# Patient Record
Sex: MALE | Race: WHITE | NOT HISPANIC OR LATINO | Employment: OTHER | URBAN - METROPOLITAN AREA
[De-identification: names, ages, dates, MRNs, and addresses within clinical notes are randomized per-mention and may not be internally consistent; named-entity substitution may affect disease eponyms.]

---

## 2017-01-04 ENCOUNTER — RADIANT APPOINTMENT (OUTPATIENT)
Dept: GENERAL RADIOLOGY | Facility: CLINIC | Age: 67
End: 2017-01-04
Attending: PHYSICIAN ASSISTANT
Payer: COMMERCIAL

## 2017-01-04 ENCOUNTER — TELEPHONE (OUTPATIENT)
Dept: FAMILY MEDICINE | Facility: CLINIC | Age: 67
End: 2017-01-04

## 2017-01-04 ENCOUNTER — OFFICE VISIT (OUTPATIENT)
Dept: URGENT CARE | Facility: URGENT CARE | Age: 67
End: 2017-01-04
Payer: COMMERCIAL

## 2017-01-04 VITALS
WEIGHT: 150 LBS | OXYGEN SATURATION: 93 % | TEMPERATURE: 98 F | HEART RATE: 92 BPM | BODY MASS INDEX: 21.47 KG/M2 | DIASTOLIC BLOOD PRESSURE: 72 MMHG | SYSTOLIC BLOOD PRESSURE: 138 MMHG | RESPIRATION RATE: 16 BRPM | HEIGHT: 70 IN

## 2017-01-04 DIAGNOSIS — S22.31XG CLOSED FRACTURE OF ONE RIB OF RIGHT SIDE WITH DELAYED HEALING, SUBSEQUENT ENCOUNTER: ICD-10-CM

## 2017-01-04 DIAGNOSIS — R07.81 RIB PAIN ON RIGHT SIDE: ICD-10-CM

## 2017-01-04 DIAGNOSIS — J20.9 ACUTE BRONCHITIS WITH COEXISTING CONDITION REQUIRING PROPHYLACTIC TREATMENT: Primary | ICD-10-CM

## 2017-01-04 PROCEDURE — 99214 OFFICE O/P EST MOD 30 MIN: CPT | Performed by: PHYSICIAN ASSISTANT

## 2017-01-04 PROCEDURE — 71101 X-RAY EXAM UNILAT RIBS/CHEST: CPT | Mod: RT

## 2017-01-04 RX ORDER — HYDROCODONE BITARTRATE AND ACETAMINOPHEN 5; 325 MG/1; MG/1
TABLET ORAL
Qty: 10 TABLET | Refills: 0 | Status: SHIPPED | OUTPATIENT
Start: 2017-01-04 | End: 2017-04-01

## 2017-01-04 RX ORDER — AZITHROMYCIN 250 MG/1
TABLET, FILM COATED ORAL
Qty: 6 TABLET | Refills: 0 | Status: SHIPPED | OUTPATIENT
Start: 2017-01-04 | End: 2017-04-01

## 2017-01-04 RX ORDER — TRAMADOL HYDROCHLORIDE 50 MG/1
50 TABLET ORAL EVERY 6 HOURS PRN
Qty: 16 TABLET | Refills: 0 | Status: SHIPPED | OUTPATIENT
Start: 2017-01-04 | End: 2017-07-08
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
## 2017-01-20 ENCOUNTER — OFFICE VISIT (OUTPATIENT)
Dept: URGENT CARE | Facility: URGENT CARE | Age: 67
End: 2017-01-20
Payer: COMMERCIAL

## 2017-01-20 VITALS
OXYGEN SATURATION: 98 % | BODY MASS INDEX: 21.47 KG/M2 | DIASTOLIC BLOOD PRESSURE: 72 MMHG | WEIGHT: 150 LBS | HEART RATE: 68 BPM | RESPIRATION RATE: 20 BRPM | TEMPERATURE: 97.9 F | HEIGHT: 70 IN | SYSTOLIC BLOOD PRESSURE: 124 MMHG

## 2017-01-20 DIAGNOSIS — J20.9 ACUTE BRONCHITIS WITH COEXISTING CONDITION REQUIRING PROPHYLACTIC TREATMENT: Primary | ICD-10-CM

## 2017-01-20 PROCEDURE — 99213 OFFICE O/P EST LOW 20 MIN: CPT | Performed by: FAMILY MEDICINE

## 2017-01-20 RX ORDER — PREDNISONE 20 MG/1
40 TABLET ORAL DAILY
Qty: 10 TABLET | Refills: 0 | Status: SHIPPED | OUTPATIENT
Start: 2017-01-20 | End: 2017-01-25

## 2017-01-20 NOTE — PROGRESS NOTES
"SUBJECTIVE:   Robert B Behr is a 67 year old male presenting with a chief complaint of Upper Respiratory/ENT symptoms:  Symptoms started 1 week ago and  include: nasal congestion, rhinorrhea and cough.  H/o COPD, has been using combivent more past few days, increased sputum production, wheezing and feeling tight and short of breath now.    Was on zithromax for same symptoms about 3 weeks ago, did help and felt well until a week ago.  No fevers.      ROS:  5-Point Review of Systems Negative-- Except as stated above.    OBJECTIVE  /72 mmHg  Pulse 68  Temp(Src) 97.9  F (36.6  C) (Oral)  Resp 20  Ht 5' 10\" (1.778 m)  Wt 150 lb (68.04 kg)  BMI 21.52 kg/m2  SpO2 98%  GENERAL:  Awake, alert and interactive. No acute distress.  HEENT:   NC/AT, EOMI, clear conjunctiva.  Clear nasal discharge.  Oropharynx moist and clear.  TM's and EAC's benign.  NECK: supple and free of adenopathy  CHEST:  Wheezing scattered throughout, no rhonchi or rales. Fair air movement with symmetric air entry throughout both lung fields.   HEART:  S1 and S2 normal, no murmurs, clicks, gallops or rubs. Regular rate and rhythm.    ASSESSMENT/PLAN    ICD-10-CM    1. Acute bronchitis with coexisting condition requiring prophylactic treatment J20.9 predniSONE (DELTASONE) 20 MG tablet     Respiratory infection with wheezing/copd exacerbation, not on steroids since Feb 2016 per chart.   We discussed the expected course and symptomatic cares in detail, including return to care if symptoms not improving as expected, do not resolve completely, or if any new or worsening symptoms develop.        "

## 2017-01-20 NOTE — NURSING NOTE
"Chief Complaint   Patient presents with     Urgent Care     URI     still having mucuc, had asthma as a child. Trouble breathing, h/o COPD. sick for several weeks.        Initial /72 mmHg  Pulse 68  Temp(Src) 97.9  F (36.6  C) (Oral)  Resp 20  Ht 5' 10\" (1.778 m)  Wt 150 lb (68.04 kg)  BMI 21.52 kg/m2  SpO2 98% Estimated body mass index is 21.52 kg/(m^2) as calculated from the following:    Height as of this encounter: 5' 10\" (1.778 m).    Weight as of this encounter: 150 lb (68.04 kg).  BP completed using cuff size: regular  "

## 2017-02-02 ENCOUNTER — TELEPHONE (OUTPATIENT)
Dept: FAMILY MEDICINE | Facility: CLINIC | Age: 67
End: 2017-02-02

## 2017-02-02 ENCOUNTER — CARE COORDINATION (OUTPATIENT)
Dept: GERIATRIC MEDICINE | Facility: CLINIC | Age: 67
End: 2017-02-02

## 2017-02-02 DIAGNOSIS — J44.1 COPD EXACERBATION (H): Primary | ICD-10-CM

## 2017-02-02 NOTE — TELEPHONE ENCOUNTER
Reason for Call:  Other prescription    Detailed comments: Ravin dan is requesting a nebulizer to stabilize his breathing when it becomes too difficult. He is already using an inhaler but thinks he will benefit better with a nebulizer based on his conversation with Leeanna San RN Case Manager with [facility]; she can be reached at [phone] for further questions.    Phone Number Patient can be reached at: Home number on file [phone] (home)    Best Time: anytime    Can we leave a detailed message on this number? YES    Call taken on 2/2/2017 at 4:18 PM by Devi Fuentes

## 2017-02-03 RX ORDER — IPRATROPIUM BROMIDE AND ALBUTEROL SULFATE 2.5; .5 MG/3ML; MG/3ML
1 SOLUTION RESPIRATORY (INHALATION) EVERY 6 HOURS PRN
Qty: 30 VIAL | Refills: 1 | Status: SHIPPED | OUTPATIENT
Start: 2017-02-03 | End: 2017-03-31

## 2017-02-03 NOTE — TELEPHONE ENCOUNTER
Called pt and notified pt about the neb machine and solution.  Pt will come  neb machine.   Please have pt sign DME form when pt  neb machine.     Placed order form and DME form in nurse basket in [place].    Thanks.    Tonya Michael MA

## 2017-02-03 NOTE — TELEPHONE ENCOUNTER
rx done for neb machine.  I put in an order for duoneb solution (this is the same medication as his Combivent inhaler), which may be used up to 4 times daily, but it says it needs a PA.  Could also use plain albuterol in the neb, but same issue.

## 2017-02-04 ENCOUNTER — TELEPHONE (OUTPATIENT)
Dept: FAMILY MEDICINE | Facility: CLINIC | Age: 67
End: 2017-02-04

## 2017-02-04 ENCOUNTER — TELEPHONE (OUTPATIENT)
Dept: NURSING | Facility: CLINIC | Age: 67
End: 2017-02-04

## 2017-02-04 NOTE — TELEPHONE ENCOUNTER
Call Type: Triage Call    Presenting Problem: mrn [phone]  Pt has questions on how he is  using his nebuelizer machine. [phone]  aw  Medication  questions.  Answered.  Triage Note:  Guideline Title: Medication Questions - Adult  Recommended Disposition: Provide Health Information  Original Inclination: Wanted to speak with a nurse  Override Disposition:  Intended Action: Follow advice given  Physician Contacted: No  Caller has medication question(s) that was answered with available resources ?  YES  Pregnant and has medication questions regarding prescribed and/or nonprescribed  medication(s) not covered by available resources ? NO  Breastfeeding and has medication questions regarding prescribed and/or  nonprescribed medication(s) not covered by available resources ? NO  Requested information on how to safely dispose of  or unused medications ?  NO  Sign(s) or symptom(s) associated with a diagnosed condition or with a new illness  ? NO  Prescription ordered today and not available at pharmacy putting patient at  clinical risk ? NO  Recurrence of a symptom(s) or illness post prescribed medication treatment AND  provider instructed patient to call if symptom(s) returned. ? NO  Unable to obtain prescribed medication related to available resources AND  situation poses immediate clinical risk ? NO  Pharmacy calling to clarify prescription order. ? NO  Requests refill of prescribed medication that does NOT have a valid refill; lack  of medication may cause clinical risk to patient if not available. ? NO  Has questions about prescribed and/or nonprescribed medications not covered by  available resources ? NO  Pharmacy calling with prescription question; answered per department policy. ? NO  Requests refill of prescribed medication without valid refills OR requests refill  of prescribed medication with valid refills but does not have prescription number  (no RX container); lack of medication does not put  patient at clinical risk ? NO  Physician Instructions:  Care Advice:

## 2017-02-04 NOTE — TELEPHONE ENCOUNTER
Ravin called, asking about using a steroid medicaton in his nebulizer.  He stated he did well on prednisone.  He used Duoneb at bedtime last night and it helped. He said though, he woke a few hours later needing it again. Please give him a call.  He stated it's okay to leave a message if you miss him when you call.  Joy SANDHU RN [facility]

## 2017-02-06 NOTE — PROGRESS NOTES
TC from client stating he would like a home nebulizer because it would help his bronchitis. He takes combovent and doesn't want to use it too much. CM encouraged client to discuss with his PCP to see if she will prescribe a nebulizer for him. He stated he would do this.  Leeanna San RN Case Manager  [facility]  [phone]

## 2017-02-06 NOTE — TELEPHONE ENCOUNTER
"Felt better when on oral prednisone. Seen twice in  recently for bronchitis. Hx COPD . Today his breathing is \"a little rough\" . He is able to speak in full sentences on the phone.  Pt given appt this afternoon in clinic    Suad Boggs, RN, BSN       "

## 2017-02-23 ENCOUNTER — CARE COORDINATION (OUTPATIENT)
Dept: GERIATRIC MEDICINE | Facility: CLINIC | Age: 67
End: 2017-02-23

## 2017-02-25 NOTE — PROGRESS NOTES
TC from client ([phone]). He needed assistance with his bus Pass. CMS Jazz Glover will assist him with that. He also had a question about his calcium which was covered in November, but now they are telling him it isn't covered. He uses [facility] on [address]. TC to [facility]. When they filled his calcium the doctor changed the strength of Vit D and that wasn't covered. They will request the same as November's order to the doc.TC to client informing him of this.  Leeanna San, RN Case Manager  [facility]  [phone]

## 2017-02-27 NOTE — PROGRESS NOTES
Per [facility] transportation regarding bus pass.  Called member to update, confirmed number on card.  The bus pass that was issued to the member is bus pass #[id number]. This number should match the number on the back side of the card on the left side corner. Since these bus cards are reusable every month, they will always show a $0 balance. The member should have no problem using the bus card even though it says it has a balance of $0. But if the member does run into an issue where it s not accepting the card then please let us know. We can deactivate the card that the member has now and send out a new one.    Evie Carroll    P: [phone]    Jazz Glover  CMS Supervisor  [facility]  [phone]

## 2017-03-03 DIAGNOSIS — F17.200 TOBACCO USE DISORDER: ICD-10-CM

## 2017-03-03 NOTE — TELEPHONE ENCOUNTER
Calling for refills of two different strengths of Nicoderm patches (14 mg & 21mg).  Patient is out of them and would like them refilled today.  OK to leave message on pt's voicemail.

## 2017-03-06 RX ORDER — NICOTINE 21 MG/24HR
1 PATCH, TRANSDERMAL 24 HOURS TRANSDERMAL EVERY 24 HOURS
Qty: 30 PATCH | Refills: 1 | Status: SHIPPED | OUTPATIENT
Start: 2017-03-06 | End: 2017-07-08

## 2017-03-06 RX ORDER — NICOTINE 21 MG/24HR
1 PATCH, TRANSDERMAL 24 HOURS TRANSDERMAL EVERY 24 HOURS
Qty: 28 PATCH | Refills: 2 | Status: SHIPPED | OUTPATIENT
Start: 2017-03-06 | End: 2017-04-01

## 2017-03-14 ENCOUNTER — OFFICE VISIT (OUTPATIENT)
Dept: URGENT CARE | Facility: URGENT CARE | Age: 67
End: 2017-03-14
Payer: COMMERCIAL

## 2017-03-14 ENCOUNTER — TELEPHONE (OUTPATIENT)
Dept: FAMILY MEDICINE | Facility: CLINIC | Age: 67
End: 2017-03-14

## 2017-03-14 VITALS
DIASTOLIC BLOOD PRESSURE: 80 MMHG | HEART RATE: 84 BPM | HEIGHT: 70 IN | TEMPERATURE: 97.3 F | SYSTOLIC BLOOD PRESSURE: 144 MMHG | WEIGHT: 155 LBS | BODY MASS INDEX: 22.19 KG/M2 | OXYGEN SATURATION: 97 %

## 2017-03-14 DIAGNOSIS — J44.1 COPD EXACERBATION (H): ICD-10-CM

## 2017-03-14 DIAGNOSIS — J20.9 ACUTE BRONCHITIS WITH SYMPTOMS > 10 DAYS: Primary | ICD-10-CM

## 2017-03-14 PROCEDURE — 99213 OFFICE O/P EST LOW 20 MIN: CPT | Performed by: FAMILY MEDICINE

## 2017-03-14 RX ORDER — PREDNISONE 20 MG/1
40 TABLET ORAL DAILY
Qty: 14 TABLET | Refills: 0 | OUTPATIENT
Start: 2017-03-14 | End: 2024-08-08

## 2017-03-14 RX ORDER — PREDNISONE 20 MG/1
40 TABLET ORAL DAILY
Qty: 14 TABLET | Refills: 0 | Status: SHIPPED | OUTPATIENT
Start: 2017-03-14 | End: 2017-03-21

## 2017-03-14 RX ORDER — AZITHROMYCIN 250 MG/1
TABLET, FILM COATED ORAL
Qty: 6 TABLET | Refills: 0 | Status: SHIPPED | OUTPATIENT
Start: 2017-03-14 | End: 2017-04-01

## 2017-03-14 NOTE — MR AVS SNAPSHOT
After Visit Summary   3/14/2017    Robert B Behr    MRN: [id number]           Patient Information     Date Of Birth          1950        Visit Information        Provider Department      3/14/2017 5:00 PM Dawn West MD [facility]        Today's Diagnoses     Acute bronchitis with symptoms > 10 days    -  1    COPD exacerbation (H)          Care Instructions      What Is Acute Bronchitis?  Acute or short-term bronchitis last for days or weeks. It occurs when the bronchial tubes (airways in the lungs) are irritated by a virus, bacteria, or allergen. This causes a cough that produces yellow or greenish mucus.  Inside healthy lungs    Air travels in and out of the lungs through the airways. The linings of these airways produce sticky mucus. This mucus traps particles that enter the lungs. Tiny structures called cilia then sweep the particles out of the airways.     Healthy airway: Airways are normally open. Air moves in and out easily.      Healthy cilia: Tiny, hairlike cilia sweep mucus and particles up and out of the airways.   Lungs with bronchitis  Bronchitis often occurs with a cold or the flu virus. The airways become inflamed (red and swollen). There is a deep  hacking  cough from the extra mucus. Other symptoms may include:    Wheezing    Chest discomfort    Shortness of breath    Mild fever  A second infection, this time due to bacteria, may then occur. And airways irritated by allergens or smoke are more likely to get infected.        Inflamed airway: Inflammation and extra mucus narrow the airway, causing shortness of breath.      Impaired cilia: Extra mucus impairs cilia, causing congestion and wheezing. Smoking makes the problem worse.   Making a diagnosis  A physical exam, health history, and certain tests help your healthcare provider make the diagnosis.  Health history  Your healthcare provider will ask you about your symptoms.  The exam  Your  provider listens to your chest for signs of congestion. He or she may also check your ears, nose, and throat.  Possible tests    A sputum test for bacteria. This requires a sample of mucus from the lungs.    A nasal or throat swab for bacterial infection.    A chest X-ray if your healthcare provider thinks you have pneumonia.    Tests to check for an underlying condition, such as allergies, asthma, or COPD. You may need to see a specialist for more lung function testing.  Treating a cough  The main treatment for bronchitis is easing symptoms. Avoiding smoke, allergens, and other things that trigger coughing can often help. If the infection is bacterial, you may be given antibiotics. During the illness, it's important to get plenty of sleep. To ease symptoms:    Don t smoke, and avoid secondhand smoke.    Use a humidifier, or breathe in steam from a hot shower. This may help loosen mucus.    Drink a lot of water and juice. They can soothe the throat and may help thin mucus.    Sit up or use extra pillows when in bed to help lessen coughing and congestion.    Ask your provider about using cough medicine, pain and fever medicine, or a decongestant.  Antibiotics  Most cases of bronchitis are caused by cold or flu viruses. Antibiotics don t treat viral illness. Taking antibiotics when they are not needed increases your risk of getting an infection later that is antibiotic-resistant. Your provider will prescribe antibiotics if the infection is caused by bacteria. If they are prescribed:    Take the medicine until it is used up, even if symptoms have improved. If you don t, the bronchitis may come back.    Take them as directed. For instance, some medicines should be taken with food.    Ask your provider or pharmacist what side effects are common, and what to do about them.  Follow-up care  You should see your provider again in 2 to 3 weeks. By this time, symptoms should have improved. An infection that lasts longer may  mean you have a more serious problem.  Prevention    Avoid tobacco smoke. If you smoke, quit. Stay away from smoky places. Ask friends and family not to smoke around you, or in your home or car.    Get checked for allergies.    Ask your provider about getting a yearly flu shot, and pneumococcal or pneumonia shots.    Wash your hands often. This helps reduce the chance of picking up viruses that cause colds and flu.  Call your healthcare provider if:    Symptoms worsen, or new symptoms develop.    Breathing problems worsen or  become severe.    Symptoms don t get better within a week, or within 3 days of taking antibiotics.     0958-9289 [employer]. [address] [place] [address]. All rights reserved. This information is not intended as a substitute for professional medical care. Always follow your healthcare professional's instructions.        Discharge Instructions: COPD  You have been diagnosed with chronic obstructive pulmonary disease (COPD). This is a name given to a group of diseases that limit the flow of air in and out of your lungs. This makes it harder to breathe. With COPD, you are also more likely to get lung infections. COPD includes chronic bronchitis and emphysema. COPD is most often caused by heavy, long-term cigarette smoking.  Home care  Quit smoking    If you smoke, quit. It is the best thing you can do for your COPD and your overall health.    Join a stop-smoking program. There are even telephone, text message, and Internet programs to help you quit.    Ask your health care provider about medications or other methods to help you quit.    Ask family members to quit smoking as well.    Don't allow people to smoke in your home, in your car, or when they are around you.  Protect yourself from infection    Wash your hands often. Do your best to keep your hands away from your face. Most germs are spread from your hands to your mouth.    Get a flu shot every year. Also ask  your provider about pneumonia vaccines.    Avoid crowds. It's especially important to do this in the winter when more people have colds and flu.    To stay healthy, get enough sleep, exercise regularly, and eat a balanced diet. You should:    Get about 8 hours of sleep every night.    Try to exercise for at least 30 minutes on most days.    Have healthy foods including fruits and vegetables, 100% whole grains, lean meats and fish, and low-fat dairy products. Try to stay away from foods high in fats and sugar.  Take your medications  Take your medications exactly as directed. Don't skip doses.  Manage your stress  Stress can make COPD worse. Use this stress management technique:    Find a quiet place and sit or lie in a comfortable position.    Close your eyes and perform breathing exercises for several minutes. Ask your provider about the best way to breathe.  Pulmonary rehabilitation    Pulmonary rehab can help you feel better. These programs include exercise, breathing techniques, information about COPD, counseling, and help for smokers.    Ask your provider or your local hospital about programs in your area.  When to call your health care provider  Call your provider immediately if you have any of the following:    Shortness of breath, wheezing, or coughing    Increased mucus    Yellow, green, bloody, or smelly mucus    Fever or chills    Tightness in your chest that does not go away with rest or medication    An irregular heartbeat or a feeling that your heart is beating very fast    Swollen ankles     [id number] [employer]. [address] [place] [address]. All rights reserved. This information is not intended as a substitute for professional medical care. Always follow your healthcare professional's instructions.              Follow-ups after your visit        Who to contact     If you have questions or need follow up information about today's clinic visit or your schedule please contact  "[facility] directly at [phone].  Normal or non-critical lab and imaging results will be communicated to you by MyChart, letter or phone within 4 business days after the clinic has received the results. If you do not hear from us within 7 days, please contact the clinic through [facility] or phone. If you have a critical or abnormal lab result, we will notify you by phone as soon as possible.  Submit refill requests through [facility] or call your pharmacy and they will forward the refill request to us. Please allow 3 business days for your refill to be completed.          Additional Information About Your Visit        [employer] Information     [facility] gives you secure access to your electronic health record. If you see a primary care provider, you can also send messages to your care team and make appointments. If you have questions, please call your primary care clinic.  If you do not have a primary care provider, please call [phone] and they will assist you.        Care EveryWhere ID     This is your Care EveryWhere ID. This could be used by other organizations to access your [facility] medical records  [id number]        Your Vitals Were     Pulse Temperature Height Pulse Oximetry BMI (Body Mass Index)       84 97.3  F (36.3  C) (Tympanic) 5' 10\" (1.778 m) 97% 22.24 kg/m2        Blood Pressure from Last 3 Encounters:   03/14/17 144/80   01/20/17 124/72   01/04/17 138/72    Weight from Last 3 Encounters:   03/14/17 155 lb (70.3 kg)   01/20/17 150 lb (68 kg)   01/04/17 150 lb (68 kg)              Today, you had the following     No orders found for display         Today's Medication Changes          These changes are accurate as of: 3/14/17  5:24 PM.  If you have any questions, ask your nurse or doctor.               Start taking these medicines.        Dose/Directions    predniSONE 20 MG tablet   Commonly known as:  DELTASONE   Used for:  COPD exacerbation (H)   Started by:  Marylou Francois " Dawn Oliver MD        Dose:  40 mg   Take 2 tablets (40 mg) by mouth daily for 7 days   Quantity:  14 tablet   Refills:  0         These medicines have changed or have updated prescriptions.        Dose/Directions    * azithromycin 250 MG tablet   Commonly known as:  ZITHROMAX   This may have changed:  Another medication with the same name was added. Make sure you understand how and when to take each.   Used for:  Acute bronchitis with coexisting condition requiring prophylactic treatment   Changed by:  Beverly Melendez PA-C        Two tablets first day, then one tablet daily for four days.   Quantity:  6 tablet   Refills:  0       * azithromycin 250 MG tablet   Commonly known as:  ZITHROMAX   This may have changed:  You were already taking a medication with the same name, and this prescription was added. Make sure you understand how and when to take each.   Used for:  Acute bronchitis with symptoms > 10 days, COPD exacerbation (H)   Changed by:  Dawn West MD        Two tablets first day, then one tablet daily for four days.   Quantity:  6 tablet   Refills:  0       * Notice:  This list has 2 medication(s) that are the same as other medications prescribed for you. Read the directions carefully, and ask your doctor or other care provider to review them with you.         Where to get your medicines      These medications were sent to [facility] - [place], [place] - [address]  [address], [place] [address]     Phone:  [phone]     azithromycin 250 MG tablet         Call your pharmacy to confirm that your medication is ready for pickup. It may take up to 24 hours for them to receive the prescription. If the prescription is not ready within 3 business days, please contact your clinic or your provider.     We will let you know when these medications are ready. If you don't hear back within 3 business days, please contact us.     predniSONE 20 MG tablet                 Primary Care Provider Office Phone # Fax #    Fina Frausto [phone]       [facility] [address] [place] [address]        Thank you!     Thank you for choosing [facility]  for your care. Our goal is always to provide you with excellent care. Hearing back from our patients is one way we can continue to improve our services. Please take a few minutes to complete the written survey that you may receive in the mail after your visit with us. Thank you!             Your Updated Medication List - Protect others around you: Learn how to safely use, store and throw away your medicines at www.disposemymeds.org.          This list is accurate as of: 3/14/17  5:24 PM.  Always use your most recent med list.                   Brand Name Dispense Instructions for use    albuterol 108 (90 BASE) MCG/ACT Inhaler    PROAIR HFA/PROVENTIL HFA/VENTOLIN HFA    1 Inhaler    Inhale 2 puffs into the lungs every 4 hours as needed for shortness of breath / dyspnea or wheezing       ascorbic acid 1000 MG Tabs    vitamin C    90 tablet    Take 1 tablet (1,000 mg) by mouth daily       * azithromycin 250 MG tablet    ZITHROMAX    6 tablet    Two tablets first day, then one tablet daily for four days.       * azithromycin 250 MG tablet    ZITHROMAX    6 tablet    Two tablets first day, then one tablet daily for four days.       calcium 1200 [id number] MG-UNIT Chew     90 tablet    Take 1 tablet by mouth daily Does not take the chews but takes the tablet!       CALCIUM 1200+D3 600- MG-MG-UNIT Tb24   Generic drug:  Calcium-Magnesium-Vitamin D      Reported on 3/14/2017       calcium-vitamin D 600-400 MG-UNIT per tablet    calcium 600 + D    180 tablet    Take 1 tablet by mouth 2 times daily       HYDROcodone-acetaminophen 5-325 MG per tablet    NORCO    10 tablet    1/2 to 1 tab every 6 hours PRN Pain       * Ipratropium-Albuterol  MCG/ACT inhaler    COMBIVENT  RESPIMAT    1 Inhaler    Inhale 1 puff into the lungs 4 times daily Not to exceed 6 doses per day.       * ipratropium - albuterol 0.5 mg/2.5 mg/3 mL 0.5-2.5 (3) MG/3ML neb solution    DUONEB    30 vial    Take 1 vial (3 mLs) by nebulization every 6 hours as needed for shortness of breath / dyspnea or wheezing       * nicotine 7 MG/24HR 24 hr patch    NICODERM CQ    30 patch    Place 1 patch onto the skin every 24 hours       * nicotine 21 MG/24HR 24 hr patch    NICODERM CQ    30 patch    Place 1 patch onto the skin every 24 hours       * nicotine 14 MG/24HR 24 hr patch    NICODERM CQ    28 patch    Place 1 patch onto the skin every 24 hours       omega 3 1000 MG Caps     90 capsule    Take 1 g by mouth daily       order for DME     1 each    Equipment being ordered: nebulizer machine       predniSONE 20 MG tablet    DELTASONE    14 tablet    Take 2 tablets (40 mg) by mouth daily for 7 days       traMADol 50 MG tablet    ULTRAM    16 tablet    Take 1 tablet (50 mg) by mouth every 6 hours as needed for pain maximum 6 tablet(s) per day       UNABLE TO FIND      Reported on 3/14/2017       vitamin A 52595 UNIT capsule    [facility] VITAMIN A    180 capsule    Take 1 capsule (10,000 Units) by mouth daily       vitamin B complex with vitamin C Tabs tablet     180 tablet    Take 1 tablet by mouth 2 times daily With Folic acid       vitamin E 1000 UNIT capsule    [facility] vitamin E    90 capsule    Take 1 capsule (1,000 Units) by mouth daily       * Notice:  This list has 7 medication(s) that are the same as other medications prescribed for you. Read the directions carefully, and ask your doctor or other care provider to review them with you.

## 2017-03-14 NOTE — PATIENT INSTRUCTIONS
What Is Acute Bronchitis?  Acute or short-term bronchitis last for days or weeks. It occurs when the bronchial tubes (airways in the lungs) are irritated by a virus, bacteria, or allergen. This causes a cough that produces yellow or greenish mucus.  Inside healthy lungs    Air travels in and out of the lungs through the airways. The linings of these airways produce sticky mucus. This mucus traps particles that enter the lungs. Tiny structures called cilia then sweep the particles out of the airways.     Healthy airway: Airways are normally open. Air moves in and out easily.      Healthy cilia: Tiny, hairlike cilia sweep mucus and particles up and out of the airways.   Lungs with bronchitis  Bronchitis often occurs with a cold or the flu virus. The airways become inflamed (red and swollen). There is a deep  hacking  cough from the extra mucus. Other symptoms may include:    Wheezing    Chest discomfort    Shortness of breath    Mild fever  A second infection, this time due to bacteria, may then occur. And airways irritated by allergens or smoke are more likely to get infected.        Inflamed airway: Inflammation and extra mucus narrow the airway, causing shortness of breath.      Impaired cilia: Extra mucus impairs cilia, causing congestion and wheezing. Smoking makes the problem worse.   Making a diagnosis  A physical exam, health history, and certain tests help your healthcare provider make the diagnosis.  Health history  Your healthcare provider will ask you about your symptoms.  The exam  Your provider listens to your chest for signs of congestion. He or she may also check your ears, nose, and throat.  Possible tests    A sputum test for bacteria. This requires a sample of mucus from the lungs.    A nasal or throat swab for bacterial infection.    A chest X-ray if your healthcare provider thinks you have pneumonia.    Tests to check for an underlying condition, such as allergies, asthma, or COPD. You may need  to see a specialist for more lung function testing.  Treating a cough  The main treatment for bronchitis is easing symptoms. Avoiding smoke, allergens, and other things that trigger coughing can often help. If the infection is bacterial, you may be given antibiotics. During the illness, it's important to get plenty of sleep. To ease symptoms:    Don t smoke, and avoid secondhand smoke.    Use a humidifier, or breathe in steam from a hot shower. This may help loosen mucus.    Drink a lot of water and juice. They can soothe the throat and may help thin mucus.    Sit up or use extra pillows when in bed to help lessen coughing and congestion.    Ask your provider about using cough medicine, pain and fever medicine, or a decongestant.  Antibiotics  Most cases of bronchitis are caused by cold or flu viruses. Antibiotics don t treat viral illness. Taking antibiotics when they are not needed increases your risk of getting an infection later that is antibiotic-resistant. Your provider will prescribe antibiotics if the infection is caused by bacteria. If they are prescribed:    Take the medicine until it is used up, even if symptoms have improved. If you don t, the bronchitis may come back.    Take them as directed. For instance, some medicines should be taken with food.    Ask your provider or pharmacist what side effects are common, and what to do about them.  Follow-up care  You should see your provider again in 2 to 3 weeks. By this time, symptoms should have improved. An infection that lasts longer may mean you have a more serious problem.  Prevention    Avoid tobacco smoke. If you smoke, quit. Stay away from smoky places. Ask friends and family not to smoke around you, or in your home or car.    Get checked for allergies.    Ask your provider about getting a yearly flu shot, and pneumococcal or pneumonia shots.    Wash your hands often. This helps reduce the chance of picking up viruses that cause colds and flu.  Call  your healthcare provider if:    Symptoms worsen, or new symptoms develop.    Breathing problems worsen or  become severe.    Symptoms don t get better within a week, or within 3 days of taking antibiotics.     0776-2405 [employer]. [address] [place] [address], [place] [address]. All rights reserved. This information is not intended as a substitute for professional medical care. Always follow your healthcare professional's instructions.        Discharge Instructions: COPD  You have been diagnosed with chronic obstructive pulmonary disease (COPD). This is a name given to a group of diseases that limit the flow of air in and out of your lungs. This makes it harder to breathe. With COPD, you are also more likely to get lung infections. COPD includes chronic bronchitis and emphysema. COPD is most often caused by heavy, long-term cigarette smoking.  Home care  Quit smoking    If you smoke, quit. It is the best thing you can do for your COPD and your overall health.    Join a stop-smoking program. There are even telephone, text message, and Internet programs to help you quit.    Ask your health care provider about medications or other methods to help you quit.    Ask family members to quit smoking as well.    Don't allow people to smoke in your home, in your car, or when they are around you.  Protect yourself from infection    Wash your hands often. Do your best to keep your hands away from your face. Most germs are spread from your hands to your mouth.    Get a flu shot every year. Also ask your provider about pneumonia vaccines.    Avoid crowds. It's especially important to do this in the winter when more people have colds and flu.    To stay healthy, get enough sleep, exercise regularly, and eat a balanced diet. You should:    Get about 8 hours of sleep every night.    Try to exercise for at least 30 minutes on most days.    Have healthy foods including fruits and vegetables, 100% whole grains, lean meats and  fish, and low-fat dairy products. Try to stay away from foods high in fats and sugar.  Take your medications  Take your medications exactly as directed. Don't skip doses.  Manage your stress  Stress can make COPD worse. Use this stress management technique:    Find a quiet place and sit or lie in a comfortable position.    Close your eyes and perform breathing exercises for several minutes. Ask your provider about the best way to breathe.  Pulmonary rehabilitation    Pulmonary rehab can help you feel better. These programs include exercise, breathing techniques, information about COPD, counseling, and help for smokers.    Ask your provider or your local hospital about programs in your area.  When to call your health care provider  Call your provider immediately if you have any of the following:    Shortness of breath, wheezing, or coughing    Increased mucus    Yellow, green, bloody, or smelly mucus    Fever or chills    Tightness in your chest that does not go away with rest or medication    An irregular heartbeat or a feeling that your heart is beating very fast    Swollen ankles     1115-1394 [employer]. [address] [place] [address], [place] [address]. All rights reserved. This information is not intended as a substitute for professional medical care. Always follow your healthcare professional's instructions.

## 2017-03-14 NOTE — NURSING NOTE
"Chief Complaint   Patient presents with     Urgent Care     Cough     c/o cough and chest congestion for 2 weeks off and on        Initial /80 (BP Location: Left arm, Patient Position: Chair, Cuff Size: Adult Regular)  Pulse 84  Temp 97.3  F (36.3  C) (Tympanic)  Ht 5' 10\" (1.778 m)  Wt 155 lb (70.3 kg)  SpO2 97%  BMI 22.24 kg/m2 Estimated body mass index is 22.24 kg/(m^2) as calculated from the following:    Height as of this encounter: 5' 10\" (1.778 m).    Weight as of this encounter: 155 lb (70.3 kg).  Medication Reconciliation: complete   Anahi David MA    "

## 2017-03-14 NOTE — PROGRESS NOTES
"SUBJECTIVE:   Robert B Behr is a 67 year old male who complains of cough described as productive of white sputum, chest congestion, wheezing, shortness of breath and hard to take deep breath for 2 weeks. He denies a history of no other unusual symptoms. He denies a history of asthma but has COPD.  Using Combivent MDI and Duo nebs BID. Patient does not smoke cigarettes.       OBJECTIVE:  /80 (BP Location: Left arm, Patient Position: Chair, Cuff Size: Adult Regular)  Pulse 84  Temp 97.3  F (36.3  C) (Tympanic)  Ht 5' 10\" (1.778 m)  Wt 155 lb (70.3 kg)  SpO2 97%  BMI 22.24 kg/m2   Appearance: in no apparent distress.   ENT- ENT exam normal except mild nasal congestion, no neck nodes or sinus tenderness.   Chest - no tachypnea, retractions or cyanosis, mild expiratory wheezing/rhonchi heard both upper lobes, air entry reduced diffusely throughout both lungs and S1, S2 normal, no murmur, no gallop, rate regular.    ASSESSMENT:   Bronchitis > 10 days  COPD exacerbation    PLAN:  As per orders.   Symptomatic therapy suggested: push fluids, rest, use vaporizer or mist needed  and use cough suppressant of choice as needed. Call or return to clinic prn if these symptoms worsen or fail to improve as anticipated.  Dawn Hickman MD   "

## 2017-03-20 ENCOUNTER — CARE COORDINATION (OUTPATIENT)
Dept: GERIATRIC MEDICINE | Facility: CLINIC | Age: 67
End: 2017-03-20

## 2017-03-20 DIAGNOSIS — M81.0 OSTEOPOROSIS: ICD-10-CM

## 2017-03-20 NOTE — TELEPHONE ENCOUNTER
Medication Detail      Disp Refills Start End ARELY   calcium-vitamin D (CALCIUM 600 + D) 600-400 MG-UNIT per tablet 180 tablet 0 12/19/2016  No   Sig: Take 1 tablet by mouth 2 times daily       Last Office Visit with NITO, ELODIA or [facility] prescribing provider: 9-28-16

## 2017-03-20 NOTE — PROGRESS NOTES
Per Bijan, arranged transportation thru [facility] for the below appt:  Appt Date: Wed 3/22 @ 10:45am  Clinic Name:  [facility] [address] [place]  Transportation Provider: [employer]   time:  10:00am    Notified Ravin of  time.    Patricia Digatojayde  Case Management Specialist   [facility]   [phone]

## 2017-03-22 NOTE — PROGRESS NOTES
Rec'd call from member.  He was dropped at the address below, however this is the wrong clinic.  Member reports this is the correct clinic:  [facility]   [address], [place] [address]   [phone]  Ravin states he will catch a bus to this clinic.  Will call if further questions.  Jazz Glover  CMS Supervisor  [facility]  [phone]

## 2017-03-27 ENCOUNTER — CARE COORDINATION (OUTPATIENT)
Dept: GERIATRIC MEDICINE | Facility: CLINIC | Age: 67
End: 2017-03-27

## 2017-03-27 NOTE — PROGRESS NOTES
Per Giovanny, arranged transportation thru [facility] for the below appt:  Appt Date: 3/29 and 4/5 both at 11am  Clinic Name:  [facility]   Transportation Provider: [employer]   time:  10-10:30am    Notified CM of  time. (CM states not to call member with  time)    Patricia Robb  Case Management Specialist   [facility]   [phone]

## 2017-03-29 NOTE — PROGRESS NOTES
TC from client. He stated that [facility] has a smoking cessation acupuncture program that he would like to go to. TC to [facility] to see if [facility] acupuncture is in network which it isn't. They provided names of several acupuncture locations within network.    [facility] in [place] [phone]  [facility] [phone]  [facility] [phone]    TC to client to provide him with this information.    Leeanna San RN Case Manager  [facility]  [phone]

## 2017-03-31 DIAGNOSIS — J44.1 COPD EXACERBATION (H): ICD-10-CM

## 2017-03-31 RX ORDER — IPRATROPIUM BROMIDE AND ALBUTEROL SULFATE 2.5; .5 MG/3ML; MG/3ML
1 SOLUTION RESPIRATORY (INHALATION) EVERY 6 HOURS PRN
Qty: 30 VIAL | Refills: 1 | Status: SHIPPED | OUTPATIENT
Start: 2017-03-31 | End: 2017-06-30

## 2017-03-31 NOTE — TELEPHONE ENCOUNTER
Reason for Call:  Medication or medication refill:    Do you use a [facility]?  Name of the pharmacy and phone number for the current request:  [facility] - [phone]    Name of the medication requested: ipratropium - albuterol 0.5 mg/2.5 mg/3 mL (DUONEB) 0.5-2.5 (3) MG/3ML neb solution    Other request: Pt is requesting for a refill, has enough to last until tomorrow 04/01. He is requesting for the solution that has steroids in it. Please follow up, thank you!    Can we leave a detailed message on this number? YES    Phone number patient can be reached at: Home number on file [phone] (home)    Best Time: anytime    Call taken on 3/31/2017 at 1:07 PM by Joy Bruner

## 2017-03-31 NOTE — TELEPHONE ENCOUNTER
Fina CARRILLO review refill of Duoneb soln.  COPD pt.  Last seen by you 9/16. Had a couple UC visits since for COPD flare ups.  Saw pulmonary MD  12/16.  Patient is calling for this medicaton refill.     Routing refill request to provider for review/approval because:  Drug not on the [facility] refill protocol   Felicia Yanez RN

## 2017-04-01 ENCOUNTER — OFFICE VISIT (OUTPATIENT)
Dept: URGENT CARE | Facility: URGENT CARE | Age: 67
End: 2017-04-01
Payer: COMMERCIAL

## 2017-04-01 VITALS
SYSTOLIC BLOOD PRESSURE: 140 MMHG | BODY MASS INDEX: 22.19 KG/M2 | HEART RATE: 82 BPM | TEMPERATURE: 97.6 F | OXYGEN SATURATION: 97 % | HEIGHT: 70 IN | WEIGHT: 155 LBS | DIASTOLIC BLOOD PRESSURE: 80 MMHG

## 2017-04-01 DIAGNOSIS — J44.1 COPD EXACERBATION (H): Primary | ICD-10-CM

## 2017-04-01 PROCEDURE — 99214 OFFICE O/P EST MOD 30 MIN: CPT | Performed by: PHYSICIAN ASSISTANT

## 2017-04-01 RX ORDER — AZITHROMYCIN 250 MG/1
TABLET, FILM COATED ORAL
Qty: 6 TABLET | Refills: 0 | Status: SHIPPED | OUTPATIENT
Start: 2017-04-01 | End: 2017-05-17

## 2017-04-01 RX ORDER — PREDNISONE 20 MG/1
40 TABLET ORAL DAILY
Qty: 10 TABLET | Refills: 0 | Status: SHIPPED | OUTPATIENT
Start: 2017-04-01 | End: 2017-04-06

## 2017-04-01 NOTE — NURSING NOTE
"Robert B Behr;   Chief Complaint   Patient presents with     Cough     onset a few weeks ago, hx. of copd - recent antibiotic and prednisone - felt better but now using inhalers more than usual has nebulizer at home - hoping for steroid medication for neb machine      Urgent Care     Initial /80 (BP Location: Right arm, Patient Position: Chair, Cuff Size: Adult Regular)  Pulse 82  Temp 97.6  F (36.4  C) (Oral)  Ht 5' 10\" (1.778 m)  Wt 155 lb (70.3 kg)  SpO2 97%  BMI 22.24 kg/m2 Estimated body mass index is 22.24 kg/(m^2) as calculated from the following:    Height as of this encounter: 5' 10\" (1.778 m).    Weight as of this encounter: 155 lb (70.3 kg)..  BP completed using cuff size regular.  Melissa Dahl R.N.  "

## 2017-04-01 NOTE — PATIENT INSTRUCTIONS
COPD Flare    You have had a flare-up of your COPD.  COPD, or chronic obstructive pulmonary disease, is a common lung disease. It causes your airways to become irritated and narrower. This makes it harder for you to breathe. Emphysema and chronic bronchitis are both types of COPD. This is a chronic condition, which means you always have it. Sometimes it gets worse. When this happens, it is called a flare-up.  Symptoms of COPD  People with COPD may have symptoms most of the time. In a flare-up, your symptoms get worse. These symptoms may mean you are having a flare-up:    Shortness of breath, shallow or rapid breathing, or wheezing that gets worse    Lung infection    Cough that gets worse    More mucus, thicker mucus or mucus of a different color    Tiredness, decreased energy, or trouble doing your usual activities    Fever    Chest tightness    Your symptoms don t get better even when you use your usual medicines, inhalers, and nebulizer    Trouble talking    You feel confused  Causes of flare-ups  Unfortunately, a flare-up can happen even though you did everything right, and you followed your doctor s instructions. Some causes of flare-ups are:    Smoking or secondhand smoke    Colds, the flu, or respiratory infections    Air pollution    Sudden change in the weather    Dust, irritating chemicals, or strong fumes    Not taking your medicines as prescribed  Home care  Here are some things you can do at home to treat a flare-up:    Try not to panic. This makes it harder to breathe, and keeps you from doing the right things.    Don t smoke or be around others who are smoking.    Try to drink more fluids than usual during a flare-up, unless your doctor has told you not to because of heart and kidney problems. More fluids can help loosen the mucus.    Use your inhalers and nebulizer, if you have one, as you have been told to.    If you were given antibiotics, take them until they are used up or your doctor tells you  to stop. It s important to finish the antibiotics, even though you feel better. This will make sure the infection has cleared.    If you were given prednisone or another steroid, finish it even if you feel better.  Preventing a flare-up  Even though flare-ups happen, the best way to treat one is to prevent it before it starts. Here are some pointers:    Don t smoke or be around others who are smoking.    Take your medicines as you have been told.    Talk with your doctor about getting a flu shot every year. Also find out if you need a pneumonia shot.    If there is a weather advisory warning to stay indoors, try to stay inside when possible.    Try to eat healthy and get plenty of sleep.    Try to avoid things that usually set you off, like dust, chemical fumes, hairsprays, or strong perfumes.  Follow-up care  Follow up with your healthcare provider.  If a culture was done, you will be told if your treatment needs to be changed. You can call as directed for the results.  If X-rays were done, and a radiologist had not seen them while you were there, they will be reviewed. You will be told if there is a change in the reading, especially if it affects your treatment.  Call 911  Call 911 if any of these occur:    You have trouble breathing    You feel confused or it s difficult to wake you up    You faint or lose consciousness    You have a rapid heart rate    You have new pain in your chest, arm, shoulder, neck or upper back  When to seek medical advice  Call your healthcare provider right away if any of these occur:    Wheezing or shortness of breath gets worse    You need to use your inhalers more often than usual without relief    Fever of 100.4 F (38 C) or higher, or as directed    Coughing up lots of dark-colored or bloody sputum (mucus)    Chest pain with each breath    You do not start to get better within 24 hours    Swelling or your ankles gets worse    Dizziness or weakness       [id number] [facility]  [employer]. [address] [place] [address], [place] [address]. All rights reserved. This information is not intended as a substitute for professional medical care. Always follow your healthcare professional's instructions.

## 2017-04-01 NOTE — PROGRESS NOTES
SUBJECTIVE:  Robert B Behr is a 67 year old male who presents to the clinic today with a chief complaint of cough  and shortness of breath. for 1 week(s).  His cough is described as productive of yellow sputum.    The patient's symptoms are moderate and worsening.  Associated symptoms include wheezing, shortness of breath. The patient's symptoms are exacerbated by no particular triggers  Patient has been using combivent and Albuterol nebs  to improve symptoms.      Had flare in mid March.  Treated with Azith and prednisone with immprovement in 2 days.  Symptom free for >10 days.     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     Tobacco abuse        Current Outpatient Prescriptions   Medication Sig Dispense Refill     ipratropium - albuterol 0.5 mg/2.5 mg/3 mL (DUONEB) 0.5-2.5 (3) MG/3ML neb solution Take 1 vial (3 mLs) by nebulization every 6 hours as needed for shortness of breath / dyspnea or wheezing 30 vial 1     nicotine (NICODERM CQ) 21 MG/24HR 24 hr patch Place 1 patch onto the skin every 24 hours 30 patch 1     ascorbic acid (VITAMIN C) 1000 MG TABS Take 1 tablet (1,000 mg) by mouth daily 90 tablet 2     vitamin E (CVS VITAMIN E) 1000 UNIT capsule Take 1 capsule (1,000 Units) by mouth daily 90 capsule 2     vitamin B complex with vitamin C (VITAMIN  B COMPLEX) TABS tablet Take 1 tablet by mouth 2 times daily With Folic acid 180 tablet 2     vitamin A (CVS VITAMIN A) 50478 UNIT capsule Take 1 capsule (10,000 Units) by mouth daily 180 capsule 2     omega 3 1000 MG CAPS Take 1 g by mouth daily 90 capsule 2     Calcium-Magnesium-Vitamin D (CALCIUM 1200+D3) 600- MG-MG-UNIT TB24 Reported on 3/14/2017       Ipratropium-Albuterol (COMBIVENT RESPIMAT)  MCG/ACT inhaler Inhale 1 puff into the lungs 4 times daily Not to exceed 6 doses per day. 1  "Inhaler 11     albuterol (PROAIR HFA, PROVENTIL HFA, VENTOLIN HFA) 108 (90 BASE) MCG/ACT inhaler Inhale 2 puffs into the lungs every 4 hours as needed for shortness of breath / dyspnea or wheezing 1 Inhaler 0     order for DME Equipment being ordered: nebulizer machine (Patient not taking: Reported on 3/14/2017) 1 each 0     traMADol (ULTRAM) 50 MG tablet Take 1 tablet (50 mg) by mouth every 6 hours as needed for pain maximum 6 tablet(s) per day (Patient not taking: Reported on 3/14/2017) 16 tablet 0     UNABLE TO FIND Reported on 3/14/2017         Social History   Substance Use Topics     Smoking status: Former Smoker     Packs/day: 1.50     Years: 45.00     Types: Cigarettes     Quit date: 9/1/2016     Smokeless tobacco: Never Used      Comment: 1.5 packs for 45 years smoker     Alcohol use No       ROS  Review of systems negative except as stated above.    OBJECTIVE:  /80 (BP Location: Right arm, Patient Position: Chair, Cuff Size: Adult Regular)  Pulse 82  Temp 97.6  F (36.4  C) (Oral)  Ht 5' 10\" (1.778 m)  Wt 155 lb (70.3 kg)  SpO2 97%  BMI 22.24 kg/m2  GENERAL APPEARANCE: healthy, alert and no distress  EYES: EOMI,  PERRL, conjunctiva clear  HENT: ear canals and TM's normal.  Nose and mouth without ulcers, erythema or lesions  NECK: supple, nontender, no lymphadenopathy  RESP: lungs clear to auscultation - no rales, rhonchi or wheezes  CV: regular rates and rhythm, normal S1 S2, no murmur noted    ASSESSMENT:   (J44.1) COPD exacerbation (H)  (primary encounter diagnosis)  Plan: azithromycin (ZITHROMAX) 250 MG tablet,         predniSONE (DELTASONE) 20 MG tablet  Red flags and emergent follow up discussed, and understood by patient  Follow up with PCP if symptoms worsen or fail to improve      "

## 2017-04-06 ENCOUNTER — CARE COORDINATION (OUTPATIENT)
Dept: GERIATRIC MEDICINE | Facility: CLINIC | Age: 67
End: 2017-04-06

## 2017-04-17 NOTE — PROGRESS NOTES
TC from client asking if he can get rid of his Medicare part A and B. CM stated that he did need those. CM also provided client with the number to the Senior Linkage Line to get further clarification of insurance options.  Leeanna San RN Case Manager  [facility]  [phone]

## 2017-05-17 ENCOUNTER — OFFICE VISIT (OUTPATIENT)
Dept: FAMILY MEDICINE | Facility: CLINIC | Age: 67
End: 2017-05-17
Payer: COMMERCIAL

## 2017-05-17 VITALS
WEIGHT: 152.6 LBS | RESPIRATION RATE: 22 BRPM | HEART RATE: 77 BPM | SYSTOLIC BLOOD PRESSURE: 136 MMHG | DIASTOLIC BLOOD PRESSURE: 73 MMHG | TEMPERATURE: 97.9 F | BODY MASS INDEX: 21.9 KG/M2 | OXYGEN SATURATION: 96 %

## 2017-05-17 DIAGNOSIS — J44.1 COPD EXACERBATION (H): Primary | ICD-10-CM

## 2017-05-17 DIAGNOSIS — J44.9 COPD (CHRONIC OBSTRUCTIVE PULMONARY DISEASE) (H): Primary | ICD-10-CM

## 2017-05-17 PROCEDURE — 99214 OFFICE O/P EST MOD 30 MIN: CPT | Performed by: FAMILY MEDICINE

## 2017-05-17 RX ORDER — PREDNISONE 20 MG/1
40 TABLET ORAL DAILY
Qty: 10 TABLET | Refills: 0 | Status: SHIPPED | OUTPATIENT
Start: 2017-05-17 | End: 2017-05-22

## 2017-05-17 RX ORDER — DOXYCYCLINE 100 MG/1
100 CAPSULE ORAL 2 TIMES DAILY
Qty: 20 CAPSULE | Refills: 0 | Status: ON HOLD | OUTPATIENT
Start: 2017-05-17 | End: 2017-06-10

## 2017-05-17 RX ORDER — TIOTROPIUM BROMIDE 18 UG/1
CAPSULE ORAL; RESPIRATORY (INHALATION)
Qty: 30 CAPSULE | Refills: 1 | Status: SHIPPED | OUTPATIENT
Start: 2017-05-17 | End: 2017-07-08

## 2017-05-17 ASSESSMENT — ENCOUNTER SYMPTOMS
COUGH: 1
DIARRHEA: 0
SORE THROAT: 0
NAUSEA: 0
CHILLS: 0
SPUTUM PRODUCTION: 1
HEADACHES: 0
SHORTNESS OF BREATH: 1
WHEEZING: 1
HEMOPTYSIS: 0
VOMITING: 0
FEVER: 0
ABDOMINAL PAIN: 0

## 2017-05-17 NOTE — NURSING NOTE
"Chief Complaint   Patient presents with     URI       Initial /73 (BP Location: Right arm, Patient Position: Chair, Cuff Size: Adult Regular)  Pulse 77  Temp 97.9  F (36.6  C) (Oral)  Resp 22  Wt 152 lb 9.6 oz (69.2 kg)  SpO2 96%  BMI 21.9 kg/m2 Estimated body mass index is 21.9 kg/(m^2) as calculated from the following:    Height as of 4/1/17: 5' 10\" (1.778 m).    Weight as of this encounter: 152 lb 9.6 oz (69.2 kg).  Medication Reconciliation: unable or not appropriate to perform       Tonya Michael MA      "

## 2017-05-17 NOTE — PROGRESS NOTES
HPI CC: 68 yo M presents with copd exacerbation.     RESPIRATORY SYMPTOMS      Duration: x1 week     Description  cough and congestion    Severity: mild    Accompanying signs and symptoms: hard time breathing     History (predisposing factors):  COPD    Precipitating or alleviating factors: None    Therapies tried and outcome:  Albuterol and duonebs outcome somewhat effective    Increased cough, yellow thick sputum production and purulence over the past week  No fevers.  Mildly short of breath and more wheezing.  Asks if anything can help decrease mucus production.  He has quit smoking . No hemoptysis.  No n/v/d.  No rhinorrhea or sore throat.        Review of Systems   Constitutional: Negative for chills and fever.   HENT: Negative for congestion and sore throat.    Respiratory: Positive for cough, sputum production, shortness of breath and wheezing. Negative for hemoptysis.    Cardiovascular: Negative for chest pain.   Gastrointestinal: Negative for abdominal pain, diarrhea, nausea and vomiting.   Skin: Negative for rash.   Neurological: Negative for headaches.       No Known Allergies  Current Outpatient Prescriptions   Medication     doxycycline (VIBRAMYCIN) 100 MG capsule     predniSONE (DELTASONE) 20 MG tablet     tiotropium (SPIRIVA HANDIHALER) 18 MCG capsule     calcium-vitamin D (CALTRATE) 600-400 MG-UNIT per tablet     ipratropium - albuterol 0.5 mg/2.5 mg/3 mL (DUONEB) 0.5-2.5 (3) MG/3ML neb solution     nicotine (NICODERM CQ) 21 MG/24HR 24 hr patch     order for DME     traMADol (ULTRAM) 50 MG tablet     UNABLE TO FIND     ascorbic acid (VITAMIN C) 1000 MG TABS     vitamin E (CVS VITAMIN E) 1000 UNIT capsule     vitamin B complex with vitamin C (VITAMIN  B COMPLEX) TABS tablet     vitamin A (CVS VITAMIN A) 42032 UNIT capsule     omega 3 1000 MG CAPS     Calcium-Magnesium-Vitamin D (CALCIUM 1200+D3) 600- MG-MG-UNIT TB24     Ipratropium-Albuterol (COMBIVENT RESPIMAT)  MCG/ACT inhaler      albuterol (PROAIR HFA, PROVENTIL HFA, VENTOLIN HFA) 108 (90 BASE) MCG/ACT inhaler     No current facility-administered medications for this visit.      Active Ambulatory Problems     Diagnosis Date Noted     Osteoporosis      COPD (chronic obstructive pulmonary disease) (H)      Tobacco use disorder 04/13/2016     CARDIOVASCULAR SCREENING; LDL GOAL LESS THAN 160 04/13/2016     Lung nodules      Health Care Home 12/12/2016     Resolved Ambulatory Problems     Diagnosis Date Noted     No Resolved Ambulatory Problems     Past Medical History:   Diagnosis Date     COPD (chronic obstructive pulmonary disease) (H)      Lung nodules      Osteoporosis      Polio 1952     Tobacco abuse          Physical Exam   Constitutional: He is well-developed, well-nourished, and in no distress.   HENT:   Right Ear: External ear normal.   Left Ear: External ear normal.   Nose: Nose normal.   Mouth/Throat: Oropharynx is clear and moist. No oropharyngeal exudate.   Eyes: Conjunctivae are normal. Pupils are equal, round, and reactive to light. Right eye exhibits no discharge. Left eye exhibits no discharge. No scleral icterus.   Neck: Neck supple. No thyromegaly present.   Cardiovascular: Normal rate, regular rhythm and normal heart sounds.  Exam reveals no gallop and no friction rub.    No murmur heard.  Pulmonary/Chest: Effort normal. No respiratory distress. He has no rales.   Decreased breath sounds in the bases bilaterally; minimal wheezing bilaterally, no rales.     Speaking in full sentences without dyspnea   Lymphadenopathy:     He has no cervical adenopathy.   Neurological: He is alert.   Skin: He is not diaphoretic.   Psychiatric: Affect normal.   Vitals reviewed.    /73 (BP Location: Right arm, Patient Position: Chair, Cuff Size: Adult Regular)  Pulse 77  Temp 97.9  F (36.6  C) (Oral)  Resp 22  Wt 152 lb 9.6 oz (69.2 kg)  SpO2 96%  BMI 21.9 kg/m2      A/P  COPD exacerbation: doxycycline and prednisone prescribed.   Continue duoneb up to four times daily.  I have not seen Ravin for a while, but it looks like this is the fourth episode over the past 5 months.  I am also sending in spiriva rx to start on daily, with duoneb or albuterol as needed.   He asks about stem cell treatment research at the  and would like to have a consultation with a pulmonologist . He also is considering visiting [place].

## 2017-05-17 NOTE — MR AVS SNAPSHOT
After Visit Summary   5/17/2017    Robert B Behr    MRN: [id number]           Patient Information     Date Of Birth          1950        Visit Information        Provider Department      5/17/2017 12:40 PM Fina Frausto MD [facility]        Today's Diagnoses     COPD exacerbation (H)    -  1       Follow-ups after your visit        Additional Services     PULMONARY MEDICINE REFERRAL       Your provider has referred you to: [facility] [place] [facility] [phone]   [email or web address]    Please be aware that coverage of these services is subject to the terms and limitations of your health insurance plan.  Call member services at your health plan with any benefit or coverage questions.      Please bring the following with you to your appointment:    (1) Any X-Rays, CTs or MRIs which have been performed.  Contact the facility where they were done to arrange for  prior to your scheduled appointment.    (2) List of current medications   (3) This referral request   (4) Any documents/labs given to you for this referral                  Who to contact     If you have questions or need follow up information about today's clinic visit or your schedule please contact [facility] directly at [phone].  Normal or non-critical lab and imaging results will be communicated to you by MyChart, letter or phone within 4 business days after the clinic has received the results. If you do not hear from us within 7 days, please contact the clinic through MyChart or phone. If you have a critical or abnormal lab result, we will notify you by phone as soon as possible.  Submit refill requests through [employer] or call your pharmacy and they will forward the refill request to us. Please allow 3 business days for your refill to be completed.          Additional Information About Your Visit         [employer] Information     [employer] gives you secure access to your electronic health record. If you see a primary care provider, you can also send messages to your care team and make appointments. If you have questions, please call your primary care clinic.  If you do not have a primary care provider, please call [phone] and they will assist you.        Care EveryWhere ID     This is your Care EveryWhere ID. This could be used by other organizations to access your [place] medical records  [id number]        Your Vitals Were     Pulse Temperature Respirations Pulse Oximetry BMI (Body Mass Index)       77 97.9  F (36.6  C) (Oral) 22 96% 21.9 kg/m2        Blood Pressure from Last 3 Encounters:   05/17/17 136/73   04/01/17 140/80   03/14/17 144/80    Weight from Last 3 Encounters:   05/17/17 152 lb 9.6 oz (69.2 kg)   04/01/17 155 lb (70.3 kg)   03/14/17 155 lb (70.3 kg)              We Performed the Following     PULMONARY MEDICINE REFERRAL          Today's Medication Changes          These changes are accurate as of: 5/17/17  1:07 PM.  If you have any questions, ask your nurse or doctor.               Start taking these medicines.        Dose/Directions    doxycycline 100 MG capsule   Commonly known as:  VIBRAMYCIN   Used for:  COPD exacerbation (H)   Started by:  Fina Frausto MD        Dose:  100 mg   Take 1 capsule (100 mg) by mouth 2 times daily   Quantity:  20 capsule   Refills:  0       predniSONE 20 MG tablet   Commonly known as:  DELTASONE   Used for:  COPD exacerbation (H)   Started by:  Fina Frausto MD        Dose:  40 mg   Take 2 tablets (40 mg) by mouth daily for 5 days   Quantity:  10 tablet   Refills:  0       tiotropium 18 MCG capsule   Commonly known as:  SPIRIVA HANDIHALER   Used for:  COPD exacerbation (H)   Started by:  Fina Frausto MD        Inhale contents of one capsule daily.   Quantity:  30 capsule   Refills:  1         Stop taking these medicines if you haven't  already. Please contact your care team if you have questions.     azithromycin 250 MG tablet   Commonly known as:  ZITHROMAX   Stopped by:  Fina Frausto MD                Where to get your medicines      These medications were sent to [facility] - [place], [place] - [address] [facility]  [address], [place] [address]     Phone:  [phone]     doxycycline 100 MG capsule    tiotropium 18 MCG capsule         Some of these will need a paper prescription and others can be bought over the counter.  Ask your nurse if you have questions.     Bring a paper prescription for each of these medications     predniSONE 20 MG tablet                Primary Care Provider Office Phone # Fax #    Fina Frausto [phone]       [facility] [address]  [place] [address]        Thank you!     Thank you for choosing [facility]  for your care. Our goal is always to provide you with excellent care. Hearing back from our patients is one way we can continue to improve our services. Please take a few minutes to complete the written survey that you may receive in the mail after your visit with us. Thank you!             Your Updated Medication List - Protect others around you: Learn how to safely use, store and throw away your medicines at www.disposemymeds.org.          This list is accurate as of: 5/17/17  1:07 PM.  Always use your most recent med list.                   Brand Name Dispense Instructions for use    albuterol 108 (90 BASE) MCG/ACT Inhaler    PROAIR HFA/PROVENTIL HFA/VENTOLIN HFA    1 Inhaler    Inhale 2 puffs into the lungs every 4 hours as needed for shortness of breath / dyspnea or wheezing       ascorbic acid 1000 MG Tabs    vitamin C    90 tablet    Take 1 tablet (1,000 mg) by mouth daily       CALCIUM 1200+D3 600- MG-MG-UNIT Tb24   Generic drug:  Calcium-Magnesium-Vitamin D      Reported on 3/14/2017       calcium-vitamin D  600-400 MG-UNIT per tablet    CALTRATE    180 tablet    TAKE 1 TABLET BY MOUTH TWICE DAILY       doxycycline 100 MG capsule    VIBRAMYCIN    20 capsule    Take 1 capsule (100 mg) by mouth 2 times daily       * Ipratropium-Albuterol  MCG/ACT inhaler    COMBIVENT RESPIMAT    1 Inhaler    Inhale 1 puff into the lungs 4 times daily Not to exceed 6 doses per day.       * ipratropium - albuterol 0.5 mg/2.5 mg/3 mL 0.5-2.5 (3) MG/3ML neb solution    DUONEB    30 vial    Take 1 vial (3 mLs) by nebulization every 6 hours as needed for shortness of breath / dyspnea or wheezing       nicotine 21 MG/24HR 24 hr patch    NICODERM CQ    30 patch    Place 1 patch onto the skin every 24 hours       omega 3 1000 MG Caps     90 capsule    Take 1 g by mouth daily       order for DME     1 each    Equipment being ordered: nebulizer machine       predniSONE 20 MG tablet    DELTASONE    10 tablet    Take 2 tablets (40 mg) by mouth daily for 5 days       tiotropium 18 MCG capsule    SPIRIVA HANDIHALER    30 capsule    Inhale contents of one capsule daily.       traMADol 50 MG tablet    ULTRAM    16 tablet    Take 1 tablet (50 mg) by mouth every 6 hours as needed for pain maximum 6 tablet(s) per day       UNABLE TO FIND      Reported on 3/14/2017       vitamin A 40767 UNIT capsule    [facility] VITAMIN A    180 capsule    Take 1 capsule (10,000 Units) by mouth daily       vitamin B complex with vitamin C Tabs tablet     180 tablet    Take 1 tablet by mouth 2 times daily With Folic acid       vitamin E 1000 UNIT capsule    [facility] vitamin E    90 capsule    Take 1 capsule (1,000 Units) by mouth daily       * Notice:  This list has 2 medication(s) that are the same as other medications prescribed for you. Read the directions carefully, and ask your doctor or other care provider to review them with you.

## 2017-06-10 ENCOUNTER — HOSPITAL ENCOUNTER (OUTPATIENT)
Facility: CLINIC | Age: 67
Setting detail: OBSERVATION
Discharge: HOME | End: 2017-06-10
Attending: EMERGENCY MEDICINE | Admitting: EMERGENCY MEDICINE
Payer: COMMERCIAL

## 2017-06-10 ENCOUNTER — APPOINTMENT (OUTPATIENT)
Dept: GENERAL RADIOLOGY | Facility: CLINIC | Age: 67
End: 2017-06-10
Attending: EMERGENCY MEDICINE
Payer: COMMERCIAL

## 2017-06-10 ENCOUNTER — APPOINTMENT (OUTPATIENT)
Dept: CARDIOLOGY | Facility: CLINIC | Age: 67
End: 2017-06-10
Payer: COMMERCIAL

## 2017-06-10 ENCOUNTER — APPOINTMENT (OUTPATIENT)
Dept: CT IMAGING | Facility: CLINIC | Age: 67
End: 2017-06-10
Attending: EMERGENCY MEDICINE
Payer: COMMERCIAL

## 2017-06-10 VITALS
DIASTOLIC BLOOD PRESSURE: 75 MMHG | SYSTOLIC BLOOD PRESSURE: 111 MMHG | OXYGEN SATURATION: 91 % | TEMPERATURE: 98.1 F | RESPIRATION RATE: 18 BRPM

## 2017-06-10 DIAGNOSIS — R07.9 CHEST PAIN: ICD-10-CM

## 2017-06-10 DIAGNOSIS — F17.200 TOBACCO USE DISORDER: Primary | ICD-10-CM

## 2017-06-10 DIAGNOSIS — J44.9 COPD (CHRONIC OBSTRUCTIVE PULMONARY DISEASE) (H): ICD-10-CM

## 2017-06-10 DIAGNOSIS — J44.1 OBSTRUCTIVE CHRONIC BRONCHITIS WITH EXACERBATION (H): ICD-10-CM

## 2017-06-10 DIAGNOSIS — K21.9 GASTROESOPHAGEAL REFLUX DISEASE WITHOUT ESOPHAGITIS: ICD-10-CM

## 2017-06-10 LAB
ANION GAP SERPL CALCULATED.3IONS-SCNC: 8 MMOL/L (ref 3–14)
BASOPHILS # BLD AUTO: 0 10E9/L (ref 0–0.2)
BASOPHILS NFR BLD AUTO: 0.2 %
BUN SERPL-MCNC: 16 MG/DL (ref 7–30)
CALCIUM SERPL-MCNC: 8.1 MG/DL (ref 8.5–10.1)
CHLORIDE SERPL-SCNC: 104 MMOL/L (ref 94–109)
CO2 SERPL-SCNC: 27 MMOL/L (ref 20–32)
CREAT SERPL-MCNC: 0.78 MG/DL (ref 0.66–1.25)
DIFFERENTIAL METHOD BLD: ABNORMAL
EOSINOPHIL # BLD AUTO: 0.3 10E9/L (ref 0–0.7)
EOSINOPHIL NFR BLD AUTO: 2.1 %
ERYTHROCYTE [DISTWIDTH] IN BLOOD BY AUTOMATED COUNT: 13.4 % (ref 10–15)
GFR SERPL CREATININE-BSD FRML MDRD: ABNORMAL ML/MIN/1.7M2
GLUCOSE SERPL-MCNC: 98 MG/DL (ref 70–99)
HCT VFR BLD AUTO: 40.8 % (ref 40–53)
HGB BLD-MCNC: 13.5 G/DL (ref 13.3–17.7)
IMM GRANULOCYTES # BLD: 0 10E9/L (ref 0–0.4)
IMM GRANULOCYTES NFR BLD: 0.3 %
INTERPRETATION ECG - MUSE: NORMAL
LYMPHOCYTES # BLD AUTO: 1.8 10E9/L (ref 0.8–5.3)
LYMPHOCYTES NFR BLD AUTO: 14.8 %
MAGNESIUM SERPL-MCNC: 1.7 MG/DL (ref 1.6–2.3)
MCH RBC QN AUTO: 32.4 PG (ref 26.5–33)
MCHC RBC AUTO-ENTMCNC: 33.1 G/DL (ref 31.5–36.5)
MCV RBC AUTO: 98 FL (ref 78–100)
MONOCYTES # BLD AUTO: 0.8 10E9/L (ref 0–1.3)
MONOCYTES NFR BLD AUTO: 6.8 %
NEUTROPHILS # BLD AUTO: 9.3 10E9/L (ref 1.6–8.3)
NEUTROPHILS NFR BLD AUTO: 75.8 %
NRBC # BLD AUTO: 0 10*3/UL
NRBC BLD AUTO-RTO: 0 /100
PLATELET # BLD AUTO: 187 10E9/L (ref 150–450)
POTASSIUM SERPL-SCNC: 3.4 MMOL/L (ref 3.4–5.3)
PROCALCITONIN SERPL-MCNC: 0.27 NG/ML
RBC # BLD AUTO: 4.17 10E12/L (ref 4.4–5.9)
SODIUM SERPL-SCNC: 139 MMOL/L (ref 133–144)
TROPONIN I SERPL-MCNC: NORMAL UG/L (ref 0–0.04)
TROPONIN I SERPL-MCNC: NORMAL UG/L (ref 0–0.04)
WBC # BLD AUTO: 12.3 10E9/L (ref 4–11)

## 2017-06-10 PROCEDURE — 93018 CV STRESS TEST I&R ONLY: CPT | Performed by: INTERNAL MEDICINE

## 2017-06-10 PROCEDURE — 71020 XR CHEST 2 VW: CPT

## 2017-06-10 PROCEDURE — 93321 DOPPLER ECHO F-UP/LMTD STD: CPT | Mod: 26 | Performed by: INTERNAL MEDICINE

## 2017-06-10 PROCEDURE — 84145 PROCALCITONIN (PCT): CPT | Performed by: NURSE PRACTITIONER

## 2017-06-10 PROCEDURE — 84484 ASSAY OF TROPONIN QUANT: CPT | Performed by: EMERGENCY MEDICINE

## 2017-06-10 PROCEDURE — 80048 BASIC METABOLIC PNL TOTAL CA: CPT | Performed by: EMERGENCY MEDICINE

## 2017-06-10 PROCEDURE — 93325 DOPPLER ECHO COLOR FLOW MAPG: CPT | Mod: 26 | Performed by: INTERNAL MEDICINE

## 2017-06-10 PROCEDURE — 93016 CV STRESS TEST SUPVJ ONLY: CPT | Performed by: INTERNAL MEDICINE

## 2017-06-10 PROCEDURE — 36415 COLL VENOUS BLD VENIPUNCTURE: CPT | Performed by: NURSE PRACTITIONER

## 2017-06-10 PROCEDURE — 99285 EMERGENCY DEPT VISIT HI MDM: CPT | Mod: 25 | Performed by: EMERGENCY MEDICINE

## 2017-06-10 PROCEDURE — 99207 ZZC APP CREDIT; MD BILLING SHARED VISIT: CPT | Mod: 25 | Performed by: EMERGENCY MEDICINE

## 2017-06-10 PROCEDURE — 93005 ELECTROCARDIOGRAM TRACING: CPT | Performed by: EMERGENCY MEDICINE

## 2017-06-10 PROCEDURE — 25500064 ZZH RX 255 OP 636: Performed by: FAMILY MEDICINE

## 2017-06-10 PROCEDURE — 25000132 ZZH RX MED GY IP 250 OP 250 PS 637: Performed by: EMERGENCY MEDICINE

## 2017-06-10 PROCEDURE — 71260 CT THORAX DX C+: CPT

## 2017-06-10 PROCEDURE — 83735 ASSAY OF MAGNESIUM: CPT | Performed by: EMERGENCY MEDICINE

## 2017-06-10 PROCEDURE — 93010 ELECTROCARDIOGRAM REPORT: CPT | Mod: Z6 | Performed by: EMERGENCY MEDICINE

## 2017-06-10 PROCEDURE — 93350 STRESS TTE ONLY: CPT | Mod: 26 | Performed by: INTERNAL MEDICINE

## 2017-06-10 PROCEDURE — 85025 COMPLETE CBC W/AUTO DIFF WBC: CPT | Performed by: EMERGENCY MEDICINE

## 2017-06-10 PROCEDURE — 25000132 ZZH RX MED GY IP 250 OP 250 PS 637: Performed by: NURSE PRACTITIONER

## 2017-06-10 PROCEDURE — 25000128 H RX IP 250 OP 636: Performed by: EMERGENCY MEDICINE

## 2017-06-10 PROCEDURE — 40000264 ECHO STRESS TEST WITH DEFINITY

## 2017-06-10 PROCEDURE — 84484 ASSAY OF TROPONIN QUANT: CPT | Mod: 91 | Performed by: EMERGENCY MEDICINE

## 2017-06-10 PROCEDURE — 99235 HOSP IP/OBS SAME DATE MOD 70: CPT | Mod: Z6 | Performed by: NURSE PRACTITIONER

## 2017-06-10 PROCEDURE — G0378 HOSPITAL OBSERVATION PER HR: HCPCS

## 2017-06-10 RX ORDER — NICOTINE 21 MG/24HR
1 PATCH, TRANSDERMAL 24 HOURS TRANSDERMAL EVERY 24 HOURS
Qty: 30 PATCH | Refills: 0 | Status: SHIPPED | OUTPATIENT
Start: 2017-06-10 | End: 2017-07-08

## 2017-06-10 RX ORDER — ASPIRIN 81 MG/1
162 TABLET, CHEWABLE ORAL ONCE
Status: COMPLETED | OUTPATIENT
Start: 2017-06-10 | End: 2017-06-10

## 2017-06-10 RX ORDER — NALOXONE HYDROCHLORIDE 0.4 MG/ML
.1-.4 INJECTION, SOLUTION INTRAMUSCULAR; INTRAVENOUS; SUBCUTANEOUS
Status: DISCONTINUED | OUTPATIENT
Start: 2017-06-10 | End: 2017-06-10 | Stop reason: HOSPADM

## 2017-06-10 RX ORDER — NALOXONE HYDROCHLORIDE 0.4 MG/ML
.1-.4 INJECTION, SOLUTION INTRAMUSCULAR; INTRAVENOUS; SUBCUTANEOUS
Status: DISCONTINUED | OUTPATIENT
Start: 2017-06-10 | End: 2017-06-10

## 2017-06-10 RX ORDER — ASPIRIN 81 MG/1
81 TABLET ORAL DAILY
Status: DISCONTINUED | OUTPATIENT
Start: 2017-06-11 | End: 2017-06-10 | Stop reason: HOSPADM

## 2017-06-10 RX ORDER — IPRATROPIUM BROMIDE AND ALBUTEROL SULFATE 2.5; .5 MG/3ML; MG/3ML
3 SOLUTION RESPIRATORY (INHALATION) EVERY 4 HOURS
Status: DISCONTINUED | OUTPATIENT
Start: 2017-06-10 | End: 2017-06-10 | Stop reason: HOSPADM

## 2017-06-10 RX ORDER — TRAMADOL HYDROCHLORIDE 50 MG/1
50 TABLET ORAL EVERY 6 HOURS PRN
Status: DISCONTINUED | OUTPATIENT
Start: 2017-06-10 | End: 2017-06-10 | Stop reason: HOSPADM

## 2017-06-10 RX ORDER — ALUMINA, MAGNESIA, AND SIMETHICONE 2400; 2400; 240 MG/30ML; MG/30ML; MG/30ML
15-30 SUSPENSION ORAL EVERY 4 HOURS PRN
Status: DISCONTINUED | OUTPATIENT
Start: 2017-06-10 | End: 2017-06-10 | Stop reason: HOSPADM

## 2017-06-10 RX ORDER — NITROGLYCERIN 0.4 MG/1
0.4 TABLET SUBLINGUAL EVERY 5 MIN PRN
Status: DISCONTINUED | OUTPATIENT
Start: 2017-06-10 | End: 2017-06-10

## 2017-06-10 RX ORDER — ACETAMINOPHEN 650 MG/1
650 SUPPOSITORY RECTAL EVERY 4 HOURS PRN
Status: DISCONTINUED | OUTPATIENT
Start: 2017-06-10 | End: 2017-06-10 | Stop reason: HOSPADM

## 2017-06-10 RX ORDER — LIDOCAINE 40 MG/G
CREAM TOPICAL
Status: DISCONTINUED | OUTPATIENT
Start: 2017-06-10 | End: 2017-06-10 | Stop reason: HOSPADM

## 2017-06-10 RX ORDER — IOPAMIDOL 755 MG/ML
55 INJECTION, SOLUTION INTRAVASCULAR ONCE
Status: COMPLETED | OUTPATIENT
Start: 2017-06-10 | End: 2017-06-10

## 2017-06-10 RX ORDER — NICOTINE 21 MG/24HR
1 PATCH, TRANSDERMAL 24 HOURS TRANSDERMAL DAILY
Status: DISCONTINUED | OUTPATIENT
Start: 2017-06-10 | End: 2017-06-10 | Stop reason: HOSPADM

## 2017-06-10 RX ORDER — NICOTINE 21 MG/24HR
1 PATCH, TRANSDERMAL 24 HOURS TRANSDERMAL EVERY 24 HOURS
Status: DISCONTINUED | OUTPATIENT
Start: 2017-06-10 | End: 2017-06-10

## 2017-06-10 RX ORDER — NITROGLYCERIN 0.4 MG/1
0.4 TABLET SUBLINGUAL EVERY 5 MIN PRN
Status: DISCONTINUED | OUTPATIENT
Start: 2017-06-10 | End: 2017-06-10 | Stop reason: HOSPADM

## 2017-06-10 RX ORDER — ASPIRIN 81 MG/1
324 TABLET, CHEWABLE ORAL ONCE
Status: DISCONTINUED | OUTPATIENT
Start: 2017-06-10 | End: 2017-06-10

## 2017-06-10 RX ORDER — ACETAMINOPHEN 325 MG/1
650 TABLET ORAL EVERY 4 HOURS PRN
Status: DISCONTINUED | OUTPATIENT
Start: 2017-06-10 | End: 2017-06-10 | Stop reason: HOSPADM

## 2017-06-10 RX ORDER — IPRATROPIUM BROMIDE AND ALBUTEROL SULFATE 2.5; .5 MG/3ML; MG/3ML
1 SOLUTION RESPIRATORY (INHALATION) EVERY 6 HOURS PRN
Status: DISCONTINUED | OUTPATIENT
Start: 2017-06-10 | End: 2017-06-10

## 2017-06-10 RX ORDER — ALBUTEROL SULFATE 0.83 MG/ML
3 SOLUTION RESPIRATORY (INHALATION)
Status: DISCONTINUED | OUTPATIENT
Start: 2017-06-10 | End: 2017-06-10 | Stop reason: HOSPADM

## 2017-06-10 RX ORDER — HYDROCODONE BITARTRATE AND ACETAMINOPHEN 5; 325 MG/1; MG/1
1 TABLET ORAL ONCE
Status: COMPLETED | OUTPATIENT
Start: 2017-06-10 | End: 2017-06-10

## 2017-06-10 RX ADMIN — HYDROCODONE BITARTRATE AND ACETAMINOPHEN 1 TABLET: 5; 325 TABLET ORAL at 05:43

## 2017-06-10 RX ADMIN — PERFLUTREN 5 ML: 6.52 INJECTION, SUSPENSION INTRAVENOUS at 09:14

## 2017-06-10 RX ADMIN — ASPIRIN 81 MG 162 MG: 81 TABLET ORAL at 10:46

## 2017-06-10 RX ADMIN — IOPAMIDOL 55 ML: 755 INJECTION, SOLUTION INTRAVENOUS at 03:43

## 2017-06-10 RX ADMIN — NITROGLYCERIN 0.4 MG: 0.4 TABLET SUBLINGUAL at 02:35

## 2017-06-10 RX ADMIN — NITROGLYCERIN 0.4 MG: 0.4 TABLET SUBLINGUAL at 05:07

## 2017-06-10 RX ADMIN — RANITIDINE HYDROCHLORIDE 150 MG: 150 TABLET, FILM COATED ORAL at 12:08

## 2017-06-10 ASSESSMENT — ENCOUNTER SYMPTOMS
CONSTITUTIONAL NEGATIVE: 1
SORE THROAT: 0
FATIGUE: 0
HEADACHES: 0
DIZZINESS: 0
DIARRHEA: 0
LIGHT-HEADEDNESS: 0
NAUSEA: 0
FLANK PAIN: 0
APPETITE CHANGE: 0
NECK PAIN: 0
DIAPHORESIS: 0
FEVER: 0
MYALGIAS: 0
CHEST TIGHTNESS: 0
ARTHRALGIAS: 0
PALPITATIONS: 0
RHINORRHEA: 0
VOMITING: 0
ABDOMINAL PAIN: 0
WEAKNESS: 0
BRUISES/BLEEDS EASILY: 0
BACK PAIN: 1
COUGH: 1
CHILLS: 0
SHORTNESS OF BREATH: 1

## 2017-06-10 NOTE — DISCHARGE SUMMARY
Discharge Summary    Robert B Behr MRN# [id number]   YOB: 1950 Age: 67 year old     Date of Admission:  6/10/2017  Date of Discharge:  6/10/2017  Admitting Physician:  Gene Judge MD  Discharge Physician:  Ned Lindsay MD  Discharging Service:  Emergency Department Observation Unit     Primary Provider: Fina Frausto          Discharge Diagnosis:     * No active hospital problems. *    * No resolved hospital problems. *               Discharge Disposition:   Discharged to home           Condition on Discharge:   Discharge condition: Stable               Procedures:   Cardiology procedures perfromed:   Stress testing  Chest xray normal.   Chest CT negative for PE and infection  .               Discharge Medications:     Current Discharge Medication List      START taking these medications    Details   !! nicotine (NICODERM CQ) 21 MG/24HR 24 hr patch Place 1 patch onto the skin every 24 hours  Qty: 30 patch, Refills: 0    Associated Diagnoses: Tobacco use disorder       !! - Potential duplicate medications found. Please discuss with provider.      CONTINUE these medications which have NOT CHANGED    Details   tiotropium (SPIRIVA HANDIHALER) 18 MCG capsule Inhale contents of one capsule daily.  Qty: 30 capsule, Refills: 1    Associated Diagnoses: COPD exacerbation (H)      calcium-vitamin D (CALTRATE) 600-400 MG-UNIT per tablet TAKE 1 TABLET BY MOUTH TWICE DAILY  Qty: 180 tablet, Refills: 3    Associated Diagnoses: Osteoporosis      ipratropium - albuterol 0.5 mg/2.5 mg/3 mL (DUONEB) 0.5-2.5 (3) MG/3ML neb solution Take 1 vial (3 mLs) by nebulization every 6 hours as needed for shortness of breath / dyspnea or wheezing  Qty: 30 vial, Refills: 1    Associated Diagnoses: COPD exacerbation (H)      !! nicotine (NICODERM CQ) 21 MG/24HR 24 hr patch Place 1 patch onto the skin every 24 hours  Qty: 30 patch, Refills: 1    Associated Diagnoses: Tobacco use disorder      order for DME  Equipment being ordered: nebulizer machine  Qty: 1 each, Refills: 0    Associated Diagnoses: COPD exacerbation (H)      traMADol (ULTRAM) 50 MG tablet Take 1 tablet (50 mg) by mouth every 6 hours as needed for pain maximum 6 tablet(s) per day  Qty: 16 tablet, Refills: 0    Associated Diagnoses: Closed fracture of one rib of right side with delayed healing, subsequent encounter      UNABLE TO FIND Reported on 3/14/2017      ascorbic acid (VITAMIN C) 1000 MG TABS Take 1 tablet (1,000 mg) by mouth daily  Qty: 90 tablet, Refills: 2    Associated Diagnoses: Encounter for herb and vitamin supplement management      vitamin E ([employer] VITAMIN E) 1000 UNIT capsule Take 1 capsule (1,000 Units) by mouth daily  Qty: 90 capsule, Refills: 2    Associated Diagnoses: Encounter for herb and vitamin supplement management      vitamin B complex with vitamin C (VITAMIN  B COMPLEX) TABS tablet Take 1 tablet by mouth 2 times daily With Folic acid  Qty: 180 tablet, Refills: 2    Associated Diagnoses: Encounter for herb and vitamin supplement management      vitamin A ([employer] VITAMIN A) 35344 UNIT capsule Take 1 capsule (10,000 Units) by mouth daily  Qty: 180 capsule, Refills: 2    Associated Diagnoses: Encounter for herb and vitamin supplement management      omega 3 1000 MG CAPS Take 1 g by mouth daily  Qty: 90 capsule, Refills: 2    Associated Diagnoses: Encounter for herb and vitamin supplement management      Calcium-Magnesium-Vitamin D (CALCIUM 1200+D3) 600- MG-MG-UNIT TB24 Reported on 3/14/2017      Ipratropium-Albuterol (COMBIVENT RESPIMAT)  MCG/ACT inhaler Inhale 1 puff into the lungs 4 times daily Not to exceed 6 doses per day.  Qty: 1 Inhaler, Refills: 11    Associated Diagnoses: COPD exacerbation (H)      albuterol (PROAIR HFA, PROVENTIL HFA, VENTOLIN HFA) 108 (90 BASE) MCG/ACT inhaler Inhale 2 puffs into the lungs every 4 hours as needed for shortness of breath / dyspnea or wheezing  Qty: 1 Inhaler, Refills: 0     Associated Diagnoses: Mild intermittent asthma without complication       !! - Potential duplicate medications found. Please discuss with provider.      STOP taking these medications       doxycycline (VIBRAMYCIN) 100 MG capsule Comments:   Reason for Stopping:                     Consultations:   No consultations were requested during this admission             Brief History of Illness:   Robert B Behr is a 67 year old male with a history of Tobacco abuse (51 pack year history), polio (left leg weakness) osteoporosis, Lung nodules and COPD who presented to the ED with chest pain.          Hospital Course:   Assessment/Plan:  1. Chest pain - Chest pain after biking and carrying groceries. Mild leftsided radiating to back. worse with deep inspiration. Initial troponin negative. CT PE protocol negative Chest xray without signs of infection. No cardiac workup in the past.  Stress test negative.     2. Recent Bronchitis - Recently diagnosed with bronchitis and was started on Doxycycline x 10 days and Prednisone burst 40 mg x 5 days. Patient reports worsening cough. Afebrile. WBC 12.3. Sating 90% on RA. Patient reports he has nebulizers at home. She feels short of breath. He is currently on 14 mcg of nicotine patch and recently started smoking again. Patient's 5th episode in 6 months. Patient wants to see a pulmonologist and referral to [facility]. Procalcitonin 0.27. Patient was recommended to continue home nebs/inhalers and also encouraged to quit smoking.      3. Tobacco abuse - 51 pack year history - requesting a nicotine patch. Reports being on 14mcg at home   - prescription will be sent for a nicotine patch.      4. GERD - Start Zantac BID.             Final Day of Progress before Discharge:       Physical Exam:  Blood pressure 111/75, temperature 98.1  F (36.7  C), temperature source Oral, resp. rate 18, SpO2 91 %.    EXAM:  Constitutional: healthy, alert and no distress   Head: Normocephalic. No masses, lesions,  tenderness or abnormalities   Neck: Neck supple. No adenopathy. Thyroid symmetric, normal size,, Carotids without bruits.   ENT: ENT exam normal, no neck nodes or sinus tenderness   Cardiovascular: RRR. No murmurs, clicks gallops or rub   Respiratory: . Decreased breath sounds om bases bilaterally; minimal wheezing bilaterally in bases   Gastrointestinal: Abdomen soft,. BS normal. No masses, organomegaly.   : Deferred   Musculoskeletal: extremities normal- no gross deformities noted, gait normal and normal muscle tone   Skin: no suspicious lesions or rashes   Neurologic: Gait normal. Reflexes normal and symmetric. Sensation grossly WNL.   Psychiatric: mentation appears normal and affect normal/bright   Hematologic/Lymphatic/Immunologic: normal ant/post cervical, axillary, supraclavicular and inguinal          Data:  All laboratory data reviewed             Significant Results:   None  Results for orders placed or performed during the hospital encounter of 06/10/17   XR Chest 2 Views    Narrative    Exam: XR CHEST 2 VW, 6/10/2017 2:05 AM    Indication: Chest Pain    Comparison: 1/4/2017, 12/28/2016, 9/14/2016.    Findings:   PA and lateral views of the chest were obtained. The cardiomediastinal  silhouette is within normal limits. No pneumothorax or pleural  effusion. Stable calcified granuloma in the lateral right lung. No  acute airspace opacity. Opacities in the upper lung bilaterally  changed. Emphysematous changes.. Upper lobe predominant emphysematous  changes. Poorly visualized old rib fractures bilaterally.      Impression    Impression:   1. No acute cardiopulmonary abnormality. Emphysematous changes,  nodular opacities, and scarring in both lungs is better visualized on  CT from today.  2. Stable upper lobe predominant emphysematous changes.    I have personally reviewed the examination and initial interpretation  and I agree with the findings.    JARRETT FITZGERALD   Chest CT, IV contrast only - PE protocol     Narrative    CT CHEST PULMONARY EMBOLISM W CONTRAST, 6/10/2017 3:52 AM    History: Chest pain, evaluate for pulmonary embolism, screening for  thoracic aneurysm.    Comparison: 6/10/2017, 12/28/2016, 9/14/2016    Technique: Helical acquisition of CT images of the chest from the lung  apices to the kidneys were acquired after the administration of  intravenous contrast according to the CT pulmonary angiogram protocol.  Axial images were reconstructed in 1 and 3 mm slice thickness. Coronal  reconstructions performed.    Findings:   Chest:  The contrast bolus is optimal. The main pulmonary artery is not  enlarged. No paradoxical bowing of the interventricular septum. No  pulmonary embolism is identified.    The thyroid parenchyma, aortic branching pattern, and heart size are  unremarkable. The ascending aorta is not enlarged. Trace pericardial  fluid. Moderate hiatal hernia, slightly increased since 12/20/2016. No  significant change in prominent subcentimeter mediastinal lymph nodes.    The central tracheobronchial tree is patent. Mild bronchial wall  thickening. No pneumothorax or pleural effusion. No focal airspace  consolidation. A significant change in moderate upper lobe predominant  centrilobular and paraseptal emphysema. Unchanged right upper lung  scarring including nodular focus which again measures 1.3 x 2.7 cm.  Numerous calcified granulomas. No new or enlarging pulmonary nodules  appreciated.    Upper abdomen:  3 mm nonobstructing stone in the superior pole of the left kidney.  Diverticulosis without diverticulitis. The upper abdomen is otherwise  unremarkable.    Bones and soft tissues:  No acute or worrisome osseous lesions. Old lateral right fourth  through seventh rib fractures.       Impression    Impression:  1. No pulmonary embolism identified. The thoracic aorta is not  enlarged. No new airspace consolidations.  2. Unchanged moderate upper lobe predominant centrilobular and  paraseptal  emphysema, including right upper lung scarring. Some of  this is nodular, and continued follow-up is recommended.  3. Moderate hiatal hernia, mildly increased since 12/20/2016.  4. Trace pericardial fluid, possibly physiologic but new since  12/28/2016.  5. Nonobstructive left nephrolithiasis.    I have personally reviewed the examination and initial interpretation  and I agree with the findings.    JARRETT FITZGERALD   Basic metabolic panel   Result Value Ref Range    Sodium 139 133 - 144 mmol/L    Potassium 3.4 3.4 - 5.3 mmol/L    Chloride 104 94 - 109 mmol/L    Carbon Dioxide 27 20 - 32 mmol/L    Anion Gap 8 3 - 14 mmol/L    Glucose 98 70 - 99 mg/dL    Urea Nitrogen 16 7 - 30 mg/dL    Creatinine 0.78 0.66 - 1.25 mg/dL    GFR Estimate >90  Non  GFR Calc   >60 mL/min/1.7m2    GFR Estimate If Black >90   GFR Calc   >60 mL/min/1.7m2    Calcium 8.1 (L) 8.5 - 10.1 mg/dL   Troponin I   Result Value Ref Range    Troponin I ES  0.000 - 0.045 ug/L     <0.015  The 99th percentile for upper reference range is 0.045 ug/L.  Troponin values in   the range of 0.045 - 0.120 ug/L may be associated with risks of adverse   clinical events.     CBC with platelets differential   Result Value Ref Range    WBC 12.3 (H) 4.0 - 11.0 10e9/L    RBC Count 4.17 (L) 4.4 - 5.9 10e12/L    Hemoglobin 13.5 13.3 - 17.7 g/dL    Hematocrit 40.8 40.0 - 53.0 %    MCV 98 78 - 100 fl    MCH 32.4 26.5 - 33.0 pg    MCHC 33.1 31.5 - 36.5 g/dL    RDW 13.4 10.0 - 15.0 %    Platelet Count 187 150 - 450 10e9/L    Diff Method Automated Method     % Neutrophils 75.8 %    % Lymphocytes 14.8 %    % Monocytes 6.8 %    % Eosinophils 2.1 %    % Basophils 0.2 %    % Immature Granulocytes 0.3 %    Nucleated RBCs 0 0 /100    Absolute Neutrophil 9.3 (H) 1.6 - 8.3 10e9/L    Absolute Lymphocytes 1.8 0.8 - 5.3 10e9/L    Absolute Monocytes 0.8 0.0 - 1.3 10e9/L    Absolute Eosinophils 0.3 0.0 - 0.7 10e9/L    Absolute Basophils 0.0 0.0 - 0.2 10e9/L     Abs Immature Granulocytes 0.0 0 - 0.4 10e9/L    Absolute Nucleated RBC 0.0    Troponin I   Result Value Ref Range    Troponin I ES  0.000 - 0.045 ug/L     <0.015  The 99th percentile for upper reference range is 0.045 ug/L.  Troponin values in   the range of 0.045 - 0.120 ug/L may be associated with risks of adverse   clinical events.     Magnesium   Result Value Ref Range    Magnesium 1.7 1.6 - 2.3 mg/dL   Procalcitonin   Result Value Ref Range    Procalcitonin 0.27 ng/ml   EKG 12-lead, tracing only   Result Value Ref Range    Interpretation ECG Click View Image link to view waveform and result    Echo stress test with definity    Narrative    [id number]  [id number]  [id number]  [id number]^LEIGHTON^LIS^VEL           [facility],[place]  Echocardiography Laboratory  [address] [place] [id number]  Name: BEHR, ROBERT B  MRN: [id number]  : 1950  Study Date: 06/10/2017 09:10 AM  Age: 67 yrs  Gender: Male  Patient Location: [facility]  Reason For Study: Chest Pain  Ordering Physician: LIS MANZANARES  Performed By: MAYCO Boggs     BSA: 1.8 m2  Height: 70 in  Weight: 150 lb  HR: 88  BP: 104/73 mmHg  _____________________________________________________________________________  __        Procedure  Stress Echo Bike with two dimensional, color and spectral Doppler performed.  Contrast Definity.  _____________________________________________________________________________  __        Interpretation Summary  Normal exercise echocardiogram without evidence of inducible ischemia.  Target heart rate was achieved. Heart rate and blood pressure response to  exercise were normal. With stress, the left ventricular ejection fraction  increased from 55-60% to greater than 65% and the left ventricular size  decreased appropriately.  Poor functional capacity. No subjective symptoms to suggest ischemia.  There was no ECG evidence of ischemia.  No significant valve disease on screening doppler  evaluation. The aortic root  and visualized ascending aorta are normal.  _____________________________________________________________________________  __     Stress  Definity (NDC #33184-289-81) given intravenously.  Patient was given 5.0ml mixture of 1.5ml Definity and 8.5ml saline.  5.0 ml wasted.  This was a normal stress EKG with no evidence of stress-induced ischemia.  Peak MVO2 13.2 ml/kg/min .  Percent predicted MVO2 55 %.  RPP 67784.  Maximum workload 50 gold.  Target Heart Rate was achieved.  Exercise was stopped due to dyspnea.  The patient exhibited dyspnea during exercise.     Rest  Normal baseline electrocardiogram.     Stress Results        Protocol:  Bike Stress Echo        Maximum Predicted HR:   152 bpm        Target HR: 129 bpm                 % Maximum Predicted HR: 93 %                             StageDurationHeart Rate  BP                                (mm:ss)   (bpm)                           BASE  0:00      88    104/73                           PEAK  2:27      142   164/85                            Stress Duration:   2:27 mm:ss *                      Maximum Stress HR: 142 bpm *     Left Ventricle  Left ventricular systolic function is normal. Ejection Fraction = >55%.     Aortic Valve  The aortic valve is normal in structure and function.     Mitral Valve  The mitral valve is normal in structure and function.        Tricuspid Valve  The tricuspid valve is normal in structure and function. RVSP = 27.0 mmHg +  RAP.     Right Ventricle  The right ventricular systolic function is normal.     Pericardium  There is no pericardial effusion.  _____________________________________________________________________________  __           Doppler Measurements & Calculations  TR max jon: 260.0 cm/sec  TR max P.0 mmHg              _____________________________________________________________________________  __        Report approved by: Norah Pantoja 06/10/2017 10:06          Recent  Results (from the past 48 hour(s))   XR Chest 2 Views    Narrative    Exam: XR CHEST 2 VW, 6/10/2017 2:05 AM    Indication: Chest Pain    Comparison: 1/4/2017, 12/28/2016, 9/14/2016.    Findings:   PA and lateral views of the chest were obtained. The cardiomediastinal  silhouette is within normal limits. No pneumothorax or pleural  effusion. Stable calcified granuloma in the lateral right lung. No  acute airspace opacity. Opacities in the upper lung bilaterally  changed. Emphysematous changes.. Upper lobe predominant emphysematous  changes. Poorly visualized old rib fractures bilaterally.      Impression    Impression:   1. No acute cardiopulmonary abnormality. Emphysematous changes,  nodular opacities, and scarring in both lungs is better visualized on  CT from today.  2. Stable upper lobe predominant emphysematous changes.    I have personally reviewed the examination and initial interpretation  and I agree with the findings.    JARRETT FITZGERALD   Chest CT, IV contrast only - PE protocol    Narrative    CT CHEST PULMONARY EMBOLISM W CONTRAST, 6/10/2017 3:52 AM    History: Chest pain, evaluate for pulmonary embolism, screening for  thoracic aneurysm.    Comparison: 6/10/2017, 12/28/2016, 9/14/2016    Technique: Helical acquisition of CT images of the chest from the lung  apices to the kidneys were acquired after the administration of  intravenous contrast according to the CT pulmonary angiogram protocol.  Axial images were reconstructed in 1 and 3 mm slice thickness. Coronal  reconstructions performed.    Findings:   Chest:  The contrast bolus is optimal. The main pulmonary artery is not  enlarged. No paradoxical bowing of the interventricular septum. No  pulmonary embolism is identified.    The thyroid parenchyma, aortic branching pattern, and heart size are  unremarkable. The ascending aorta is not enlarged. Trace pericardial  fluid. Moderate hiatal hernia, slightly increased since 12/20/2016. No  significant  change in prominent subcentimeter mediastinal lymph nodes.    The central tracheobronchial tree is patent. Mild bronchial wall  thickening. No pneumothorax or pleural effusion. No focal airspace  consolidation. A significant change in moderate upper lobe predominant  centrilobular and paraseptal emphysema. Unchanged right upper lung  scarring including nodular focus which again measures 1.3 x 2.7 cm.  Numerous calcified granulomas. No new or enlarging pulmonary nodules  appreciated.    Upper abdomen:  3 mm nonobstructing stone in the superior pole of the left kidney.  Diverticulosis without diverticulitis. The upper abdomen is otherwise  unremarkable.    Bones and soft tissues:  No acute or worrisome osseous lesions. Old lateral right fourth  through seventh rib fractures.       Impression    Impression:  1. No pulmonary embolism identified. The thoracic aorta is not  enlarged. No new airspace consolidations.  2. Unchanged moderate upper lobe predominant centrilobular and  paraseptal emphysema, including right upper lung scarring. Some of  this is nodular, and continued follow-up is recommended.  3. Moderate hiatal hernia, mildly increased since 12/20/2016.  4. Trace pericardial fluid, possibly physiologic but new since  12/28/2016.  5. Nonobstructive left nephrolithiasis.    I have personally reviewed the examination and initial interpretation  and I agree with the findings.    JARRTET FITZGERALD                Pending Results:   Unresulted Labs Ordered in the Past 30 Days of this Admission     No orders found from 4/11/2017 to 6/11/2017.                  Discharge Instructions and Follow-Up:     Discharge Procedure Orders  Reason for your hospital stay   Order Comments: Follow up with your primary care provider in one week for hospital follow up.     Adult [facility] Follow-up and recommended labs and tests   Order Comments: Appointments on [facility] and/or [facility] (with [facility] or [facility] provider or service). Call  [phone] if you haven't heard regarding these appointments within 7 days of discharge.     Activity   Order Comments: Your activity upon discharge: As tolerated   Order Specific Question Answer Comments   Is discharge order? Yes      When to contact your care team   Order Comments: Please go to your nearest emergency room If you were to have a change in the type of chest pain i.e more severe, lasting longer or radiating to your shoulder, arm, neck, jaw or back, shortness of breath or increased pain with breathing, coughing up blood, feel dizzy or lightheaded, or notice swelling in one leg.     Diet   Order Comments: Follow this diet upon discharge: Regular diet   Order Specific Question Answer Comments   Is discharge order? Yes             Attestation:  Lulu Jorge.

## 2017-06-10 NOTE — ED PROVIDER NOTES
[place] EMERGENCY DEPARTMENT ([facility])  Loraine 10, 2017  ED 19   History     Chief Complaint   Patient presents with     Chest Pain     The history is provided by the patient and medical records.     Robert B Behr is a 67 year old male who presents with chest pain. He has a history of COPD on albuterol and duonebs, smoking history x 51 pack year history, as well as indeterminate bilateral pulmonary nodules seen on CT. Today patient went on a 5 mile bike ride carrying a 25 lb bag of groceries on his back. He returned home, put the groceries down when he noticed a mild left sided chest pain. He states it was 1/10 in intensity, dull, localized over his left chest and wrapping around to the back. Initially he thought the chest pain would go away on its own, but instead has been constant and worsening to 7/10 pain over course of the day. He has never had this pain before. He called 911, was given NTG and his chest pain improved from 7 to a 4. He also took 2 Tylenol at home prior to transport. He has not taken any aspirin. He notes that his chest pain worsens with deep inspiration and he feels short of breath just standing up. Theres some change to his chest pain with twisting or movement as well.  He notes some prior shortness of breath secondary to bronchitis, and has had worsening of his cough lately. He denies any diaphoresis. Patient feels a little lightheaded at this time. No history of hypertension. Patient is very active, bicycles every day and swims 5x a week. Patient is asking for prescription for nicotine patch as well.     I have reviewed the Medications, Allergies, Past Medical and Surgical History, and Social History in the [employer] system.  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     Tobacco abuse        Review of Systems    Constitutional: Negative.  Negative for appetite change, chills, diaphoresis, fatigue and fever.   HENT: Negative for congestion, rhinorrhea and sore throat.    Eyes: Negative for visual disturbance.   Respiratory: Positive for cough and shortness of breath. Negative for chest tightness.    Cardiovascular: Positive for chest pain. Negative for palpitations and leg swelling.   Gastrointestinal: Negative for abdominal pain, diarrhea, nausea and vomiting.   Genitourinary: Negative for flank pain.   Musculoskeletal: Positive for back pain (chest pain wraps around to back). Negative for arthralgias, myalgias and neck pain.   Skin: Negative for rash.   Allergic/Immunologic: Negative for immunocompromised state.   Neurological: Negative for dizziness, syncope, weakness, light-headedness and headaches.   Hematological: Does not bruise/bleed easily.       Physical Exam   BP: 134/88  Heart Rate: 96  Temp: 98.2  F (36.8  C)  Resp: 16  SpO2: 95 %  Physical Exam   Constitutional: He appears well-developed and well-nourished. No distress.   HENT:   Head: Normocephalic and atraumatic.   Mouth/Throat: Oropharynx is clear and moist.   Eyes: Conjunctivae and EOM are normal. Pupils are equal, round, and reactive to light.   Neck: Normal range of motion. Neck supple.   Cardiovascular: Normal rate, regular rhythm, normal heart sounds and intact distal pulses.  Exam reveals no gallop and no friction rub.    No murmur heard.  Pulmonary/Chest: Effort normal and breath sounds normal. No respiratory distress. He exhibits tenderness.   Abdominal: Soft. Bowel sounds are normal. There is no tenderness.   Musculoskeletal: He exhibits no edema or tenderness.   Neurological: He is alert.   Skin: Skin is warm and dry.   Psychiatric: He has a normal mood and affect. His behavior is normal.   Nursing note and vitals reviewed.      ED Course     ED Course     Procedures             EKG Interpretation:      Interpreted by Gene Sterling  reviewed: 0055  Symptoms at time of EKG: chest pain   Rhythm: normal sinus   Rate: normal  Axis: normal  Ectopy: none  Conduction: normal  ST Segments/ T Waves: No ST-T wave changes  Q Waves: none  Comparison to prior: No old EKG available    Clinical Impression: normal EKG          Critical Care time:  none               Labs Ordered and Resulted from Time of ED Arrival Up to the Time of Departure from the ED   BASIC METABOLIC PANEL - Abnormal; Notable for the following:        Result Value    Calcium 8.1 (*)     All other components within normal limits   CBC WITH PLATELETS DIFFERENTIAL - Abnormal; Notable for the following:     WBC 12.3 (*)     RBC Count 4.17 (*)     Absolute Neutrophil 9.3 (*)     All other components within normal limits   TROPONIN I   TROPONIN I   CARDIAC CONTINUOUS MONITORING   PULSE OXIMETRY NURSING            Assessments & Plan (with Medical Decision Making)   Chest pain, etiology not clear. Now pain free. No ischemic changes on EKG. No pulmonary embolism or thoracic aneurysm on CTA. Admit to observation for serial troponin and stress testing    I have reviewed the nursing notes.    I have reviewed the findings, diagnosis, plan and need for follow up with the patient.    New Prescriptions    No medications on file       Final diagnoses:   None     Jennifer MENA, am serving as a trained medical scribe to document services personally performed by Regulo Judge MD, based on the provider's statements to me.    Regulo MENA MD, was physically present and have reviewed and verified the accuracy of this note documented by Jennifer Wharton, medical scribe.      6/10/2017   [facility], [place], EMERGENCY DEPARTMENT     Gene Judge MD  06/10/17 0740

## 2017-06-10 NOTE — H&P
[facility] ED Observation Admission Note    Chief Complaint   Patient presents with     Chest Pain       Assessment/Plan:  1. Chest pain - Chest pain after biking and carrying groceries. Mild leftsided radiating to back. worse with deep inspiration. Initial troponin negative. CT PE protocol negative Chest xray without signs of infection. No cardiac workup in the past.   - Continuous cardiac monitoring  - Troponin  - Exercise stress test negative.   - Ultram for chest pain    2. COPD Exacerbation - Recently diagnosed with bronchitis and was started on Doxycycline x 10 days and Prednisone burst 40 mg x 5 days. Patient reports worsening cough. Afebrile. WBC 12.3. Sating 90% on RA. Patient reports he has nebulizers at home. She feels short of breath. He is currently on 14 mcg of nicotine patch and recently started smoking again. Patient's 5th episode in 6 months. Patient wants to see a pulmonologist and referral to [facility].   - Duo nebs every 4 hours  - Albuterol neb every 2 hours prn  - Continue Combivent which the patient has with him  - continuous pulse ox   - Procacitonin pending.  - Pulmonology follow up.       3. Tobacco abuse - 51 pack year history - requesting a nicotine patch. Reports being on 14mcg at home   - prescription will be sent for a nicotine patch.     4. GERD - Start Zantac BID.       HPI:    Robert B Behr is a 67 year old male with a history of Tobacco abuse (51 pack year history), polio (left leg weakness) osteoporosis, Lung nodules and COPD who presented to the ED with chest pain. The patient went on a bike ride yesterday and was carrying a 25 pound bag of groceries on his bike ride. When he arrived home, he put the groceries on the ground and felt a mild left sided chest pain. It did not go away and the pain increased and wrapped around his back. He denies having chest pain like this before. The patient called 911 and was given nitro en route with improvement of his chest pain. His chest pain is worse  with deep breaths and patient reports feeling short of breath at rest. He has recently been diagnosed with bronchitis and was started on an antibiotic and a steroid. The patient reports a worsening cough as of late. The patient reports he is active and bikes everyday and swims every day.     In the ED   Vitals: BP:104/66 Pulse: 86 Temp: 98.2 Resp: 18 SP02: 90 Labs:  2 trops negative, Na 139, Potassium 3.4, Cr 0.78, WBC 12.3, plt 187, Medications: nitro 0.4 mg po x 2, Norco 1 tablet Imaging: Chest xray: No acute cardiopulmonary abnormality. Emphysematous changes, nodular opacities, and scarring in both lungs is better visualized on CT from today. CT today showed No pulmonary embolism identified. The thoracic aorta is not enlarged. No new airspace consolidations. Unchanged moderate upper lobe predominant centrilobular and paraseptal emphysema, including right upper lung scarring. Some of this is nodular, and continued follow-up is recommended. Moderate hiatal hernia, mildly increased since 12/20/2016.  Trace pericardial fluid, possibly physiologic but new since 12/28/2016.Stable upper lobe predominant emphysematous changes. Consults: none  Plan: Admit to ED observation for ACS r/o.    On admission to the observation unit the patient was stable. Reports shortness of breath with standing. Reports chest pain has resolved.     History:    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     Tobacco abuse        Past Surgical History:   Procedure Laterality Date     PHACOEMULSIFICATION CLEAR CORNEA WITH STANDARD INTRAOCULAR LENS IMPLANT Right 9/30/2016    Procedure: PHACOEMULSIFICATION CLEAR CORNEA WITH STANDARD INTRAOCULAR LENS IMPLANT;  Surgeon: Elly Valencia MD;  Location: [facility]     WRIST SURGERY         Family History   Problem Relation Age of Onset      DIABETES Brother      living     Lung Cancer Brother           Macular Degeneration Mother      Colon Cancer No family hx of      Glaucoma No family hx of        Social History     Social History     Marital status: Single     Spouse name: N/A     Number of children: N/A     Years of education: N/A     Occupational History     Not on file.     Social History Main Topics     Smoking status: Former Smoker     Packs/day: 1.50     Years: 45.00     Types: Cigarettes     Quit date: 2016     Smokeless tobacco: Never Used      Comment: 1.5 packs for 45 years smoker     Alcohol use No     Drug use: No     Sexual activity: No     Other Topics Concern     Parent/Sibling W/ Cabg, Mi Or Angioplasty Before 65f 55m? No     Social History Narrative         No current facility-administered medications on file prior to encounter.   Current Outpatient Prescriptions on File Prior to Encounter:  doxycycline (VIBRAMYCIN) 100 MG capsule Take 1 capsule (100 mg) by mouth 2 times daily   tiotropium (SPIRIVA HANDIHALER) 18 MCG capsule Inhale contents of one capsule daily.   calcium-vitamin D (CALTRATE) 600-400 MG-UNIT per tablet TAKE 1 TABLET BY MOUTH TWICE DAILY   ipratropium - albuterol 0.5 mg/2.5 mg/3 mL (DUONEB) 0.5-2.5 (3) MG/3ML neb solution Take 1 vial (3 mLs) by nebulization every 6 hours as needed for shortness of breath / dyspnea or wheezing   nicotine (NICODERM CQ) 21 MG/24HR 24 hr patch Place 1 patch onto the skin every 24 hours   order for DME Equipment being ordered: nebulizer machine   traMADol (ULTRAM) 50 MG tablet Take 1 tablet (50 mg) by mouth every 6 hours as needed for pain maximum 6 tablet(s) per day   UNABLE TO FIND Reported on 3/14/2017   ascorbic acid (VITAMIN C) 1000 MG TABS Take 1 tablet (1,000 mg) by mouth daily   vitamin E ([employer] VITAMIN E) 1000 UNIT capsule Take 1 capsule (1,000 Units) by mouth daily   vitamin B complex with vitamin C (VITAMIN  B COMPLEX) TABS tablet Take 1 tablet by mouth 2 times daily  With Folic acid   vitamin A ([employer] VITAMIN A) 23687 UNIT capsule Take 1 capsule (10,000 Units) by mouth daily   omega 3 1000 MG CAPS Take 1 g by mouth daily   Calcium-Magnesium-Vitamin D (CALCIUM 1200+D3) 600- MG-MG-UNIT TB24 Reported on 3/14/2017   Ipratropium-Albuterol (COMBIVENT RESPIMAT)  MCG/ACT inhaler Inhale 1 puff into the lungs 4 times daily Not to exceed 6 doses per day.   albuterol (PROAIR HFA, PROVENTIL HFA, VENTOLIN HFA) 108 (90 BASE) MCG/ACT inhaler Inhale 2 puffs into the lungs every 4 hours as needed for shortness of breath / dyspnea or wheezing       Data:    Results for orders placed or performed during the hospital encounter of 06/10/17   XR Chest 2 Views    Narrative    Exam: XR CHEST 2 VW, 6/10/2017 2:05 AM    Indication: Chest Pain    Comparison: 1/4/2017, 12/28/2016, 9/14/2016.    Findings:   PA and lateral views of the chest were obtained. The cardiomediastinal  silhouette is within normal limits. No pneumothorax or pleural  effusion. Stable calcified granuloma in the lateral right lung. No  acute airspace opacity. Opacities in the upper lung bilaterally  changed. Emphysematous changes.. Upper lobe predominant emphysematous  changes. Poorly visualized old rib fractures bilaterally.      Impression    Impression:   1. No acute cardiopulmonary abnormality. Emphysematous changes,  nodular opacities, and scarring in both lungs is better visualized on  CT from today.  2. Stable upper lobe predominant emphysematous changes.    I have personally reviewed the examination and initial interpretation  and I agree with the findings.    JARRETT FITGZERALD   Chest CT, IV contrast only - PE protocol    Narrative    CT CHEST PULMONARY EMBOLISM W CONTRAST, 6/10/2017 3:52 AM    History: Chest pain, evaluate for pulmonary embolism, screening for  thoracic aneurysm.    Comparison: 6/10/2017, 12/28/2016, 9/14/2016    Technique: Helical acquisition of CT images of the chest from the lung  apices to the  kidneys were acquired after the administration of  intravenous contrast according to the CT pulmonary angiogram protocol.  Axial images were reconstructed in 1 and 3 mm slice thickness. Coronal  reconstructions performed.    Findings:   Chest:  The contrast bolus is optimal. The main pulmonary artery is not  enlarged. No paradoxical bowing of the interventricular septum. No  pulmonary embolism is identified.    The thyroid parenchyma, aortic branching pattern, and heart size are  unremarkable. The ascending aorta is not enlarged. Trace pericardial  fluid. Moderate hiatal hernia, slightly increased since 12/20/2016. No  significant change in prominent subcentimeter mediastinal lymph nodes.    The central tracheobronchial tree is patent. Mild bronchial wall  thickening. No pneumothorax or pleural effusion. No focal airspace  consolidation. A significant change in moderate upper lobe predominant  centrilobular and paraseptal emphysema. Unchanged right upper lung  scarring including nodular focus which again measures 1.3 x 2.7 cm.  Numerous calcified granulomas. No new or enlarging pulmonary nodules  appreciated.    Upper abdomen:  3 mm nonobstructing stone in the superior pole of the left kidney.  Diverticulosis without diverticulitis. The upper abdomen is otherwise  unremarkable.    Bones and soft tissues:  No acute or worrisome osseous lesions. Old lateral right fourth  through seventh rib fractures.       Impression    Impression:  1. No pulmonary embolism identified. The thoracic aorta is not  enlarged. No new airspace consolidations.  2. Unchanged moderate upper lobe predominant centrilobular and  paraseptal emphysema, including right upper lung scarring. Some of  this is nodular, and continued follow-up is recommended.  3. Moderate hiatal hernia, mildly increased since 12/20/2016.  4. Trace pericardial fluid, possibly physiologic but new since  12/28/2016.  5. Nonobstructive left nephrolithiasis.    I have  personally reviewed the examination and initial interpretation  and I agree with the findings.    JARRETT FITZGERALD   Basic metabolic panel   Result Value Ref Range    Sodium 139 133 - 144 mmol/L    Potassium 3.4 3.4 - 5.3 mmol/L    Chloride 104 94 - 109 mmol/L    Carbon Dioxide 27 20 - 32 mmol/L    Anion Gap 8 3 - 14 mmol/L    Glucose 98 70 - 99 mg/dL    Urea Nitrogen 16 7 - 30 mg/dL    Creatinine 0.78 0.66 - 1.25 mg/dL    GFR Estimate >90  Non  GFR Calc   >60 mL/min/1.7m2    GFR Estimate If Black >90   GFR Calc   >60 mL/min/1.7m2    Calcium 8.1 (L) 8.5 - 10.1 mg/dL   Troponin I   Result Value Ref Range    Troponin I ES  0.000 - 0.045 ug/L     <0.015  The 99th percentile for upper reference range is 0.045 ug/L.  Troponin values in   the range of 0.045 - 0.120 ug/L may be associated with risks of adverse   clinical events.     CBC with platelets differential   Result Value Ref Range    WBC 12.3 (H) 4.0 - 11.0 10e9/L    RBC Count 4.17 (L) 4.4 - 5.9 10e12/L    Hemoglobin 13.5 13.3 - 17.7 g/dL    Hematocrit 40.8 40.0 - 53.0 %    MCV 98 78 - 100 fl    MCH 32.4 26.5 - 33.0 pg    MCHC 33.1 31.5 - 36.5 g/dL    RDW 13.4 10.0 - 15.0 %    Platelet Count 187 150 - 450 10e9/L    Diff Method Automated Method     % Neutrophils 75.8 %    % Lymphocytes 14.8 %    % Monocytes 6.8 %    % Eosinophils 2.1 %    % Basophils 0.2 %    % Immature Granulocytes 0.3 %    Nucleated RBCs 0 0 /100    Absolute Neutrophil 9.3 (H) 1.6 - 8.3 10e9/L    Absolute Lymphocytes 1.8 0.8 - 5.3 10e9/L    Absolute Monocytes 0.8 0.0 - 1.3 10e9/L    Absolute Eosinophils 0.3 0.0 - 0.7 10e9/L    Absolute Basophils 0.0 0.0 - 0.2 10e9/L    Abs Immature Granulocytes 0.0 0 - 0.4 10e9/L    Absolute Nucleated RBC 0.0    Troponin I   Result Value Ref Range    Troponin I ES  0.000 - 0.045 ug/L     <0.015  The 99th percentile for upper reference range is 0.045 ug/L.  Troponin values in   the range of 0.045 - 0.120 ug/L may be associated with  risks of adverse   clinical events.               EKG Interpretation:      Interpreted by Gene Blair  Time reviewed: 0055  Symptoms at time of EKG: chest pain   Rhythm: normal sinus   Rate: normal  Axis: normal  Ectopy: none  Conduction: normal  ST Segments/ T Waves: No ST-T wave changes  Q Waves: none  Comparison to prior: No old EKG available     Clinical Impression: normal EKG    ROS:    Review Of Systems  Skin: negative  Eyes: negative  Ears/Nose/Throat: hoarse voice   Respiratory: Shortness of breath- and Cough   Cardiovascular: chest pain  Gastrointestinal: negative  Genitourinary: negative  Musculoskeletal: back pain  Neurologic: negative  Psychiatric: negative  Hematologic/Lymphatic/Immunologic: negative  Endocrine: negative  PCP: Lisbet       10 point ROS negative other than the symptoms noted above.      Exam:    Vitals:  B/P: 104/66, T: 98.2, P: Data Unavailable, R: 18    Constitutional: healthy, alert and no distress   Head: Normocephalic. No masses, lesions, tenderness or abnormalities   Neck: Neck supple. No adenopathy. Thyroid symmetric, normal size,, Carotids without bruits.   ENT: ENT exam normal, no neck nodes or sinus tenderness   Cardiovascular: RRR. No murmurs, clicks gallops or rub   Respiratory: . Decreased breath sounds om bases bilaterally; minimal wheezing bilaterally in bases   Gastrointestinal: Abdomen soft,. BS normal. No masses, organomegaly.   : Deferred   Musculoskeletal: extremities normal- no gross deformities noted, gait normal and normal muscle tone   Skin: no suspicious lesions or rashes   Neurologic: Gait normal. Reflexes normal and symmetric. Sensation grossly WNL.   Psychiatric: mentation appears normal and affect normal/bright   Hematologic/Lymphatic/Immunologic: normal ant/post cervical, axillary, supraclavicular and inguinal     Consults: none  FEN: NPO  DVT prophylaxis: Early ambulation   Disposition: Home if troponins negative and stress test negative.      Signed:  DARIEL Shaw, NP  Emergency Department  [phone] Ex [id number]  Loraine 10, 2017 at 8:42 AM

## 2017-06-10 NOTE — ED NOTES
Patient was riding his bike, which he normally does, when he started having left sided chest pain that radiated to his back. The chest pain started around 1:30 this afternoon. He also complains of shortness of breath with a cough that produces clear, thin sputum. Patient received aspirin and 1 nitro in route to ED. Nitro improved chest pain.

## 2017-06-10 NOTE — ED NOTES
Observation Brochure and Video    Patient informed of observation status based on provider's order.  Observation brochure was given and the video watched. Patient/Family stated understanding. Questions answered.  Connie Wright

## 2017-06-10 NOTE — PROGRESS NOTES
Patient interviewed and examined. Labs, imaging and chart notes reviewed.  Mr Behr presents with onset of left upper chest pain radiating to the left neck and upper back starting at about 130 PM yesterday after he biked home with 25 lbs of groceries on his back. In the ED he reported some change in the pain with respiration and movement. He currently denies effect of movement. He has a history of COPD. He has been trying to quit smoking without complete success. He has chronic cough and wheezing. He was treated with a course of doxycycline and steroids. He is otherwise on combivent and inhaled steroids. His pain partially responded to NTG in the ambulance. His pain has been gradually abating. He has ongoing cough, mildly productive, and wheezing. He has no fever, chills or sweats. He has occasional heartburn. He has no nausea, vomiting or abdominal pain. He has no leg pain or swelling.    PAST MEDICAL HISTORY:   Past Medical History:   Diagnosis Date     COPD (chronic obstructive pulmonary disease) (H)     diagnosed with spirometry      Lung nodules     CT scan      Osteoporosis      Polio 195    left leg weak     Tobacco abuse        PAST SURGICAL HISTORY:   Past Surgical History:   Procedure Laterality Date     PHACOEMULSIFICATION CLEAR CORNEA WITH STANDARD INTRAOCULAR LENS IMPLANT Right 2016    Procedure: PHACOEMULSIFICATION CLEAR CORNEA WITH STANDARD INTRAOCULAR LENS IMPLANT;  Surgeon: Elly Valencia MD;  Location: [facility]     WRIST SURGERY         FAMILY HISTORY:   Family History   Problem Relation Age of Onset     DIABETES Brother      living     Lung Cancer Brother           Macular Degeneration Mother      Colon Cancer No family hx of      Glaucoma No family hx of        SOCIAL HISTORY:   Social History   Substance Use Topics     Smoking status: Former Smoker     Packs/day: 1.50     Years: 45.00     Types: Cigarettes     Quit date: 2016     Smokeless tobacco: Never Used      Comment:  1.5 packs for 45 years smoker     Alcohol use No      ROS: ROS neg other than the symptoms noted above in the HPI.    Physical exam:   Middle aged male in NAD, intermittent cough.  /75  Temp 98.1  F (36.7  C) (Oral)  Resp 18  SpO2 91%  HEENT: PERRLA, EOMI.  Neck: No bruit. No JVD.  Lungs: Scattered wheezes, no rales.  Cardiac: RRR. Normal S1 and S2. No murmur or rub.  Abdomen: Non tender.  Extrem: No edema.  Skin: No rash.  Neuro: Alert, oriented. CN, motor, sensory intact.  Psych: Normal mood and affect.    Labs/Imaging    Results for orders placed or performed during the hospital encounter of 06/10/17 (from the past 24 hour(s))   EKG 12-lead, tracing only   Result Value Ref Range    Interpretation ECG Click View Image link to view waveform and result    Basic metabolic panel   Result Value Ref Range    Sodium 139 133 - 144 mmol/L    Potassium 3.4 3.4 - 5.3 mmol/L    Chloride 104 94 - 109 mmol/L    Carbon Dioxide 27 20 - 32 mmol/L    Anion Gap 8 3 - 14 mmol/L    Glucose 98 70 - 99 mg/dL    Urea Nitrogen 16 7 - 30 mg/dL    Creatinine 0.78 0.66 - 1.25 mg/dL    GFR Estimate >90  Non  GFR Calc   >60 mL/min/1.7m2    GFR Estimate If Black >90   GFR Calc   >60 mL/min/1.7m2    Calcium 8.1 (L) 8.5 - 10.1 mg/dL   Troponin I   Result Value Ref Range    Troponin I ES  0.000 - 0.045 ug/L     <0.015  The 99th percentile for upper reference range is 0.045 ug/L.  Troponin values in   the range of 0.045 - 0.120 ug/L may be associated with risks of adverse   clinical events.     CBC with platelets differential   Result Value Ref Range    WBC 12.3 (H) 4.0 - 11.0 10e9/L    RBC Count 4.17 (L) 4.4 - 5.9 10e12/L    Hemoglobin 13.5 13.3 - 17.7 g/dL    Hematocrit 40.8 40.0 - 53.0 %    MCV 98 78 - 100 fl    MCH 32.4 26.5 - 33.0 pg    MCHC 33.1 31.5 - 36.5 g/dL    RDW 13.4 10.0 - 15.0 %    Platelet Count 187 150 - 450 10e9/L    Diff Method Automated Method     % Neutrophils 75.8 %    % Lymphocytes  14.8 %    % Monocytes 6.8 %    % Eosinophils 2.1 %    % Basophils 0.2 %    % Immature Granulocytes 0.3 %    Nucleated RBCs 0 0 /100    Absolute Neutrophil 9.3 (H) 1.6 - 8.3 10e9/L    Absolute Lymphocytes 1.8 0.8 - 5.3 10e9/L    Absolute Monocytes 0.8 0.0 - 1.3 10e9/L    Absolute Eosinophils 0.3 0.0 - 0.7 10e9/L    Absolute Basophils 0.0 0.0 - 0.2 10e9/L    Abs Immature Granulocytes 0.0 0 - 0.4 10e9/L    Absolute Nucleated RBC 0.0    Magnesium   Result Value Ref Range    Magnesium 1.7 1.6 - 2.3 mg/dL   XR Chest 2 Views    Narrative    Exam: XR CHEST 2 VW, 6/10/2017 2:05 AM    Indication: Chest Pain    Comparison: 1/4/2017, 12/28/2016, 9/14/2016.    Findings:   PA and lateral views of the chest were obtained. The cardiomediastinal  silhouette is within normal limits. No pneumothorax or pleural  effusion. Stable calcified granuloma in the lateral right lung. No  acute airspace opacity. Opacities in the upper lung bilaterally  changed. Emphysematous changes.. Upper lobe predominant emphysematous  changes. Poorly visualized old rib fractures bilaterally.      Impression    Impression:   1. No acute cardiopulmonary abnormality. Emphysematous changes,  nodular opacities, and scarring in both lungs is better visualized on  CT from today.  2. Stable upper lobe predominant emphysematous changes.    I have personally reviewed the examination and initial interpretation  and I agree with the findings.    JARRETT FITZGERALD   Chest CT, IV contrast only - PE protocol    Narrative    CT CHEST PULMONARY EMBOLISM W CONTRAST, 6/10/2017 3:52 AM    History: Chest pain, evaluate for pulmonary embolism, screening for  thoracic aneurysm.    Comparison: 6/10/2017, 12/28/2016, 9/14/2016    Technique: Helical acquisition of CT images of the chest from the lung  apices to the kidneys were acquired after the administration of  intravenous contrast according to the CT pulmonary angiogram protocol.  Axial images were reconstructed in 1 and 3 mm slice  thickness. Coronal  reconstructions performed.    Findings:   Chest:  The contrast bolus is optimal. The main pulmonary artery is not  enlarged. No paradoxical bowing of the interventricular septum. No  pulmonary embolism is identified.    The thyroid parenchyma, aortic branching pattern, and heart size are  unremarkable. The ascending aorta is not enlarged. Trace pericardial  fluid. Moderate hiatal hernia, slightly increased since 12/20/2016. No  significant change in prominent subcentimeter mediastinal lymph nodes.    The central tracheobronchial tree is patent. Mild bronchial wall  thickening. No pneumothorax or pleural effusion. No focal airspace  consolidation. A significant change in moderate upper lobe predominant  centrilobular and paraseptal emphysema. Unchanged right upper lung  scarring including nodular focus which again measures 1.3 x 2.7 cm.  Numerous calcified granulomas. No new or enlarging pulmonary nodules  appreciated.    Upper abdomen:  3 mm nonobstructing stone in the superior pole of the left kidney.  Diverticulosis without diverticulitis. The upper abdomen is otherwise  unremarkable.    Bones and soft tissues:  No acute or worrisome osseous lesions. Old lateral right fourth  through seventh rib fractures.       Impression    Impression:  1. No pulmonary embolism identified. The thoracic aorta is not  enlarged. No new airspace consolidations.  2. Unchanged moderate upper lobe predominant centrilobular and  paraseptal emphysema, including right upper lung scarring. Some of  this is nodular, and continued follow-up is recommended.  3. Moderate hiatal hernia, mildly increased since 12/20/2016.  4. Trace pericardial fluid, possibly physiologic but new since  12/28/2016.  5. Nonobstructive left nephrolithiasis.    I have personally reviewed the examination and initial interpretation  and I agree with the findings.    JARRETT FITZGERALD   Troponin I   Result Value Ref Range    Troponin I ES  0.000 -  0.045 ug/L     <0.015  The 99th percentile for upper reference range is 0.045 ug/L.  Troponin values in   the range of 0.045 - 0.120 ug/L may be associated with risks of adverse   clinical events.     Echo stress test with definity    Narrative    [id number]  [id number]  [id number]  [id number]^LIS^R           [facility],[place]  Echocardiography Laboratory  [address] [place] [id number]  Name: BEHR, ROBERT B  MRN: [id number]  : 1950  Study Date: 06/10/2017 09:10 AM  Age: 67 yrs  Gender: Male  Patient Location: [facility]  Reason For Study: Chest Pain  Ordering Physician: LIS MANZANARES  Performed By: MAYCO Boggs     BSA: 1.8 m2  Height: 70 in  Weight: 150 lb  HR: 88  BP: 104/73 mmHg  _____________________________________________________________________________  __        Procedure  Stress Echo Bike with two dimensional, color and spectral Doppler performed.  Contrast Definity.  _____________________________________________________________________________  __        Interpretation Summary  Normal exercise echocardiogram without evidence of inducible ischemia.  Target heart rate was achieved. Heart rate and blood pressure response to  exercise were normal. With stress, the left ventricular ejection fraction  increased from 55-60% to greater than 65% and the left ventricular size  decreased appropriately.  Poor functional capacity. No subjective symptoms to suggest ischemia.  There was no ECG evidence of ischemia.  No significant valve disease on screening doppler evaluation. The aortic root  and visualized ascending aorta are normal.  _____________________________________________________________________________  __     Stress  Definity (NDC #21524-313-61) given intravenously.  Patient was given 5.0ml mixture of 1.5ml Definity and 8.5ml saline.  5.0 ml wasted.  This was a normal stress EKG with no evidence of stress-induced ischemia.  Peak MVO2 13.2 ml/kg/min .  Percent  predicted MVO2 55 %.  RPP 39810.  Maximum workload 50 gold.  Target Heart Rate was achieved.  Exercise was stopped due to dyspnea.  The patient exhibited dyspnea during exercise.     Rest  Normal baseline electrocardiogram.     Stress Results        Protocol:  Bike Stress Echo        Maximum Predicted HR:   152 bpm        Target HR: 129 bpm                 % Maximum Predicted HR: 93 %                             StageDurationHeart Rate  BP                                (mm:ss)   (bpm)                           BASE  0:00      88    104/73                           PEAK  2:27      142   164/85                            Stress Duration:   2:27 mm:ss *                      Maximum Stress HR: 142 bpm *     Left Ventricle  Left ventricular systolic function is normal. Ejection Fraction = >55%.     Aortic Valve  The aortic valve is normal in structure and function.     Mitral Valve  The mitral valve is normal in structure and function.        Tricuspid Valve  The tricuspid valve is normal in structure and function. RVSP = 27.0 mmHg +  RAP.     Right Ventricle  The right ventricular systolic function is normal.     Pericardium  There is no pericardial effusion.  _____________________________________________________________________________  __           Doppler Measurements & Calculations  TR max jon: 260.0 cm/sec  TR max P.0 mmHg              _____________________________________________________________________________  __        Report approved by: Norah Pantoja 06/10/2017 10:06 AM        Impression:  Middle aged male with COPD and nicotine dependence presents with left upper chest pain. Differential includes cardiac ischemia, chest wall pain, bronchial air trapping, PTX. Pneumonia, PE and gastrointestinal. He has normal serial troponin. He has no acute changes on EKG. He had normal exercise stress echocardiogram. Cardiac ischemia appears unlikely. He had no evidence of PE on chest CTA. This also shows no  evidence of pneumonia or PTX, though there is centrilobular emphysema. He has old healed rib fractures on the right. No other obvious chest wall pathology on CT. He has no significant abnormalities in the spine on CT.He does have a fairly large hiatal hernia. With recent course of steroids, esophagitis is possible. He has active COPD with cough. Will check procalcitonin for evidence of ongoing infection. If normal would continue inhalers and continue to support smoking cessation. H2 blocker for possible GERD is also reasonable. (Would prefer to avoid PPI in setting of chronic steroid use and osteoporosis risk factors).    Plan:  Await procalcitonin result. Repeat course antibiotics if elevated. Continue current COPD inhalers.  Encourage smoking cessation.  H2 blocker.  Tylenol of low dose ibuprofen for pain.  Anticipate discharge this afternoon.    Ned Lindsay MD

## 2017-06-10 NOTE — IP AVS SNAPSHOT
MRN:[id number]                      After Visit Summary   6/10/2017    Robert B Behr    MRN: [id number]           Thank you!     Thank you for choosing [facility] for your care. Our goal is always to provide you with excellent care. Hearing back from our patients is one way we can continue to improve our services. Please take a few minutes to complete the written survey that you may receive in the mail after you visit with us. Thank you!        Patient Information     Date Of Birth          1950        About your hospital stay     You were admitted on:  Loraine 10, 2017 You last received care in the:  Unit 6D Observation [facility]    You were discharged on:  Loraine 10, 2017        Reason for your hospital stay       Follow up with your primary care provider in one week for hospital follow up.                  Who to Call     For medical emergencies, please call 911.  For non-urgent questions about your medical care, please call your primary care provider or clinic, [phone]          Attending Provider     Provider Specialty    Gene Judge MD Emergency Medicine    Loyda, Jadon Burnett MD Emergency Medicine    Neftali, Ned Rosales MD Emergency Medicine       Primary Care Provider Office Phone # Fax #    Fina Frausto [phone]       When to contact your care team       Please go to your nearest emergency room If you were to have a change in the type of chest pain i.e more severe, lasting longer or radiating to your shoulder, arm, neck, jaw or back, shortness of breath or increased pain with breathing, coughing up blood, feel dizzy or lightheaded, or notice swelling in one leg.                  After Care Instructions     Activity       Your activity upon discharge: As tolerated            Diet       Follow this diet upon discharge: Regular diet                  Follow-up Appointments     Adult [facility] Follow-up and recommended labs and tests        Appointments on [place] and/or [facility] (with [facility] or [facility] provider or service). Call [phone] if you haven't heard regarding these appointments within 7 days of discharge.                  Your next 10 appointments already scheduled     Aug 28, 2017  2:45 PM CDT   (Arrive by 2:30 PM)   XR CHEST 2 VIEWS with UCXR1   [facility] Xray ([facility])    [address]  [address] [facility] [id number] [phone]           Please bring a list of your current medicines to your exam. (Include vitamins, minerals and over-thecounter medicines.) Leave your valuables at home.  Tell your doctor if there is a chance you may be pregnant.  You do not need to do anything special for this exam.            Aug 28, 2017  3:00 PM CDT   FULL PULMONARY FUNCTION with  PFL [facility] Pulmonary Function Testing ([facility])    [address]  [address] [facility] [id number] [phone]            Aug 28, 2017  4:00 PM CDT   (Arrive by 3:45 PM)   New Patient Visit with Svitlana Taylor MD   [facility] ([facility])    [address] [id number] [phone]              Pending Results     No orders found from 6/8/2017 to 6/11/2017.            Statement of Approval     Ordered          06/10/17 1314  I have reviewed and agree with all the recommendations and orders detailed in this document.  EFFECTIVE NOW     Approved and electronically signed by:  Lulu Jorge APRN CNP             Admission Information     Date & Time Provider Department Dept. Phone    6/10/2017 Ned Lindsay MD Unit 6D Observation [facility] [place] [phone]      Your Vitals Were     Blood Pressure Temperature Respirations Pulse Oximetry          111/75 98.1  F (36.7  C) (Oral) 18 91%        MyChart Information     [employer] gives you secure access to your  electronic health record. If you see a primary care provider, you can also send messages to your care team and make appointments. If you have questions, please call your primary care clinic.  If you do not have a primary care provider, please call [phone] and they will assist you.        Care EveryWhere ID     This is your Care EveryWhere ID. This could be used by other organizations to access your [place] medical records  [id number]           Review of your medicines      CONTINUE these medicines which may have CHANGED, or have new prescriptions. If we are uncertain of the size of tablets/capsules you have at home, strength may be listed as something that might have changed.        Dose / Directions    * nicotine 21 MG/24HR 24 hr patch   Commonly known as:  NICODERM CQ   This may have changed:  Another medication with the same name was added. Make sure you understand how and when to take each.   Used for:  Tobacco use disorder        Dose:  1 patch   Place 1 patch onto the skin every 24 hours   Quantity:  30 patch   Refills:  1       * nicotine 21 MG/24HR 24 hr patch   Commonly known as:  NICODERM CQ   This may have changed:  You were already taking a medication with the same name, and this prescription was added. Make sure you understand how and when to take each.   Used for:  Tobacco use disorder        Dose:  1 patch   Place 1 patch onto the skin every 24 hours   Quantity:  30 patch   Refills:  0       * Notice:  This list has 2 medication(s) that are the same as other medications prescribed for you. Read the directions carefully, and ask your doctor or other care provider to review them with you.      CONTINUE these medicines which have NOT CHANGED        Dose / Directions    albuterol 108 (90 BASE) MCG/ACT Inhaler   Commonly known as:  PROAIR HFA/PROVENTIL HFA/VENTOLIN HFA   Used for:  Mild intermittent asthma without complication        Dose:  2 puff   Inhale 2 puffs into the lungs every 4 hours as  needed for shortness of breath / dyspnea or wheezing   Quantity:  1 Inhaler   Refills:  0       ascorbic acid 1000 MG Tabs   Commonly known as:  vitamin C   Used for:  Encounter for herb and vitamin supplement management        Dose:  1000 mg   Take 1 tablet (1,000 mg) by mouth daily   Quantity:  90 tablet   Refills:  2       CALCIUM 1200+D3 600- MG-MG-UNIT Tb24   Generic drug:  Calcium-Magnesium-Vitamin D        Reported on 3/14/2017   Refills:  0       calcium-vitamin D 600-400 MG-UNIT per tablet   Commonly known as:  CALTRATE   Used for:  Osteoporosis        TAKE 1 TABLET BY MOUTH TWICE DAILY   Quantity:  180 tablet   Refills:  3       * Ipratropium-Albuterol  MCG/ACT inhaler   Commonly known as:  COMBIVENT RESPIMAT   Used for:  COPD exacerbation (H)        Dose:  1 puff   Inhale 1 puff into the lungs 4 times daily Not to exceed 6 doses per day.   Quantity:  1 Inhaler   Refills:  11       * ipratropium - albuterol 0.5 mg/2.5 mg/3 mL 0.5-2.5 (3) MG/3ML neb solution   Commonly known as:  DUONEB   Used for:  COPD exacerbation (H)        Dose:  1 vial   Take 1 vial (3 mLs) by nebulization every 6 hours as needed for shortness of breath / dyspnea or wheezing   Quantity:  30 vial   Refills:  1       omega 3 1000 MG Caps   Used for:  Encounter for herb and vitamin supplement management        Dose:  1 g   Take 1 g by mouth daily   Quantity:  90 capsule   Refills:  2       order for DME   Used for:  COPD exacerbation (H)        Equipment being ordered: nebulizer machine   Quantity:  1 each   Refills:  0       tiotropium 18 MCG capsule   Commonly known as:  SPIRIVA HANDIHALER   Used for:  COPD exacerbation (H)        Inhale contents of one capsule daily.   Quantity:  30 capsule   Refills:  1       traMADol 50 MG tablet   Commonly known as:  ULTRAM   Used for:  Closed fracture of one rib of right side with delayed healing, subsequent encounter        Dose:  50 mg   Take 1 tablet (50 mg) by mouth every 6 hours  as needed for pain maximum 6 tablet(s) per day   Quantity:  16 tablet   Refills:  0       UNABLE TO FIND        Reported on 3/14/2017   Refills:  0       vitamin A 07820 UNIT capsule   Commonly known as:  CVS VITAMIN A   Used for:  Encounter for herb and vitamin supplement management        Dose:  52151 Units   Take 1 capsule (10,000 Units) by mouth daily   Quantity:  180 capsule   Refills:  2       vitamin B complex with vitamin C Tabs tablet   Used for:  Encounter for herb and vitamin supplement management        Dose:  1 tablet   Take 1 tablet by mouth 2 times daily With Folic acid   Quantity:  180 tablet   Refills:  2       vitamin E 1000 UNIT capsule   Commonly known as:  CVS vitamin E   Used for:  Encounter for herb and vitamin supplement management        Dose:  1000 Units   Take 1 capsule (1,000 Units) by mouth daily   Quantity:  90 capsule   Refills:  2       * Notice:  This list has 2 medication(s) that are the same as other medications prescribed for you. Read the directions carefully, and ask your doctor or other care provider to review them with you.      STOP taking     doxycycline 100 MG capsule   Commonly known as:  VIBRAMYCIN                Where to get your medicines      These medications were sent to [id number] - [place], [place] - [address] AT [facility] [address]  [address], [place] [address]     Phone:  [phone]     nicotine 21 MG/24HR 24 hr patch                Protect others around you: Learn how to safely use, store and throw away your medicines at www.disposemymeds.org.             Medication List: This is a list of all your medications and when to take them. Check marks below indicate your daily home schedule. Keep this list as a reference.      Medications           Morning Afternoon Evening Bedtime As Needed    albuterol 108 (90 BASE) MCG/ACT Inhaler   Commonly known as:  PROAIR HFA/PROVENTIL HFA/VENTOLIN HFA   Inhale 2 puffs into the lungs every 4 hours  as needed for shortness of breath / dyspnea or wheezing                                ascorbic acid 1000 MG Tabs   Commonly known as:  vitamin C   Take 1 tablet (1,000 mg) by mouth daily                                CALCIUM 1200+D3 600- MG-MG-UNIT Tb24   Reported on 3/14/2017   Generic drug:  Calcium-Magnesium-Vitamin D                                calcium-vitamin D 600-400 MG-UNIT per tablet   Commonly known as:  CALTRATE   TAKE 1 TABLET BY MOUTH TWICE DAILY                                * Ipratropium-Albuterol  MCG/ACT inhaler   Commonly known as:  COMBIVENT RESPIMAT   Inhale 1 puff into the lungs 4 times daily Not to exceed 6 doses per day.                                * ipratropium - albuterol 0.5 mg/2.5 mg/3 mL 0.5-2.5 (3) MG/3ML neb solution   Commonly known as:  DUONEB   Take 1 vial (3 mLs) by nebulization every 6 hours as needed for shortness of breath / dyspnea or wheezing                                * nicotine 21 MG/24HR 24 hr patch   Commonly known as:  NICODERM CQ   Place 1 patch onto the skin every 24 hours                                * nicotine 21 MG/24HR 24 hr patch   Commonly known as:  NICODERM CQ   Place 1 patch onto the skin every 24 hours                                omega 3 1000 MG Caps   Take 1 g by mouth daily                                order for DME   Equipment being ordered: nebulizer machine                                tiotropium 18 MCG capsule   Commonly known as:  SPIRIVA HANDIHALER   Inhale contents of one capsule daily.                                traMADol 50 MG tablet   Commonly known as:  ULTRAM   Take 1 tablet (50 mg) by mouth every 6 hours as needed for pain maximum 6 tablet(s) per day                                UNABLE TO FIND   Reported on 3/14/2017                                vitamin A 75424 UNIT capsule   Commonly known as:  CVS VITAMIN A   Take 1 capsule (10,000 Units) by mouth daily                                vitamin B complex  with vitamin C Tabs tablet   Take 1 tablet by mouth 2 times daily With Folic acid                                vitamin E 1000 UNIT capsule   Commonly known as:  CVS vitamin E   Take 1 capsule (1,000 Units) by mouth daily                                * Notice:  This list has 4 medication(s) that are the same as other medications prescribed for you. Read the directions carefully, and ask your doctor or other care provider to review them with you.

## 2017-06-10 NOTE — PROGRESS NOTES
IV's removed.  Discharge instructions given and questions answered.  Pt. Able to verbalize understanding.  Pt. Is taking train home.  Pt. Has all belongings.

## 2017-06-10 NOTE — IP AVS SNAPSHOT
Unit 6D Observation [address] [address]    Phone:  [phone]    Fax:  [phone]                                       After Visit Summary   6/10/2017    Robert B Behr    MRN: [id number]           After Visit Summary Signature Page     I have received my discharge instructions, and my questions have been answered. I have discussed any challenges I see with this plan with the nurse or doctor.    ..........................................................................................................................................  Patient/Patient Representative Signature      ..........................................................................................................................................  Patient Representative Print Name and Relationship to Patient    ..................................................               ................................................  Date                                            Time    ..........................................................................................................................................  Reviewed by Signature/Title    ...................................................              ..............................................  Date                                                            Time

## 2017-06-10 NOTE — PROGRESS NOTES
List all goals to be met before discharge home:   - Serial troponins and stress test complete: YES  - Seen and cleared by consultant if applicable: YES  - Adequate pain control on oral analgesia: YES  - Vital signs normal or at patient baseline: YES  - Safe disposition plan has been identified: YES      Pt A&Ox4 and VSS. Pt denies chest pain, SOB, or difficulty breathing at this time. NSR and negative troponins. Echo complete. Pt to d/c.    - Nurse to notify provider when observation goals have been met and patient is ready for discharge.

## 2017-06-12 ENCOUNTER — TELEPHONE (OUTPATIENT)
Dept: FAMILY MEDICINE | Facility: CLINIC | Age: 67
End: 2017-06-12

## 2017-06-12 NOTE — TELEPHONE ENCOUNTER
ED / Discharge Outreach Protocol    Patient Contact    Attempt # 1    Was call answered?  No.  Left message on voicemail with information to call me back.  Felicia Yanez RN

## 2017-06-13 ENCOUNTER — CARE COORDINATION (OUTPATIENT)
Dept: GERIATRIC MEDICINE | Facility: CLINIC | Age: 67
End: 2017-06-13

## 2017-06-13 DIAGNOSIS — J44.1 COPD EXACERBATION (H): ICD-10-CM

## 2017-06-13 NOTE — PROGRESS NOTES
TM left for client requesting a call back after his recent ER/Hospitalization.  Leeanna San RN Case Manager  [facility]  [phone]

## 2017-06-13 NOTE — PROGRESS NOTES
TC from client. Discussed that he pulled a muscle in his chest. His heart is fine. He does have a F/U with his PCP. No needs identified at this time.  Leeanna San RN Case Manager  [facility]  [phone]

## 2017-06-14 RX ORDER — IPRATROPIUM BROMIDE AND ALBUTEROL 20; 100 UG/1; UG/1
SPRAY, METERED RESPIRATORY (INHALATION)
Qty: 4 G | Refills: 0 | Status: SHIPPED | OUTPATIENT
Start: 2017-06-14 | End: 2017-06-19

## 2017-06-16 DIAGNOSIS — J44.1 COPD EXACERBATION (H): ICD-10-CM

## 2017-06-16 NOTE — TELEPHONE ENCOUNTER
ED / Discharge Outreach Protocol    Patient Contact    Attempt # 2    Was call answered?  No.  Left message on voicemail with information to call me back.    Lea Hernandez RN

## 2017-06-19 ENCOUNTER — OFFICE VISIT (OUTPATIENT)
Dept: FAMILY MEDICINE | Facility: CLINIC | Age: 67
End: 2017-06-19
Payer: COMMERCIAL

## 2017-06-19 VITALS
HEART RATE: 75 BPM | TEMPERATURE: 97 F | SYSTOLIC BLOOD PRESSURE: 136 MMHG | BODY MASS INDEX: 21.95 KG/M2 | RESPIRATION RATE: 22 BRPM | WEIGHT: 153 LBS | OXYGEN SATURATION: 96 % | DIASTOLIC BLOOD PRESSURE: 84 MMHG

## 2017-06-19 DIAGNOSIS — J44.1 COPD EXACERBATION (H): ICD-10-CM

## 2017-06-19 PROBLEM — K44.9 HIATAL HERNIA: Status: ACTIVE | Noted: 2017-06-19

## 2017-06-19 PROCEDURE — 94640 AIRWAY INHALATION TREATMENT: CPT | Performed by: FAMILY MEDICINE

## 2017-06-19 PROCEDURE — 99214 OFFICE O/P EST MOD 30 MIN: CPT | Mod: 25 | Performed by: FAMILY MEDICINE

## 2017-06-19 PROCEDURE — 99207 C PAF COMPLETED  NO CHARGE: CPT | Performed by: FAMILY MEDICINE

## 2017-06-19 RX ORDER — IPRATROPIUM BROMIDE AND ALBUTEROL 20; 100 UG/1; UG/1
SPRAY, METERED RESPIRATORY (INHALATION)
Qty: 4 G | Refills: 0 | OUTPATIENT
Start: 2017-06-19

## 2017-06-19 RX ORDER — IPRATROPIUM BROMIDE AND ALBUTEROL SULFATE 2.5; .5 MG/3ML; MG/3ML
1 SOLUTION RESPIRATORY (INHALATION) ONCE
Qty: 30 VIAL | Refills: 1 | Status: SHIPPED
Start: 2017-06-19 | End: 2019-05-14

## 2017-06-19 ASSESSMENT — ENCOUNTER SYMPTOMS
DIZZINESS: 0
CHILLS: 0
FEVER: 0
HEMOPTYSIS: 0
SORE THROAT: 0
HEARTBURN: 0
SPUTUM PRODUCTION: 0
WHEEZING: 1
COUGH: 0
SHORTNESS OF BREATH: 1

## 2017-06-19 NOTE — NURSING NOTE
"Chief Complaint   Patient presents with     Musculoskeletal Problem       Initial /84 (BP Location: Right arm, Patient Position: Chair, Cuff Size: Adult Regular)  Pulse 75  Temp 97  F (36.1  C) (Tympanic)  Resp 22  Wt 153 lb (69.4 kg)  SpO2 96%  BMI 21.95 kg/m2 Estimated body mass index is 21.95 kg/(m^2) as calculated from the following:    Height as of 4/1/17: 5' 10\" (1.778 m).    Weight as of this encounter: 153 lb (69.4 kg).  Medication Reconciliation: unable or not appropriate to perform         Tonya Michael MA      "

## 2017-06-19 NOTE — MR AVS SNAPSHOT
After Visit Summary   6/19/2017    Robert B Behr    MRN: [id number]           Patient Information     Date Of Birth          1950        Visit Information        Provider Department      6/19/2017 1:00 PM Fina Frausto MD [facility]        Today's Diagnoses     COPD exacerbation (H)           Follow-ups after your visit        Your next 10 appointments already scheduled     Aug 28, 2017  2:45 PM CDT   (Arrive by 2:30 PM)   XR CHEST 2 VIEWS with UCXR1   [facility] ([facility])    [address] [place] [address]  1st [facility] [id number] [phone]           Please bring a list of your current medicines to your exam. (Include vitamins, minerals and over-thecounter medicines.) Leave your valuables at home.  Tell your doctor if there is a chance you may be pregnant.  You do not need to do anything special for this exam.            Aug 28, 2017  3:00 PM CDT   FULL PULMONARY FUNCTION with  PFL C   [facility] Pulmonary Function Testing ([facility])    [address] [place] [id number] [phone]            Aug 28, 2017  4:00 PM CDT   (Arrive by 3:45 PM)   New Patient Visit with Svitlana Taylor MD   [facility] ([facility])    [address] [place] [address] [id number] [phone]              Who to contact     If you have questions or need follow up information about today's clinic visit or your schedule please contact [facility] directly at [phone].  Normal or non-critical lab and imaging results will be communicated to you by MyChart, letter or phone within 4 business days after the clinic has received the results. If you do not hear from us within 7 days, please contact the clinic through MyChart or phone. If you have a critical or abnormal lab result, we will notify you  by phone as soon as possible.  Submit refill requests through [employer] or call your pharmacy and they will forward the refill request to us. Please allow 3 business days for your refill to be completed.          Additional Information About Your Visit        [employer] Information     [employer] gives you secure access to your electronic health record. If you see a primary care provider, you can also send messages to your care team and make appointments. If you have questions, please call your primary care clinic.  If you do not have a primary care provider, please call [phone] and they will assist you.        Care EveryWhere ID     This is your Care EveryWhere ID. This could be used by other organizations to access your [facility] medical records  [id number]        Your Vitals Were     Pulse Temperature Respirations Pulse Oximetry BMI (Body Mass Index)       75 97  F (36.1  C) (Tympanic) 22 96% 21.95 kg/m2        Blood Pressure from Last 3 Encounters:   06/19/17 136/84   06/10/17 111/75   05/17/17 136/73    Weight from Last 3 Encounters:   06/19/17 153 lb (69.4 kg)   05/17/17 152 lb 9.6 oz (69.2 kg)   04/01/17 155 lb (70.3 kg)              We Performed the Following     ALBUTEROL/IPRATROPIUM 3ML NEB - .001     INHALATION/NEBULIZER TREATMENT, INITIAL          Today's Medication Changes          These changes are accurate as of: 6/19/17  5:54 PM.  If you have any questions, ask your nurse or doctor.               These medicines have changed or have updated prescriptions.        Dose/Directions    * ipratropium - albuterol 0.5 mg/2.5 mg/3 mL 0.5-2.5 (3) MG/3ML neb solution   Commonly known as:  DUONEB   This may have changed:    - Another medication with the same name was added. Make sure you understand how and when to take each.  - Another medication with the same name was changed. Make sure you understand how and when to take each.   Used for:  COPD exacerbation (H)   Changed by:  Fina Frausto MD         Dose:  1 vial   Take 1 vial (3 mLs) by nebulization every 6 hours as needed for shortness of breath / dyspnea or wheezing   Quantity:  30 vial   Refills:  1       * Ipratropium-Albuterol  MCG/ACT inhaler   Commonly known as:  COMBIVENT RESPIMAT   This may have changed:  See the new instructions.   Used for:  COPD exacerbation (H)   Changed by:  Fina Frausto MD        INHALE ONE PUFF BY MOUTH FOUR TIMES A DAY (NO TO EXCCED 6 DOSES PER DAY)   Quantity:  4 g   Refills:  11       * ipratropium - albuterol 0.5 mg/2.5 mg/3 mL 0.5-2.5 (3) MG/3ML neb solution   Commonly known as:  DUONEB   This may have changed:  You were already taking a medication with the same name, and this prescription was added. Make sure you understand how and when to take each.   Used for:  COPD exacerbation (H)   Changed by:  Fina Frausto MD        Dose:  1 vial   Take 1 vial (3 mLs) by nebulization once for 1 dose   Quantity:  30 vial   Refills:  1       * Notice:  This list has 3 medication(s) that are the same as other medications prescribed for you. Read the directions carefully, and ask your doctor or other care provider to review them with you.         Where to get your medicines      These medications were sent to [facility] - [place], [place] - [address]  [address], [place] [address]     Phone:  [phone]     Ipratropium-Albuterol  MCG/ACT inhaler         Some of these will need a paper prescription and others can be bought over the counter.  Ask your nurse if you have questions.     You don't need a prescription for these medications     ipratropium - albuterol 0.5 mg/2.5 mg/3 mL 0.5-2.5 (3) MG/3ML neb solution                Primary Care Provider Office Phone # Fax #    Fina Frausto [phone]       [facility] [address] [facility] [address]  [place] [address]        Thank you!     Thank you for choosing [facility]  for your care.  Our goal is always to provide you with excellent care. Hearing back from our patients is one way we can continue to improve our services. Please take a few minutes to complete the written survey that you may receive in the mail after your visit with us. Thank you!             Your Updated Medication List - Protect others around you: Learn how to safely use, store and throw away your medicines at www.disposemymeds.org.          This list is accurate as of: 6/19/17  5:54 PM.  Always use your most recent med list.                   Brand Name Dispense Instructions for use    albuterol 108 (90 BASE) MCG/ACT Inhaler    PROAIR HFA/PROVENTIL HFA/VENTOLIN HFA    1 Inhaler    Inhale 2 puffs into the lungs every 4 hours as needed for shortness of breath / dyspnea or wheezing       ascorbic acid 1000 MG Tabs    vitamin C    90 tablet    Take 1 tablet (1,000 mg) by mouth daily       CALCIUM 1200+D3 600- MG-MG-UNIT Tb24   Generic drug:  Calcium-Magnesium-Vitamin D      Reported on 3/14/2017       calcium-vitamin D 600-400 MG-UNIT per tablet    CALTRATE    180 tablet    TAKE 1 TABLET BY MOUTH TWICE DAILY       * ipratropium - albuterol 0.5 mg/2.5 mg/3 mL 0.5-2.5 (3) MG/3ML neb solution    DUONEB    30 vial    Take 1 vial (3 mLs) by nebulization every 6 hours as needed for shortness of breath / dyspnea or wheezing       * Ipratropium-Albuterol  MCG/ACT inhaler    COMBIVENT RESPIMAT    4 g    INHALE ONE PUFF BY MOUTH FOUR TIMES A DAY (NO TO EXCCED 6 DOSES PER DAY)       * ipratropium - albuterol 0.5 mg/2.5 mg/3 mL 0.5-2.5 (3) MG/3ML neb solution    DUONEB    30 vial    Take 1 vial (3 mLs) by nebulization once for 1 dose       * nicotine 21 MG/24HR 24 hr patch    NICODERM CQ    30 patch    Place 1 patch onto the skin every 24 hours       * nicotine 21 MG/24HR 24 hr patch    NICODERM CQ    30 patch    Place 1 patch onto the skin every 24 hours       omega 3 1000 MG Caps     90 capsule    Take 1 g by mouth daily        order for DME     1 each    Equipment being ordered: nebulizer machine       tiotropium 18 MCG capsule    SPIRIVA HANDIHALER    30 capsule    Inhale contents of one capsule daily.       traMADol 50 MG tablet    ULTRAM    16 tablet    Take 1 tablet (50 mg) by mouth every 6 hours as needed for pain maximum 6 tablet(s) per day       UNABLE TO FIND      Reported on 3/14/2017       vitamin A 06973 UNIT capsule    CVS VITAMIN A    180 capsule    Take 1 capsule (10,000 Units) by mouth daily       vitamin B complex with vitamin C Tabs tablet     180 tablet    Take 1 tablet by mouth 2 times daily With Folic acid       vitamin E 1000 UNIT capsule    CVS vitamin E    90 capsule    Take 1 capsule (1,000 Units) by mouth daily       * Notice:  This list has 5 medication(s) that are the same as other medications prescribed for you. Read the directions carefully, and ask your doctor or other care provider to review them with you.

## 2017-06-19 NOTE — PROGRESS NOTES
HPI  ED/UC Followup:    Facility:  [facility]  Date of visit: 6/10/2017  Reason for visit: Chest Pain   Current Status: pt state he's much better     The patient states that he ultimately had a muscle strain in his left chest as the cause of his left chest pain after bicycling and carrying a heavy bag of groceries.  Stress test, troponins and CTPA were negative for ischemia and PE respectively.  His pain has resolved.  However, he accidentally threw away his combivent and now has no rescue inhaler, so is wheezing a lot today.  No increased cough, sputum or fever.  He has not been using spiriva regularly, as he did not understand to do so.  He has a pulmonology visit in August.    Review of Systems   Constitutional: Negative for chills and fever.   HENT: Negative for congestion and sore throat.    Respiratory: Positive for shortness of breath and wheezing. Negative for cough, hemoptysis and sputum production.    Cardiovascular: Negative for chest pain.   Gastrointestinal: Negative for heartburn.   Neurological: Negative for dizziness.       No Known Allergies  Current Outpatient Prescriptions   Medication     Ipratropium-Albuterol (COMBIVENT RESPIMAT)  MCG/ACT inhaler     nicotine (NICODERM CQ) 21 MG/24HR 24 hr patch     tiotropium (SPIRIVA HANDIHALER) 18 MCG capsule     calcium-vitamin D (CALTRATE) 600-400 MG-UNIT per tablet     ipratropium - albuterol 0.5 mg/2.5 mg/3 mL (DUONEB) 0.5-2.5 (3) MG/3ML neb solution     nicotine (NICODERM CQ) 21 MG/24HR 24 hr patch     order for DME     traMADol (ULTRAM) 50 MG tablet     UNABLE TO FIND     ascorbic acid (VITAMIN C) 1000 MG TABS     vitamin E (CVS VITAMIN E) 1000 UNIT capsule     vitamin B complex with vitamin C (VITAMIN  B COMPLEX) TABS tablet     vitamin A (CVS VITAMIN A) 82899 UNIT capsule     omega 3 1000 MG CAPS     Calcium-Magnesium-Vitamin D (CALCIUM 1200+D3) 600- MG-MG-UNIT TB24     albuterol (PROAIR HFA, PROVENTIL HFA, VENTOLIN HFA) 108 (90 BASE) MCG/ACT  inhaler     [DISCONTINUED] COMBIVENT RESPIMAT  MCG/ACT inhaler     No current facility-administered medications for this visit.      Active Ambulatory Problems     Diagnosis Date Noted     Osteoporosis      COPD (chronic obstructive pulmonary disease) (H)      Tobacco use disorder 04/13/2016     CARDIOVASCULAR SCREENING; LDL GOAL LESS THAN 160 04/13/2016     Lung nodules      Health Care Home 12/12/2016     Resolved Ambulatory Problems     Diagnosis Date Noted     No Resolved Ambulatory Problems     Past Medical History:   Diagnosis Date     COPD (chronic obstructive pulmonary disease) (H)      Lung nodules      Osteoporosis      Polio 1952     Tobacco abuse          Physical Exam   Constitutional: He is well-developed, well-nourished, and in no distress.   Eyes: Conjunctivae are normal. Pupils are equal, round, and reactive to light. Right eye exhibits no discharge. Left eye exhibits no discharge. No scleral icterus.   Cardiovascular: Normal rate, regular rhythm and normal heart sounds.  Exam reveals no gallop and no friction rub.    No murmur heard.  Pulmonary/Chest: Effort normal. No respiratory distress. He has wheezes. He has no rales.   Inspiratory and expiratory wheezing heard without a stethoscope; able to speak in complete sentences but a little short of breath.  After nebulizer treatment, his breath sounds are much more clear, still a little expiratory wheeze, no rales or rhonchi.     Neurological: He is alert.   Skin: He is not diaphoretic.   Psychiatric: Affect normal.   Vitals reviewed.      /84 (BP Location: Right arm, Patient Position: Chair, Cuff Size: Adult Regular)  Pulse 75  Temp 97  F (36.1  C) (Tympanic)  Resp 22  Wt 153 lb (69.4 kg)  SpO2 96%  BMI 21.95 kg/m2    A/P  COPD: advised to use the spiriva regularly.  Will re-send combivent.  Duoneb done in clinic with good result.  No increase cough, sputum production, sputum purulence or fever, so treatment with antibiotics and  steroids is not currently indicated.  Refills sent on his combivent, and he is advised to try using the spiriva on a regular basis.

## 2017-06-19 NOTE — NURSING NOTE
The following nebulizer treatment was given:     MEDICATION: Duoneb  : [employer]  LOT #: [id number]  EXPIRATION DATE:  8/2018  NDC # 0814-2394-65      Tonya Michael MA

## 2017-06-25 ENCOUNTER — OFFICE VISIT (OUTPATIENT)
Dept: URGENT CARE | Facility: URGENT CARE | Age: 67
End: 2017-06-25
Payer: COMMERCIAL

## 2017-06-25 VITALS
TEMPERATURE: 96.9 F | BODY MASS INDEX: 21.67 KG/M2 | DIASTOLIC BLOOD PRESSURE: 81 MMHG | SYSTOLIC BLOOD PRESSURE: 149 MMHG | HEART RATE: 74 BPM | OXYGEN SATURATION: 97 % | WEIGHT: 151 LBS

## 2017-06-25 DIAGNOSIS — J42 CHRONIC BRONCHITIS, UNSPECIFIED CHRONIC BRONCHITIS TYPE (H): Primary | ICD-10-CM

## 2017-06-25 PROCEDURE — 99213 OFFICE O/P EST LOW 20 MIN: CPT | Performed by: FAMILY MEDICINE

## 2017-06-25 RX ORDER — METHYLPREDNISOLONE 4 MG
TABLET, DOSE PACK ORAL
Qty: 21 TABLET | Refills: 0 | Status: SHIPPED | OUTPATIENT
Start: 2017-06-25 | End: 2017-07-08

## 2017-06-25 RX ORDER — AZITHROMYCIN 250 MG/1
TABLET, FILM COATED ORAL
Qty: 6 TABLET | Refills: 0 | Status: SHIPPED | OUTPATIENT
Start: 2017-06-25 | End: 2017-07-05

## 2017-06-25 NOTE — PROGRESS NOTES
Subjective: Patient with a history of bronchitis, COPD, has had episodes where it gets bad and he has to take a short course of steroids and antibiotics, symptoms started about 3 days ago and he is using his inhaler 10 or 12 times a day,he needs to get back on treatment. He does have a specialist cc once a year, has not taken steroid inhalers. The glucose is yellow. He doesn't feel feverish.    Objective: ENT normal. Neck is normal. Lungs with scattered wheezing throughout. Heart regular without murmurs. Abdomen benign.    Assessment and plan: Exacerbation of COPD. Will treat with antibiotics and steroids and he should talk about whether he should be on preventative inhalers steroids with his regular doctor.

## 2017-06-25 NOTE — MR AVS SNAPSHOT
After Visit Summary   6/25/2017    Robert B Behr    MRN: [id number]           Patient Information     Date Of Birth          1950        Visit Information        Provider Department      6/25/2017 11:00 AM Juan Blum MD [facility]        Today's Diagnoses     Chronic bronchitis, unspecified chronic bronchitis type (H)    -  1       Follow-ups after your visit        Your next 10 appointments already scheduled     Aug 28, 2017  2:45 PM CDT   (Arrive by 2:30 PM)   XR CHEST 2 VIEWS with UCXR1   [facility] ([facility])    [address] [place] [id number] [phone]           Please bring a list of your current medicines to your exam. (Include vitamins, minerals and over-thecounter medicines.) Leave your valuables at home.  Tell your doctor if there is a chance you may be pregnant.  You do not need to do anything special for this exam.            Aug 28, 2017  3:00 PM CDT   FULL PULMONARY FUNCTION with  PFL C   [facility] Function Testing ([facility])    [address] [place] [address] [id number] [phone]            Aug 28, 2017  4:00 PM CDT   (Arrive by 3:45 PM)   New Patient Visit with Svitlaan Taylor MD   [facility] ([facility])    [address] [place] [id number] [phone]              Who to contact     If you have questions or need follow up information about today's clinic visit or your schedule please contact [facility] directly at [phone].  Normal or non-critical lab and imaging results will be communicated to you by MyChart, letter or phone within 4 business days after the clinic has received the results. If you do not hear from us within 7 days, please contact the clinic through MyChart or phone. If you have a  critical or abnormal lab result, we will notify you by phone as soon as possible.  Submit refill requests through [employer] or call your pharmacy and they will forward the refill request to us. Please allow 3 business days for your refill to be completed.          Additional Information About Your Visit        [employer] Information     [employer] gives you secure access to your electronic health record. If you see a primary care provider, you can also send messages to your care team and make appointments. If you have questions, please call your primary care clinic.  If you do not have a primary care provider, please call [phone] and they will assist you.        Care EveryWhere ID     This is your Care EveryWhere ID. This could be used by other organizations to access your [facility] medical records  [id number]        Your Vitals Were     Pulse Temperature Pulse Oximetry BMI (Body Mass Index)          74 96.9  F (36.1  C) (Tympanic) 97% 21.67 kg/m2         Blood Pressure from Last 3 Encounters:   06/25/17 149/81   06/19/17 136/84   06/10/17 111/75    Weight from Last 3 Encounters:   06/25/17 151 lb (68.5 kg)   06/19/17 153 lb (69.4 kg)   05/17/17 152 lb 9.6 oz (69.2 kg)              Today, you had the following     No orders found for display         Today's Medication Changes          These changes are accurate as of: 6/25/17 12:16 PM.  If you have any questions, ask your nurse or doctor.               Start taking these medicines.        Dose/Directions    azithromycin 250 MG tablet   Commonly known as:  ZITHROMAX   Used for:  Chronic bronchitis, unspecified chronic bronchitis type (H)   Started by:  Juan Blum MD        Two tablets first day, then one tablet daily for four days.   Quantity:  6 tablet   Refills:  0       methylPREDNISolone 4 MG tablet   Commonly known as:  MEDROL DOSEPAK   Used for:  Chronic bronchitis, unspecified chronic bronchitis type (H)   Started by:  Juan Blum MD         Follow package instructions   Quantity:  21 tablet   Refills:  0            Where to get your medicines      These medications were sent to "[facility] - [place], [place] - [address] [facility] [address]  [address], [place] [address]     Phone:  [phone]     azithromycin 250 MG tablet    methylPREDNISolone 4 MG tablet                Primary Care Provider Office Phone # Fax #    Fina Frausto [phone]       [facility] [address]  [place] [address]        Equal Access to Services     [facility]: Hadii aad ku hadasho Soomaali, waaxda luqadaha, qaybta kaalmada adeegyada, kael piercein hayaan adekaye rangel . So [facility] [phone].    ATENCIÓN: Si habla español, tiene a rios disposición servicios gratuitos de asistencia lingüística. [facility] [phone].    We comply with applicable federal civil rights laws and [place] laws. We do not discriminate on the basis of race, color, national origin, age, disability sex, sexual orientation or gender identity.            Thank you!     Thank you for choosing [facility]  for your care. Our goal is always to provide you with excellent care. Hearing back from our patients is one way we can continue to improve our services. Please take a few minutes to complete the written survey that you may receive in the mail after your visit with us. Thank you!             Your Updated Medication List - Protect others around you: Learn how to safely use, store and throw away your medicines at www.disposemymeds.org.          This list is accurate as of: 6/25/17 12:16 PM.  Always use your most recent med list.                   Brand Name Dispense Instructions for use Diagnosis    albuterol 108 (90 BASE) MCG/ACT Inhaler    PROAIR HFA/PROVENTIL HFA/VENTOLIN HFA    1 Inhaler    Inhale 2 puffs into the lungs every 4 hours as needed for shortness of breath / dyspnea or wheezing    Mild  intermittent asthma without complication       ascorbic acid 1000 MG Tabs    vitamin C    90 tablet    Take 1 tablet (1,000 mg) by mouth daily    Encounter for herb and vitamin supplement management       azithromycin 250 MG tablet    ZITHROMAX    6 tablet    Two tablets first day, then one tablet daily for four days.    Chronic bronchitis, unspecified chronic bronchitis type (H)       CALCIUM 1200+D3 600- MG-MG-UNIT Tb24   Generic drug:  Calcium-Magnesium-Vitamin D      Reported on 3/14/2017        calcium-vitamin D 600-400 MG-UNIT per tablet    CALTRATE    180 tablet    TAKE 1 TABLET BY MOUTH TWICE DAILY    Osteoporosis       * ipratropium - albuterol 0.5 mg/2.5 mg/3 mL 0.5-2.5 (3) MG/3ML neb solution    DUONEB    30 vial    Take 1 vial (3 mLs) by nebulization every 6 hours as needed for shortness of breath / dyspnea or wheezing    COPD exacerbation (H)       * Ipratropium-Albuterol  MCG/ACT inhaler    COMBIVENT RESPIMAT    4 g    INHALE ONE PUFF BY MOUTH FOUR TIMES A DAY (NO TO EXCCED 6 DOSES PER DAY)    COPD exacerbation (H)       * ipratropium - albuterol 0.5 mg/2.5 mg/3 mL 0.5-2.5 (3) MG/3ML neb solution    DUONEB    30 vial    Take 1 vial (3 mLs) by nebulization once for 1 dose    COPD exacerbation (H)       methylPREDNISolone 4 MG tablet    MEDROL DOSEPAK    21 tablet    Follow package instructions    Chronic bronchitis, unspecified chronic bronchitis type (H)       * nicotine 21 MG/24HR 24 hr patch    NICODERM CQ    30 patch    Place 1 patch onto the skin every 24 hours    Tobacco use disorder       * nicotine 21 MG/24HR 24 hr patch    NICODERM CQ    30 patch    Place 1 patch onto the skin every 24 hours    Tobacco use disorder       omega 3 1000 MG Caps     90 capsule    Take 1 g by mouth daily    Encounter for herb and vitamin supplement management       order for DME     1 each    Equipment being ordered: nebulizer machine    COPD exacerbation (H)       tiotropium 18 MCG capsule    SPIRIVA  HANDIHALER    30 capsule    Inhale contents of one capsule daily.    COPD exacerbation (H)       traMADol 50 MG tablet    ULTRAM    16 tablet    Take 1 tablet (50 mg) by mouth every 6 hours as needed for pain maximum 6 tablet(s) per day    Closed fracture of one rib of right side with delayed healing, subsequent encounter       UNABLE TO FIND      Reported on 3/14/2017        vitamin A 46451 UNIT capsule    CVS VITAMIN A    180 capsule    Take 1 capsule (10,000 Units) by mouth daily    Encounter for herb and vitamin supplement management       vitamin B complex with vitamin C Tabs tablet     180 tablet    Take 1 tablet by mouth 2 times daily With Folic acid    Encounter for herb and vitamin supplement management       vitamin E 1000 UNIT capsule    CVS vitamin E    90 capsule    Take 1 capsule (1,000 Units) by mouth daily    Encounter for herb and vitamin supplement management       * Notice:  This list has 5 medication(s) that are the same as other medications prescribed for you. Read the directions carefully, and ask your doctor or other care provider to review them with you.

## 2017-06-25 NOTE — NURSING NOTE
"Chief Complaint   Patient presents with     Urgent Care     Pt in clinic to have eval for cough and SOB.     Respiratory Problems       Initial /81  Pulse 74  Temp 96.9  F (36.1  C) (Tympanic)  Wt 151 lb (68.5 kg)  SpO2 97%  BMI 21.67 kg/m2 Estimated body mass index is 21.67 kg/(m^2) as calculated from the following:    Height as of 4/1/17: 5' 10\" (1.778 m).    Weight as of this encounter: 151 lb (68.5 kg).  Medication Reconciliation: complete   Radha Abreu/ MA    "

## 2017-06-28 DIAGNOSIS — J44.1 COPD EXACERBATION (H): ICD-10-CM

## 2017-06-29 RX ORDER — IPRATROPIUM BROMIDE AND ALBUTEROL SULFATE 2.5; .5 MG/3ML; MG/3ML
SOLUTION RESPIRATORY (INHALATION)
Qty: 0.1 ML | Refills: 0 | OUTPATIENT
Start: 2017-06-29

## 2017-06-30 RX ORDER — IPRATROPIUM BROMIDE AND ALBUTEROL SULFATE 2.5; .5 MG/3ML; MG/3ML
1 SOLUTION RESPIRATORY (INHALATION) EVERY 6 HOURS PRN
Qty: 30 VIAL | Refills: 1 | Status: SHIPPED | OUTPATIENT
Start: 2017-06-30 | End: 2019-03-27

## 2017-07-05 ENCOUNTER — OFFICE VISIT (OUTPATIENT)
Dept: URGENT CARE | Facility: URGENT CARE | Age: 67
End: 2017-07-05
Payer: COMMERCIAL

## 2017-07-05 VITALS
HEIGHT: 70 IN | TEMPERATURE: 98 F | WEIGHT: 151 LBS | BODY MASS INDEX: 21.62 KG/M2 | DIASTOLIC BLOOD PRESSURE: 70 MMHG | OXYGEN SATURATION: 97 % | SYSTOLIC BLOOD PRESSURE: 118 MMHG | RESPIRATION RATE: 20 BRPM | HEART RATE: 87 BPM

## 2017-07-05 DIAGNOSIS — J44.1 COPD EXACERBATION (H): Primary | ICD-10-CM

## 2017-07-05 PROCEDURE — 99213 OFFICE O/P EST LOW 20 MIN: CPT | Performed by: PHYSICIAN ASSISTANT

## 2017-07-05 NOTE — PROGRESS NOTES
HPI:  Ravin is a 66 yo male who has hx of COPD and is running low on his combivent inhaler.  Reports in the last day or so, due to excessive heat and humidity he has had to used his inhaler more than usual.  He does not feel SOB now and denies wheezing, but wants to make sure his inhaler does not run out.  Denies fever.  Had virus a few weeks ago that resolved.    ROS:  See HPI      PE:  Vitals & nursing notes reviewed.  B/P: 118/70, T: 98, P: 87, R: 20  Constitutional:  Alert, well nourished, well-developed, NAD  Lungs:  (+) mild wheezes, No rhonchi, or rales  CV:  RRR,  no murmur appreciated      ASSESSMENT:  (J44.1) COPD exacerbation (H)  (primary encounter diagnosis)  Comment:   Plan: Ipratropium-Albuterol (COMBIVENT RESPIMAT)          MCG/ACT inhaler         F/U with PCP if sx persist or worsen.

## 2017-07-05 NOTE — MR AVS SNAPSHOT
After Visit Summary   7/5/2017    Robert B Behr    MRN: [id number]           Patient Information     Date Of Birth          1950        Visit Information        Provider Department      7/5/2017 5:00 PM Beverly Melendez PA-C [facility]        Today's Diagnoses     COPD exacerbation (H)    -  1       Follow-ups after your visit        Your next 10 appointments already scheduled     Aug 28, 2017  2:45 PM CDT   (Arrive by 2:30 PM)   XR CHEST 2 VIEWS with UCXR1   [facility] ([facility])    [address] [place] [address] [id number] [phone]           Please bring a list of your current medicines to your exam. (Include vitamins, minerals and over-thecounter medicines.) Leave your valuables at home.  Tell your doctor if there is a chance you may be pregnant.  You do not need to do anything special for this exam.            Aug 28, 2017  3:00 PM CDT   FULL PULMONARY FUNCTION with  PFL [facility] Pulmonary Function Testing ([facility])    [address] [place] [address] [id number] [phone]            Aug 28, 2017  4:00 PM CDT   (Arrive by 3:45 PM)   New Patient Visit with Svitlana Taylor MD   [facility] ([facility])    [address] [place] [address] [id number] [phone]              Who to contact     If you have questions or need follow up information about today's clinic visit or your schedule please contact [facility] directly at [phone].  Normal or non-critical lab and imaging results will be communicated to you by MyChart, letter or phone within 4 business days after the clinic has received the results. If you do not hear from us within 7 days, please contact the clinic through MyChart or phone. If you have a critical or abnormal lab result, we will  "notify you by phone as soon as possible.  Submit refill requests through [employer] or call your pharmacy and they will forward the refill request to us. Please allow 3 business days for your refill to be completed.          Additional Information About Your Visit        [employer] Information     [employer] gives you secure access to your electronic health record. If you see a primary care provider, you can also send messages to your care team and make appointments. If you have questions, please call your primary care clinic.  If you do not have a primary care provider, please call [phone] and they will assist you.        Care EveryWhere ID     This is your Care EveryWhere ID. This could be used by other organizations to access your [place] medical records  [id number]        Your Vitals Were     Pulse Temperature Respirations Height Pulse Oximetry BMI (Body Mass Index)    87 98  F (36.7  C) (Oral) 20 5' 10\" (1.778 m) 97% 21.67 kg/m2       Blood Pressure from Last 3 Encounters:   07/05/17 118/70   06/25/17 149/81   06/19/17 136/84    Weight from Last 3 Encounters:   07/05/17 151 lb (68.5 kg)   06/25/17 151 lb (68.5 kg)   06/19/17 153 lb (69.4 kg)              Today, you had the following     No orders found for display         Today's Medication Changes          These changes are accurate as of: 7/5/17  5:09 PM.  If you have any questions, ask your nurse or doctor.               These medicines have changed or have updated prescriptions.        Dose/Directions    * Ipratropium-Albuterol  MCG/ACT inhaler   Commonly known as:  COMBIVENT RESPIMAT   This may have changed:  Another medication with the same name was added. Make sure you understand how and when to take each.   Used for:  COPD exacerbation (H)   Changed by:  Fina Frausto MD        INHALE ONE PUFF BY MOUTH FOUR TIMES A DAY (NO TO EXCCED 6 DOSES PER DAY)   Quantity:  4 g   Refills:  11       * ipratropium - albuterol 0.5 mg/2.5 mg/3 mL 0.5-2.5 (3) " MG/3ML neb solution   Commonly known as:  DUONEB   This may have changed:  Another medication with the same name was added. Make sure you understand how and when to take each.   Used for:  COPD exacerbation (H)   Changed by:  Fina Frausto MD        Dose:  1 vial   Take 1 vial (3 mLs) by nebulization every 6 hours as needed for shortness of breath / dyspnea or wheezing   Quantity:  30 vial   Refills:  1       * Ipratropium-Albuterol  MCG/ACT inhaler   Commonly known as:  COMBIVENT RESPIMAT   This may have changed:  You were already taking a medication with the same name, and this prescription was added. Make sure you understand how and when to take each.   Used for:  COPD exacerbation (H)   Changed by:  Beverly Melendez PA-C        Dose:  1 puff   Inhale 1 puff into the lungs 4 times daily Not to exceed 6 doses per day.   Quantity:  3 Inhaler   Refills:  1       * Notice:  This list has 3 medication(s) that are the same as other medications prescribed for you. Read the directions carefully, and ask your doctor or other care provider to review them with you.      Stop taking these medicines if you haven't already. Please contact your care team if you have questions.     azithromycin 250 MG tablet   Commonly known as:  ZITHROMAX   Stopped by:  Beverly Melendez PA-C                Where to get your medicines      These medications were sent to [id number] - [place], [place] - [address] AT [facility]  [address], [place] [address]     Phone:  [phone]     Ipratropium-Albuterol  MCG/ACT inhaler                Primary Care Provider Office Phone # Fax #    Fina Frausto [phone]       [facility] [address] [place] [address]        Equal Access to Services     TIARRA PAPPAS AH: Cony cooper Sojasmin, waaxda luqadaha, qaybta kaalmada adeegyada, kael rangel . So [facility]  [phone].    ATENCIÓN: Si marya alston, tiene a rios disposición servicios gratuitos de asistencia lingüística. Kenna yadav [phone].    We comply with applicable federal civil rights laws and [place] laws. We do not discriminate on the basis of race, color, national origin, age, disability sex, sexual orientation or gender identity.            Thank you!     Thank you for choosing [facility]  for your care. Our goal is always to provide you with excellent care. Hearing back from our patients is one way we can continue to improve our services. Please take a few minutes to complete the written survey that you may receive in the mail after your visit with us. Thank you!             Your Updated Medication List - Protect others around you: Learn how to safely use, store and throw away your medicines at www.disposemymeds.org.          This list is accurate as of: 7/5/17  5:09 PM.  Always use your most recent med list.                   Brand Name Dispense Instructions for use Diagnosis    albuterol 108 (90 BASE) MCG/ACT Inhaler    PROAIR HFA/PROVENTIL HFA/VENTOLIN HFA    1 Inhaler    Inhale 2 puffs into the lungs every 4 hours as needed for shortness of breath / dyspnea or wheezing    Mild intermittent asthma without complication       ascorbic acid 1000 MG Tabs    vitamin C    90 tablet    Take 1 tablet (1,000 mg) by mouth daily    Encounter for herb and vitamin supplement management       CALCIUM 1200+D3 600- MG-MG-UNIT Tb24   Generic drug:  Calcium-Magnesium-Vitamin D      Reported on 3/14/2017        calcium-vitamin D 600-400 MG-UNIT per tablet    CALTRATE    180 tablet    TAKE 1 TABLET BY MOUTH TWICE DAILY    Osteoporosis       * Ipratropium-Albuterol  MCG/ACT inhaler    COMBIVENT RESPIMAT    4 g    INHALE ONE PUFF BY MOUTH FOUR TIMES A DAY (NO TO EXCCED 6 DOSES PER DAY)    COPD exacerbation (H)       * ipratropium - albuterol 0.5 mg/2.5 mg/3 mL 0.5-2.5 (3) MG/3ML neb  solution    DUONEB    30 vial    Take 1 vial (3 mLs) by nebulization every 6 hours as needed for shortness of breath / dyspnea or wheezing    COPD exacerbation (H)       * Ipratropium-Albuterol  MCG/ACT inhaler    COMBIVENT RESPIMAT    3 Inhaler    Inhale 1 puff into the lungs 4 times daily Not to exceed 6 doses per day.    COPD exacerbation (H)       methylPREDNISolone 4 MG tablet    MEDROL DOSEPAK    21 tablet    Follow package instructions    Chronic bronchitis, unspecified chronic bronchitis type (H)       * nicotine 21 MG/24HR 24 hr patch    NICODERM CQ    30 patch    Place 1 patch onto the skin every 24 hours    Tobacco use disorder       * nicotine 21 MG/24HR 24 hr patch    NICODERM CQ    30 patch    Place 1 patch onto the skin every 24 hours    Tobacco use disorder       omega 3 1000 MG Caps     90 capsule    Take 1 g by mouth daily    Encounter for herb and vitamin supplement management       order for DME     1 each    Equipment being ordered: nebulizer machine    COPD exacerbation (H)       tiotropium 18 MCG capsule    SPIRIVA HANDIHALER    30 capsule    Inhale contents of one capsule daily.    COPD exacerbation (H)       traMADol 50 MG tablet    ULTRAM    16 tablet    Take 1 tablet (50 mg) by mouth every 6 hours as needed for pain maximum 6 tablet(s) per day    Closed fracture of one rib of right side with delayed healing, subsequent encounter       UNABLE TO FIND      Reported on 3/14/2017        vitamin A 51536 UNIT capsule    CVS VITAMIN A    180 capsule    Take 1 capsule (10,000 Units) by mouth daily    Encounter for herb and vitamin supplement management       vitamin B complex with vitamin C Tabs tablet     180 tablet    Take 1 tablet by mouth 2 times daily With Folic acid    Encounter for herb and vitamin supplement management       vitamin E 1000 UNIT capsule    CVS vitamin E    90 capsule    Take 1 capsule (1,000 Units) by mouth daily    Encounter for herb and vitamin supplement management        * Notice:  This list has 5 medication(s) that are the same as other medications prescribed for you. Read the directions carefully, and ask your doctor or other care provider to review them with you.

## 2017-07-08 ENCOUNTER — OFFICE VISIT (OUTPATIENT)
Dept: URGENT CARE | Facility: URGENT CARE | Age: 67
End: 2017-07-08
Payer: COMMERCIAL

## 2017-07-08 VITALS
WEIGHT: 150 LBS | OXYGEN SATURATION: 95 % | DIASTOLIC BLOOD PRESSURE: 60 MMHG | BODY MASS INDEX: 21.47 KG/M2 | HEIGHT: 70 IN | HEART RATE: 96 BPM | TEMPERATURE: 98.1 F | SYSTOLIC BLOOD PRESSURE: 102 MMHG

## 2017-07-08 DIAGNOSIS — Z76.0 ENCOUNTER FOR MEDICATION REFILL: Primary | ICD-10-CM

## 2017-07-08 PROCEDURE — 99213 OFFICE O/P EST LOW 20 MIN: CPT | Performed by: FAMILY MEDICINE

## 2017-07-08 RX ORDER — ALBUTEROL SULFATE 90 UG/1
1-2 AEROSOL, METERED RESPIRATORY (INHALATION) EVERY 4 HOURS PRN
Qty: 1 INHALER | Refills: 0 | Status: SHIPPED | OUTPATIENT
Start: 2017-07-08 | End: 2017-08-29

## 2017-07-08 NOTE — PROGRESS NOTES
"SUBJECTIVE:   Robert B Behr is a 67 year old male presenting for an albuterol inhaler.  He has a h/o COPD and uses combivent plus prn albuterol nebs.  Using combivent over prescribed amount and ran out early this month.  Has his typical coughing and dyspnea, nothing flaring at this time.   He was unable to refill his combivent early, won't be available until 7/11.   Would like an albuterol inhaler to have for now.        ROS:  5-Point Review of Systems Negative-- Except as stated above.    OBJECTIVE  /60 (BP Location: Right arm, Patient Position: Chair, Cuff Size: Adult Regular)  Pulse 96  Temp 98.1  F (36.7  C) (Oral)  Ht 5' 10\" (1.778 m)  Wt 150 lb (68 kg)  SpO2 95%  BMI 21.52 kg/m2  GENERAL:  Awake, alert and interactive. No acute distress.      ASSESSMENT/PLAN    ICD-10-CM    1. Encounter for medication refill Z76.0 albuterol (PROAIR HFA/PROVENTIL HFA/VENTOLIN HFA) 108 (90 BASE) MCG/ACT Inhaler     Advised he f/u with his PCP to discuss ongoing symptoms and medications to see what additional measures need to be added.  He states that he has an appointment in august to see a pulmonologist already set up.   We discussed return to care if symptoms not improving as expected, do not resolve completely, or if any new or worsening symptoms develop.      "

## 2017-07-08 NOTE — NURSING NOTE
"Robert B Behr;   Chief Complaint   Patient presents with     Cough     and sob at times, hx. of copd, was given a refill of combivent on 7/3/17 however unable to fill due to insurance regulations, wants to talk about an alternate plan as he can not wait until 7/11 to fill without any inhaler to use      Urgent Care     Initial /60 (BP Location: Right arm, Patient Position: Chair, Cuff Size: Adult Regular)  Pulse 96  Temp 98.1  F (36.7  C) (Oral)  Ht 5' 10\" (1.778 m)  Wt 150 lb (68 kg)  SpO2 95%  BMI 21.52 kg/m2 Estimated body mass index is 21.52 kg/(m^2) as calculated from the following:    Height as of this encounter: 5' 10\" (1.778 m).    Weight as of this encounter: 150 lb (68 kg)..  BP completed using cuff size regular.  Melissa Dahl R.N.  "

## 2017-07-08 NOTE — MR AVS SNAPSHOT
After Visit Summary   7/8/2017    Robert B Behr    MRN: [id number]           Patient Information     Date Of Birth          1950        Visit Information        Provider Department      7/8/2017 10:35 AM Hilda Gross DO [facility]        Today's Diagnoses     Encounter for medication refill    -  1       Follow-ups after your visit        Your next 10 appointments already scheduled     Aug 28, 2017  2:45 PM CDT   (Arrive by 2:30 PM)   XR CHEST 2 VIEWS with UCXR1   [facility] ([facility])    [address] [place] [id number] [phone]           Please bring a list of your current medicines to your exam. (Include vitamins, minerals and over-thecounter medicines.) Leave your valuables at home.  Tell your doctor if there is a chance you may be pregnant.  You do not need to do anything special for this exam.            Aug 28, 2017  3:00 PM CDT   FULL PULMONARY FUNCTION with  PFL C   [facility] Pulmonary Function Testing ([facility])    [address] [place] [id number] [phone]            Aug 28, 2017  4:00 PM CDT   (Arrive by 3:45 PM)   New Patient Visit with Svitlana Taylor MD   [facility] ([facility])    [address] [place] [id number] [phone]              Who to contact     If you have questions or need follow up information about today's clinic visit or your schedule please contact [facility] directly at [phone].  Normal or non-critical lab and imaging results will be communicated to you by MyChart, letter or phone within 4 business days after the clinic has received the results. If you do not hear from us within 7 days, please contact the clinic through MyChart or phone. If you have a critical or abnormal lab  "result, we will notify you by phone as soon as possible.  Submit refill requests through [employer] or call your pharmacy and they will forward the refill request to us. Please allow 3 business days for your refill to be completed.          Additional Information About Your Visit        [employer] Information     [employer] gives you secure access to your electronic health record. If you see a primary care provider, you can also send messages to your care team and make appointments. If you have questions, please call your primary care clinic.  If you do not have a primary care provider, please call [phone] and they will assist you.        Care EveryWhere ID     This is your Care EveryWhere ID. This could be used by other organizations to access your [place] medical records  [id number]        Your Vitals Were     Pulse Temperature Height Pulse Oximetry BMI (Body Mass Index)       96 98.1  F (36.7  C) (Oral) 5' 10\" (1.778 m) 95% 21.52 kg/m2        Blood Pressure from Last 3 Encounters:   07/08/17 102/60   07/05/17 118/70   06/25/17 149/81    Weight from Last 3 Encounters:   07/08/17 150 lb (68 kg)   07/05/17 151 lb (68.5 kg)   06/25/17 151 lb (68.5 kg)              Today, you had the following     No orders found for display         Today's Medication Changes          These changes are accurate as of: 7/8/17 11:36 AM.  If you have any questions, ask your nurse or doctor.               These medicines have changed or have updated prescriptions.        Dose/Directions    * albuterol 108 (90 BASE) MCG/ACT Inhaler   Commonly known as:  PROAIR HFA/PROVENTIL HFA/VENTOLIN HFA   This may have changed:  Another medication with the same name was added. Make sure you understand how and when to take each.   Used for:  Mild intermittent asthma without complication   Changed by:  Renee Arreaga PA-C        Dose:  2 puff   Inhale 2 puffs into the lungs every 4 hours as needed for shortness of breath / dyspnea or wheezing "   Quantity:  1 Inhaler   Refills:  0       * albuterol 108 (90 BASE) MCG/ACT Inhaler   Commonly known as:  PROAIR HFA/PROVENTIL HFA/VENTOLIN HFA   This may have changed:  You were already taking a medication with the same name, and this prescription was added. Make sure you understand how and when to take each.   Used for:  Encounter for medication refill   Changed by:  Hilda Gross DO        Dose:  1-2 puff   Inhale 1-2 puffs into the lungs every 4 hours as needed for shortness of breath / dyspnea or wheezing   Quantity:  1 Inhaler   Refills:  0       * Notice:  This list has 2 medication(s) that are the same as other medications prescribed for you. Read the directions carefully, and ask your doctor or other care provider to review them with you.      Stop taking these medicines if you haven't already. Please contact your care team if you have questions.     methylPREDNISolone 4 MG tablet   Commonly known as:  MEDROL DOSEPAK   Stopped by:  Hilda Gross DO           nicotine 21 MG/24HR 24 hr patch   Commonly known as:  NICODERM CQ   Stopped by:  Hilda Gross DO           tiotropium 18 MCG capsule   Commonly known as:  SPIRIVA HANDIHALER   Stopped by:  Hilda Gross DO           traMADol 50 MG tablet   Commonly known as:  ULTRAM   Stopped by:  Hilda Gross DO           UNABLE TO FIND   Stopped by:  Hilda Gross DO                Where to get your medicines      These medications were sent to [facility] - [place], [place] - [address]  [address], [place] [address]     Phone:  [phone]     albuterol 108 (90 BASE) MCG/ACT Inhaler                Primary Care Provider Office Phone # Fax #    Fina Frausto [phone]       [facility] [address] [place] [address]        Equal Access to Services     TIARRA PAPPAS AH: Cony Hernandez, wajesicada luadriana, qaradha cano,  kael varela dior prince'aan ah. So [facility] [phone].    ATENCIÓN: Si habla alecia, tiene a rios disposición servicios gratuitos de asistencia lingüística. Kenna yadav [phone].    We comply with applicable federal civil rights laws and [place] laws. We do not discriminate on the basis of race, color, national origin, age, disability sex, sexual orientation or gender identity.            Thank you!     Thank you for choosing [facility]  for your care. Our goal is always to provide you with excellent care. Hearing back from our patients is one way we can continue to improve our services. Please take a few minutes to complete the written survey that you may receive in the mail after your visit with us. Thank you!             Your Updated Medication List - Protect others around you: Learn how to safely use, store and throw away your medicines at www.disposemymeds.org.          This list is accurate as of: 7/8/17 11:36 AM.  Always use your most recent med list.                   Brand Name Dispense Instructions for use Diagnosis    * albuterol 108 (90 BASE) MCG/ACT Inhaler    PROAIR HFA/PROVENTIL HFA/VENTOLIN HFA    1 Inhaler    Inhale 2 puffs into the lungs every 4 hours as needed for shortness of breath / dyspnea or wheezing    Mild intermittent asthma without complication       * albuterol 108 (90 BASE) MCG/ACT Inhaler    PROAIR HFA/PROVENTIL HFA/VENTOLIN HFA    1 Inhaler    Inhale 1-2 puffs into the lungs every 4 hours as needed for shortness of breath / dyspnea or wheezing    Encounter for medication refill       ascorbic acid 1000 MG Tabs    vitamin C    90 tablet    Take 1 tablet (1,000 mg) by mouth daily    Encounter for herb and vitamin supplement management       CALCIUM 1200+D3 600- MG-MG-UNIT Tb24   Generic drug:  Calcium-Magnesium-Vitamin D      Reported on 3/14/2017        calcium-vitamin D 600-400 MG-UNIT per tablet    CALTRATE    180 tablet    TAKE 1 TABLET BY  MOUTH TWICE DAILY    Osteoporosis       * Ipratropium-Albuterol  MCG/ACT inhaler    COMBIVENT RESPIMAT    4 g    INHALE ONE PUFF BY MOUTH FOUR TIMES A DAY (NO TO EXCCED 6 DOSES PER DAY)    COPD exacerbation (H)       * ipratropium - albuterol 0.5 mg/2.5 mg/3 mL 0.5-2.5 (3) MG/3ML neb solution    DUONEB    30 vial    Take 1 vial (3 mLs) by nebulization every 6 hours as needed for shortness of breath / dyspnea or wheezing    COPD exacerbation (H)       * Ipratropium-Albuterol  MCG/ACT inhaler    COMBIVENT RESPIMAT    3 Inhaler    Inhale 1 puff into the lungs 4 times daily Not to exceed 6 doses per day.    COPD exacerbation (H)       omega 3 1000 MG Caps     90 capsule    Take 1 g by mouth daily    Encounter for herb and vitamin supplement management       order for DME     1 each    Equipment being ordered: nebulizer machine    COPD exacerbation (H)       vitamin A 37199 UNIT capsule    CVS VITAMIN A    180 capsule    Take 1 capsule (10,000 Units) by mouth daily    Encounter for herb and vitamin supplement management       vitamin B complex with vitamin C Tabs tablet     180 tablet    Take 1 tablet by mouth 2 times daily With Folic acid    Encounter for herb and vitamin supplement management       vitamin E 1000 UNIT capsule    CVS vitamin E    90 capsule    Take 1 capsule (1,000 Units) by mouth daily    Encounter for herb and vitamin supplement management       * Notice:  This list has 5 medication(s) that are the same as other medications prescribed for you. Read the directions carefully, and ask your doctor or other care provider to review them with you.

## 2017-07-09 ENCOUNTER — OFFICE VISIT (OUTPATIENT)
Dept: URGENT CARE | Facility: URGENT CARE | Age: 67
End: 2017-07-09
Payer: COMMERCIAL

## 2017-07-09 VITALS
WEIGHT: 150 LBS | SYSTOLIC BLOOD PRESSURE: 110 MMHG | RESPIRATION RATE: 15 BRPM | OXYGEN SATURATION: 96 % | BODY MASS INDEX: 21.47 KG/M2 | TEMPERATURE: 97.4 F | DIASTOLIC BLOOD PRESSURE: 64 MMHG | HEART RATE: 69 BPM | HEIGHT: 70 IN

## 2017-07-09 DIAGNOSIS — J44.9 CHRONIC OBSTRUCTIVE PULMONARY DISEASE, UNSPECIFIED COPD TYPE (H): Primary | ICD-10-CM

## 2017-07-09 PROCEDURE — 99214 OFFICE O/P EST MOD 30 MIN: CPT | Performed by: HOSPITALIST

## 2017-07-09 RX ORDER — PREDNISONE 20 MG/1
TABLET ORAL
Qty: 20 TABLET | Refills: 0 | Status: SHIPPED | OUTPATIENT
Start: 2017-07-09 | End: 2017-08-29

## 2017-07-09 NOTE — PROGRESS NOTES
Pt came here for possible starting steroid, he has copd exacerbation, he has combivent but it run out before he can fill it out, he apparently come to urgent care yesterday to get different inhaler and he was prescribed albuterol. As per him he has been using his inhaler at least 4 -6 times a day. 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     Tobacco abuse          Current Outpatient Prescriptions on File Prior to Visit:  ipratropium - albuterol 0.5 mg/2.5 mg/3 mL (DUONEB) 0.5-2.5 (3) MG/3ML neb solution Take 1 vial (3 mLs) by nebulization every 6 hours as needed for shortness of breath / dyspnea or wheezing   Ipratropium-Albuterol (COMBIVENT RESPIMAT)  MCG/ACT inhaler INHALE ONE PUFF BY MOUTH FOUR TIMES A DAY (NO TO EXCCED 6 DOSES PER DAY)   vitamin E ([facility]S VITAMIN E) 1000 UNIT capsule Take 1 capsule (1,000 Units) by mouth daily   vitamin B complex with vitamin C (VITAMIN  B COMPLEX) TABS tablet Take 1 tablet by mouth 2 times daily With Folic acid   omega 3 1000 MG CAPS Take 1 g by mouth daily   Calcium-Magnesium-Vitamin D (CALCIUM 1200+D3) 600- MG-MG-UNIT TB24 Reported on 3/14/2017   albuterol (PROAIR HFA, PROVENTIL HFA, VENTOLIN HFA) 108 (90 BASE) MCG/ACT inhaler Inhale 2 puffs into the lungs every 4 hours as needed for shortness of breath / dyspnea or wheezing   albuterol (PROAIR HFA/PROVENTIL HFA/VENTOLIN HFA) 108 (90 BASE) MCG/ACT Inhaler Inhale 1-2 puffs into the lungs every 4 hours as needed for shortness of breath / dyspnea or wheezing   Ipratropium-Albuterol (COMBIVENT RESPIMAT)  MCG/ACT inhaler Inhale 1 puff into the lungs 4 times daily Not to exceed 6 doses per day.  "  calcium-vitamin D (CALTRATE) 600-400 MG-UNIT per tablet TAKE 1 TABLET BY MOUTH TWICE DAILY   order for DME Equipment being ordered: nebulizer machine   ascorbic acid (VITAMIN C) 1000 MG TABS Take 1 tablet (1,000 mg) by mouth daily   vitamin A (CVS VITAMIN A) 87422 UNIT capsule Take 1 capsule (10,000 Units) by mouth daily     No current facility-administered medications on file prior to visit.     Social History   Substance Use Topics     Smoking status: Former Smoker     Packs/day: 1.50     Years: 45.00     Types: Cigarettes     Quit date: 9/1/2016     Smokeless tobacco: Never Used      Comment: 1.5 packs for 45 years smoker     Alcohol use No       ROS:  Consitutional: As above  ENT: As above  Respiratory: As above    OBJECTIVE:  /64  Pulse 69  Temp 97.4  F (36.3  C) (Oral)  Resp 15  Ht 5' 10\" (1.778 m)  Wt 150 lb (68 kg)  SpO2 96%  BMI 21.52 kg/m2  GENERAL APPEARANCE: healthy, alert and mild to moderate  distress  EYES: conjunctiva clear  EARS:no cerumen.   Ear canals w/o erythema, TM's intact w/o erythema.    NOSE/MOUTH: Nose and mouth without ulcers, erythema or lesions  THROAT: no erythema w/ no tonsillar enlargement . no exudates  NECK: supple, nontender, no lymphadenopathy  RESP: crackles and wheezing noted on all lung area  CV: regular rates and rhythm, normal S1 S2, no murmur noted  NEURO: awake, alert        No results found for this or any previous visit (from the past 168 hour(s)).     ASSESSMENT:     ICD-10-CM    1. Chronic obstructive pulmonary disease, unspecified COPD type (H) J44.9 fluticasone-salmeterol (ADVAIR) 250-50 MCG/DOSE diskus inhaler     predniSONE (DELTASONE) 20 MG tablet         PLAN:    I think he need maintenance medication on top of his albuterol/combivent. He agree for me to start him on advair 250-50, I advice him to take it wether he has attack or not. (not as a rescue inhaler), continue with the albuterol for now. I also will put him on prednisone taper off too. "   Pt has appointment with pulmonologist in the end of august. Hopefully he can formulate further plan for his copd.     Lucila Lundberg MD

## 2017-07-09 NOTE — MR AVS SNAPSHOT
After Visit Summary   7/9/2017    Robert B Behr    MRN: [id number]           Patient Information     Date Of Birth          1950        Visit Information        Provider Department      7/9/2017 10:10 AM Lucila Lundberg MD [facility]        Today's Diagnoses     Chronic obstructive pulmonary disease, unspecified COPD type (H)    -  1       Follow-ups after your visit        Your next 10 appointments already scheduled     Aug 28, 2017  2:45 PM CDT   (Arrive by 2:30 PM)   XR CHEST 2 VIEWS with UCXR1   [facility] ([facility])    [address] [place] [id number] [phone]           Please bring a list of your current medicines to your exam. (Include vitamins, minerals and over-thecounter medicines.) Leave your valuables at home.  Tell your doctor if there is a chance you may be pregnant.  You do not need to do anything special for this exam.            Aug 28, 2017  3:00 PM CDT   FULL PULMONARY FUNCTION with  PFL C   [facility] Function Testing ([facility])    [address] [place] [id number] [phone]            Aug 28, 2017  4:00 PM CDT   (Arrive by 3:45 PM)   New Patient Visit with Svitlana Taylor MD   [facility] ([facility])    [address] [place] [id number] [phone]              Who to contact     If you have questions or need follow up information about today's clinic visit or your schedule please contact [facility] directly at [phone].  Normal or non-critical lab and imaging results will be communicated to you by MyChart, letter or phone within 4 business days after the clinic has received the results. If you do not hear from us within 7 days, please contact the clinic through MyChart or phone. If you  "have a critical or abnormal lab result, we will notify you by phone as soon as possible.  Submit refill requests through [facility] or call your pharmacy and they will forward the refill request to us. Please allow 3 business days for your refill to be completed.          Additional Information About Your Visit        [employer] Information     [facility] gives you secure access to your electronic health record. If you see a primary care provider, you can also send messages to your care team and make appointments. If you have questions, please call your primary care clinic.  If you do not have a primary care provider, please call [phone] and they will assist you.        Care EveryWhere ID     This is your Care EveryWhere ID. This could be used by other organizations to access your [facility] medical records  [id number]        Your Vitals Were     Pulse Temperature Respirations Height Pulse Oximetry BMI (Body Mass Index)    69 97.4  F (36.3  C) (Oral) 15 5' 10\" (1.778 m) 96% 21.52 kg/m2       Blood Pressure from Last 3 Encounters:   07/09/17 110/64   07/08/17 102/60   07/05/17 118/70    Weight from Last 3 Encounters:   07/09/17 150 lb (68 kg)   07/08/17 150 lb (68 kg)   07/05/17 151 lb (68.5 kg)              Today, you had the following     No orders found for display         Today's Medication Changes          These changes are accurate as of: 7/9/17 11:01 AM.  If you have any questions, ask your nurse or doctor.               Start taking these medicines.        Dose/Directions    fluticasone-salmeterol 250-50 MCG/DOSE diskus inhaler   Commonly known as:  ADVAIR   Used for:  Chronic obstructive pulmonary disease, unspecified COPD type (H)   Started by:  Lucila Lundberg MD        Dose:  1 puff   Inhale 1 puff into the lungs 2 times daily   Quantity:  1 Inhaler   Refills:  1       predniSONE 20 MG tablet   Commonly known as:  DELTASONE   Used for:  Chronic obstructive pulmonary disease, unspecified COPD type (H) "   Started by:  Lucila Lundberg MD        Take 3 tabs (60 mg) by mouth daily x 3 days, 2 tabs (40 mg) daily x 3 days, 1 tab (20 mg) daily x 3 days, then 1/2 tab (10 mg) x 3 days.   Quantity:  20 tablet   Refills:  0            Where to get your medicines      Some of these will need a paper prescription and others can be bought over the counter.  Ask your nurse if you have questions.     Bring a paper prescription for each of these medications     fluticasone-salmeterol 250-50 MCG/DOSE diskus inhaler    predniSONE 20 MG tablet                Primary Care Provider Office Phone # Fax #    Fina Frausto [phone]       [facility] [address] [place] [address]        Equal Access to Services     TIARRA PAPPAS : Cony cooper Sojasmin, waaxda luqadaha, qaybta kaalmada rachael, kael rangel . So [facility] [phone].    ATENCIÓN: Si habla español, tiene a rios disposición servicios gratuitos de asistencia lingüística. Kenna al [phone].    We comply with applicable federal civil rights laws and [place] laws. We do not discriminate on the basis of race, color, national origin, age, disability sex, sexual orientation or gender identity.            Thank you!     Thank you for choosing [facility]  for your care. Our goal is always to provide you with excellent care. Hearing back from our patients is one way we can continue to improve our services. Please take a few minutes to complete the written survey that you may receive in the mail after your visit with us. Thank you!             Your Updated Medication List - Protect others around you: Learn how to safely use, store and throw away your medicines at www.disposemymeds.org.          This list is accurate as of: 7/9/17 11:01 AM.  Always use your most recent med list.                   Brand Name Dispense Instructions for use Diagnosis    * albuterol 108 (90  BASE) MCG/ACT Inhaler    PROAIR HFA/PROVENTIL HFA/VENTOLIN HFA    1 Inhaler    Inhale 2 puffs into the lungs every 4 hours as needed for shortness of breath / dyspnea or wheezing    Mild intermittent asthma without complication       * albuterol 108 (90 BASE) MCG/ACT Inhaler    PROAIR HFA/PROVENTIL HFA/VENTOLIN HFA    1 Inhaler    Inhale 1-2 puffs into the lungs every 4 hours as needed for shortness of breath / dyspnea or wheezing    Encounter for medication refill       ascorbic acid 1000 MG Tabs    vitamin C    90 tablet    Take 1 tablet (1,000 mg) by mouth daily    Encounter for herb and vitamin supplement management       CALCIUM 1200+D3 600- MG-MG-UNIT Tb24   Generic drug:  Calcium-Magnesium-Vitamin D      Reported on 3/14/2017        calcium-vitamin D 600-400 MG-UNIT per tablet    CALTRATE    180 tablet    TAKE 1 TABLET BY MOUTH TWICE DAILY    Osteoporosis       fluticasone-salmeterol 250-50 MCG/DOSE diskus inhaler    ADVAIR    1 Inhaler    Inhale 1 puff into the lungs 2 times daily    Chronic obstructive pulmonary disease, unspecified COPD type (H)       * Ipratropium-Albuterol  MCG/ACT inhaler    COMBIVENT RESPIMAT    4 g    INHALE ONE PUFF BY MOUTH FOUR TIMES A DAY (NO TO EXCCED 6 DOSES PER DAY)    COPD exacerbation (H)       * ipratropium - albuterol 0.5 mg/2.5 mg/3 mL 0.5-2.5 (3) MG/3ML neb solution    DUONEB    30 vial    Take 1 vial (3 mLs) by nebulization every 6 hours as needed for shortness of breath / dyspnea or wheezing    COPD exacerbation (H)       * Ipratropium-Albuterol  MCG/ACT inhaler    COMBIVENT RESPIMAT    3 Inhaler    Inhale 1 puff into the lungs 4 times daily Not to exceed 6 doses per day.    COPD exacerbation (H)       omega 3 1000 MG Caps     90 capsule    Take 1 g by mouth daily    Encounter for herb and vitamin supplement management       order for DME     1 each    Equipment being ordered: nebulizer machine    COPD exacerbation (H)       predniSONE 20 MG tablet     DELTASONE    20 tablet    Take 3 tabs (60 mg) by mouth daily x 3 days, 2 tabs (40 mg) daily x 3 days, 1 tab (20 mg) daily x 3 days, then 1/2 tab (10 mg) x 3 days.    Chronic obstructive pulmonary disease, unspecified COPD type (H)       vitamin A 16589 UNIT capsule    CVS VITAMIN A    180 capsule    Take 1 capsule (10,000 Units) by mouth daily    Encounter for herb and vitamin supplement management       vitamin B complex with vitamin C Tabs tablet     180 tablet    Take 1 tablet by mouth 2 times daily With Folic acid    Encounter for herb and vitamin supplement management       vitamin E 1000 UNIT capsule    CVS vitamin E    90 capsule    Take 1 capsule (1,000 Units) by mouth daily    Encounter for herb and vitamin supplement management       * Notice:  This list has 5 medication(s) that are the same as other medications prescribed for you. Read the directions carefully, and ask your doctor or other care provider to review them with you.

## 2017-07-09 NOTE — NURSING NOTE
"Chief Complaint   Patient presents with     Urgent Care     COPD     3rd visit for COPD. feels it's not getting better.        Initial /64  Pulse 69  Temp 97.4  F (36.3  C) (Oral)  Resp 15  Ht 5' 10\" (1.778 m)  Wt 150 lb (68 kg)  SpO2 96%  BMI 21.52 kg/m2 Estimated body mass index is 21.52 kg/(m^2) as calculated from the following:    Height as of this encounter: 5' 10\" (1.778 m).    Weight as of this encounter: 150 lb (68 kg).  Medication Reconciliation: complete  "

## 2017-07-11 ENCOUNTER — CARE COORDINATION (OUTPATIENT)
Dept: GERIATRIC MEDICINE | Facility: CLINIC | Age: 67
End: 2017-07-11

## 2017-07-11 NOTE — PROGRESS NOTES
TM left for client requesting call back to discuss 6 month phone screen.  Leeanna San RN Case Manager  [facility]  [phone]

## 2017-07-11 NOTE — TELEPHONE ENCOUNTER
"Patient called and spoke with writer.    Per patient:  1. History of COPD  2. Uses Combivent PRN-has not needed as much since quit smoking  3. Quit smoking after 40 year history  4. Productive cough with \"lots\" of mucous   A. Duration: 2 weeks   B. Associated symptoms: sore throat developed 2 days ago, raspy voice and fatigue  5. Last time these symptoms were present, Prednisone prescribed for 5 day course   A. This helped symptoms greatly    Writer recommended:  1. Office visit for evaluation.  Patient prefers to be seen today and plans to go to [facility].  Hours of Urgent Care reviewed with patient  2. Drink frequent, small sips of water  3. Avoid dairy products as those thicken mucous  4. Breathe steam several times daily  5. Warm tea with 1/2 tsp honey to help reduce coughing trigger  6. Call with any new, changing or worsening symptoms    Patient verbalized understanding and in agreement with plan.    Per patient: he does use the steam room at the [facility], RN      " Patient picked up

## 2017-07-12 ENCOUNTER — OFFICE VISIT (OUTPATIENT)
Dept: URGENT CARE | Facility: URGENT CARE | Age: 67
End: 2017-07-12
Payer: COMMERCIAL

## 2017-07-12 VITALS
TEMPERATURE: 98.2 F | OXYGEN SATURATION: 95 % | WEIGHT: 150 LBS | SYSTOLIC BLOOD PRESSURE: 132 MMHG | HEART RATE: 66 BPM | HEIGHT: 70 IN | DIASTOLIC BLOOD PRESSURE: 78 MMHG | BODY MASS INDEX: 21.47 KG/M2

## 2017-07-12 DIAGNOSIS — F17.200 TOBACCO USE DISORDER: Primary | ICD-10-CM

## 2017-07-12 PROCEDURE — 99213 OFFICE O/P EST LOW 20 MIN: CPT | Performed by: INTERNAL MEDICINE

## 2017-07-12 RX ORDER — NICOTINE 21 MG/24HR
1 PATCH, TRANSDERMAL 24 HOURS TRANSDERMAL EVERY 24 HOURS
Qty: 30 PATCH | Refills: 1 | Status: SHIPPED | OUTPATIENT
Start: 2017-07-12 | End: 2017-08-29

## 2017-07-12 RX ORDER — NICOTINE 21 MG/24HR
1 PATCH, TRANSDERMAL 24 HOURS TRANSDERMAL EVERY 24 HOURS
Qty: 30 PATCH | Refills: 1 | Status: SHIPPED | OUTPATIENT
Start: 2017-07-12 | End: 2017-11-02

## 2017-07-12 NOTE — PROGRESS NOTES
SUBJECTIVE:  Robert B Behr, a 67 year old male scheduled an appointment to discuss the following issues:  Chief Complaint   Patient presents with     Urgent Care     Medication Request     Hx COPD and currently finishing up course of prednisone for bronchitis.  Has been working on quitting smoking for past year; has been successful using nicotene patches but has run out of Rx and reports is going through stressful time and needs his Rx refilled.       Smokes 3 cigs every few weeks  Under stress, so craving tobacco  Partner smokes    Wants the patch    Copd baseline  Biking around [facility] without symptoms   Swam 20 minutes today  No shortness of breath         Current Outpatient Prescriptions on File Prior to Visit:  fluticasone-salmeterol (ADVAIR) 250-50 MCG/DOSE diskus inhaler Inhale 1 puff into the lungs 2 times daily   predniSONE (DELTASONE) 20 MG tablet Take 3 tabs (60 mg) by mouth daily x 3 days, 2 tabs (40 mg) daily x 3 days, 1 tab (20 mg) daily x 3 days, then 1/2 tab (10 mg) x 3 days.   Ipratropium-Albuterol (COMBIVENT RESPIMAT)  MCG/ACT inhaler INHALE ONE PUFF BY MOUTH FOUR TIMES A DAY (NO TO EXCCED 6 DOSES PER DAY)   ascorbic acid (VITAMIN C) 1000 MG TABS Take 1 tablet (1,000 mg) by mouth daily   vitamin E (CVS VITAMIN E) 1000 UNIT capsule Take 1 capsule (1,000 Units) by mouth daily   vitamin B complex with vitamin C (VITAMIN  B COMPLEX) TABS tablet Take 1 tablet by mouth 2 times daily With Folic acid   vitamin A (CVS VITAMIN A) 70867 UNIT capsule Take 1 capsule (10,000 Units) by mouth daily   omega 3 1000 MG CAPS Take 1 g by mouth daily   Calcium-Magnesium-Vitamin D (CALCIUM 1200+D3) 600- MG-MG-UNIT TB24 Reported on 3/14/2017   albuterol (PROAIR HFA, PROVENTIL HFA, VENTOLIN HFA) 108 (90 BASE) MCG/ACT inhaler Inhale 2 puffs into the lungs every 4 hours as needed for shortness of breath / dyspnea or wheezing   albuterol (PROAIR HFA/PROVENTIL HFA/VENTOLIN HFA) 108 (90 BASE) MCG/ACT Inhaler  "Inhale 1-2 puffs into the lungs every 4 hours as needed for shortness of breath / dyspnea or wheezing   Ipratropium-Albuterol (COMBIVENT RESPIMAT)  MCG/ACT inhaler Inhale 1 puff into the lungs 4 times daily Not to exceed 6 doses per day.   ipratropium - albuterol 0.5 mg/2.5 mg/3 mL (DUONEB) 0.5-2.5 (3) MG/3ML neb solution Take 1 vial (3 mLs) by nebulization every 6 hours as needed for shortness of breath / dyspnea or wheezing (Patient not taking: Reported on 7/12/2017)   calcium-vitamin D (CALTRATE) 600-400 MG-UNIT per tablet TAKE 1 TABLET BY MOUTH TWICE DAILY   order for DME Equipment being ordered: nebulizer machine     No current facility-administered medications on file prior to visit.       Medical, social, surgical, and family histories reviewed and updated as of 7/12/2017.      OBJECTIVE:  /78  Pulse 66  Temp 98.2  F (36.8  C) (Oral)  Ht 5' 10\" (1.778 m)  Wt 150 lb (68 kg)  SpO2 95%  BMI 21.52 kg/m2  EXAM:  GENERAL APPEARANCE: healthy, alert and no distress  Declines lung exam    ASSESSMENT/PLAN:    ASSESSMENT/PLAN:      ICD-10-CM    1. Tobacco use disorder F17.200 nicotine (NICODERM CQ) 21 MG/24HR 24 hr patch     nicotine (NICODERM CQ) 14 MG/24HR 24 hr patch     nicotine (NICODERM CQ) 7 MG/24HR 24 hr patch       Encouraged tobacco cessation  Plans to wean from 21 to 14 to 7   Mg /patch      Jovita De La Torre MD      "

## 2017-07-12 NOTE — MR AVS SNAPSHOT
After Visit Summary   7/12/2017    Robert B Behr    MRN: [id number]           Patient Information     Date Of Birth          1950        Visit Information        Provider Department      7/12/2017 5:50 PM Jovita De La Torre MD [facility]        Today's Diagnoses     Tobacco use disorder    -  1       Follow-ups after your visit        Your next 10 appointments already scheduled     Aug 28, 2017  2:45 PM CDT   (Arrive by 2:30 PM)   XR CHEST 2 VIEWS with UCXR1   [facility] ([facility])    [address] [id number] [phone]           Please bring a list of your current medicines to your exam. (Include vitamins, minerals and over-thecounter medicines.) Leave your valuables at home.  Tell your doctor if there is a chance you may be pregnant.  You do not need to do anything special for this exam.            Aug 28, 2017  3:00 PM CDT   FULL PULMONARY FUNCTION with  PFL C   [facility] Function Testing ([facility])    [address] [place] [id number] [phone]            Aug 28, 2017  4:00 PM CDT   (Arrive by 3:45 PM)   New Patient Visit with Svitlana Taylor MD   [facility] ([facility])    [address] [place] [id number] [phone]              Who to contact     If you have questions or need follow up information about today's clinic visit or your schedule please contact [facility] directly at [phone].  Normal or non-critical lab and imaging results will be communicated to you by MyChart, letter or phone within 4 business days after the clinic has received the results. If you do not hear from us within 7 days, please contact the clinic through MyChart or phone. If you have a critical or abnormal lab result, we  "will notify you by phone as soon as possible.  Submit refill requests through [employer] or call your pharmacy and they will forward the refill request to us. Please allow 3 business days for your refill to be completed.          Additional Information About Your Visit        [employer] Information     [employer] gives you secure access to your electronic health record. If you see a primary care provider, you can also send messages to your care team and make appointments. If you have questions, please call your primary care clinic.  If you do not have a primary care provider, please call [phone] and they will assist you.        Care EveryWhere ID     This is your Care EveryWhere ID. This could be used by other organizations to access your [place] medical records  [id number]        Your Vitals Were     Pulse Temperature Height Pulse Oximetry BMI (Body Mass Index)       66 98.2  F (36.8  C) (Oral) 5' 10\" (1.778 m) 95% 21.52 kg/m2        Blood Pressure from Last 3 Encounters:   07/12/17 132/78   07/09/17 110/64   07/08/17 102/60    Weight from Last 3 Encounters:   07/12/17 150 lb (68 kg)   07/09/17 150 lb (68 kg)   07/08/17 150 lb (68 kg)              Today, you had the following     No orders found for display         Today's Medication Changes          These changes are accurate as of: 7/12/17  6:34 PM.  If you have any questions, ask your nurse or doctor.               Start taking these medicines.        Dose/Directions    * nicotine 21 MG/24HR 24 hr patch   Commonly known as:  NICODERM CQ   Used for:  Tobacco use disorder   Started by:  Jovita De La Torre MD        Dose:  1 patch   Place 1 patch onto the skin every 24 hours   Quantity:  30 patch   Refills:  1       * nicotine 14 MG/24HR 24 hr patch   Commonly known as:  NICODERM CQ   Used for:  Tobacco use disorder   Started by:  Jovita De La Torre MD        Dose:  1 patch   Place 1 patch onto the skin every 24 hours   Quantity:  30 patch   Refills:  1    "    * nicotine 7 MG/24HR 24 hr patch   Commonly known as:  NICODERM CQ   Used for:  Tobacco use disorder   Started by:  Jovita De La Torre MD        Dose:  1 patch   Place 1 patch onto the skin every 24 hours   Quantity:  30 patch   Refills:  1       * Notice:  This list has 3 medication(s) that are the same as other medications prescribed for you. Read the directions carefully, and ask your doctor or other care provider to review them with you.         Where to get your medicines      These medications were sent to [facility] - [place], [place] - [address]  [address], [place] [address]     Phone:  [phone]     nicotine 14 MG/24HR 24 hr patch    nicotine 21 MG/24HR 24 hr patch    nicotine 7 MG/24HR 24 hr patch                Primary Care Provider Office Phone # Fax #    Fina Frausto [phone]       [facility] [address] [place] [address]        Equal Access to Services     MJ PAPPAS AH: Hadii aad ku hadasho Soomaali, waaxda luqadaha, qaybta kaalmada adeegyada, waxay idiin hayaan adeeg kharash la'samir . So [facility] [phone].    ATENCIÓN: Si habla español, tiene a rios disposición servicios gratuitos de asistencia lingüística. [facility] [phone].    We comply with applicable federal civil rights laws and [place] laws. We do not discriminate on the basis of race, color, national origin, age, disability sex, sexual orientation or gender identity.            Thank you!     Thank you for choosing [facility]  for your care. Our goal is always to provide you with excellent care. Hearing back from our patients is one way we can continue to improve our services. Please take a few minutes to complete the written survey that you may receive in the mail after your visit with us. Thank you!             Your Updated Medication List - Protect others around you: Learn how to safely use, store and throw away your  medicines at www.disposemymeds.org.          This list is accurate as of: 7/12/17  6:34 PM.  Always use your most recent med list.                   Brand Name Dispense Instructions for use Diagnosis    * albuterol 108 (90 BASE) MCG/ACT Inhaler    PROAIR HFA/PROVENTIL HFA/VENTOLIN HFA    1 Inhaler    Inhale 2 puffs into the lungs every 4 hours as needed for shortness of breath / dyspnea or wheezing    Mild intermittent asthma without complication       * albuterol 108 (90 BASE) MCG/ACT Inhaler    PROAIR HFA/PROVENTIL HFA/VENTOLIN HFA    1 Inhaler    Inhale 1-2 puffs into the lungs every 4 hours as needed for shortness of breath / dyspnea or wheezing    Encounter for medication refill       ascorbic acid 1000 MG Tabs    vitamin C    90 tablet    Take 1 tablet (1,000 mg) by mouth daily    Encounter for herb and vitamin supplement management       CALCIUM 1200+D3 600- MG-MG-UNIT Tb24   Generic drug:  Calcium-Magnesium-Vitamin D      Reported on 3/14/2017        calcium-vitamin D 600-400 MG-UNIT per tablet    CALTRATE    180 tablet    TAKE 1 TABLET BY MOUTH TWICE DAILY    Osteoporosis       fluticasone-salmeterol 250-50 MCG/DOSE diskus inhaler    ADVAIR    1 Inhaler    Inhale 1 puff into the lungs 2 times daily    Chronic obstructive pulmonary disease, unspecified COPD type (H)       * Ipratropium-Albuterol  MCG/ACT inhaler    COMBIVENT RESPIMAT    4 g    INHALE ONE PUFF BY MOUTH FOUR TIMES A DAY (NO TO EXCCED 6 DOSES PER DAY)    COPD exacerbation (H)       * ipratropium - albuterol 0.5 mg/2.5 mg/3 mL 0.5-2.5 (3) MG/3ML neb solution    DUONEB    30 vial    Take 1 vial (3 mLs) by nebulization every 6 hours as needed for shortness of breath / dyspnea or wheezing    COPD exacerbation (H)       * Ipratropium-Albuterol  MCG/ACT inhaler    COMBIVENT RESPIMAT    3 Inhaler    Inhale 1 puff into the lungs 4 times daily Not to exceed 6 doses per day.    COPD exacerbation (H)       * nicotine 21 MG/24HR 24 hr  patch    NICODERM CQ    30 patch    Place 1 patch onto the skin every 24 hours    Tobacco use disorder       * nicotine 14 MG/24HR 24 hr patch    NICODERM CQ    30 patch    Place 1 patch onto the skin every 24 hours    Tobacco use disorder       * nicotine 7 MG/24HR 24 hr patch    NICODERM CQ    30 patch    Place 1 patch onto the skin every 24 hours    Tobacco use disorder       omega 3 1000 MG Caps     90 capsule    Take 1 g by mouth daily    Encounter for herb and vitamin supplement management       order for DME     1 each    Equipment being ordered: nebulizer machine    COPD exacerbation (H)       predniSONE 20 MG tablet    DELTASONE    20 tablet    Take 3 tabs (60 mg) by mouth daily x 3 days, 2 tabs (40 mg) daily x 3 days, 1 tab (20 mg) daily x 3 days, then 1/2 tab (10 mg) x 3 days.    Chronic obstructive pulmonary disease, unspecified COPD type (H)       vitamin A 31978 UNIT capsule    CVS VITAMIN A    180 capsule    Take 1 capsule (10,000 Units) by mouth daily    Encounter for herb and vitamin supplement management       vitamin B complex with vitamin C Tabs tablet     180 tablet    Take 1 tablet by mouth 2 times daily With Folic acid    Encounter for herb and vitamin supplement management       vitamin E 1000 UNIT capsule    CVS vitamin E    90 capsule    Take 1 capsule (1,000 Units) by mouth daily    Encounter for herb and vitamin supplement management       * Notice:  This list has 8 medication(s) that are the same as other medications prescribed for you. Read the directions carefully, and ask your doctor or other care provider to review them with you.

## 2017-07-12 NOTE — NURSING NOTE
"Chief Complaint   Patient presents with     Urgent Care     Medication Request     Hx COPD and currently finishing up course of prednisone for bronchitis.  Has been working on quitting smoking for past year; has been successful using nicotene patches but has run out of Rx and reports is going through stressful time and needs his Rx refilled.     Initial /78  Pulse 66  Temp 98.2  F (36.8  C) (Oral)  Ht 5' 10\" (1.778 m)  Wt 150 lb (68 kg)  SpO2 95%  BMI 21.52 kg/m2 Estimated body mass index is 21.52 kg/(m^2) as calculated from the following:    Height as of this encounter: 5' 10\" (1.778 m).    Weight as of this encounter: 150 lb (68 kg)..  BP completed using cuff size: regular  AFIA Kirkpatrick  "

## 2017-07-17 ENCOUNTER — CARE COORDINATION (OUTPATIENT)
Dept: GERIATRIC MEDICINE | Facility: CLINIC | Age: 67
End: 2017-07-17

## 2017-07-17 NOTE — PROGRESS NOTES
TM left for client requesting call back to discuss 6 month phone screen.  Leeanna San RN Case Manager  [facility]  [phone]

## 2017-07-19 ENCOUNTER — CARE COORDINATION (OUTPATIENT)
Dept: GERIATRIC MEDICINE | Facility: CLINIC | Age: 67
End: 2017-07-19

## 2017-07-19 NOTE — PROGRESS NOTES
[facility]    6 month telephone assessment completed on July 19, 2017.    ER visits: Yes -  [facility]  Hospitalizations: No  TCU stays: No  Significant health status changes: none  Falls/Injuries: No  ADL/IADL changes: No  Changes in services: No    Caregiver Assessment follow up:  NA    Goals: See POC in chart for goal progress documentation.  Client reports that he rides his bike about 10 miles a day. He goes to [employer] and swims. He also goes the the [employer] to exercise. He has an appointment on July 28 with Pulmonary at the . They will do a stress test. He is going to speak to them about stem cell therapy for COPD. Discussed his smoking. He is doing well. Some days he has no cigarettes. Some days he has a couple. He used to smoke 3 packs a day and smoked for 40 years, so he thinks he is doing well with quitting. No needs identified at this time.    Will see client in 6 months for an annual health risk assessment.   Encouraged client to call CM with any questions or concerns in the meantime.     Leeanna San RN Case Manager  [facility]  [phone]

## 2017-07-26 ENCOUNTER — OFFICE VISIT (OUTPATIENT)
Dept: OPHTHALMOLOGY | Facility: CLINIC | Age: 67
End: 2017-07-26
Attending: OPHTHALMOLOGY
Payer: COMMERCIAL

## 2017-07-26 VITALS — WEIGHT: 150 LBS | BODY MASS INDEX: 21.47 KG/M2 | HEIGHT: 70 IN

## 2017-07-26 DIAGNOSIS — Z96.1 PSEUDOPHAKIA, RIGHT EYE: ICD-10-CM

## 2017-07-26 DIAGNOSIS — H25.12 AGE-RELATED NUCLEAR CATARACT OF LEFT EYE: Primary | ICD-10-CM

## 2017-07-26 PROCEDURE — 99214 OFFICE O/P EST MOD 30 MIN: CPT | Mod: ZF

## 2017-07-26 ASSESSMENT — REFRACTION_MANIFEST
OD_CYLINDER: +0.00
OS_CYLINDER: +2.00
OS_SPHERE: -2.75
OS_AXIS: 150
OD_ADD: +2.50
OD_SPHERE: -0.25
OS_ADD: +2.50
OD_AXIS: 000

## 2017-07-26 ASSESSMENT — VISUAL ACUITY
OS_SC: 20/80
METHOD: SNELLEN - LINEAR
OS_PH_SC: 20/25-
OD_SC: 20/20
OD_SC+: -1

## 2017-07-26 ASSESSMENT — CONF VISUAL FIELD
OD_NORMAL: 1
OS_NORMAL: 1

## 2017-07-26 ASSESSMENT — SLIT LAMP EXAM - LIDS
COMMENTS: NORMAL
COMMENTS: NORMAL

## 2017-07-26 ASSESSMENT — TONOMETRY
IOP_METHOD: TONOPEN
OD_IOP_MMHG: 19
OS_IOP_MMHG: 15

## 2017-07-26 ASSESSMENT — CUP TO DISC RATIO
OS_RATIO: 0.3
OD_RATIO: 0.3

## 2017-07-26 ASSESSMENT — EXTERNAL EXAM - LEFT EYE: OS_EXAM: NORMAL

## 2017-07-26 ASSESSMENT — EXTERNAL EXAM - RIGHT EYE: OD_EXAM: NORMAL

## 2017-07-26 NOTE — MR AVS SNAPSHOT
After Visit Summary   7/26/2017    Robert B Behr    MRN: [id number]           Patient Information     Date Of Birth          1950        Visit Information        Provider Department      7/26/2017 2:00 PM Elly Valencia MD [facility]        Today's Diagnoses     Age-related nuclear cataract of left eye    -  1    Pseudophakia, right eye           Follow-ups after your visit        Follow-up notes from your care team     Return for scheduled procedure.      Your next 10 appointments already scheduled     Aug 28, 2017  2:45 PM CDT   (Arrive by 2:30 PM)   XR CHEST 2 VIEWS with UCXR1   [facility] ([facility])    [address] [facility]  [address] [facility] [address] [phone]           Please bring a list of your current medicines to your exam. (Include vitamins, minerals and over-thecounter medicines.) Leave your valuables at home.  Tell your doctor if there is a chance you may be pregnant.  You do not need to do anything special for this exam.            Aug 28, 2017  3:00 PM CDT   FULL PULMONARY FUNCTION with  PFL C   [facility] Function Testing ([facility])    [address] [address] [phone]            Aug 28, 2017  4:00 PM CDT   (Arrive by 3:45 PM)   New Patient Visit with Svitlana Taylor MD   [facility] ([facility])    [address] [place] [address] [phone]              Who to contact     Please call your clinic at [phone] to:    Ask questions about your health    Make or cancel appointments    Discuss your medicines    Learn about your test results    Speak to your doctor   If you have compliments or concerns about an experience at your clinic, or if you wish to file a complaint, please contact [facility] Patient Relations at [phone] or  "email us at [email or web address]         Additional Information About Your Visit        [employer] Information     [employer] gives you secure access to your electronic health record. If you see a primary care provider, you can also send messages to your care team and make appointments. If you have questions, please call your primary care clinic.  If you do not have a primary care provider, please call [phone] and they will assist you.      [employer] is an electronic gateway that provides easy, online access to your medical records. With [employer], you can request a clinic appointment, read your test results, renew a prescription or communicate with your care team.     To access your existing account, please contact your [facility] or call [phone] for assistance.        Care EveryWhere ID     This is your Care EveryWhere ID. This could be used by other organizations to access your [place] medical records  [id number]        Your Vitals Were     Height BMI (Body Mass Index)                1.778 m (5' 10\") 21.52 kg/m2           Blood Pressure from Last 3 Encounters:   07/12/17 132/78   07/09/17 110/64   07/08/17 102/60    Weight from Last 3 Encounters:   07/26/17 68 kg (150 lb)   07/12/17 68 kg (150 lb)   07/09/17 68 kg (150 lb)              We Performed the Following     [employer]-Operative Worksheet        Primary Care Provider Office Phone # Fax #    Fina Frausto [phone]       [facility] [address] [place] [address]        Equal Access to Services     TIARRA PAPPAS : Hadii aad ku hadasho Soomaali, waaxda luqadaha, qaybta kaalmada hiltonyaмарина, kael rangel . So [facility] [phone].    ATENCIÓN: Si habla español, tiene a rios disposición servicios gratuitos de asistencia lingüística. Llame al [phone].    We comply with applicable federal civil rights laws and [place] laws. We do not discriminate " on the basis of race, color, national origin, age, disability sex, sexual orientation or gender identity.            Thank you!     Thank you for choosing EYE CLINIC  for your care. Our goal is always to provide you with excellent care. Hearing back from our patients is one way we can continue to improve our services. Please take a few minutes to complete the written survey that you may receive in the mail after your visit with us. Thank you!             Your Updated Medication List - Protect others around you: Learn how to safely use, store and throw away your medicines at www.disposemymeds.org.          This list is accurate as of: 7/26/17  5:37 PM.  Always use your most recent med list.                   Brand Name Dispense Instructions for use Diagnosis    * albuterol 108 (90 BASE) MCG/ACT Inhaler    PROAIR HFA/PROVENTIL HFA/VENTOLIN HFA    1 Inhaler    Inhale 2 puffs into the lungs every 4 hours as needed for shortness of breath / dyspnea or wheezing    Mild intermittent asthma without complication       * albuterol 108 (90 BASE) MCG/ACT Inhaler    PROAIR HFA/PROVENTIL HFA/VENTOLIN HFA    1 Inhaler    Inhale 1-2 puffs into the lungs every 4 hours as needed for shortness of breath / dyspnea or wheezing    Encounter for medication refill       ascorbic acid 1000 MG Tabs    vitamin C    90 tablet    Take 1 tablet (1,000 mg) by mouth daily    Encounter for herb and vitamin supplement management       CALCIUM 1200+D3 600- MG-MG-UNIT Tb24   Generic drug:  Calcium-Magnesium-Vitamin D      Reported on 3/14/2017        calcium-vitamin D 600-400 MG-UNIT per tablet    CALTRATE    180 tablet    TAKE 1 TABLET BY MOUTH TWICE DAILY    Osteoporosis       fluticasone-salmeterol 250-50 MCG/DOSE diskus inhaler    ADVAIR    1 Inhaler    Inhale 1 puff into the lungs 2 times daily    Chronic obstructive pulmonary disease, unspecified COPD type (H)       * Ipratropium-Albuterol  MCG/ACT inhaler    COMBIVENT RESPIMAT    4  g    INHALE ONE PUFF BY MOUTH FOUR TIMES A DAY (NO TO EXCCED 6 DOSES PER DAY)    COPD exacerbation (H)       * ipratropium - albuterol 0.5 mg/2.5 mg/3 mL 0.5-2.5 (3) MG/3ML neb solution    DUONEB    30 vial    Take 1 vial (3 mLs) by nebulization every 6 hours as needed for shortness of breath / dyspnea or wheezing    COPD exacerbation (H)       * Ipratropium-Albuterol  MCG/ACT inhaler    COMBIVENT RESPIMAT    3 Inhaler    Inhale 1 puff into the lungs 4 times daily Not to exceed 6 doses per day.    COPD exacerbation (H)       * nicotine 21 MG/24HR 24 hr patch    NICODERM CQ    30 patch    Place 1 patch onto the skin every 24 hours    Tobacco use disorder       * nicotine 14 MG/24HR 24 hr patch    NICODERM CQ    30 patch    Place 1 patch onto the skin every 24 hours    Tobacco use disorder       * nicotine 7 MG/24HR 24 hr patch    NICODERM CQ    30 patch    Place 1 patch onto the skin every 24 hours    Tobacco use disorder       omega 3 1000 MG Caps     90 capsule    Take 1 g by mouth daily    Encounter for herb and vitamin supplement management       order for DME     1 each    Equipment being ordered: nebulizer machine    COPD exacerbation (H)       predniSONE 20 MG tablet    DELTASONE    20 tablet    Take 3 tabs (60 mg) by mouth daily x 3 days, 2 tabs (40 mg) daily x 3 days, 1 tab (20 mg) daily x 3 days, then 1/2 tab (10 mg) x 3 days.    Chronic obstructive pulmonary disease, unspecified COPD type (H)       vitamin A 24426 UNIT capsule    CVS VITAMIN A    180 capsule    Take 1 capsule (10,000 Units) by mouth daily    Encounter for herb and vitamin supplement management       vitamin B complex with vitamin C Tabs tablet     180 tablet    Take 1 tablet by mouth 2 times daily With Folic acid    Encounter for herb and vitamin supplement management       vitamin E 1000 UNIT capsule    CVS vitamin E    90 capsule    Take 1 capsule (1,000 Units) by mouth daily    Encounter for herb and vitamin supplement management        * Notice:  This list has 8 medication(s) that are the same as other medications prescribed for you. Read the directions carefully, and ask your doctor or other care provider to review them with you.

## 2017-07-26 NOTE — PROGRESS NOTES
HPI  Robert B Behr is a 66 year old male her follow-up. Reports left eye vision is getting worse. Wants information about cataract surgery, specifically the Symfony lens. He would also like information about exchanging right lens for Symfony. No eye pain, redness, discharge. No flashes/floaters.    POH:   - CEIOL OD  PMH: COPD  FH: Mother with AMD  SH: Current smoker     Assessment & Plan    (H25.12) Age-related nuclear cataract of left eye  Comment: Visually significant with BCVA 20/30 OS with NS, cortical, and PSC changes on exam.    Dilates to: 8 mm  Alpha blockers/Flomax: None  Trauma/Pseudoxfoliation: None  Fuchs dystrophy/guttae: None    Diabetes: No  Anticoagulation: None    Cyl: 0.45 @ 003 OS    We discussed the risks and benefits of cataract surgery in the left eye, and informed consent was obtained.  Proceed with CE/IOL OS.      Discussed refractive target. He is very interested in Symfony lens. I am not sure how this will blend with the monofocal in his right eye. Can also consider mild myopia/monovision/blended vision with monofocal IOL. He is currently monovision with left eye SE ~ -2.00    Surgical plan:  Topical  Consider Symfony vs Symfony toric vs monofocal  He would like to schedule in the fall, possibly September  (Z96.1) Pseudophakia, right eye  (primary encounter diagnosis)  Comment: Doing well, great vision without correction; I do not recommend a lens exchange  Plan: monitor     -----------------------------------------------------------------------------------    Return for scheduled procedure    Jos Burciaga MD  Ophthalmology Resident, PGY-2  [facility]    Teaching statement:  Complete documentation of historical and exam elements from today's encounter can be found in the full encounter summary report (not reduplicated in this progress note). I personally obtained the chief complaint(s) and history of present illness.  I confirmed and edited as necessary the review of  systems, past medical/surgical history, family history, social history, and examination findings as documented by others; and I examined the patient myself. I personally reviewed the relevant tests, images, and reports as documented above.     I formulated and edited as necessary the assessment and plan and discussed the findings and management plan with the patient and family.    Elly Valencia MD  Comprehensive Ophthalmology & Ocular Pathology  Department of Ophthalmology and Visual Neurosciences  [email or web address]  Pager [phone]

## 2017-07-26 NOTE — NURSING NOTE
Chief Complaints and History of Present Illnesses   Patient presents with     Blurred Vision Left Eye     HPI    Last Eye Exam:  10/24/16   Affected eye(s):  Left   Symptoms:     Blurred vision   Difficulty with reading      Duration:  6 months   Frequency:  Daily       Do you have eye pain now?:  No      Comments:  Pt complains of foggy vision at near, hard to read fine print. Would like to discuss options with Cataract surgery MD SMILEY please review with pt today. SUPA GUTIERREZ, COA 2:35 PM 07/26/2017

## 2017-07-31 DIAGNOSIS — Z71.89 ENCOUNTER FOR HERB AND VITAMIN SUPPLEMENT MANAGEMENT: ICD-10-CM

## 2017-08-01 RX ORDER — MULTIVIT WITH MINERALS/LUTEIN
TABLET ORAL
Qty: 90 TABLET | Refills: 0 | Status: SHIPPED | OUTPATIENT
Start: 2017-08-01 | End: 2017-11-20

## 2017-08-01 NOTE — TELEPHONE ENCOUNTER
ascorbic acid (VITAMIN C) 1000 MG TABS      Last Written Prescription Date: 12/16/2016  Last Fill Quantity: 90,  # refills: 2   Last Office Visit with [facility] or [facility] prescribing provider: 6/19/2017    Routing refill request to provider for review/approval because:  Drug not on the [facility] refill protocol       Thank you,  Callie Sequeira RN

## 2017-08-02 ENCOUNTER — TELEPHONE (OUTPATIENT)
Dept: OPHTHALMOLOGY | Facility: CLINIC | Age: 67
End: 2017-08-02

## 2017-08-02 ENCOUNTER — TELEPHONE (OUTPATIENT)
Dept: FAMILY MEDICINE | Facility: CLINIC | Age: 67
End: 2017-08-02

## 2017-08-02 NOTE — TELEPHONE ENCOUNTER
Called pt and notified him about PA needing for Vitamin C. Pt is upset that why is a PA needed when it's been covered for a long time.  Called pt's insurance to confirm if PA is needed and it looks like the pt or pharmacy is trying to fill it early that's why it came back as denied. Pt still has 19 days left until his next refill. His next refill has be on 8/21/2017.   Called pt back to notified him about the med.      Tonya Michael MA

## 2017-08-02 NOTE — TELEPHONE ENCOUNTER
Plan does not cover ascorbic acid (VITAMIN C) 1000 MG TABS  Message on fax: Plan does not cover medication prescribed. Please fax back with approval along with strength, directions quantity, and refills.  Do you want us to start PA?      Tonya Michael MA

## 2017-08-07 ENCOUNTER — RADIANT APPOINTMENT (OUTPATIENT)
Dept: GENERAL RADIOLOGY | Facility: CLINIC | Age: 67
End: 2017-08-07
Attending: FAMILY MEDICINE
Payer: COMMERCIAL

## 2017-08-07 ENCOUNTER — OFFICE VISIT (OUTPATIENT)
Dept: URGENT CARE | Facility: URGENT CARE | Age: 67
End: 2017-08-07
Payer: COMMERCIAL

## 2017-08-07 VITALS
BODY MASS INDEX: 21.47 KG/M2 | TEMPERATURE: 97.6 F | HEART RATE: 68 BPM | WEIGHT: 150 LBS | OXYGEN SATURATION: 97 % | HEIGHT: 70 IN | DIASTOLIC BLOOD PRESSURE: 77 MMHG | SYSTOLIC BLOOD PRESSURE: 133 MMHG

## 2017-08-07 DIAGNOSIS — M25.551 BILATERAL HIP PAIN: ICD-10-CM

## 2017-08-07 DIAGNOSIS — M25.552 BILATERAL HIP PAIN: ICD-10-CM

## 2017-08-07 DIAGNOSIS — M16.11 OSTEOARTHRITIS OF RIGHT HIP, UNSPECIFIED OSTEOARTHRITIS TYPE: Primary | ICD-10-CM

## 2017-08-07 PROCEDURE — 99213 OFFICE O/P EST LOW 20 MIN: CPT | Performed by: FAMILY MEDICINE

## 2017-08-07 PROCEDURE — 73523 X-RAY EXAM HIPS BI 5/> VIEWS: CPT

## 2017-08-07 RX ORDER — MAGNESIUM CARB/ALUMINUM HYDROX 105-160MG
2 TABLET,CHEWABLE ORAL 2 TIMES DAILY
Qty: 60 TABLET | Refills: 0 | Status: SHIPPED | OUTPATIENT
Start: 2017-08-07 | End: 2017-08-15

## 2017-08-07 NOTE — NURSING NOTE
"Chief Complaint   Patient presents with     Urgent Care     Pt in clinic to have eval for joint pain.     Arthritis       Initial /77  Pulse 68  Temp 97.6  F (36.4  C) (Tympanic)  Ht 5' 10\" (1.778 m)  Wt 150 lb (68 kg)  SpO2 97%  BMI 21.52 kg/m2 Estimated body mass index is 21.52 kg/(m^2) as calculated from the following:    Height as of this encounter: 5' 10\" (1.778 m).    Weight as of this encounter: 150 lb (68 kg).  Medication Reconciliation: complete   Radha Abreu/ MA    "

## 2017-08-07 NOTE — MR AVS SNAPSHOT
After Visit Summary   8/7/2017    Robert B Behr    MRN: [id number]           Patient Information     Date Of Birth          1950        Visit Information        Provider Department      8/7/2017 5:20 PM Dawn West MD [facility]        Today's Diagnoses     Osteoarthritis of right hip, unspecified osteoarthritis type    -  1    Bilateral hip pain          Care Instructions      Osteoarthritis  Osteoarthritis (also called degenerative joint disease) happens when the cartilage in a joint becomes damaged and worn. This may be due to age, wear and tear, overuse of the joint, or other problems. Osteoarthritis can affect any joint. But it is most common in hands, knees, spine, hips, and feet. Symptoms include joint stiffness, pain, and swelling.  Home care    When a joint is more sore than usual, rest it for a day or two.    Heat can help relieve stiffness. Take a hot bath or apply a heating pad for up to 30 minutes at a time. If symptoms are worse in the morning, using heat just after awakening can help relax the muscle and soothe the joints.     Ice helps relieve pain and swelling. It is often used after activity. Use a cold pack wrapped in a thin cloth on the joint for 10-15 minutes at a time.     Alternating hot and cold can also help relieve pain. Try this for 20 minutes at a time, several times per day.    Exercise helps prevent the muscles and ligaments around the joint from becoming weak. It also helps maintain function in the joint.  Be as active as you can. Talk to your doctor about what activity program is best for you.    Excess weight puts a lot of extra strain on weight-bearing joints of the lower back, hips, knees, feet and ankles. If you are overweight, talk to your doctor about a safe and effective weight loss program.    Use anti-inflammatory medications as prescribed for pain. This incudes acetaminophen or NSAIDs such as ibuprofen or naproxen.  If needed, topical or injected medications may be recommended. Talk to your doctor if these options are not enough to manage your pain.    Talk with your doctor about devices that might help improve your function and reduce pain.  Follow-up care  Follow up with your doctor as advised by our staff.  When to seek medical attention  Call your doctor right away if any of the following occur:    Redness or swelling of a painful joint    Discharge or pus from a painful joint    Fever of 100.4 F (38 C) or higher, or as directed by your healthcare provider    Worsening joint pain    Decreased ability to move the joint or bear weight on the joint  Date Last Reviewed: 4/13/2015 2000-2017 [employer]. [address] [place] [address], [place] [address]. All rights reserved. This information is not intended as a substitute for professional medical care. Always follow your healthcare professional's instructions.                Follow-ups after your visit        Your next 10 appointments already scheduled     Aug 28, 2017  2:45 PM CDT   (Arrive by 2:30 PM)   XR CHEST 2 VIEWS with UCXR1   [facility] ([facility])    [address] [place] [address] [phone]           Please bring a list of your current medicines to your exam. (Include vitamins, minerals and over-thecounter medicines.) Leave your valuables at home.  Tell your doctor if there is a chance you may be pregnant.  You do not need to do anything special for this exam.            Aug 28, 2017  3:00 PM CDT   FULL PULMONARY FUNCTION with  PFL C   [facility] ([facility])    [address] [place] [id number] [phone]            Aug 28, 2017  4:00 PM CDT   (Arrive by 3:45 PM)   New Patient Visit with Svitlana Taylor MD   [facility] ([facility])    [address]  3rd Floor  [facility] [address]   [phone]            Sep 15, 2017   Procedure with Elly Valencia MD   [facility] ([facility])    [address]  5th Floor  [facility] [address]   [phone]           Located in the Clinics and Surgery Center at [address], [facility] [address].   parking is very convenient and highly recommended.  is a $6 flat rate fee.  Both  and self parkers should enter the main arrival plaza from [address]; parking attendants will direct you based on your parking preference.            Sep 15, 2017 10:00 AM CDT   (Arrive by 9:45 AM)   Post-Op with Elly Valencia MD   [facility] ([facility])    [address] [place] [address]  [address] [facility] [address]   [phone]            Oct 11, 2017 12:45 PM CDT   Post-Op with Elly Valencia MD   [facility] ([facility])    [facility]  [address]  [address]  [facility] [address] [phone]              Who to contact     If you have questions or need follow up information about today's clinic visit or your schedule please contact [facility] directly at [phone].  Normal or non-critical lab and imaging results will be communicated to you by MyChart, letter or phone within 4 business days after the clinic has received the results. If you do not hear from us within 7 days, please contact the clinic through [employer] or phone. If you have a critical or abnormal lab result, we will notify you by phone as soon as possible.  Submit refill requests through [employer] or call your pharmacy and they will forward the refill request to us. Please allow 3 business days for your refill to be completed.          Additional Information About Your Visit        [employer] Information     [employer] gives you secure access to your electronic health record. If  "you see a primary care provider, you can also send messages to your care team and make appointments. If you have questions, please call your primary care clinic.  If you do not have a primary care provider, please call [phone] and they will assist you.        Care EveryWhere ID     This is your Care EveryWhere ID. This could be used by other organizations to access your [facility] medical records  [id number]        Your Vitals Were     Pulse Temperature Height Pulse Oximetry BMI (Body Mass Index)       68 97.6  F (36.4  C) (Tympanic) 5' 10\" (1.778 m) 97% 21.52 kg/m2        Blood Pressure from Last 3 Encounters:   08/07/17 133/77   07/12/17 132/78   07/09/17 110/64    Weight from Last 3 Encounters:   08/07/17 150 lb (68 kg)   07/26/17 150 lb (68 kg)   07/12/17 150 lb (68 kg)                 Today's Medication Changes          These changes are accurate as of: 8/7/17  7:44 PM.  If you have any questions, ask your nurse or doctor.               Start taking these medicines.        Dose/Directions    glucosamine-chondroitinoitin 750-600 MG Tabs   Commonly known as:  PA GLUCOSAMINE-CHONDROITIN   Used for:  Osteoarthritis of right hip, unspecified osteoarthritis type   Started by:  Dawn West MD        Dose:  2 tablet   Take 2 tablets by mouth 2 times daily   Quantity:  60 tablet   Refills:  0            Where to get your medicines      These medications were sent to [id number] - [place], [place] - [address] AT [facility]  [address], [place] [address]     Phone:  [phone]     glucosamine-chondroitinoitin 750-600 MG Tabs                Primary Care Provider Office Phone # Fax #    Fina Frausto [phone]       [facility] [address] [place] [address]        Equal Access to Services     TIARRA PAPPAS AH: Cony cooper Soomaali, waaxda luqadaha, qaybta kaalchrissy cano, kael chino " laandrew guerrero. So [facility] [phone].    ATENCIÓN: Si habla alecia, tiene a rios disposición servicios gratuitos de asistencia lingüística. Kenna al [phone].    We comply with applicable federal civil rights laws and [place] laws. We do not discriminate on the basis of race, color, national origin, age, disability sex, sexual orientation or gender identity.            Thank you!     Thank you for choosing [facility]  for your care. Our goal is always to provide you with excellent care. Hearing back from our patients is one way we can continue to improve our services. Please take a few minutes to complete the written survey that you may receive in the mail after your visit with us. Thank you!             Your Updated Medication List - Protect others around you: Learn how to safely use, store and throw away your medicines at www.disposemymeds.org.          This list is accurate as of: 8/7/17  7:44 PM.  Always use your most recent med list.                   Brand Name Dispense Instructions for use Diagnosis    * albuterol 108 (90 BASE) MCG/ACT Inhaler    PROAIR HFA/PROVENTIL HFA/VENTOLIN HFA    1 Inhaler    Inhale 2 puffs into the lungs every 4 hours as needed for shortness of breath / dyspnea or wheezing    Mild intermittent asthma without complication       * albuterol 108 (90 BASE) MCG/ACT Inhaler    PROAIR HFA/PROVENTIL HFA/VENTOLIN HFA    1 Inhaler    Inhale 1-2 puffs into the lungs every 4 hours as needed for shortness of breath / dyspnea or wheezing    Encounter for medication refill       ascorbic acid 1000 MG Tabs    vitamin C    90 tablet    TAKE 1 TABLET BY MOUTH DAILY    Encounter for herb and vitamin supplement management       CALCIUM 1200+D3 600- MG-MG-UNIT Tb24   Generic drug:  Calcium-Magnesium-Vitamin D      Reported on 3/14/2017        calcium-vitamin D 600-400 MG-UNIT per tablet    CALTRATE    180 tablet    TAKE 1 TABLET BY MOUTH TWICE DAILY    Osteoporosis        fluticasone-salmeterol 250-50 MCG/DOSE diskus inhaler    ADVAIR    1 Inhaler    Inhale 1 puff into the lungs 2 times daily    Chronic obstructive pulmonary disease, unspecified COPD type (H)       glucosamine-chondroitinoitin 750-600 MG Tabs    PA GLUCOSAMINE-CHONDROITIN    60 tablet    Take 2 tablets by mouth 2 times daily    Osteoarthritis of right hip, unspecified osteoarthritis type       * Ipratropium-Albuterol  MCG/ACT inhaler    COMBIVENT RESPIMAT    4 g    INHALE ONE PUFF BY MOUTH FOUR TIMES A DAY (NO TO EXCCED 6 DOSES PER DAY)    COPD exacerbation (H)       * ipratropium - albuterol 0.5 mg/2.5 mg/3 mL 0.5-2.5 (3) MG/3ML neb solution    DUONEB    30 vial    Take 1 vial (3 mLs) by nebulization every 6 hours as needed for shortness of breath / dyspnea or wheezing    COPD exacerbation (H)       * Ipratropium-Albuterol  MCG/ACT inhaler    COMBIVENT RESPIMAT    3 Inhaler    Inhale 1 puff into the lungs 4 times daily Not to exceed 6 doses per day.    COPD exacerbation (H)       * nicotine 21 MG/24HR 24 hr patch    NICODERM CQ    30 patch    Place 1 patch onto the skin every 24 hours    Tobacco use disorder       * nicotine 14 MG/24HR 24 hr patch    NICODERM CQ    30 patch    Place 1 patch onto the skin every 24 hours    Tobacco use disorder       * nicotine 7 MG/24HR 24 hr patch    NICODERM CQ    30 patch    Place 1 patch onto the skin every 24 hours    Tobacco use disorder       omega 3 1000 MG Caps     90 capsule    Take 1 g by mouth daily    Encounter for herb and vitamin supplement management       order for DME     1 each    Equipment being ordered: nebulizer machine    COPD exacerbation (H)       predniSONE 20 MG tablet    DELTASONE    20 tablet    Take 3 tabs (60 mg) by mouth daily x 3 days, 2 tabs (40 mg) daily x 3 days, 1 tab (20 mg) daily x 3 days, then 1/2 tab (10 mg) x 3 days.    Chronic obstructive pulmonary disease, unspecified COPD type (H)       vitamin A 95990 UNIT capsule    [facility]  VITAMIN A    180 capsule    Take 1 capsule (10,000 Units) by mouth daily    Encounter for herb and vitamin supplement management       vitamin B complex with vitamin C Tabs tablet     180 tablet    Take 1 tablet by mouth 2 times daily With Folic acid    Encounter for herb and vitamin supplement management       vitamin E 1000 UNIT capsule    [facility] vitamin E    90 capsule    Take 1 capsule (1,000 Units) by mouth daily    Encounter for herb and vitamin supplement management       * Notice:  This list has 8 medication(s) that are the same as other medications prescribed for you. Read the directions carefully, and ask your doctor or other care provider to review them with you.

## 2017-08-08 NOTE — PATIENT INSTRUCTIONS
Osteoarthritis  Osteoarthritis (also called degenerative joint disease) happens when the cartilage in a joint becomes damaged and worn. This may be due to age, wear and tear, overuse of the joint, or other problems. Osteoarthritis can affect any joint. But it is most common in hands, knees, spine, hips, and feet. Symptoms include joint stiffness, pain, and swelling.  Home care    When a joint is more sore than usual, rest it for a day or two.    Heat can help relieve stiffness. Take a hot bath or apply a heating pad for up to 30 minutes at a time. If symptoms are worse in the morning, using heat just after awakening can help relax the muscle and soothe the joints.     Ice helps relieve pain and swelling. It is often used after activity. Use a cold pack wrapped in a thin cloth on the joint for 10-15 minutes at a time.     Alternating hot and cold can also help relieve pain. Try this for 20 minutes at a time, several times per day.    Exercise helps prevent the muscles and ligaments around the joint from becoming weak. It also helps maintain function in the joint.  Be as active as you can. Talk to your doctor about what activity program is best for you.    Excess weight puts a lot of extra strain on weight-bearing joints of the lower back, hips, knees, feet and ankles. If you are overweight, talk to your doctor about a safe and effective weight loss program.    Use anti-inflammatory medications as prescribed for pain. This incudes acetaminophen or NSAIDs such as ibuprofen or naproxen. If needed, topical or injected medications may be recommended. Talk to your doctor if these options are not enough to manage your pain.    Talk with your doctor about devices that might help improve your function and reduce pain.  Follow-up care  Follow up with your doctor as advised by our staff.  When to seek medical attention  Call your doctor right away if any of the following occur:    Redness or swelling of a painful  joint    Discharge or pus from a painful joint    Fever of 100.4 F (38 C) or higher, or as directed by your healthcare provider    Worsening joint pain    Decreased ability to move the joint or bear weight on the joint  Date Last Reviewed: 4/13/2015 2000-2017 [employer]. [address] [place] [address]. All rights reserved. This information is not intended as a substitute for professional medical care. Always follow your healthcare professional's instructions.

## 2017-08-08 NOTE — PROGRESS NOTES
"S: Robert B Behr is a 67 year old male with c/o right>left hip pain and desire to be prescribed glucosamine chondroitin sulfate supplements (1500mg/1200mg) twice per day.      O:  /77  Pulse 68  Temp 97.6  F (36.4  C) (Tympanic)  Ht 5' 10\" (1.778 m)  Wt 150 lb (68 kg)  SpO2 97%  BMI 21.52 kg/m2     Gen: NAD  Back: spine is not tender  Hips: there is right>left limited hip ROM, tenderness of posterior aspect of the hip on both sides    Hip X-rays: right hip DJD noted    A/P: Right hip OA    As per orders.  I informed the patient that the max daily dosing for OA pain relief is usually 1500mg/1200mg of glucosamine chondroitin sulfate but he insists that the [employer] brand states that the dosing is 2 per day and that is what he would like prescribed.  See PCP for further cares.    Dawn Hickman MD   "

## 2017-08-09 ENCOUNTER — TELEPHONE (OUTPATIENT)
Dept: FAMILY MEDICINE | Facility: CLINIC | Age: 67
End: 2017-08-09

## 2017-08-09 DIAGNOSIS — M16.11 OSTEOARTHRITIS OF RIGHT HIP, UNSPECIFIED OSTEOARTHRITIS TYPE: ICD-10-CM

## 2017-08-09 NOTE — TELEPHONE ENCOUNTER
Reason for Call:  Medication or medication refill:    Do you use a [facility]?  Name of the pharmacy and phone number for the current request:  [facility] on [address] - [phone]    Name of the medication requested: glucosamine    Other request: Pt called and wanted to let Dr. Frausto know that he was seen in Urgent Care yesterday 8/8. He stated Xrays were taken. He was wondering if AS was aware about that visit and if she can take a look at the xrays. Pt also stated he was prescribed glucosamine but only for a 15 days supply yesterday. He is wondering if AS can make this a permanent prescription. Please assist, thanks!    Can we leave a detailed message on this number? YES    Phone number patient can be reached at: Home number on file [phone] (home) or Cell number on file:    Telephone Information:   Mobile [phone]       Best Time: anytime    Call taken on 8/9/2017 at 10:48 AM by Antonette Bruner

## 2017-08-09 NOTE — TELEPHONE ENCOUNTER
Left message on voicemail that Fina Frausto MD is out of the office all week. If he wants an rx for the glucosamine, he should do an evisit or come in for an office visit.   Thanks!     Aditi Webb RN

## 2017-08-10 ENCOUNTER — CARE COORDINATION (OUTPATIENT)
Dept: GERIATRIC MEDICINE | Facility: CLINIC | Age: 67
End: 2017-08-10

## 2017-08-15 RX ORDER — MAGNESIUM CARB/ALUMINUM HYDROX 105-160MG
2 TABLET,CHEWABLE ORAL 2 TIMES DAILY
Qty: 60 TABLET | Refills: 0 | Status: SHIPPED | OUTPATIENT
Start: 2017-08-15 | End: 2017-09-07

## 2017-08-15 NOTE — TELEPHONE ENCOUNTER
I did go ahead and refill this prescription per his rquest. I gave him a one-month supply.   I would recommend that he follow-up with Dr. Frausto before this prescription runs out to discuss additional treatment options.

## 2017-08-15 NOTE — TELEPHONE ENCOUNTER
Notified pt that med was refilled and to FU for additional refills-pt stated he will call back to schedule.  Demetrice Causey

## 2017-08-15 NOTE — TELEPHONE ENCOUNTER
I spoke with pt tonight to give him xray results per note below.     Notes Recorded by Dawn West MD on 8/7/2017 at 10:02 PM  Inform hip X-rays were read as normal.  May need to speak to his PCP prior to starting the Glucosamine supplement.    Dawn Hickman MD    Pt informed he still may have beginning arthritis per Dr Hickman because of physical exam.   Pt concerned he will run out of Glucosamine.  Would like rx from doctor, aware that Dr. Frausto is out of office. Would appreciate another provider take care of this for him.    isabel moura

## 2017-08-21 ENCOUNTER — PRE VISIT (OUTPATIENT)
Dept: PULMONOLOGY | Facility: CLINIC | Age: 67
End: 2017-08-21

## 2017-08-21 NOTE — TELEPHONE ENCOUNTER
1.  Date/reason for appt:8/28/17, COPD  2.  Referring provider: ROBERT FENG  3.  Call to patient (Yes / No - short description):No, referred   4.  Previous care at / records requested from:   [facility]

## 2017-08-28 ENCOUNTER — ANESTHESIA EVENT (OUTPATIENT)
Dept: SURGERY | Facility: AMBULATORY SURGERY CENTER | Age: 67
End: 2017-08-28

## 2017-08-28 ENCOUNTER — OFFICE VISIT (OUTPATIENT)
Dept: PULMONOLOGY | Facility: CLINIC | Age: 67
End: 2017-08-28
Attending: INTERNAL MEDICINE
Payer: COMMERCIAL

## 2017-08-28 VITALS
WEIGHT: 157.4 LBS | SYSTOLIC BLOOD PRESSURE: 138 MMHG | HEIGHT: 70 IN | OXYGEN SATURATION: 98 % | BODY MASS INDEX: 22.54 KG/M2 | HEART RATE: 71 BPM | DIASTOLIC BLOOD PRESSURE: 79 MMHG

## 2017-08-28 DIAGNOSIS — Z72.0 TOBACCO ABUSE: Primary | ICD-10-CM

## 2017-08-28 DIAGNOSIS — J44.9 CHRONIC OBSTRUCTIVE PULMONARY DISEASE, UNSPECIFIED COPD TYPE (H): ICD-10-CM

## 2017-08-28 DIAGNOSIS — J44.9 COPD (CHRONIC OBSTRUCTIVE PULMONARY DISEASE) (H): ICD-10-CM

## 2017-08-28 PROCEDURE — 96372 THER/PROPH/DIAG INJ SC/IM: CPT | Mod: ZF

## 2017-08-28 PROCEDURE — 25000128 H RX IP 250 OP 636: Mod: ZF | Performed by: INTERNAL MEDICINE

## 2017-08-28 PROCEDURE — 90732 PPSV23 VACC 2 YRS+ SUBQ/IM: CPT | Mod: ZF | Performed by: INTERNAL MEDICINE

## 2017-08-28 PROCEDURE — G0009 ADMIN PNEUMOCOCCAL VACCINE: HCPCS

## 2017-08-28 RX ORDER — POLYETHYLENE GLYCOL 3350 17 G
2 POWDER IN PACKET (EA) ORAL PRN
Qty: 150 LOZENGE | Refills: 3 | Status: SHIPPED | OUTPATIENT
Start: 2017-08-28 | End: 2019-08-06

## 2017-08-28 RX ADMIN — PNEUMOCOCCAL VACCINE POLYVALENT 0.5 ML
25; 25; 25; 25; 25; 25; 25; 25; 25; 25; 25; 25; 25; 25; 25; 25; 25; 25; 25; 25; 25; 25; 25 INJECTION, SOLUTION INTRAMUSCULAR; SUBCUTANEOUS at 16:49

## 2017-08-28 ASSESSMENT — PAIN SCALES - GENERAL: PAINLEVEL: NO PAIN (0)

## 2017-08-28 NOTE — LETTER
"8/28/2017       RE: Robert B Behr  [address]  [place] [address]     Dear Colleague,    Thank you for referring your patient, Robert B Behr, to the [facility] at [facility]. Please see a copy of my visit note below.    Reason for Visit  Robert B Behr is a 67 year old male who is referred by Dr Salazar for COPD  Pulmonary HPI  No respiratory limitations, undergoing lung cancer screening. He is trying to quit smoking, using patches and has had periods of complete abstinence. He is falling back into a pattern of a few a day cigarettes every time he allows himself to have \"just one.\" Combivent once a week, very active.  He's been on Advair for about two months which was started for chronic productive cough.     The patient was seen and examined by Svitlana Taylor MD   Current Outpatient Prescriptions   Medication     glucosamine-chondroitinoitin (PA GLUCOSAMINE-CHONDROITIN) 750-600 MG TABS     ascorbic acid (VITAMIN C) 1000 MG TABS     nicotine (NICODERM CQ) 21 MG/24HR 24 hr patch     nicotine (NICODERM CQ) 14 MG/24HR 24 hr patch     nicotine (NICODERM CQ) 7 MG/24HR 24 hr patch     fluticasone-salmeterol (ADVAIR) 250-50 MCG/DOSE diskus inhaler     predniSONE (DELTASONE) 20 MG tablet     albuterol (PROAIR HFA/PROVENTIL HFA/VENTOLIN HFA) 108 (90 BASE) MCG/ACT Inhaler     Ipratropium-Albuterol (COMBIVENT RESPIMAT)  MCG/ACT inhaler     ipratropium - albuterol 0.5 mg/2.5 mg/3 mL (DUONEB) 0.5-2.5 (3) MG/3ML neb solution     Ipratropium-Albuterol (COMBIVENT RESPIMAT)  MCG/ACT inhaler     calcium-vitamin D (CALTRATE) 600-400 MG-UNIT per tablet     order for DME     vitamin E (CVS VITAMIN E) 1000 UNIT capsule     vitamin B complex with vitamin C (VITAMIN  B COMPLEX) TABS tablet     vitamin A (CVS VITAMIN A) 83943 UNIT capsule     omega 3 1000 MG CAPS     Calcium-Magnesium-Vitamin D (CALCIUM 1200+D3) 600- MG-MG-UNIT TB24     " "albuterol (PROAIR HFA, PROVENTIL HFA, VENTOLIN HFA) 108 (90 BASE) MCG/ACT inhaler     No current facility-administered medications for this visit.      No Known Allergies  Social History     Social History     Marital status: Single     Spouse name: N/A     Number of children: N/A     Years of education: N/A     Occupational History     Not on file.     Social History Main Topics     Smoking status: Former Smoker     Packs/day: 1.50     Years: 45.00     Types: Cigarettes     Quit date: 2016     Smokeless tobacco: Never Used      Comment: 1.5 packs for 45 years smoker     Alcohol use No     Drug use: No     Sexual activity: No     Other Topics Concern     Parent/Sibling W/ Cabg, Mi Or Angioplasty Before 65f 55m? No     Social History Narrative     Past Medical History:   Diagnosis Date     COPD (chronic obstructive pulmonary disease) (H)     diagnosed with spirometry      Lung nodules     CT scan      Osteoporosis      Polio 195    left leg weak     Tobacco abuse      Past Surgical History:   Procedure Laterality Date     PHACOEMULSIFICATION CLEAR CORNEA WITH STANDARD INTRAOCULAR LENS IMPLANT Right 2016    Procedure: PHACOEMULSIFICATION CLEAR CORNEA WITH STANDARD INTRAOCULAR LENS IMPLANT;  Surgeon: Elly Valencia MD;  Location: [facility]     WRIST SURGERY       Family History   Problem Relation Age of Onset     DIABETES Brother      living     Lung Cancer Brother           Macular Degeneration Mother      Colon Cancer No family hx of      Glaucoma No family hx of      Retinal detachment No family hx of      Amblyopia No family hx of        ROS Pulmonary  Dyspnea: No, Cough: No, Chest pain: No, Wheezing: No, Sputum Production: No, Hemoptysis: No  A complete ROS was otherwise negative except as noted in the HPI.  /79 (BP Location: Right arm, Patient Position: Sitting, Cuff Size: Adult Regular)  Pulse 71  Ht 1.778 m (5' 10\")  Wt 71.4 kg (157 lb 6.4 oz)  SpO2 98%  BMI 22.58 kg/m2  Exam: "   GENERAL APPEARANCE: Well developed, well nourished, alert, and in no apparent distress.  EYES: PERRL, EOMI  HENT: Nasal mucosa with no edema and no hyperemia. No nasal polyps.  EARS: Canals clear, TMs normal  MOUTH: Oral mucosa is moist, without any lesions, no tonsillar enlargement, no oropharyngeal exudate.  NECK: supple, no masses, no thyromegaly.  LYMPHATICS: No significant axillary, cervical, or supraclavicular nodes.  RESP: normal percussion, good air flow throughout.  No crackles. No rhonchi. No wheezes.  CV: Normal S1, S2, regular rhythm, normal rate. No murmur.  No rub. No gallop. No LE edema.   ABDOMEN:  Bowel sounds normal, soft, nontender, no HSM or masses.   MS: extremities normal. No clubbing. No cyanosis.  SKIN: no rash on limited exam  NEURO: Mentation intact, speech normal, normal strength and tone, normal gait and stance  PSYCH: mentation appears normal. and affect normal/bright  Results:  PFTs today mild obstruction, normal lung volumes and diffusion  Chest imaging reviewed: CXR today stable    Assessment and plan: Ravin is a 67-year-old male with COPD being seen today for consultation.  COPD-mild by a PFTs today, although he has radiographic emphysema, his diffusion capacity is normal.  He has phenotype of chronic bronchitis, which is improved with daily Advair.  We discussed the positive COPD management including inhaler therapy for symptoms, smoking cessation, avoiding infection and treating infection early with early antibiotics for exacerbation.  We are in agreement that oxygen therapy and pulmonary rehabilitation are not appropriate for him at this time.  -Continue Advair b.i.d. and Combivent p.r.n.  -He declined alpha one antitrypsin deficiency testing today  Nicotine dependence-we discussed the importance of quitting smoking but additional techniques to help him take the habit for good as he has had some relapses.  Today I prescribed and described the use of nicotine lozenges to  him.  Preventive care-he is eligible for lung cancer screening.  He had a CT of his chest in June of this year showing stable right upper lobe scarring, would be due for annual lung cancer screening in one year from that so June 2018  -Pneumococcal 23 vaccine today  Return to clinic as needed    Again, thank you for allowing me to participate in the care of your patient.      Sincerely,    Svitlana Taylor MD

## 2017-08-28 NOTE — MR AVS SNAPSHOT
After Visit Summary   8/28/2017    Robert B Behr    MRN: [id number]           Patient Information     Date Of Birth          1950        Visit Information        Provider Department      8/28/2017 4:00 PM Svitlana Taylor MD [facility]        Today's Diagnoses     Tobacco abuse    -  1    Chronic obstructive pulmonary disease, unspecified COPD type (H)          Care Instructions      Nicotine Lozenge    Dosing:  <25 cigarettes a day    Weeks 1-6 Use one 2 mg lozenge every 1-2 hours. Maximum 20 lozenges per day.   Weeks 7-9 Use one 4 mg lozenge every 2-4 hours. Maximum 20 lozenges per day.   Weeks 10-12 Use one 4 mg lozenge every 4-8 hours. Maximum 20 lozenges per day.     How to use the nicotine lozenge:    Place the lozenge in your mouth and allow it to slowly dissolve. It will be completely dissolved in about 20-30 minutes.     Do not chew or swallow the lozenge. Try to swallow as little as possible. It may help to hold the lozenge in the side of your mouth.     Occasionally move the lozenge from one side of your mouth to another.    You may feel a tingling sensation as you suck on the lozenge.    Especially at the beginning, use the lozenge whenever you would normally smoke a cigarette.    Some Tips:    Do not smoke while you are using the nicotine lozenge.     Do not use the nicotine lozenge like a normal lozenge. If you swallow, you will not absorb the nicotine and you may experience more side effects such as stomach upset.    Do not drink anything, even water, while you are using the lozenge or for 15 minutes before or after.    Do not use more than 1 lozenge at one time.    Do not use more than 5 lozenges in 6 hours.    Using at least 9 lozenges per day during your first 6 weeks will increase your chance of quitting.    As your body becomes less dependent on nicotine, you will need to use less lozenges.    Follow up with your health care professional if  you have questions and also to help taper your nicotine lozenge dose.    Side Effects:  Some people may experience some indigestion or nausea. Using the lozenge properly may help. If you experience any other troublesome or unusual side effects, call your health care professional.    HOW TO QUIT SMOKING  Smoking is one of the hardest habits to break. About half of all those who have ever smoked have been able to quit, and most of those (about 70%) who still smoke want to quit. Here are some of the best ways to stop smoking.     KEEP TRYING:  It takes most smokers about 8 tries before they are finally able to fully quit. So, the more often you try and fail, the better your chance of quitting the next time! So, don't give up!    GO COLD TURKEY:  Most ex-smokers quit cold turkey. Trying to cut back gradually doesn't seem to work as well, perhaps because it continues the smoking habit. Also, it is possible to fool yourself by inhaling more while smoking fewer cigarettes. This results in the same amount of nicotine in your body!    GET SUPPORT:  Support programs can make an important difference, especially for the heavy smoker. These groups offer lectures, methods to change your behavior and peer support. Call the free national Quitline for more information. 800-QUIT-NOW (280-463-6389). Low-cost or free programs are offered by many hospitals, local chapters of the American Lung Association (073-358-9636) and the American Cancer Society (568-925-4770). Support at home is important too. Non-smokers can help by offering praise and encouragement. If the smoker fails to quit, encourage them to try again!    OVER-THE-COUNTER MEDICINES:  For those who can't quit on their own, Nicotine Replacement Therapy (NRT) may make quitting much easier. Certain aids such as the nicotine patch, gum and lozenge are available without a prescription. However, it is best to use these under the guidance of your doctor. The skin patch provides a  steady supply of nicotine to the body. Nicotine gum and lozenge gives temporary bursts of low levels of nicotine. Both methods take the edge off the craving for cigarettes. WARNING: If you feel symptoms of nicotine overdose, such as nausea, vomiting, dizziness, weakness, or fast heartbeat, stop using these and see your doctor.    PRESCRIPTION MEDICINES:  After evaluating your smoking patterns and prior attempts at quitting, your doctor may offer a prescription medicine such as bupropion (Zyban, Wellbutrin), varenicline (Chantix, Champix), a niocotine inhaler or nasal spray. Each has its unique advantage and side effects which your doctor can review with you.    HEALTH BENEFITS OF QUITTING:  The benefits of quitting start right away and keep improving the longer you go without smokin minutes: blood pressure and pulse return to normal  8 hours: oxygen levels return to normal  2 days: ability to smell and taste begins to improve as damaged nerves start to regrow  2-3 weeks: circulation and lung function improves  1-9 months: decreased cough, congestion and shortness of breath; less tired  1 year: risk of heart attack decreases by half  5 years: risk of lung cancer decreases by half; risk of stroke becomes the same as a non-smoker  For information about how to quit smoking, visit the following links:  National Cancer [place] ,   Clearing the Air, Quit Smoking Today   - an online booklet. http://www.smokefree.gov/pubs/clearing_the_air.pdf  Smokefree.gov http://smokefree.gov/  QuitNet http://www.quitnet.com/    2921-7996 [facility], [address] [place] [address], [place] [address]. All rights reserved. This information is not intended as a substitute for professional medical care. Always follow your healthcare professional's instructions.    The Benefits of Living Smoke Free  What do you want to gain from quitting? Check off some reasons to quit.  Health Benefits  ___ Reduce my risk of lung cancer, heart disease,  chronic lung disease  ___ Have fewer wrinkles and softer skin  ___ Improve my sense of taste and smell  ___ For pregnant women--reduce the risk of having a miscarriage, stillbirth, premature birth, or low-birth-weight baby  Personal Benefits  ___ Feel more in control of my life  ___ Have better-smelling hair, breath, clothes, home, and car  ___ Save time by not having to take smoke breaks, buy cigarettes, or hunt for a light  ___ Have whiter teeth  Family Benefits  ___ Reduce my children s respiratory tract infections  ___ Set a good example for my children  ___ Reduce my family s cancer risk  Financial Benefits  ___ Save hundreds of dollars each year that would be spent on cigarettes  ___ Save money on medical bills  ___ Save on life, health, and car insurance premiums    Those Dollars Add Up!  Cigarettes are expensive, and getting more expensive all the time. Do you realize how much money you are spending on cigarettes per year? What is the average amount you spend on a pack of cigarettes? What is the average number of packs that you smoke per day? Using your answers to these questions, fill in this formula to help you find out:  ($ _____ per pack) ×  ( _____ number of packs per day) × (365 days) =  $ _____ yearly cost of smoking  Besides tobacco, there are other costs, including extra cleaning bills and replacement costs for clothing and furniture; medical expenses for smoking-related illnesses; and higher health, life, and car insurance premiums.          Follow-ups after your visit        Follow-up notes from your care team     Return if symptoms worsen or fail to improve.      Your next 10 appointments already scheduled     Aug 29, 2017  1:30 PM CDT   (Arrive by 1:15 PM)   PAC EVALUATION with  Pac Minnie 2   [facility] ([facility])    [address]  [address] [facility] [address] [phone]            Aug 29, 2017  2:30 PM CDT   (Arrive by  2:15 PM)   PAC RN ASSESSMENT with  Pac Rn   [facility] ([facility])    [address]  4th Floor  [facility] [address] [phone]            Aug 29, 2017  3:10 PM CDT   (Arrive by 2:55 PM)   PAC Anesthesia Consult with  Pac Anesthesiologist   [facility] ([facility])    [address]  4th Floor  [facility] [address] [phone]            Sep 15, 2017   Procedure with Elly Valencia MD   [facility] ([facility])    [address]  5th Floor  [place] [address] [phone]           Located in the Clinics and Surgery Center at [address] [address].   parking is very convenient and highly recommended.  is a $6 flat rate fee.  Both  and self parkers should enter the main arrival plaza from [facility]; parking attendants will direct you based on your parking preference.            Sep 15, 2017 10:00 AM CDT   (Arrive by 9:45 AM)   Post-Op with Elly Valencia MD   [facility] Ophthalmology ([facility])    [address]  [address]th [facility] [id number] [phone]            Oct 11, 2017 12:45 PM CDT   Post-Op with Elly Valencia MD   [facility] ([facility])    [facility]  [address]  [address] [address]  [facility] [address] [phone]              Who to contact     If you have questions or need follow up information about today's clinic visit or your schedule please contact [facility] directly at [phone].  Normal or non-critical lab and imaging results will be communicated to you by MyChart, letter or phone within 4 business days after the clinic has received the results. If you do not hear from us within 7 days, please contact the clinic through MyChart or phone. If you  "have a critical or abnormal lab result, we will notify you by phone as soon as possible.  Submit refill requests through [employer] or call your pharmacy and they will forward the refill request to us. Please allow 3 business days for your refill to be completed.          Additional Information About Your Visit        [employer] Information     [employer] gives you secure access to your electronic health record. If you see a primary care provider, you can also send messages to your care team and make appointments. If you have questions, please call your primary care clinic.  If you do not have a primary care provider, please call [phone] and they will assist you.        Care EveryWhere ID     This is your Care EveryWhere ID. This could be used by other organizations to access your [facility] medical records  [id number]        Your Vitals Were     Pulse Height Pulse Oximetry BMI (Body Mass Index)          71 1.778 m (5' 10\") 98% 22.58 kg/m2         Blood Pressure from Last 3 Encounters:   08/28/17 138/79   08/07/17 133/77   07/12/17 132/78    Weight from Last 3 Encounters:   08/28/17 71.4 kg (157 lb 6.4 oz)   08/07/17 68 kg (150 lb)   07/26/17 68 kg (150 lb)              We Performed the Following     Tobacco Cessation - for Health Maintenance          Today's Medication Changes          These changes are accurate as of: 8/28/17  5:08 PM.  If you have any questions, ask your nurse or doctor.               Start taking these medicines.        Dose/Directions    nicotine polacrilex 2 MG lozenge   Commonly known as:  COMMIT   Used for:  Chronic obstructive pulmonary disease, unspecified COPD type (H), Tobacco abuse   Started by:  Svitlana Taylor MD        Dose:  2 mg   Place 1 lozenge (2 mg) inside cheek as needed for smoking cessation Place 1 lozenge inside cheek as needed for smoking cessation.   Quantity:  150 lozenge   Refills:  3            Where to get your medicines      These medications were sent to [id number] - [place], [place] - [address] AT [facility] [address]  [address], [place] [address]     Phone:  [phone]     fluticasone-salmeterol 250-50 MCG/DOSE diskus inhaler    nicotine polacrilex 2 MG lozenge                Primary Care Provider Office Phone # Fax #    Fina Frausto [phone]       [address]  [place] [address]        Equal Access to Services     [facility]: Hadii aad ku hadasho Soomaali, waaxda luqadaha, qaybta kaalmada adeegyada, waxay idiin haysamir adekaye rangel . So [facility] [phone].    ATENCIÓN: Si habla español, tiene a rios disposición servicios gratuitos de asistencia lingüística. [facility] [phone].    We comply with applicable federal civil rights laws and [place] laws. We do not discriminate on the basis of race, color, national origin, age, disability sex, sexual orientation or gender identity.            Thank you!     Thank you for choosing [facility]  for your care. Our goal is always to provide you with excellent care. Hearing back from our patients is one way we can continue to improve our services. Please take a few minutes to complete the written survey that you may receive in the mail after your visit with us. Thank you!             Your Updated Medication List - Protect others around you: Learn how to safely use, store and throw away your medicines at www.disposemymeds.org.          This list is accurate as of: 8/28/17  5:08 PM.  Always use your most recent med list.                   Brand Name Dispense Instructions for use Diagnosis    * albuterol 108 (90 BASE) MCG/ACT Inhaler    PROAIR HFA/PROVENTIL HFA/VENTOLIN HFA    1 Inhaler    Inhale 2 puffs into the lungs every 4 hours as needed for shortness of breath / dyspnea or wheezing    Mild intermittent asthma without complication       * albuterol 108 (90 BASE) MCG/ACT Inhaler    PROAIR HFA/PROVENTIL HFA/VENTOLIN HFA    1  Inhaler    Inhale 1-2 puffs into the lungs every 4 hours as needed for shortness of breath / dyspnea or wheezing    Encounter for medication refill       ascorbic acid 1000 MG Tabs    vitamin C    90 tablet    TAKE 1 TABLET BY MOUTH DAILY    Encounter for herb and vitamin supplement management       CALCIUM 1200+D3 600- MG-MG-UNIT Tb24   Generic drug:  Calcium-Magnesium-Vitamin D      Reported on 3/14/2017        calcium-vitamin D 600-400 MG-UNIT per tablet    CALTRATE    180 tablet    TAKE 1 TABLET BY MOUTH TWICE DAILY    Osteoporosis       fluticasone-salmeterol 250-50 MCG/DOSE diskus inhaler    ADVAIR    1 Inhaler    Inhale 1 puff into the lungs 2 times daily    Chronic obstructive pulmonary disease, unspecified COPD type (H)       glucosamine-chondroitinoitin 750-600 MG Tabs    PA GLUCOSAMINE-CHONDROITIN    60 tablet    Take 2 tablets by mouth 2 times daily    Osteoarthritis of right hip, unspecified osteoarthritis type       * Ipratropium-Albuterol  MCG/ACT inhaler    COMBIVENT RESPIMAT    4 g    INHALE ONE PUFF BY MOUTH FOUR TIMES A DAY (NO TO EXCCED 6 DOSES PER DAY)    COPD exacerbation (H)       * ipratropium - albuterol 0.5 mg/2.5 mg/3 mL 0.5-2.5 (3) MG/3ML neb solution    DUONEB    30 vial    Take 1 vial (3 mLs) by nebulization every 6 hours as needed for shortness of breath / dyspnea or wheezing    COPD exacerbation (H)       * Ipratropium-Albuterol  MCG/ACT inhaler    COMBIVENT RESPIMAT    3 Inhaler    Inhale 1 puff into the lungs 4 times daily Not to exceed 6 doses per day.    COPD exacerbation (H)       * nicotine 21 MG/24HR 24 hr patch    NICODERM CQ    30 patch    Place 1 patch onto the skin every 24 hours    Tobacco use disorder       * nicotine 14 MG/24HR 24 hr patch    NICODERM CQ    30 patch    Place 1 patch onto the skin every 24 hours    Tobacco use disorder       * nicotine 7 MG/24HR 24 hr patch    NICODERM CQ    30 patch    Place 1 patch onto the skin every 24 hours     Tobacco use disorder       nicotine polacrilex 2 MG lozenge    COMMIT    150 lozenge    Place 1 lozenge (2 mg) inside cheek as needed for smoking cessation Place 1 lozenge inside cheek as needed for smoking cessation.    Chronic obstructive pulmonary disease, unspecified COPD type (H), Tobacco abuse       omega 3 1000 MG Caps     90 capsule    Take 1 g by mouth daily    Encounter for herb and vitamin supplement management       order for DME     1 each    Equipment being ordered: nebulizer machine    COPD exacerbation (H)       predniSONE 20 MG tablet    DELTASONE    20 tablet    Take 3 tabs (60 mg) by mouth daily x 3 days, 2 tabs (40 mg) daily x 3 days, 1 tab (20 mg) daily x 3 days, then 1/2 tab (10 mg) x 3 days.    Chronic obstructive pulmonary disease, unspecified COPD type (H)       vitamin A 37363 UNIT capsule    CVS VITAMIN A    180 capsule    Take 1 capsule (10,000 Units) by mouth daily    Encounter for herb and vitamin supplement management       vitamin B complex with vitamin C Tabs tablet     180 tablet    Take 1 tablet by mouth 2 times daily With Folic acid    Encounter for herb and vitamin supplement management       vitamin E 1000 UNIT capsule    CVS vitamin E    90 capsule    Take 1 capsule (1,000 Units) by mouth daily    Encounter for herb and vitamin supplement management       * Notice:  This list has 8 medication(s) that are the same as other medications prescribed for you. Read the directions carefully, and ask your doctor or other care provider to review them with you.

## 2017-08-28 NOTE — PATIENT INSTRUCTIONS
Nicotine Lozenge    Dosing:  <25 cigarettes a day    Weeks 1-6 Use one 2 mg lozenge every 1-2 hours. Maximum 20 lozenges per day.   Weeks 7-9 Use one 4 mg lozenge every 2-4 hours. Maximum 20 lozenges per day.   Weeks 10-12 Use one 4 mg lozenge every 4-8 hours. Maximum 20 lozenges per day.     How to use the nicotine lozenge:    Place the lozenge in your mouth and allow it to slowly dissolve. It will be completely dissolved in about 20-30 minutes.     Do not chew or swallow the lozenge. Try to swallow as little as possible. It may help to hold the lozenge in the side of your mouth.     Occasionally move the lozenge from one side of your mouth to another.    You may feel a tingling sensation as you suck on the lozenge.    Especially at the beginning, use the lozenge whenever you would normally smoke a cigarette.    Some Tips:    Do not smoke while you are using the nicotine lozenge.     Do not use the nicotine lozenge like a normal lozenge. If you swallow, you will not absorb the nicotine and you may experience more side effects such as stomach upset.    Do not drink anything, even water, while you are using the lozenge or for 15 minutes before or after.    Do not use more than 1 lozenge at one time.    Do not use more than 5 lozenges in 6 hours.    Using at least 9 lozenges per day during your first 6 weeks will increase your chance of quitting.    As your body becomes less dependent on nicotine, you will need to use less lozenges.    Follow up with your health care professional if you have questions and also to help taper your nicotine lozenge dose.    Side Effects:  Some people may experience some indigestion or nausea. Using the lozenge properly may help. If you experience any other troublesome or unusual side effects, call your health care professional.    HOW TO QUIT SMOKING  Smoking is one of the hardest habits to break. About half of all those who have ever smoked have been able to quit, and most of those  (about 70%) who still smoke want to quit. Here are some of the best ways to stop smoking.     KEEP TRYING:  It takes most smokers about 8 tries before they are finally able to fully quit. So, the more often you try and fail, the better your chance of quitting the next time! So, don't give up!    GO COLD TURKEY:  Most ex-smokers quit cold turkey. Trying to cut back gradually doesn't seem to work as well, perhaps because it continues the smoking habit. Also, it is possible to fool yourself by inhaling more while smoking fewer cigarettes. This results in the same amount of nicotine in your body!    GET SUPPORT:  Support programs can make an important difference, especially for the heavy smoker. These groups offer lectures, methods to change your behavior and peer support. Call the free national Quitline for more information. 800-QUIT-NOW (605-555-5493). Low-cost or free programs are offered by many hospitals, local chapters of the American Lung Association (562-722-9157) and the American Cancer Society (985-973-2014). Support at home is important too. Non-smokers can help by offering praise and encouragement. If the smoker fails to quit, encourage them to try again!    OVER-THE-COUNTER MEDICINES:  For those who can't quit on their own, Nicotine Replacement Therapy (NRT) may make quitting much easier. Certain aids such as the nicotine patch, gum and lozenge are available without a prescription. However, it is best to use these under the guidance of your doctor. The skin patch provides a steady supply of nicotine to the body. Nicotine gum and lozenge gives temporary bursts of low levels of nicotine. Both methods take the edge off the craving for cigarettes. WARNING: If you feel symptoms of nicotine overdose, such as nausea, vomiting, dizziness, weakness, or fast heartbeat, stop using these and see your doctor.    PRESCRIPTION MEDICINES:  After evaluating your smoking patterns and prior attempts at quitting, your doctor  may offer a prescription medicine such as bupropion (Zyban, Wellbutrin), varenicline (Chantix, Champix), a niocotine inhaler or nasal spray. Each has its unique advantage and side effects which your doctor can review with you.    HEALTH BENEFITS OF QUITTING:  The benefits of quitting start right away and keep improving the longer you go without smokin minutes: blood pressure and pulse return to normal  8 hours: oxygen levels return to normal  2 days: ability to smell and taste begins to improve as damaged nerves start to regrow  2-3 weeks: circulation and lung function improves  1-9 months: decreased cough, congestion and shortness of breath; less tired  1 year: risk of heart attack decreases by half  5 years: risk of lung cancer decreases by half; risk of stroke becomes the same as a non-smoker  For information about how to quit smoking, visit the following links:  National Cancer [place] ,   Clearing the Air, Quit Smoking Today   - an online booklet. http://www.smokefree.gov/pubs/clearing_the_air.pdf  Smokefree.gov http://smokefree.gov/  QuitNet http://www.quitnet.com/    8739-4139 [facility], [address] [place] [address]. All rights reserved. This information is not intended as a substitute for professional medical care. Always follow your healthcare professional's instructions.    The Benefits of Living Smoke Free  What do you want to gain from quitting? Check off some reasons to quit.  Health Benefits  ___ Reduce my risk of lung cancer, heart disease, chronic lung disease  ___ Have fewer wrinkles and softer skin  ___ Improve my sense of taste and smell  ___ For pregnant women--reduce the risk of having a miscarriage, stillbirth, premature birth, or low-birth-weight baby  Personal Benefits  ___ Feel more in control of my life  ___ Have better-smelling hair, breath, clothes, home, and car  ___ Save time by not having to take smoke breaks, buy cigarettes, or hunt for a light  ___ Have  whiter teeth  Family Benefits  ___ Reduce my children s respiratory tract infections  ___ Set a good example for my children  ___ Reduce my family s cancer risk  Financial Benefits  ___ Save hundreds of dollars each year that would be spent on cigarettes  ___ Save money on medical bills  ___ Save on life, health, and car insurance premiums    Those Dollars Add Up!  Cigarettes are expensive, and getting more expensive all the time. Do you realize how much money you are spending on cigarettes per year? What is the average amount you spend on a pack of cigarettes? What is the average number of packs that you smoke per day? Using your answers to these questions, fill in this formula to help you find out:  ($ _____ per pack) ×  ( _____ number of packs per day) × (365 days) =  $ _____ yearly cost of smoking  Besides tobacco, there are other costs, including extra cleaning bills and replacement costs for clothing and furniture; medical expenses for smoking-related illnesses; and higher health, life, and car insurance premiums.

## 2017-08-28 NOTE — PROGRESS NOTES
"Reason for Visit  Robert B Behr is a 67 year old male who is referred by Dr Salazar for COPD  Pulmonary HPI  No respiratory limitations, undergoing lung cancer screening. He is trying to quit smoking, using patches and has had periods of complete abstinence. He is falling back into a pattern of a few a day cigarettes every time he allows himself to have \"just one.\" Combivent once a week, very active.  He's been on Advair for about two months which was started for chronic productive cough.     The patient was seen and examined by Svitlana Taylor MD   Current Outpatient Prescriptions   Medication     glucosamine-chondroitinoitin (PA GLUCOSAMINE-CHONDROITIN) 750-600 MG TABS     ascorbic acid (VITAMIN C) 1000 MG TABS     nicotine (NICODERM CQ) 21 MG/24HR 24 hr patch     nicotine (NICODERM CQ) 14 MG/24HR 24 hr patch     nicotine (NICODERM CQ) 7 MG/24HR 24 hr patch     fluticasone-salmeterol (ADVAIR) 250-50 MCG/DOSE diskus inhaler     predniSONE (DELTASONE) 20 MG tablet     albuterol (PROAIR HFA/PROVENTIL HFA/VENTOLIN HFA) 108 (90 BASE) MCG/ACT Inhaler     Ipratropium-Albuterol (COMBIVENT RESPIMAT)  MCG/ACT inhaler     ipratropium - albuterol 0.5 mg/2.5 mg/3 mL (DUONEB) 0.5-2.5 (3) MG/3ML neb solution     Ipratropium-Albuterol (COMBIVENT RESPIMAT)  MCG/ACT inhaler     calcium-vitamin D (CALTRATE) 600-400 MG-UNIT per tablet     order for DME     vitamin E ([facility] VITAMIN E) 1000 UNIT capsule     vitamin B complex with vitamin C (VITAMIN  B COMPLEX) TABS tablet     vitamin A ([facility] VITAMIN A) 89010 UNIT capsule     omega 3 1000 MG CAPS     Calcium-Magnesium-Vitamin D (CALCIUM 1200+D3) 600- MG-MG-UNIT TB24     albuterol (PROAIR HFA, PROVENTIL HFA, VENTOLIN HFA) 108 (90 BASE) MCG/ACT inhaler     No current facility-administered medications for this visit.      No Known Allergies  Social History     Social History     Marital status: Single     Spouse name: N/A     Number of children: N/A     Years of " "education: N/A     Occupational History     Not on file.     Social History Main Topics     Smoking status: Former Smoker     Packs/day: 1.50     Years: 45.00     Types: Cigarettes     Quit date: 2016     Smokeless tobacco: Never Used      Comment: 1.5 packs for 45 years smoker     Alcohol use No     Drug use: No     Sexual activity: No     Other Topics Concern     Parent/Sibling W/ Cabg, Mi Or Angioplasty Before 65f 55m? No     Social History Narrative     Past Medical History:   Diagnosis Date     COPD (chronic obstructive pulmonary disease) (H)     diagnosed with spirometry      Lung nodules     CT scan      Osteoporosis      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     Tobacco abuse      Past Surgical History:   Procedure Laterality Date     PHACOEMULSIFICATION CLEAR CORNEA WITH STANDARD INTRAOCULAR LENS IMPLANT Right 2016    Procedure: PHACOEMULSIFICATION CLEAR CORNEA WITH STANDARD INTRAOCULAR LENS IMPLANT;  Surgeon: Elly Valencia MD;  Location: [facility]     WRIST SURGERY       Family History   Problem Relation Age of Onset     DIABETES Brother      living     Lung Cancer Brother           Macular Degeneration Mother      Colon Cancer No family hx of      Glaucoma No family hx of      Retinal detachment No family hx of      Amblyopia No family hx of        ROS Pulmonary  Dyspnea: No, Cough: No, Chest pain: No, Wheezing: No, Sputum Production: No, Hemoptysis: No  A complete ROS was otherwise negative except as noted in the HPI.  /79 (BP Location: Right arm, Patient Position: Sitting, Cuff Size: Adult Regular)  Pulse 71  Ht 1.778 m (5' 10\")  Wt 71.4 kg (157 lb 6.4 oz)  SpO2 98%  BMI 22.58 kg/m2  Exam:   GENERAL APPEARANCE: Well developed, well nourished, alert, and in no apparent distress.  EYES: PERRL, EOMI  HENT: Nasal mucosa with no edema and no hyperemia. No nasal polyps.  EARS: Canals clear, TMs normal  MOUTH: Oral mucosa is moist, without any lesions, no tonsillar enlargement, no " oropharyngeal exudate.  NECK: supple, no masses, no thyromegaly.  LYMPHATICS: No significant axillary, cervical, or supraclavicular nodes.  RESP: normal percussion, good air flow throughout.  No crackles. No rhonchi. No wheezes.  CV: Normal S1, S2, regular rhythm, normal rate. No murmur.  No rub. No gallop. No LE edema.   ABDOMEN:  Bowel sounds normal, soft, nontender, no HSM or masses.   MS: extremities normal. No clubbing. No cyanosis.  SKIN: no rash on limited exam  NEURO: Mentation intact, speech normal, normal strength and tone, normal gait and stance  PSYCH: mentation appears normal. and affect normal/bright  Results:  PFTs today mild obstruction, normal lung volumes and diffusion  Chest imaging reviewed: CXR today stable    Assessment and plan: Ravin is a 67-year-old male with COPD being seen today for consultation.  COPD-mild by a PFTs today, although he has radiographic emphysema, his diffusion capacity is normal.  He has phenotype of chronic bronchitis, which is improved with daily Advair.  We discussed the positive COPD management including inhaler therapy for symptoms, smoking cessation, avoiding infection and treating infection early with early antibiotics for exacerbation.  We are in agreement that oxygen therapy and pulmonary rehabilitation are not appropriate for him at this time.  -Continue Advair b.i.d. and Combivent p.r.n.  -He declined alpha one antitrypsin deficiency testing today  Nicotine dependence-we discussed the importance of quitting smoking but additional techniques to help him take the habit for good as he has had some relapses.  Today I prescribed and described the use of nicotine lozenges to him.  Preventive care-he is eligible for lung cancer screening.  He had a CT of his chest in June of this year showing stable right upper lobe scarring, would be due for annual lung cancer screening in one year from that so June 2018  -Pneumococcal 23 vaccine today  Return to clinic as  needed

## 2017-08-29 ENCOUNTER — OFFICE VISIT (OUTPATIENT)
Dept: SURGERY | Facility: CLINIC | Age: 67
End: 2017-08-29

## 2017-08-29 ENCOUNTER — ALLIED HEALTH/NURSE VISIT (OUTPATIENT)
Dept: SURGERY | Facility: CLINIC | Age: 67
End: 2017-08-29

## 2017-08-29 VITALS
WEIGHT: 156.7 LBS | HEART RATE: 70 BPM | OXYGEN SATURATION: 95 % | DIASTOLIC BLOOD PRESSURE: 67 MMHG | SYSTOLIC BLOOD PRESSURE: 121 MMHG | TEMPERATURE: 98.7 F | RESPIRATION RATE: 16 BRPM | HEIGHT: 70 IN | BODY MASS INDEX: 22.43 KG/M2

## 2017-08-29 DIAGNOSIS — Z01.818 PRE-OP EVALUATION: Primary | ICD-10-CM

## 2017-08-29 ASSESSMENT — COPD QUESTIONNAIRES
COPD: 1
CAT_SEVERITY: MODERATE

## 2017-08-29 ASSESSMENT — LIFESTYLE VARIABLES: TOBACCO_USE: 1

## 2017-08-29 NOTE — PATIENT INSTRUCTIONS
Preparing for Your Surgery  Name:  Robert B Behr   MRN:  [id number]   :  1950   Today's Date:  2017     Arriving for surgery:  Surgery date:  9/15/17  Surgery time:  7:30 am  Arrival time:  6:00 am    Please come to:     [facility]    [address] [place] [id number]     Parking is available in front of the Clinics and Surgery Center building from 5:30AM to 8:00PM.  -  Proceed to the 5th floor to check into the Ambulatory Surgery Center.              >> There will be patient concierges on the 1st and 5th floor, for assistance or an escort, if you would like.              >> Please call [phone] with any questions.    What can I eat or drink?  -  You may have solid food or milk products until 8 hours prior to your surgery (until 11:30 pm).  -  You may have clear liquids such as water, gatorade, tea, black coffee, apple juice or 7up/Sprite until 2 hours prior to your surgery (until 5:30 am).    Which medicines can I take?        -  Please HOLD fish oil and all vitamins/supplements for 7 days before surgery (take last dose on 17).    -  Please take these medications the day of surgery: All other regular morning medication and inhalers(bring albuterol inhaler to surgery).    How do I prepare myself?  -  Take two showers: one the night before surgery; and one the morning of surgery.         Use Scrubcare or Hibiclens to wash from neck down.  You may use your own shampoo and conditioner. No other hair products.   -  Do NOT use lotion, powder, deodorant, or antiperspirant the day of your surgery.  -  Do NOT wear any makeup, fingernail polish or jewelry.  -  Do not bring your own medications to the hospital, except for inhalers and eye drops.  -  Bring your ID and insurance card.    Questions or Concerns:  If you have questions or concerns, please call the  Preoperative Assessment Center, Monday-Friday 7AM-7PM:  [phone]    AFTER YOUR SURGERY  Breathing  exercises   Breathing exercises help you recover faster. Take deep breaths and let the air out slowly. This will:     Help you wake up after surgery.    Help prevent complications like pneumonia.  Preventing complications will help you go home sooner.   Nausea and vomiting   You may feel sick to your stomach after surgery; if so, let your nurse know.    Pain control:  After surgery, you may have pain. Our goal is to help you manage your pain. Pain medicine will help you feel comfortable enough to do activities that will help you heal.  These activities may include breathing exercises, walking and physical therapy.   To help your health care team treat your pain we will ask: 1) If you have pain  2) where it is located 3) describe your pain in your words  Methods of pain control include medications given by mouth, vein or by nerve block for some surgeries.  Sequential Compression Device (SCD) or Pneumo Boots:  You may need to wear SCD S on your legs or feet. These are wraps connected to a machine that pumps in air and releases it. The repeated pumping helps prevent blood clots from forming.

## 2017-08-29 NOTE — ANESTHESIA PREPROCEDURE EVALUATION
Anesthesia Evaluation     . Pt has had prior anesthetic. Type: MAC and General    No history of anesthetic complications          ROS/MED HX    ENT/Pulmonary:     (+)tobacco use, Current use up to 1.5 ppd packs/day  moderate COPD, , . .    Neurologic:     (+)other neuro hx of Polio - left leg weakness (mild)    Cardiovascular:  - neg cardiovascular ROS   (+) ----. : . . . :. . Previous cardiac testing date:results:Stress Testdate:6/2017 results:ECG reviewed date:6/2017 results:NSR with fusion complexes date: results:         (-) CAD and taking anticoagulants/antiplatelets   METS/Exercise Tolerance: Comment: Bikes 10 miles per day, swims laps in pool >4 METS   Hematologic:  - neg hematologic  ROS       Musculoskeletal:   (+) arthritis, , , -       GI/Hepatic: Comment: Uses baking soda prn for GERD    (+) GERD Symptomatic, hiatal hernia,       Renal/Genitourinary:  - ROS Renal section negative       Endo:  - neg endo ROS       Psychiatric:  - neg psychiatric ROS       Infectious Disease:  - neg infectious disease ROS       Malignancy:      - no malignancy   Other:                     Physical Exam      Airway   Mallampati: II  TM distance: >3 FB  Neck ROM: full    Dental   (+) partials  Comment: Upper and lower partials    Cardiovascular   Rhythm and rate: regular and normal  (-) carotid bruit is not present, no peripheral edema and no murmur    Pulmonary    breath sounds clear to auscultation(+) decreased breath sounds       Other findings:   Interpretation Summary  Normal exercise echocardiogram without evidence of inducible ischemia.  Target heart rate was achieved. Heart rate and blood pressure response to  exercise were normal. With stress, the left ventricular ejection fraction  increased from 55-60% to greater than 65% and the left ventricular size  decreased appropriately.  Poor functional capacity. No subjective symptoms to suggest ischemia.  There was no ECG evidence of ischemia.  No significant valve  disease on screening doppler evaluation. The aortic root  and visualized ascending aorta are normal.           PAC Discussion and Assessment    ASA Classification: 2  Case is suitable for: ASC  Anesthetic techniques and relevant risks discussed: MAC with GA as backup  Invasive monitoring and risk discussed: No  Types:   Possibility and Risk of blood transfusion discussed: No  NPO instructions given:   Additional anesthetic preparation and risks discussed:   Needs early admission to pre-op area:   Other:     PAC Resident/NP Anesthesia Assessment:  Scheduled for Left Eye Phacoemulsification with Standard Lens on 9/15/17 by Dr. Valencia in treatment of cataract.  PAC referral for risk assessment and optimization for anesthesia with comorbid conditions of:    ** patient states he is getting a bifocal lens replacement    Pre-operative considerations:  1.  Cardiac:  Functional status excellent.  Can swim laps, rides bike 10 miles.  0.4%  risk of major adverse cardiac event.  Low risk procedure.  Risk of MACE < 1%. METS>4.  No further cardiac evaluation needed per 2014 ACC/AHA guidelines for non-cardiac surgery.  Recent cardiac work up for atypical CP (musculoskeletal) was negative.  Normal stress test.  EF 55-60%.  2.  Pulm:  Airway feasible.  JOSSELINE risk: low.  Heavy smoker in past; now smokes 3 cig per day  (Prior 1.5 ppd x 45 years).  + COPD, moderate.  Uses Combivent prn and Advair.  Sats 95%  3.  GI:  Risk of PONV score = 0.  If 3 or > anti-emetic intervention recommended (with 2 or more meds).  + moderate hiatal hernia (uses baking soda and H20 prn)  4.  History of polio @ age 1.  Left leg weaker (mild).    **was planning on taking a taxi cab to surgery and back with no one to stay with him.  Reinforced need to have someone stay with him for 24 hours after surgery.     Patient is optimized and is acceptable candidate for the proposed procedure.  No further diagnostic evaluation is needed.     Discussed with Dr. Silverio.  See recommendations below.           Reviewed and Signed by PAC Mid-Level Provider/Resident  Mid-Level Provider/Resident: Hilda Winkler PA-C  Date: 8/26/17  Time: 1320    Attending Anesthesiologist Anesthesia Assessment:        Anesthesiologist:   Date:   Time:   Pass/Fail:   Disposition:     PAC Pharmacist Assessment:        Pharmacist:   Date:   Time:      Anesthesia Plan      History & Physical Review  History and physical reviewed and following examination; no interval change.    ASA Status:  2 .    NPO Status:  > 8 hours    Plan for MAC with Intravenous induction. Maintenance will be TIVA.    PONV prophylaxis:  Ondansetron (or other 5HT-3)       Postoperative Care  Postoperative pain management:  Oral pain medications and Multi-modal analgesia.      Consents  Anesthetic plan, risks, benefits and alternatives discussed with:  Patient..                          .

## 2017-08-29 NOTE — MR AVS SNAPSHOT
After Visit Summary   2017    Robert B Behr    MRN: [id number]           Patient Information     Date Of Birth          1950        Visit Information        Provider Department      2017 2:30 PM Rn, [facility] Preoperative Assessment Center        Care Instructions    Preparing for Your Surgery  Name:  Robert B Behr   MRN:  [id number]   :  1950   Today's Date:  2017     Arriving for surgery:  Surgery date:  9/15/17  Surgery time:  7:30 am  Arrival time:  6:00 am    Please come to:     [facility]    [address] [place] [id number]     Parking is available in front of the Clinics and Surgery Center building from 5:30AM to 8:00PM.  -  Proceed to the 5th floor to check into the Ambulatory Surgery Center.              >> There will be patient concierges on the 1st and 5th floor, for assistance or an escort, if you would like.              >> Please call [phone] with any questions.    What can I eat or drink?  -  You may have solid food or milk products until 8 hours prior to your surgery (until 11:30 pm).  -  You may have clear liquids such as water, gatorade, tea, black coffee, apple juice or 7up/Sprite until 2 hours prior to your surgery (until 5:30 am).    Which medicines can I take?        -  Please HOLD fish oil and all vitamins/supplements for 7 days before surgery (take last dose on 17).    -  Please take these medications the day of surgery: All other regular morning medication and inhalers(bring albuterol inhaler to surgery).    How do I prepare myself?  -  Take two showers: one the night before surgery; and one the morning of surgery.         Use Scrubcare or Hibiclens to wash from neck down.  You may use your own shampoo and conditioner. No other hair products.   -  Do NOT use lotion, powder, deodorant, or antiperspirant the day of your surgery.  -  Do NOT wear any makeup, fingernail polish or jewelry.  -  Do not  bring your own medications to the hospital, except for inhalers and eye drops.  -  Bring your ID and insurance card.    Questions or Concerns:  If you have questions or concerns, please call the  Preoperative Assessment Center, Monday-Friday 7AM-7PM:  [phone]    AFTER YOUR SURGERY  Breathing exercises   Breathing exercises help you recover faster. Take deep breaths and let the air out slowly. This will:     Help you wake up after surgery.    Help prevent complications like pneumonia.  Preventing complications will help you go home sooner.   Nausea and vomiting   You may feel sick to your stomach after surgery; if so, let your nurse know.    Pain control:  After surgery, you may have pain. Our goal is to help you manage your pain. Pain medicine will help you feel comfortable enough to do activities that will help you heal.  These activities may include breathing exercises, walking and physical therapy.   To help your health care team treat your pain we will ask: 1) If you have pain  2) where it is located 3) describe your pain in your words  Methods of pain control include medications given by mouth, vein or by nerve block for some surgeries.  Sequential Compression Device (SCD) or Pneumo Boots:  You may need to wear SCD S on your legs or feet. These are wraps connected to a machine that pumps in air and releases it. The repeated pumping helps prevent blood clots from forming.           Follow-ups after your visit        Your next 10 appointments already scheduled     Aug 29, 2017  2:30 PM CDT   (Arrive by 2:15 PM)   PAC RN ASSESSMENT with Adeel Pac Rn   [facility] Preoperative [facility] ([facility])    [address] [place] [address]  4th [facility] [id number] [phone]            Aug 29, 2017  3:10 PM CDT   (Arrive by 2:55 PM)   PAC Anesthesia Consult with Adeel Pac Anesthesiologist   [facility] Preoperative Assessment Center ([facility])    [address] [place] [address]  [facility]  4th [facility] [id number] [phone]            Aug 29, 2017  3:30 PM CDT   LAB with  LAB   [facility] ([facility])    [address] [place] [address]  1st [facility] [id number] [phone]           Patient must bring picture ID. Patient should be prepared to give a urine specimen  Please do not eat 10-12 hours before your appointment if you are coming in fasting for labs on lipids, cholesterol, or glucose (sugar). Pregnant women should follow their Care Team instructions. Water with medications is okay. Do not drink coffee or other fluids. If you have concerns about taking  your medications, please ask at office or if scheduling via [employer], send a message by clicking on Secure Messaging, Message Your Care Team.            Sep 15, 2017   Procedure with Elly Valencia MD   [facility] ([facility])    [address] [place] [address]  5th [facility] [id number] [phone]           Located in the Clinics and Surgery Center at [address] [place] [address].   parking is very convenient and highly recommended.  is a $6 flat rate fee.  Both  and self parkers should enter the main arrival plaza from [facility]; parking attendants will direct you based on your parking preference.            Sep 15, 2017 10:00 AM CDT   (Arrive by 9:45 AM)   Post-Op with Elly Valencia MD   [facility] ([facility])    [address] [place] [address]  4th [facility] [id number] [phone]            Oct 11, 2017 12:45 PM CDT   Post-Op with Elly Valencia MD   [facility] Clinic ([facility])    [address]  [address] [facility]  9th Fl Clin 9a  [facility] [id number] [phone]              Who to contact     Please call your clinic at [phone] to:    Ask questions about your health    Make or cancel appointments    Discuss your  medicines    Learn about your test results    Speak to your doctor   If you have compliments or concerns about an experience at your clinic, or if you wish to file a complaint, please contact [facility] Patient Relations at [phone] or email us at [email or web address]         Additional Information About Your Visit        MyChart Information     [employer] gives you secure access to your electronic health record. If you see a primary care provider, you can also send messages to your care team and make appointments. If you have questions, please call your primary care clinic.  If you do not have a primary care provider, please call [phone] and they will assist you.      [employer] is an electronic gateway that provides easy, online access to your medical records. With [employer], you can request a clinic appointment, read your test results, renew a prescription or communicate with your care team.     To access your existing account, please contact your [facility] or call [phone] for assistance.        Care EveryWhere ID     This is your Care EveryWhere ID. This could be used by other organizations to access your [facility] medical records  [id number]         Blood Pressure from Last 3 Encounters:   08/29/17 121/67   08/28/17 138/79   08/07/17 133/77    Weight from Last 3 Encounters:   08/29/17 71.1 kg (156 lb 11.2 oz)   08/28/17 71.4 kg (157 lb 6.4 oz)   08/07/17 68 kg (150 lb)              Today, you had the following     No orders found for display         Today's Medication Changes          These changes are accurate as of: 8/29/17  2:01 PM.  If you have any questions, ask your nurse or doctor.               These medicines have changed or have updated prescriptions.        Dose/Directions    ascorbic acid 1000 MG Tabs   Commonly known as:  vitamin C   This may have changed:  See the new instructions.   Used for:  Encounter for herb and  vitamin supplement management        TAKE 1 TABLET BY MOUTH DAILY   Quantity:  90 tablet   Refills:  0       * Ipratropium-Albuterol  MCG/ACT inhaler   Commonly known as:  COMBIVENT RESPIMAT   This may have changed:    - when to take this  - reasons to take this  - additional instructions   Used for:  COPD exacerbation (H)   Changed by:  Fina Frausto MD        INHALE ONE PUFF BY MOUTH FOUR TIMES A DAY (NO TO EXCCED 6 DOSES PER DAY)   Quantity:  4 g   Refills:  11       * ipratropium - albuterol 0.5 mg/2.5 mg/3 mL 0.5-2.5 (3) MG/3ML neb solution   Commonly known as:  DUONEB   This may have changed:  Another medication with the same name was changed. Make sure you understand how and when to take each.   Used for:  COPD exacerbation (H)   Changed by:  Fina Frausto MD        Dose:  1 vial   Take 1 vial (3 mLs) by nebulization every 6 hours as needed for shortness of breath / dyspnea or wheezing   Quantity:  30 vial   Refills:  1       omega 3 1000 MG Caps   This may have changed:  when to take this   Used for:  Encounter for herb and vitamin supplement management        Dose:  1 g   Take 1 g by mouth daily   Quantity:  90 capsule   Refills:  2       vitamin A 40637 UNIT capsule   Commonly known as:  CVS VITAMIN A   This may have changed:  when to take this   Used for:  Encounter for herb and vitamin supplement management        Dose:  45554 Units   Take 1 capsule (10,000 Units) by mouth daily   Quantity:  180 capsule   Refills:  2       vitamin E 1000 UNIT capsule   Commonly known as:  CVS vitamin E   This may have changed:  when to take this   Used for:  Encounter for herb and vitamin supplement management        Dose:  1000 Units   Take 1 capsule (1,000 Units) by mouth daily   Quantity:  90 capsule   Refills:  2       * Notice:  This list has 2 medication(s) that are the same as other medications prescribed for you. Read the directions carefully, and ask your doctor or other care provider to review  them with you.             Primary Care Provider Office Phone # Fax #    Fina Frausto [phone] [address] [place]  [place] [address]        Equal Access to Services     TIARRA PAPPAS : Hadii aad ku hadleannjuvenal David, waaxda luqadaha, qaybta kaalmada adedennisda, kael stanin hayaavictorina canela claudiavy guerrero. So [facility] [phone].    ATENCIÓN: Si habla español, tiene a rios disposición servicios gratuitos de asistencia lingüística. Llame al [phone].    We comply with applicable federal civil rights laws and [place] laws. We do not discriminate on the basis of race, color, national origin, age, disability sex, sexual orientation or gender identity.            Thank you!     Thank you for choosing [facility] PREOPERATIVE [facility]ESSMENT CENTER  for your care. Our goal is always to provide you with excellent care. Hearing back from our patients is one way we can continue to improve our services. Please take a few minutes to complete the written survey that you may receive in the mail after your visit with us. Thank you!             Your Updated Medication List - Protect others around you: Learn how to safely use, store and throw away your medicines at www.disposemymeds.org.          This list is accurate as of: 8/29/17  2:01 PM.  Always use your most recent med list.                   Brand Name Dispense Instructions for use Diagnosis    ascorbic acid 1000 MG Tabs    vitamin C    90 tablet    TAKE 1 TABLET BY MOUTH DAILY    Encounter for herb and vitamin supplement management       CALCIUM 1200+D3 600- MG-MG-UNIT Tb24   Generic drug:  Calcium-Magnesium-Vitamin D      Take 1-2 tablets by mouth every morning Reported on 3/14/2017        fluticasone-salmeterol 250-50 MCG/DOSE diskus inhaler    ADVAIR    1 Inhaler    Inhale 1 puff into the lungs 2 times daily    Chronic obstructive pulmonary disease, unspecified COPD type (H)       glucosamine-chondroitinoitin 750-600 MG Tabs    PA  GLUCOSAMINE-CHONDROITIN    60 tablet    Take 2 tablets by mouth 2 times daily    Osteoarthritis of right hip, unspecified osteoarthritis type       * Ipratropium-Albuterol  MCG/ACT inhaler    COMBIVENT RESPIMAT    4 g    INHALE ONE PUFF BY MOUTH FOUR TIMES A DAY (NO TO EXCCED 6 DOSES PER DAY)    COPD exacerbation (H)       * ipratropium - albuterol 0.5 mg/2.5 mg/3 mL 0.5-2.5 (3) MG/3ML neb solution    DUONEB    30 vial    Take 1 vial (3 mLs) by nebulization every 6 hours as needed for shortness of breath / dyspnea or wheezing    COPD exacerbation (H)       nicotine 14 MG/24HR 24 hr patch    NICODERM CQ    30 patch    Place 1 patch onto the skin every 24 hours    Tobacco use disorder       nicotine polacrilex 2 MG lozenge    COMMIT    150 lozenge    Place 1 lozenge (2 mg) inside cheek as needed for smoking cessation Place 1 lozenge inside cheek as needed for smoking cessation.    Chronic obstructive pulmonary disease, unspecified COPD type (H), Tobacco abuse       omega 3 1000 MG Caps     90 capsule    Take 1 g by mouth daily    Encounter for herb and vitamin supplement management       order for DME     1 each    Equipment being ordered: nebulizer machine    COPD exacerbation (H)       vitamin A 24993 UNIT capsule    CVS VITAMIN A    180 capsule    Take 1 capsule (10,000 Units) by mouth daily    Encounter for herb and vitamin supplement management       vitamin B complex with vitamin C Tabs tablet     180 tablet    Take 1 tablet by mouth 2 times daily With Folic acid    Encounter for herb and vitamin supplement management       vitamin E 1000 UNIT capsule    CVS vitamin E    90 capsule    Take 1 capsule (1,000 Units) by mouth daily    Encounter for herb and vitamin supplement management       * Notice:  This list has 2 medication(s) that are the same as other medications prescribed for you. Read the directions carefully, and ask your doctor or other care provider to review them with you.

## 2017-08-29 NOTE — H&P
Pre-Operative H & P     CC:  Preoperative exam to assess for increased cardiopulmonary risk while undergoing surgery and anesthesia.    Date of Encounter: 8/29/2017  Primary Care Physician:  Fina Frausto  Robert B Behr is a 67 year old male who presents for pre-operative H & P in preparation for  Left Eye Phacoemulsification with Standard Lens on 9/15/17 by Dr. Valencia in treatment of cataract.  Surgery  at [facility]. He had right cataract surgery 9/2016.      No problems with anesthesia/surgery.      History is obtained from the patient and electronic health record.     Past Medical History  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     Tobacco abuse        Past Surgical History  Past Surgical History:   Procedure Laterality Date     PHACOEMULSIFICATION CLEAR CORNEA WITH STANDARD INTRAOCULAR LENS IMPLANT Right 9/30/2016    Procedure: PHACOEMULSIFICATION CLEAR CORNEA WITH STANDARD INTRAOCULAR LENS IMPLANT;  Surgeon: Elly Valencia MD;  Location: [facility]     WRIST SURGERY         Hx of Blood transfusions/reactions: no     Hx of abnormal bleeding or anti-platelet use: no    Steroid use in the last year: no    Personal or FH with difficulty with Anesthesia:  no    Prior to Admission Medications  Current Outpatient Prescriptions   Medication Sig Dispense Refill     fluticasone-salmeterol (ADVAIR) 250-50 MCG/DOSE diskus inhaler Inhale 1 puff into the lungs 2 times daily 1 Inhaler 11     nicotine polacrilex (COMMIT) 2 MG lozenge Place 1 lozenge (2 mg) inside cheek as needed for smoking cessation Place 1 lozenge inside cheek as needed for smoking cessation. 150 lozenge 3     glucosamine-chondroitinoitin (PA GLUCOSAMINE-CHONDROITIN) 750-600 MG TABS Take 2 tablets by mouth 2 times daily 60 tablet 0      ascorbic acid (VITAMIN C) 1000 MG TABS TAKE 1 TABLET BY MOUTH DAILY (Patient taking differently: TAKE 1 TABLET BY MOUTH TWO TIMES DAILY) 90 tablet 0     nicotine (NICODERM CQ) 14 MG/24HR 24 hr patch Place 1 patch onto the skin every 24 hours 30 patch 1     Ipratropium-Albuterol (COMBIVENT RESPIMAT)  MCG/ACT inhaler INHALE ONE PUFF BY MOUTH FOUR TIMES A DAY (NO TO EXCCED 6 DOSES PER DAY) (Patient taking differently: as needed INHALE ONE PUFF BY MOUTH FOUR TIMES A DAY (NO TO EXCCED 6 DOSES PER DAY)) 4 g 11     vitamin E (CVS VITAMIN E) 1000 UNIT capsule Take 1 capsule (1,000 Units) by mouth daily (Patient taking differently: Take 1,000 Units by mouth every morning ) 90 capsule 2     vitamin B complex with vitamin C (VITAMIN  B COMPLEX) TABS tablet Take 1 tablet by mouth 2 times daily With Folic acid 180 tablet 2     vitamin A (CVS VITAMIN A) 21834 UNIT capsule Take 1 capsule (10,000 Units) by mouth daily (Patient taking differently: Take 10,000 Units by mouth every morning ) 180 capsule 2     omega 3 1000 MG CAPS Take 1 g by mouth daily (Patient taking differently: Take 1 g by mouth every morning ) 90 capsule 2     Calcium-Magnesium-Vitamin D (CALCIUM 1200+D3) 600- MG-MG-UNIT TB24 Take 1-2 tablets by mouth every morning Reported on 3/14/2017       [DISCONTINUED] Ipratropium-Albuterol (COMBIVENT RESPIMAT)  MCG/ACT inhaler Inhale 1 puff into the lungs 4 times daily Not to exceed 6 doses per day. 3 Inhaler 1     ipratropium - albuterol 0.5 mg/2.5 mg/3 mL (DUONEB) 0.5-2.5 (3) MG/3ML neb solution Take 1 vial (3 mLs) by nebulization every 6 hours as needed for shortness of breath / dyspnea or wheezing 30 vial 1     order for DME Equipment being ordered: nebulizer machine 1 each 0       Allergies  No Known Allergies    Social History  Social History     Social History     Marital status: Single     Spouse name: N/A     Number of children: N/A     Years of education: N/A     Occupational History     Not on  file.     Social History Main Topics     Smoking status: Former Smoker     Packs/day: 0.10     Years: 45.00     Types: Cigarettes     Quit date: 9/1/2016     Smokeless tobacco: Never Used      Comment: 1.5 packs for 45 years smoker     Alcohol use 0.0 oz/week     0 Standard drinks or equivalent per week      Comment: occ beer     Drug use: No     Sexual activity: No     Other Topics Concern     Parent/Sibling W/ Cabg, Mi Or Angioplasty Before 65f 55m? No     Social History Narrative    Single.  Retired.     No children    5 siblings:      No family history of bleeding, clotting disorders or complications with anesthesia.           Family History  Family History   Problem Relation Age of Onset     DIABETES Brother      living     CANCER Brother      throat     Macular Degeneration Mother      Colon Cancer No family hx of      Glaucoma No family hx of      Retinal detachment No family hx of      Amblyopia No family hx of        ROS/MED HX  The complete review of systems is negative other than noted in the HPI or here.     ENT/Pulmonary:     (+)tobacco use, Current use up to 1.5 ppd packs/day  moderate COPD, , . .    Neurologic:     (+)other neuro hx of Polio - left leg weakness (mild)    Cardiovascular:  - neg cardiovascular ROS   (+) ----. : . . . :. . Previous cardiac testing date:results:Stress Testdate:6/2017 results:ECG reviewed date:6/2017 results:NSR with fusion complexes date: results:         (-) CAD and taking anticoagulants/antiplatelets   METS/Exercise Tolerance: Comment: Bikes 10 miles per day, swims laps in pool >4 METS   Hematologic:  - neg hematologic  ROS       Musculoskeletal:   (+) arthritis, , , -       GI/Hepatic: Comment: Uses baking soda prn for GERD    (+) GERD Symptomatic, hiatal hernia,       Renal/Genitourinary:  - ROS Renal section negative       Endo:  - neg endo ROS       Psychiatric:  - neg psychiatric ROS       Infectious Disease:  - neg infectious disease ROS       Malignancy:      -  "no malignancy   Other:           Temp: 98.7  F (37.1  C) Temp src: Oral BP: 121/67 Pulse: 70   Resp: 16 SpO2: 95 %         156 lbs 11.2 oz  5' 10\"   Body mass index is 22.48 kg/(m^2).       Physical Exam  Constitutional: Awake, alert, cooperative, no apparent distress, and appears stated age.  Eyes: Pupils equal, round and reactive to light,  sclera clear, conjunctiva normal.  HENT: Normocephalic, oral pharynx with moist mucus membranes, good dentition. No goiter appreciated.   Respiratory: Clear to auscultation bilaterally, no crackles or wheezing.  Decreased.   Cardiovascular: Regular rate and rhythm, normal S1 and S2, and no murmur noted.  Carotids +2, no bruits. No edema. Palpable pulses to  DP and PT arteries.   GI: Normal bowel sounds, soft, non-distended, non-tender, no masses palpated, no hepatosplenomegaly.    Skin: Warm and dry.    Musculoskeletal: Full ROM of neck. There is no redness, warmth, or swelling of the joints. Gross motor strength is normal.    Neurologic: Awake, alert, oriented to name, place and time. Cranial nerves II-XII are grossly intact. Gait is normal.   Neuropsychiatric: Calm, cooperative. Normal affect.     Labs: not indicated  EKG: Personally reviewed 6/2017:  NSR  Stress test:  6/2017  Interpretation Summary  Normal exercise echocardiogram without evidence of inducible ischemia.  Target heart rate was achieved. Heart rate and blood pressure response to  exercise were normal. With stress, the left ventricular ejection fraction  increased from 55-60% to greater than 65% and the left ventricular size  decreased appropriately.  Poor functional capacity. No subjective symptoms to suggest ischemia.  There was no ECG evidence of ischemia.  No significant valve disease on screening doppler evaluation. The aortic root  and visualized ascending aorta are normal.    Outside records reviewed from: NA    ASSESSMENT and PLAN  Robert B Behr is a 67 year old male scheduled to undergo  Left Eye " Phacoemulsification with Standard Lens on 9/15/17 by Dr. Valencia in treatment of cataract.  PAC referral for risk assessment and optimization for anesthesia with comorbid conditions of:    ** patient states he is getting a bifocal lens replacement    Pre-operative considerations:  1.  Cardiac:  Functional status excellent.  Can swim laps, rides bike 10 miles.  0.4%  risk of major adverse cardiac event.  Low risk procedure.  Risk of MACE < 1%. METS>4.  No further cardiac evaluation needed per 2014 ACC/AHA guidelines for non-cardiac surgery.  Recent cardiac work up for atypical CP (musculoskeletal) was negative.  Normal stress test.  EF 55-60%.  2.  Pulm:  Airway feasible.  JOSSELINE risk: low.  Heavy smoker in past; now smokes 3 cig per day  (Prior 1.5 ppd x 45 years).  + COPD, moderate.  Uses Combivent prn and Advair.  Sats 95%.  Will bring inhalers with him.   3.  GI:  Risk of PONV score = 0.  If 3 or > anti-emetic intervention recommended (with 2 or more meds).  + moderate hiatal hernia/GERD (uses baking soda and H20 prn).  Rare episodes.   4.  History of polio @ age 1.  Left leg weaker (mild).    **was planning on taking a taxi cab to surgery and back with no one to stay with him.  Reinforced need to have someone stay with him for 24 hours after surgery.     Patient is optimized and is acceptable candidate for the proposed procedure.  No further diagnostic evaluation is needed  For complete medication and diet instructions for surgery, please refer to the AVS completed by nursing.   Patient was discussed with Dr Silverio.    Hilda Winkler PA-C  Preoperative Assessment Center  [facility]  Clinic and Surgery Center  Phone: [phone]  Fax: [phone]

## 2017-09-05 ENCOUNTER — CARE COORDINATION (OUTPATIENT)
Dept: GERIATRIC MEDICINE | Facility: CLINIC | Age: 67
End: 2017-09-05

## 2017-09-05 LAB
DLCOUNC-%PRED-PRE: 73 %
DLCOUNC-PRE: 19.39 ML/MIN/MMHG
DLCOUNC-PRED: 26.46 ML/MIN/MMHG
ERV-%PRED-PRE: 110 %
ERV-PRE: 1.53 L
ERV-PRED: 1.38 L
EXPTIME-PRE: 16.84 SEC
FEF2575-%PRED-POST: 27 %
FEF2575-%PRED-PRE: 32 %
FEF2575-POST: 0.69 L/SEC
FEF2575-PRE: 0.82 L/SEC
FEF2575-PRED: 2.52 L/SEC
FEFMAX-%PRED-PRE: 77 %
FEFMAX-PRE: 6.57 L/SEC
FEFMAX-PRED: 8.49 L/SEC
FEV1-%PRED-PRE: 74 %
FEV1-PRE: 2.44 L
FEV1FEV6-PRE: 49 %
FEV1FEV6-PRED: 78 %
FEV1FVC-PRE: 41 %
FEV1FVC-PRED: 76 %
FEV1SVC-PRE: 40 %
FEV1SVC-PRED: 68 %
FIFMAX-PRE: 3.39 L/SEC
FRCPLETH-%PRED-PRE: 106 %
FRCPLETH-PRE: 3.94 L
FRCPLETH-PRED: 3.69 L
FVC-%PRED-PRE: 137 %
FVC-PRE: 5.9 L
FVC-PRED: 4.3 L
IC-%PRED-PRE: 133 %
IC-PRE: 4.56 L
IC-PRED: 3.41 L
RVPLETH-%PRED-PRE: 92 %
RVPLETH-PRE: 2.41 L
RVPLETH-PRED: 2.61 L
TLCPLETH-%PRED-PRE: 119 %
TLCPLETH-PRE: 8.5 L
TLCPLETH-PRED: 7.13 L
VA-%PRED-PRE: 107 %
VA-PRE: 7.32 L
VC-%PRED-PRE: 127 %
VC-PRE: 6.09 L
VC-PRED: 4.79 L

## 2017-09-05 NOTE — PROGRESS NOTES
Arranged transportation thru [facility] for the below appt:  Appt Date & Time: 9/15/17 @ 6:00am  Clinic Name & Address:  [facility] [address] [place] [address]   Transportation Provider: Suburb    time:  5:00am - 5:30am   Patient will be dropped off at friends house: Lance Sethiy   [address], [address], [place] [address].     Notified member of  time.    Gely Kenny  Case Management Specialist  [facility]  [phone]

## 2017-09-06 ENCOUNTER — CARE COORDINATION (OUTPATIENT)
Dept: GERIATRIC MEDICINE | Facility: CLINIC | Age: 67
End: 2017-09-06

## 2017-09-07 ENCOUNTER — TELEPHONE (OUTPATIENT)
Dept: FAMILY MEDICINE | Facility: CLINIC | Age: 67
End: 2017-09-07

## 2017-09-07 DIAGNOSIS — M16.11 OSTEOARTHRITIS OF RIGHT HIP, UNSPECIFIED OSTEOARTHRITIS TYPE: ICD-10-CM

## 2017-09-07 RX ORDER — MAGNESIUM CARB/ALUMINUM HYDROX 105-160MG
2 TABLET,CHEWABLE ORAL 2 TIMES DAILY
Qty: 60 TABLET | Refills: 11 | Status: SHIPPED | OUTPATIENT
Start: 2017-09-07 | End: 2018-05-09

## 2017-09-07 NOTE — TELEPHONE ENCOUNTER
Fina CARRILLO review this RX request: Order exceeds recommended dose so triage nurse cannot fill it.  Last visit was in June.  Felicia Yanez RN

## 2017-09-07 NOTE — TELEPHONE ENCOUNTER
I don't mind refilling this without a visit.  I think the request for a visit came from a covering provider, as I was out of the office but I am comfortable providing long-term refills on this supplement without a separate visit.   It's a supplement that doesn't seem to have any ill-effects and, depending on the study, might not do as well as we used to think for arthritis but it is ok to keep going if he feels it is of benefit.

## 2017-09-07 NOTE — PROGRESS NOTES
TC from client stating that he had a dental appointment on Sept 19, but they called and cancelled appointment stating that they don't take his insurance. CM advised him to call [facility] to check to see if the office is in network or where he should go.  Leeanna San RN Case Manager  [facility]  [phone]

## 2017-09-07 NOTE — TELEPHONE ENCOUNTER
"Reason for Call:  Medication or medication refill: **PLEASE SEE TE FROM 08/09    Do you use a [facility]?  Name of the pharmacy and phone number for the current request:  [facility] on [address] - [phone]    Name of the medication requested: glucosamine-chondroitinoitin (PA GLUCOSAMINE-CHONDROITIN) 750-600 MG TABS    Other request: Gave patient message from TE on 08/09 to follow up with PCP for further refills and discuss different options. Patient was upset at the fact that he would have to come in again when he already knows what's going. Does not see a reason as to why he should come in and discuss the same thing that has been addressed, states \"its a rip off and will have to pay his insurance more money to talk about the same thing\". Please advise, thank you!    Can we leave a detailed message on this number? YES    Phone number patient can be reached at: Cell number on file:    Telephone Information:   Mobile [phone]       Best Time: anytime    Call taken on 9/7/2017 at 3:23 PM by Joy Bruner      "

## 2017-09-13 RX ORDER — PREDNISOLONE ACETATE 10 MG/ML
SUSPENSION/ DROPS OPHTHALMIC
Qty: 1 BOTTLE | Refills: 1 | Status: SHIPPED | OUTPATIENT
Start: 2017-09-13 | End: 2019-03-22

## 2017-09-13 RX ORDER — LEVOFLOXACIN 5 MG/ML
SOLUTION/ DROPS TOPICAL
Qty: 1 BOTTLE | Refills: 1 | Status: SHIPPED | OUTPATIENT
Start: 2017-09-13 | End: 2018-09-24

## 2017-09-15 ENCOUNTER — ANESTHESIA (OUTPATIENT)
Dept: SURGERY | Facility: AMBULATORY SURGERY CENTER | Age: 67
End: 2017-09-15

## 2017-09-15 ENCOUNTER — HOSPITAL ENCOUNTER (OUTPATIENT)
Facility: AMBULATORY SURGERY CENTER | Age: 67
End: 2017-09-15
Attending: OPHTHALMOLOGY

## 2017-09-15 ENCOUNTER — SURGERY (OUTPATIENT)
Age: 67
End: 2017-09-15

## 2017-09-15 ENCOUNTER — OFFICE VISIT (OUTPATIENT)
Dept: OPHTHALMOLOGY | Facility: CLINIC | Age: 67
End: 2017-09-15

## 2017-09-15 VITALS
RESPIRATION RATE: 16 BRPM | OXYGEN SATURATION: 97 % | SYSTOLIC BLOOD PRESSURE: 126 MMHG | TEMPERATURE: 97.8 F | DIASTOLIC BLOOD PRESSURE: 78 MMHG | HEART RATE: 62 BPM

## 2017-09-15 DIAGNOSIS — Z96.1 PSEUDOPHAKIA, BOTH EYES: Primary | ICD-10-CM

## 2017-09-15 DEVICE — IMPLANTABLE DEVICE: Type: IMPLANTABLE DEVICE | Site: EYE | Status: FUNCTIONAL

## 2017-09-15 RX ORDER — LIDOCAINE 40 MG/G
CREAM TOPICAL
Status: DISCONTINUED | OUTPATIENT
Start: 2017-09-15 | End: 2017-09-15 | Stop reason: HOSPADM

## 2017-09-15 RX ORDER — NALOXONE HYDROCHLORIDE 0.4 MG/ML
.1-.4 INJECTION, SOLUTION INTRAMUSCULAR; INTRAVENOUS; SUBCUTANEOUS
Status: DISCONTINUED | OUTPATIENT
Start: 2017-09-15 | End: 2017-09-16 | Stop reason: HOSPADM

## 2017-09-15 RX ORDER — SODIUM CHLORIDE, SODIUM LACTATE, POTASSIUM CHLORIDE, CALCIUM CHLORIDE 600; 310; 30; 20 MG/100ML; MG/100ML; MG/100ML; MG/100ML
INJECTION, SOLUTION INTRAVENOUS CONTINUOUS
Status: DISCONTINUED | OUTPATIENT
Start: 2017-09-15 | End: 2017-09-16 | Stop reason: HOSPADM

## 2017-09-15 RX ORDER — SODIUM CHLORIDE, SODIUM LACTATE, POTASSIUM CHLORIDE, CALCIUM CHLORIDE 600; 310; 30; 20 MG/100ML; MG/100ML; MG/100ML; MG/100ML
500 INJECTION, SOLUTION INTRAVENOUS CONTINUOUS
Status: DISCONTINUED | OUTPATIENT
Start: 2017-09-15 | End: 2017-09-15 | Stop reason: HOSPADM

## 2017-09-15 RX ORDER — ONDANSETRON 2 MG/ML
4 INJECTION INTRAMUSCULAR; INTRAVENOUS EVERY 30 MIN PRN
Status: DISCONTINUED | OUTPATIENT
Start: 2017-09-15 | End: 2017-09-16 | Stop reason: HOSPADM

## 2017-09-15 RX ORDER — BALANCED SALT SOLUTION 6.4; .75; .48; .3; 3.9; 1.7 MG/ML; MG/ML; MG/ML; MG/ML; MG/ML; MG/ML
SOLUTION OPHTHALMIC PRN
Status: DISCONTINUED | OUTPATIENT
Start: 2017-09-15 | End: 2017-09-15 | Stop reason: HOSPADM

## 2017-09-15 RX ORDER — PREDNISOLONE ACETATE 1 %
SUSPENSION, DROPS(FINAL DOSAGE FORM)(ML) OPHTHALMIC (EYE) PRN
Status: DISCONTINUED | OUTPATIENT
Start: 2017-09-15 | End: 2017-09-15 | Stop reason: HOSPADM

## 2017-09-15 RX ORDER — MEPERIDINE HYDROCHLORIDE 25 MG/ML
12.5 INJECTION INTRAMUSCULAR; INTRAVENOUS; SUBCUTANEOUS
Status: DISCONTINUED | OUTPATIENT
Start: 2017-09-15 | End: 2017-09-16 | Stop reason: HOSPADM

## 2017-09-15 RX ORDER — PHENYLEPHRINE HYDROCHLORIDE 25 MG/ML
1 SOLUTION/ DROPS OPHTHALMIC
Status: COMPLETED | OUTPATIENT
Start: 2017-09-15 | End: 2017-09-15

## 2017-09-15 RX ORDER — FLURBIPROFEN SODIUM 0.3 MG/ML
1 SOLUTION/ DROPS OPHTHALMIC
Status: DISCONTINUED | OUTPATIENT
Start: 2017-09-15 | End: 2017-09-15 | Stop reason: HOSPADM

## 2017-09-15 RX ORDER — TETRACAINE HYDROCHLORIDE 5 MG/ML
SOLUTION OPHTHALMIC PRN
Status: DISCONTINUED | OUTPATIENT
Start: 2017-09-15 | End: 2017-09-15 | Stop reason: HOSPADM

## 2017-09-15 RX ORDER — TETRACAINE HYDROCHLORIDE 5 MG/ML
1 SOLUTION OPHTHALMIC ONCE
Status: COMPLETED | OUTPATIENT
Start: 2017-09-15 | End: 2017-09-15

## 2017-09-15 RX ORDER — CYCLOPENTOLATE HYDROCHLORIDE 10 MG/ML
1 SOLUTION/ DROPS OPHTHALMIC
Status: COMPLETED | OUTPATIENT
Start: 2017-09-15 | End: 2017-09-15

## 2017-09-15 RX ORDER — OFLOXACIN 3 MG/ML
1 SOLUTION/ DROPS OPHTHALMIC
Status: COMPLETED | OUTPATIENT
Start: 2017-09-15 | End: 2017-09-15

## 2017-09-15 RX ORDER — LIDOCAINE HYDROCHLORIDE 10 MG/ML
INJECTION, SOLUTION EPIDURAL; INFILTRATION; INTRACAUDAL; PERINEURAL PRN
Status: DISCONTINUED | OUTPATIENT
Start: 2017-09-15 | End: 2017-09-15 | Stop reason: HOSPADM

## 2017-09-15 RX ORDER — ONDANSETRON 4 MG/1
4 TABLET, ORALLY DISINTEGRATING ORAL EVERY 30 MIN PRN
Status: DISCONTINUED | OUTPATIENT
Start: 2017-09-15 | End: 2017-09-16 | Stop reason: HOSPADM

## 2017-09-15 RX ADMIN — PHENYLEPHRINE HYDROCHLORIDE 1 DROP: 25 SOLUTION/ DROPS OPHTHALMIC at 06:20

## 2017-09-15 RX ADMIN — CYCLOPENTOLATE HYDROCHLORIDE 1 DROP: 10 SOLUTION/ DROPS OPHTHALMIC at 06:20

## 2017-09-15 RX ADMIN — FLURBIPROFEN SODIUM 1 DROP: 0.3 SOLUTION/ DROPS OPHTHALMIC at 06:20

## 2017-09-15 RX ADMIN — TETRACAINE HYDROCHLORIDE 1 DROP: 5 SOLUTION OPHTHALMIC at 06:21

## 2017-09-15 RX ADMIN — FLURBIPROFEN SODIUM 1 DROP: 0.3 SOLUTION/ DROPS OPHTHALMIC at 06:26

## 2017-09-15 RX ADMIN — OFLOXACIN 1 DROP: 3 SOLUTION/ DROPS OPHTHALMIC at 06:38

## 2017-09-15 RX ADMIN — LIDOCAINE HYDROCHLORIDE 1 ML: 10 INJECTION, SOLUTION EPIDURAL; INFILTRATION; INTRACAUDAL; PERINEURAL at 07:31

## 2017-09-15 RX ADMIN — OFLOXACIN 1 DROP: 3 SOLUTION/ DROPS OPHTHALMIC at 06:26

## 2017-09-15 RX ADMIN — OFLOXACIN 1 DROP: 3 SOLUTION/ DROPS OPHTHALMIC at 06:20

## 2017-09-15 RX ADMIN — FLURBIPROFEN SODIUM 1 DROP: 0.3 SOLUTION/ DROPS OPHTHALMIC at 06:38

## 2017-09-15 RX ADMIN — CYCLOPENTOLATE HYDROCHLORIDE 1 DROP: 10 SOLUTION/ DROPS OPHTHALMIC at 06:26

## 2017-09-15 RX ADMIN — BALANCED SALT SOLUTION 1 APPLICATOR: 6.4; .75; .48; .3; 3.9; 1.7 SOLUTION OPHTHALMIC at 07:12

## 2017-09-15 RX ADMIN — Medication 1 DROP: at 07:43

## 2017-09-15 RX ADMIN — PHENYLEPHRINE HYDROCHLORIDE 1 DROP: 25 SOLUTION/ DROPS OPHTHALMIC at 06:38

## 2017-09-15 RX ADMIN — CYCLOPENTOLATE HYDROCHLORIDE 1 DROP: 10 SOLUTION/ DROPS OPHTHALMIC at 06:38

## 2017-09-15 RX ADMIN — TETRACAINE HYDROCHLORIDE 2 DROP: 5 SOLUTION OPHTHALMIC at 07:20

## 2017-09-15 RX ADMIN — Medication 500 ML: at 07:31

## 2017-09-15 RX ADMIN — PHENYLEPHRINE HYDROCHLORIDE 1 DROP: 25 SOLUTION/ DROPS OPHTHALMIC at 06:26

## 2017-09-15 ASSESSMENT — EXTERNAL EXAM - LEFT EYE: OS_EXAM: NORMAL

## 2017-09-15 ASSESSMENT — SLIT LAMP EXAM - LIDS: COMMENTS: NORMAL

## 2017-09-15 ASSESSMENT — TONOMETRY
IOP_METHOD: TONOPEN
OS_IOP_MMHG: 16

## 2017-09-15 ASSESSMENT — VISUAL ACUITY
OS_SC: 20/40-2
METHOD: SNELLEN - LINEAR

## 2017-09-15 NOTE — IP AVS SNAPSHOT
[facility]    [address] [place] [address]    Phone:  [phone]    Fax:  [phone]                                       After Visit Summary   9/15/2017    Robert B Behr    MRN: [id number]           After Visit Summary Signature Page     I have received my discharge instructions, and my questions have been answered. I have discussed any challenges I see with this plan with the nurse or doctor.    ..........................................................................................................................................  Patient/Patient Representative Signature      ..........................................................................................................................................  Patient Representative Print Name and Relationship to Patient    ..................................................               ................................................  Date                                            Time    ..........................................................................................................................................  Reviewed by Signature/Title    ...................................................              ..............................................  Date                                                            Time

## 2017-09-15 NOTE — MR AVS SNAPSHOT
After Visit Summary   9/15/2017    Robert B Behr    MRN: [id number]           Patient Information     Date Of Birth          1950        Visit Information        Provider Department      9/15/2017 10:00 AM Elly Valencia MD [facility]        Today's Diagnoses     Pseudophakia, both eyes    -  1       Follow-ups after your visit        Follow-up notes from your care team     Return in about 3 weeks (around 10/6/2017) for refraction and dilation.      Your next 10 appointments already scheduled     Sep 15, 2017 10:00 AM CDT   (Arrive by 9:45 AM)   Post-Op with MD CHARLETTE Santos [facility] ([facility] [place])    [address]  4th [address]  [facility] [address] [phone]            Oct 11, 2017 12:45 PM CDT   Post-Op with Elly Valencia MD   Eye Clinic ([facility])    [address]  [address]  [address]  [facility] [address] [phone]              Who to contact     Please call your clinic at [phone] to:    Ask questions about your health    Make or cancel appointments    Discuss your medicines    Learn about your test results    Speak to your doctor   If you have compliments or concerns about an experience at your clinic, or if you wish to file a complaint, please contact [facility] Patient Relations at [phone] or email us at [email or web address]         Additional Information About Your Visit        MyChart Information     [employer] gives you secure access to your electronic health record. If you see a primary care provider, you can also send messages to your care team and make appointments. If you have questions, please call your primary care clinic.  If you do not have a primary care provider, please call [phone] and they will assist you.      [employer] is an electronic gateway that provides easy, online access to your medical records. With  [employer], you can request a clinic appointment, read your test results, renew a prescription or communicate with your care team.     To access your existing account, please contact your [facility] or call [phone] for assistance.        Care EveryWhere ID     This is your Care EveryWhere ID. This could be used by other organizations to access your [facility] medical records  [id number]         Blood Pressure from Last 3 Encounters:   09/15/17 126/78   08/29/17 121/67   08/28/17 138/79    Weight from Last 3 Encounters:   08/29/17 71.1 kg (156 lb 11.2 oz)   08/28/17 71.4 kg (157 lb 6.4 oz)   08/07/17 68 kg (150 lb)              Today, you had the following     No orders found for display         Today's Medication Changes          These changes are accurate as of: 9/15/17  9:48 AM.  If you have any questions, ask your nurse or doctor.               These medicines have changed or have updated prescriptions.        Dose/Directions    ascorbic acid 1000 MG Tabs   Commonly known as:  vitamin C   This may have changed:  See the new instructions.   Used for:  Encounter for herb and vitamin supplement management        TAKE 1 TABLET BY MOUTH DAILY   Quantity:  90 tablet   Refills:  0       * Ipratropium-Albuterol  MCG/ACT inhaler   Commonly known as:  COMBIVENT RESPIMAT   This may have changed:    - when to take this  - reasons to take this  - additional instructions   Used for:  COPD exacerbation (H)   Changed by:  Fina Frausto MD        INHALE ONE PUFF BY MOUTH FOUR TIMES A DAY (NO TO EXCCED 6 DOSES PER DAY)   Quantity:  4 g   Refills:  11       * ipratropium - albuterol 0.5 mg/2.5 mg/3 mL 0.5-2.5 (3) MG/3ML neb solution   Commonly known as:  DUONEB   This may have changed:  Another medication with the same name was changed. Make sure you understand how and when to take each.   Used for:  COPD exacerbation (H)   Changed by:  Fina Frausto MD        Dose:  1 vial   Take  1 vial (3 mLs) by nebulization every 6 hours as needed for shortness of breath / dyspnea or wheezing   Quantity:  30 vial   Refills:  1       omega 3 1000 MG Caps   This may have changed:  when to take this   Used for:  Encounter for herb and vitamin supplement management        Dose:  1 g   Take 1 g by mouth daily   Quantity:  90 capsule   Refills:  2       vitamin A 39706 UNIT capsule   Commonly known as:  CVS VITAMIN A   This may have changed:  when to take this   Used for:  Encounter for herb and vitamin supplement management        Dose:  18022 Units   Take 1 capsule (10,000 Units) by mouth daily   Quantity:  180 capsule   Refills:  2       vitamin E 1000 UNIT capsule   Commonly known as:  CVS vitamin E   This may have changed:  when to take this   Used for:  Encounter for herb and vitamin supplement management        Dose:  1000 Units   Take 1 capsule (1,000 Units) by mouth daily   Quantity:  90 capsule   Refills:  2       * Notice:  This list has 2 medication(s) that are the same as other medications prescribed for you. Read the directions carefully, and ask your doctor or other care provider to review them with you.             Primary Care Provider Office Phone # Fax #    Fina Frausto [phone] [address] [place] [address]        Equal Access to Services     MJ PAPPAS AH: Hadii marylou Hernandez, waaxda luadriana, qaybta kaalmada rachael, kael guerrero. So [facility] [phone].    ATENCIÓN: Si habla español, tiene a rios disposición servicios gratuitos de asistencia lingüística. [facility] [phone].    We comply with applicable federal civil rights laws and [place] laws. We do not discriminate on the basis of race, color, national origin, age, disability sex, sexual orientation or gender identity.            Thank you!     Thank you for choosing [facility]  for your care. Our goal is always to provide you with  excellent care. Hearing back from our patients is one way we can continue to improve our services. Please take a few minutes to complete the written survey that you may receive in the mail after your visit with us. Thank you!             Your Updated Medication List - Protect others around you: Learn how to safely use, store and throw away your medicines at www.disposemymeds.org.          This list is accurate as of: 9/15/17  9:48 AM.  Always use your most recent med list.                   Brand Name Dispense Instructions for use Diagnosis    ascorbic acid 1000 MG Tabs    vitamin C    90 tablet    TAKE 1 TABLET BY MOUTH DAILY    Encounter for herb and vitamin supplement management       CALCIUM 1200+D3 600- MG-MG-UNIT Tb24   Generic drug:  Calcium-Magnesium-Vitamin D      Take 1-2 tablets by mouth every morning Reported on 3/14/2017        calcium-vitamin D 600-400 MG-UNIT per tablet    CALTRATE    180 tablet    TAKE 1 TABLET BY MOUTH TWICE DAILY    Age-related osteoporosis without current pathological fracture       fluticasone-salmeterol 250-50 MCG/DOSE diskus inhaler    ADVAIR    1 Inhaler    Inhale 1 puff into the lungs 2 times daily    Chronic obstructive pulmonary disease, unspecified COPD type (H)       glucosamine-chondroitinoitin 750-600 MG Tabs    PA GLUCOSAMINE-CHONDROITIN    60 tablet    Take 2 tablets by mouth 2 times daily    Osteoarthritis of right hip, unspecified osteoarthritis type       * Ipratropium-Albuterol  MCG/ACT inhaler    COMBIVENT RESPIMAT    4 g    INHALE ONE PUFF BY MOUTH FOUR TIMES A DAY (NO TO EXCCED 6 DOSES PER DAY)    COPD exacerbation (H)       * ipratropium - albuterol 0.5 mg/2.5 mg/3 mL 0.5-2.5 (3) MG/3ML neb solution    DUONEB    30 vial    Take 1 vial (3 mLs) by nebulization every 6 hours as needed for shortness of breath / dyspnea or wheezing    COPD exacerbation (H)       levofloxacin 0.5 % ophthalmic solution    QUIXIN    1 Bottle    1 drop in surgical eye as  directed - 4x daily for 1 week, then stop    Nuclear cataract of left eye       nicotine 14 MG/24HR 24 hr patch    NICODERM CQ    30 patch    Place 1 patch onto the skin every 24 hours    Tobacco use disorder       nicotine polacrilex 2 MG lozenge    COMMIT    150 lozenge    Place 1 lozenge (2 mg) inside cheek as needed for smoking cessation Place 1 lozenge inside cheek as needed for smoking cessation.    Chronic obstructive pulmonary disease, unspecified COPD type (H), Tobacco abuse       omega 3 1000 MG Caps     90 capsule    Take 1 g by mouth daily    Encounter for herb and vitamin supplement management       order for DME     1 each    Equipment being ordered: nebulizer machine    COPD exacerbation (H)       prednisoLONE acetate 1 % ophthalmic susp    PRED FORTE    1 Bottle    1 drop in surgical eye as directed, 4x daily after surgery for 1 week, 3x daily for 1 week, 2x daily for 1 week, daily for 1 week, then stop    Nuclear cataract of left eye       vitamin A 38311 UNIT capsule    CVS VITAMIN A    180 capsule    Take 1 capsule (10,000 Units) by mouth daily    Encounter for herb and vitamin supplement management       vitamin B complex with vitamin C Tabs tablet     180 tablet    Take 1 tablet by mouth 2 times daily With Folic acid    Encounter for herb and vitamin supplement management       vitamin E 1000 UNIT capsule    CVS vitamin E    90 capsule    Take 1 capsule (1,000 Units) by mouth daily    Encounter for herb and vitamin supplement management       * Notice:  This list has 2 medication(s) that are the same as other medications prescribed for you. Read the directions carefully, and ask your doctor or other care provider to review them with you.

## 2017-09-15 NOTE — IP AVS SNAPSHOT
MRN:[id number]                      After Visit Summary   9/15/2017    Robert B Behr    MRN: [id number]           Thank you!     Thank you for choosing [facility] [facility] for your care. Our goal is always to provide you with excellent care. Hearing back from our patients is one way we can continue to improve our services. Please take a few minutes to complete the written survey that you may receive in the mail after you visit with us. Thank you!        Patient Information     Date Of Birth          1950        About your hospital stay     You were admitted on:  September 15, 2017 You last received care in the:  [facility]    You were discharged on:  September 15, 2017       Who to Call     For medical emergencies, please call 911.  For non-urgent questions about your medical care, please call your primary care provider or clinic, [phone]  For questions related to your surgery, please call your surgery clinic        Attending Provider     Provider Specialty    Elly Valencia MD Ophthalmology       Primary Care Provider Office Phone # Fax #    Fina Frausto [phone]      After Care Instructions     Eye drops as prescribed by physician.  Start drops today unless told otherwise by the physician           May use Tylenol or Advil for pain as directed by the physician           Notify Physician if you have severe headache or nausea           Remove patch per physician instruction           Return to clinic as instructed by physician           Wear eye shield or patch as directed                 Your next 10 appointments already scheduled     Sep 15, 2017   Procedure with Elly Valencia MD   [facility] ([facility])    [address] [place] [address]  [address] [facility] [address] [phone]           Located in the Clinics and Surgery Center at [address] [place] [address].  [facility]  parking is very convenient and highly recommended.  is a $6 flat rate fee.  Both  and self parkers should enter the main arrival plaza from [facility]; parking attendants will direct you based on your parking preference.            Sep 15, 2017 10:00 AM CDT   (Arrive by 9:45 AM)   Post-Op with Elly Valencia MD   [facility] ([facility])    [address]  4th Floor  [facility] [address] [phone]            Oct 11, 2017 12:45 PM CDT   Post-Op with Elly Valencia MD   Eye Clinic ([facility])    [address]  [address]  9[address] Clin [address]  [facility] [address]   [phone]              Further instructions from your care team       [facility]     Home Care Following Cataract Surgery    If you have a gauze eye patch on, please do not remove it until it is removed by your doctor at your first appointment after your surgery.  You will start your eye drops the next day.    OR    If you only have a clear eye shield on, you may remove the eye shield when you get home and begin eye drops today as directed by your doctor.      Wear the clear eye shield for protection when sleeping for the next 5 days.      Do not rub the eye that had the operation.      Your eye might be sensitive to light.  Wearing sunglasses may be more comfortable for you.      You may have some discomfort and irritation.  Acetaminophen (Tylenol) or Ibuprofen (Advil) may be taken for discomfort. If pain persists please call your doctor s office.      Keep the eye that had the surgery dry. You may wash your hair, bathe or shower, but keep your eye closed while doing so.       Avoid bending over, strenuous activity or heavy lifting until this activity has been approved by your doctor.      You have a follow-up appointment with your doctor tomorrow at the [facility] ([phone]).  Bring all your  prescribed eye drops with you to this follow-up appointment.        If you take glaucoma medications, bring them with you to your follow-up appointment tomorrow.      Use medication exactly as prescribed by your doctor. You may restart your regular home medications.       Call your doctor s office at [phone] if any of the following should occur:    - Any sudden vision changes, including decreased vision  - Nausea or severe headache  - Increase in pain that is not controlled with Acetaminophen (Tylenol) or Ibuprofen (Advil)  - Signs of infection (pus, increasing redness or tenderness)  - Severe sensitivity to light  - An increase in floaters (black spots in front of your vision)      Pending Results     No orders found from 9/13/2017 to 9/16/2017.            Admission Information     Date & Time Provider Department Dept. Phone    9/15/2017 Elly Valencia MD [facility] [phone]      Your Vitals Were     Blood Pressure Pulse Temperature Respirations Pulse Oximetry       136/85 (Cuff Size: Adult Regular) 66 97.8  F (36.6  C) (Oral) 16 98%       [employer] Information     [employer] gives you secure access to your electronic health record. If you see a primary care provider, you can also send messages to your care team and make appointments. If you have questions, please call your primary care clinic.  If you do not have a primary care provider, please call [phone] and they will assist you.      [employer] is an electronic gateway that provides easy, online access to your medical records. With [employer], you can request a clinic appointment, read your test results, renew a prescription or communicate with your care team.     To access your existing account, please contact your [facility] or call [phone] for assistance.        Care EveryWhere ID     This is your Care EveryWhere ID. This could be used by other organizations to access your [facility]  records  [id number]        Equal Access to Services     [facility] : Hadii marylou chaney violetjuvenal Soalessandraali, waaxda luqadaha, qaybta kakeiryмарина cano, kael benjaminpiotrvy guerrero. So [facility] [phone].    ATENCIÓN: Si habla español, tiene a rios disposición servicios gratuitos de asistencia lingüística. Llame al [phone].    We comply with applicable federal civil rights laws and [place] laws. We do not discriminate on the basis of race, color, national origin, age, disability sex, sexual orientation or gender identity.               Review of your medicines      UNREVIEWED medicines. Ask your doctor about these medicines        Dose / Directions    ascorbic acid 1000 MG Tabs   Commonly known as:  vitamin C   Used for:  Encounter for herb and vitamin supplement management        TAKE 1 TABLET BY MOUTH DAILY   Quantity:  90 tablet   Refills:  0       CALCIUM 1200+D3 600- MG-MG-UNIT Tb24   Generic drug:  Calcium-Magnesium-Vitamin D        Dose:  1-2 tablet   Take 1-2 tablets by mouth every morning Reported on 3/14/2017   Refills:  0       calcium-vitamin D 600-400 MG-UNIT per tablet   Commonly known as:  CALTRATE   Used for:  Age-related osteoporosis without current pathological fracture        TAKE 1 TABLET BY MOUTH TWICE DAILY   Quantity:  180 tablet   Refills:  3       fluticasone-salmeterol 250-50 MCG/DOSE diskus inhaler   Commonly known as:  ADVAIR   Used for:  Chronic obstructive pulmonary disease, unspecified COPD type (H)        Dose:  1 puff   Inhale 1 puff into the lungs 2 times daily   Quantity:  1 Inhaler   Refills:  11       glucosamine-chondroitinoitin 750-600 MG Tabs   Commonly known as:  PA GLUCOSAMINE-CHONDROITIN   Used for:  Osteoarthritis of right hip, unspecified osteoarthritis type        Dose:  2 tablet   Take 2 tablets by mouth 2 times daily   Quantity:  60 tablet   Refills:  11       * Ipratropium-Albuterol  MCG/ACT inhaler   Commonly known as:  COMBIVENT RESPIMAT    Used for:  COPD exacerbation (H)        INHALE ONE PUFF BY MOUTH FOUR TIMES A DAY (NO TO EXCCED 6 DOSES PER DAY)   Quantity:  4 g   Refills:  11       * ipratropium - albuterol 0.5 mg/2.5 mg/3 mL 0.5-2.5 (3) MG/3ML neb solution   Commonly known as:  DUONEB   Used for:  COPD exacerbation (H)        Dose:  1 vial   Take 1 vial (3 mLs) by nebulization every 6 hours as needed for shortness of breath / dyspnea or wheezing   Quantity:  30 vial   Refills:  1       levofloxacin 0.5 % ophthalmic solution   Commonly known as:  QUIXIN   Used for:  Nuclear cataract of left eye        1 drop in surgical eye as directed - 4x daily for 1 week, then stop   Quantity:  1 Bottle   Refills:  1       nicotine 14 MG/24HR 24 hr patch   Commonly known as:  NICODERM CQ   Used for:  Tobacco use disorder        Dose:  1 patch   Place 1 patch onto the skin every 24 hours   Quantity:  30 patch   Refills:  1       nicotine polacrilex 2 MG lozenge   Commonly known as:  COMMIT   Used for:  Chronic obstructive pulmonary disease, unspecified COPD type (H), Tobacco abuse        Dose:  2 mg   Place 1 lozenge (2 mg) inside cheek as needed for smoking cessation Place 1 lozenge inside cheek as needed for smoking cessation.   Quantity:  150 lozenge   Refills:  3       omega 3 1000 MG Caps   Used for:  Encounter for herb and vitamin supplement management        Dose:  1 g   Take 1 g by mouth daily   Quantity:  90 capsule   Refills:  2       prednisoLONE acetate 1 % ophthalmic susp   Commonly known as:  PRED FORTE   Used for:  Nuclear cataract of left eye        1 drop in surgical eye as directed, 4x daily after surgery for 1 week, 3x daily for 1 week, 2x daily for 1 week, daily for 1 week, then stop   Quantity:  1 Bottle   Refills:  1       vitamin A 59021 UNIT capsule   Commonly known as:  CVS VITAMIN A   Used for:  Encounter for herb and vitamin supplement management        Dose:  54678 Units   Take 1 capsule (10,000 Units) by mouth daily   Quantity:   180 capsule   Refills:  2       vitamin B complex with vitamin C Tabs tablet   Used for:  Encounter for herb and vitamin supplement management        Dose:  1 tablet   Take 1 tablet by mouth 2 times daily With Folic acid   Quantity:  180 tablet   Refills:  2       vitamin E 1000 UNIT capsule   Commonly known as:  CVS vitamin E   Used for:  Encounter for herb and vitamin supplement management        Dose:  1000 Units   Take 1 capsule (1,000 Units) by mouth daily   Quantity:  90 capsule   Refills:  2       * Notice:  This list has 2 medication(s) that are the same as other medications prescribed for you. Read the directions carefully, and ask your doctor or other care provider to review them with you.      CONTINUE these medicines which have NOT CHANGED        Dose / Directions    order for DME   Used for:  COPD exacerbation (H)        Equipment being ordered: nebulizer machine   Quantity:  1 each   Refills:  0                Protect others around you: Learn how to safely use, store and throw away your medicines at www.disposemymeds.org.             Medication List: This is a list of all your medications and when to take them. Check marks below indicate your daily home schedule. Keep this list as a reference.      Medications           Morning Afternoon Evening Bedtime As Needed    ascorbic acid 1000 MG Tabs   Commonly known as:  vitamin C   TAKE 1 TABLET BY MOUTH DAILY                                CALCIUM 1200+D3 600- MG-MG-UNIT Tb24   Take 1-2 tablets by mouth every morning Reported on 3/14/2017   Generic drug:  Calcium-Magnesium-Vitamin D                                calcium-vitamin D 600-400 MG-UNIT per tablet   Commonly known as:  CALTRATE   TAKE 1 TABLET BY MOUTH TWICE DAILY                                fluticasone-salmeterol 250-50 MCG/DOSE diskus inhaler   Commonly known as:  ADVAIR   Inhale 1 puff into the lungs 2 times daily                                glucosamine-chondroitinoitin 750-600 MG  Tabs   Commonly known as:  PA GLUCOSAMINE-CHONDROITIN   Take 2 tablets by mouth 2 times daily                                * Ipratropium-Albuterol  MCG/ACT inhaler   Commonly known as:  COMBIVENT RESPIMAT   INHALE ONE PUFF BY MOUTH FOUR TIMES A DAY (NO TO EXCCED 6 DOSES PER DAY)                                * ipratropium - albuterol 0.5 mg/2.5 mg/3 mL 0.5-2.5 (3) MG/3ML neb solution   Commonly known as:  DUONEB   Take 1 vial (3 mLs) by nebulization every 6 hours as needed for shortness of breath / dyspnea or wheezing                                levofloxacin 0.5 % ophthalmic solution   Commonly known as:  QUIXIN   1 drop in surgical eye as directed - 4x daily for 1 week, then stop                                nicotine 14 MG/24HR 24 hr patch   Commonly known as:  NICODERM CQ   Place 1 patch onto the skin every 24 hours                                nicotine polacrilex 2 MG lozenge   Commonly known as:  COMMIT   Place 1 lozenge (2 mg) inside cheek as needed for smoking cessation Place 1 lozenge inside cheek as needed for smoking cessation.                                omega 3 1000 MG Caps   Take 1 g by mouth daily                                order for DME   Equipment being ordered: nebulizer machine                                prednisoLONE acetate 1 % ophthalmic susp   Commonly known as:  PRED FORTE   1 drop in surgical eye as directed, 4x daily after surgery for 1 week, 3x daily for 1 week, 2x daily for 1 week, daily for 1 week, then stop                                vitamin A 83009 UNIT capsule   Commonly known as:  CVS VITAMIN A   Take 1 capsule (10,000 Units) by mouth daily                                vitamin B complex with vitamin C Tabs tablet   Take 1 tablet by mouth 2 times daily With Folic acid                                vitamin E 1000 UNIT capsule   Commonly known as:  CVS vitamin E   Take 1 capsule (1,000 Units) by mouth daily                                * Notice:  This  list has 2 medication(s) that are the same as other medications prescribed for you. Read the directions carefully, and ask your doctor or other care provider to review them with you.

## 2017-09-15 NOTE — PROGRESS NOTES
POD#0, status post cataract surgery, left eye    No complaints.  Denies eye pain.    Impression/Plan:  Pseudophakia, OD: POD0, good post-operative appearance. IOP reasonable.    - Levofloxacin 4x daily in the surgical eye for 1 week  - Prednisolone 4x daily in the surgical eye for 1 week, then weekly taper      Eye protection at all times and eye shield at night for 1 week.    Limited activities with no exercise or heavy lifting for 1 week.    Instructed patient to contact us for decreasing vision, eye pain, new floaters or flashes of light or other concerning symptoms.    Written instructions given    Return to clinic 3-4 weeks with refraction.    Teaching statement:  Complete documentation of historical and exam elements from today's encounter can be found in the full encounter summary report (not reduplicated in this progress note). I personally obtained the chief complaint(s) and history of present illness.  I confirmed and edited as necessary the review of systems, past medical/surgical history, family history, social history, and examination findings as documented by others; and I examined the patient myself. I personally reviewed the relevant tests, images, and reports as documented above.     I formulated and edited as necessary the assessment and plan and discussed the findings and management plan with the patient and family.    Elly Valencia MD  Comprehensive Ophthalmology & Ocular Pathology  Department of Ophthalmology and Visual Neurosciences  [email or web address]  Pager [phone]

## 2017-09-15 NOTE — ANESTHESIA CARE TRANSFER NOTE
Patient: Robert B Behr    Procedure(s):  Left Eye Phacoemulsification with Standard Lens - Wound Class: I-Clean    Diagnosis: Left Eye Cataract  Diagnosis Additional Information: No value filed.    Anesthesia Type:   MAC     Note:  Airway :Room Air  Patient transferred to:Phase II  Comments: Arrive Phase II, Stable, Airway Intact  104/69, 58,16,98,98.2  All questions answered.      Vitals: (Last set prior to Anesthesia Care Transfer)    CRNA VITALS  9/15/2017 0719 - 9/15/2017 0753      9/15/2017             EKG: NSR                Electronically Signed By: DARIEL Jacques CRNA  September 15, 2017  7:53 AM

## 2017-09-15 NOTE — ANESTHESIA POSTPROCEDURE EVALUATION
Patient: Robert B Behr    Procedure(s):  Left Eye Phacoemulsification with Standard Lens - Wound Class: I-Clean    Diagnosis:Left Eye Cataract  Diagnosis Additional Information: No value filed.    Anesthesia Type:  MAC    Note:  Anesthesia Post Evaluation    Patient location during evaluation: PACU  Patient participation: Able to fully participate in evaluation  Level of consciousness: awake and alert  Pain management: adequate  Airway patency: patent  Cardiovascular status: hemodynamically stable  Respiratory status: nonlabored ventilation, spontaneous ventilation and room air  Hydration status: euvolemic  PONV: none     Anesthetic complications: None          Last vitals:  Vitals:    09/15/17 0615 09/15/17 0803 09/15/17 0816   BP: 136/85 126/82 126/78   Pulse: 66 68 62   Resp: 16 16 16   Temp: 36.6  C (97.8  F) 36.6  C (97.8  F)    SpO2: 98% 96% 97%         Electronically Signed By: Alvarez Velez MD  September 15, 2017  11:32 AM

## 2017-09-30 ENCOUNTER — OFFICE VISIT (OUTPATIENT)
Dept: URGENT CARE | Facility: URGENT CARE | Age: 67
End: 2017-09-30
Payer: COMMERCIAL

## 2017-09-30 VITALS
TEMPERATURE: 97.6 F | SYSTOLIC BLOOD PRESSURE: 154 MMHG | BODY MASS INDEX: 22.38 KG/M2 | WEIGHT: 156 LBS | DIASTOLIC BLOOD PRESSURE: 82 MMHG | HEART RATE: 57 BPM | OXYGEN SATURATION: 98 %

## 2017-09-30 DIAGNOSIS — J44.1 COPD EXACERBATION (H): Primary | ICD-10-CM

## 2017-09-30 PROCEDURE — 99213 OFFICE O/P EST LOW 20 MIN: CPT | Performed by: PHYSICIAN ASSISTANT

## 2017-09-30 RX ORDER — DOXYCYCLINE 100 MG/1
100 CAPSULE ORAL 2 TIMES DAILY
Qty: 14 CAPSULE | Refills: 0 | Status: SHIPPED | OUTPATIENT
Start: 2017-09-30 | End: 2017-10-07

## 2017-09-30 RX ORDER — PREDNISONE 20 MG/1
20 TABLET ORAL 2 TIMES DAILY
Qty: 10 TABLET | Refills: 0 | Status: SHIPPED | OUTPATIENT
Start: 2017-09-30 | End: 2019-05-14

## 2017-09-30 NOTE — MR AVS SNAPSHOT
After Visit Summary   9/30/2017    Robert B Behr    MRN: [id number]           Patient Information     Date Of Birth          1950        Visit Information        Provider Department      9/30/2017 9:35 AM Juan Abreu PA-C [facility]        Today's Diagnoses     COPD exacerbation (H)    -  1       Follow-ups after your visit        Your next 10 appointments already scheduled     Oct 11, 2017 12:45 PM CDT   Post-Op with Elly Valencia MD   Eye Clinic ([facility])    [address]  [address] [facility]  9th [address] Clin [address]  [facility] [id number] [phone]              Who to contact     If you have questions or need follow up information about today's clinic visit or your schedule please contact [facility] directly at [phone].  Normal or non-critical lab and imaging results will be communicated to you by MyChart, letter or phone within 4 business days after the clinic has received the results. If you do not hear from us within 7 days, please contact the clinic through MyChart or phone. If you have a critical or abnormal lab result, we will notify you by phone as soon as possible.  Submit refill requests through [employer] or call your pharmacy and they will forward the refill request to us. Please allow 3 business days for your refill to be completed.          Additional Information About Your Visit        MyChart Information     [employer] gives you secure access to your electronic health record. If you see a primary care provider, you can also send messages to your care team and make appointments. If you have questions, please call your primary care clinic.  If you do not have a primary care provider, please call [phone] and they will assist you.        Care EveryWhere ID     This is your Care EveryWhere ID. This could be used by other organizations to access your [place] medical records  [id number]        Your Vitals  Were     Pulse Temperature Pulse Oximetry BMI (Body Mass Index)          57 97.6  F (36.4  C) (Tympanic) 98% 22.38 kg/m2         Blood Pressure from Last 3 Encounters:   09/30/17 154/82   09/15/17 126/78   08/29/17 121/67    Weight from Last 3 Encounters:   09/30/17 156 lb (70.8 kg)   08/29/17 156 lb 11.2 oz (71.1 kg)   08/28/17 157 lb 6.4 oz (71.4 kg)              Today, you had the following     No orders found for display         Today's Medication Changes          These changes are accurate as of: 9/30/17 10:02 AM.  If you have any questions, ask your nurse or doctor.               Start taking these medicines.        Dose/Directions    doxycycline 100 MG capsule   Commonly known as:  VIBRAMYCIN   Used for:  COPD exacerbation (H)   Started by:  Juan Abreu PA-C        Dose:  100 mg   Take 1 capsule (100 mg) by mouth 2 times daily for 7 days   Quantity:  14 capsule   Refills:  0       predniSONE 20 MG tablet   Commonly known as:  DELTASONE   Used for:  COPD exacerbation (H)   Started by:  Juan Abreu PA-C        Dose:  20 mg   Take 1 tablet (20 mg) by mouth 2 times daily   Quantity:  10 tablet   Refills:  0         These medicines have changed or have updated prescriptions.        Dose/Directions    ascorbic acid 1000 MG Tabs   Commonly known as:  vitamin C   This may have changed:  See the new instructions.   Used for:  Encounter for herb and vitamin supplement management        TAKE 1 TABLET BY MOUTH DAILY   Quantity:  90 tablet   Refills:  0       omega 3 1000 MG Caps   This may have changed:  when to take this   Used for:  Encounter for herb and vitamin supplement management        Dose:  1 g   Take 1 g by mouth daily   Quantity:  90 capsule   Refills:  2       vitamin A 86753 UNIT capsule   Commonly known as:  CVS VITAMIN A   This may have changed:  when to take this   Used for:  Encounter for herb and vitamin supplement management        Dose:  33070 Units   Take 1 capsule (10,000 Units) by mouth  daily   Quantity:  180 capsule   Refills:  2       vitamin E 1000 UNIT capsule   Commonly known as:  CVS vitamin E   This may have changed:  when to take this   Used for:  Encounter for herb and vitamin supplement management        Dose:  1000 Units   Take 1 capsule (1,000 Units) by mouth daily   Quantity:  90 capsule   Refills:  2            Where to get your medicines      Some of these will need a paper prescription and others can be bought over the counter.  Ask your nurse if you have questions.     Bring a paper prescription for each of these medications     doxycycline 100 MG capsule    predniSONE 20 MG tablet                Primary Care Provider Office Phone # Fax #    iFna Frausto [phone] [address] [place] [address]        Equal Access to Services     TIARRA PAPPAS : Cony Hernandez, staci licea, bindu kaalmada rachael, kael guerrero. So [facility] [phone].    ATENCIÓN: Si habla español, tiene a rios disposición servicios gratuitos de asistencia lingüística. Llame al [phone].    We comply with applicable federal civil rights laws and [place] laws. We do not discriminate on the basis of race, color, national origin, age, disability, sex, sexual orientation, or gender identity.            Thank you!     Thank you for choosing [facility]  for your care. Our goal is always to provide you with excellent care. Hearing back from our patients is one way we can continue to improve our services. Please take a few minutes to complete the written survey that you may receive in the mail after your visit with us. Thank you!             Your Updated Medication List - Protect others around you: Learn how to safely use, store and throw away your medicines at www.disposemymeds.org.          This list is accurate as of: 9/30/17 10:02 AM.  Always use your most recent med list.                   Brand Name  Dispense Instructions for use Diagnosis    ascorbic acid 1000 MG Tabs    vitamin C    90 tablet    TAKE 1 TABLET BY MOUTH DAILY    Encounter for herb and vitamin supplement management       CALCIUM 1200+D3 600- MG-MG-UNIT Tb24   Generic drug:  Calcium-Magnesium-Vitamin D      Take 1-2 tablets by mouth every morning Reported on 3/14/2017        calcium-vitamin D 600-400 MG-UNIT per tablet    CALTRATE    180 tablet    TAKE 1 TABLET BY MOUTH TWICE DAILY    Age-related osteoporosis without current pathological fracture       doxycycline 100 MG capsule    VIBRAMYCIN    14 capsule    Take 1 capsule (100 mg) by mouth 2 times daily for 7 days    COPD exacerbation (H)       fluticasone-salmeterol 250-50 MCG/DOSE diskus inhaler    ADVAIR    1 Inhaler    Inhale 1 puff into the lungs 2 times daily    Chronic obstructive pulmonary disease, unspecified COPD type (H)       glucosamine-chondroitinoitin 750-600 MG Tabs    PA GLUCOSAMINE-CHONDROITIN    60 tablet    Take 2 tablets by mouth 2 times daily    Osteoarthritis of right hip, unspecified osteoarthritis type       * Ipratropium-Albuterol  MCG/ACT inhaler    COMBIVENT RESPIMAT    4 g    INHALE ONE PUFF BY MOUTH FOUR TIMES A DAY (NO TO EXCCED 6 DOSES PER DAY)    COPD exacerbation (H)       * ipratropium - albuterol 0.5 mg/2.5 mg/3 mL 0.5-2.5 (3) MG/3ML neb solution    DUONEB    30 vial    Take 1 vial (3 mLs) by nebulization every 6 hours as needed for shortness of breath / dyspnea or wheezing    COPD exacerbation (H)       levofloxacin 0.5 % ophthalmic solution    QUIXIN    1 Bottle    1 drop in surgical eye as directed - 4x daily for 1 week, then stop    Nuclear cataract of left eye       nicotine 14 MG/24HR 24 hr patch    NICODERM CQ    30 patch    Place 1 patch onto the skin every 24 hours    Tobacco use disorder       nicotine polacrilex 2 MG lozenge    COMMIT    150 lozenge    Place 1 lozenge (2 mg) inside cheek as needed for smoking cessation Place 1 lozenge  inside cheek as needed for smoking cessation.    Chronic obstructive pulmonary disease, unspecified COPD type (H), Tobacco abuse       omega 3 1000 MG Caps     90 capsule    Take 1 g by mouth daily    Encounter for herb and vitamin supplement management       order for DME     1 each    Equipment being ordered: nebulizer machine    COPD exacerbation (H)       prednisoLONE acetate 1 % ophthalmic susp    PRED FORTE    1 Bottle    1 drop in surgical eye as directed, 4x daily after surgery for 1 week, 3x daily for 1 week, 2x daily for 1 week, daily for 1 week, then stop    Nuclear cataract of left eye       predniSONE 20 MG tablet    DELTASONE    10 tablet    Take 1 tablet (20 mg) by mouth 2 times daily    COPD exacerbation (H)       vitamin A 95027 UNIT capsule    CVS VITAMIN A    180 capsule    Take 1 capsule (10,000 Units) by mouth daily    Encounter for herb and vitamin supplement management       vitamin B complex with vitamin C Tabs tablet     180 tablet    Take 1 tablet by mouth 2 times daily With Folic acid    Encounter for herb and vitamin supplement management       vitamin E 1000 UNIT capsule    CVS vitamin E    90 capsule    Take 1 capsule (1,000 Units) by mouth daily    Encounter for herb and vitamin supplement management       * Notice:  This list has 2 medication(s) that are the same as other medications prescribed for you. Read the directions carefully, and ask your doctor or other care provider to review them with you.

## 2017-09-30 NOTE — NURSING NOTE
"Chief Complaint   Patient presents with     Urgent Care     Pt in clinic to have eval for cough and congestion lasting 2 days.     Respiratory Problems       Initial /82  Pulse 57  Temp 97.6  F (36.4  C) (Tympanic)  Wt 156 lb (70.8 kg)  SpO2 98%  BMI 22.38 kg/m2 Estimated body mass index is 22.38 kg/(m^2) as calculated from the following:    Height as of 8/29/17: 5' 10\" (1.778 m).    Weight as of this encounter: 156 lb (70.8 kg).  Medication Reconciliation: complete   Radha Abreu/ MA    "

## 2017-10-05 ENCOUNTER — CARE COORDINATION (OUTPATIENT)
Dept: GERIATRIC MEDICINE | Facility: CLINIC | Age: 67
End: 2017-10-05

## 2017-10-06 DIAGNOSIS — Z71.89 ENCOUNTER FOR HERB AND VITAMIN SUPPLEMENT MANAGEMENT: ICD-10-CM

## 2017-10-06 RX ORDER — CHLORAL HYDRATE 500 MG
CAPSULE ORAL
Qty: 90 CAPSULE | Refills: 0 | Status: SHIPPED | OUTPATIENT
Start: 2017-10-06 | End: 2018-01-04

## 2017-10-06 NOTE — TELEPHONE ENCOUNTER
omega 3 1000 MG CAPS      Last Written Prescription Date: 12/16/2016  Last Fill Quantity: 90,  # refills: 2   Last Office Visit with [facility], [facility] or [facility] prescribing provider: 6/19/2017                                           Routing refill request to provider for review/approval because:  Drug not on the [facility] refill protocol       Thank you,  Callie Sequeira RN

## 2017-10-09 NOTE — TELEPHONE ENCOUNTER
I faxed Rx for fish oil-omega-3 fatty acids 1000 MG capsule  Start Date: 10/06/2017  Fax #: [phone]    Zo Sapp MA

## 2017-10-11 ENCOUNTER — OFFICE VISIT (OUTPATIENT)
Dept: OPHTHALMOLOGY | Facility: CLINIC | Age: 67
End: 2017-10-11
Attending: OPHTHALMOLOGY
Payer: COMMERCIAL

## 2017-10-11 DIAGNOSIS — Z96.1 PSEUDOPHAKIA, BOTH EYES: Primary | ICD-10-CM

## 2017-10-11 PROCEDURE — 92015 DETERMINE REFRACTIVE STATE: CPT | Mod: ZF

## 2017-10-11 PROCEDURE — 99213 OFFICE O/P EST LOW 20 MIN: CPT | Mod: ZF

## 2017-10-11 ASSESSMENT — SLIT LAMP EXAM - LIDS
COMMENTS: NORMAL
COMMENTS: NORMAL

## 2017-10-11 ASSESSMENT — REFRACTION_MANIFEST
OS_AXIS: 180
OD_ADD: +2.50
OS_ADD: +2.50
OD_CYLINDER: +0.75
OS_CYLINDER: +0.75
OD_AXIS: 017
OS_SPHERE: PLANO
OD_SPHERE: -0.75

## 2017-10-11 ASSESSMENT — REFRACTION_WEARINGRX
OS_AXIS: 150
OD_SPHERE: -0.25
OD_ADD: +2.50
OD_CYLINDER: +0.00
OS_ADD: +2.50
OD_AXIS: 000
OS_SPHERE: -2.75
OS_CYLINDER: +2.00

## 2017-10-11 ASSESSMENT — CONF VISUAL FIELD
METHOD: COUNTING FINGERS
OD_NORMAL: 1
OS_NORMAL: 1

## 2017-10-11 ASSESSMENT — VISUAL ACUITY
OD_SC: 20/20
OS_SC: 20/20
OD_SC+: -2
OS_SC+: -3
METHOD: SNELLEN - LINEAR

## 2017-10-11 ASSESSMENT — TONOMETRY
OD_IOP_MMHG: 15
OS_IOP_MMHG: 14
IOP_METHOD: TONOPEN

## 2017-10-11 ASSESSMENT — CUP TO DISC RATIO
OD_RATIO: 0.3
OS_RATIO: 0.3

## 2017-10-11 ASSESSMENT — EXTERNAL EXAM - LEFT EYE: OS_EXAM: NORMAL

## 2017-10-11 NOTE — PROGRESS NOTES
HPI  Robert B Behr is a 66 year old male her for follow-up after CE/IOL left eye with [employer]. He states his distance is good, not quite as crisp as the right. He also notes he can read the phone book, but he has to strain a little. He denies glare or halos. No eye pain, redness, discharge. No flashes/floaters.    POH:   - CEIOL OD  PMH: COPD  FH: Mother with AMD  SH: Current smoker     Assessment & Plan    (Z96.1) Pseudophakia, both eyes  (primary encounter diagnosis)  Comment: Excellent outcome with 20/20- uncorrected left eye and ability to read the phone book at near. Good post-op appearance  Plan: Complete drop taper. Ok to use OTC readers when needed.     -----------------------------------------------------------------------------------    RTC 1 year or sooner as needed.    Teaching statement:  Complete documentation of historical and exam elements from today's encounter can be found in the full encounter summary report (not reduplicated in this progress note). I personally obtained the chief complaint(s) and history of present illness.  I confirmed and edited as necessary the review of systems, past medical/surgical history, family history, social history, and examination findings as documented by others; and I examined the patient myself. I personally reviewed the relevant tests, images, and reports as documented above.     I formulated and edited as necessary the assessment and plan and discussed the findings and management plan with the patient and family.    Elly Valencia MD  Comprehensive Ophthalmology & Ocular Pathology  Department of Ophthalmology and Visual Neurosciences  [email or web address]  Pager [phone]

## 2017-10-11 NOTE — NURSING NOTE
Chief Complaints and History of Present Illnesses   Patient presents with     Follow Up For     3 week follow up s/p CE/IOL Left eye     HPI    Affected eye(s):  Left   Symptoms:     No floaters   No flashes   No redness   No Dryness         Do you have eye pain now?:  No      Comments:  Pt states vision has been good since last visit.    Daysi BARRAZA October 11, 2017 12:50 PM

## 2017-10-11 NOTE — MR AVS SNAPSHOT
After Visit Summary   10/11/2017    Robert B Behr    MRN: [id number]           Patient Information     Date Of Birth          1950        Visit Information        Provider Department      10/11/2017 12:45 PM Elly Valencia MD [facility]        Today's Diagnoses     Pseudophakia, both eyes    -  1       Follow-ups after your visit        Follow-up notes from your care team     Return in about 1 year (around 10/11/2018).      Who to contact     Please call your clinic at [phone] to:    Ask questions about your health    Make or cancel appointments    Discuss your medicines    Learn about your test results    Speak to your doctor   If you have compliments or concerns about an experience at your clinic, or if you wish to file a complaint, please contact [facility] Patient Relations at [phone] or email us at [email or web address]         Additional Information About Your Visit        MyChart Information     [employer] gives you secure access to your electronic health record. If you see a primary care provider, you can also send messages to your care team and make appointments. If you have questions, please call your primary care clinic.  If you do not have a primary care provider, please call [phone] and they will assist you.      [employer] is an electronic gateway that provides easy, online access to your medical records. With [employer], you can request a clinic appointment, read your test results, renew a prescription or communicate with your care team.     To access your existing account, please contact your [facility] or call [phone] for assistance.        Care EveryWhere ID     This is your Care EveryWhere ID. This could be used by other organizations to access your [place] medical records  [id number]         Blood Pressure from Last 3 Encounters:   09/30/17 154/82   09/15/17 126/78   08/29/17 121/67    Weight  from Last 3 Encounters:   09/30/17 70.8 kg (156 lb)   08/29/17 71.1 kg (156 lb 11.2 oz)   08/28/17 71.4 kg (157 lb 6.4 oz)              Today, you had the following     No orders found for display         Today's Medication Changes          These changes are accurate as of: 10/11/17  1:34 PM.  If you have any questions, ask your nurse or doctor.               These medicines have changed or have updated prescriptions.        Dose/Directions    ascorbic acid 1000 MG Tabs   Commonly known as:  vitamin C   This may have changed:  See the new instructions.   Used for:  Encounter for herb and vitamin supplement management        TAKE 1 TABLET BY MOUTH DAILY   Quantity:  90 tablet   Refills:  0       vitamin A 24111 UNIT capsule   Commonly known as:  CVS VITAMIN A   This may have changed:  when to take this   Used for:  Encounter for herb and vitamin supplement management        Dose:  07288 Units   Take 1 capsule (10,000 Units) by mouth daily   Quantity:  180 capsule   Refills:  2       vitamin E 1000 UNIT capsule   Commonly known as:  CVS vitamin E   This may have changed:  when to take this   Used for:  Encounter for herb and vitamin supplement management        Dose:  1000 Units   Take 1 capsule (1,000 Units) by mouth daily   Quantity:  90 capsule   Refills:  2                Primary Care Provider Office Phone # Fax #    Fina Frausto [phone] [address] [place] [address]        Equal Access to Services     TIARRA PAPPAS AH: Hadii marylou ku hadasho Soomaali, waaxda luqadaha, qaybta kaalmada adeegyada, kael guerrero. So [facility] [phone].    ATENCIÓN: Si habla español, tiene a rios disposición servicios gratuitos de asistencia lingüística. Llame al [phone].    We comply with applicable federal civil rights laws and [place] laws. We do not discriminate on the basis of race, color, national origin, age, disability, sex, sexual orientation, or gender  identity.            Thank you!     Thank you for choosing EYE CLINIC  for your care. Our goal is always to provide you with excellent care. Hearing back from our patients is one way we can continue to improve our services. Please take a few minutes to complete the written survey that you may receive in the mail after your visit with us. Thank you!             Your Updated Medication List - Protect others around you: Learn how to safely use, store and throw away your medicines at www.disposemymeds.org.          This list is accurate as of: 10/11/17  1:34 PM.  Always use your most recent med list.                   Brand Name Dispense Instructions for use Diagnosis    ascorbic acid 1000 MG Tabs    vitamin C    90 tablet    TAKE 1 TABLET BY MOUTH DAILY    Encounter for herb and vitamin supplement management       CALCIUM 1200+D3 600- MG-MG-UNIT Tb24   Generic drug:  Calcium-Magnesium-Vitamin D      Take 1-2 tablets by mouth every morning Reported on 3/14/2017        calcium-vitamin D 600-400 MG-UNIT per tablet    CALTRATE    180 tablet    TAKE 1 TABLET BY MOUTH TWICE DAILY    Age-related osteoporosis without current pathological fracture       fish oil-omega-3 fatty acids 1000 MG capsule     90 capsule    TAKE 1 CAPSULE BY MOUTH DAILY    Encounter for herb and vitamin supplement management       fluticasone-salmeterol 250-50 MCG/DOSE diskus inhaler    ADVAIR    1 Inhaler    Inhale 1 puff into the lungs 2 times daily    Chronic obstructive pulmonary disease, unspecified COPD type (H)       glucosamine-chondroitinoitin 750-600 MG Tabs    PA GLUCOSAMINE-CHONDROITIN    60 tablet    Take 2 tablets by mouth 2 times daily    Osteoarthritis of right hip, unspecified osteoarthritis type       * Ipratropium-Albuterol  MCG/ACT inhaler    COMBIVENT RESPIMAT    4 g    INHALE ONE PUFF BY MOUTH FOUR TIMES A DAY (NO TO EXCCED 6 DOSES PER DAY)    COPD exacerbation (H)       * ipratropium - albuterol 0.5 mg/2.5 mg/3 mL  0.5-2.5 (3) MG/3ML neb solution    DUONEB    30 vial    Take 1 vial (3 mLs) by nebulization every 6 hours as needed for shortness of breath / dyspnea or wheezing    COPD exacerbation (H)       levofloxacin 0.5 % ophthalmic solution    QUIXIN    1 Bottle    1 drop in surgical eye as directed - 4x daily for 1 week, then stop    Nuclear cataract of left eye       nicotine 14 MG/24HR 24 hr patch    NICODERM CQ    30 patch    Place 1 patch onto the skin every 24 hours    Tobacco use disorder       nicotine polacrilex 2 MG lozenge    COMMIT    150 lozenge    Place 1 lozenge (2 mg) inside cheek as needed for smoking cessation Place 1 lozenge inside cheek as needed for smoking cessation.    Chronic obstructive pulmonary disease, unspecified COPD type (H), Tobacco abuse       order for DME     1 each    Equipment being ordered: nebulizer machine    COPD exacerbation (H)       prednisoLONE acetate 1 % ophthalmic susp    PRED FORTE    1 Bottle    1 drop in surgical eye as directed, 4x daily after surgery for 1 week, 3x daily for 1 week, 2x daily for 1 week, daily for 1 week, then stop    Nuclear cataract of left eye       predniSONE 20 MG tablet    DELTASONE    10 tablet    Take 1 tablet (20 mg) by mouth 2 times daily    COPD exacerbation (H)       vitamin A 56959 UNIT capsule    CVS VITAMIN A    180 capsule    Take 1 capsule (10,000 Units) by mouth daily    Encounter for herb and vitamin supplement management       vitamin B complex with vitamin C Tabs tablet     180 tablet    Take 1 tablet by mouth 2 times daily With Folic acid    Encounter for herb and vitamin supplement management       vitamin E 1000 UNIT capsule    CVS vitamin E    90 capsule    Take 1 capsule (1,000 Units) by mouth daily    Encounter for herb and vitamin supplement management       * Notice:  This list has 2 medication(s) that are the same as other medications prescribed for you. Read the directions carefully, and ask your doctor or other care provider  to review them with you.

## 2017-10-13 ENCOUNTER — ALLIED HEALTH/NURSE VISIT (OUTPATIENT)
Dept: NURSING | Facility: CLINIC | Age: 67
End: 2017-10-13
Payer: COMMERCIAL

## 2017-10-13 DIAGNOSIS — Z23 NEED FOR PROPHYLACTIC VACCINATION AND INOCULATION AGAINST INFLUENZA: ICD-10-CM

## 2017-10-13 PROCEDURE — 99207 ZZC NO CHARGE NURSE ONLY: CPT

## 2017-10-13 PROCEDURE — 90662 IIV NO PRSV INCREASED AG IM: CPT

## 2017-10-13 PROCEDURE — 90471 IMMUNIZATION ADMIN: CPT

## 2017-10-13 NOTE — PROGRESS NOTES
Injectable Influenza Immunization Documentation    1.  Is the person to be vaccinated sick today?   No    2. Does the person to be vaccinated have an allergy to a component   of the vaccine?   No    3. Has the person to be vaccinated ever had a serious reaction   to influenza vaccine in the past?   No    4. Has the person to be vaccinated ever had Guillain-Barré syndrome?   No    Form completed by Alfredito Michael MA

## 2017-10-13 NOTE — MR AVS SNAPSHOT
After Visit Summary   10/13/2017    Robert B Behr    MRN: [id number]           Patient Information     Date Of Birth          1950        Visit Information        Provider Department      10/13/2017 11:00 AM [facility]IST[facility]        Today's Diagnoses     Need for prophylactic vaccination and inoculation against influenza           Follow-ups after your visit        Who to contact     If you have questions or need follow up information about today's clinic visit or your schedule please contact [facility] directly at [phone].  Normal or non-critical lab and imaging results will be communicated to you by Anvatohart, letter or phone within 4 business days after the clinic has received the results. If you do not hear from us within 7 days, please contact the clinic through GeniusMatchert or phone. If you have a critical or abnormal lab result, we will notify you by phone as soon as possible.  Submit refill requests through Quick Key or call your pharmacy and they will forward the refill request to us. Please allow 3 business days for your refill to be completed.          Additional Information About Your Visit        MyChart Information     Quick Key gives you secure access to your electronic health record. If you see a primary care provider, you can also send messages to your care team and make appointments. If you have questions, please call your primary care clinic.  If you do not have a primary care provider, please call [phone] and they will assist you.        Care EveryWhere ID     This is your Care EveryWhere ID. This could be used by other organizations to access your [place] medical records  [id number]         Blood Pressure from Last 3 Encounters:   09/30/17 154/82   09/15/17 126/78   08/29/17 121/67    Weight from Last 3 Encounters:   09/30/17 156 lb (70.8 kg)   08/29/17 156 lb 11.2 oz (71.1 kg)   08/28/17 157 lb 6.4 oz (71.4 kg)               We Performed the Following          ADMIN VACCINE, FIRST [63139]     FLU VACCINE, INCREASED ANTIGEN, PRESV FREE, AGE 65+ [63036]          Today's Medication Changes          These changes are accurate as of: 10/13/17 12:19 PM.  If you have any questions, ask your nurse or doctor.               These medicines have changed or have updated prescriptions.        Dose/Directions    ascorbic acid 1000 MG Tabs   Commonly known as:  vitamin C   This may have changed:  See the new instructions.   Used for:  Encounter for herb and vitamin supplement management        TAKE 1 TABLET BY MOUTH DAILY   Quantity:  90 tablet   Refills:  0       vitamin A 53179 UNIT capsule   Commonly known as:  CVS VITAMIN A   This may have changed:  when to take this   Used for:  Encounter for herb and vitamin supplement management        Dose:  70340 Units   Take 1 capsule (10,000 Units) by mouth daily   Quantity:  180 capsule   Refills:  2       vitamin E 1000 UNIT capsule   Commonly known as:  CVS vitamin E   This may have changed:  when to take this   Used for:  Encounter for herb and vitamin supplement management        Dose:  1000 Units   Take 1 capsule (1,000 Units) by mouth daily   Quantity:  90 capsule   Refills:  2                Primary Care Provider Office Phone # Fax #    Fina Frausto [phone] [address] [place] [address]        Equal Access to Services     TIARRA PAPPAS AH: Cony cooper Sojasmin, wajesicada vinayak, qaybta kaalmada adedennisda, kael guerrero. So [facility] [phone].    ATENCIÓN: Si habla español, tiene a rios disposición servicios gratuitos de asistencia lingüística. Llame al [phone].    We comply with applicable federal civil rights laws and [place] laws. We do not discriminate on the basis of race, color, national origin, age, disability, sex, sexual orientation, or gender identity.            Thank you!     Thank you for choosing  [facility]  for your care. Our goal is always to provide you with excellent care. Hearing back from our patients is one way we can continue to improve our services. Please take a few minutes to complete the written survey that you may receive in the mail after your visit with us. Thank you!             Your Updated Medication List - Protect others around you: Learn how to safely use, store and throw away your medicines at www.disposemymeds.org.          This list is accurate as of: 10/13/17 12:19 PM.  Always use your most recent med list.                   Brand Name Dispense Instructions for use Diagnosis    ascorbic acid 1000 MG Tabs    vitamin C    90 tablet    TAKE 1 TABLET BY MOUTH DAILY    Encounter for herb and vitamin supplement management       CALCIUM 1200+D3 600- MG-MG-UNIT Tb24   Generic drug:  Calcium-Magnesium-Vitamin D      Take 1-2 tablets by mouth every morning Reported on 3/14/2017        calcium-vitamin D 600-400 MG-UNIT per tablet    CALTRATE    180 tablet    TAKE 1 TABLET BY MOUTH TWICE DAILY    Age-related osteoporosis without current pathological fracture       fish oil-omega-3 fatty acids 1000 MG capsule     90 capsule    TAKE 1 CAPSULE BY MOUTH DAILY    Encounter for herb and vitamin supplement management       fluticasone-salmeterol 250-50 MCG/DOSE diskus inhaler    ADVAIR    1 Inhaler    Inhale 1 puff into the lungs 2 times daily    Chronic obstructive pulmonary disease, unspecified COPD type (H)       glucosamine-chondroitinoitin 750-600 MG Tabs    PA GLUCOSAMINE-CHONDROITIN    60 tablet    Take 2 tablets by mouth 2 times daily    Osteoarthritis of right hip, unspecified osteoarthritis type       * Ipratropium-Albuterol  MCG/ACT inhaler    COMBIVENT RESPIMAT    4 g    INHALE ONE PUFF BY MOUTH FOUR TIMES A DAY (NO TO EXCCED 6 DOSES PER DAY)    COPD exacerbation (H)       * ipratropium - albuterol 0.5 mg/2.5 mg/3 mL 0.5-2.5 (3) MG/3ML neb solution    DUONEB     30 vial    Take 1 vial (3 mLs) by nebulization every 6 hours as needed for shortness of breath / dyspnea or wheezing    COPD exacerbation (H)       levofloxacin 0.5 % ophthalmic solution    QUIXIN    1 Bottle    1 drop in surgical eye as directed - 4x daily for 1 week, then stop    Nuclear cataract of left eye       nicotine 14 MG/24HR 24 hr patch    NICODERM CQ    30 patch    Place 1 patch onto the skin every 24 hours    Tobacco use disorder       nicotine polacrilex 2 MG lozenge    COMMIT    150 lozenge    Place 1 lozenge (2 mg) inside cheek as needed for smoking cessation Place 1 lozenge inside cheek as needed for smoking cessation.    Chronic obstructive pulmonary disease, unspecified COPD type (H), Tobacco abuse       order for DME     1 each    Equipment being ordered: nebulizer machine    COPD exacerbation (H)       prednisoLONE acetate 1 % ophthalmic susp    PRED FORTE    1 Bottle    1 drop in surgical eye as directed, 4x daily after surgery for 1 week, 3x daily for 1 week, 2x daily for 1 week, daily for 1 week, then stop    Nuclear cataract of left eye       predniSONE 20 MG tablet    DELTASONE    10 tablet    Take 1 tablet (20 mg) by mouth 2 times daily    COPD exacerbation (H)       vitamin A 73065 UNIT capsule    CVS VITAMIN A    180 capsule    Take 1 capsule (10,000 Units) by mouth daily    Encounter for herb and vitamin supplement management       vitamin B complex with vitamin C Tabs tablet     180 tablet    Take 1 tablet by mouth 2 times daily With Folic acid    Encounter for herb and vitamin supplement management       vitamin E 1000 UNIT capsule    CVS vitamin E    90 capsule    Take 1 capsule (1,000 Units) by mouth daily    Encounter for herb and vitamin supplement management       * Notice:  This list has 2 medication(s) that are the same as other medications prescribed for you. Read the directions carefully, and ask your doctor or other care provider to review them with you.

## 2017-10-25 DIAGNOSIS — Z78.9 TAKES DIETARY SUPPLEMENTS: Primary | ICD-10-CM

## 2017-11-02 ENCOUNTER — OFFICE VISIT (OUTPATIENT)
Dept: FAMILY MEDICINE | Facility: CLINIC | Age: 67
End: 2017-11-02
Payer: COMMERCIAL

## 2017-11-02 VITALS
OXYGEN SATURATION: 96 % | BODY MASS INDEX: 23.19 KG/M2 | HEART RATE: 80 BPM | SYSTOLIC BLOOD PRESSURE: 118 MMHG | WEIGHT: 153 LBS | DIASTOLIC BLOOD PRESSURE: 71 MMHG | TEMPERATURE: 97.5 F | RESPIRATION RATE: 20 BRPM | HEIGHT: 68 IN

## 2017-11-02 DIAGNOSIS — M81.0 AGE-RELATED OSTEOPOROSIS WITHOUT CURRENT PATHOLOGICAL FRACTURE: ICD-10-CM

## 2017-11-02 DIAGNOSIS — F17.200 TOBACCO USE DISORDER: ICD-10-CM

## 2017-11-02 DIAGNOSIS — Z12.5 SCREENING FOR PROSTATE CANCER: ICD-10-CM

## 2017-11-02 DIAGNOSIS — Z00.00 ROUTINE GENERAL MEDICAL EXAMINATION AT A HEALTH CARE FACILITY: Primary | ICD-10-CM

## 2017-11-02 DIAGNOSIS — E78.2 MIXED HYPERLIPIDEMIA: ICD-10-CM

## 2017-11-02 DIAGNOSIS — J44.9 CHRONIC OBSTRUCTIVE PULMONARY DISEASE, UNSPECIFIED COPD TYPE (H): ICD-10-CM

## 2017-11-02 DIAGNOSIS — Z12.11 SPECIAL SCREENING FOR MALIGNANT NEOPLASMS, COLON: ICD-10-CM

## 2017-11-02 PROCEDURE — 80061 LIPID PANEL: CPT | Performed by: FAMILY MEDICINE

## 2017-11-02 PROCEDURE — 36415 COLL VENOUS BLD VENIPUNCTURE: CPT | Performed by: FAMILY MEDICINE

## 2017-11-02 PROCEDURE — G0103 PSA SCREENING: HCPCS | Performed by: FAMILY MEDICINE

## 2017-11-02 PROCEDURE — G0439 PPPS, SUBSEQ VISIT: HCPCS | Performed by: FAMILY MEDICINE

## 2017-11-02 RX ORDER — NICOTINE 21 MG/24HR
1 PATCH, TRANSDERMAL 24 HOURS TRANSDERMAL EVERY 24 HOURS
Qty: 30 PATCH | Refills: 1 | Status: SHIPPED | OUTPATIENT
Start: 2017-11-02 | End: 2018-02-15

## 2017-11-02 RX ORDER — INHALER, ASSIST DEVICES
SPACER (EA) MISCELLANEOUS
Qty: 1 EACH | Refills: 0 | Status: SHIPPED | OUTPATIENT
Start: 2017-11-02 | End: 2019-08-06

## 2017-11-02 NOTE — MR AVS SNAPSHOT
After Visit Summary   11/2/2017    Robert B Behr    MRN: [id number]           Patient Information     Date Of Birth          1950        Visit Information        Provider Department      11/2/2017 1:00 PM Fina Frausto MD [facility]        Today's Diagnoses     Routine general medical examination at a health care facility    -  1    Tobacco use disorder        Special screening for malignant neoplasms, colon        Mixed hyperlipidemia        Screening for prostate cancer        Chronic obstructive pulmonary disease, unspecified COPD type (H)        Age-related osteoporosis without current pathological fracture          Care Instructions      Preventive Health Recommendations:       Male Ages 65 and over    Yearly exam:             See your health care provider every year in order to  o   Review health changes.   o   Discuss preventive care.    o   Review your medicines if your doctor has prescribed any.    Talk with your health care provider about whether you should have a test to screen for prostate cancer (PSA).    Every 3 years, have a diabetes test (fasting glucose). If you are at risk for diabetes, you should have this test more often.    Every 5 years, have a cholesterol test. Have this test more often if you are at risk for high cholesterol or heart disease.     Every 10 years, have a colonoscopy. Or, have a yearly FIT test (stool test). These exams will check for colon cancer.    Talk to with your health care provider about screening for Abdominal Aortic Aneurysm if you have a family history of AAA or have a history of smoking.  Shots:     Get a flu shot each year.     Get a tetanus shot every 10 years.     Talk to your doctor about your pneumonia vaccines. There are now two you should receive - Pneumovax (PPSV 23) and Prevnar (PCV 13).    Talk to your doctor about a shingles vaccine.     Talk to your doctor about the hepatitis B vaccine.  Nutrition:     Eat at  least 5 servings of fruits and vegetables each day.     Eat whole-grain bread, whole-wheat pasta and brown rice instead of white grains and rice.     Talk to your doctor about Calcium and Vitamin D.   Lifestyle    Exercise for at least 150 minutes a week (30 minutes a day, 5 days a week). This will help you control your weight and prevent disease.     Limit alcohol to one drink per day.     No smoking.     Wear sunscreen to prevent skin cancer.     See your dentist every six months for an exam and cleaning.     See your eye doctor every 1 to 2 years to screen for conditions such as glaucoma, macular degeneration and cataracts.          Follow-ups after your visit        Future tests that were ordered for you today     Open Future Orders        Priority Expected Expires Ordered    DX Hip/Pelvis/Spine Routine  11/2/2018 11/2/2017    Fecal colorectal cancer screen (FIT) Routine 11/23/2017 1/25/2018 11/2/2017            Who to contact     If you have questions or need follow up information about today's clinic visit or your schedule please contact [facility] directly at [phone].  Normal or non-critical lab and imaging results will be communicated to you by Tropos Networkshart, letter or phone within 4 business days after the clinic has received the results. If you do not hear from us within 7 days, please contact the clinic through MobileDayt or phone. If you have a critical or abnormal lab result, we will notify you by phone as soon as possible.  Submit refill requests through Aeromics or call your pharmacy and they will forward the refill request to us. Please allow 3 business days for your refill to be completed.          Additional Information About Your Visit        Aeromics Information     Aeromics gives you secure access to your electronic health record. If you see a primary care provider, you can also send messages to your care team and make appointments. If you have questions, please call your primary  "care clinic.  If you do not have a primary care provider, please call [phone] and they will assist you.        Care EveryWhere ID     This is your Care EveryWhere ID. This could be used by other organizations to access your [place] medical records  [id number]        Your Vitals Were     Pulse Temperature Respirations Height Pulse Oximetry BMI (Body Mass Index)    80 97.5  F (36.4  C) (Oral) 20 5' 7.75\" (1.721 m) 96% 23.44 kg/m2       Blood Pressure from Last 3 Encounters:   11/02/17 118/71   09/30/17 154/82   09/15/17 126/78    Weight from Last 3 Encounters:   11/02/17 153 lb (69.4 kg)   09/30/17 156 lb (70.8 kg)   08/29/17 156 lb 11.2 oz (71.1 kg)              We Performed the Following     Lipid Profile     PSA, screen          Today's Medication Changes          These changes are accurate as of: 11/2/17  5:42 PM.  If you have any questions, ask your nurse or doctor.               Start taking these medicines.        Dose/Directions    pocket spacer Jeanine   Used for:  Chronic obstructive pulmonary disease, unspecified COPD type (H)   Started by:  Fina Frausto MD        Attach to albuterol inhaler, use 2 puffs every 4-6 hours as needed.   Quantity:  1 each   Refills:  0         These medicines have changed or have updated prescriptions.        Dose/Directions    ascorbic acid 1000 MG Tabs   Commonly known as:  vitamin C   This may have changed:  See the new instructions.   Used for:  Encounter for herb and vitamin supplement management        TAKE 1 TABLET BY MOUTH DAILY   Quantity:  90 tablet   Refills:  0       vitamin A 02173 UNIT capsule   Commonly known as:  CVS VITAMIN A   This may have changed:  when to take this   Used for:  Encounter for herb and vitamin supplement management        Dose:  27434 Units   Take 1 capsule (10,000 Units) by mouth daily   Quantity:  180 capsule   Refills:  2       vitamin E 1000 UNIT capsule   Commonly known as:  CVS vitamin E   This may have changed:  when to take " this   Used for:  Encounter for herb and vitamin supplement management        Dose:  1000 Units   Take 1 capsule (1,000 Units) by mouth daily   Quantity:  90 capsule   Refills:  2            Where to get your medicines      These medications were sent to [facility] - [place], [place] - [address]  [address], [place] [address]     Phone:  [phone]     nicotine 14 MG/24HR 24 hr patch    pocket spacer Jeanine                Primary Care Provider Office Phone # Fax #    Fina Frausto [phone]       [address]  [place] [address]        Equal Access to Services     [facility] : Hadii marylou chaney hadhal Sojasmin, waaxda luqadaha, qaybta kaalmada adekayeyada, kael rangel . So [facility] [phone].    ATENCIÓN: Si habla español, tiene a rios disposición servicios gratuitos de asistencia lingüística. [facility] [phone].    We comply with applicable federal civil rights laws and [place] laws. We do not discriminate on the basis of race, color, national origin, age, disability, sex, sexual orientation, or gender identity.            Thank you!     Thank you for choosing [facility]  for your care. Our goal is always to provide you with excellent care. Hearing back from our patients is one way we can continue to improve our services. Please take a few minutes to complete the written survey that you may receive in the mail after your visit with us. Thank you!             Your Updated Medication List - Protect others around you: Learn how to safely use, store and throw away your medicines at www.disposemymeds.org.          This list is accurate as of: 11/2/17  5:42 PM.  Always use your most recent med list.                   Brand Name Dispense Instructions for use Diagnosis    ascorbic acid 1000 MG Tabs    vitamin C    90 tablet    TAKE 1 TABLET BY MOUTH DAILY    Encounter for herb and vitamin supplement management        CALCIUM 1200+D3 600- MG-MG-UNIT Tb24   Generic drug:  Calcium-Magnesium-Vitamin D      Take 1-2 tablets by mouth every morning Reported on 3/14/2017        calcium-vitamin D 600-400 MG-UNIT per tablet    CALTRATE    180 tablet    TAKE 1 TABLET BY MOUTH TWICE DAILY    Age-related osteoporosis without current pathological fracture       fish oil-omega-3 fatty acids 1000 MG capsule     90 capsule    TAKE 1 CAPSULE BY MOUTH DAILY    Encounter for herb and vitamin supplement management       fluticasone-salmeterol 250-50 MCG/DOSE diskus inhaler    ADVAIR    1 Inhaler    Inhale 1 puff into the lungs 2 times daily    Chronic obstructive pulmonary disease, unspecified COPD type (H)       glucosamine-chondroitinoitin 750-600 MG Tabs    PA GLUCOSAMINE-CHONDROITIN    60 tablet    Take 2 tablets by mouth 2 times daily    Osteoarthritis of right hip, unspecified osteoarthritis type       * Ipratropium-Albuterol  MCG/ACT inhaler    COMBIVENT RESPIMAT    4 g    INHALE ONE PUFF BY MOUTH FOUR TIMES A DAY (NO TO EXCCED 6 DOSES PER DAY)    COPD exacerbation (H)       * ipratropium - albuterol 0.5 mg/2.5 mg/3 mL 0.5-2.5 (3) MG/3ML neb solution    DUONEB    30 vial    Take 1 vial (3 mLs) by nebulization every 6 hours as needed for shortness of breath / dyspnea or wheezing    COPD exacerbation (H)       levofloxacin 0.5 % ophthalmic solution    QUIXIN    1 Bottle    1 drop in surgical eye as directed - 4x daily for 1 week, then stop    Nuclear cataract of left eye       nicotine 14 MG/24HR 24 hr patch    NICODERM CQ    30 patch    Place 1 patch onto the skin every 24 hours    Tobacco use disorder       nicotine polacrilex 2 MG lozenge    COMMIT    150 lozenge    Place 1 lozenge (2 mg) inside cheek as needed for smoking cessation Place 1 lozenge inside cheek as needed for smoking cessation.    Chronic obstructive pulmonary disease, unspecified COPD type (H), Tobacco abuse       order for DME     1 each    Equipment being  ordered: nebulizer machine    COPD exacerbation (H)       pocket spacer Jeanine     1 each    Attach to albuterol inhaler, use 2 puffs every 4-6 hours as needed.    Chronic obstructive pulmonary disease, unspecified COPD type (H)       prednisoLONE acetate 1 % ophthalmic susp    PRED FORTE    1 Bottle    1 drop in surgical eye as directed, 4x daily after surgery for 1 week, 3x daily for 1 week, 2x daily for 1 week, daily for 1 week, then stop    Nuclear cataract of left eye       predniSONE 20 MG tablet    DELTASONE    10 tablet    Take 1 tablet (20 mg) by mouth 2 times daily    COPD exacerbation (H)       vitamin A 85797 UNIT capsule    CVS VITAMIN A    180 capsule    Take 1 capsule (10,000 Units) by mouth daily    Encounter for herb and vitamin supplement management       vitamin b complex w/vitamin C Tabs tablet     180 tablet    TAKE 1 TABLET BY MOUTH TWICE DAILY WITH FOLIC ACID    Takes dietary supplements       vitamin B complex with vitamin C Tabs tablet     180 tablet    Take 1 tablet by mouth 2 times daily With Folic acid    Encounter for herb and vitamin supplement management       vitamin E 1000 UNIT capsule    CVS vitamin E    90 capsule    Take 1 capsule (1,000 Units) by mouth daily    Encounter for herb and vitamin supplement management       * Notice:  This list has 2 medication(s) that are the same as other medications prescribed for you. Read the directions carefully, and ask your doctor or other care provider to review them with you.

## 2017-11-02 NOTE — PROGRESS NOTES
"SUBJECTIVE:   Robert B Behr is a 67 year old male who presents for Preventive Visit.  Are you in the first 12 months of your Medicare coverage?  No    Physical   Annual:     Getting at least 3 servings of Calcium per day::  Yes    Bi-annual eye exam::  Yes    Dental care twice a year::  NO    Sleep apnea or symptoms of sleep apnea::  None    Diet::  Regular (no restrictions)    Frequency of exercise::  4-5 days/week    Duration of exercise::  15-30 minutes    Taking medications regularly::  Yes    Medication side effects::  None    Additional concerns today::  YES (sore throat from taking medication)    COGNITIVE SCREEN  1) Repeat 3 items (Banana, Sunrise, Chair)    2) Clock draw: NORMAL  3) 3 item recall: Recalls 3 objects  Results: NORMAL clock, 1-2 items recalled: COGNITIVE IMPAIRMENT LESS LIKELY    Mini-CogTM Copyright LORENA Slater. Licensed by the author for use in [facility] "[employer] reprinted with permission (fitz@[facility]). All rights reserved.      Reviewed and updated as needed this visit by clinical staffTobacco  Allergies  Meds  Med Hx  Surg Hx  Fam Hx  Soc Hx         CV:  Ravin continues to work on smoking cessation.  He otherwise is very healthy, exercising regularly (biking, swimming) and eats well but doesn't limit red meats etc  He had negative AAA screening.  Malignancy: no colon or prostate cancer in the family.  One of his brothers, who smoked, developed tongue and bronchial (?) cancer; he states that his brother  from \"coughing up his heart.\"  He has not had colonoscopy; he had a negative FIT test last year.  He is followed with yearly CT scan for lung cancer screening.    Bone health: he reports a h/o osteoporosis on dexa scan about 2-3 years ago, was not prescribed a medication, but takes calcium and D regularly.   Immunizations: does not get immunizations as a rule.    Depression screen: neg    Advair is working well for him.    Glucosamine is helping with hip and back arthritis. "     Reviewed and updated as needed this visit by Provider        Social History   Substance Use Topics     Smoking status: Current Every Day Smoker     Packs/day: 0.10     Years: 45.00     Types: Cigarettes     Smokeless tobacco: Never Used      Comment: 3-7 cigarettes/day (8/29/17). 1.5 packs for 45 years smoker     Alcohol use 0.0 oz/week     0 Standard drinks or equivalent per week      Comment: occ beer       The patient does not drink >3 drinks per day nor >7 drinks per week.            Today's PHQ-2 Score:   PHQ-2 ( 1999 [employer]) 11/2/2017   Q1: Little interest or pleasure in doing things 0   Q2: Feeling down, depressed or hopeless 0   PHQ-2 Score 0   Q1: Little interest or pleasure in doing things Not at all   Q2: Feeling down, depressed or hopeless Not at all   PHQ-2 Score 0       Do you feel safe in your environment - Yes    Do you have a Health Care Directive?: No: Advance care planning was reviewed with patient; patient declined at this time.      Current providers sharing in care for this patient include: Patient Care Team:  Fina Frausto MD as PCP - General (Family Practice)  Jovon, Leeanna Lauren RN as   Alison Torres as Other (see comments)  Marielena Feng      Hearing impairment: No    Ability to successfully perform activities of daily living: Yes, no assistance needed     Fall risk:  Fallen 2 or more times in the past year?: No  Any fall with injury in the past year?: No      Home safety:  lack of grab bars in the bathroom      The following health maintenance items are reviewed in Epic and correct as of today:Health Maintenance   Topic Date Due     HEPATITIS C SCREENING  01/16/1968     ADVANCE DIRECTIVE PLANNING Q5 YRS  01/16/2004     COPD ACTION PLAN Q1 YR  04/05/2017     FALL RISK ASSESSMENT  04/05/2017     LUNG CANCER SCREENING ANNUAL  06/10/2018     LIPID SCREEN Q5 YR MALE (SYSTEM ASSIGNED)  02/24/2021     COLON CANCER SCREEN (SYSTEM ASSIGNED)  10/19/2025     TETANUS  IMMUNIZATION (SYSTEM ASSIGNED)  02/24/2026     INFLUENZA VACCINE (SYSTEM ASSIGNED)  Completed     SPIROMETRY ONETIME  Completed     PNEUMOCOCCAL  Completed     AORTIC ANEURYSM SCREENING (SYSTEM ASSIGNED)  Completed     Patient Active Problem List   Diagnosis     Osteoporosis     COPD (chronic obstructive pulmonary disease) (H)     Tobacco use disorder     CARDIOVASCULAR SCREENING; LDL GOAL LESS THAN 160     Lung nodules     Health Care Home     Hiatal hernia     Past Surgical History:   Procedure Laterality Date     CATARACT IOL, RT/LT Left 09/15/2017    s/p CE/IOL left eye     CATARACT IOL, RT/LT Right      PHACOEMULSIFICATION CLEAR CORNEA WITH STANDARD INTRAOCULAR LENS IMPLANT Right 9/30/2016    Procedure: PHACOEMULSIFICATION CLEAR CORNEA WITH STANDARD INTRAOCULAR LENS IMPLANT;  Surgeon: Elly Valencia MD;  Location:  [facility]     PHACOEMULSIFICATION WITH STANDARD INTRAOCULAR LENS IMPLANT Left 9/15/2017    Procedure: PHACOEMULSIFICATION WITH STANDARD INTRAOCULAR LENS IMPLANT;  Left Eye Phacoemulsification with Standard Lens;  Surgeon: Elly Valencia MD;  Location: [facility] OR     WRIST SURGERY         Social History   Substance Use Topics     Smoking status: Current Every Day Smoker     Packs/day: 0.10     Years: 45.00     Types: Cigarettes     Smokeless tobacco: Never Used      Comment: 3-7 cigarettes/day (8/29/17). 1.5 packs for 45 years smoker     Alcohol use 0.0 oz/week     0 Standard drinks or equivalent per week      Comment: occ beer     Family History   Problem Relation Age of Onset     DIABETES Brother      living     CANCER Brother      throat     Macular Degeneration Mother      Colon Cancer No family hx of      Glaucoma No family hx of      Retinal detachment No family hx of      Amblyopia No family hx of          Current Outpatient Prescriptions   Medication Sig Dispense Refill     nicotine (NICODERM CQ) 14 MG/24HR 24 hr patch Place 1 patch onto the skin every 24 hours 30 patch 1     Spacer/Aero-Holding  Chambers (POCKET SPACER) MARGARITA Attach to albuterol inhaler, use 2 puffs every 4-6 hours as needed. 1 each 0     B Complex-C (VITAMIN B COMPLEX W/VITAMIN C) TABS tablet TAKE 1 TABLET BY MOUTH TWICE DAILY WITH FOLIC ACID 180 tablet 2     fish oil-omega-3 fatty acids 1000 MG capsule TAKE 1 CAPSULE BY MOUTH DAILY 90 capsule 0     predniSONE (DELTASONE) 20 MG tablet Take 1 tablet (20 mg) by mouth 2 times daily 10 tablet 0     levofloxacin (QUIXIN) 0.5 % ophthalmic solution 1 drop in surgical eye as directed - 4x daily for 1 week, then stop 1 Bottle 1     prednisoLONE acetate (PRED FORTE) 1 % ophthalmic susp 1 drop in surgical eye as directed, 4x daily after surgery for 1 week, 3x daily for 1 week, 2x daily for 1 week, daily for 1 week, then stop 1 Bottle 1     glucosamine-chondroitinoitin (PA GLUCOSAMINE-CHONDROITIN) 750-600 MG TABS Take 2 tablets by mouth 2 times daily 60 tablet 11     calcium-vitamin D (CALTRATE) 600-400 MG-UNIT per tablet TAKE 1 TABLET BY MOUTH TWICE DAILY 180 tablet 3     fluticasone-salmeterol (ADVAIR) 250-50 MCG/DOSE diskus inhaler Inhale 1 puff into the lungs 2 times daily 1 Inhaler 11     nicotine polacrilex (COMMIT) 2 MG lozenge Place 1 lozenge (2 mg) inside cheek as needed for smoking cessation Place 1 lozenge inside cheek as needed for smoking cessation. 150 lozenge 3     ascorbic acid (VITAMIN C) 1000 MG TABS TAKE 1 TABLET BY MOUTH DAILY (Patient taking differently: TAKE 1 TABLET BY MOUTH TWO TIMES DAILY) 90 tablet 0     ipratropium - albuterol 0.5 mg/2.5 mg/3 mL (DUONEB) 0.5-2.5 (3) MG/3ML neb solution Take 1 vial (3 mLs) by nebulization every 6 hours as needed for shortness of breath / dyspnea or wheezing 30 vial 1     Ipratropium-Albuterol (COMBIVENT RESPIMAT)  MCG/ACT inhaler INHALE ONE PUFF BY MOUTH FOUR TIMES A DAY (NO TO EXCCED 6 DOSES PER DAY) 4 g 11     order for DME Equipment being ordered: nebulizer machine 1 each 0     vitamin E ([employer]S VITAMIN E) 1000 UNIT capsule Take 1  "capsule (1,000 Units) by mouth daily (Patient taking differently: Take 1,000 Units by mouth every morning ) 90 capsule 2     vitamin B complex with vitamin C (VITAMIN  B COMPLEX) TABS tablet Take 1 tablet by mouth 2 times daily With Folic acid 180 tablet 2     vitamin A ([employer]S VITAMIN A) 85232 UNIT capsule Take 1 capsule (10,000 Units) by mouth daily (Patient taking differently: Take 10,000 Units by mouth every morning ) 180 capsule 2     Calcium-Magnesium-Vitamin D (CALCIUM 1200+D3) 600- MG-MG-UNIT TB24 Take 1-2 tablets by mouth every morning Reported on 3/14/2017       No Known Allergies        Review of Systems  Constitutional, HEENT, cardiovascular, pulmonary, GI, , musculoskeletal, neuro, skin, endocrine and psych systems are negative, except as otherwise noted.      OBJECTIVE:   /71 (BP Location: Left arm, Patient Position: Sitting, Cuff Size: Adult Regular)  Pulse 80  Temp 97.5  F (36.4  C) (Oral)  Resp 20  Ht 5' 7.75\" (1.721 m)  Wt 153 lb (69.4 kg)  SpO2 96%  BMI 23.44 kg/m2 Estimated body mass index is 23.44 kg/(m^2) as calculated from the following:    Height as of this encounter: 5' 7.75\" (1.721 m).    Weight as of this encounter: 153 lb (69.4 kg).  Physical Exam  GENERAL: healthy, alert and no distress  EYES: Eyes grossly normal to inspection, PERRL and conjunctivae and sclerae normal  HENT: ear canals and TM's normal, nose and mouth without ulcers or lesions  NECK: no adenopathy, no asymmetry, masses, or scars and thyroid normal to palpation  RESP: lungs clear, prolonged expiratory phase - no rales, rhonchi or wheezes  CV: regular rate and rhythm, normal S1 S2, no S3 or S4, no murmur, click or rub, no peripheral edema and peripheral pulses strong  ABDOMEN: soft, nontender, no hepatosplenomegaly, no masses and bowel sounds normal  MS: no gross musculoskeletal defects noted, no edema  SKIN: black lesion to right nose unchanged from previous, no suspicious lesions or rashes  NEURO: " "Normal strength and tone, mentation intact and speech normal  PSYCH: mentation appears normal, affect normal/bright    ASSESSMENT / PLAN:   1. Tobacco use disorder    - nicotine (NICODERM CQ) 14 MG/24HR 24 hr patch; Place 1 patch onto the skin every 24 hours  Dispense: 30 patch; Refill: 1    2. CPE  CV: repeat lipids, continue efforts to quit smoking  Malignancy: PSA, FIT, yearly CT chest.  Bone health: will repeat dexa this year.   Immunizations: had a flu shot this year    - Fecal colorectal cancer screen (FIT); Future    3. Mixed hyperlipidemia    - Lipid Profile    4. Screening for prostate cancer    - PSA, screen    5. Chronic obstructive pulmonary disease, unspecified COPD type (H)    - Spacer/Aero-Holding Chambers (POCKET SPACER) MARGARITA; Attach to albuterol inhaler, use 2 puffs every 4-6 hours as needed.  Dispense: 1 each; Refill: 0    End of Life Planning:  Patient currently has an advanced directive:     COUNSELING:  Reviewed preventive health counseling, as reflected in patient instructions        Estimated body mass index is 23.44 kg/(m^2) as calculated from the following:    Height as of this encounter: 5' 7.75\" (1.721 m).    Weight as of this encounter: 153 lb (69.4 kg).     reports that he has been smoking Cigarettes.  He has a 4.50 pack-year smoking history. He has never used smokeless tobacco.  Tobacco Cessation Action Plan: refilled NRT    Appropriate preventive services were discussed with this patient, including applicable screening as appropriate for cardiovascular disease, diabetes, osteopenia/osteoporosis, and glaucoma.  As appropriate for age/gender, discussed screening for colorectal cancer, prostate cancer, breast cancer, and cervical cancer. Checklist reviewing preventive services available has been given to the patient.    Reviewed patients plan of care and provided an AVS. The Basic Care Plan (routine screening as documented in Health Maintenance) for Ravin meets the Care Plan requirement. " This Care Plan has been established and reviewed with the Patient.    Counseling Resources:  ATP IV Guidelines  Pooled Cohorts Equation Calculator  Breast Cancer Risk Calculator  FRAX Risk Assessment  ICSI Preventive Guidelines  Dietary Guidelines for Americans, 2010  USDA's MyPlate  ASA Prophylaxis  Lung CA Screening    Fina Frausto MD  [facility]  Answers for HPI/ROS submitted by the patient on 11/2/2017   PHQ-2 Score: 0

## 2017-11-03 LAB
CHOLEST SERPL-MCNC: 235 MG/DL
HDLC SERPL-MCNC: 73 MG/DL
LDLC SERPL CALC-MCNC: 118 MG/DL
NONHDLC SERPL-MCNC: 162 MG/DL
PSA SERPL-ACNC: 0.88 UG/L (ref 0–4)
TRIGL SERPL-MCNC: 221 MG/DL

## 2017-11-08 PROCEDURE — 82274 ASSAY TEST FOR BLOOD FECAL: CPT | Performed by: FAMILY MEDICINE

## 2017-11-10 DIAGNOSIS — Z12.11 SPECIAL SCREENING FOR MALIGNANT NEOPLASMS, COLON: ICD-10-CM

## 2017-11-11 LAB — HEMOCCULT STL QL IA: NEGATIVE

## 2017-11-20 DIAGNOSIS — Z71.89 ENCOUNTER FOR HERB AND VITAMIN SUPPLEMENT MANAGEMENT: ICD-10-CM

## 2017-11-22 RX ORDER — MULTIVIT WITH MINERALS/LUTEIN
1000 TABLET ORAL DAILY
Qty: 90 CAPSULE | Refills: 2 | Status: SHIPPED | OUTPATIENT
Start: 2017-11-22 | End: 2018-10-12

## 2017-11-22 RX ORDER — MULTIVIT WITH MINERALS/LUTEIN
TABLET ORAL
Qty: 90 TABLET | Refills: 0 | Status: SHIPPED | OUTPATIENT
Start: 2017-11-22 | End: 2018-01-04

## 2017-12-01 ENCOUNTER — CARE COORDINATION (OUTPATIENT)
Dept: GERIATRIC MEDICINE | Facility: CLINIC | Age: 67
End: 2017-12-01

## 2017-12-04 NOTE — PROGRESS NOTES
TC from client. He stated that he had his annual physical 3-4 weeks ago and did the FIT test then, but now got a letter from [facility] with another test. CM stated that if he already did the FIT test, he doesn't need to do it again this year. Discussed a home visit for an annual assessment. Client stated that he is fine and doesn't need a visit. CM entered refusal in MMIS and completed [facility]'s Refusal POC.  Leeanna Sigala RN, BSN, PHN  [facility]   [phone]

## 2017-12-08 NOTE — PROGRESS NOTES
"Per CC, mailed client a \"Refusal of Home Visit\" letter.     Gely Kenny  Case Management Specialist  [facility]  [phone]    "

## 2017-12-27 ENCOUNTER — TELEPHONE (OUTPATIENT)
Dept: FAMILY MEDICINE | Facility: CLINIC | Age: 67
End: 2017-12-27

## 2017-12-27 NOTE — TELEPHONE ENCOUNTER
Patient would like to discuss his osteoporosis and going skiing. Should he get Dexa Scan.  Please call today.  OK to leave message on voicemail.

## 2017-12-27 NOTE — TELEPHONE ENCOUNTER
Left message on voicemail with msg from Fina Frausto MD  call back with questions/ schedule al Webb RN

## 2017-12-27 NOTE — TELEPHONE ENCOUNTER
Patient reports a h/o osteoporosis, meaning he is at increased risk of fracture, regardless of activity.   There usually aren't activity restrictions.

## 2017-12-27 NOTE — TELEPHONE ENCOUNTER
Fina Frausto MD  Please see pt msg below  I dont see an old dexa result  He has a dexa ordered since 11/17- not done yet  Please advise.    Thanks!     Aditi Webb RN

## 2018-01-04 DIAGNOSIS — J44.1 COPD EXACERBATION (H): ICD-10-CM

## 2018-01-04 DIAGNOSIS — Z71.89 ENCOUNTER FOR HERB AND VITAMIN SUPPLEMENT MANAGEMENT: ICD-10-CM

## 2018-01-06 NOTE — TELEPHONE ENCOUNTER
Requested Prescriptions   Pending Prescriptions Disp Refills     ascorbic acid (VITAMIN C) 1000 MG TABS [Pharmacy Med Name: VITAMIN C 1000MG TABLETS]  Last Written Prescription Date:  11/22/2017  Last Fill Quantity: 90 tabs,   # refills: 0  Last Office Visit: 11/2/2017  Future Office visit:       Routing refill request to provider for review/approval because:  Drug not on the [facility], [facility] or [facility] refill protocol or controlled substance   90 tablet 0     Sig: TAKE 1 TABLET BY MOUTH DAILY                vitamin A 69356 UNIT capsule [Pharmacy Med Name: VITAMIN A 10,000UNT CAPSULES]  Last Written Prescription Date:  12/16/2016  Last Fill Quantity: 180 caps,   # refills: 2  Last Office Visit: 11/2/2017  Future Office visit:       Routing refill request to provider for review/approval because:  Drug not on the [facility], [facility] or [facility] refill protocol or controlled substance   180 capsule 0     Sig: TAKE 1 CAPSULE BY MOUTH DAILY                fish oil-omega-3 fatty acids 1000 MG capsule [Pharmacy Med Name: FISH OIL 1000MG CAPSULES  Last Written Prescription Date:  10/6/2017  Last Fill Quantity: 90 caps,   # refills: 0  Last Office Visit: 11/2/2017  Future Office visit:       Routing refill request to provider for review/approval because:  Drug not on the [facility], [facility] or [facility] refill protocol or controlled substance   90 capsule 0     Sig: TAKE 1 CAPSULE BY MOUTH DAILY                COMBIVENT RESPIMAT  MCG/ACT inhaler [Pharmacy Med Name: COMBIVENT RESPIMAT ORAL 120SPRAY 4G]  Last Written Prescription Date:  6/19/2017  Last Fill Quantity: 4 g,  # refills: 11   Last Office Visit with [facility], [facility] or [facility] prescribing provider:  11/2/2017   Future Office Visit:      12 g 0     Sig: INHALE 1 PUFF INTO THE LUNGS FOUR TIMES DAILY. NOT TO EXCEED 6 DOSES PER DAY    Asthma Maintenance Inhalers - Anticholinergics Passed    1/4/2018  1:02 PM       Passed - Patient is age 12 years or older       Passed - Recent or future visit with  authorizing provider's specialty    Patient had office visit in the last year or has a visit in the next 30 days with authorizing provider.  See chart review.             '

## 2018-01-08 RX ORDER — IPRATROPIUM BROMIDE AND ALBUTEROL 20; 100 UG/1; UG/1
SPRAY, METERED RESPIRATORY (INHALATION)
Qty: 12 G | Refills: 0 | OUTPATIENT
Start: 2018-01-08

## 2018-01-08 RX ORDER — CHLORAL HYDRATE 500 MG
CAPSULE ORAL
Qty: 90 CAPSULE | Refills: 11 | Status: SHIPPED | OUTPATIENT
Start: 2018-01-08 | End: 2019-01-19

## 2018-01-08 RX ORDER — MULTIVIT WITH MINERALS/LUTEIN
TABLET ORAL
Qty: 90 TABLET | Refills: 11 | Status: SHIPPED | OUTPATIENT
Start: 2018-01-08 | End: 2019-01-19

## 2018-01-08 NOTE — TELEPHONE ENCOUNTER
Routing refill request to provider for review/approval because:  Drugs not on the [facility] refill protocol: Fish oil, vitamin A, Vitamin C      Patient should have refill available on Combivent inhaler.  6/19/17 at [facility].      Dr. Frausto-Please review and sign if agree.    Team coordinators-Please contact patient to inform her she may have [facility] contact [facility] if she wishes to transfer Combivent script.    Thank you!  JUDAH Kirby, BRAYDONN, RN

## 2018-02-15 DIAGNOSIS — F17.200 TOBACCO USE DISORDER: ICD-10-CM

## 2018-02-15 NOTE — TELEPHONE ENCOUNTER
Reason for Call:  Medication or medication refill: nicotine (NICODERM CQ) 14 MG/24HR 24 hr patch    Do you use a [facility]?  Name of the pharmacy and phone number for the current request:  [facility] [phone]    Name of the medication requested: nicotine (NICODERM CQ) 14 MG/24HR 24 hr patch    Other request: NA    Can we leave a detailed message on this number? YES    Phone number patient can be reached at: Cell number on file:    Telephone Information:   Mobile [phone]       Best Time: anytime    Call taken on 2/15/2018 at 11:22 AM by Lauren Fuentes

## 2018-02-16 RX ORDER — NICOTINE 21 MG/24HR
1 PATCH, TRANSDERMAL 24 HOURS TRANSDERMAL EVERY 24 HOURS
Qty: 30 PATCH | Refills: 1 | Status: SHIPPED | OUTPATIENT
Start: 2018-02-16 | End: 2018-05-10

## 2018-02-16 NOTE — TELEPHONE ENCOUNTER
NICODERM PATCH :  Notified pt LMOM that RX is done.     Prescription approved per [facility] Refill Protocol.  Felicia Yanez RN

## 2018-03-02 ENCOUNTER — TELEPHONE (OUTPATIENT)
Dept: FAMILY MEDICINE | Facility: CLINIC | Age: 68
End: 2018-03-02

## 2018-03-09 ENCOUNTER — OFFICE VISIT (OUTPATIENT)
Dept: FAMILY MEDICINE | Facility: CLINIC | Age: 68
End: 2018-03-09
Payer: COMMERCIAL

## 2018-03-09 VITALS
SYSTOLIC BLOOD PRESSURE: 130 MMHG | WEIGHT: 155 LBS | RESPIRATION RATE: 20 BRPM | DIASTOLIC BLOOD PRESSURE: 74 MMHG | OXYGEN SATURATION: 95 % | HEART RATE: 76 BPM | BODY MASS INDEX: 23.74 KG/M2 | TEMPERATURE: 97.7 F

## 2018-03-09 DIAGNOSIS — E78.2 MIXED HYPERLIPIDEMIA: ICD-10-CM

## 2018-03-09 DIAGNOSIS — J44.9 CHRONIC OBSTRUCTIVE PULMONARY DISEASE, UNSPECIFIED COPD TYPE (H): Primary | ICD-10-CM

## 2018-03-09 DIAGNOSIS — L91.8 SKIN TAG: ICD-10-CM

## 2018-03-09 DIAGNOSIS — E55.9 VITAMIN D DEFICIENCY: ICD-10-CM

## 2018-03-09 DIAGNOSIS — M81.0 OSTEOPOROSIS, UNSPECIFIED OSTEOPOROSIS TYPE, UNSPECIFIED PATHOLOGICAL FRACTURE PRESENCE: ICD-10-CM

## 2018-03-09 PROCEDURE — 80061 LIPID PANEL: CPT | Performed by: FAMILY MEDICINE

## 2018-03-09 PROCEDURE — 82306 VITAMIN D 25 HYDROXY: CPT | Performed by: FAMILY MEDICINE

## 2018-03-09 PROCEDURE — 36415 COLL VENOUS BLD VENIPUNCTURE: CPT | Performed by: FAMILY MEDICINE

## 2018-03-09 PROCEDURE — 99214 OFFICE O/P EST MOD 30 MIN: CPT | Performed by: FAMILY MEDICINE

## 2018-03-09 NOTE — Clinical Note
"Jin, can you help me look up this Serovital supplement some day?  My patient says it's \"human growth hormone\"... Thanks."

## 2018-03-09 NOTE — MR AVS SNAPSHOT
After Visit Summary   3/9/2018    Robert B Behr    MRN: [id number]           Patient Information     Date Of Birth          1950        Visit Information        Provider Department      3/9/2018 1:40 PM Fina Frausto MD [facility]        Today's Diagnoses     Chronic obstructive pulmonary disease, unspecified COPD type (H)    -  1    Osteoporosis, unspecified osteoporosis type, unspecified pathological fracture presence        Vitamin D deficiency        Mixed hyperlipidemia        Skin tag           Follow-ups after your visit        Who to contact     If you have questions or need follow up information about today's clinic visit or your schedule please contact [facility] directly at [phone].  Normal or non-critical lab and imaging results will be communicated to you by MyChart, letter or phone within 4 business days after the clinic has received the results. If you do not hear from us within 7 days, please contact the clinic through [employer] or phone. If you have a critical or abnormal lab result, we will notify you by phone as soon as possible.  Submit refill requests through [employer] or call your pharmacy and they will forward the refill request to us. Please allow 3 business days for your refill to be completed.          Additional Information About Your Visit        MyChart Information     [employer] gives you secure access to your electronic health record. If you see a primary care provider, you can also send messages to your care team and make appointments. If you have questions, please call your primary care clinic.  If you do not have a primary care provider, please call [phone] and they will assist you.        Care EveryWhere ID     This is your Care EveryWhere ID. This could be used by other organizations to access your [place] medical records  [id number]        Your Vitals Were     Pulse Temperature Respirations Pulse Oximetry BMI  (Body Mass Index)       76 97.7  F (36.5  C) (Oral) 20 95% 23.74 kg/m2        Blood Pressure from Last 3 Encounters:   03/09/18 130/74   11/02/17 118/71   09/30/17 154/82    Weight from Last 3 Encounters:   03/09/18 155 lb (70.3 kg)   11/02/17 153 lb (69.4 kg)   09/30/17 156 lb (70.8 kg)              We Performed the Following     Lipid Profile     Vitamin D Deficiency          Today's Medication Changes          These changes are accurate as of 3/9/18  6:37 PM.  If you have any questions, ask your nurse or doctor.               Start taking these medicines.        Dose/Directions    tiotropium 2.5 MCG/ACT inhalation aerosol   Commonly known as:  SPIRIVA RESPIMAT   Used for:  Chronic obstructive pulmonary disease, unspecified COPD type (H)   Started by:  Fina Frausto MD        Dose:  2 puff   Inhale 2 puffs into the lungs daily   Quantity:  4 g   Refills:  1            Where to get your medicines      These medications were sent to [id number] - [place], [place] - [address] AT [facility] [address]  [address], [place] [address]     Phone:  [phone]     tiotropium 2.5 MCG/ACT inhalation aerosol                Primary Care Provider Office Phone # Fax #    Fina Frausto [phone] [address] [place] [address]        Equal Access to Services     MJ PAPPAS AH: Hadii marylou lazoo Soalessandraali, waaxda luqadaha, qaybta kaalmada adeegyada, waxay avery rader adekaye rangel . So [facility] [phone].    ATENCIÓN: Si habla alecia, tiene a rios disposición servicios gratuitos de asistencia lingüística. Llame al [phone].    We comply with applicable federal civil rights laws and [place] laws. We do not discriminate on the basis of race, color, national origin, age, disability, sex, sexual orientation, or gender identity.            Thank you!     Thank you for choosing [facility]  for your care. Our goal is always to  provide you with excellent care. Hearing back from our patients is one way we can continue to improve our services. Please take a few minutes to complete the written survey that you may receive in the mail after your visit with us. Thank you!             Your Updated Medication List - Protect others around you: Learn how to safely use, store and throw away your medicines at www.disposemymeds.org.          This list is accurate as of 3/9/18  6:37 PM.  Always use your most recent med list.                   Brand Name Dispense Instructions for use Diagnosis    ascorbic acid 1000 MG Tabs    vitamin C    90 tablet    TAKE 1 TABLET BY MOUTH DAILY    Encounter for herb and vitamin supplement management       CALCIUM 1200+D3 600- MG-MG-UNIT Tb24   Generic drug:  Calcium-Magnesium-Vitamin D      Take 1-2 tablets by mouth every morning Reported on 3/14/2017        calcium-vitamin D 600-400 MG-UNIT per tablet    CALTRATE    180 tablet    TAKE 1 TABLET BY MOUTH TWICE DAILY    Age-related osteoporosis without current pathological fracture       fish oil-omega-3 fatty acids 1000 MG capsule     90 capsule    TAKE 1 CAPSULE BY MOUTH DAILY    Encounter for herb and vitamin supplement management       fluticasone-salmeterol 250-50 MCG/DOSE diskus inhaler    ADVAIR    1 Inhaler    Inhale 1 puff into the lungs 2 times daily    Chronic obstructive pulmonary disease, unspecified COPD type (H)       glucosamine-chondroitinoitin 750-600 MG Tabs    PA GLUCOSAMINE-CHONDROITIN    60 tablet    Take 2 tablets by mouth 2 times daily    Osteoarthritis of right hip, unspecified osteoarthritis type       * Ipratropium-Albuterol  MCG/ACT inhaler    COMBIVENT RESPIMAT    4 g    INHALE ONE PUFF BY MOUTH FOUR TIMES A DAY (NO TO EXCCED 6 DOSES PER DAY)    COPD exacerbation (H)       * ipratropium - albuterol 0.5 mg/2.5 mg/3 mL 0.5-2.5 (3) MG/3ML neb solution    DUONEB    30 vial    Take 1 vial (3 mLs) by nebulization every 6 hours as needed  for shortness of breath / dyspnea or wheezing    COPD exacerbation (H)       levofloxacin 0.5 % ophthalmic solution    QUIXIN    1 Bottle    1 drop in surgical eye as directed - 4x daily for 1 week, then stop    Nuclear cataract of left eye       nicotine 14 MG/24HR 24 hr patch    NICODERM CQ    30 patch    Place 1 patch onto the skin every 24 hours    Tobacco use disorder       nicotine polacrilex 2 MG lozenge    COMMIT    150 lozenge    Place 1 lozenge (2 mg) inside cheek as needed for smoking cessation Place 1 lozenge inside cheek as needed for smoking cessation.    Chronic obstructive pulmonary disease, unspecified COPD type (H), Tobacco abuse       order for DME     1 each    Equipment being ordered: nebulizer machine    COPD exacerbation (H)       pocket spacer Jeanine     1 each    Attach to albuterol inhaler, use 2 puffs every 4-6 hours as needed.    Chronic obstructive pulmonary disease, unspecified COPD type (H)       prednisoLONE acetate 1 % ophthalmic susp    PRED FORTE    1 Bottle    1 drop in surgical eye as directed, 4x daily after surgery for 1 week, 3x daily for 1 week, 2x daily for 1 week, daily for 1 week, then stop    Nuclear cataract of left eye       predniSONE 20 MG tablet    DELTASONE    10 tablet    Take 1 tablet (20 mg) by mouth 2 times daily    COPD exacerbation (H)       tiotropium 2.5 MCG/ACT inhalation aerosol    SPIRIVA RESPIMAT    4 g    Inhale 2 puffs into the lungs daily    Chronic obstructive pulmonary disease, unspecified COPD type (H)       vitamin A 85816 UNIT capsule     180 capsule    TAKE 1 CAPSULE BY MOUTH DAILY    Encounter for herb and vitamin supplement management       vitamin b complex w/vitamin C Tabs tablet     180 tablet    TAKE 1 TABLET BY MOUTH TWICE DAILY WITH FOLIC ACID    Takes dietary supplements       vitamin B complex with vitamin C Tabs tablet     180 tablet    Take 1 tablet by mouth 2 times daily With Folic acid    Encounter for herb and vitamin supplement  management       vitamin E 1000 UNIT capsule    CVS vitamin E    90 capsule    Take 1 capsule (1,000 Units) by mouth daily    Encounter for herb and vitamin supplement management       * Notice:  This list has 2 medication(s) that are the same as other medications prescribed for you. Read the directions carefully, and ask your doctor or other care provider to review them with you.

## 2018-03-09 NOTE — PROGRESS NOTES
HPI      ROS      Physical Exam      SUBJECTIVE:   Robert B Behr is a 68 year old male who presents to clinic today for the following health issues:    COPD: Advair was started this past July 2017 at an urgent care visit due to frequent COPD exacerbations.  He has remained on it, and it is helping.  However, the patient reports a h/o osteoporosis (has still not done a dexa for us) and is concerned about the advair making his bones weaker.  He notes that his face has seemed more puffy since he has been using advair as well.  Finally, he notes that the first time he took his bike out when the weather cleared up this season, he then had some pain in his inner elbows (points to bicep tendons).  The pain resolved on its own.  He is concerned that advair caused this pain.  Alternatively, he is worried that low vitamin D may have caused that pain.  He unfortunately continues to smoke, but has cut down.     The patient also wants to ask me about Serovital, which, he says is human growth hormone.  He wants to take it to improve his energy/stamina.      Dyslipidemia: he forgot his password for [employer] and never saw his lipid results, would like to go over them.     Skin tag: he wants to ask me about a skin tag on his right shoulder.  It is not growing.  It is not symptomatic.  He wonders if he should cut it off.     Problem list and histories reviewed & adjusted, as indicated.  Additional history: as documented    Patient Active Problem List   Diagnosis     Osteoporosis     COPD (chronic obstructive pulmonary disease) (H)     Tobacco use disorder     CARDIOVASCULAR SCREENING; LDL GOAL LESS THAN 160     Lung nodules     Health Care Home     Hiatal hernia     Past Surgical History:   Procedure Laterality Date     CATARACT IOL, RT/LT Left 09/15/2017    s/p CE/IOL left eye     CATARACT IOL, RT/LT Right      PHACOEMULSIFICATION CLEAR CORNEA WITH STANDARD INTRAOCULAR LENS IMPLANT Right 9/30/2016    Procedure: PHACOEMULSIFICATION  CLEAR CORNEA WITH STANDARD INTRAOCULAR LENS IMPLANT;  Surgeon: Elly Valencia MD;  Location: [facility]     PHACOEMULSIFICATION WITH STANDARD INTRAOCULAR LENS IMPLANT Left 9/15/2017    Procedure: PHACOEMULSIFICATION WITH STANDARD INTRAOCULAR LENS IMPLANT;  Left Eye Phacoemulsification with Standard Lens;  Surgeon: Elly Valencia MD;  Location: [facility]     WRIST SURGERY         Social History   Substance Use Topics     Smoking status: Current Every Day Smoker     Packs/day: 0.10     Years: 45.00     Types: Cigarettes     Smokeless tobacco: Never Used      Comment: 3-7 cigarettes/day (8/29/17). 1.5 packs for 45 years smoker     Alcohol use 0.0 oz/week     0 Standard drinks or equivalent per week      Comment: occ beer     Family History   Problem Relation Age of Onset     DIABETES Brother      living     CANCER Brother      throat     Macular Degeneration Mother      Colon Cancer No family hx of      Glaucoma No family hx of      Retinal detachment No family hx of      Amblyopia No family hx of          Current Outpatient Prescriptions   Medication Sig Dispense Refill     tiotropium (SPIRIVA RESPIMAT) 2.5 MCG/ACT inhalation aerosol Inhale 2 puffs into the lungs daily 4 g 1     nicotine (NICODERM CQ) 14 MG/24HR 24 hr patch Place 1 patch onto the skin every 24 hours 30 patch 1     ascorbic acid (VITAMIN C) 1000 MG TABS TAKE 1 TABLET BY MOUTH DAILY 90 tablet 11     vitamin A 01985 UNIT capsule TAKE 1 CAPSULE BY MOUTH DAILY 180 capsule 11     fish oil-omega-3 fatty acids 1000 MG capsule TAKE 1 CAPSULE BY MOUTH DAILY 90 capsule 11     vitamin E (CVS VITAMIN E) 1000 UNIT capsule Take 1 capsule (1,000 Units) by mouth daily 90 capsule 2     B Complex-C (VITAMIN B COMPLEX W/VITAMIN C) TABS tablet TAKE 1 TABLET BY MOUTH TWICE DAILY WITH FOLIC ACID 180 tablet 2     glucosamine-chondroitinoitin (PA GLUCOSAMINE-CHONDROITIN) 750-600 MG TABS Take 2 tablets by mouth 2 times daily 60 tablet 11     calcium-vitamin D (CALTRATE) 600-400  MG-UNIT per tablet TAKE 1 TABLET BY MOUTH TWICE DAILY 180 tablet 3     fluticasone-salmeterol (ADVAIR) 250-50 MCG/DOSE diskus inhaler Inhale 1 puff into the lungs 2 times daily 1 Inhaler 11     nicotine polacrilex (COMMIT) 2 MG lozenge Place 1 lozenge (2 mg) inside cheek as needed for smoking cessation Place 1 lozenge inside cheek as needed for smoking cessation. 150 lozenge 3     Ipratropium-Albuterol (COMBIVENT RESPIMAT)  MCG/ACT inhaler INHALE ONE PUFF BY MOUTH FOUR TIMES A DAY (NO TO EXCCED 6 DOSES PER DAY) 4 g 11     vitamin B complex with vitamin C (VITAMIN  B COMPLEX) TABS tablet Take 1 tablet by mouth 2 times daily With Folic acid 180 tablet 2     Calcium-Magnesium-Vitamin D (CALCIUM 1200+D3) 600- MG-MG-UNIT TB24 Take 1-2 tablets by mouth every morning Reported on 3/14/2017       Spacer/Aero-Holding Chambers (POCKET SPACER) MARGARITA Attach to albuterol inhaler, use 2 puffs every 4-6 hours as needed. (Patient not taking: Reported on 3/9/2018) 1 each 0     predniSONE (DELTASONE) 20 MG tablet Take 1 tablet (20 mg) by mouth 2 times daily (Patient not taking: Reported on 3/9/2018) 10 tablet 0     levofloxacin (QUIXIN) 0.5 % ophthalmic solution 1 drop in surgical eye as directed - 4x daily for 1 week, then stop (Patient not taking: Reported on 3/9/2018) 1 Bottle 1     prednisoLONE acetate (PRED FORTE) 1 % ophthalmic susp 1 drop in surgical eye as directed, 4x daily after surgery for 1 week, 3x daily for 1 week, 2x daily for 1 week, daily for 1 week, then stop (Patient not taking: Reported on 3/9/2018) 1 Bottle 1     ipratropium - albuterol 0.5 mg/2.5 mg/3 mL (DUONEB) 0.5-2.5 (3) MG/3ML neb solution Take 1 vial (3 mLs) by nebulization every 6 hours as needed for shortness of breath / dyspnea or wheezing (Patient not taking: Reported on 3/9/2018) 30 vial 1     order for DME Equipment being ordered: nebulizer machine (Patient not taking: Reported on 3/9/2018) 1 each 0     No Known Allergies  Recent Labs   Lab  Test  11/02/17   1333  06/10/17   0054  02/24/16   1132   LDL  118*   --   123*   HDL  73   --   81   TRIG  221*   --   152*   CR   --   0.78  0.90   GFRESTIMATED   --   >90  Non  GFR Calc    84   GFRESTBLACK   --   >90   GFR Calc    >90   GFR Calc     POTASSIUM   --   3.4  4.2        Reviewed and updated as needed this visit by clinical staff  Tobacco  Allergies  Med Hx  Surg Hx  Fam Hx  Soc Hx      Reviewed and updated as needed this visit by Provider         ROS:  Constitutional, HEENT, cardiovascular, pulmonary, gi and gu systems are negative, except as otherwise noted.    OBJECTIVE:     /74 (BP Location: Right arm, Patient Position: Sitting, Cuff Size: Adult Regular)  Pulse 76  Temp 97.7  F (36.5  C) (Oral)  Resp 20  Wt 155 lb (70.3 kg)  SpO2 95%  BMI 23.74 kg/m2  Body mass index is 23.74 kg/(m^2).  GENERAL APPEARANCE: healthy, alert and no distress  RESP: decreased breath sound bilaterally; no wheeze, rales or rhonchi.  Normal work of breathing.  CV: regular rates and rhythm, normal S1 S2, no S3 or S4 and no murmur, click or rub  SKIN: skin tag to right shoulder.    Diagnostic Test Results:  none     ASSESSMENT/PLAN:             1. Chronic obstructive pulmonary disease, unspecified COPD type (H)  Discussed the risk of ICS in contributing to osteoporosis, with higher doses being more problematic, but still a potential risk on any ICS.  I did point out that continuing to smoke is also a major risk to his bones.  I discussed that I would still like him to have a dexa scan.  If it does show osteoporosis, then we should offer him treatment.  In the past, his treatment was calcium and D.  Finally, I advised he could try switching to spiriva.  - tiotropium (SPIRIVA RESPIMAT) 2.5 MCG/ACT inhalation aerosol; Inhale 2 puffs into the lungs daily  Dispense: 4 g; Refill: 1    2. Osteoporosis, unspecified osteoporosis type, unspecified pathological fracture  "presence  -asked him to do dexa scan, previously ordered  - Vitamin D Deficiency    3. Vitamin D deficiency    - Vitamin D Deficiency    4. Mixed hyperlipidemia  I reviewed his [place] risk with him, as below.  Discussed we could recheck his lipids today, but if similar, should consider a statin and ASA in addition to smoking cessation.  He says he will get help with his [employer] password.    The 10-year ASCVD risk score (Yoamish CARL Jr, et al., 2013) is: 19.7%    Values used to calculate the score:      Age: 68 years      Sex: Male      Is Non- : No      Diabetic: No      Tobacco smoker: Yes      Systolic Blood Pressure: 130 mmHg      Is BP treated: No      HDL Cholesterol: 73 mg/dL      Total Cholesterol: 235 mg/dL    - Lipid Profile    5. Skin tag  Reassurance that this appears benign, no need to remove, but may schedule a procedure if desired (advised to check insurance)     Finally, I discussed that I have no knowledge about the \"human growth hormone supplement,\" but will see if there is any literature about side effects; in general, though, I told him I cannot medically recommend (or prescribe) it.        Fina Frausto MD  [facility]    "

## 2018-03-10 LAB
CHOLEST SERPL-MCNC: 234 MG/DL
HDLC SERPL-MCNC: 70 MG/DL
LDLC SERPL CALC-MCNC: 128 MG/DL
NONHDLC SERPL-MCNC: 164 MG/DL
TRIGL SERPL-MCNC: 181 MG/DL

## 2018-03-12 LAB — DEPRECATED CALCIDIOL+CALCIFEROL SERPL-MC: 45 UG/L (ref 20–75)

## 2018-03-12 NOTE — PROGRESS NOTES
3-12-18      SeroVital is an oral capsule blend of various amino acids and other supplements below:  L-Lysine  L-Arginine  Oxo-Proline  N-acetylcysteine  L-Glutamine  Schizonepeta    There is not much evidence on benefits of the supplement but [employer] reports that benefits of the amino acids listed above may be effective for erectile dysfunction, angina, hypertension, and general skin health. We checked drug interactions and there is no concern for SeroVital interacting with any of the pt's medications (would be a concern if pt was on nitroglycerin, anticonvulsants, antidiabetics, antihypertensives, or anticoagulants).    Therefore, most likely okay for pt to take SeroVital if desired, however the supplement seems expensive so would recommend pt not take it as there is not evidence on its benefits.     Delmis Sarmiento, Pharm-D4 student    [facility].  Medication Therapy Management Provider  [phone]

## 2018-03-13 ENCOUNTER — RADIANT APPOINTMENT (OUTPATIENT)
Dept: BONE DENSITY | Facility: CLINIC | Age: 68
End: 2018-03-13
Attending: FAMILY MEDICINE
Payer: COMMERCIAL

## 2018-03-13 DIAGNOSIS — M81.0 AGE-RELATED OSTEOPOROSIS WITHOUT CURRENT PATHOLOGICAL FRACTURE: ICD-10-CM

## 2018-03-13 PROCEDURE — 77080 DXA BONE DENSITY AXIAL: CPT | Performed by: INTERNAL MEDICINE

## 2018-04-09 ENCOUNTER — NURSE TRIAGE (OUTPATIENT)
Dept: NURSING | Facility: CLINIC | Age: 68
End: 2018-04-09

## 2018-04-09 ENCOUNTER — OFFICE VISIT (OUTPATIENT)
Dept: URGENT CARE | Facility: URGENT CARE | Age: 68
End: 2018-04-09
Payer: COMMERCIAL

## 2018-04-09 VITALS
BODY MASS INDEX: 23.74 KG/M2 | WEIGHT: 155 LBS | DIASTOLIC BLOOD PRESSURE: 89 MMHG | OXYGEN SATURATION: 97 % | TEMPERATURE: 98.1 F | HEART RATE: 87 BPM | SYSTOLIC BLOOD PRESSURE: 149 MMHG

## 2018-04-09 DIAGNOSIS — J44.1 COPD EXACERBATION (H): Primary | ICD-10-CM

## 2018-04-09 PROCEDURE — 99214 OFFICE O/P EST MOD 30 MIN: CPT | Performed by: PHYSICIAN ASSISTANT

## 2018-04-09 RX ORDER — PREDNISONE 20 MG/1
40 TABLET ORAL DAILY
Qty: 10 TABLET | Refills: 0 | Status: SHIPPED | OUTPATIENT
Start: 2018-04-09 | End: 2018-04-14

## 2018-04-09 RX ORDER — DOXYCYCLINE 100 MG/1
100 TABLET ORAL 2 TIMES DAILY
Qty: 20 TABLET | Refills: 0 | Status: SHIPPED | OUTPATIENT
Start: 2018-04-09 | End: 2018-04-19

## 2018-04-09 NOTE — MR AVS SNAPSHOT
After Visit Summary   4/9/2018    Robert B Behr    MRN: [id number]           Patient Information     Date Of Birth          1950        Visit Information        Provider Department      4/9/2018 7:35 PM Claudia Clemens PA-C [facility]        Today's Diagnoses     COPD exacerbation (H)    -  1      Care Instructions    1. Take doxycycline twice daily x 10 days. Take with food. Hold your calcium supplement while on this medication as calcium can deactivate the medication in the stomach. Also do not take within 2 hours of eating dairy.    2. Take prednisone 40mg once daily x 5 days. Take with food.    3. Continue using your rescue inhaler 2 puffs. Continue your other daily inhalers.    4. If not improving in 2-3 days, follow up with your regular doctor, sooner if worsening. Go to the ER if severe shortness of breath, wheezing, high fever, or any other new concerning symptoms.          Follow-ups after your visit        Follow-up notes from your care team     Return if symptoms worsen or fail to improve.      Who to contact     If you have questions or need follow up information about today's clinic visit or your schedule please contact [facility] directly at [phone].  Normal or non-critical lab and imaging results will be communicated to you by Windowfarmshart, letter or phone within 4 business days after the clinic has received the results. If you do not hear from us within 7 days, please contact the clinic through Windowfarmshart or phone. If you have a critical or abnormal lab result, we will notify you by phone as soon as possible.  Submit refill requests through Is That Odd or call your pharmacy and they will forward the refill request to us. Please allow 3 business days for your refill to be completed.          Additional Information About Your Visit        Windowfarmshart Information     Is That Odd gives you secure access to your electronic health record. If you  see a primary care provider, you can also send messages to your care team and make appointments. If you have questions, please call your primary care clinic.  If you do not have a primary care provider, please call [phone] and they will assist you.        Care EveryWhere ID     This is your Care EveryWhere ID. This could be used by other organizations to access your [place] medical records  [id number]        Your Vitals Were     Pulse Temperature Pulse Oximetry BMI (Body Mass Index)          87 98.1  F (36.7  C) (Tympanic) 97% 23.74 kg/m2         Blood Pressure from Last 3 Encounters:   04/09/18 149/89   03/09/18 130/74   11/02/17 118/71    Weight from Last 3 Encounters:   04/09/18 155 lb (70.3 kg)   03/09/18 155 lb (70.3 kg)   11/02/17 153 lb (69.4 kg)              Today, you had the following     No orders found for display         Today's Medication Changes          These changes are accurate as of 4/9/18  8:19 PM.  If you have any questions, ask your nurse or doctor.               Start taking these medicines.        Dose/Directions    doxycycline Monohydrate 100 MG Tabs   Used for:  COPD exacerbation (H)   Started by:  Claudia Clemens PA-C        Dose:  100 mg   Take 100 mg by mouth 2 times daily for 10 days   Quantity:  20 tablet   Refills:  0         These medicines have changed or have updated prescriptions.        Dose/Directions    * predniSONE 20 MG tablet   Commonly known as:  DELTASONE   This may have changed:  Another medication with the same name was added. Make sure you understand how and when to take each.   Used for:  COPD exacerbation (H)   Changed by:  Claudia Clemens PA-C        Dose:  20 mg   Take 1 tablet (20 mg) by mouth 2 times daily   Quantity:  10 tablet   Refills:  0       * predniSONE 20 MG tablet   Commonly known as:  DELTASONE   This may have changed:  You were already taking a medication with the same name, and this prescription was added. Make sure you  understand how and when to take each.   Used for:  COPD exacerbation (H)   Changed by:  Claudia Clemens PA-C        Dose:  40 mg   Take 2 tablets (40 mg) by mouth daily for 5 days   Quantity:  10 tablet   Refills:  0       * Notice:  This list has 2 medication(s) that are the same as other medications prescribed for you. Read the directions carefully, and ask your doctor or other care provider to review them with you.         Where to get your medicines      These medications were sent to [id number] - [place], [place] - [address] AT [facility] [address]  [address], [place] [address]     Phone:  [phone]     doxycycline Monohydrate 100 MG Tabs         Call your pharmacy to confirm that your medication is ready for pickup. It may take up to 24 hours for them to receive the prescription. If the prescription is not ready within 3 business days, please contact your clinic or your provider.     We will let you know when these medications are ready. If you don't hear back within 3 business days, please contact us.     predniSONE 20 MG tablet                Primary Care Provider Office Phone # Fax #    Fina Frausto [phone] [address] [place] [address]        Equal Access to Services     TIARRA PAPPAS AH: Cony lazoo Soalessandraali, waaxda luqadaha, qaybta kaalmada adeegyada, kael guerrero. So [facility] [phone].    ATENCIÓN: Si habla español, tiene a rios disposición servicios gratuitos de asistencia lingüística. Llelizabeth al [phone].    We comply with applicable federal civil rights laws and [place] laws. We do not discriminate on the basis of race, color, national origin, age, disability, sex, sexual orientation, or gender identity.            Thank you!     Thank you for choosing [facility]  for your care. Our goal is always to provide you with excellent care. Hearing back from our  patients is one way we can continue to improve our services. Please take a few minutes to complete the written survey that you may receive in the mail after your visit with us. Thank you!             Your Updated Medication List - Protect others around you: Learn how to safely use, store and throw away your medicines at www.disposemymeds.org.          This list is accurate as of 4/9/18  8:19 PM.  Always use your most recent med list.                   Brand Name Dispense Instructions for use Diagnosis    ascorbic acid 1000 MG Tabs    vitamin C    90 tablet    TAKE 1 TABLET BY MOUTH DAILY    Encounter for herb and vitamin supplement management       CALCIUM 1200+D3 600- MG-MG-UNIT Tb24   Generic drug:  Calcium-Magnesium-Vitamin D      Take 1-2 tablets by mouth every morning Reported on 3/14/2017        calcium-vitamin D 600-400 MG-UNIT per tablet    CALTRATE    180 tablet    TAKE 1 TABLET BY MOUTH TWICE DAILY    Age-related osteoporosis without current pathological fracture       doxycycline Monohydrate 100 MG Tabs     20 tablet    Take 100 mg by mouth 2 times daily for 10 days    COPD exacerbation (H)       fish oil-omega-3 fatty acids 1000 MG capsule     90 capsule    TAKE 1 CAPSULE BY MOUTH DAILY    Encounter for herb and vitamin supplement management       fluticasone-salmeterol 250-50 MCG/DOSE diskus inhaler    ADVAIR    1 Inhaler    Inhale 1 puff into the lungs 2 times daily    Chronic obstructive pulmonary disease, unspecified COPD type (H)       glucosamine-chondroitinoitin 750-600 MG Tabs    PA GLUCOSAMINE-CHONDROITIN    60 tablet    Take 2 tablets by mouth 2 times daily    Osteoarthritis of right hip, unspecified osteoarthritis type       * Ipratropium-Albuterol  MCG/ACT inhaler    COMBIVENT RESPIMAT    4 g    INHALE ONE PUFF BY MOUTH FOUR TIMES A DAY (NO TO EXCCED 6 DOSES PER DAY)    COPD exacerbation (H)       * ipratropium - albuterol 0.5 mg/2.5 mg/3 mL 0.5-2.5 (3) MG/3ML neb solution     DUONEB    30 vial    Take 1 vial (3 mLs) by nebulization every 6 hours as needed for shortness of breath / dyspnea or wheezing    COPD exacerbation (H)       levofloxacin 0.5 % ophthalmic solution    QUIXIN    1 Bottle    1 drop in surgical eye as directed - 4x daily for 1 week, then stop    Nuclear cataract of left eye       nicotine 14 MG/24HR 24 hr patch    NICODERM CQ    30 patch    Place 1 patch onto the skin every 24 hours    Tobacco use disorder       nicotine polacrilex 2 MG lozenge    COMMIT    150 lozenge    Place 1 lozenge (2 mg) inside cheek as needed for smoking cessation Place 1 lozenge inside cheek as needed for smoking cessation.    Chronic obstructive pulmonary disease, unspecified COPD type (H), Tobacco abuse       order for DME     1 each    Equipment being ordered: nebulizer machine    COPD exacerbation (H)       pocket spacer Jeanine     1 each    Attach to albuterol inhaler, use 2 puffs every 4-6 hours as needed.    Chronic obstructive pulmonary disease, unspecified COPD type (H)       prednisoLONE acetate 1 % ophthalmic susp    PRED FORTE    1 Bottle    1 drop in surgical eye as directed, 4x daily after surgery for 1 week, 3x daily for 1 week, 2x daily for 1 week, daily for 1 week, then stop    Nuclear cataract of left eye       * predniSONE 20 MG tablet    DELTASONE    10 tablet    Take 1 tablet (20 mg) by mouth 2 times daily    COPD exacerbation (H)       * predniSONE 20 MG tablet    DELTASONE    10 tablet    Take 2 tablets (40 mg) by mouth daily for 5 days    COPD exacerbation (H)       tiotropium 2.5 MCG/ACT inhalation aerosol    SPIRIVA RESPIMAT    4 g    Inhale 2 puffs into the lungs daily    Chronic obstructive pulmonary disease, unspecified COPD type (H)       vitamin A 06304 UNIT capsule     180 capsule    TAKE 1 CAPSULE BY MOUTH DAILY    Encounter for herb and vitamin supplement management       vitamin b complex w/vitamin C Tabs tablet     180 tablet    TAKE 1 TABLET BY MOUTH TWICE  DAILY WITH FOLIC ACID    Takes dietary supplements       vitamin B complex with vitamin C Tabs tablet     180 tablet    Take 1 tablet by mouth 2 times daily With Folic acid    Encounter for herb and vitamin supplement management       vitamin E 1000 UNIT capsule    CVS vitamin E    90 capsule    Take 1 capsule (1,000 Units) by mouth daily    Encounter for herb and vitamin supplement management       * Notice:  This list has 4 medication(s) that are the same as other medications prescribed for you. Read the directions carefully, and ask your doctor or other care provider to review them with you.

## 2018-04-10 NOTE — PROGRESS NOTES
SUBJECTIVE:   Robert B Behr is a 68 year old male presenting with a chief complaint of   Chief Complaint   Patient presents with     Urgent Care     Pt in clinic to have eval for worsening cough for 2 days.     Respiratory Problems     Onset of symptoms was 2 day(s) ago.  Course of illness is worsening.    Severity moderately severe  Current and Associated symptoms:  Cough x 2 days. Cough is productive of yellow mucous - more sputum production than normal. He feels more short of breath and wheezy.   No nasal congestion but does have hoarse voice. No fever. No hemoptysis. No chest pain. No sore throat. No nausea, vomiting. No leg swelling.   He has been needing to use his albuterol inhaler a lot and it is not working.  Treatment measures tried include: albuterol   Predisposing factors include COPD     Last COPD exacerbation was 09/2017.  He says he normally needs steroids and antibiotics when this happens.    ROS  See HPI    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     Tobacco abuse      Current Outpatient Prescriptions   Medication Sig Dispense Refill     doxycycline Monohydrate 100 MG TABS Take 100 mg by mouth 2 times daily for 10 days 20 tablet 0     predniSONE (DELTASONE) 20 MG tablet Take 2 tablets (40 mg) by mouth daily for 5 days 10 tablet 0     tiotropium (SPIRIVA RESPIMAT) 2.5 MCG/ACT inhalation aerosol Inhale 2 puffs into the lungs daily 4 g 1     nicotine (NICODERM CQ) 14 MG/24HR 24 hr patch Place 1 patch onto the skin every 24 hours 30 patch 1     ascorbic acid (VITAMIN C) 1000 MG TABS TAKE 1 TABLET BY MOUTH DAILY 90 tablet 11     vitamin A 72105 UNIT capsule TAKE 1 CAPSULE BY MOUTH DAILY 180 capsule 11     fish oil-omega-3 fatty acids 1000 MG capsule TAKE 1 CAPSULE BY MOUTH DAILY 90 capsule 11     vitamin E ([facility]S VITAMIN E) 1000  UNIT capsule Take 1 capsule (1,000 Units) by mouth daily 90 capsule 2     Spacer/Aero-Holding Chambers (POCKET SPACER) MARGARITA Attach to albuterol inhaler, use 2 puffs every 4-6 hours as needed. 1 each 0     B Complex-C (VITAMIN B COMPLEX W/VITAMIN C) TABS tablet TAKE 1 TABLET BY MOUTH TWICE DAILY WITH FOLIC ACID 180 tablet 2     predniSONE (DELTASONE) 20 MG tablet Take 1 tablet (20 mg) by mouth 2 times daily 10 tablet 0     levofloxacin (QUIXIN) 0.5 % ophthalmic solution 1 drop in surgical eye as directed - 4x daily for 1 week, then stop 1 Bottle 1     prednisoLONE acetate (PRED FORTE) 1 % ophthalmic susp 1 drop in surgical eye as directed, 4x daily after surgery for 1 week, 3x daily for 1 week, 2x daily for 1 week, daily for 1 week, then stop 1 Bottle 1     glucosamine-chondroitinoitin (PA GLUCOSAMINE-CHONDROITIN) 750-600 MG TABS Take 2 tablets by mouth 2 times daily 60 tablet 11     calcium-vitamin D (CALTRATE) 600-400 MG-UNIT per tablet TAKE 1 TABLET BY MOUTH TWICE DAILY 180 tablet 3     fluticasone-salmeterol (ADVAIR) 250-50 MCG/DOSE diskus inhaler Inhale 1 puff into the lungs 2 times daily 1 Inhaler 11     nicotine polacrilex (COMMIT) 2 MG lozenge Place 1 lozenge (2 mg) inside cheek as needed for smoking cessation Place 1 lozenge inside cheek as needed for smoking cessation. 150 lozenge 3     ipratropium - albuterol 0.5 mg/2.5 mg/3 mL (DUONEB) 0.5-2.5 (3) MG/3ML neb solution Take 1 vial (3 mLs) by nebulization every 6 hours as needed for shortness of breath / dyspnea or wheezing 30 vial 1     Ipratropium-Albuterol (COMBIVENT RESPIMAT)  MCG/ACT inhaler INHALE ONE PUFF BY MOUTH FOUR TIMES A DAY (NO TO EXCCED 6 DOSES PER DAY) 4 g 11     order for DME Equipment being ordered: nebulizer machine 1 each 0     vitamin B complex with vitamin C (VITAMIN  B COMPLEX) TABS tablet Take 1 tablet by mouth 2 times daily With Folic acid 180 tablet 2     Calcium-Magnesium-Vitamin D (CALCIUM 1200+D3) 600- MG-MG-UNIT TB24  Take 1-2 tablets by mouth every morning Reported on 3/14/2017         Family History   Problem Relation Age of Onset     DIABETES Brother      living     CANCER Brother      throat     Macular Degeneration Mother      Colon Cancer No family hx of      Glaucoma No family hx of      Retinal detachment No family hx of      Amblyopia No family hx of        Social History   Substance Use Topics     Smoking status: Former Smoker     Packs/day: 0.10     Years: 45.00     Types: Cigarettes     Smokeless tobacco: Former User      Comment: 3-7 cigarettes/day (8/29/17). 1.5 packs for 45 years smoker     Alcohol use 0.0 oz/week     0 Standard drinks or equivalent per week      Comment: occ beer          OBJECTIVE  /89  Pulse 87  Temp 98.1  F (36.7  C) (Tympanic)  Wt 155 lb (70.3 kg)  SpO2 97%  BMI 23.74 kg/m2    General: alert, appears well, NAD. Afebrile. Talkative.  Skin: no suspicious lesions or rashes.  HEENT: Normocephalic.   Eyes: conjunctiva clear.   Ears: TMs pearly, translucent bilaterally.   Nose:No rhinorrhea.  Oropharynx: MMM. Mild posterior pharyngeal erythema. No tonsillar hypertrophy. Uvula midline.    Neck: supple, no lymphadenopathy.  Respiratory: No distress- no tachypnea or accessory muscle use. Diminished breath sounds equally bilaterally, but does have air movement to bases bilaterally. Diffuse expiratory wheezes. No crackles, rhonchi, rales.  Cardiovascular: RRR. No murmurs, clicks, gallups, or rub. No peripheral edema. Peripheral pulses 2+ bilaterally.            ASSESSMENT/PLAN:    ICD-10-CM    1. COPD exacerbation (H) J44.1 doxycycline Monohydrate 100 MG TABS     predniSONE (DELTASONE) 20 MG tablet           Medical Decision Making:    Differential Diagnosis: COPD exacerbation 2/2 viral URI, CAP, influenza    Serious Comorbid Conditions: COPD, exacerbated as described below    Patient with COPD presenting with increased sputum production and wheezing x 2 days. He was not in respiratory  distress, no hypoxia upon presentation. He generally appeared well and nontoxic. Do not suspect influenza or CAP without fever and with his general very well appearance. He has other URI symptoms (hoarseness) which suggest viral etiology. However, with increased sputum production, GOLD guidelines do recommend antibiotics in addition to oral steroids.  Do not think CXR is indicated today as again I have low suspicion for pneumonia.    Doxycycline BID x 10 days prescribed.  Prednisone 40mg x 5 days prescribed.  Continue regular inhalers and albuterol as needed    At the end of the encounter, I discussed all available results, as well as the diagnosis and any associated medications. I discussed red flags for immediate return to clinic/ER, as well as indications for follow up. Refer to patient instructions below, which were all addressed with patient. Patient understood and agreed to plan. Patient was appropriate for discharge.      Patient Instructions   1. Take doxycycline twice daily x 10 days. Take with food. Hold your calcium supplement while on this medication as calcium can deactivate the medication in the stomach. Also do not take within 2 hours of eating dairy.    2. Take prednisone 40mg once daily x 5 days. Take with food.    3. Continue using your rescue inhaler 2 puffs. Continue your other daily inhalers.    4. If not improving in 2-3 days, follow up with your regular doctor, sooner if worsening. Go to the ER if severe shortness of breath, wheezing, high fever, or any other new concerning symptoms.              Claudia Clemens PA-C

## 2018-04-10 NOTE — PATIENT INSTRUCTIONS
1. Take doxycycline twice daily x 10 days. Take with food. Hold your calcium supplement while on this medication as calcium can deactivate the medication in the stomach. Also do not take within 2 hours of eating dairy.    2. Take prednisone 40mg once daily x 5 days. Take with food.    3. Continue using your rescue inhaler 2 puffs. Continue your other daily inhalers.    4. If not improving in 2-3 days, follow up with your regular doctor, sooner if worsening. Go to the ER if severe shortness of breath, wheezing, high fever, or any other new concerning symptoms.

## 2018-04-10 NOTE — NURSING NOTE
"Chief Complaint   Patient presents with     Urgent Care     Pt in clinic to have eval for worsening cough for 2 days.     Respiratory Problems       Initial /89  Pulse 87  Temp 98.1  F (36.7  C) (Tympanic)  Wt 155 lb (70.3 kg)  SpO2 97%  BMI 23.74 kg/m2 Estimated body mass index is 23.74 kg/(m^2) as calculated from the following:    Height as of 11/2/17: 5' 7.75\" (1.721 m).    Weight as of this encounter: 155 lb (70.3 kg).  Medication Reconciliation: complete   Radha Abreu/ MA    "

## 2018-04-10 NOTE — TELEPHONE ENCOUNTER
RX for prednisone not at pharmacy; Verbal order given with readback    Order History   Outpatient   Date/Time Action Taken User Additional Information   04/09/18 2016 Claduia Martins PA-C    Medication Detail      Disp Refills Start End ARELY   predniSONE (DELTASONE) 20 MG tablet 10 tablet 0 4/9/2018 4/14/2018 --   Sig: Take 2 tablets (40 mg) by mouth daily for 5 days   Class: E-Prescribe   Route: Oral   Order: [id number]   Prior authorization: Closed - Other   This order has not been released to its destination.   Printout Tracking   External Result Report   Pharmacy   [id number] - [place], [place] - [address] AT [facility] [address]   Associated Diagnoses   COPD exacerbation (H) [J44.1]  - Primary           Reason for Disposition    [1] Prescription not at pharmacy AND [2] was prescribed today by PCP    Protocols used: MEDICATION QUESTION CALL-ADULT-  Eli Dominguez RN  FNA

## 2018-04-19 ENCOUNTER — OFFICE VISIT (OUTPATIENT)
Dept: FAMILY MEDICINE | Facility: CLINIC | Age: 68
End: 2018-04-19
Payer: COMMERCIAL

## 2018-04-19 ENCOUNTER — RADIANT APPOINTMENT (OUTPATIENT)
Dept: GENERAL RADIOLOGY | Facility: CLINIC | Age: 68
End: 2018-04-19
Attending: FAMILY MEDICINE
Payer: COMMERCIAL

## 2018-04-19 ENCOUNTER — TELEPHONE (OUTPATIENT)
Dept: FAMILY MEDICINE | Facility: CLINIC | Age: 68
End: 2018-04-19

## 2018-04-19 VITALS
TEMPERATURE: 96.6 F | BODY MASS INDEX: 24.05 KG/M2 | HEART RATE: 78 BPM | DIASTOLIC BLOOD PRESSURE: 99 MMHG | WEIGHT: 157 LBS | SYSTOLIC BLOOD PRESSURE: 148 MMHG | RESPIRATION RATE: 20 BRPM | OXYGEN SATURATION: 95 %

## 2018-04-19 DIAGNOSIS — J44.1 COPD EXACERBATION (H): Primary | ICD-10-CM

## 2018-04-19 DIAGNOSIS — J44.1 COPD EXACERBATION (H): ICD-10-CM

## 2018-04-19 PROCEDURE — 71046 X-RAY EXAM CHEST 2 VIEWS: CPT

## 2018-04-19 PROCEDURE — 99213 OFFICE O/P EST LOW 20 MIN: CPT | Performed by: FAMILY MEDICINE

## 2018-04-19 RX ORDER — DOXYCYCLINE 100 MG/1
100 TABLET ORAL 2 TIMES DAILY
Qty: 20 TABLET | Refills: 0 | Status: CANCELLED | OUTPATIENT
Start: 2018-04-19

## 2018-04-19 RX ORDER — PREDNISONE 20 MG/1
60 TABLET ORAL DAILY
Qty: 18 TABLET | Refills: 0 | Status: SHIPPED | OUTPATIENT
Start: 2018-04-19 | End: 2019-05-14

## 2018-04-19 NOTE — TELEPHONE ENCOUNTER
Return call to patient - discussed provider response and results - he verbalized understanding and agrees with plan    Closing encounter - no further actions needed at this time    Callie Sequeira RN

## 2018-04-19 NOTE — TELEPHONE ENCOUNTER
No, I advised only prednisone at this point.     I stated that if his CXR showed a pneumonia, I would send in a different antibiotic (because he just finished a course of doxycycline).  I don't see a pneumonia but am waiting for radiology report for the final.  Thanks.

## 2018-04-19 NOTE — TELEPHONE ENCOUNTER
Patient called stating he was seen today with the understanding an RX for Doxycycline was being sent to the pharmacy as well but hasn't?    Ok to leave message

## 2018-04-19 NOTE — TELEPHONE ENCOUNTER
Notes Recorded by Fina Frausto MD on 4/19/2018 at 1:57 PM  Dear Ravin,     No pneumonia on the chest x-ray, so no further antibiotics now that you are done with doxycycline.  Please go ahead with the prednisone taper that I prescribed at today's visit.     Sincerely,     Fina Frausto MD

## 2018-04-19 NOTE — MR AVS SNAPSHOT
After Visit Summary   4/19/2018    Robert B Behr    MRN: [id number]           Patient Information     Date Of Birth          1950        Visit Information        Provider Department      4/19/2018 10:20 AM Fina Frausto MD [facility]        Today's Diagnoses     COPD exacerbation (H)    -  1       Follow-ups after your visit        Who to contact     If you have questions or need follow up information about today's clinic visit or your schedule please contact [facility] directly at [phone].  Normal or non-critical lab and imaging results will be communicated to you by [employer], letter or phone within 4 business days after the clinic has received the results. If you do not hear from us within 7 days, please contact the clinic through [employer] or phone. If you have a critical or abnormal lab result, we will notify you by phone as soon as possible.  Submit refill requests through [employer] or call your pharmacy and they will forward the refill request to us. Please allow 3 business days for your refill to be completed.          Additional Information About Your Visit        MyChart Information     [employer] gives you secure access to your electronic health record. If you see a primary care provider, you can also send messages to your care team and make appointments. If you have questions, please call your primary care clinic.  If you do not have a primary care provider, please call [phone] and they will assist you.        Care EveryWhere ID     This is your Care EveryWhere ID. This could be used by other organizations to access your [place] medical records  [id number]        Your Vitals Were     Pulse Temperature Respirations Pulse Oximetry BMI (Body Mass Index)       78 96.6  F (35.9  C) (Tympanic) 20 95% 24.05 kg/m2        Blood Pressure from Last 3 Encounters:   04/19/18 (!) 148/99   04/09/18 149/89   03/09/18 130/74    Weight from Last 3  Encounters:   04/19/18 157 lb (71.2 kg)   04/09/18 155 lb (70.3 kg)   03/09/18 155 lb (70.3 kg)                 Today's Medication Changes          These changes are accurate as of 4/19/18 11:59 PM.  If you have any questions, ask your nurse or doctor.               These medicines have changed or have updated prescriptions.        Dose/Directions    * predniSONE 20 MG tablet   Commonly known as:  DELTASONE   This may have changed:  Another medication with the same name was added. Make sure you understand how and when to take each.   Used for:  COPD exacerbation (H)   Changed by:  Fina Frausto MD        Dose:  20 mg   Take 1 tablet (20 mg) by mouth 2 times daily   Quantity:  10 tablet   Refills:  0       * predniSONE 20 MG tablet   Commonly known as:  DELTASONE   This may have changed:  You were already taking a medication with the same name, and this prescription was added. Make sure you understand how and when to take each.   Used for:  COPD exacerbation (H)   Changed by:  Fina Frausto MD        Dose:  60 mg   Take 3 tablets (60 mg) by mouth daily   Quantity:  18 tablet   Refills:  0       * Notice:  This list has 2 medication(s) that are the same as other medications prescribed for you. Read the directions carefully, and ask your doctor or other care provider to review them with you.         Where to get your medicines      These medications were sent to [id number] - [place], [place] - [address] AT [facility]  [address], [place] [address]     Phone:  [phone]     predniSONE 20 MG tablet                Primary Care Provider Office Phone # Fax #    Fina Frausto [phone] [address] [place] [address]        Equal Access to Services     TIARRA PAPPAS AH: Cony Hernandez, staci licea, bindu cano, kael guerrero. [facility] [phone].    ATENCIÓN: Si maribella  español, tiene a rios disposición servicios gratuitos de asistencia lingüística. Kenna yadav [phone].    We comply with applicable federal civil rights laws and [place] laws. We do not discriminate on the basis of race, color, national origin, age, disability, sex, sexual orientation, or gender identity.            Thank you!     Thank you for choosing [facility]  for your care. Our goal is always to provide you with excellent care. Hearing back from our patients is one way we can continue to improve our services. Please take a few minutes to complete the written survey that you may receive in the mail after your visit with us. Thank you!             Your Updated Medication List - Protect others around you: Learn how to safely use, store and throw away your medicines at www.disposemymeds.org.          This list is accurate as of 4/19/18 11:59 PM.  Always use your most recent med list.                   Brand Name Dispense Instructions for use Diagnosis    ascorbic acid 1000 MG Tabs    vitamin C    90 tablet    TAKE 1 TABLET BY MOUTH DAILY    Encounter for herb and vitamin supplement management       CALCIUM 1200+D3 600- MG-MG-UNIT Tb24   Generic drug:  Calcium-Magnesium-Vitamin D      Take 1-2 tablets by mouth every morning Reported on 3/14/2017        calcium-vitamin D 600-400 MG-UNIT per tablet    CALTRATE    180 tablet    TAKE 1 TABLET BY MOUTH TWICE DAILY    Age-related osteoporosis without current pathological fracture       doxycycline Monohydrate 100 MG Tabs     20 tablet    Take 100 mg by mouth 2 times daily for 10 days    COPD exacerbation (H)       fish oil-omega-3 fatty acids 1000 MG capsule     90 capsule    TAKE 1 CAPSULE BY MOUTH DAILY    Encounter for herb and vitamin supplement management       fluticasone-salmeterol 250-50 MCG/DOSE diskus inhaler    ADVAIR    1 Inhaler    Inhale 1 puff into the lungs 2 times daily    Chronic obstructive pulmonary disease, unspecified COPD  type (H)       glucosamine-chondroitinoitin 750-600 MG Tabs    PA GLUCOSAMINE-CHONDROITIN    60 tablet    Take 2 tablets by mouth 2 times daily    Osteoarthritis of right hip, unspecified osteoarthritis type       * Ipratropium-Albuterol  MCG/ACT inhaler    COMBIVENT RESPIMAT    4 g    INHALE ONE PUFF BY MOUTH FOUR TIMES A DAY (NO TO EXCCED 6 DOSES PER DAY)    COPD exacerbation (H)       * ipratropium - albuterol 0.5 mg/2.5 mg/3 mL 0.5-2.5 (3) MG/3ML neb solution    DUONEB    30 vial    Take 1 vial (3 mLs) by nebulization every 6 hours as needed for shortness of breath / dyspnea or wheezing    COPD exacerbation (H)       levofloxacin 0.5 % ophthalmic solution    QUIXIN    1 Bottle    1 drop in surgical eye as directed - 4x daily for 1 week, then stop    Nuclear cataract of left eye       nicotine 14 MG/24HR 24 hr patch    NICODERM CQ    30 patch    Place 1 patch onto the skin every 24 hours    Tobacco use disorder       nicotine polacrilex 2 MG lozenge    COMMIT    150 lozenge    Place 1 lozenge (2 mg) inside cheek as needed for smoking cessation Place 1 lozenge inside cheek as needed for smoking cessation.    Chronic obstructive pulmonary disease, unspecified COPD type (H), Tobacco abuse       order for DME     1 each    Equipment being ordered: nebulizer machine    COPD exacerbation (H)       pocket spacer Jeanine     1 each    Attach to albuterol inhaler, use 2 puffs every 4-6 hours as needed.    Chronic obstructive pulmonary disease, unspecified COPD type (H)       prednisoLONE acetate 1 % ophthalmic susp    PRED FORTE    1 Bottle    1 drop in surgical eye as directed, 4x daily after surgery for 1 week, 3x daily for 1 week, 2x daily for 1 week, daily for 1 week, then stop    Nuclear cataract of left eye       * predniSONE 20 MG tablet    DELTASONE    10 tablet    Take 1 tablet (20 mg) by mouth 2 times daily    COPD exacerbation (H)       * predniSONE 20 MG tablet    DELTASONE    18 tablet    Take 3 tablets  (60 mg) by mouth daily    COPD exacerbation (H)       tiotropium 2.5 MCG/ACT inhalation aerosol    SPIRIVA RESPIMAT    4 g    Inhale 2 puffs into the lungs daily    Chronic obstructive pulmonary disease, unspecified COPD type (H)       vitamin A 49526 UNIT capsule     180 capsule    TAKE 1 CAPSULE BY MOUTH DAILY    Encounter for herb and vitamin supplement management       vitamin b complex w/vitamin C Tabs tablet     180 tablet    TAKE 1 TABLET BY MOUTH TWICE DAILY WITH FOLIC ACID    Takes dietary supplements       vitamin B complex with vitamin C Tabs tablet     180 tablet    Take 1 tablet by mouth 2 times daily With Folic acid    Encounter for herb and vitamin supplement management       vitamin E 1000 UNIT capsule    CVS vitamin E    90 capsule    Take 1 capsule (1,000 Units) by mouth daily    Encounter for herb and vitamin supplement management       * Notice:  This list has 4 medication(s) that are the same as other medications prescribed for you. Read the directions carefully, and ask your doctor or other care provider to review them with you.

## 2018-04-19 NOTE — PROGRESS NOTES
HPI      ROS      Physical Exam      SUBJECTIVE:   Robert B Behr is a 68 year old male who presents to clinic today for the following health issues:    RESPIRATORY SYMPTOMS      Duration: ongoing     Description  Cough, wheezing, and mucus     Severity: mild    Accompanying signs and symptoms: Pt report he gets bronchitis all the time, pt stopped smoking. Pt was in for urgent care on 4/09/2019 for the same symptoms     History (predisposing factors):  COPD     Therapies tried and outcome:  Prednisone and Doxycycline outcome not effective. Pt will like another alternative other than what was prescribe for him. Pt report he doesn't want any medication with steroids. Question about blood pressure.     Ravin was seen in  on 4/9/18 for COPD exacerbation and treated with 5 days of prednisone and a course of doxycycline.  He reports that he was feeling better, but after the prednisone course ended, his symptoms returned.  He endorses cough productive of yellow or white sputum, shortness of breath and wheezing.  He does like his new spiriva inhaler, but admits he doesn't use it every day.  Albuterol inhaler is helpful for about an hour.    No fevers, nightsweats, chest pain, hemoptysis, nausea, vomiting, diarrhea, rhinorrhea, ear pain, sore throat, skin rash or joint pains.  He does have some hoarseness.  No dizziness or headache.    He has had a little epigastric discomfort.  No melena.      Problem list and histories reviewed & adjusted, as indicated.  Additional history: as documented    Patient Active Problem List   Diagnosis     Osteoporosis     COPD (chronic obstructive pulmonary disease) (H)     Tobacco use disorder     CARDIOVASCULAR SCREENING; LDL GOAL LESS THAN 160     Lung nodules     Health Care Home     Hiatal hernia     Past Surgical History:   Procedure Laterality Date     CATARACT IOL, RT/LT Left 09/15/2017    s/p CE/IOL left eye     CATARACT IOL, RT/LT Right      PHACOEMULSIFICATION CLEAR CORNEA WITH  STANDARD INTRAOCULAR LENS IMPLANT Right 9/30/2016    Procedure: PHACOEMULSIFICATION CLEAR CORNEA WITH STANDARD INTRAOCULAR LENS IMPLANT;  Surgeon: Elly Valencia MD;  Location: [facility]     PHACOEMULSIFICATION WITH STANDARD INTRAOCULAR LENS IMPLANT Left 9/15/2017    Procedure: PHACOEMULSIFICATION WITH STANDARD INTRAOCULAR LENS IMPLANT;  Left Eye Phacoemulsification with Standard Lens;  Surgeon: Elly Valencia MD;  Location: [facility]     WRIST SURGERY         Social History   Substance Use Topics     Smoking status: Former Smoker     Packs/day: 0.10     Years: 45.00     Types: Cigarettes     Smokeless tobacco: Former User      Comment: 3-7 cigarettes/day (8/29/17). 1.5 packs for 45 years smoker     Alcohol use 0.0 oz/week     0 Standard drinks or equivalent per week      Comment: occ beer     Family History   Problem Relation Age of Onset     DIABETES Brother      living     CANCER Brother      throat     Macular Degeneration Mother      Colon Cancer No family hx of      Glaucoma No family hx of      Retinal detachment No family hx of      Amblyopia No family hx of          Current Outpatient Prescriptions   Medication Sig Dispense Refill     ascorbic acid (VITAMIN C) 1000 MG TABS TAKE 1 TABLET BY MOUTH DAILY 90 tablet 11     B Complex-C (VITAMIN B COMPLEX W/VITAMIN C) TABS tablet TAKE 1 TABLET BY MOUTH TWICE DAILY WITH FOLIC ACID 180 tablet 2     Calcium-Magnesium-Vitamin D (CALCIUM 1200+D3) 600- MG-MG-UNIT TB24 Take 1-2 tablets by mouth every morning Reported on 3/14/2017       calcium-vitamin D (CALTRATE) 600-400 MG-UNIT per tablet TAKE 1 TABLET BY MOUTH TWICE DAILY (Patient not taking: Reported on 4/19/2018) 180 tablet 3     fish oil-omega-3 fatty acids 1000 MG capsule TAKE 1 CAPSULE BY MOUTH DAILY 90 capsule 11     fluticasone-salmeterol (ADVAIR) 250-50 MCG/DOSE diskus inhaler Inhale 1 puff into the lungs 2 times daily (Patient not taking: Reported on 4/19/2018) 1 Inhaler 11      glucosamine-chondroitinoitin (PA GLUCOSAMINE-CHONDROITIN) 750-600 MG TABS Take 2 tablets by mouth 2 times daily 60 tablet 11     ipratropium - albuterol 0.5 mg/2.5 mg/3 mL (DUONEB) 0.5-2.5 (3) MG/3ML neb solution Take 1 vial (3 mLs) by nebulization every 6 hours as needed for shortness of breath / dyspnea or wheezing (Patient not taking: Reported on 4/19/2018) 30 vial 1     Ipratropium-Albuterol (COMBIVENT RESPIMAT)  MCG/ACT inhaler INHALE ONE PUFF BY MOUTH FOUR TIMES A DAY (NO TO EXCCED 6 DOSES PER DAY) 4 g 11     levofloxacin (QUIXIN) 0.5 % ophthalmic solution 1 drop in surgical eye as directed - 4x daily for 1 week, then stop (Patient not taking: Reported on 4/19/2018) 1 Bottle 1     nicotine (NICODERM CQ) 14 MG/24HR 24 hr patch Place 1 patch onto the skin every 24 hours (Patient not taking: Reported on 4/19/2018) 30 patch 1     nicotine polacrilex (COMMIT) 2 MG lozenge Place 1 lozenge (2 mg) inside cheek as needed for smoking cessation Place 1 lozenge inside cheek as needed for smoking cessation. (Patient not taking: Reported on 4/19/2018) 150 lozenge 3     order for DME Equipment being ordered: nebulizer machine (Patient not taking: Reported on 4/19/2018) 1 each 0     prednisoLONE acetate (PRED FORTE) 1 % ophthalmic susp 1 drop in surgical eye as directed, 4x daily after surgery for 1 week, 3x daily for 1 week, 2x daily for 1 week, daily for 1 week, then stop (Patient not taking: Reported on 4/19/2018) 1 Bottle 1     predniSONE (DELTASONE) 20 MG tablet Take 1 tablet (20 mg) by mouth 2 times daily 10 tablet 0     Spacer/Aero-Holding Chambers (POCKET SPACER) MARGARITA Attach to albuterol inhaler, use 2 puffs every 4-6 hours as needed. 1 each 0     tiotropium (SPIRIVA RESPIMAT) 2.5 MCG/ACT inhalation aerosol Inhale 2 puffs into the lungs daily 4 g 1     vitamin A 40074 UNIT capsule TAKE 1 CAPSULE BY MOUTH DAILY 180 capsule 11     vitamin B complex with vitamin C (VITAMIN  B COMPLEX) TABS tablet Take 1 tablet by  mouth 2 times daily With Folic acid 180 tablet 2     vitamin E ([facility]S VITAMIN E) 1000 UNIT capsule Take 1 capsule (1,000 Units) by mouth daily 90 capsule 2     predniSONE (DELTASONE) 20 MG tablet Take 3 tablets (60 mg) by mouth daily 18 tablet 0     No Known Allergies    Reviewed and updated as needed this visit by clinical staff  Tobacco  Allergies  Meds  Med Hx  Surg Hx  Fam Hx  Soc Hx      Reviewed and updated as needed this visit by Provider         ROS:  Constitutional, HEENT, cardiovascular, pulmonary, gi and gu systems are negative, except as otherwise noted.    OBJECTIVE:     BP (!) 148/99 (BP Location: Right arm, Patient Position: Sitting, Cuff Size: Adult Regular)  Pulse 78  Temp 96.6  F (35.9  C) (Tympanic)  Resp 20  Wt 157 lb (71.2 kg)  SpO2 95%  BMI 24.05 kg/m2  Body mass index is 24.05 kg/(m^2).  GENERAL APPEARANCE: healthy, alert and no distress  EYES: Eyes grossly normal to inspection, PERRL and conjunctivae and sclerae normal  HENT: ear canals and TM's normal and nose and mouth without ulcers or lesions; voice is hoarse  NECK: no adenopathy, no asymmetry, masses, or scars and thyroid normal to palpation  RESP: poor air movement bilaterally, fairly tight; no rales; few tight wheezes.  Normal respiratory effort; able to speak in full sentences with minimal dyspnea.  CV: regular rates and rhythm, normal S1 S2, no S3 or S4 and no murmur, click or rub  PSYCH: mentation appears normal and affect normal/bright    Diagnostic Test Results:  CXR: no infiltrate to my interpretation    ASSESSMENT/PLAN:     This is a 67 yo M with COPD who has been trying to quit smoking this year (not currently smoking) who was recently treated for COPD exacerbation, perhaps related to change in seasons.  He just completed a course of doxycycline yesterday; I did get CXR today to ensure that there is not a pneumonia which would need further antibiotic treatment, and there does not appear to be any infiltrate on plain  films today.  He is afebrile, well-appearing and has an oxygen saturation of 95%, which also makes pneumonia less likely.  Therefore, he may just need more time with the prednisone, so I am prescribing a longer tapering course for him.  I've advised that he use his spiriva inhaler regularly for best results . Continue to avoid any smoking.  F/u if not improving as expected, or if symptoms start to resurface as he tapers the steroids, or if any other concerns.  Of note, he had been concerned about using a steroid inhaler, as he had been told in the past he had osteoporosis.  Dexa scan this year shows no osteoporosis though of course that is a concern with long-term use of ICS.  Of course, repeated courses of prednisone present the same problem.    I discussed that prednisone is very harsh on the stomach lining and may lead to ulcers; I recommended taking a PPI while he is on the prednisone course, but he declined stating that he would take baking soda.  I advised that if this did not help his symptoms to start the PPI.  He has not had PUD but he does have a hiatal hernia.    Elevated BP: recheck when he is feeling well.        Fina Frausto MD  [facility]

## 2018-04-22 DIAGNOSIS — M16.11 OSTEOARTHRITIS OF RIGHT HIP, UNSPECIFIED OSTEOARTHRITIS TYPE: ICD-10-CM

## 2018-04-23 RX ORDER — MAGNESIUM CARB/ALUMINUM HYDROX 105-160MG
TABLET,CHEWABLE ORAL
Qty: 0.01 TABLET | Refills: 0 | OUTPATIENT
Start: 2018-04-23

## 2018-04-23 NOTE — TELEPHONE ENCOUNTER
Duplicate    Refused Prescriptions:                       Disp   Refills    glucosamine-chondroitinoitin 750-600 MG TA*0.01 t*0        Sig: TAKE 2 TABLETS BY MOUTH TWICE DAILY  Refused By: KANDI ELI  Reason for Refusal: Duplicate      Closing encounter - no further actions needed at this time    KANDI ELI RN

## 2018-05-08 ENCOUNTER — TELEPHONE (OUTPATIENT)
Dept: DERMATOLOGY | Facility: CLINIC | Age: 68
End: 2018-05-08

## 2018-05-08 NOTE — TELEPHONE ENCOUNTER
Dermatology Pre-visit Call:    Reason for visit : Discoloration on forehead     Any personal history of skin cancers: No    Was the patient referred: No    If the patient was referred, are records obtained: No.     Has the patient seen a dermatologist in the past: Yes. Patient states that the records are not relevant to current situation.      Patient Reminders Given:  --Please, make sure you bring an updated list of your medications.   --Plan on being in our facility for approximately one hour, this includes the registration process, office visit, education and check-out process.  If you are having a procedure, more time may be required.     --If you are having a procedure, please, present 15 minutes early.  --Location reviewed.   --If you need to cancel or reschedule, call XXXX  --We look forward to seeing you in Dermatology Clinic.

## 2018-05-09 DIAGNOSIS — M16.11 OSTEOARTHRITIS OF RIGHT HIP, UNSPECIFIED OSTEOARTHRITIS TYPE: ICD-10-CM

## 2018-05-09 NOTE — TELEPHONE ENCOUNTER
[facility] is calling to find out status of glucosamine chondroitin.  Pharmacy states that patient was only prescribed a 15 day supply, so it needs to be refilled twice a month.  Please call.

## 2018-05-10 DIAGNOSIS — F17.200 TOBACCO USE DISORDER: ICD-10-CM

## 2018-05-10 NOTE — TELEPHONE ENCOUNTER
"Requested Prescriptions   Pending Prescriptions Disp Refills     nicotine (NICODERM CQ) 14 MG/24HR 24 hr patch  Last Written Prescription Date:  2-16-18  Last Fill Quantity: 30 pch,  # refills: 1   Last office visit: 4/19/2018 with prescribing provider:  Fina Frausto    Future Office Visit:    30 patch 1     Sig: Place 1 patch onto the skin every 24 hours    Partial Cholinergic Nicotinic Agonist Agents Failed    5/10/2018 11:00 AM       Failed - Blood pressure under 140/90 in past 12 months    BP Readings from Last 3 Encounters:   04/19/18 (!) 148/99   04/09/18 149/89   03/09/18 130/74          Passed - Recent (12 mo) or future (30 days) visit within the authorizing provider's specialty    Patient had office visit in the last 12 months or has a visit in the next 30 days with authorizing provider or within the authorizing provider's specialty.  See \"Patient Info\" tab in inbasket, or \"Choose Columns\" in Meds & Orders section of the refill encounter.           Passed - Patient is 18 years of age or older          "

## 2018-05-11 RX ORDER — MAGNESIUM CARB/ALUMINUM HYDROX 105-160MG
TABLET,CHEWABLE ORAL
Qty: 120 TABLET | Refills: 5 | Status: SHIPPED | OUTPATIENT
Start: 2018-05-11 | End: 2018-11-13

## 2018-05-11 RX ORDER — NICOTINE 21 MG/24HR
1 PATCH, TRANSDERMAL 24 HOURS TRANSDERMAL EVERY 24 HOURS
Qty: 30 PATCH | Refills: 1 | Status: SHIPPED | OUTPATIENT
Start: 2018-05-11 | End: 2018-08-20

## 2018-05-11 NOTE — TELEPHONE ENCOUNTER
Please fill for 120 pills, as patient takes 4/day. He has used all of the 12 fills that were for 60.

## 2018-05-14 ENCOUNTER — OFFICE VISIT (OUTPATIENT)
Dept: DERMATOLOGY | Facility: CLINIC | Age: 68
End: 2018-05-14
Payer: COMMERCIAL

## 2018-05-14 VITALS — DIASTOLIC BLOOD PRESSURE: 86 MMHG | SYSTOLIC BLOOD PRESSURE: 122 MMHG | HEART RATE: 80 BPM

## 2018-05-14 DIAGNOSIS — L57.8 DIFFUSE PHOTODAMAGE OF SKIN: ICD-10-CM

## 2018-05-14 DIAGNOSIS — L55.9 SUNBURN: ICD-10-CM

## 2018-05-14 DIAGNOSIS — L81.9 HYPOPIGMENTED SKIN LESION: ICD-10-CM

## 2018-05-14 DIAGNOSIS — L81.9 HYPOPIGMENTED SKIN LESION: Primary | ICD-10-CM

## 2018-05-14 LAB — TSH SERPL DL<=0.005 MIU/L-ACNC: 1.08 MU/L (ref 0.4–4)

## 2018-05-14 RX ORDER — KETOCONAZOLE 20 MG/G
CREAM TOPICAL 2 TIMES DAILY
Qty: 60 G | Refills: 3 | Status: SHIPPED | OUTPATIENT
Start: 2018-05-14 | End: 2019-03-22

## 2018-05-14 ASSESSMENT — ACTIVITIES OF DAILY LIVING (ADL)
TRANSFERRING: 0 - INDEPENDENT
AMBULATION: 0-->INDEPENDENT
DRESS: 0 - INDEPENDENT
EATING: 0 - INDEPENDENT
CHANGE_IN_FUNCTIONAL_STATUS_SINCE_ONSET_OF_CURRENT_ILLNESS/INJURY: NO
BATHING: 0 - INDEPENDENT
RETIRED_COMMUNICATION: 0-->UNDERSTANDS/COMMUNICATES WITHOUT DIFFICULTY
TOILETING: 0 - INDEPENDENT
SWALLOWING: 0-->SWALLOWS FOODS/LIQUIDS WITHOUT DIFFICULTY
TRANSFERRING: 0-->INDEPENDENT
BATHING: 0-->INDEPENDENT
AMBULATION: 0 - INDEPENDENT
TOILETING: 0-->INDEPENDENT
RETIRED_EATING: 0-->INDEPENDENT
COGNITION: 0 - NO COGNITION ISSUES REPORTED
DRESS: 0-->INDEPENDENT
FALL_HISTORY_WITHIN_LAST_SIX_MONTHS: NO
COMMUNICATION: 0 - UNDERSTANDS/COMMUNICATES WITHOUT DIFFICULTY
SWALLOWING: 0 - SWALLOWS FOODS/LIQUIDS WITHOUT DIFFICULTY

## 2018-05-14 ASSESSMENT — PAIN SCALES - GENERAL: PAINLEVEL: NO PAIN (0)

## 2018-05-14 NOTE — LETTER
5/14/2018       RE: Robert B Behr  [address]  [place] [address]     Dear Colleague,    Thank you for referring your patient, Robert B Behr, to the [facility] DERMATOLOGY at [facility]. Please see a copy of my visit note below.    [facility] Dermatology Note      Dermatology Problem List:  1. Hypo/depigmented (difficult to tell due to acute sunburn) patch on scalp as well as depigmented macules on low back, chest, and shins  - Differential diagnosis includes vitiligo, tinea versicolor, actinic poikiloderma/idiopathic guttate hypomelanosis, progressive macular hypomelanosis; possible overlap of etiologies  - 5/14/18: TSH/free T4   - Trial of ketoconazole 2% cream BID to all affected areas to treat possible tinea versicolor component; return in 3 weeks    Encounter Date: May 14, 2018    CC:   Chief Complaint   Patient presents with     Derm Problem     Ravin hartley with a concern of pigmentation cheanges on his head.        History of Present Illness:  Mr. Robert B Behr is a 68 year old male who presents as a self-referral for white spots on the scalp. This suddenly occurred approximately 4-6 weeks ago. The white patches were asymptomatic, not itchy or scaly. Yesterday, he went out in the sun without protection and the entire white area became sunburned, so it is now burning and slightly tender, but was not like this prior to the sunburn.   On further questioning, he also notes small asymptomatic white spots on the low back, chest, and shins, but states this has been present for years and has not been changing or progressing over time. He has never tried any treatment.   He has no personal or family history of skin conditions or autoimmune disease such as thyroid disorders.     Past Medical History:   Patient Active Problem List   Diagnosis     Osteoporosis     COPD (chronic obstructive pulmonary disease) (H)     Tobacco use disorder      CARDIOVASCULAR SCREENING; LDL GOAL LESS THAN 160     Lung nodules     Health Care Home     Hiatal hernia     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     Tobacco abuse      Past Surgical History:   Procedure Laterality Date     CATARACT IOL, RT/LT Left 09/15/2017    s/p CE/IOL left eye     CATARACT IOL, RT/LT Right      PHACOEMULSIFICATION CLEAR CORNEA WITH STANDARD INTRAOCULAR LENS IMPLANT Right 9/30/2016    Procedure: PHACOEMULSIFICATION CLEAR CORNEA WITH STANDARD INTRAOCULAR LENS IMPLANT;  Surgeon: Elly Valencia MD;  Location:  [facility]     PHACOEMULSIFICATION WITH STANDARD INTRAOCULAR LENS IMPLANT Left 9/15/2017    Procedure: PHACOEMULSIFICATION WITH STANDARD INTRAOCULAR LENS IMPLANT;  Left Eye Phacoemulsification with Standard Lens;  Surgeon: Elly Valencia MD;  Location: [facility] OR     WRIST SURGERY         Social History:  The patient is retired. The patient denies use of tanning beds but does spend time outdoors; likes to go biking.    Family History:  No history of skin or autoimmune conditions.     Medications:  Current Outpatient Prescriptions   Medication Sig Dispense Refill     ascorbic acid (VITAMIN C) 1000 MG TABS TAKE 1 TABLET BY MOUTH DAILY 90 tablet 11     B Complex-C (VITAMIN B COMPLEX W/VITAMIN C) TABS tablet TAKE 1 TABLET BY MOUTH TWICE DAILY WITH FOLIC ACID 180 tablet 2     Calcium-Magnesium-Vitamin D (CALCIUM 1200+D3) 600- MG-MG-UNIT TB24 Take 1-2 tablets by mouth every morning Reported on 3/14/2017       calcium-vitamin D (CALTRATE) 600-400 MG-UNIT per tablet TAKE 1 TABLET BY MOUTH TWICE DAILY 180 tablet 3     fish oil-omega-3 fatty acids 1000 MG capsule TAKE 1 CAPSULE BY MOUTH DAILY 90 capsule 11     glucosamine-chondroitinoitin 750-600 MG TABS TAKE 2 TABLETS BY MOUTH TWICE DAILY 120 tablet 5     ipratropium -  albuterol 0.5 mg/2.5 mg/3 mL (DUONEB) 0.5-2.5 (3) MG/3ML neb solution Take 1 vial (3 mLs) by nebulization every 6 hours as needed for shortness of breath / dyspnea or wheezing 30 vial 1     Ipratropium-Albuterol (COMBIVENT RESPIMAT)  MCG/ACT inhaler INHALE ONE PUFF BY MOUTH FOUR TIMES A DAY (NO TO EXCCED 6 DOSES PER DAY) 4 g 11     ketoconazole (NIZORAL) 2 % cream Apply topically 2 times daily To all affected areas with white spots 60 g 3     levofloxacin (QUIXIN) 0.5 % ophthalmic solution 1 drop in surgical eye as directed - 4x daily for 1 week, then stop 1 Bottle 1     nicotine (NICODERM CQ) 14 MG/24HR 24 hr patch Place 1 patch onto the skin every 24 hours 30 patch 1     order for DME Equipment being ordered: nebulizer machine 1 each 0     Spacer/Aero-Holding Chambers (POCKET SPACER) MARGARITA Attach to albuterol inhaler, use 2 puffs every 4-6 hours as needed. 1 each 0     vitamin A 83212 UNIT capsule TAKE 1 CAPSULE BY MOUTH DAILY 180 capsule 11     vitamin B complex with vitamin C (VITAMIN  B COMPLEX) TABS tablet Take 1 tablet by mouth 2 times daily With Folic acid 180 tablet 2     vitamin E ([facility]S VITAMIN E) 1000 UNIT capsule Take 1 capsule (1,000 Units) by mouth daily 90 capsule 2     fluticasone-salmeterol (ADVAIR) 250-50 MCG/DOSE diskus inhaler Inhale 1 puff into the lungs 2 times daily (Patient not taking: Reported on 4/19/2018) 1 Inhaler 11     nicotine polacrilex (COMMIT) 2 MG lozenge Place 1 lozenge (2 mg) inside cheek as needed for smoking cessation Place 1 lozenge inside cheek as needed for smoking cessation. (Patient not taking: Reported on 4/19/2018) 150 lozenge 3     prednisoLONE acetate (PRED FORTE) 1 % ophthalmic susp 1 drop in surgical eye as directed, 4x daily after surgery for 1 week, 3x daily for 1 week, 2x daily for 1 week, daily for 1 week, then stop (Patient not taking: Reported on 4/19/2018) 1 Bottle 1     predniSONE (DELTASONE) 20 MG tablet Take 1 tablet (20 mg) by mouth 2 times daily  (Patient not taking: Reported on 5/14/2018) 10 tablet 0     predniSONE (DELTASONE) 20 MG tablet Take 3 tablets (60 mg) by mouth daily (Patient not taking: Reported on 5/14/2018) 18 tablet 0     SPIRIVA RESPIMAT 2.5 MCG/ACT inhalation aerosol INHALE 2 PUFFS INTO THE LUNGS DAILY (Patient not taking: Reported on 5/14/2018) 4 g 11        No Known Allergies    Review of Systems:  -Constitutional: The patient denies fatigue, fevers, chills, unintended weight loss, and night sweats.  -HEENT: Patient denies nonhealing oral sores.  -Skin: As above in HPI. No additional skin concerns.    Physical exam:  Vitals: /86  Pulse 80  GEN: This is a well-developed, well-nourished male in no acute distress, in a pleasant mood.    SKIN: Focused examination of the scalp, face, neck, back, chest, and lower legs was performed.  -Diffuse photodamage.   -Upper forehead extending back onto top of scalp with hypo- vs depigmented macules coalescing into a larger patch; diffuse overlying erythema related to sunburn, making it difficult to tell even with Wood's lamp examination whether hypo- or depigmented.   -Chest with diffuse erythema and telangiectasias with few scattered depigmented round macules. Similar appearing depigmented small round macules on lower back and anterior shins.   -No other lesions of concern on areas examined.     Impression/Plan:  1. Hypo/depigmented (difficult to tell due to acute sunburn) patch on scalp as well as depigmented macules on low back, chest, and shins    Differential diagnosis includes vitiligo, tinea versicolor, actinic poikiloderma/idiopathic guttate hypomelanosis, progressive macular hypomelanosis; possible overlap of etiologies; (Syphilis unlikely, patient denies venereal risk)    Check TSH/free T4 today    Trial of ketoconazole 2% cream BID to all affected areas to treat possible tinea versicolor component    2.  Photodamage, sunburn    Counseled on photoprotection; he should be particularly  careful to protect himself from the sun in the hypo/depigmented areas    Follow-up in 3 weeks, earlier for new or changing lesions.     Dr. Liu staffed the patient.  Staff Physician Comments:  I saw and evaluated the patient with the resident and I agree with the assessment and plan. I was present for the key portions of the above major procedure and examination.    Nayan Liu MD  Professor  Head of Dermato-Allergy Division  Department of Dermatology  [facility], [place], [place]    Staff Involved:  Resident(Melba Corcoran)/Staff(as above)    Vanessa Corcoran MD  Dermatology Resident PGY2    Again, thank you for allowing me to participate in the care of your patient.      Sincerely,    Nayan Liu MD

## 2018-05-14 NOTE — NURSING NOTE
Dermatology Rooming Note    Robert B Behr's goals for this visit include:   Chief Complaint   Patient presents with     Derm Problem     Ravin is odilia with a concern of pigmentation cheanges on his head.      Precious Resendiz LPN

## 2018-05-14 NOTE — PATIENT INSTRUCTIONS
We are not quite sure yet what is causing the white spots. It could be vitiligo (an autoimmune disease that attacks the pigment cells and may be related to thyroid disease), tinea versicolor (a yeast that can colonize the skin and cause white spots), or less likely a condition called progressive macular hypomelanosis (which is caused by bacteria on the skin). We will check your thyroid and do a trial of an anti-yeast cream and see how things are going when we see you next.     We will check your thyroid today on blood work.     Start ketoconazole 2% cream to all affected areas with white spots twice daily for the next 3 weeks until we see you.

## 2018-05-14 NOTE — MR AVS SNAPSHOT
After Visit Summary   5/14/2018    Robert B Behr    MRN: [id number]           Patient Information     Date Of Birth          1950        Visit Information        Provider Department      5/14/2018 12:15 PM Nayan Liu MD [facility] Dermatology        Today's Diagnoses     Hypopigmented skin lesion    -  1    Diffuse photodamage of skin        Sunburn          Care Instructions    We are not quite sure yet what is causing the white spots. It could be vitiligo (an autoimmune disease that attacks the pigment cells and may be related to thyroid disease), tinea versicolor (a yeast that can colonize the skin and cause white spots), or less likely a condition called progressive macular hypomelanosis (which is caused by bacteria on the skin). We will check your thyroid and do a trial of an anti-yeast cream and see how things are going when we see you next.     We will check your thyroid today on blood work.     Start ketoconazole 2% cream to all affected areas with white spots twice daily for the next 3 weeks until we see you.           Follow-ups after your visit        Follow-up notes from your care team     Return in about 3 weeks (around 6/4/2018).      Your next 10 appointments already scheduled     Jun 08, 2018 10:45 AM CDT   (Arrive by 10:30 AM)   Return Visit with MD CHARLETTE Santos [facility] Dermatology ([facility])    [address] [place] [id number] [phone]              Who to contact     Please call your clinic at [phone] to:    Ask questions about your health    Make or cancel appointments    Discuss your medicines    Learn about your test results    Speak to your doctor            Additional Information About Your Visit        MyChart Information     [employer] gives you secure access to your electronic health record. If you see a primary care provider, you can also send messages to your care team and make appointments. If  you have questions, please call your primary care clinic.  If you do not have a primary care provider, please call [phone] and they will assist you.      [employer] is an electronic gateway that provides easy, online access to your medical records. With [employer], you can request a clinic appointment, read your test results, renew a prescription or communicate with your care team.     To access your existing account, please contact your [facility] or call [phone] for assistance.        Care EveryWhere ID     This is your Care EveryWhere ID. This could be used by other organizations to access your [facility] medical records  [id number]        Your Vitals Were     Pulse                   80            Blood Pressure from Last 3 Encounters:   05/14/18 122/86   04/19/18 (!) 148/99   04/09/18 149/89    Weight from Last 3 Encounters:   04/19/18 71.2 kg (157 lb)   04/09/18 70.3 kg (155 lb)   03/09/18 70.3 kg (155 lb)                 Today's Medication Changes          These changes are accurate as of 5/14/18  1:22 PM.  If you have any questions, ask your nurse or doctor.               Start taking these medicines.        Dose/Directions    ketoconazole 2 % cream   Commonly known as:  NIZORAL   Used for:  Hypopigmented skin lesion   Started by:  Nayan Liu MD        Apply topically 2 times daily To all affected areas with white spots   Quantity:  60 g   Refills:  3            Where to get your medicines      These medications were sent to [id number] - [place], [place] - [address] AT [facility] [address]  [address], [place] [address]     Phone:  [phone]     ketoconazole 2 % cream                Primary Care Provider Office Phone # Fax #    Fina Frausto [phone]       [address] [place] [address]        Equal Access to Services     TIARRA PAPPAS AH: Cony Hernandez, staci licea, bindu galdamez  kael canokaye souravsebastián ortizaan ah. So [facility] [phone].    ATENCIÓN: Si maribella alecia, tiene a rios disposición servicios gratuitos de asistencia lingüística. Kenna al [phone].    We comply with applicable federal civil rights laws and [place] laws. We do not discriminate on the basis of race, color, national origin, age, disability, sex, sexual orientation, or gender identity.            Thank you!     Thank you for choosing [facility] DERMATOLOGY  for your care. Our goal is always to provide you with excellent care. Hearing back from our patients is one way we can continue to improve our services. Please take a few minutes to complete the written survey that you may receive in the mail after your visit with us. Thank you!             Your Updated Medication List - Protect others around you: Learn how to safely use, store and throw away your medicines at www.disposemymeds.org.          This list is accurate as of 5/14/18  1:22 PM.  Always use your most recent med list.                   Brand Name Dispense Instructions for use Diagnosis    ascorbic acid 1000 MG Tabs    vitamin C    90 tablet    TAKE 1 TABLET BY MOUTH DAILY    Encounter for herb and vitamin supplement management       CALCIUM 1200+D3 600- MG-MG-UNIT Tb24   Generic drug:  Calcium-Magnesium-Vitamin D      Take 1-2 tablets by mouth every morning Reported on 3/14/2017        calcium-vitamin D 600-400 MG-UNIT per tablet    CALTRATE    180 tablet    TAKE 1 TABLET BY MOUTH TWICE DAILY    Age-related osteoporosis without current pathological fracture       fish oil-omega-3 fatty acids 1000 MG capsule     90 capsule    TAKE 1 CAPSULE BY MOUTH DAILY    Encounter for herb and vitamin supplement management       fluticasone-salmeterol 250-50 MCG/DOSE diskus inhaler    ADVAIR    1 Inhaler    Inhale 1 puff into the lungs 2 times daily    Chronic obstructive pulmonary disease, unspecified COPD type (H)        glucosamine-chondroitinoitin 750-600 MG Tabs     120 tablet    TAKE 2 TABLETS BY MOUTH TWICE DAILY    Osteoarthritis of right hip, unspecified osteoarthritis type       * Ipratropium-Albuterol  MCG/ACT inhaler    COMBIVENT RESPIMAT    4 g    INHALE ONE PUFF BY MOUTH FOUR TIMES A DAY (NO TO EXCCED 6 DOSES PER DAY)    COPD exacerbation (H)       * ipratropium - albuterol 0.5 mg/2.5 mg/3 mL 0.5-2.5 (3) MG/3ML neb solution    DUONEB    30 vial    Take 1 vial (3 mLs) by nebulization every 6 hours as needed for shortness of breath / dyspnea or wheezing    COPD exacerbation (H)       ketoconazole 2 % cream    NIZORAL    60 g    Apply topically 2 times daily To all affected areas with white spots    Hypopigmented skin lesion       levofloxacin 0.5 % ophthalmic solution    QUIXIN    1 Bottle    1 drop in surgical eye as directed - 4x daily for 1 week, then stop    Nuclear cataract of left eye       nicotine 14 MG/24HR 24 hr patch    NICODERM CQ    30 patch    Place 1 patch onto the skin every 24 hours    Tobacco use disorder       nicotine polacrilex 2 MG lozenge    COMMIT    150 lozenge    Place 1 lozenge (2 mg) inside cheek as needed for smoking cessation Place 1 lozenge inside cheek as needed for smoking cessation.    Chronic obstructive pulmonary disease, unspecified COPD type (H), Tobacco abuse       order for DME     1 each    Equipment being ordered: nebulizer machine    COPD exacerbation (H)       pocket spacer Jeanine     1 each    Attach to albuterol inhaler, use 2 puffs every 4-6 hours as needed.    Chronic obstructive pulmonary disease, unspecified COPD type (H)       prednisoLONE acetate 1 % ophthalmic susp    PRED FORTE    1 Bottle    1 drop in surgical eye as directed, 4x daily after surgery for 1 week, 3x daily for 1 week, 2x daily for 1 week, daily for 1 week, then stop    Nuclear cataract of left eye       * predniSONE 20 MG tablet    DELTASONE    10 tablet    Take 1 tablet (20 mg) by mouth 2 times daily     COPD exacerbation (H)       * predniSONE 20 MG tablet    DELTASONE    18 tablet    Take 3 tablets (60 mg) by mouth daily    COPD exacerbation (H)       SPIRIVA RESPIMAT 2.5 MCG/ACT inhalation aerosol   Generic drug:  tiotropium     4 g    INHALE 2 PUFFS INTO THE LUNGS DAILY    Chronic obstructive pulmonary disease, unspecified COPD type (H)       vitamin A 73080 UNIT capsule     180 capsule    TAKE 1 CAPSULE BY MOUTH DAILY    Encounter for herb and vitamin supplement management       vitamin b complex w/vitamin C Tabs tablet     180 tablet    TAKE 1 TABLET BY MOUTH TWICE DAILY WITH FOLIC ACID    Takes dietary supplements       vitamin B complex with vitamin C Tabs tablet     180 tablet    Take 1 tablet by mouth 2 times daily With Folic acid    Encounter for herb and vitamin supplement management       vitamin E 1000 UNIT capsule    CVS vitamin E    90 capsule    Take 1 capsule (1,000 Units) by mouth daily    Encounter for herb and vitamin supplement management       * Notice:  This list has 4 medication(s) that are the same as other medications prescribed for you. Read the directions carefully, and ask your doctor or other care provider to review them with you.

## 2018-05-14 NOTE — PROGRESS NOTES
[facility] Dermatology Note      Dermatology Problem List:  1. Hypo/depigmented (difficult to tell due to acute sunburn) patch on scalp as well as depigmented macules on low back, chest, and shins  - Differential diagnosis includes vitiligo, tinea versicolor, actinic poikiloderma/idiopathic guttate hypomelanosis, progressive macular hypomelanosis; possible overlap of etiologies  - 5/14/18: TSH/free T4   - Trial of ketoconazole 2% cream BID to all affected areas to treat possible tinea versicolor component; return in 3 weeks    Encounter Date: May 14, 2018    CC:   Chief Complaint   Patient presents with     Derm Problem     Ravin hartley with a concern of pigmentation cheanges on his head.        History of Present Illness:  Mr. Robert B Behr is a 68 year old male who presents as a self-referral for white spots on the scalp. This suddenly occurred approximately 4-6 weeks ago. The white patches were asymptomatic, not itchy or scaly. Yesterday, he went out in the sun without protection and the entire white area became sunburned, so it is now burning and slightly tender, but was not like this prior to the sunburn.   On further questioning, he also notes small asymptomatic white spots on the low back, chest, and shins, but states this has been present for years and has not been changing or progressing over time. He has never tried any treatment.   He has no personal or family history of skin conditions or autoimmune disease such as thyroid disorders.     Past Medical History:   Patient Active Problem List   Diagnosis     Osteoporosis     COPD (chronic obstructive pulmonary disease) (H)     Tobacco use disorder     CARDIOVASCULAR SCREENING; LDL GOAL LESS THAN 160     Lung nodules     Health Care Home     Hiatal hernia     Past Medical History:   Diagnosis Date     Cataract      COPD (chronic obstructive pulmonary disease) (H)     diagnosed with spirometry      Lung nodules     CT scan 2016      Osteoporosis      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     Tobacco abuse      Past Surgical History:   Procedure Laterality Date     CATARACT IOL, RT/LT Left 09/15/2017    s/p CE/IOL left eye     CATARACT IOL, RT/LT Right      PHACOEMULSIFICATION CLEAR CORNEA WITH STANDARD INTRAOCULAR LENS IMPLANT Right 9/30/2016    Procedure: PHACOEMULSIFICATION CLEAR CORNEA WITH STANDARD INTRAOCULAR LENS IMPLANT;  Surgeon: Elly Valencia MD;  Location: [facility]     PHACOEMULSIFICATION WITH STANDARD INTRAOCULAR LENS IMPLANT Left 9/15/2017    Procedure: PHACOEMULSIFICATION WITH STANDARD INTRAOCULAR LENS IMPLANT;  Left Eye Phacoemulsification with Standard Lens;  Surgeon: Elly Valencia MD;  Location: [facility] OR     WRIST SURGERY         Social History:  The patient is retired. The patient denies use of tanning beds but does spend time outdoors; likes to go biking.    Family History:  No history of skin or autoimmune conditions.     Medications:  Current Outpatient Prescriptions   Medication Sig Dispense Refill     ascorbic acid (VITAMIN C) 1000 MG TABS TAKE 1 TABLET BY MOUTH DAILY 90 tablet 11     B Complex-C (VITAMIN B COMPLEX W/VITAMIN C) TABS tablet TAKE 1 TABLET BY MOUTH TWICE DAILY WITH FOLIC ACID 180 tablet 2     Calcium-Magnesium-Vitamin D (CALCIUM 1200+D3) 600- MG-MG-UNIT TB24 Take 1-2 tablets by mouth every morning Reported on 3/14/2017       calcium-vitamin D (CALTRATE) 600-400 MG-UNIT per tablet TAKE 1 TABLET BY MOUTH TWICE DAILY 180 tablet 3     fish oil-omega-3 fatty acids 1000 MG capsule TAKE 1 CAPSULE BY MOUTH DAILY 90 capsule 11     glucosamine-chondroitinoitin 750-600 MG TABS TAKE 2 TABLETS BY MOUTH TWICE DAILY 120 tablet 5     ipratropium - albuterol 0.5 mg/2.5 mg/3 mL (DUONEB) 0.5-2.5 (3) MG/3ML neb solution Take 1 vial (3 mLs) by nebulization every 6 hours as needed for shortness of breath / dyspnea or wheezing 30 vial 1     Ipratropium-Albuterol (COMBIVENT RESPIMAT)  MCG/ACT inhaler INHALE ONE PUFF BY MOUTH  FOUR TIMES A DAY (NO TO EXCCED 6 DOSES PER DAY) 4 g 11     ketoconazole (NIZORAL) 2 % cream Apply topically 2 times daily To all affected areas with white spots 60 g 3     levofloxacin (QUIXIN) 0.5 % ophthalmic solution 1 drop in surgical eye as directed - 4x daily for 1 week, then stop 1 Bottle 1     nicotine (NICODERM CQ) 14 MG/24HR 24 hr patch Place 1 patch onto the skin every 24 hours 30 patch 1     order for DME Equipment being ordered: nebulizer machine 1 each 0     Spacer/Aero-Holding Chambers (POCKET SPACER) MARGARITA Attach to albuterol inhaler, use 2 puffs every 4-6 hours as needed. 1 each 0     vitamin A 30497 UNIT capsule TAKE 1 CAPSULE BY MOUTH DAILY 180 capsule 11     vitamin B complex with vitamin C (VITAMIN  B COMPLEX) TABS tablet Take 1 tablet by mouth 2 times daily With Folic acid 180 tablet 2     vitamin E (CVS VITAMIN E) 1000 UNIT capsule Take 1 capsule (1,000 Units) by mouth daily 90 capsule 2     fluticasone-salmeterol (ADVAIR) 250-50 MCG/DOSE diskus inhaler Inhale 1 puff into the lungs 2 times daily (Patient not taking: Reported on 4/19/2018) 1 Inhaler 11     nicotine polacrilex (COMMIT) 2 MG lozenge Place 1 lozenge (2 mg) inside cheek as needed for smoking cessation Place 1 lozenge inside cheek as needed for smoking cessation. (Patient not taking: Reported on 4/19/2018) 150 lozenge 3     prednisoLONE acetate (PRED FORTE) 1 % ophthalmic susp 1 drop in surgical eye as directed, 4x daily after surgery for 1 week, 3x daily for 1 week, 2x daily for 1 week, daily for 1 week, then stop (Patient not taking: Reported on 4/19/2018) 1 Bottle 1     predniSONE (DELTASONE) 20 MG tablet Take 1 tablet (20 mg) by mouth 2 times daily (Patient not taking: Reported on 5/14/2018) 10 tablet 0     predniSONE (DELTASONE) 20 MG tablet Take 3 tablets (60 mg) by mouth daily (Patient not taking: Reported on 5/14/2018) 18 tablet 0     SPIRIVA RESPIMAT 2.5 MCG/ACT inhalation aerosol INHALE 2 PUFFS INTO THE LUNGS DAILY (Patient  not taking: Reported on 5/14/2018) 4 g 11        No Known Allergies      Review of Systems:  -Constitutional: The patient denies fatigue, fevers, chills, unintended weight loss, and night sweats.  -HEENT: Patient denies nonhealing oral sores.  -Skin: As above in HPI. No additional skin concerns.    Physical exam:  Vitals: /86  Pulse 80  GEN: This is a well-developed, well-nourished male in no acute distress, in a pleasant mood.    SKIN: Focused examination of the scalp, face, neck, back, chest, and lower legs was performed.  -Diffuse photodamage.   -Upper forehead extending back onto top of scalp with hypo- vs depigmented macules coalescing into a larger patch; diffuse overlying erythema related to sunburn, making it difficult to tell even with Wood's lamp examination whether hypo- or depigmented.   -Chest with diffuse erythema and telangiectasias with few scattered depigmented round macules. Similar appearing depigmented small round macules on lower back and anterior shins.   -No other lesions of concern on areas examined.     Impression/Plan:  1. Hypo/depigmented (difficult to tell due to acute sunburn) patch on scalp as well as depigmented macules on low back, chest, and shins    Differential diagnosis includes vitiligo, tinea versicolor, actinic poikiloderma/idiopathic guttate hypomelanosis, progressive macular hypomelanosis; possible overlap of etiologies; (Syphilis unlikely, patient denies venereal risk)    Check TSH/free T4 today    Trial of ketoconazole 2% cream BID to all affected areas to treat possible tinea versicolor component    2.  Photodamage, sunburn    Counseled on photoprotection; he should be particularly careful to protect himself from the sun in the hypo/depigmented areas    Follow-up in 3 weeks, earlier for new or changing lesions.     Dr. Liu staffed the patient.  Staff Physician Comments:  I saw and evaluated the patient with the resident and I agree with the assessment and  plan. I was present for the key portions of the above major procedure and examination.    Nayan Liu MD  Professor  Head of Dermato-Allergy Division  Department of Dermatology  [facility], [place], [place]    Staff Involved:  Resident(Melba Corcoran)/Staff(as above)    Vanessa Corcoran MD  Dermatology Resident PGY2

## 2018-05-21 ENCOUNTER — OFFICE VISIT (OUTPATIENT)
Dept: URGENT CARE | Facility: URGENT CARE | Age: 68
End: 2018-05-21
Payer: COMMERCIAL

## 2018-05-21 VITALS
HEART RATE: 88 BPM | DIASTOLIC BLOOD PRESSURE: 86 MMHG | OXYGEN SATURATION: 96 % | BODY MASS INDEX: 22.19 KG/M2 | SYSTOLIC BLOOD PRESSURE: 142 MMHG | WEIGHT: 155 LBS | HEIGHT: 70 IN | TEMPERATURE: 97.4 F

## 2018-05-21 DIAGNOSIS — J44.1 COPD EXACERBATION (H): Primary | ICD-10-CM

## 2018-05-21 PROCEDURE — 94640 AIRWAY INHALATION TREATMENT: CPT | Performed by: INTERNAL MEDICINE

## 2018-05-21 PROCEDURE — 99214 OFFICE O/P EST MOD 30 MIN: CPT | Mod: 25 | Performed by: INTERNAL MEDICINE

## 2018-05-21 RX ORDER — IPRATROPIUM BROMIDE AND ALBUTEROL SULFATE 2.5; .5 MG/3ML; MG/3ML
1 SOLUTION RESPIRATORY (INHALATION) EVERY 6 HOURS PRN
Qty: 30 VIAL | Refills: 1 | Status: SHIPPED | OUTPATIENT
Start: 2018-05-21 | End: 2019-12-23

## 2018-05-21 RX ORDER — IPRATROPIUM BROMIDE AND ALBUTEROL SULFATE 2.5; .5 MG/3ML; MG/3ML
1 SOLUTION RESPIRATORY (INHALATION) ONCE
Qty: 3 ML | Refills: 0 | Status: SHIPPED
Start: 2018-05-21 | End: 2019-05-14

## 2018-05-21 RX ORDER — PREDNISONE 20 MG/1
40 TABLET ORAL DAILY
Qty: 10 TABLET | Refills: 0 | Status: SHIPPED | OUTPATIENT
Start: 2018-05-21 | End: 2018-05-26

## 2018-05-21 ASSESSMENT — ENCOUNTER SYMPTOMS
FEVER: 0
CHILLS: 0
COUGH: 1
RHINORRHEA: 0

## 2018-05-21 NOTE — MR AVS SNAPSHOT
After Visit Summary   5/21/2018    Robert B Behr    MRN: [id number]           Patient Information     Date Of Birth          1950        Visit Information        Provider Department      5/21/2018 5:00 PM Jovita De La Torre MD [facility]        Today's Diagnoses     COPD exacerbation (H)    -  1      Care Instructions    duoneb or combivent every 4 to 6 hours for rescue  Prednisone 40 mg daily for 5 day burst  Continue spireva daily  Start advair 1 puff 2 x day    Recheck later this week for COPD Exacerbation.  Recheck 1 month with primary after adding advair.    Call or return to clinic if symptoms worsen or fail to improve as anticipated.            Follow-ups after your visit        Your next 10 appointments already scheduled     Jun 08, 2018 10:45 AM CDT   (Arrive by 10:30 AM)   Return Visit with Nayan Liu MD   [facility] ([facility])    [address] [place] [id number] [phone]              Who to contact     If you have questions or need follow up information about today's clinic visit or your schedule please contact [facility] directly at [phone].  Normal or non-critical lab and imaging results will be communicated to you by MyChart, letter or phone within 4 business days after the clinic has received the results. If you do not hear from us within 7 days, please contact the clinic through MyChart or phone. If you have a critical or abnormal lab result, we will notify you by phone as soon as possible.  Submit refill requests through [employer] or call your pharmacy and they will forward the refill request to us. Please allow 3 business days for your refill to be completed.          Additional Information About Your Visit        MyChart Information     [employer] gives you secure access to your electronic health record. If you see a primary care provider, you can also  "send messages to your care team and make appointments. If you have questions, please call your primary care clinic.  If you do not have a primary care provider, please call [phone] and they will assist you.        Care EveryWhere ID     This is your Care EveryWhere ID. This could be used by other organizations to access your [facility] medical records  [id number]        Your Vitals Were     Pulse Temperature Height Pulse Oximetry BMI (Body Mass Index)       88 97.4  F (36.3  C) (Tympanic) 5' 10\" (1.778 m) 96% 22.24 kg/m2        Blood Pressure from Last 3 Encounters:   05/21/18 142/86   05/14/18 122/86   04/19/18 (!) 148/99    Weight from Last 3 Encounters:   05/21/18 155 lb (70.3 kg)   04/19/18 157 lb (71.2 kg)   04/09/18 155 lb (70.3 kg)              We Performed the Following     INHALATION/NEBULIZER TREATMENT, INITIAL          Today's Medication Changes          These changes are accurate as of 5/21/18  5:25 PM.  If you have any questions, ask your nurse or doctor.               These medicines have changed or have updated prescriptions.        Dose/Directions    * fluticasone-salmeterol 250-50 MCG/DOSE diskus inhaler   Commonly known as:  ADVAIR   This may have changed:  Another medication with the same name was added. Make sure you understand how and when to take each.   Used for:  Chronic obstructive pulmonary disease, unspecified COPD type (H)   Changed by:  Jovita De La Torre MD        Dose:  1 puff   Inhale 1 puff into the lungs 2 times daily   Quantity:  1 Inhaler   Refills:  11       * fluticasone-salmeterol 250-50 MCG/DOSE diskus inhaler   Commonly known as:  ADVAIR   This may have changed:  You were already taking a medication with the same name, and this prescription was added. Make sure you understand how and when to take each.   Used for:  COPD exacerbation (H)   Changed by:  Jovita De La Torre MD        Dose:  1 puff   Inhale 1 puff into the lungs 2 times daily   Quantity:  1 Inhaler "   Refills:  1       * Ipratropium-Albuterol  MCG/ACT inhaler   Commonly known as:  COMBIVENT RESPIMAT   This may have changed:  Another medication with the same name was added. Make sure you understand how and when to take each.   Used for:  COPD exacerbation (H)   Changed by:  Jovita De La Torre MD        INHALE ONE PUFF BY MOUTH FOUR TIMES A DAY (NO TO EXCCED 6 DOSES PER DAY)   Quantity:  4 g   Refills:  11       * ipratropium - albuterol 0.5 mg/2.5 mg/3 mL 0.5-2.5 (3) MG/3ML neb solution   Commonly known as:  DUONEB   This may have changed:  Another medication with the same name was added. Make sure you understand how and when to take each.   Used for:  COPD exacerbation (H)   Changed by:  Jovita De La Torre MD        Dose:  1 vial   Take 1 vial (3 mLs) by nebulization every 6 hours as needed for shortness of breath / dyspnea or wheezing   Quantity:  30 vial   Refills:  1       * ipratropium - albuterol 0.5 mg/2.5 mg/3 mL 0.5-2.5 (3) MG/3ML neb solution   Commonly known as:  DUONEB   This may have changed:  You were already taking a medication with the same name, and this prescription was added. Make sure you understand how and when to take each.   Used for:  COPD exacerbation (H)   Changed by:  Jovita De La Torre MD        Dose:  1 vial   Take 1 vial (3 mLs) by nebulization once for 1 dose   Quantity:  3 mL   Refills:  0       * ipratropium - albuterol 0.5 mg/2.5 mg/3 mL 0.5-2.5 (3) MG/3ML neb solution   Commonly known as:  DUONEB   This may have changed:  You were already taking a medication with the same name, and this prescription was added. Make sure you understand how and when to take each.   Used for:  COPD exacerbation (H)   Changed by:  Jovita De La Torre MD        Dose:  1 vial   Take 1 vial (3 mLs) by nebulization every 6 hours as needed for shortness of breath / dyspnea or wheezing   Quantity:  30 vial   Refills:  1       * predniSONE 20 MG tablet   Commonly known as:   DELTASONE   This may have changed:  Another medication with the same name was added. Make sure you understand how and when to take each.   Used for:  COPD exacerbation (H)   Changed by:  Jovita De La Torre MD        Dose:  20 mg   Take 1 tablet (20 mg) by mouth 2 times daily   Quantity:  10 tablet   Refills:  0       * predniSONE 20 MG tablet   Commonly known as:  DELTASONE   This may have changed:  Another medication with the same name was added. Make sure you understand how and when to take each.   Used for:  COPD exacerbation (H)   Changed by:  Jovita De La Torre MD        Dose:  60 mg   Take 3 tablets (60 mg) by mouth daily   Quantity:  18 tablet   Refills:  0       * predniSONE 20 MG tablet   Commonly known as:  DELTASONE   This may have changed:  You were already taking a medication with the same name, and this prescription was added. Make sure you understand how and when to take each.   Used for:  COPD exacerbation (H)   Changed by:  Jovita De La Torre MD        Dose:  40 mg   Take 2 tablets (40 mg) by mouth daily for 5 days   Quantity:  10 tablet   Refills:  0       * Notice:  This list has 9 medication(s) that are the same as other medications prescribed for you. Read the directions carefully, and ask your doctor or other care provider to review them with you.         Where to get your medicines      These medications were sent to [id number] - [place], [place] - [address] AT [facility] [address]  [address], [place] [address]     Phone:  [phone]     fluticasone-salmeterol 250-50 MCG/DOSE diskus inhaler    ipratropium - albuterol 0.5 mg/2.5 mg/3 mL 0.5-2.5 (3) MG/3ML neb solution         Some of these will need a paper prescription and others can be bought over the counter.  Ask your nurse if you have questions.     You don't need a prescription for these medications     ipratropium - albuterol 0.5 mg/2.5 mg/3 mL 0.5-2.5 (3) MG/3ML neb solution         Call  your pharmacy to confirm that your medication is ready for pickup. It may take up to 24 hours for them to receive the prescription. If the prescription is not ready within 3 business days, please contact your clinic or your provider.     We will let you know when these medications are ready. If you don't hear back within 3 business days, please contact us.     predniSONE 20 MG tablet                Primary Care Provider Office Phone # Fax #    Fina Frausto [phone] [address]  [place] [address]        Equal Access to Services     [facility]: Hadii aad ku hadasho Soomaali, waaxda luqadaha, qaybta kaalmada adeegyaмарина, kael rangel . So [facility] [phone].    ATENCIÓN: Si habla español, tiene a rios disposición servicios gratuitos de asistencia lingüística. [facility] [phone].    We comply with applicable federal civil rights laws and [place] laws. We do not discriminate on the basis of race, color, national origin, age, disability, sex, sexual orientation, or gender identity.            Thank you!     Thank you for choosing [facility]  for your care. Our goal is always to provide you with excellent care. Hearing back from our patients is one way we can continue to improve our services. Please take a few minutes to complete the written survey that you may receive in the mail after your visit with us. Thank you!             Your Updated Medication List - Protect others around you: Learn how to safely use, store and throw away your medicines at www.disposemymeds.org.          This list is accurate as of 5/21/18  5:25 PM.  Always use your most recent med list.                   Brand Name Dispense Instructions for use Diagnosis    ascorbic acid 1000 MG Tabs    vitamin C    90 tablet    TAKE 1 TABLET BY MOUTH DAILY    Encounter for herb and vitamin supplement management       CALCIUM 1200+D3 600- MG-MG-UNIT Tb24    Generic drug:  Calcium-Magnesium-Vitamin D      Take 1-2 tablets by mouth every morning Reported on 3/14/2017        calcium-vitamin D 600-400 MG-UNIT per tablet    CALTRATE    180 tablet    TAKE 1 TABLET BY MOUTH TWICE DAILY    Age-related osteoporosis without current pathological fracture       fish oil-omega-3 fatty acids 1000 MG capsule     90 capsule    TAKE 1 CAPSULE BY MOUTH DAILY    Encounter for herb and vitamin supplement management       * fluticasone-salmeterol 250-50 MCG/DOSE diskus inhaler    ADVAIR    1 Inhaler    Inhale 1 puff into the lungs 2 times daily    Chronic obstructive pulmonary disease, unspecified COPD type (H)       * fluticasone-salmeterol 250-50 MCG/DOSE diskus inhaler    ADVAIR    1 Inhaler    Inhale 1 puff into the lungs 2 times daily    COPD exacerbation (H)       glucosamine-chondroitinoitin 750-600 MG Tabs     120 tablet    TAKE 2 TABLETS BY MOUTH TWICE DAILY    Osteoarthritis of right hip, unspecified osteoarthritis type       * Ipratropium-Albuterol  MCG/ACT inhaler    COMBIVENT RESPIMAT    4 g    INHALE ONE PUFF BY MOUTH FOUR TIMES A DAY (NO TO EXCCED 6 DOSES PER DAY)    COPD exacerbation (H)       * ipratropium - albuterol 0.5 mg/2.5 mg/3 mL 0.5-2.5 (3) MG/3ML neb solution    DUONEB    30 vial    Take 1 vial (3 mLs) by nebulization every 6 hours as needed for shortness of breath / dyspnea or wheezing    COPD exacerbation (H)       * ipratropium - albuterol 0.5 mg/2.5 mg/3 mL 0.5-2.5 (3) MG/3ML neb solution    DUONEB    3 mL    Take 1 vial (3 mLs) by nebulization once for 1 dose    COPD exacerbation (H)       * ipratropium - albuterol 0.5 mg/2.5 mg/3 mL 0.5-2.5 (3) MG/3ML neb solution    DUONEB    30 vial    Take 1 vial (3 mLs) by nebulization every 6 hours as needed for shortness of breath / dyspnea or wheezing    COPD exacerbation (H)       ketoconazole 2 % cream    NIZORAL    60 g    Apply topically 2 times daily To all affected areas with white spots    Hypopigmented  skin lesion       levofloxacin 0.5 % ophthalmic solution    QUIXIN    1 Bottle    1 drop in surgical eye as directed - 4x daily for 1 week, then stop    Nuclear cataract of left eye       nicotine 14 MG/24HR 24 hr patch    NICODERM CQ    30 patch    Place 1 patch onto the skin every 24 hours    Tobacco use disorder       nicotine polacrilex 2 MG lozenge    COMMIT    150 lozenge    Place 1 lozenge (2 mg) inside cheek as needed for smoking cessation Place 1 lozenge inside cheek as needed for smoking cessation.    Chronic obstructive pulmonary disease, unspecified COPD type (H), Tobacco abuse       order for DME     1 each    Equipment being ordered: nebulizer machine    COPD exacerbation (H)       pocket spacer Jeanine     1 each    Attach to albuterol inhaler, use 2 puffs every 4-6 hours as needed.    Chronic obstructive pulmonary disease, unspecified COPD type (H)       prednisoLONE acetate 1 % ophthalmic susp    PRED FORTE    1 Bottle    1 drop in surgical eye as directed, 4x daily after surgery for 1 week, 3x daily for 1 week, 2x daily for 1 week, daily for 1 week, then stop    Nuclear cataract of left eye       * predniSONE 20 MG tablet    DELTASONE    10 tablet    Take 1 tablet (20 mg) by mouth 2 times daily    COPD exacerbation (H)       * predniSONE 20 MG tablet    DELTASONE    18 tablet    Take 3 tablets (60 mg) by mouth daily    COPD exacerbation (H)       * predniSONE 20 MG tablet    DELTASONE    10 tablet    Take 2 tablets (40 mg) by mouth daily for 5 days    COPD exacerbation (H)       SPIRIVA RESPIMAT 2.5 MCG/ACT inhalation aerosol   Generic drug:  tiotropium     4 g    INHALE 2 PUFFS INTO THE LUNGS DAILY    Chronic obstructive pulmonary disease, unspecified COPD type (H)       vitamin A 61068 UNIT capsule     180 capsule    TAKE 1 CAPSULE BY MOUTH DAILY    Encounter for herb and vitamin supplement management       vitamin b complex w/vitamin C Tabs tablet     180 tablet    TAKE 1 TABLET BY MOUTH TWICE  DAILY WITH FOLIC ACID    Takes dietary supplements       vitamin B complex with vitamin C Tabs tablet     180 tablet    Take 1 tablet by mouth 2 times daily With Folic acid    Encounter for herb and vitamin supplement management       vitamin E 1000 UNIT capsule    CVS vitamin E    90 capsule    Take 1 capsule (1,000 Units) by mouth daily    Encounter for herb and vitamin supplement management       * Notice:  This list has 9 medication(s) that are the same as other medications prescribed for you. Read the directions carefully, and ask your doctor or other care provider to review them with you.

## 2018-05-21 NOTE — PROGRESS NOTES
SUBJECTIVE:   Robert B Behr is a 68 year old male presenting with a chief complaint of   Chief Complaint   Patient presents with     Urgent Care     Wheezing     c/o wheezing and URI for 3 days       He is an established patient of [facility].    URI Adult    Onset of symptoms was 3 day(s) ago.  Course of illness is worsening.      Current and Associated symptoms: cough - productive  wheezing  Treatment measures tried include Inhaler (name: combivent 1 puff 5 x day) and (name: spireva).  Predisposing factors include HX of COPD and humidity.    Bikes, swims      Review of Systems   Constitutional: Negative for chills and fever.   HENT: Negative for rhinorrhea.    Respiratory: Positive for cough.        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     Tobacco abuse      Family History   Problem Relation Age of Onset     DIABETES Brother      living     CANCER Brother      throat     Macular Degeneration Mother      Colon Cancer No family hx of      Glaucoma No family hx of      Retinal detachment No family hx of      Amblyopia No family hx of      Skin Cancer No family hx of      Melanoma No family hx of      Current Outpatient Prescriptions   Medication Sig Dispense Refill     ascorbic acid (VITAMIN C) 1000 MG TABS TAKE 1 TABLET BY MOUTH DAILY 90 tablet 11     B Complex-C (VITAMIN B COMPLEX W/VITAMIN C) TABS tablet TAKE 1 TABLET BY MOUTH TWICE DAILY WITH FOLIC ACID 180 tablet 2     Calcium-Magnesium-Vitamin D (CALCIUM 1200+D3) 600- MG-MG-UNIT TB24 Take 1-2 tablets by mouth every morning Reported on 3/14/2017       calcium-vitamin D (CALTRATE) 600-400 MG-UNIT per tablet TAKE 1 TABLET BY MOUTH TWICE DAILY 180 tablet 3     fish oil-omega-3 fatty acids 1000 MG capsule TAKE 1 CAPSULE BY MOUTH DAILY 90 capsule 11     fluticasone-salmeterol  (ADVAIR) 250-50 MCG/DOSE diskus inhaler Inhale 1 puff into the lungs 2 times daily 1 Inhaler 11     fluticasone-salmeterol (ADVAIR) 250-50 MCG/DOSE diskus inhaler Inhale 1 puff into the lungs 2 times daily 1 Inhaler 1     glucosamine-chondroitinoitin 750-600 MG TABS TAKE 2 TABLETS BY MOUTH TWICE DAILY 120 tablet 5     ipratropium - albuterol 0.5 mg/2.5 mg/3 mL (DUONEB) 0.5-2.5 (3) MG/3ML neb solution Take 1 vial (3 mLs) by nebulization every 6 hours as needed for shortness of breath / dyspnea or wheezing 30 vial 1     ipratropium - albuterol 0.5 mg/2.5 mg/3 mL (DUONEB) 0.5-2.5 (3) MG/3ML neb solution Take 1 vial (3 mLs) by nebulization once for 1 dose 3 mL 0     ipratropium - albuterol 0.5 mg/2.5 mg/3 mL (DUONEB) 0.5-2.5 (3) MG/3ML neb solution Take 1 vial (3 mLs) by nebulization every 6 hours as needed for shortness of breath / dyspnea or wheezing 30 vial 1     Ipratropium-Albuterol (COMBIVENT RESPIMAT)  MCG/ACT inhaler INHALE ONE PUFF BY MOUTH FOUR TIMES A DAY (NO TO EXCCED 6 DOSES PER DAY) 4 g 11     predniSONE (DELTASONE) 20 MG tablet Take 2 tablets (40 mg) by mouth daily for 5 days 10 tablet 0     Spacer/Aero-Holding Chambers (POCKET SPACER) MARGARITA Attach to albuterol inhaler, use 2 puffs every 4-6 hours as needed. 1 each 0     SPIRIVA RESPIMAT 2.5 MCG/ACT inhalation aerosol INHALE 2 PUFFS INTO THE LUNGS DAILY 4 g 11     vitamin A 76222 UNIT capsule TAKE 1 CAPSULE BY MOUTH DAILY 180 capsule 11     vitamin B complex with vitamin C (VITAMIN  B COMPLEX) TABS tablet Take 1 tablet by mouth 2 times daily With Folic acid 180 tablet 2     vitamin E ([facility]S VITAMIN E) 1000 UNIT capsule Take 1 capsule (1,000 Units) by mouth daily 90 capsule 2     ketoconazole (NIZORAL) 2 % cream Apply topically 2 times daily To all affected areas with white spots (Patient not taking: Reported on 5/21/2018) 60 g 3     levofloxacin (QUIXIN) 0.5 % ophthalmic solution 1 drop in surgical eye as directed - 4x daily for 1 week, then stop  "(Patient not taking: Reported on 5/21/2018) 1 Bottle 1     nicotine (NICODERM CQ) 14 MG/24HR 24 hr patch Place 1 patch onto the skin every 24 hours (Patient not taking: Reported on 5/21/2018) 30 patch 1     nicotine polacrilex (COMMIT) 2 MG lozenge Place 1 lozenge (2 mg) inside cheek as needed for smoking cessation Place 1 lozenge inside cheek as needed for smoking cessation. (Patient not taking: Reported on 4/19/2018) 150 lozenge 3     order for DME Equipment being ordered: nebulizer machine (Patient not taking: Reported on 5/21/2018) 1 each 0     prednisoLONE acetate (PRED FORTE) 1 % ophthalmic susp 1 drop in surgical eye as directed, 4x daily after surgery for 1 week, 3x daily for 1 week, 2x daily for 1 week, daily for 1 week, then stop (Patient not taking: Reported on 4/19/2018) 1 Bottle 1     predniSONE (DELTASONE) 20 MG tablet Take 1 tablet (20 mg) by mouth 2 times daily (Patient not taking: Reported on 5/14/2018) 10 tablet 0     predniSONE (DELTASONE) 20 MG tablet Take 3 tablets (60 mg) by mouth daily (Patient not taking: Reported on 5/14/2018) 18 tablet 0     Social History   Substance Use Topics     Smoking status: Former Smoker     Packs/day: 0.10     Years: 45.00     Types: Cigarettes     Smokeless tobacco: Former User      Comment: 3-7 cigarettes/day (8/29/17). 1.5 packs for 45 years smoker     Alcohol use 0.0 oz/week     0 Standard drinks or equivalent per week      Comment: occ beer       OBJECTIVE  /86  Pulse 88  Temp 97.4  F (36.3  C) (Tympanic)  Ht 5' 10\" (1.778 m)  Wt 155 lb (70.3 kg)  SpO2 96%  BMI 22.24 kg/m2    Physical Exam   Constitutional: He appears well-developed and well-nourished.   HENT:   Nose: Nose normal.   Mouth/Throat: Oropharynx is clear and moist. No oropharyngeal exudate.   Tympanic membranes clear.   Cardiovascular: Normal rate, regular rhythm and normal heart sounds.    Pulmonary/Chest: Effort normal. No respiratory distress. He has wheezes.   Tight " aeration.  Diffuse wheezing   Lymphadenopathy:     He has no cervical adenopathy.         duoneb given    On exam patient continues with diffuse wheezing  Aeration slightly improved    Labs:  No results found for this or any previous visit (from the past 24 hour(s)).    X-Ray was not done.    ASSESSMENT:      ICD-10-CM    1. COPD exacerbation (H) J44.1 ipratropium - albuterol 0.5 mg/2.5 mg/3 mL (DUONEB) 0.5-2.5 (3) MG/3ML neb solution     ipratropium - albuterol 0.5 mg/2.5 mg/3 mL (DUONEB) 0.5-2.5 (3) MG/3ML neb solution     INHALATION/NEBULIZER TREATMENT, INITIAL     fluticasone-salmeterol (ADVAIR) 250-50 MCG/DOSE diskus inhaler     predniSONE (DELTASONE) 20 MG tablet        Medical Decision Making:    Differential Diagnosis:  COPD exacerbation    Serious Comorbid Conditions:  Adult:  COPD         PLAN:  COPD education  Discussed combivent is rescue inhaler and he has high use of this med  Encouraged him to be on both Spiriva & Advair for this month before making other med changes or dropping Spiriva as he may need both for better control.  Goal is not to need rescue inhaler.  If does well could do step down Advair to 1 x day      Patient Instructions   duoneb or combivent every 4 to 6 hours for rescue  Prednisone 40 mg daily for 5 day burst  Continue spireva daily  Start advair 1 puff 2 x day    Recheck later this week for COPD Exacerbation.  Recheck 1 month with primary after adding advair.    Call or return to clinic if symptoms worsen or fail to improve as anticipated.        I encouraged him to recheck later this week in the clinic and he states he will recheck if he is not better.  I am concerned that he may not realize if he is improved or not. He might have a wheezy baseline as he frequently uses his Combivent

## 2018-05-21 NOTE — PATIENT INSTRUCTIONS
duoneb or combivent every 4 to 6 hours for rescue  Prednisone 40 mg daily for 5 day burst  Continue spireva daily  Start advair 1 puff 2 x day    Recheck later this week for COPD Exacerbation.  Recheck 1 month with primary after adding advair.    Call or return to clinic if symptoms worsen or fail to improve as anticipated.

## 2018-06-08 ENCOUNTER — OFFICE VISIT (OUTPATIENT)
Dept: DERMATOLOGY | Facility: CLINIC | Age: 68
End: 2018-06-08
Payer: COMMERCIAL

## 2018-06-08 DIAGNOSIS — B36.0 PV (PITYRIASIS VERSICOLOR): Primary | ICD-10-CM

## 2018-06-08 RX ORDER — KETOCONAZOLE 20 MG/G
CREAM TOPICAL
Qty: 60 G | Refills: 1 | Status: SHIPPED | OUTPATIENT
Start: 2018-06-11 | End: 2019-03-27

## 2018-06-08 ASSESSMENT — PAIN SCALES - GENERAL: PAINLEVEL: NO PAIN (0)

## 2018-06-08 NOTE — NURSING NOTE
Dermatology Rooming Note    Robert B Behr's goals for this visit include:   Chief Complaint   Patient presents with     Derm Problem     Ravin is here today for a f/u

## 2018-06-08 NOTE — LETTER
6/8/2018       RE: Robert B Behr  [address]  [place] [address]     Dear Colleague,    Thank you for referring your patient, Robert B Behr, to the [facility] at [facility]. Please see a copy of my visit note below.    [facility] Dermatology Note      Dermatology Problem List:  1. Hypo/depigmented (difficult to tell due to acute sunburn) patch on scalp as well as depigmented macules on low back, chest, and shins  - Differential diagnosis includes vitiligo, tinea versicolor, actinic poikiloderma/idiopathic guttate hypomelanosis, progressive macular hypomelanosis; possible overlap of etiologies  - 5/14/18: TSH/free T4   - Trial of ketoconazole 2% cream BID to all affected areas to treat possible tinea versicolor component; return in 3 weeks    Encounter Date: Jun 8, 2018    CC:   Chief Complaint   Patient presents with     Derm Problem     Ravin is here today for a f/u       History of Present Illness:  Mr. Robert B Behr is a 68 year old male who presents as a self-referral for white spots on the scalp. This suddenly occurred approximately 4-6 weeks ago. The white patches were asymptomatic, not itchy or scaly. Yesterday, he went out in the sun without protection and the entire white area became sunburned, so it is now burning and slightly tender, but was not like this prior to the sunburn.   On further questioning, he also notes small asymptomatic white spots on the low back, chest, and shins, but states this has been present for years and has not been changing or progressing over time. He has never tried any treatment.   He has no personal or family history of skin conditions or autoimmune disease such as thyroid disorders.     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     Tobacco abuse      Past Surgical History:   Procedure Laterality Date     CATARACT IOL, RT/LT Left 09/15/2017    s/p CE/IOL left eye     CATARACT IOL, RT/LT Right      PHACOEMULSIFICATION CLEAR CORNEA WITH STANDARD INTRAOCULAR LENS IMPLANT Right 9/30/2016    Procedure: PHACOEMULSIFICATION CLEAR CORNEA WITH STANDARD INTRAOCULAR LENS IMPLANT;  Surgeon: Elly Valencia MD;  Location:  [facility]     PHACOEMULSIFICATION WITH STANDARD INTRAOCULAR LENS IMPLANT Left 9/15/2017    Procedure: PHACOEMULSIFICATION WITH STANDARD INTRAOCULAR LENS IMPLANT;  Left Eye Phacoemulsification with Standard Lens;  Surgeon: Elly Valencia MD;  Location: [facility] OR     WRIST SURGERY         Social History:  The patient is retired. The patient denies use of tanning beds but does spend time outdoors; likes to go biking.    Family History:  No history of skin or autoimmune conditions.     Medications:  Current Outpatient Prescriptions   Medication Sig Dispense Refill     ascorbic acid (VITAMIN C) 1000 MG TABS TAKE 1 TABLET BY MOUTH DAILY 90 tablet 11     B Complex-C (VITAMIN B COMPLEX W/VITAMIN C) TABS tablet TAKE 1 TABLET BY MOUTH TWICE DAILY WITH FOLIC ACID 180 tablet 2     Calcium-Magnesium-Vitamin D (CALCIUM 1200+D3) 600- MG-MG-UNIT TB24 Take 1-2 tablets by mouth every morning Reported on 3/14/2017       calcium-vitamin D (CALTRATE) 600-400 MG-UNIT per tablet TAKE 1 TABLET BY MOUTH TWICE DAILY 180 tablet 3     fish oil-omega-3 fatty acids 1000 MG capsule TAKE 1 CAPSULE BY MOUTH DAILY 90 capsule 11     fluticasone-salmeterol (ADVAIR) 250-50 MCG/DOSE diskus inhaler Inhale 1 puff into the lungs 2 times daily 1 Inhaler 1     fluticasone-salmeterol (ADVAIR) 250-50  MCG/DOSE diskus inhaler Inhale 1 puff into the lungs 2 times daily 1 Inhaler 11     glucosamine-chondroitinoitin 750-600 MG TABS TAKE 2 TABLETS BY MOUTH TWICE DAILY 120 tablet 5     ipratropium - albuterol 0.5 mg/2.5 mg/3 mL (DUONEB) 0.5-2.5 (3) MG/3ML neb solution Take 1 vial (3 mLs) by nebulization every 6 hours as needed for shortness of breath / dyspnea or wheezing 30 vial 1     ipratropium - albuterol 0.5 mg/2.5 mg/3 mL (DUONEB) 0.5-2.5 (3) MG/3ML neb solution Take 1 vial (3 mLs) by nebulization every 6 hours as needed for shortness of breath / dyspnea or wheezing 30 vial 1     Ipratropium-Albuterol (COMBIVENT RESPIMAT)  MCG/ACT inhaler INHALE ONE PUFF BY MOUTH FOUR TIMES A DAY (NO TO EXCCED 6 DOSES PER DAY) 4 g 11     ketoconazole (NIZORAL) 2 % cream Apply topically 2 times daily To all affected areas with white spots 60 g 3     levofloxacin (QUIXIN) 0.5 % ophthalmic solution 1 drop in surgical eye as directed - 4x daily for 1 week, then stop 1 Bottle 1     nicotine (NICODERM CQ) 14 MG/24HR 24 hr patch Place 1 patch onto the skin every 24 hours 30 patch 1     nicotine polacrilex (COMMIT) 2 MG lozenge Place 1 lozenge (2 mg) inside cheek as needed for smoking cessation Place 1 lozenge inside cheek as needed for smoking cessation. 150 lozenge 3     order for DME Equipment being ordered: nebulizer machine 1 each 0     prednisoLONE acetate (PRED FORTE) 1 % ophthalmic susp 1 drop in surgical eye as directed, 4x daily after surgery for 1 week, 3x daily for 1 week, 2x daily for 1 week, daily for 1 week, then stop 1 Bottle 1     predniSONE (DELTASONE) 20 MG tablet Take 3 tablets (60 mg) by mouth daily 18 tablet 0     predniSONE (DELTASONE) 20 MG tablet Take 1 tablet (20 mg) by mouth 2 times daily 10 tablet 0     Spacer/Aero-Holding Chambers (POCKET SPACER) MARGARITA Attach to albuterol inhaler, use 2 puffs every 4-6 hours as needed. 1 each 0     SPIRIVA RESPIMAT 2.5 MCG/ACT inhalation aerosol INHALE 2 PUFFS INTO THE  LUNGS DAILY 4 g 11     vitamin A 63206 UNIT capsule TAKE 1 CAPSULE BY MOUTH DAILY 180 capsule 11     vitamin B complex with vitamin C (VITAMIN  B COMPLEX) TABS tablet Take 1 tablet by mouth 2 times daily With Folic acid 180 tablet 2     vitamin E (CVS VITAMIN E) 1000 UNIT capsule Take 1 capsule (1,000 Units) by mouth daily 90 capsule 2        No Known Allergies      Review of Systems:  -Constitutional: The patient denies fatigue, fevers, chills, unintended weight loss, and night sweats.  -HEENT: Patient denies nonhealing oral sores.  -Skin: As above in HPI. No additional skin concerns.    Physical exam:  Vitals: There were no vitals taken for this visit.  GEN: This is a well-developed, well-nourished male in no acute distress, in a pleasant mood.    SKIN: Focused examination of the scalp, face, neck, back, chest, and lower legs was performed.  -Diffuse photodamage.   -Upper forehead extending back onto top of scalp with hypo- vs depigmented macules coalescing into a larger patch; diffuse overlying erythema related to sunburn, making it difficult to tell even with Wood's lamp examination whether hypo- or depigmented.   -Chest with diffuse erythema and telangiectasias with few scattered depigmented round macules. Similar appearing depigmented small round macules on lower back and anterior shins.   -No other lesions of concern on areas examined.     06/08/2018  Patient has treated now the scalp for 3 weeks with Ketoconazole cream and he observes some re-pigmentation. It is true, that the lesions on the scalp/front have a slight pigmentation. However, there are still some depigmented lesions on the arm.  No signs for Thyroid problem    Impression/Plan:  1. 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 Continue 2-3 times per week with  ketoconazole cream for 3-4 months    Observe the depigmented lesions on the arm and if they become bigger, then treat as Vitiligo maybe with Protopic oint. However these depigmented lesions on arms and legs could be as well flat seborrhoic keratosis in form of stukko keratosis (light damage of skin)    Follow up if necessary.    Nayan Liu MD  Professor  Head of Dermato-Allergy Division  Department of Dermatology  [facility]

## 2018-06-08 NOTE — MR AVS SNAPSHOT
After Visit Summary   6/8/2018    Robert B Behr    MRN: [id number]           Patient Information     Date Of Birth          1950        Visit Information        Provider Department      6/8/2018 10:45 AM Nayan Liu MD [facility] Dermatology        Today's Diagnoses     PV (pityriasis versicolor)    -  1       Follow-ups after your visit        Who to contact     Please call your clinic at [phone] to:    Ask questions about your health    Make or cancel appointments    Discuss your medicines    Learn about your test results    Speak to your doctor            Additional Information About Your Visit        MyChart Information     The [facility] gives you secure access to your electronic health record. If you see a primary care provider, you can also send messages to your care team and make appointments. If you have questions, please call your primary care clinic.  If you do not have a primary care provider, please call [phone] and they will assist you.      The Whistle is an electronic gateway that provides easy, online access to your medical records. With [facility], you can request a clinic appointment, read your test results, renew a prescription or communicate with your care team.     To access your existing account, please contact your [facility] or call [phone] for assistance.        Care EveryWhere ID     This is your Care EveryWhere ID. This could be used by other organizations to access your [facility] medical records  [id number]         Blood Pressure from Last 3 Encounters:   05/21/18 142/86   05/14/18 122/86   04/19/18 (!) 148/99    Weight from Last 3 Encounters:   05/21/18 70.3 kg (155 lb)   04/19/18 71.2 kg (157 lb)   04/09/18 70.3 kg (155 lb)              Today, you had the following     No orders found for display         Today's Medication Changes          These changes are accurate as of 6/8/18 11:52 AM.  If you have any questions, ask your  nurse or doctor.               These medicines have changed or have updated prescriptions.        Dose/Directions    * ketoconazole 2 % cream   Commonly known as:  NIZORAL   This may have changed:  Another medication with the same name was added. Make sure you understand how and when to take each.   Used for:  Hypopigmented skin lesion   Changed by:  Nayan Liu MD        Apply topically 2 times daily To all affected areas with white spots   Quantity:  60 g   Refills:  3       * ketoconazole 2 % cream   Commonly known as:  NIZORAL   This may have changed:  You were already taking a medication with the same name, and this prescription was added. Make sure you understand how and when to take each.   Used for:  PV (pityriasis versicolor)   Changed by:  Nayan Liu MD        Start taking on:  6/11/2018   Apply topically twice a week   Quantity:  60 g   Refills:  1       * Notice:  This list has 2 medication(s) that are the same as other medications prescribed for you. Read the directions carefully, and ask your doctor or other care provider to review them with you.         Where to get your medicines      These medications were sent to [id number] - [place], [place] - [address] AT [facility] [address]  [address], [place] [address]     Phone:  [phone]     ketoconazole 2 % cream                Primary Care Provider Office Phone # Fax #    Fina Frausto [phone] [address] [place] [address]        Equal Access to Services     MJ PAPPAS : Hadii marylou chaney hadasho Soomaali, waaxda luqadaha, qaybta kaalmada adeegyada, kael rangel . So [facility] [phone].    ATENCIÓN: Si habla español, tiene a rios disposición servicios gratuitos de asistencia lingüística. Llame al [phone].    We comply with applicable federal civil rights laws and [place] laws. We do not discriminate on the basis of race, color, national  origin, age, disability, sex, sexual orientation, or gender identity.            Thank you!     Thank you for choosing [facility] DERMATOLOGY  for your care. Our goal is always to provide you with excellent care. Hearing back from our patients is one way we can continue to improve our services. Please take a few minutes to complete the written survey that you may receive in the mail after your visit with us. Thank you!             Your Updated Medication List - Protect others around you: Learn how to safely use, store and throw away your medicines at www.disposemymeds.org.          This list is accurate as of 6/8/18 11:52 AM.  Always use your most recent med list.                   Brand Name Dispense Instructions for use Diagnosis    ascorbic acid 1000 MG Tabs    vitamin C    90 tablet    TAKE 1 TABLET BY MOUTH DAILY    Encounter for herb and vitamin supplement management       CALCIUM 1200+D3 600- MG-MG-UNIT Tb24   Generic drug:  Calcium-Magnesium-Vitamin D      Take 1-2 tablets by mouth every morning Reported on 3/14/2017        calcium-vitamin D 600-400 MG-UNIT per tablet    CALTRATE    180 tablet    TAKE 1 TABLET BY MOUTH TWICE DAILY    Age-related osteoporosis without current pathological fracture       fish oil-omega-3 fatty acids 1000 MG capsule     90 capsule    TAKE 1 CAPSULE BY MOUTH DAILY    Encounter for herb and vitamin supplement management       * fluticasone-salmeterol 250-50 MCG/DOSE diskus inhaler    ADVAIR    1 Inhaler    Inhale 1 puff into the lungs 2 times daily    Chronic obstructive pulmonary disease, unspecified COPD type (H)       * fluticasone-salmeterol 250-50 MCG/DOSE diskus inhaler    ADVAIR    1 Inhaler    Inhale 1 puff into the lungs 2 times daily    COPD exacerbation (H)       glucosamine-chondroitinoitin 750-600 MG Tabs     120 tablet    TAKE 2 TABLETS BY MOUTH TWICE DAILY    Osteoarthritis of right hip, unspecified osteoarthritis type       * Ipratropium-Albuterol   MCG/ACT inhaler    COMBIVENT RESPIMAT    4 g    INHALE ONE PUFF BY MOUTH FOUR TIMES A DAY (NO TO EXCCED 6 DOSES PER DAY)    COPD exacerbation (H)       * ipratropium - albuterol 0.5 mg/2.5 mg/3 mL 0.5-2.5 (3) MG/3ML neb solution    DUONEB    30 vial    Take 1 vial (3 mLs) by nebulization every 6 hours as needed for shortness of breath / dyspnea or wheezing    COPD exacerbation (H)       * ipratropium - albuterol 0.5 mg/2.5 mg/3 mL 0.5-2.5 (3) MG/3ML neb solution    DUONEB    30 vial    Take 1 vial (3 mLs) by nebulization every 6 hours as needed for shortness of breath / dyspnea or wheezing    COPD exacerbation (H)       * ketoconazole 2 % cream    NIZORAL    60 g    Apply topically 2 times daily To all affected areas with white spots    Hypopigmented skin lesion       * ketoconazole 2 % cream   Start taking on:  6/11/2018    NIZORAL    60 g    Apply topically twice a week    PV (pityriasis versicolor)       levofloxacin 0.5 % ophthalmic solution    QUIXIN    1 Bottle    1 drop in surgical eye as directed - 4x daily for 1 week, then stop    Nuclear cataract of left eye       nicotine 14 MG/24HR 24 hr patch    NICODERM CQ    30 patch    Place 1 patch onto the skin every 24 hours    Tobacco use disorder       nicotine polacrilex 2 MG lozenge    COMMIT    150 lozenge    Place 1 lozenge (2 mg) inside cheek as needed for smoking cessation Place 1 lozenge inside cheek as needed for smoking cessation.    Chronic obstructive pulmonary disease, unspecified COPD type (H), Tobacco abuse       order for DME     1 each    Equipment being ordered: nebulizer machine    COPD exacerbation (H)       pocket spacer Jeanine     1 each    Attach to albuterol inhaler, use 2 puffs every 4-6 hours as needed.    Chronic obstructive pulmonary disease, unspecified COPD type (H)       prednisoLONE acetate 1 % ophthalmic susp    PRED FORTE    1 Bottle    1 drop in surgical eye as directed, 4x daily after surgery for 1 week, 3x daily for 1 week, 2x  daily for 1 week, daily for 1 week, then stop    Nuclear cataract of left eye       * predniSONE 20 MG tablet    DELTASONE    10 tablet    Take 1 tablet (20 mg) by mouth 2 times daily    COPD exacerbation (H)       * predniSONE 20 MG tablet    DELTASONE    18 tablet    Take 3 tablets (60 mg) by mouth daily    COPD exacerbation (H)       SPIRIVA RESPIMAT 2.5 MCG/ACT inhalation aerosol   Generic drug:  tiotropium     4 g    INHALE 2 PUFFS INTO THE LUNGS DAILY    Chronic obstructive pulmonary disease, unspecified COPD type (H)       vitamin A 11395 UNIT capsule     180 capsule    TAKE 1 CAPSULE BY MOUTH DAILY    Encounter for herb and vitamin supplement management       vitamin b complex w/vitamin C Tabs tablet     180 tablet    TAKE 1 TABLET BY MOUTH TWICE DAILY WITH FOLIC ACID    Takes dietary supplements       vitamin B complex with vitamin C Tabs tablet     180 tablet    Take 1 tablet by mouth 2 times daily With Folic acid    Encounter for herb and vitamin supplement management       vitamin E 1000 UNIT capsule    CVS vitamin E    90 capsule    Take 1 capsule (1,000 Units) by mouth daily    Encounter for herb and vitamin supplement management       * Notice:  This list has 9 medication(s) that are the same as other medications prescribed for you. Read the directions carefully, and ask your doctor or other care provider to review them with you.

## 2018-06-08 NOTE — PROGRESS NOTES
[facility] Dermatology Note      Dermatology Problem List:  1. Hypo/depigmented (difficult to tell due to acute sunburn) patch on scalp as well as depigmented macules on low back, chest, and shins  - Differential diagnosis includes vitiligo, tinea versicolor, actinic poikiloderma/idiopathic guttate hypomelanosis, progressive macular hypomelanosis; possible overlap of etiologies  - 5/14/18: TSH/free T4   - Trial of ketoconazole 2% cream BID to all affected areas to treat possible tinea versicolor component; return in 3 weeks    Encounter Date: Jun 8, 2018    CC:   Chief Complaint   Patient presents with     Derm Problem     Ravin is here today for a f/u       History of Present Illness:  Mr. Robert B Behr is a 68 year old male who presents as a self-referral for white spots on the scalp. This suddenly occurred approximately 4-6 weeks ago. The white patches were asymptomatic, not itchy or scaly. Yesterday, he went out in the sun without protection and the entire white area became sunburned, so it is now burning and slightly tender, but was not like this prior to the sunburn.   On further questioning, he also notes small asymptomatic white spots on the low back, chest, and shins, but states this has been present for years and has not been changing or progressing over time. He has never tried any treatment.   He has no personal or family history of skin conditions or autoimmune disease such as thyroid disorders.     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      Tobacco abuse      Past Surgical History:   Procedure Laterality Date     CATARACT IOL, RT/LT Left 09/15/2017    s/p CE/IOL left eye     CATARACT IOL, RT/LT Right      PHACOEMULSIFICATION CLEAR CORNEA WITH STANDARD INTRAOCULAR LENS IMPLANT Right 9/30/2016    Procedure: PHACOEMULSIFICATION CLEAR CORNEA WITH STANDARD INTRAOCULAR LENS IMPLANT;  Surgeon: Elly Valencia MD;  Location: [facility]     PHACOEMULSIFICATION WITH STANDARD INTRAOCULAR LENS IMPLANT Left 9/15/2017    Procedure: PHACOEMULSIFICATION WITH STANDARD INTRAOCULAR LENS IMPLANT;  Left Eye Phacoemulsification with Standard Lens;  Surgeon: Elly Valencia MD;  Location: [facility] OR     WRIST SURGERY         Social History:  The patient is retired. The patient denies use of tanning beds but does spend time outdoors; likes to go biking.    Family History:  No history of skin or autoimmune conditions.     Medications:  Current Outpatient Prescriptions   Medication Sig Dispense Refill     ascorbic acid (VITAMIN C) 1000 MG TABS TAKE 1 TABLET BY MOUTH DAILY 90 tablet 11     B Complex-C (VITAMIN B COMPLEX W/VITAMIN C) TABS tablet TAKE 1 TABLET BY MOUTH TWICE DAILY WITH FOLIC ACID 180 tablet 2     Calcium-Magnesium-Vitamin D (CALCIUM 1200+D3) 600- MG-MG-UNIT TB24 Take 1-2 tablets by mouth every morning Reported on 3/14/2017       calcium-vitamin D (CALTRATE) 600-400 MG-UNIT per tablet TAKE 1 TABLET BY MOUTH TWICE DAILY 180 tablet 3     fish oil-omega-3 fatty acids 1000 MG capsule TAKE 1 CAPSULE BY MOUTH DAILY 90 capsule 11     fluticasone-salmeterol (ADVAIR) 250-50 MCG/DOSE diskus inhaler Inhale 1 puff into the lungs 2 times daily 1 Inhaler 1     fluticasone-salmeterol (ADVAIR) 250-50 MCG/DOSE diskus inhaler Inhale 1 puff into the lungs 2 times daily 1 Inhaler 11     glucosamine-chondroitinoitin 750-600 MG TABS TAKE 2 TABLETS BY MOUTH TWICE DAILY 120 tablet 5     ipratropium - albuterol 0.5 mg/2.5 mg/3 mL (DUONEB) 0.5-2.5 (3) MG/3ML neb solution Take 1 vial (3  mLs) by nebulization every 6 hours as needed for shortness of breath / dyspnea or wheezing 30 vial 1     ipratropium - albuterol 0.5 mg/2.5 mg/3 mL (DUONEB) 0.5-2.5 (3) MG/3ML neb solution Take 1 vial (3 mLs) by nebulization every 6 hours as needed for shortness of breath / dyspnea or wheezing 30 vial 1     Ipratropium-Albuterol (COMBIVENT RESPIMAT)  MCG/ACT inhaler INHALE ONE PUFF BY MOUTH FOUR TIMES A DAY (NO TO EXCCED 6 DOSES PER DAY) 4 g 11     ketoconazole (NIZORAL) 2 % cream Apply topically 2 times daily To all affected areas with white spots 60 g 3     levofloxacin (QUIXIN) 0.5 % ophthalmic solution 1 drop in surgical eye as directed - 4x daily for 1 week, then stop 1 Bottle 1     nicotine (NICODERM CQ) 14 MG/24HR 24 hr patch Place 1 patch onto the skin every 24 hours 30 patch 1     nicotine polacrilex (COMMIT) 2 MG lozenge Place 1 lozenge (2 mg) inside cheek as needed for smoking cessation Place 1 lozenge inside cheek as needed for smoking cessation. 150 lozenge 3     order for DME Equipment being ordered: nebulizer machine 1 each 0     prednisoLONE acetate (PRED FORTE) 1 % ophthalmic susp 1 drop in surgical eye as directed, 4x daily after surgery for 1 week, 3x daily for 1 week, 2x daily for 1 week, daily for 1 week, then stop 1 Bottle 1     predniSONE (DELTASONE) 20 MG tablet Take 3 tablets (60 mg) by mouth daily 18 tablet 0     predniSONE (DELTASONE) 20 MG tablet Take 1 tablet (20 mg) by mouth 2 times daily 10 tablet 0     Spacer/Aero-Holding Chambers (POCKET SPACER) MARGARITA Attach to albuterol inhaler, use 2 puffs every 4-6 hours as needed. 1 each 0     SPIRIVA RESPIMAT 2.5 MCG/ACT inhalation aerosol INHALE 2 PUFFS INTO THE LUNGS DAILY 4 g 11     vitamin A 90583 UNIT capsule TAKE 1 CAPSULE BY MOUTH DAILY 180 capsule 11     vitamin B complex with vitamin C (VITAMIN  B COMPLEX) TABS tablet Take 1 tablet by mouth 2 times daily With Folic acid 180 tablet 2     vitamin E (CVS VITAMIN E) 1000 UNIT capsule  Take 1 capsule (1,000 Units) by mouth daily 90 capsule 2        No Known Allergies      Review of Systems:  -Constitutional: The patient denies fatigue, fevers, chills, unintended weight loss, and night sweats.  -HEENT: Patient denies nonhealing oral sores.  -Skin: As above in HPI. No additional skin concerns.    Physical exam:  Vitals: There were no vitals taken for this visit.  GEN: This is a well-developed, well-nourished male in no acute distress, in a pleasant mood.    SKIN: Focused examination of the scalp, face, neck, back, chest, and lower legs was performed.  -Diffuse photodamage.   -Upper forehead extending back onto top of scalp with hypo- vs depigmented macules coalescing into a larger patch; diffuse overlying erythema related to sunburn, making it difficult to tell even with Wood's lamp examination whether hypo- or depigmented.   -Chest with diffuse erythema and telangiectasias with few scattered depigmented round macules. Similar appearing depigmented small round macules on lower back and anterior shins.   -No other lesions of concern on areas examined.     06/08/2018  Patient has treated now the scalp for 3 weeks with Ketoconazole cream and he observes some re-pigmentation. It is true, that the lesions on the scalp/front have a slight pigmentation. However, there are still some depigmented lesions on the arm.  No signs for Thyroid problem    Impression/Plan:  1. 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 Continue 2-3 times per week with ketoconazole cream for 3-4 months    Observe the depigmented lesions on the arm and if they become bigger, then treat as Vitiligo maybe with Protopic oint. However these depigmented lesions on arms and legs could be as well flat seborrhoic keratosis in form of stukko keratosis  (light damage of skin)    Follow up if necessary.    Nayan Liu MD  Professor  Head of Dermato-Allergy Division  Department of Dermatology  [facility], [place], [facility]

## 2018-06-19 ENCOUNTER — PATIENT OUTREACH (OUTPATIENT)
Dept: GERIATRIC MEDICINE | Facility: CLINIC | Age: 68
End: 2018-06-19

## 2018-06-19 NOTE — PROGRESS NOTES
[facility] Care Coordination Contact    [facility] Six-Month Telephone Assessment    6 month telephone assessment completed on June 19, 2018.    ER visits: No  Hospitalizations: No  TCU stays: No  Significant health status changes: none  Falls/Injuries: No  ADL/IADL changes: No  Changes in services: No    Caregiver Assessment follow up:  NA    Goals: See POC in chart for goal progress documentation.      Will call member in 6 months for an annual health risk assessment.   Encouraged member to call CC with any questions or concerns in the meantime.     Leeanna Sigala RN, BSN, PHN  [facility]   [phone]

## 2018-06-26 DIAGNOSIS — J44.1 COPD EXACERBATION (H): ICD-10-CM

## 2018-06-26 NOTE — TELEPHONE ENCOUNTER
"Requested Prescriptions   Pending Prescriptions Disp Refills     Ipratropium-Albuterol (COMBIVENT RESPIMAT)  MCG/ACT inhaler  Last Written Prescription Date:  6-19-17  Last Fill Quantity: 4 G,  # refills: 11   Last office visit: 4/19/2018 with prescribing provider:  Fina Frausto    Future Office Visit:    4 g 11     Sig: INHALE ONE PUFF BY MOUTH FOUR TIMES A DAY (NO TO EXCCED 6 DOSES PER DAY)    Asthma Maintenance Inhalers - Anticholinergics Passed    6/26/2018  9:51 AM       Passed - Patient is age 12 years or older       Passed - Recent (12 mo) or future (30 days) visit within the authorizing provider's specialty    Patient had office visit in the last 12 months or has a visit in the next 30 days with authorizing provider or within the authorizing provider's specialty.  See \"Patient Info\" tab in inbasket, or \"Choose Columns\" in Meds & Orders section of the refill encounter.              "

## 2018-06-29 NOTE — TELEPHONE ENCOUNTER
Prescription approved per [facility] Refill Protocol.  Ferny Craft RN, BSN

## 2018-07-11 ENCOUNTER — OFFICE VISIT (OUTPATIENT)
Dept: URGENT CARE | Facility: URGENT CARE | Age: 68
End: 2018-07-11
Payer: COMMERCIAL

## 2018-07-11 VITALS
SYSTOLIC BLOOD PRESSURE: 118 MMHG | HEIGHT: 70 IN | DIASTOLIC BLOOD PRESSURE: 64 MMHG | OXYGEN SATURATION: 95 % | WEIGHT: 151 LBS | HEART RATE: 66 BPM | TEMPERATURE: 98.7 F | BODY MASS INDEX: 21.62 KG/M2

## 2018-07-11 DIAGNOSIS — R14.0 BLOATING: Primary | ICD-10-CM

## 2018-07-11 PROCEDURE — 99213 OFFICE O/P EST LOW 20 MIN: CPT | Performed by: INTERNAL MEDICINE

## 2018-07-11 RX ORDER — LACTOBACILLUS RHAMNOSUS GG 10B CELL
1 CAPSULE ORAL 2 TIMES DAILY
Qty: 100 CAPSULE | Refills: 3 | Status: SHIPPED | OUTPATIENT
Start: 2018-07-11 | End: 2019-03-27

## 2018-07-11 NOTE — MR AVS SNAPSHOT
After Visit Summary   7/11/2018    Robert B Behr    MRN: [id number]           Patient Information     Date Of Birth          1950        Visit Information        Provider Department      7/11/2018 7:00 PM Jovita De La Torre MD [facility]        Today's Diagnoses     Bloating    -  1      Care Instructions    Probiotics        Patient Education    Lactobacillus Acidophilus Oral capsule    Lactobacillus Acidophilus Oral capsule, modified-release    Lactobacillus Acidophilus Oral tablet    Lactobacillus Acidophilus, Lactobacillus Sp. Oral granules    Lactobacillus Acidophilus, Lactobacillus Sporogenes Oral tablet  Lactobacillus Acidophilus Oral capsule  What is this medicine?  LACTOBACILLUS (lak goyo buh HEAVENLY uhs) is a supplement. It is used to help the normal balance of bacteria in the colon. This may treat or prevent diarrhea caused by an infection or by antibiotics. The FDA has not approved this supplement for any medical use.  This supplement may be used for other purposes; ask your health care provider or pharmacist if you have questions.  What should I tell my health care provider before I take this medicine?  They need to know if you have any of these conditions:    chronic disease    immune system problems    prosthetic heart valve or valvular heart disease    an unusual or allergic reaction to Lactobacillus, any medicines, lactose or milk, other foods, dyes, or preservatives    pregnant or trying to get pregnant    breast-feeding  How should I use this medicine?  Take this medicine by mouth with a small amount of milk, fruit juice, or water. Follow the directions on the package labeling, or take as directed by your health care professional. This medicine can be taken with cereal or other food. Do not take this medicine more often than directed.  Contact your pediatrician regarding the use of this medicine in children. Special care may be needed. This medicine is  not recommended for children under 3 years old unless prescribed by a doctor.  Overdosage: If you think you have taken too much of this medicine contact a poison control center or emergency room at once.  NOTE: This medicine is only for you. Do not share this medicine with others.  What if I miss a dose?  If you miss a dose, take it as soon as you can. If it is almost time for your next dose, take only that dose. Do not take double or extra doses.  What may interact with this medicine?  Interactions are not expected.  This list may not describe all possible interactions. Give your health care provider a list of all the medicines, herbs, non-prescription drugs, or dietary supplements you use. Also tell them if you smoke, drink alcohol, or use illegal drugs. Some items may interact with your medicine.  What should I watch for while using this medicine?  See your doctor if your symptoms do not get better or if they get worse. Do not take this supplement for more than 2 days or if you have a fever unless your doctor tells you to.  If you have allergies to milk or you are sensitive to lactose, do not use this supplement.  What side effects may I notice from receiving this medicine?  Side effects that you should report to your doctor or health care professional as soon as possible:    allergic reactions like skin rash, itching or hives, swelling of the face, lips, or tongue    breathing problems    severe nausea, vomiting    unusually weak or tired  Side effects that usually do not require medical attention (report to your doctor or health care professional if they continue or are bothersome):    hiccups    stomach gas  This list may not describe all possible side effects. Call your doctor for medical advice about side effects. You may report side effects to FDA at 8-318-FDA-2732.  Where should I keep my medicine?  Keep out of the reach of children.  Store in the refrigerator or as directed on the package label. Do not  freeze. Throw away any unused medicine after the expiration date.  NOTE:This sheet is a summary. It may not cover all possible information. If you have questions about this medicine, talk to your doctor, pharmacist, or health care provider. Copyright  2016 Gold Standard                Follow-ups after your visit        Your next 10 appointments already scheduled     Jul 16, 2018  2:40 PM CDT   (Arrive by 2:25 PM)   Return Visit with Raquel Gandhi MD   [facility] ([facility])    [address] [place] [address]  3rd [facility] [address] [phone]              Who to contact     If you have questions or need follow up information about today's clinic visit or your schedule please contact [facility] directly at [phone].  Normal or non-critical lab and imaging results will be communicated to you by MyChart, letter or phone within 4 business days after the clinic has received the results. If you do not hear from us within 7 days, please contact the clinic through MyChart or phone. If you have a critical or abnormal lab result, we will notify you by phone as soon as possible.  Submit refill requests through [employer] or call your pharmacy and they will forward the refill request to us. Please allow 3 business days for your refill to be completed.          Additional Information About Your Visit        [employer] Information     [employer] gives you secure access to your electronic health record. If you see a primary care provider, you can also send messages to your care team and make appointments. If you have questions, please call your primary care clinic.  If you do not have a primary care provider, please call [phone] and they will assist you.        Care EveryWhere ID     This is your Care EveryWhere ID. This could be used by other organizations to access your [place] medical records  [id number]        Your Vitals Were     Pulse  "Temperature Height Pulse Oximetry BMI (Body Mass Index)       66 98.7  F (37.1  C) (Oral) 5' 10\" (1.778 m) 95% 21.67 kg/m2        Blood Pressure from Last 3 Encounters:   07/11/18 118/64   05/21/18 142/86   05/14/18 122/86    Weight from Last 3 Encounters:   07/11/18 151 lb (68.5 kg)   05/21/18 155 lb (70.3 kg)   04/19/18 157 lb (71.2 kg)              Today, you had the following     No orders found for display         Today's Medication Changes          These changes are accurate as of 7/11/18  8:12 PM.  If you have any questions, ask your nurse or doctor.               Start taking these medicines.        Dose/Directions    CULTURELLE DIGESTIVE HEALTH Caps   Used for:  Bloating   Started by:  Jovita De La Torre MD        Dose:  1 capsule   Take 1 capsule by mouth 2 times daily Culturelle or something similiar   Quantity:  100 capsule   Refills:  3            Where to get your medicines      These medications were sent to [id number] - [place], [place] - [address] AT [facility] [address]  [address], [place] [address]     Phone:  [phone]     CULTURELLE DIGESTIVE HEALTH Caps                Primary Care Provider Office Phone # Fax #    Fina Frausto [phone]       [address] [place] [address]        Equal Access to Services     MJ PAPPAS AH: Hadkike lazoo Sojasmin, waaxda luqadaha, qaybta kaalmada adeegyaмарина, waxay avery rader adekaye guerrero. So [facility] [phone].    ATENCIÓN: Si habla español, tiene a rios disposición servicios gratuitos de asistencia lingüística. Llame al [phone].    We comply with applicable federal civil rights laws and [place] laws. We do not discriminate on the basis of race, color, national origin, age, disability, sex, sexual orientation, or gender identity.            Thank you!     Thank you for choosing [facility]  for your care. Our goal is always to provide you with " excellent care. Hearing back from our patients is one way we can continue to improve our services. Please take a few minutes to complete the written survey that you may receive in the mail after your visit with us. Thank you!             Your Updated Medication List - Protect others around you: Learn how to safely use, store and throw away your medicines at www.disposemymeds.org.          This list is accurate as of 7/11/18  8:12 PM.  Always use your most recent med list.                   Brand Name Dispense Instructions for use Diagnosis    ascorbic acid 1000 MG Tabs    vitamin C    90 tablet    TAKE 1 TABLET BY MOUTH DAILY    Encounter for herb and vitamin supplement management       CALCIUM 1200+D3 600- MG-MG-UNIT Tb24   Generic drug:  Calcium-Magnesium-Vitamin D      Take 1-2 tablets by mouth every morning Reported on 3/14/2017        calcium-vitamin D 600-400 MG-UNIT per tablet    CALTRATE    180 tablet    TAKE 1 TABLET BY MOUTH TWICE DAILY    Age-related osteoporosis without current pathological fracture       CULTURELLE DIGESTIVE HEALTH Caps     100 capsule    Take 1 capsule by mouth 2 times daily Culturelle or something similiar    Bloating       fish oil-omega-3 fatty acids 1000 MG capsule     90 capsule    TAKE 1 CAPSULE BY MOUTH DAILY    Encounter for herb and vitamin supplement management       * fluticasone-salmeterol 250-50 MCG/DOSE diskus inhaler    ADVAIR    1 Inhaler    Inhale 1 puff into the lungs 2 times daily    Chronic obstructive pulmonary disease, unspecified COPD type (H)       * fluticasone-salmeterol 250-50 MCG/DOSE diskus inhaler    ADVAIR    1 Inhaler    Inhale 1 puff into the lungs 2 times daily    COPD exacerbation (H)       glucosamine-chondroitinoitin 750-600 MG Tabs     120 tablet    TAKE 2 TABLETS BY MOUTH TWICE DAILY    Osteoarthritis of right hip, unspecified osteoarthritis type       * ipratropium - albuterol 0.5 mg/2.5 mg/3 mL 0.5-2.5 (3) MG/3ML neb solution    DUONEB     30 vial    Take 1 vial (3 mLs) by nebulization every 6 hours as needed for shortness of breath / dyspnea or wheezing    COPD exacerbation (H)       * ipratropium - albuterol 0.5 mg/2.5 mg/3 mL 0.5-2.5 (3) MG/3ML neb solution    DUONEB    30 vial    Take 1 vial (3 mLs) by nebulization every 6 hours as needed for shortness of breath / dyspnea or wheezing    COPD exacerbation (H)       * Ipratropium-Albuterol  MCG/ACT inhaler    COMBIVENT RESPIMAT    4 g    INHALE ONE PUFF BY MOUTH FOUR TIMES A DAY (NO TO EXCCED 6 DOSES PER DAY)    COPD exacerbation (H)       * ketoconazole 2 % cream    NIZORAL    60 g    Apply topically 2 times daily To all affected areas with white spots    Hypopigmented skin lesion       * ketoconazole 2 % cream    NIZORAL    60 g    Apply topically twice a week    PV (pityriasis versicolor)       levofloxacin 0.5 % ophthalmic solution    QUIXIN    1 Bottle    1 drop in surgical eye as directed - 4x daily for 1 week, then stop    Nuclear cataract of left eye       nicotine 14 MG/24HR 24 hr patch    NICODERM CQ    30 patch    Place 1 patch onto the skin every 24 hours    Tobacco use disorder       nicotine polacrilex 2 MG lozenge    COMMIT    150 lozenge    Place 1 lozenge (2 mg) inside cheek as needed for smoking cessation Place 1 lozenge inside cheek as needed for smoking cessation.    Chronic obstructive pulmonary disease, unspecified COPD type (H), Tobacco abuse       order for DME     1 each    Equipment being ordered: nebulizer machine    COPD exacerbation (H)       pocket spacer Jeanine     1 each    Attach to albuterol inhaler, use 2 puffs every 4-6 hours as needed.    Chronic obstructive pulmonary disease, unspecified COPD type (H)       prednisoLONE acetate 1 % ophthalmic susp    PRED FORTE    1 Bottle    1 drop in surgical eye as directed, 4x daily after surgery for 1 week, 3x daily for 1 week, 2x daily for 1 week, daily for 1 week, then stop    Nuclear cataract of left eye       *  predniSONE 20 MG tablet    DELTASONE    10 tablet    Take 1 tablet (20 mg) by mouth 2 times daily    COPD exacerbation (H)       * predniSONE 20 MG tablet    DELTASONE    18 tablet    Take 3 tablets (60 mg) by mouth daily    COPD exacerbation (H)       SPIRIVA RESPIMAT 2.5 MCG/ACT inhalation aerosol   Generic drug:  tiotropium     4 g    INHALE 2 PUFFS INTO THE LUNGS DAILY    Chronic obstructive pulmonary disease, unspecified COPD type (H)       vitamin A 38986 UNIT capsule     180 capsule    TAKE 1 CAPSULE BY MOUTH DAILY    Encounter for herb and vitamin supplement management       vitamin b complex w/vitamin C Tabs tablet     180 tablet    TAKE 1 TABLET BY MOUTH TWICE DAILY WITH FOLIC ACID    Takes dietary supplements       vitamin B complex with vitamin C Tabs tablet     180 tablet    Take 1 tablet by mouth 2 times daily With Folic acid    Encounter for herb and vitamin supplement management       vitamin E 1000 UNIT capsule    [facility] vitamin E    90 capsule    Take 1 capsule (1,000 Units) by mouth daily    Encounter for herb and vitamin supplement management       * Notice:  This list has 9 medication(s) that are the same as other medications prescribed for you. Read the directions carefully, and ask your doctor or other care provider to review them with you.

## 2018-07-12 NOTE — PATIENT INSTRUCTIONS
Probiotics        Patient Education    Lactobacillus Acidophilus Oral capsule    Lactobacillus Acidophilus Oral capsule, modified-release    Lactobacillus Acidophilus Oral tablet    Lactobacillus Acidophilus, Lactobacillus Sp. Oral granules    Lactobacillus Acidophilus, Lactobacillus Sporogenes Oral tablet  Lactobacillus Acidophilus Oral capsule  What is this medicine?  LACTOBACILLUS (rosey BURDICK uhs) is a supplement. It is used to help the normal balance of bacteria in the colon. This may treat or prevent diarrhea caused by an infection or by antibiotics. The FDA has not approved this supplement for any medical use.  This supplement may be used for other purposes; ask your health care provider or pharmacist if you have questions.  What should I tell my health care provider before I take this medicine?  They need to know if you have any of these conditions:    chronic disease    immune system problems    prosthetic heart valve or valvular heart disease    an unusual or allergic reaction to Lactobacillus, any medicines, lactose or milk, other foods, dyes, or preservatives    pregnant or trying to get pregnant    breast-feeding  How should I use this medicine?  Take this medicine by mouth with a small amount of milk, fruit juice, or water. Follow the directions on the package labeling, or take as directed by your health care professional. This medicine can be taken with cereal or other food. Do not take this medicine more often than directed.  Contact your pediatrician regarding the use of this medicine in children. Special care may be needed. This medicine is not recommended for children under 3 years old unless prescribed by a doctor.  Overdosage: If you think you have taken too much of this medicine contact a poison control center or emergency room at once.  NOTE: This medicine is only for you. Do not share this medicine with others.  What if I miss a dose?  If you miss a dose, take it as soon as you can. If it  is almost time for your next dose, take only that dose. Do not take double or extra doses.  What may interact with this medicine?  Interactions are not expected.  This list may not describe all possible interactions. Give your health care provider a list of all the medicines, herbs, non-prescription drugs, or dietary supplements you use. Also tell them if you smoke, drink alcohol, or use illegal drugs. Some items may interact with your medicine.  What should I watch for while using this medicine?  See your doctor if your symptoms do not get better or if they get worse. Do not take this supplement for more than 2 days or if you have a fever unless your doctor tells you to.  If you have allergies to milk or you are sensitive to lactose, do not use this supplement.  What side effects may I notice from receiving this medicine?  Side effects that you should report to your doctor or health care professional as soon as possible:    allergic reactions like skin rash, itching or hives, swelling of the face, lips, or tongue    breathing problems    severe nausea, vomiting    unusually weak or tired  Side effects that usually do not require medical attention (report to your doctor or health care professional if they continue or are bothersome):    hiccups    stomach gas  This list may not describe all possible side effects. Call your doctor for medical advice about side effects. You may report side effects to FDA at 9-899-FDA-0433.  Where should I keep my medicine?  Keep out of the reach of children.  Store in the refrigerator or as directed on the package label. Do not freeze. Throw away any unused medicine after the expiration date.  NOTE:This sheet is a summary. It may not cover all possible information. If you have questions about this medicine, talk to your doctor, pharmacist, or health care provider. Copyright  2016 Gold Standard

## 2018-07-12 NOTE — PROGRESS NOTES
SUBJECTIVE:   Robert B Behr is a 68 year old male presenting with a chief complaint of   Chief Complaint   Patient presents with     Urgent Care     Abdominal Pain     For past 1 - 2 years has been having trouble with abdominal bloating and heartburn (heartburn resolves with taking baking soda).  Reports he swims and bikes every day and eats healthily.  Wonders if he needs probiotics; was advised to do so by staff member at [facility] today.       He is an established patient of [facility].    Bloating    Someone at gym suggested him trying probiotics for bloating symptoms   Requesting A prescription for for probiotics     Location: generalized   Radiation: can go up to chest     Pain character: bloating,     Duration: few year(s)     Relieved by: baking soda.    Has regular bowel movement daily.  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     Tobacco abuse      Family History   Problem Relation Age of Onset     Diabetes Brother      living     Cancer Brother      throat     Macular Degeneration Mother      Colon Cancer No family hx of      Glaucoma No family hx of      Retinal detachment No family hx of      Amblyopia No family hx of      Skin Cancer No family hx of      Melanoma No family hx of      Current Outpatient Prescriptions   Medication Sig Dispense Refill     ascorbic acid (VITAMIN C) 1000 MG TABS TAKE 1 TABLET BY MOUTH DAILY 90 tablet 11     B Complex-C (VITAMIN B COMPLEX W/VITAMIN C) TABS tablet TAKE 1 TABLET BY MOUTH TWICE DAILY WITH FOLIC ACID 180 tablet 11     Calcium-Magnesium-Vitamin D (CALCIUM 1200+D3) 600- MG-MG-UNIT TB24 Take 1-2 tablets by mouth every morning Reported on 3/14/2017       fish  oil-omega-3 fatty acids 1000 MG capsule TAKE 1 CAPSULE BY MOUTH DAILY 90 capsule 11     fluticasone-salmeterol (ADVAIR) 250-50 MCG/DOSE diskus inhaler Inhale 1 puff into the lungs 2 times daily 1 Inhaler 11     glucosamine-chondroitinoitin 750-600 MG TABS TAKE 2 TABLETS BY MOUTH TWICE DAILY 120 tablet 5     Ipratropium-Albuterol (COMBIVENT RESPIMAT)  MCG/ACT inhaler INHALE ONE PUFF BY MOUTH FOUR TIMES A DAY (NO TO EXCCED 6 DOSES PER DAY) 4 g 3     ketoconazole (NIZORAL) 2 % cream Apply topically 2 times daily To all affected areas with white spots 60 g 3     Lactobacillus-Inulin ([employer] DIG[facility]) CAPS Take 1 capsule by mouth 2 times daily Culturelle or something similiar 100 capsule 3     levofloxacin (QUIXIN) 0.5 % ophthalmic solution 1 drop in surgical eye as directed - 4x daily for 1 week, then stop 1 Bottle 1     nicotine (NICODERM CQ) 14 MG/24HR 24 hr patch Place 1 patch onto the skin every 24 hours 30 patch 1     vitamin A 65023 UNIT capsule TAKE 1 CAPSULE BY MOUTH DAILY 180 capsule 11     vitamin E ([employer] VITAMIN E) 1000 UNIT capsule Take 1 capsule (1,000 Units) by mouth daily 90 capsule 2     calcium-vitamin D (CALTRATE) 600-400 MG-UNIT per tablet TAKE 1 TABLET BY MOUTH TWICE DAILY 180 tablet 3     fluticasone-salmeterol (ADVAIR) 250-50 MCG/DOSE diskus inhaler Inhale 1 puff into the lungs 2 times daily 1 Inhaler 1     ipratropium - albuterol 0.5 mg/2.5 mg/3 mL (DUONEB) 0.5-2.5 (3) MG/3ML neb solution Take 1 vial (3 mLs) by nebulization every 6 hours as needed for shortness of breath / dyspnea or wheezing (Patient not taking: Reported on 7/11/2018) 30 vial 1     ipratropium - albuterol 0.5 mg/2.5 mg/3 mL (DUONEB) 0.5-2.5 (3) MG/3ML neb solution Take 1 vial (3 mLs) by nebulization every 6 hours as needed for shortness of breath / dyspnea or wheezing (Patient not taking: Reported on 7/11/2018) 30 vial 1     ketoconazole (NIZORAL) 2 % cream Apply topically twice a week 60 g 1     nicotine  "polacrilex (COMMIT) 2 MG lozenge Place 1 lozenge (2 mg) inside cheek as needed for smoking cessation Place 1 lozenge inside cheek as needed for smoking cessation. 150 lozenge 3     order for DME Equipment being ordered: nebulizer machine 1 each 0     prednisoLONE acetate (PRED FORTE) 1 % ophthalmic susp 1 drop in surgical eye as directed, 4x daily after surgery for 1 week, 3x daily for 1 week, 2x daily for 1 week, daily for 1 week, then stop 1 Bottle 1     predniSONE (DELTASONE) 20 MG tablet Take 3 tablets (60 mg) by mouth daily (Patient not taking: Reported on 7/11/2018) 18 tablet 0     predniSONE (DELTASONE) 20 MG tablet Take 1 tablet (20 mg) by mouth 2 times daily (Patient not taking: Reported on 7/11/2018) 10 tablet 0     Spacer/Aero-Holding Chambers (POCKET SPACER) MARGARITA Attach to albuterol inhaler, use 2 puffs every 4-6 hours as needed. 1 each 0     SPIRIVA RESPIMAT 2.5 MCG/ACT inhalation aerosol INHALE 2 PUFFS INTO THE LUNGS DAILY (Patient not taking: Reported on 7/11/2018) 4 g 11     vitamin B complex with vitamin C (VITAMIN  B COMPLEX) TABS tablet Take 1 tablet by mouth 2 times daily With Folic acid 180 tablet 2     Social History   Substance Use Topics     Smoking status: Former Smoker     Packs/day: 0.10     Years: 45.00     Types: Cigarettes     Smokeless tobacco: Former User      Comment: 3-7 cigarettes/day (8/29/17). 1.5 packs for 45 years smoker     Alcohol use 0.0 oz/week     0 Standard drinks or equivalent per week      Comment: occ beer       OBJECTIVE  /64 (BP Location: Left leg, Patient Position: Sitting, Cuff Size: Adult Small)  Pulse 66  Temp 98.7  F (37.1  C) (Oral)  Ht 5' 10\" (1.778 m)  Wt 151 lb (68.5 kg)  SpO2 95%  BMI 21.67 kg/m2    Physical Exam   Constitutional: He appears well-developed and well-nourished.   Abdominal: Soft. There is no tenderness.       Labs:  No results found for this or any previous visit (from the past 24 hour(s)).        ASSESSMENT:      ICD-10-CM    1. " Bloating R14.0 Lactobacillus-Inulin ([facility] DIGEST[facility]) CAPS        Medical Decision Making:  Requesting rx probiotics only    PLAN:      Patient Instructions   Probiotics        Patient Education    Lactobacillus Acidophilus Oral capsule    Lactobacillus Acidophilus Oral capsule, modified-release    Lactobacillus Acidophilus Oral tablet    Lactobacillus Acidophilus, Lactobacillus Sp. Oral granules    Lactobacillus Acidophilus, Lactobacillus Sporogenes Oral tablet  Lactobacillus Acidophilus Oral capsule  What is this medicine?  LACTOBACILLUS (rosey gonzalezh HEAVENLY s) is a supplement. It is used to help the normal balance of bacteria in the colon. This may treat or prevent diarrhea caused by an infection or by antibiotics. The FDA has not approved this supplement for any medical use.  This supplement may be used for other purposes; ask your health care provider or pharmacist if you have questions.  What should I tell my health care provider before I take this medicine?  They need to know if you have any of these conditions:    chronic disease    immune system problems    prosthetic heart valve or valvular heart disease    an unusual or allergic reaction to Lactobacillus, any medicines, lactose or milk, other foods, dyes, or preservatives    pregnant or trying to get pregnant    breast-feeding  How should I use this medicine?  Take this medicine by mouth with a small amount of milk, fruit juice, or water. Follow the directions on the package labeling, or take as directed by your health care professional. This medicine can be taken with cereal or other food. Do not take this medicine more often than directed.  Contact your pediatrician regarding the use of this medicine in children. Special care may be needed. This medicine is not recommended for children under 3 years old unless prescribed by a doctor.  Overdosage: If you think you have taken too much of this medicine contact a poison control center or emergency  room at once.  NOTE: This medicine is only for you. Do not share this medicine with others.  What if I miss a dose?  If you miss a dose, take it as soon as you can. If it is almost time for your next dose, take only that dose. Do not take double or extra doses.  What may interact with this medicine?  Interactions are not expected.  This list may not describe all possible interactions. Give your health care provider a list of all the medicines, herbs, non-prescription drugs, or dietary supplements you use. Also tell them if you smoke, drink alcohol, or use illegal drugs. Some items may interact with your medicine.  What should I watch for while using this medicine?  See your doctor if your symptoms do not get better or if they get worse. Do not take this supplement for more than 2 days or if you have a fever unless your doctor tells you to.  If you have allergies to milk or you are sensitive to lactose, do not use this supplement.  What side effects may I notice from receiving this medicine?  Side effects that you should report to your doctor or health care professional as soon as possible:    allergic reactions like skin rash, itching or hives, swelling of the face, lips, or tongue    breathing problems    severe nausea, vomiting    unusually weak or tired  Side effects that usually do not require medical attention (report to your doctor or health care professional if they continue or are bothersome):    hiccups    stomach gas  This list may not describe all possible side effects. Call your doctor for medical advice about side effects. You may report side effects to FDA at 6-393-FDA-4176.  Where should I keep my medicine?  Keep out of the reach of children.  Store in the refrigerator or as directed on the package label. Do not freeze. Throw away any unused medicine after the expiration date.  NOTE:This sheet is a summary. It may not cover all possible information. If you have questions about this medicine, talk to  your doctor, pharmacist, or health care provider. Copyright  2016 [employer] Standard

## 2018-07-13 ENCOUNTER — PATIENT OUTREACH (OUTPATIENT)
Dept: GERIATRIC MEDICINE | Facility: CLINIC | Age: 68
End: 2018-07-13

## 2018-07-16 ENCOUNTER — OFFICE VISIT (OUTPATIENT)
Dept: DERMATOLOGY | Facility: CLINIC | Age: 68
End: 2018-07-16
Payer: COMMERCIAL

## 2018-07-16 VITALS — SYSTOLIC BLOOD PRESSURE: 118 MMHG | DIASTOLIC BLOOD PRESSURE: 80 MMHG | HEART RATE: 84 BPM

## 2018-07-16 DIAGNOSIS — L81.8 POST INFLAMMATORY HYPOPIGMENTATION: Primary | ICD-10-CM

## 2018-07-16 DIAGNOSIS — L08.1 ERYTHRASMA: ICD-10-CM

## 2018-07-16 DIAGNOSIS — L81.9 HYPOPIGMENTATION: ICD-10-CM

## 2018-07-16 RX ORDER — ERYTHROMYCIN 20 MG/G
GEL TOPICAL DAILY
Qty: 60 G | Refills: 0 | Status: SHIPPED | OUTPATIENT
Start: 2018-07-16 | End: 2019-03-22

## 2018-07-16 RX ORDER — TACROLIMUS 1 MG/G
OINTMENT TOPICAL 2 TIMES DAILY
Qty: 60 G | Refills: 0 | Status: SHIPPED | OUTPATIENT
Start: 2018-07-16 | End: 2018-10-05

## 2018-07-16 ASSESSMENT — PAIN SCALES - GENERAL: PAINLEVEL: NO PAIN (0)

## 2018-07-16 NOTE — PATIENT INSTRUCTIONS
Please apply protopic ointment 0.1% twice a day for the next three months. This may help the cells in the areas of whiteness to produce pigment. We are unsure at this point if your loss of pigment on the scalp is secondary to inflammation (for example, from sun burns), or if this represents vitiligo, which is an autoimmune disorder (more information below).    For your feet/toes, you have a condition called erythrasma, which is a bacterial infection of the skin. We will treat this with topical erythromycin twice daily for 2 weeks.    How do dermatologists treat vitiligo?  If you have vitiligo and want to treat it, you should discuss treatment options with a dermatologist. There are many treatment options. The goal of most treatments is to restore lost skin color.    Here are some key facts about treatment options to help you start a conversation with a dermatologist. The type of treatment that is best for you will depend on your preference, overall health, age, and where the vitiligo appears on your body. Some people choose not to treat vitiligo.    No medical treatment (use cosmetics to camouflage lost color)  Cosmetic options include makeup, self-tanners, and skin dyes.  Offers safe way to make vitiligo less noticeable.  Often recommended for children because it avoids possible side effects from medicine.  Drawbacks: Must be repeatedly applied, can be time-consuming, takes practice to get a natural-looking result.    Medicine applied to the skin  Several different topical (applied to the skin) medicines can add color to your skin.  Prescribed for small areas.  The most commonly prescribed medicine is a potent or super-potent corticosteroid that you apply to your skin. About half, 45%, of patients regain at least some skin color after 4 to 6 months.  A corticosteroid that you apply to your skin may be combined with another medicine to improve results.  This option works best in people with darkly pigmented  skin.  These medicines are most effective on certain areas of the body, such as the face. They are least effective on the hands and feet.  Some of these medicines should not be used on the face because of possible side effects.  Drawbacks: These medicines have possible side effects, so patients must be carefully monitored. A possible serious side effect of using of a topical corticosteroid for a year or longer is skin atrophy. This means the skin becomes paper thin, very dry, and fragile.    Light treatment  Uses light to restore lost color to the skin.  Patient may sit in a light box or receive excimer laser treatments.  Light boxes are used to treat widespread vitiligo; lasers are used to treat a small area.  Works best on the face; least effective on hands and feet.  Effective for many patients; about 70% see results with an excimer laser.  Results can disappear. About half, 44%, see results disappear within 1 year of stopping treatment. After 4 years, about 86% lose some color restored by treatment.  May cause patients with darkly pigmented skin to see areas of darker skin after treatment, but treated skin usually matches untreated skin within a few months.  Requires a time commitment. Patients need 2 to 3 treatments per week for several weeks.  May be combined with another treatment such as a corticosteroid that you apply to your skin.    PUVA light therapy  Uses UVA light and a medicine called psoralen to restore skin color.  Psoralen may be applied to the skin or taken as a pill.  Can treat widespread vitiligo.  About 50% to 75% effective in restoring pigment to the face, trunk, upper arms, and upper legs.  Not very effective for the hands or feet.  Time-consuming, requiring treatment at a hospital or PUVA center twice a week for about 1 year.  Psoralen can affect the eyes, so this treatment requires an eye exam before and after finishing treatment.  To help prevent serious side effects, patients are  carefully monitored.    Surgery  May be an option when light therapy and medicines applied to the skin do not work.  For adults whose vitiligo has been stable (not changed) for at least 6 months.  Not for children.  Not for people who scar easily or develop keloids (scars that rise above the skin).  Different surgical procedures are available. Most involve removing skin with your natural color or skin cells and placing these where you need color.  Can be effective for 90% to 95% of patients.  Possible side effects include failure to work, cobblestone-like skin, and infection.    Unconventional treatment  Some vitamins, minerals, amino acids, and enzymes have been reported to restore skin color in people who have vitiligo.  Most have not been studied, so there is no evidence to support these treatments and no knowledge of possible side effects.  Ginkgo biloba, an herb, has been studied in a clinical trial. Results from this trial indicate that the herb may restore skin color and stop vitiligo from worsening.  In the ginkgo biloba trial, 10 patients given ginkgo biloba had noticeable or complete return of skin color. Two patients taking the placebo (contains no active ingredient) also had noticeable or complete return of skin color.  Because some patients taking the placebo regained their skin color, more study is needed.    Depigmentation  This treatment removes the remaining pigment from the skin.  Very few patients opt for this treatment.  Removing the rest of the pigment leaves a person with completely white skin.  It may be an option for an adult who has little pigment left and other treatment has not worked. Removing the remaining pigment can be an effective way to even out the skin color, giving the person white skin.  To remove the remaining color, you'd apply a cream once or twice a day. This cream gradually removes the remaining color from your skin.  Depigmentation can take 1 to 4 years.  Once treatment is  finished, some people see spots of pigment on their skin from being out in the sun. To get rid of these spots, you can use the cream to remove this color.    Outcome  It is not possible to predict how a patient will respond to treatment. It is important to keep in mind that no one treatment works for everyone. Results can vary from one part of the body to another. Combining two or more treatments often gives the best results.    Treatment Q&A    Q: Can a child with vitiligo be treated?  A: Yes, but some treatments are not appropriate for children. The following may be an option for a child:    Medicine applied to the skin.  PUVA that uses psoralen applied to the skin. PUVA therapy that uses the psoralen pill is usually not recommended until after 12 years of age. Even then, the risk and benefits of this treatment must be carefully weighed.  For children with extensive vitiligo, a dermatologist may recommend narrowband UVB light treatments.    Q: Are researchers looking for more effective treatment?  A: Yes. They are studying the genes involved in vitiligo. Researchers believe that by identifying all of the genes involved in vitiligo, they will learn what destroys the cells that give skin its color. With this knowledge, it should be possible to develop better treatments. One of the strange goals of this research is to develop a treatment that will permanently stop the skin from losing color.    Learn more  Is vitiligo a medical condition?  Vitiligo discomfort stops with sunscreen use    References  Gelacio SKAGGS, Jai ORDAZ, Ramses L et al.  Guideline for the diagnosis and management of vitiligo.  Br J Dermatol 2008; 159: 1051-76.    Nathaly PUTNAM,  Vitiligo.  In: Shannan AGARWAL and Mel SC, editors. Dermatology for Skin of Color, [place], [facility]; 2009. p. 317-23.    Ray BARDALES, Andria ALTAMIRANO.  Vitiligo.  In: Ally K, Dwain LA, Kong SI, et al. editors. Tim velazquez Dermatology in General Medicine, 7th ed. [place]  [facility], [facility]; 2008. p.616-21.    Marquez De La Cruz MW, Jackie PI, karol Cid J et al.  The burden of vitiligo: patient characteristics associated with quality of life.  J Am Acad Dermatol 2009; 61: 411-20.    Iris E, Sharon C, Maria Luisa A et al.  Narrowband ultraviolet B phototherapy and 308-nm excimer laser in the treatment of vitiligo: a review.  J Am Acad Dermatol 2009; 60: 470-7.    Poncho KOKO,  Vitiligo and other disorders of hypopigmentation.  In: Les JL, Kelvin JL, Sinai RP, et al. editors. Dermatology, 2nd ed. K[place], [employer]; 2008. p. 913-20.    Olivia ME, Krystal DM, Yifan U.  Therapeutic interventions for vitiligo.  J Am Acad Dermatol 2008; 59: 713-7.      From: [email or web address]://[email or web address].org/public/diseases/color-problems/[email or web address]#treatment      [facility] Vitiligo [facility]  [place] Chapter Leader  email: [email or web address]  [email or web address]  [email or web address]  1-273-245-1827 ext [phone]

## 2018-07-16 NOTE — LETTER
7/16/2018       RE: Robert B Behr  [address]  [place] [address]     Dear Colleague,    Thank you for referring your patient, Robert B Behr, to the [facility] DERMATOLOGY at [facility]. Please see a copy of my visit note below.    [facility] Dermatology Note      Dermatology Problem List:  1. Hypo/depigmented  macules and patches on scalp as well as depigmented macules on low back, chest, and shins  - Differential diagnosis includes vitiligo, tinea versicolor, actinic poikiloderma/idiopathic guttate hypomelanosis, progressive macular hypomelanosis; possible overlap of etiologies  - 5/14/18: TSH/free T4   -Trial of ketoconazole 2% cream BID to all affected areas for 2 weeks without efficacy  -Protopic 0.1% ointment BID, follow up in three months   2. Erythrasma   -topical erythromycin 2% daily for two weeks      Encounter Date: Jul 16, 2018    CC:   Chief Complaint   Patient presents with     Skin Discoloration     Ravin is here for a f/u appt for his skin discoloration. He states there has been no changes with the discoloration.          History of Present Illness:  Mr. Robert B Behr is a 68 year old male who presents for follow-up for hypo-/depigmented patches on the scalp for which he was prescribed ketoconazole 2% cream twice daily to affected areas of the scalp, low back, and chest for 3 weeks starting on June 8, 2018.  Per the patient, he noticed white patches on his scalp approximately 3 months ago, he believes this was around the time of a sunburn.  He states that he has also had asymptomatic spots that are similar on the low back, chest, and shins which have been present for several years. The areas are not changing, growing, itchy, or painful.  He has no history of autoimmune disorder or thyroid disorder.  Since his last visit, he has not noticed any improvement in the de pigmentation of his scalp since trying the topical  "ketoconazole cream. 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     Tobacco abuse      Past Surgical History:   Procedure Laterality Date     CATARACT IOL, RT/LT Left 09/15/2017    s/p CE/IOL left eye     CATARACT IOL, RT/LT Right      PHACOEMULSIFICATION CLEAR CORNEA WITH STANDARD INTRAOCULAR LENS IMPLANT Right 9/30/2016    Procedure: PHACOEMULSIFICATION CLEAR CORNEA WITH STANDARD INTRAOCULAR LENS IMPLANT;  Surgeon: Elly Valencia MD;  Location: [facility]     PHACOEMULSIFICATION WITH STANDARD INTRAOCULAR LENS IMPLANT Left 9/15/2017    Procedure: PHACOEMULSIFICATION WITH STANDARD INTRAOCULAR LENS IMPLANT;  Left Eye Phacoemulsification with Standard Lens;  Surgeon: Elly Valencia MD;  Location: [facility] OR     WRIST SURGERY         Social History:  The patient is retired. The patient denies use of tanning beds although is an avid outdoor biker and swimmer.     Family History:  No history of skin or autoimmune conditions.     Medications:  Current Outpatient Prescriptions   Medication Sig Dispense Refill     ascorbic acid (VITAMIN C) 1000 MG TABS TAKE 1 TABLET BY MOUTH DAILY 90 tablet 11     B Complex-C (VITAMIN B COMPLEX W/VITAMIN C) TABS tablet TAKE 1 TABLET BY MOUTH TWICE DAILY WITH FOLIC ACID 180 tablet 11     Calcium-Magnesium-Vitamin D (CALCIUM 1200+D3) 600- MG-MG-UNIT TB24 Take 1-2 tablets by mouth every morning Reported on 3/14/2017       " calcium-vitamin D (CALTRATE) 600-400 MG-UNIT per tablet TAKE 1 TABLET BY MOUTH TWICE DAILY 180 tablet 3     fish oil-omega-3 fatty acids 1000 MG capsule TAKE 1 CAPSULE BY MOUTH DAILY 90 capsule 11     fluticasone-salmeterol (ADVAIR) 250-50 MCG/DOSE diskus inhaler Inhale 1 puff into the lungs 2 times daily 1 Inhaler 1     fluticasone-salmeterol (ADVAIR) 250-50 MCG/DOSE diskus inhaler Inhale 1 puff into the lungs 2 times daily 1 Inhaler 11     glucosamine-chondroitinoitin 750-600 MG TABS TAKE 2 TABLETS BY MOUTH TWICE DAILY 120 tablet 5     ipratropium - albuterol 0.5 mg/2.5 mg/3 mL (DUONEB) 0.5-2.5 (3) MG/3ML neb solution Take 1 vial (3 mLs) by nebulization every 6 hours as needed for shortness of breath / dyspnea or wheezing (Patient not taking: Reported on 7/11/2018) 30 vial 1     ipratropium - albuterol 0.5 mg/2.5 mg/3 mL (DUONEB) 0.5-2.5 (3) MG/3ML neb solution Take 1 vial (3 mLs) by nebulization every 6 hours as needed for shortness of breath / dyspnea or wheezing (Patient not taking: Reported on 7/11/2018) 30 vial 1     Ipratropium-Albuterol (COMBIVENT RESPIMAT)  MCG/ACT inhaler INHALE ONE PUFF BY MOUTH FOUR TIMES A DAY (NO TO EXCCED 6 DOSES PER DAY) 4 g 3     ketoconazole (NIZORAL) 2 % cream Apply topically twice a week 60 g 1     ketoconazole (NIZORAL) 2 % cream Apply topically 2 times daily To all affected areas with white spots 60 g 3     Lactobacillus-Inulin (CULTURELLE DIGESTIVE HEALTH) CAPS Take 1 capsule by mouth 2 times daily Culturelle or something similiar 100 capsule 3     levofloxacin (QUIXIN) 0.5 % ophthalmic solution 1 drop in surgical eye as directed - 4x daily for 1 week, then stop 1 Bottle 1     nicotine (NICODERM CQ) 14 MG/24HR 24 hr patch Place 1 patch onto the skin every 24 hours 30 patch 1     nicotine polacrilex (COMMIT) 2 MG lozenge Place 1 lozenge (2 mg) inside cheek as needed for smoking cessation Place 1 lozenge inside cheek as needed for smoking cessation. 150 lozenge 3      order for DME Equipment being ordered: nebulizer machine 1 each 0     prednisoLONE acetate (PRED FORTE) 1 % ophthalmic susp 1 drop in surgical eye as directed, 4x daily after surgery for 1 week, 3x daily for 1 week, 2x daily for 1 week, daily for 1 week, then stop 1 Bottle 1     predniSONE (DELTASONE) 20 MG tablet Take 3 tablets (60 mg) by mouth daily (Patient not taking: Reported on 7/11/2018) 18 tablet 0     predniSONE (DELTASONE) 20 MG tablet Take 1 tablet (20 mg) by mouth 2 times daily (Patient not taking: Reported on 7/11/2018) 10 tablet 0     Spacer/Aero-Holding Chambers (POCKET SPACER) MARGARITA Attach to albuterol inhaler, use 2 puffs every 4-6 hours as needed. 1 each 0     SPIRIVA RESPIMAT 2.5 MCG/ACT inhalation aerosol INHALE 2 PUFFS INTO THE LUNGS DAILY (Patient not taking: Reported on 7/11/2018) 4 g 11     vitamin A 52882 UNIT capsule TAKE 1 CAPSULE BY MOUTH DAILY 180 capsule 11     vitamin B complex with vitamin C (VITAMIN  B COMPLEX) TABS tablet Take 1 tablet by mouth 2 times daily With Folic acid 180 tablet 2     vitamin E ([employer] VITAMIN E) 1000 UNIT capsule Take 1 capsule (1,000 Units) by mouth daily 90 capsule 2        No Known Allergies      Review of Systems:  -As per HPI  -Constitutional: The patient denies fatigue, fevers, chills, unintended weight loss, and night sweats.  -HEENT: Patient denies nonhealing oral sores.  -Skin: As above in HPI. No additional skin concerns.    Physical exam:  Vitals: There were no vitals taken for this visit.  GEN: This is a well developed, well-nourished male in no acute distress, in a pleasant mood.    SKIN: Sun-exposed skin, which includes the head/face, neck, both arms, both legs, digits, toes and/or nails was examined.   -hypopigmented macules and patches of the scalp which are entirely devoid of pigment by dermo scopy .  -similar hypopigmented macules and patches over the chest, back, arms, and legs   -coral patch by woods lamp exam between 4-5 digits of right foot  and between 2-3 digits of left foot   -diffuse photodamage   -No other lesions of concern on areas examined.     Impression/Plan:  1. Hypo and depigmented patches on the scalp, chest, back, arms and legs. Differentials include vitiligo, post inflammatory hypopigmentation. Patient has used ketoconazole cream for over a month without any improvement. We will try to treat with protopic 0.1% ointment twice daily and follow up in three months. He may be a candidate for excimer laser in the future. We may also be able to try topical corticosteroids of necessary in the future.     Photographs taken for future comparison     2. Erythrasma     Topical erythromycin 2% BID for two weeks    Follow up in three months    Photographs taken today for future comparison       CC Dr. Frausto on close of this encounter.  Follow-up in 3 months, earlier for new or changing lesions.       Dr. Gandhi staffed the patient.    Staff Involved:  Resident(Enedelia Calderon)/Staff(as above)      Patient was seen and examined with the dermatology resident. I agree with the history, review of systems, physical examination, assessments and plan.    Raquel Gandhi MD  Professor and  Chair  Department of Dermatology  [facility]    Pictures were placed in Pt's chart today for future reference.            Again, thank you for allowing me to participate in the care of your patient.      Sincerely,    Raquel Gandhi MD

## 2018-07-16 NOTE — NURSING NOTE
Chief Complaint   Patient presents with     Skin Discoloration     Ravin is here for a f/u appt for his skin discoloration. He states there has been no changes with the discoloration.      Rosalinda Grover, EMT

## 2018-07-16 NOTE — MR AVS SNAPSHOT
After Visit Summary   7/16/2018    Robert B Behr    MRN: [id number]           Patient Information     Date Of Birth          1950        Visit Information        Provider Department      7/16/2018 2:40 PM Raquel Gandhi MD [facility] D[facility]        Today's Diagnoses     Vitiligo    -  1    Post inflammatory hypopigmentation        Erythrasma          Care Instructions    Please apply protopic ointment 0.1% twice a day for the next three months. This may help the cells in the areas of whiteness to produce pigment. We are unsure at this point if your loss of pigment on the scalp is secondary to inflammation (for example, from sun burns), or if this represents vitiligo, which is an autoimmune disorder (more information below).    For your feet/toes, you have a condition called erythrasma, which is a bacterial infection of the skin. We will treat this with topical erythromycin twice daily for 2 weeks.    How do dermatologists treat vitiligo?  If you have vitiligo and want to treat it, you should discuss treatment options with a dermatologist. There are many treatment options. The goal of most treatments is to restore lost skin color.    Here are some key facts about treatment options to help you start a conversation with a dermatologist. The type of treatment that is best for you will depend on your preference, overall health, age, and where the vitiligo appears on your body. Some people choose not to treat vitiligo.    No medical treatment (use cosmetics to camouflage lost color)  Cosmetic options include makeup, self-tanners, and skin dyes.  Offers safe way to make vitiligo less noticeable.  Often recommended for children because it avoids possible side effects from medicine.  Drawbacks: Must be repeatedly applied, can be time-consuming, takes practice to get a natural-looking result.    Medicine applied to the skin  Several different topical (applied to the skin) medicines can add  color to your skin.  Prescribed for small areas.  The most commonly prescribed medicine is a potent or super-potent corticosteroid that you apply to your skin. About half, 45%, of patients regain at least some skin color after 4 to 6 months.  A corticosteroid that you apply to your skin may be combined with another medicine to improve results.  This option works best in people with darkly pigmented skin.  These medicines are most effective on certain areas of the body, such as the face. They are least effective on the hands and feet.  Some of these medicines should not be used on the face because of possible side effects.  Drawbacks: These medicines have possible side effects, so patients must be carefully monitored. A possible serious side effect of using of a topical corticosteroid for a year or longer is skin atrophy. This means the skin becomes paper thin, very dry, and fragile.    Light treatment  Uses light to restore lost color to the skin.  Patient may sit in a light box or receive excimer laser treatments.  Light boxes are used to treat widespread vitiligo; lasers are used to treat a small area.  Works best on the face; least effective on hands and feet.  Effective for many patients; about 70% see results with an excimer laser.  Results can disappear. About half, 44%, see results disappear within 1 year of stopping treatment. After 4 years, about 86% lose some color restored by treatment.  May cause patients with darkly pigmented skin to see areas of darker skin after treatment, but treated skin usually matches untreated skin within a few months.  Requires a time commitment. Patients need 2 to 3 treatments per week for several weeks.  May be combined with another treatment such as a corticosteroid that you apply to your skin.    PUVA light therapy  Uses UVA light and a medicine called psoralen to restore skin color.  Psoralen may be applied to the skin or taken as a pill.  Can treat widespread  vitiligo.  About 50% to 75% effective in restoring pigment to the face, trunk, upper arms, and upper legs.  Not very effective for the hands or feet.  Time-consuming, requiring treatment at a hospital or [facility] twice a week for about 1 year.  Psoralen can affect the eyes, so this treatment requires an eye exam before and after finishing treatment.  To help prevent serious side effects, patients are carefully monitored.    Surgery  May be an option when light therapy and medicines applied to the skin do not work.  For adults whose vitiligo has been stable (not changed) for at least 6 months.  Not for children.  Not for people who scar easily or develop keloids (scars that rise above the skin).  Different surgical procedures are available. Most involve removing skin with your natural color or skin cells and placing these where you need color.  Can be effective for 90% to 95% of patients.  Possible side effects include failure to work, cobblestone-like skin, and infection.    Unconventional treatment  Some vitamins, minerals, amino acids, and enzymes have been reported to restore skin color in people who have vitiligo.  Most have not been studied, so there is no evidence to support these treatments and no knowledge of possible side effects.  Ginkgo biloba, an herb, has been studied in a clinical trial. Results from this trial indicate that the herb may restore skin color and stop vitiligo from worsening.  In the ginkgo biloba trial, 10 patients given ginkgo biloba had noticeable or complete return of skin color. Two patients taking the placebo (contains no active ingredient) also had noticeable or complete return of skin color.  Because some patients taking the placebo regained their skin color, more study is needed.    Depigmentation  This treatment removes the remaining pigment from the skin.  Very few patients opt for this treatment.  Removing the rest of the pigment leaves a person with completely white  skin.  It may be an option for an adult who has little pigment left and other treatment has not worked. Removing the remaining pigment can be an effective way to even out the skin color, giving the person white skin.  To remove the remaining color, you'd apply a cream once or twice a day. This cream gradually removes the remaining color from your skin.  Depigmentation can take 1 to 4 years.  Once treatment is finished, some people see spots of pigment on their skin from being out in the sun. To get rid of these spots, you can use the cream to remove this color.    Outcome  It is not possible to predict how a patient will respond to treatment. It is important to keep in mind that no one treatment works for everyone. Results can vary from one part of the body to another. Combining two or more treatments often gives the best results.    Treatment Q&A    Q: Can a child with vitiligo be treated?  A: Yes, but some treatments are not appropriate for children. The following may be an option for a child:    Medicine applied to the skin.  PUVA that uses psoralen applied to the skin. PUVA therapy that uses the psoralen pill is usually not recommended until after 12 years of age. Even then, the risk and benefits of this treatment must be carefully weighed.  For children with extensive vitiligo, a dermatologist may recommend narrowband UVB light treatments.    Q: Are researchers looking for more effective treatment?  A: Yes. They are studying the genes involved in vitiligo. Researchers believe that by identifying all of the genes involved in vitiligo, they will learn what destroys the cells that give skin its color. With this knowledge, it should be possible to develop better treatments. One of the strange goals of this research is to develop a treatment that will permanently stop the skin from losing color.    Learn more  Is vitiligo a medical condition?  Vitiligo discomfort stops with sunscreen use    References  Gelacio SKAGGS,  Jai ORDAZ, Ramses L et al.  Guideline for the diagnosis and management of vitiligo.  Br J Dermatol 2008; 159: 1051-76.    Nathaly PUTNAM,  Vitiligo.  In: Shannan AP and Mel SC, editors. Dermatology for Skin of Color, [place], [facility]; 2009. p. 317-23.    Ray RM, Andria ALTAMIRANO.  Vitiligo.  In: Ally K, Dwain LA, Kong SI, et al. editors. Tim velazquez Dermatology in General Medicine, 7th ed. [place], [facility]; 2008. p.616-21.    Marquez De La Cruz MW, Jackie PI, de Jose Roberto J et al.  The burden of vitiligo: patient characteristics associated with quality of life.  J Am Acad Dermatol 2009; 61: 411-20.    Iris E, Sharon C, Maria Luisa A et al.  Narrowband ultraviolet B phototherapy and 308-nm excimer laser in the treatment of vitiligo: a review.  J Am Acad Dermatol 2009; 60: 470-7.    Poncho SUTHERLAND,  Vitiligo and other disorders of hypopigmentation.  In: Les JL, Kelvin JL, Sinai RP, et al. editors. Dermatology, 2nd ed. Kg, [facility]; 2008. p. 913-20.    Olivia ME, Krystal DM, Yifan U.  Therapeutic interventions for vitiligo.  J Am Acad Dermatol 2008; 59: 713-7.      From: https://www.aad.org/public/diseases/color-problems/vitiligo#treatment      [employer] Vitiligo Support Group  [place] Chapter Leader  email: [place]@vitfriends.org  [email or web address]  [email or web address].[email or web address]/[email or web address]  1-760.764.9948 ext [phone]            Follow-ups after your visit        Follow-up notes from your care team     Return in about 3 months (around 10/16/2018).      Your next 10 appointments already scheduled     Oct 16, 2018  3:50 PM CDT   (Arrive by 3:35 PM)   Return Visit with Raquel Gandhi MD   [facility] ([facility])    [address] [place] [id number] [phone]              Who to contact     Please call your clinic at [phone] to:    Ask questions about your health    Make or cancel  appointments    Discuss your medicines    Learn about your test results    Speak to your doctor            Additional Information About Your Visit        [employer] Information     [employer] gives you secure access to your electronic health record. If you see a primary care provider, you can also send messages to your care team and make appointments. If you have questions, please call your primary care clinic.  If you do not have a primary care provider, please call [phone] and they will assist you.      [employer] is an electronic gateway that provides easy, online access to your medical records. With [employer], you can request a clinic appointment, read your test results, renew a prescription or communicate with your care team.     To access your existing account, please contact your [facility] or call [phone] for assistance.        Care EveryWhere ID     This is your Care EveryWhere ID. This could be used by other organizations to access your [facility] medical records  [id number]        Your Vitals Were     Pulse                   84            Blood Pressure from Last 3 Encounters:   07/16/18 118/80   07/11/18 118/64   05/21/18 142/86    Weight from Last 3 Encounters:   07/11/18 68.5 kg (151 lb)   05/21/18 70.3 kg (155 lb)   04/19/18 71.2 kg (157 lb)              Today, you had the following     No orders found for display       Primary Care Provider Office Phone # Fax #    Fina Frausto [phone] [address] [place] [address]        Equal Access to Services     TIARRA PAPPAS AH: Hadii marylou Hernandez, waaxda luqadaha, qaybta kaalmada hiltonyada, kael guerrero. So [facility] [phone].    ATENCIÓN: Si habla español, tiene a rios disposición servicios gratuitos de asistencia lingüística. Llame al [phone].    We comply with applicable federal civil rights laws and [place] laws. We do not discriminate on the  basis of race, color, national origin, age, disability, sex, sexual orientation, or gender identity.            Thank you!     Thank you for choosing [facility]  for your care. Our goal is always to provide you with excellent care. Hearing back from our patients is one way we can continue to improve our services. Please take a few minutes to complete the written survey that you may receive in the mail after your visit with us. Thank you!             Your Updated Medication List - Protect others around you: Learn how to safely use, store and throw away your medicines at www.disposemymeds.org.          This list is accurate as of 7/16/18  3:54 PM.  Always use your most recent med list.                   Brand Name Dispense Instructions for use Diagnosis    ascorbic acid 1000 MG Tabs    vitamin C    90 tablet    TAKE 1 TABLET BY MOUTH DAILY    Encounter for herb and vitamin supplement management       CALCIUM 1200+D3 600- MG-MG-UNIT Tb24   Generic drug:  Calcium-Magnesium-Vitamin D      Take 1-2 tablets by mouth every morning Reported on 3/14/2017        calcium-vitamin D 600-400 MG-UNIT per tablet    CALTRATE    180 tablet    TAKE 1 TABLET BY MOUTH TWICE DAILY    Age-related osteoporosis without current pathological fracture       CULTURELLE DIGESTIVE HEALTH Caps     100 capsule    Take 1 capsule by mouth 2 times daily Culturelle or something similiar    Bloating       fish oil-omega-3 fatty acids 1000 MG capsule     90 capsule    TAKE 1 CAPSULE BY MOUTH DAILY    Encounter for herb and vitamin supplement management       * fluticasone-salmeterol 250-50 MCG/DOSE diskus inhaler    ADVAIR    1 Inhaler    Inhale 1 puff into the lungs 2 times daily    Chronic obstructive pulmonary disease, unspecified COPD type (H)       * fluticasone-salmeterol 250-50 MCG/DOSE diskus inhaler    ADVAIR    1 Inhaler    Inhale 1 puff into the lungs 2 times daily    COPD exacerbation (H)       glucosamine-chondroitinoitin  750-600 MG Tabs     120 tablet    TAKE 2 TABLETS BY MOUTH TWICE DAILY    Osteoarthritis of right hip, unspecified osteoarthritis type       * ipratropium - albuterol 0.5 mg/2.5 mg/3 mL 0.5-2.5 (3) MG/3ML neb solution    DUONEB    30 vial    Take 1 vial (3 mLs) by nebulization every 6 hours as needed for shortness of breath / dyspnea or wheezing    COPD exacerbation (H)       * ipratropium - albuterol 0.5 mg/2.5 mg/3 mL 0.5-2.5 (3) MG/3ML neb solution    DUONEB    30 vial    Take 1 vial (3 mLs) by nebulization every 6 hours as needed for shortness of breath / dyspnea or wheezing    COPD exacerbation (H)       * Ipratropium-Albuterol  MCG/ACT inhaler    COMBIVENT RESPIMAT    4 g    INHALE ONE PUFF BY MOUTH FOUR TIMES A DAY (NO TO EXCCED 6 DOSES PER DAY)    COPD exacerbation (H)       * ketoconazole 2 % cream    NIZORAL    60 g    Apply topically 2 times daily To all affected areas with white spots    Hypopigmented skin lesion       * ketoconazole 2 % cream    NIZORAL    60 g    Apply topically twice a week    PV (pityriasis versicolor)       levofloxacin 0.5 % ophthalmic solution    QUIXIN    1 Bottle    1 drop in surgical eye as directed - 4x daily for 1 week, then stop    Nuclear cataract of left eye       nicotine 14 MG/24HR 24 hr patch    NICODERM CQ    30 patch    Place 1 patch onto the skin every 24 hours    Tobacco use disorder       nicotine polacrilex 2 MG lozenge    COMMIT    150 lozenge    Place 1 lozenge (2 mg) inside cheek as needed for smoking cessation Place 1 lozenge inside cheek as needed for smoking cessation.    Chronic obstructive pulmonary disease, unspecified COPD type (H), Tobacco abuse       order for DME     1 each    Equipment being ordered: nebulizer machine    COPD exacerbation (H)       pocket spacer Jeanine     1 each    Attach to albuterol inhaler, use 2 puffs every 4-6 hours as needed.    Chronic obstructive pulmonary disease, unspecified COPD type (H)       prednisoLONE acetate 1 %  ophthalmic susp    PRED FORTE    1 Bottle    1 drop in surgical eye as directed, 4x daily after surgery for 1 week, 3x daily for 1 week, 2x daily for 1 week, daily for 1 week, then stop    Nuclear cataract of left eye       * predniSONE 20 MG tablet    DELTASONE    10 tablet    Take 1 tablet (20 mg) by mouth 2 times daily    COPD exacerbation (H)       * predniSONE 20 MG tablet    DELTASONE    18 tablet    Take 3 tablets (60 mg) by mouth daily    COPD exacerbation (H)       SPIRIVA RESPIMAT 2.5 MCG/ACT inhalation aerosol   Generic drug:  tiotropium     4 g    INHALE 2 PUFFS INTO THE LUNGS DAILY    Chronic obstructive pulmonary disease, unspecified COPD type (H)       vitamin A 33446 UNIT capsule     180 capsule    TAKE 1 CAPSULE BY MOUTH DAILY    Encounter for herb and vitamin supplement management       vitamin b complex w/vitamin C Tabs tablet     180 tablet    TAKE 1 TABLET BY MOUTH TWICE DAILY WITH FOLIC ACID    Takes dietary supplements       vitamin B complex with vitamin C Tabs tablet     180 tablet    Take 1 tablet by mouth 2 times daily With Folic acid    Encounter for herb and vitamin supplement management       vitamin E 1000 UNIT capsule    [facility] vitamin E    90 capsule    Take 1 capsule (1,000 Units) by mouth daily    Encounter for herb and vitamin supplement management       * Notice:  This list has 9 medication(s) that are the same as other medications prescribed for you. Read the directions carefully, and ask your doctor or other care provider to review them with you.

## 2018-07-16 NOTE — PROGRESS NOTES
"[facility] Dermatology Note      Dermatology Problem List:  1. Hypo/depigmented macules and patches on scalp as well as depigmented macules on low back, chest, and shins  - Differential diagnosis includes vitiligo, tinea versicolor, actinic poikiloderma/idiopathic guttate hypomelanosis, progressive macular hypomelanosis; possible overlap of etiologies  - 5/14/18: TSH/free T4   -Trial of ketoconazole 2% cream BID to all affected areas for 2 weeks without efficacy  -Protopic 0.1% ointment BID, follow up in three months   2. Erythrasma   -topical erythromycin 2% daily for two weeks      Encounter Date: Jul 16, 2018    CC:   Chief Complaint   Patient presents with     Skin Discoloration     Ravin is here for a f/u appt for his skin discoloration. He states there has been no changes with the discoloration.          History of Present Illness:  Mr. Robert B Behr is a 68 year old male who presents for follow-up for hypo-/depigmented patches on the scalp for which he was prescribed ketoconazole 2% cream twice daily to affected areas of the scalp, low back, and chest for 3 weeks starting on June 8, 2018.  Per the patient, he noticed white patches on his scalp approximately 3 months ago, he believes this was around the time of a sunburn.  He states that he has also had asymptomatic spots that are similar on the low back, chest, and shins which have been present for several years. The areas are not changing, growing, itchy, or painful.  He has no history of autoimmune disorder or thyroid disorder.  Since his last visit, he has not noticed any improvement in the de pigmentation of his scalp since trying the topical ketoconazole cream. He is not overly concerned about the area, but wondering if there is anything he can do to get \"some color back.\"    Past Medical History:   Patient Active Problem List   Diagnosis     Osteoporosis     COPD (chronic obstructive pulmonary disease) (H)     Tobacco use disorder "     CARDIOVASCULAR SCREENING; LDL GOAL LESS THAN 160     Lung nodules     Health Care Home     Hiatal hernia     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     Tobacco abuse      Past Surgical History:   Procedure Laterality Date     CATARACT IOL, RT/LT Left 09/15/2017    s/p CE/IOL left eye     CATARACT IOL, RT/LT Right      PHACOEMULSIFICATION CLEAR CORNEA WITH STANDARD INTRAOCULAR LENS IMPLANT Right 9/30/2016    Procedure: PHACOEMULSIFICATION CLEAR CORNEA WITH STANDARD INTRAOCULAR LENS IMPLANT;  Surgeon: Elly Valencia MD;  Location:  [facility]     PHACOEMULSIFICATION WITH STANDARD INTRAOCULAR LENS IMPLANT Left 9/15/2017    Procedure: PHACOEMULSIFICATION WITH STANDARD INTRAOCULAR LENS IMPLANT;  Left Eye Phacoemulsification with Standard Lens;  Surgeon: Elly Valencia MD;  Location: [facility] OR     WRIST SURGERY         Social History:  The patient is retired. The patient denies use of tanning beds although is an avid outdoor biker and swimmer.     Family History:  No history of skin or autoimmune conditions.     Medications:  Current Outpatient Prescriptions   Medication Sig Dispense Refill     ascorbic acid (VITAMIN C) 1000 MG TABS TAKE 1 TABLET BY MOUTH DAILY 90 tablet 11     B Complex-C (VITAMIN B COMPLEX W/VITAMIN C) TABS tablet TAKE 1 TABLET BY MOUTH TWICE DAILY WITH FOLIC ACID 180 tablet 11     Calcium-Magnesium-Vitamin D (CALCIUM 1200+D3) 600- MG-MG-UNIT TB24 Take 1-2 tablets by mouth every morning Reported on 3/14/2017       calcium-vitamin D (CALTRATE) 600-400 MG-UNIT per tablet TAKE 1 TABLET BY MOUTH TWICE DAILY 180 tablet 3     fish oil-omega-3 fatty acids 1000 MG capsule TAKE 1 CAPSULE BY MOUTH DAILY 90 capsule 11     fluticasone-salmeterol (ADVAIR) 250-50 MCG/DOSE diskus inhaler Inhale 1 puff into the lungs 2 times daily 1  Inhaler 1     fluticasone-salmeterol (ADVAIR) 250-50 MCG/DOSE diskus inhaler Inhale 1 puff into the lungs 2 times daily 1 Inhaler 11     glucosamine-chondroitinoitin 750-600 MG TABS TAKE 2 TABLETS BY MOUTH TWICE DAILY 120 tablet 5     ipratropium - albuterol 0.5 mg/2.5 mg/3 mL (DUONEB) 0.5-2.5 (3) MG/3ML neb solution Take 1 vial (3 mLs) by nebulization every 6 hours as needed for shortness of breath / dyspnea or wheezing (Patient not taking: Reported on 7/11/2018) 30 vial 1     ipratropium - albuterol 0.5 mg/2.5 mg/3 mL (DUONEB) 0.5-2.5 (3) MG/3ML neb solution Take 1 vial (3 mLs) by nebulization every 6 hours as needed for shortness of breath / dyspnea or wheezing (Patient not taking: Reported on 7/11/2018) 30 vial 1     Ipratropium-Albuterol (COMBIVENT RESPIMAT)  MCG/ACT inhaler INHALE ONE PUFF BY MOUTH FOUR TIMES A DAY (NO TO EXCCED 6 DOSES PER DAY) 4 g 3     ketoconazole (NIZORAL) 2 % cream Apply topically twice a week 60 g 1     ketoconazole (NIZORAL) 2 % cream Apply topically 2 times daily To all affected areas with white spots 60 g 3     Lactobacillus-Inulin (CULTURELLE DIGESTIVE [facility]) CAPS Take 1 capsule by mouth 2 times daily Culturelle or something similiar 100 capsule 3     levofloxacin (QUIXIN) 0.5 % ophthalmic solution 1 drop in surgical eye as directed - 4x daily for 1 week, then stop 1 Bottle 1     nicotine (NICODERM CQ) 14 MG/24HR 24 hr patch Place 1 patch onto the skin every 24 hours 30 patch 1     nicotine polacrilex (COMMIT) 2 MG lozenge Place 1 lozenge (2 mg) inside cheek as needed for smoking cessation Place 1 lozenge inside cheek as needed for smoking cessation. 150 lozenge 3     order for DME Equipment being ordered: nebulizer machine 1 each 0     prednisoLONE acetate (PRED FORTE) 1 % ophthalmic susp 1 drop in surgical eye as directed, 4x daily after surgery for 1 week, 3x daily for 1 week, 2x daily for 1 week, daily for 1 week, then stop 1 Bottle 1     predniSONE (DELTASONE) 20 MG  tablet Take 3 tablets (60 mg) by mouth daily (Patient not taking: Reported on 7/11/2018) 18 tablet 0     predniSONE (DELTASONE) 20 MG tablet Take 1 tablet (20 mg) by mouth 2 times daily (Patient not taking: Reported on 7/11/2018) 10 tablet 0     Spacer/Aero-Holding Chambers (POCKET SPACER) MARGARITA Attach to albuterol inhaler, use 2 puffs every 4-6 hours as needed. 1 each 0     SPIRIVA RESPIMAT 2.5 MCG/ACT inhalation aerosol INHALE 2 PUFFS INTO THE LUNGS DAILY (Patient not taking: Reported on 7/11/2018) 4 g 11     vitamin A 38222 UNIT capsule TAKE 1 CAPSULE BY MOUTH DAILY 180 capsule 11     vitamin B complex with vitamin C (VITAMIN  B COMPLEX) TABS tablet Take 1 tablet by mouth 2 times daily With Folic acid 180 tablet 2     vitamin E ([facility]S VITAMIN E) 1000 UNIT capsule Take 1 capsule (1,000 Units) by mouth daily 90 capsule 2        No Known Allergies      Review of Systems:  -As per HPI  -Constitutional: The patient denies fatigue, fevers, chills, unintended weight loss, and night sweats.  -HEENT: Patient denies nonhealing oral sores.  -Skin: As above in HPI. No additional skin concerns.    Physical exam:  Vitals: There were no vitals taken for this visit.  GEN: This is a well developed, well-nourished male in no acute distress, in a pleasant mood.    SKIN: Sun-exposed skin, which includes the head/face, neck, both arms, both legs, digits, toes and/or nails was examined.   -hypopigmented macules and patches of the scalp which are entirely devoid of pigment by dermo scopy .  -similar hypopigmented macules and patches over the chest, back, arms, and legs   -coral patch by woods lamp exam between 4-5 digits of right foot and between 2-3 digits of left foot   -diffuse photodamage   -No other lesions of concern on areas examined.     Impression/Plan:  1. Hypo and depigmented patches on the scalp, chest, back, arms and legs. Differentials include vitiligo, post inflammatory hypopigmentation. Patient has used ketoconazole cream  for over a month without any improvement. We will try to treat with protopic 0.1% ointment twice daily and follow up in three months. He may be a candidate for excimer laser in the future. We may also be able to try topical corticosteroids of necessary in the future.     Photographs taken for future comparison     2. Erythrasma     Topical erythromycin 2% BID for two weeks    Follow up in three months    Photographs taken today for future comparison       CC Dr. Frausto on close of this encounter.  Follow-up in 3 months, earlier for new or changing lesions.       Dr. Gandhi staffed the patient.    Staff Involved:  Resident(Enedelia Calderon)/Staff(as above)      Patient was seen and examined with the dermatology resident. I agree with the history, review of systems, physical examination, assessments and plan.    Raquel Gandhi MD  Professor and  Chair  Department of Dermatology  [facility]

## 2018-07-18 ENCOUNTER — TELEPHONE (OUTPATIENT)
Dept: DERMATOLOGY | Facility: CLINIC | Age: 68
End: 2018-07-18

## 2018-07-18 NOTE — TELEPHONE ENCOUNTER
[facility] Call Center    Phone Message    May a detailed message be left on voicemail: yes    Reason for Call: Medication Question or concern regarding medication   Prescription Clarification  Name of Medication: Ginkgo Biloba   Prescribing Provider: Dr. Gandhi    Pharmacy: [id number] - [place], [place] - [address] AT [facility] [address]   What on the order needs clarification? Pt was wondering if he could be put on this medication for his vitiligo. Please give him a call back.     Action Taken: Message routed to:  [facility] ([facility]): Dermatology

## 2018-07-19 NOTE — PROGRESS NOTES
[facility] Care Coordination Contact   TC from client stating that he was prescribed a probiotic, but when he picked it up from [facility] he was told that [employer] didn't cover the med. Writer checked the OTC formulary and called [employer] and found that it wasn't a covered med. TC to client to inform him of this. He thanked writer for checking. He stated that he is doing well and exercising (Biking, swimming) a lot. No falls.  Leeanna Sigala, RN, BSN, PHN  [facility]   [phone]

## 2018-07-20 ENCOUNTER — OFFICE VISIT (OUTPATIENT)
Dept: URGENT CARE | Facility: URGENT CARE | Age: 68
End: 2018-07-20
Payer: COMMERCIAL

## 2018-07-20 VITALS
SYSTOLIC BLOOD PRESSURE: 120 MMHG | HEIGHT: 70 IN | OXYGEN SATURATION: 97 % | RESPIRATION RATE: 18 BRPM | WEIGHT: 151 LBS | DIASTOLIC BLOOD PRESSURE: 70 MMHG | BODY MASS INDEX: 21.62 KG/M2 | HEART RATE: 75 BPM | TEMPERATURE: 98.2 F

## 2018-07-20 DIAGNOSIS — L80 VITILIGO: Primary | ICD-10-CM

## 2018-07-20 PROCEDURE — 99213 OFFICE O/P EST LOW 20 MIN: CPT | Performed by: FAMILY MEDICINE

## 2018-07-20 RX ORDER — GARLIC 180 MG
60 TABLET, DELAYED RELEASE (ENTERIC COATED) ORAL DAILY
Qty: 90 CAPSULE | Refills: 3 | Status: SHIPPED | OUTPATIENT
Start: 2018-07-20 | End: 2019-05-14

## 2018-07-20 NOTE — MR AVS SNAPSHOT
After Visit Summary   7/20/2018    Robert B Behr    MRN: [id number]           Patient Information     Date Of Birth          1950        Visit Information        Provider Department      7/20/2018 5:00 PM Juan Blum MD [facility]        Today's Diagnoses     Vitiligo    -  1       Follow-ups after your visit        Your next 10 appointments already scheduled     Aug 09, 2018 10:00 AM CDT   (Arrive by 9:45 AM)   Return Visit with Enedelia Calderon MD   [facility] ([facility])    [address] [place] [id number] [phone]            Oct 16, 2018  3:50 PM CDT   (Arrive by 3:35 PM)   Return Visit with Raquel Gandhi MD   [facility] ([facility])    [address] [place] [id number] [phone]              Who to contact     If you have questions or need follow up information about today's clinic visit or your schedule please contact [facility] directly at [phone].  Normal or non-critical lab and imaging results will be communicated to you by MyChart, letter or phone within 4 business days after the clinic has received the results. If you do not hear from us within 7 days, please contact the clinic through [employer] or phone. If you have a critical or abnormal lab result, we will notify you by phone as soon as possible.  Submit refill requests through [employer] or call your pharmacy and they will forward the refill request to us. Please allow 3 business days for your refill to be completed.          Additional Information About Your Visit        MyChart Information     [employer] gives you secure access to your electronic health record. If you see a primary care provider, you can also send messages to your care team and make appointments. If you have questions, please call your primary care clinic.  If  "you do not have a primary care provider, please call [phone] and they will assist you.        Care EveryWhere ID     This is your Care EveryWhere ID. This could be used by other organizations to access your [facility] medical records  [id number]        Your Vitals Were     Pulse Temperature Respirations Height Pulse Oximetry BMI (Body Mass Index)    75 98.2  F (36.8  C) (Oral) 18 5' 10\" (1.778 m) 97% 21.67 kg/m2       Blood Pressure from Last 3 Encounters:   07/20/18 120/70   07/16/18 118/80   07/11/18 118/64    Weight from Last 3 Encounters:   07/20/18 151 lb (68.5 kg)   07/11/18 151 lb (68.5 kg)   05/21/18 155 lb (70.3 kg)              Today, you had the following     No orders found for display         Today's Medication Changes          These changes are accurate as of 7/20/18  5:49 PM.  If you have any questions, ask your nurse or doctor.               Start taking these medicines.        Dose/Directions    ginkgo biloba 60 MG Caps capsule   Used for:  Vitiligo   Started by:  Juan Blum MD        Dose:  60 mg   Take 1 capsule (60 mg) by mouth daily   Quantity:  90 capsule   Refills:  3            Where to get your medicines      These medications were sent to "[id number] - [place], [place] - [address] AT [facility] [address]  [address], [place] [address]     Phone:  [phone]     ginkgo biloba 60 MG Caps capsule                Primary Care Provider Office Phone # Fax #    Fina Frausto [phone] [address] [place] [address]        Equal Access to Services     MJ PAPPAS AH: Cony Hernandez, staci licea, bindu cheungalchrissy cano, kael guerrero. So [facility] [phone].    ATENCIÓN: Si habla español, tiene a rios disposición servicios gratuitos de asistencia lingüística. Llame al [phone].    We comply with applicable federal civil rights laws and [place] laws. We do not " discriminate on the basis of race, color, national origin, age, disability, sex, sexual orientation, or gender identity.            Thank you!     Thank you for choosing [facility]  for your care. Our goal is always to provide you with excellent care. Hearing back from our patients is one way we can continue to improve our services. Please take a few minutes to complete the written survey that you may receive in the mail after your visit with us. Thank you!             Your Updated Medication List - Protect others around you: Learn how to safely use, store and throw away your medicines at www.disposemymeds.org.          This list is accurate as of 7/20/18  5:49 PM.  Always use your most recent med list.                   Brand Name Dispense Instructions for use Diagnosis    ascorbic acid 1000 MG Tabs    vitamin C    90 tablet    TAKE 1 TABLET BY MOUTH DAILY    Encounter for herb and vitamin supplement management       CALCIUM 1200+D3 600- MG-MG-UNIT Tb24   Generic drug:  Calcium-Magnesium-Vitamin D      Take 1-2 tablets by mouth every morning Reported on 3/14/2017        calcium-vitamin D 600-400 MG-UNIT per tablet    CALTRATE    180 tablet    TAKE 1 TABLET BY MOUTH TWICE DAILY    Age-related osteoporosis without current pathological fracture       CULTURELLE DIGESTIVE HEALTH Caps     100 capsule    Take 1 capsule by mouth 2 times daily Culturelle or something similiar    Bloating       erythromycin with ethanol 2 % gel    EMGEL    60 g    Apply topically daily    Erythrasma       fish oil-omega-3 fatty acids 1000 MG capsule     90 capsule    TAKE 1 CAPSULE BY MOUTH DAILY    Encounter for herb and vitamin supplement management       * fluticasone-salmeterol 250-50 MCG/DOSE diskus inhaler    ADVAIR    1 Inhaler    Inhale 1 puff into the lungs 2 times daily    Chronic obstructive pulmonary disease, unspecified COPD type (H)       * fluticasone-salmeterol 250-50 MCG/DOSE diskus inhaler     ADVAIR    1 Inhaler    Inhale 1 puff into the lungs 2 times daily    COPD exacerbation (H)       ginkgo biloba 60 MG Caps capsule     90 capsule    Take 1 capsule (60 mg) by mouth daily    Vitiligo       glucosamine-chondroitinoitin 750-600 MG Tabs     120 tablet    TAKE 2 TABLETS BY MOUTH TWICE DAILY    Osteoarthritis of right hip, unspecified osteoarthritis type       * ipratropium - albuterol 0.5 mg/2.5 mg/3 mL 0.5-2.5 (3) MG/3ML neb solution    DUONEB    30 vial    Take 1 vial (3 mLs) by nebulization every 6 hours as needed for shortness of breath / dyspnea or wheezing    COPD exacerbation (H)       * ipratropium - albuterol 0.5 mg/2.5 mg/3 mL 0.5-2.5 (3) MG/3ML neb solution    DUONEB    30 vial    Take 1 vial (3 mLs) by nebulization every 6 hours as needed for shortness of breath / dyspnea or wheezing    COPD exacerbation (H)       * Ipratropium-Albuterol  MCG/ACT inhaler    COMBIVENT RESPIMAT    4 g    INHALE ONE PUFF BY MOUTH FOUR TIMES A DAY (NO TO EXCCED 6 DOSES PER DAY)    COPD exacerbation (H)       * ketoconazole 2 % cream    NIZORAL    60 g    Apply topically 2 times daily To all affected areas with white spots    Hypopigmented skin lesion       * ketoconazole 2 % cream    NIZORAL    60 g    Apply topically twice a week    PV (pityriasis versicolor)       levofloxacin 0.5 % ophthalmic solution    QUIXIN    1 Bottle    1 drop in surgical eye as directed - 4x daily for 1 week, then stop    Nuclear cataract of left eye       nicotine 14 MG/24HR 24 hr patch    NICODERM CQ    30 patch    Place 1 patch onto the skin every 24 hours    Tobacco use disorder       nicotine polacrilex 2 MG lozenge    COMMIT    150 lozenge    Place 1 lozenge (2 mg) inside cheek as needed for smoking cessation Place 1 lozenge inside cheek as needed for smoking cessation.    Chronic obstructive pulmonary disease, unspecified COPD type (H), Tobacco abuse       order for DME     1 each    Equipment being ordered: nebulizer  machine    COPD exacerbation (H)       pocket spacer Jeanine     1 each    Attach to albuterol inhaler, use 2 puffs every 4-6 hours as needed.    Chronic obstructive pulmonary disease, unspecified COPD type (H)       prednisoLONE acetate 1 % ophthalmic susp    PRED FORTE    1 Bottle    1 drop in surgical eye as directed, 4x daily after surgery for 1 week, 3x daily for 1 week, 2x daily for 1 week, daily for 1 week, then stop    Nuclear cataract of left eye       * predniSONE 20 MG tablet    DELTASONE    10 tablet    Take 1 tablet (20 mg) by mouth 2 times daily    COPD exacerbation (H)       * predniSONE 20 MG tablet    DELTASONE    18 tablet    Take 3 tablets (60 mg) by mouth daily    COPD exacerbation (H)       SPIRIVA RESPIMAT 2.5 MCG/ACT inhalation aerosol   Generic drug:  tiotropium     4 g    INHALE 2 PUFFS INTO THE LUNGS DAILY    Chronic obstructive pulmonary disease, unspecified COPD type (H)       tacrolimus 0.1 % ointment    PROTOPIC    60 g    Apply topically 2 times daily Apply twice daily to the affected areas of the skin, especially the scalp    Vitiligo, Post inflammatory hypopigmentation       vitamin A 87095 UNIT capsule     180 capsule    TAKE 1 CAPSULE BY MOUTH DAILY    Encounter for herb and vitamin supplement management       vitamin b complex w/vitamin C Tabs tablet     180 tablet    TAKE 1 TABLET BY MOUTH TWICE DAILY WITH FOLIC ACID    Takes dietary supplements       vitamin B complex with vitamin C Tabs tablet     180 tablet    Take 1 tablet by mouth 2 times daily With Folic acid    Encounter for herb and vitamin supplement management       vitamin E 1000 UNIT capsule    CVS vitamin E    90 capsule    Take 1 capsule (1,000 Units) by mouth daily    Encounter for herb and vitamin supplement management       * Notice:  This list has 9 medication(s) that are the same as other medications prescribed for you. Read the directions carefully, and ask your doctor or other care provider to review them with  you.

## 2018-07-20 NOTE — PROGRESS NOTES
Subjective: See recent notes about possible vitiligo according to dermatology.  He has been pursuing some options about the treatment and he thinks that the Protopic might already be helping but he read up about the use of ginkgo biloba for this and some success in a small number of patients in a small study and he is very interested in trying it.  The vitiligo-like symptoms are just on the top of his head.  Trial of ketoconazole did not seem to help at all.  I did caution him that some of these treatments may take a long time to see any effects.  In any case he would like ginkgo prescribed if possible.  He is hopeful that his insurance might cover it.    Objective: He certainly has loss of pigment on the top of his head consistent with vitiligo, see notes from dermatology    Assessment and plan: This certainly could be vitiligo and I think ginkgo biloba is a reasonable thing to try.  His mother had dementia and he wonders if it might help prevent dementia in him.  I told the jury is really out on whether that helps.  He can give it a try.

## 2018-07-23 DIAGNOSIS — J44.1 COPD EXACERBATION (H): ICD-10-CM

## 2018-07-23 NOTE — TELEPHONE ENCOUNTER
"Requested Prescriptions   Pending Prescriptions Disp Refills     fluticasone-salmeterol (ADVAIR) 250-50 MCG/DOSE diskus inhaler  Last Written Prescription Date:  5-21-18  Last Fill Quantity: 1 INH,  # refills: 1   Last office visit: 4/19/2018 with prescribing provider: Fina Frausto    Future Office Visit:    1 Inhaler 1     Sig: Inhale 1 puff into the lungs 2 times daily    Inhaled Steroids Protocol Passed    7/23/2018 10:04 AM       Passed - Patient is age 12 or older       Passed - Recent (12 mo) or future (30 days) visit within the authorizing provider's specialty    Patient had office visit in the last 12 months or has a visit in the next 30 days with authorizing provider or within the authorizing provider's specialty.  See \"Patient Info\" tab in inbasket, or \"Choose Columns\" in Meds & Orders section of the refill encounter.              "

## 2018-07-26 ENCOUNTER — PATIENT OUTREACH (OUTPATIENT)
Dept: GERIATRIC MEDICINE | Facility: CLINIC | Age: 68
End: 2018-07-26

## 2018-07-30 ENCOUNTER — OFFICE VISIT (OUTPATIENT)
Dept: URGENT CARE | Facility: URGENT CARE | Age: 68
End: 2018-07-30
Payer: COMMERCIAL

## 2018-07-30 VITALS
OXYGEN SATURATION: 96 % | BODY MASS INDEX: 21.67 KG/M2 | SYSTOLIC BLOOD PRESSURE: 138 MMHG | TEMPERATURE: 97.8 F | HEART RATE: 73 BPM | WEIGHT: 151 LBS | DIASTOLIC BLOOD PRESSURE: 77 MMHG

## 2018-07-30 DIAGNOSIS — R07.0 THROAT PAIN: Primary | ICD-10-CM

## 2018-07-30 LAB
DEPRECATED S PYO AG THROAT QL EIA: NORMAL
SPECIMEN SOURCE: NORMAL

## 2018-07-30 PROCEDURE — 87880 STREP A ASSAY W/OPTIC: CPT | Performed by: PHYSICIAN ASSISTANT

## 2018-07-30 PROCEDURE — 87081 CULTURE SCREEN ONLY: CPT | Performed by: PHYSICIAN ASSISTANT

## 2018-07-30 PROCEDURE — 99213 OFFICE O/P EST LOW 20 MIN: CPT | Performed by: PHYSICIAN ASSISTANT

## 2018-07-30 NOTE — PROGRESS NOTES
"SUBJECTIVE:   Robert B Behr is a 68 year old male presenting with a chief complaint of   Chief Complaint   Patient presents with     Urgent Care     Pt in clinic to have eval for sore throat, headache, and body aches possibly due to side affects of Tacdinmus Ointment.     Pharyngitis     Headache     Generalized Body Aches       Patient has vitiligo which affects his scalp. He saw dermatology at the [facility] and was prescribed Tacrolimus ointment. He also tried taking Gingko supplement. He has noted improvement in his vitiligo. He wonders now if the new symptoms he is having are due to the medication. He stopped the Tacrolimus due to concerns and has an appointment scheduled.     Onset of symptoms was 1 week(s) ago.  Course of illness is worsening.    Severity moderate  Current and Associated symptoms: sore throat, headache, body aches, dry cough, states \"trachea feels raw\"  Sores in his mouth on his gums where he just had teeth pulled last week and dentures put in  No nausea, vomiting. No fever. He is tolerating PO intake. No shortness of breath or wheezing.  Treatment measures tried include: antiseptic mouth wash  Predisposing factors include: no known ill contacts  Just had dental work done last week  ? Immunosuppression with topical Tacrolimus    ROS   See HPI    PMH:  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     Tobacco abuse        Current medications:  Current Outpatient Prescriptions   Medication Sig Dispense Refill     ascorbic acid (VITAMIN C) 1000 MG TABS TAKE 1 TABLET BY MOUTH DAILY 90 tablet 11     B Complex-C (VITAMIN B COMPLEX W/VITAMIN C) TABS tablet TAKE 1 TABLET BY MOUTH TWICE DAILY WITH FOLIC ACID 180 tablet 11     Calcium-Magnesium-Vitamin D (CALCIUM 1200+D3) 600- MG-MG-UNIT TB24 Take 1-2 tablets by mouth every " morning Reported on 3/14/2017       calcium-vitamin D (CALTRATE) 600-400 MG-UNIT per tablet TAKE 1 TABLET BY MOUTH TWICE DAILY 180 tablet 3     fish oil-omega-3 fatty acids 1000 MG capsule TAKE 1 CAPSULE BY MOUTH DAILY 90 capsule 11     fluticasone-salmeterol (ADVAIR) 250-50 MCG/DOSE diskus inhaler Inhale 1 puff into the lungs 2 times daily 1 Inhaler 1     fluticasone-salmeterol (ADVAIR) 250-50 MCG/DOSE diskus inhaler Inhale 1 puff into the lungs 2 times daily 1 Inhaler 11     ginkgo biloba 60 MG CAPS capsule Take 1 capsule (60 mg) by mouth daily 90 capsule 3     glucosamine-chondroitinoitin 750-600 MG TABS TAKE 2 TABLETS BY MOUTH TWICE DAILY 120 tablet 5     ipratropium - albuterol 0.5 mg/2.5 mg/3 mL (DUONEB) 0.5-2.5 (3) MG/3ML neb solution Take 1 vial (3 mLs) by nebulization every 6 hours as needed for shortness of breath / dyspnea or wheezing 30 vial 1     ipratropium - albuterol 0.5 mg/2.5 mg/3 mL (DUONEB) 0.5-2.5 (3) MG/3ML neb solution Take 1 vial (3 mLs) by nebulization every 6 hours as needed for shortness of breath / dyspnea or wheezing 30 vial 1     Ipratropium-Albuterol (COMBIVENT RESPIMAT)  MCG/ACT inhaler INHALE ONE PUFF BY MOUTH FOUR TIMES A DAY (NO TO EXCCED 6 DOSES PER DAY) 4 g 3     ketoconazole (NIZORAL) 2 % cream Apply topically twice a week 60 g 1     ketoconazole (NIZORAL) 2 % cream Apply topically 2 times daily To all affected areas with white spots 60 g 3     Lactobacillus-Inulin (CULTURELLE DIGESTIVE HEALTH) CAPS Take 1 capsule by mouth 2 times daily Culturelle or something similiar 100 capsule 3     nicotine (NICODERM CQ) 14 MG/24HR 24 hr patch Place 1 patch onto the skin every 24 hours 30 patch 1     nicotine polacrilex (COMMIT) 2 MG lozenge Place 1 lozenge (2 mg) inside cheek as needed for smoking cessation Place 1 lozenge inside cheek as needed for smoking cessation. 150 lozenge 3     order for DME Equipment being ordered: nebulizer machine 1 each 0     prednisoLONE acetate (PRED  FORTE) 1 % ophthalmic susp 1 drop in surgical eye as directed, 4x daily after surgery for 1 week, 3x daily for 1 week, 2x daily for 1 week, daily for 1 week, then stop 1 Bottle 1     predniSONE (DELTASONE) 20 MG tablet Take 3 tablets (60 mg) by mouth daily 18 tablet 0     predniSONE (DELTASONE) 20 MG tablet Take 1 tablet (20 mg) by mouth 2 times daily 10 tablet 0     Spacer/Aero-Holding Chambers (POCKET SPACER) MARGARITA Attach to albuterol inhaler, use 2 puffs every 4-6 hours as needed. 1 each 0     SPIRIVA RESPIMAT 2.5 MCG/ACT inhalation aerosol INHALE 2 PUFFS INTO THE LUNGS DAILY 4 g 11     tacrolimus (PROTOPIC) 0.1 % ointment Apply topically 2 times daily Apply twice daily to the affected areas of the skin, especially the scalp 60 g 0     vitamin A 76226 UNIT capsule TAKE 1 CAPSULE BY MOUTH DAILY 180 capsule 11     vitamin B complex with vitamin C (VITAMIN  B COMPLEX) TABS tablet Take 1 tablet by mouth 2 times daily With Folic acid 180 tablet 2     vitamin E (CVS VITAMIN E) 1000 UNIT capsule Take 1 capsule (1,000 Units) by mouth daily 90 capsule 2     erythromycin with ethanol (EMGEL) 2 % gel Apply topically daily (Patient not taking: Reported on 7/30/2018) 60 g 0     levofloxacin (QUIXIN) 0.5 % ophthalmic solution 1 drop in surgical eye as directed - 4x daily for 1 week, then stop (Patient not taking: Reported on 7/30/2018) 1 Bottle 1       Surgical History:  Past Surgical History:   Procedure Laterality Date     CATARACT IOL, RT/LT Left 09/15/2017    s/p CE/IOL left eye     CATARACT IOL, RT/LT Right      PHACOEMULSIFICATION CLEAR CORNEA WITH STANDARD INTRAOCULAR LENS IMPLANT Right 9/30/2016    Procedure: PHACOEMULSIFICATION CLEAR CORNEA WITH STANDARD INTRAOCULAR LENS IMPLANT;  Surgeon: Elly Valencia MD;  Location: [facility]     PHACOEMULSIFICATION WITH STANDARD INTRAOCULAR LENS IMPLANT Left 9/15/2017    Procedure: PHACOEMULSIFICATION WITH STANDARD INTRAOCULAR LENS IMPLANT;  Left Eye Phacoemulsification with  Standard Lens;  Surgeon: Elly Valencia MD;  Location: [facility] OR     WRIST SURGERY         Family history:  Family History   Problem Relation Age of Onset     Diabetes Brother      living     Cancer Brother      throat     Macular Degeneration Mother      Colon Cancer No family hx of      Glaucoma No family hx of      Retinal detachment No family hx of      Amblyopia No family hx of      Skin Cancer No family hx of      Melanoma No family hx of          Social History:  Social History   Substance Use Topics     Smoking status: Current Every Day Smoker     Packs/day: 0.10     Years: 45.00     Types: Cigarettes     Smokeless tobacco: Former User      Comment: 3-7 cigarettes/day (8/29/17). 1.5 packs for 45 years smoker     Alcohol use 0.0 oz/week     0 Standard drinks or equivalent per week      Comment: occ beer          OBJECTIVE  /77  Pulse 73  Temp 97.8  F (36.6  C) (Tympanic)  Wt 151 lb (68.5 kg)  SpO2 96%  BMI 21.67 kg/m2    General: alert, appears generally well, NAD. Afebrile. Talkative and pleasant.  Skin: no suspicious lesions or rashes.  HEENT: Normocephalic.   Eyes: conjunctiva clear.   Ears: TMs pearly, translucent bilaterally.   Nose: no nasal polyps. No edema of nasal mucosa. No rhinorrhea.  Oropharynx: MMM. Dentures present. Mild posterior pharyngeal erythema. No petechiae or exudate. No tonsillar hypertrophy. Uvula midline.    Neck: supple, no lymphadenopathy.  Respiratory: No distress. Equal inspiration to bilateral bases. No crackles wheeze, rhonchi, rales.   Cardiovascular: RRR. No murmurs, clicks, gallups, or rub.    Psychiatric: mentation appears normal and affect normal/bright           Labs:  No results found for this or any previous visit (from the past 24 hour(s)).  Rapid Strep test- negative      ASSESSMENT/PLAN:    ICD-10-CM    1. Throat pain R07.0 Strep, Rapid Screen     Beta strep group A culture           Medical Decision Making:    Differential Diagnosis:  -viral  pharyngitis  -Strep pharyngitis    Serious Comorbid Conditions: possible immunosuppression on topical Tacrolimus- if so, mild, and he is afebrile, nontoxic appearing here today. Not concerned for systemic infection.      RST negative. No stigmata of herpetic infection on oropharyngeal exam. Suspect other viral pharyngitis.  Patient appears well and is tolerating oral intake. No signs of peritonsillar or retropharyngeal abscess on exam. Clear lungs. This is likely a viral infection. Discussed likely viral etiology with patient and recommended supportive cares. We will follow up on overnight Strep result tomorrow.      At the end of the encounter, I discussed all available results, as well as the diagnosis and any associated medications. I discussed red flags for immediate return to clinic/ER, as well as indications for follow up. Refer to patient instructions below, which were all addressed with patient. Patient understood and agreed to plan. Patient was appropriate for discharge.      Patient Instructions   Rapid Strep test was negative.   We will run the Strep culture and if this returns positive in 24-48 hours, we will notify you and call in antibiotic prescription.      Symptoms are likely due to viral infection that will resolve on its own in 3-7 days.    May continue with symptomatic treatments including:  -salt water gargles  -warm beverages such as tea  -throat drops  -ibuprofen or Tylenol for pain or fever    If fevers not coming down with Tylenol or ibuprofen, shortness of breath, not tolerating oral liquid intake, drooling, or stiff neck, return for evaluation immediately. Otherwise, if no improvement in the next week, follow up with primary care provider.              Claudia Clemens PA-C

## 2018-07-30 NOTE — MR AVS SNAPSHOT
After Visit Summary   7/30/2018    Robert B Behr    MRN: [id number]           Patient Information     Date Of Birth          1950        Visit Information        Provider Department      7/30/2018 5:00 PM Claudia Clemens PA-C [facility]        Today's Diagnoses     Throat pain    -  1      Care Instructions    Rapid Strep test was negative.   We will run the Strep culture and if this returns positive in 24-48 hours, we will notify you and call in antibiotic prescription.      Symptoms are likely due to viral infection that will resolve on its own in 3-7 days.    May continue with symptomatic treatments including:  -salt water gargles  -warm beverages such as tea  -throat drops  -ibuprofen or Tylenol for pain or fever    If fevers not coming down with Tylenol or ibuprofen, shortness of breath, not tolerating oral liquid intake, drooling, or stiff neck, return for evaluation immediately. Otherwise, if no improvement in the next week, follow up with primary care provider.            Follow-ups after your visit        Follow-up notes from your care team     Return if symptoms worsen or fail to improve.      Your next 10 appointments already scheduled     Aug 09, 2018 10:00 AM CDT   (Arrive by 9:45 AM)   Return Visit with Enedelia Calderon MD   [facility] ([facility])    [address] [place] [id number] [phone]            Oct 16, 2018  3:50 PM CDT   (Arrive by 3:35 PM)   Return Visit with Raquel Gandhi MD   [facility] ([facility])    [address] [place] [id number] [phone]              Who to contact     If you have questions or need follow up information about today's clinic visit or your schedule please contact [facility] directly at [phone].  Normal or non-critical lab and imaging  results will be communicated to you by MyChart, letter or phone within 4 business days after the clinic has received the results. If you do not hear from us within 7 days, please contact the clinic through [facility] or phone. If you have a critical or abnormal lab result, we will notify you by phone as soon as possible.  Submit refill requests through [facility] or call your pharmacy and they will forward the refill request to us. Please allow 3 business days for your refill to be completed.          Additional Information About Your Visit        [facility] Information     [facility] gives you secure access to your electronic health record. If you see a primary care provider, you can also send messages to your care team and make appointments. If you have questions, please call your primary care clinic.  If you do not have a primary care provider, please call [phone] and they will assist you.        Care EveryWhere ID     This is your Care EveryWhere ID. This could be used by other organizations to access your [place] medical records  [id number]        Your Vitals Were     Pulse Temperature Pulse Oximetry BMI (Body Mass Index)          73 97.8  F (36.6  C) (Tympanic) 96% 21.67 kg/m2         Blood Pressure from Last 3 Encounters:   07/30/18 138/77   07/20/18 120/70   07/16/18 118/80    Weight from Last 3 Encounters:   07/30/18 151 lb (68.5 kg)   07/20/18 151 lb (68.5 kg)   07/11/18 151 lb (68.5 kg)              We Performed the Following     Beta strep group A culture     Strep, Rapid Screen        Primary Care Provider Office Phone # Fax #    Fina Frausto [phone] [address] [place] [address]        Equal Access to Services     [facility] : Hadii marylou Hernandez, waaxda luqadaha, qaybta kaalmada rachael, kael guerrero. So [facility] [phone].    ATENCIÓN: Si habla español, tiene a rios disposición servicios gratuitos de asistencia  lingüísticaMarietta Pierson al [phone].    We comply with applicable federal civil rights laws and [place] laws. We do not discriminate on the basis of race, color, national origin, age, disability, sex, sexual orientation, or gender identity.            Thank you!     Thank you for choosing [facility]  for your care. Our goal is always to provide you with excellent care. Hearing back from our patients is one way we can continue to improve our services. Please take a few minutes to complete the written survey that you may receive in the mail after your visit with us. Thank you!             Your Updated Medication List - Protect others around you: Learn how to safely use, store and throw away your medicines at www.disposemymeds.org.          This list is accurate as of 7/30/18  6:00 PM.  Always use your most recent med list.                   Brand Name Dispense Instructions for use Diagnosis    ascorbic acid 1000 MG Tabs    vitamin C    90 tablet    TAKE 1 TABLET BY MOUTH DAILY    Encounter for herb and vitamin supplement management       CALCIUM 1200+D3 600- MG-MG-UNIT Tb24   Generic drug:  Calcium-Magnesium-Vitamin D      Take 1-2 tablets by mouth every morning Reported on 3/14/2017        calcium-vitamin D 600-400 MG-UNIT per tablet    CALTRATE    180 tablet    TAKE 1 TABLET BY MOUTH TWICE DAILY    Age-related osteoporosis without current pathological fracture       CULTURELLE DIGESTIVE HEALTH Caps     100 capsule    Take 1 capsule by mouth 2 times daily Culturelle or something similiar    Bloating       erythromycin with ethanol 2 % gel    EMGEL    60 g    Apply topically daily    Erythrasma       fish oil-omega-3 fatty acids 1000 MG capsule     90 capsule    TAKE 1 CAPSULE BY MOUTH DAILY    Encounter for herb and vitamin supplement management       * fluticasone-salmeterol 250-50 MCG/DOSE diskus inhaler    ADVAIR    1 Inhaler    Inhale 1 puff into the lungs 2 times daily    Chronic  obstructive pulmonary disease, unspecified COPD type (H)       * fluticasone-salmeterol 250-50 MCG/DOSE diskus inhaler    ADVAIR    1 Inhaler    Inhale 1 puff into the lungs 2 times daily    COPD exacerbation (H)       ginkgo biloba 60 MG Caps capsule     90 capsule    Take 1 capsule (60 mg) by mouth daily    Vitiligo       glucosamine-chondroitinoitin 750-600 MG Tabs     120 tablet    TAKE 2 TABLETS BY MOUTH TWICE DAILY    Osteoarthritis of right hip, unspecified osteoarthritis type       * ipratropium - albuterol 0.5 mg/2.5 mg/3 mL 0.5-2.5 (3) MG/3ML neb solution    DUONEB    30 vial    Take 1 vial (3 mLs) by nebulization every 6 hours as needed for shortness of breath / dyspnea or wheezing    COPD exacerbation (H)       * ipratropium - albuterol 0.5 mg/2.5 mg/3 mL 0.5-2.5 (3) MG/3ML neb solution    DUONEB    30 vial    Take 1 vial (3 mLs) by nebulization every 6 hours as needed for shortness of breath / dyspnea or wheezing    COPD exacerbation (H)       * Ipratropium-Albuterol  MCG/ACT inhaler    COMBIVENT RESPIMAT    4 g    INHALE ONE PUFF BY MOUTH FOUR TIMES A DAY (NO TO EXCCED 6 DOSES PER DAY)    COPD exacerbation (H)       * ketoconazole 2 % cream    NIZORAL    60 g    Apply topically 2 times daily To all affected areas with white spots    Hypopigmented skin lesion       * ketoconazole 2 % cream    NIZORAL    60 g    Apply topically twice a week    PV (pityriasis versicolor)       levofloxacin 0.5 % ophthalmic solution    QUIXIN    1 Bottle    1 drop in surgical eye as directed - 4x daily for 1 week, then stop    Nuclear cataract of left eye       nicotine 14 MG/24HR 24 hr patch    NICODERM CQ    30 patch    Place 1 patch onto the skin every 24 hours    Tobacco use disorder       nicotine polacrilex 2 MG lozenge    COMMIT    150 lozenge    Place 1 lozenge (2 mg) inside cheek as needed for smoking cessation Place 1 lozenge inside cheek as needed for smoking cessation.    Chronic obstructive pulmonary  disease, unspecified COPD type (H), Tobacco abuse       order for DME     1 each    Equipment being ordered: nebulizer machine    COPD exacerbation (H)       pocket spacer Jeanine     1 each    Attach to albuterol inhaler, use 2 puffs every 4-6 hours as needed.    Chronic obstructive pulmonary disease, unspecified COPD type (H)       prednisoLONE acetate 1 % ophthalmic susp    PRED FORTE    1 Bottle    1 drop in surgical eye as directed, 4x daily after surgery for 1 week, 3x daily for 1 week, 2x daily for 1 week, daily for 1 week, then stop    Nuclear cataract of left eye       * predniSONE 20 MG tablet    DELTASONE    10 tablet    Take 1 tablet (20 mg) by mouth 2 times daily    COPD exacerbation (H)       * predniSONE 20 MG tablet    DELTASONE    18 tablet    Take 3 tablets (60 mg) by mouth daily    COPD exacerbation (H)       SPIRIVA RESPIMAT 2.5 MCG/ACT inhalation aerosol   Generic drug:  tiotropium     4 g    INHALE 2 PUFFS INTO THE LUNGS DAILY    Chronic obstructive pulmonary disease, unspecified COPD type (H)       tacrolimus 0.1 % ointment    PROTOPIC    60 g    Apply topically 2 times daily Apply twice daily to the affected areas of the skin, especially the scalp    Vitiligo, Post inflammatory hypopigmentation       vitamin A 79105 UNIT capsule     180 capsule    TAKE 1 CAPSULE BY MOUTH DAILY    Encounter for herb and vitamin supplement management       vitamin b complex w/vitamin C Tabs tablet     180 tablet    TAKE 1 TABLET BY MOUTH TWICE DAILY WITH FOLIC ACID    Takes dietary supplements       vitamin B complex with vitamin C Tabs tablet     180 tablet    Take 1 tablet by mouth 2 times daily With Folic acid    Encounter for herb and vitamin supplement management       vitamin E 1000 UNIT capsule    CVS vitamin E    90 capsule    Take 1 capsule (1,000 Units) by mouth daily    Encounter for herb and vitamin supplement management       * Notice:  This list has 9 medication(s) that are the same as other  medications prescribed for you. Read the directions carefully, and ask your doctor or other care provider to review them with you.

## 2018-07-31 LAB
BACTERIA SPEC CULT: NORMAL
SPECIMEN SOURCE: NORMAL

## 2018-07-31 NOTE — PROGRESS NOTES
[facility] Care Coordination Contact   Client spoke with CMS about getting Ginko Biloba covered. Writer looked in up on [facility]'s formulary and it wasn't covered. TC to [facility] who confirmed that is wasn't a covered med. Client's doctor can try to do a prior auth for it through [employer] ([phone]). TC to client to discuss this. Client thanked writer for checking on it. He will not call the doctor about this and will just pay out of pocket.  Leeanna Sigala, RN, BSN, PHN  [facility] Coordinator  [phone]

## 2018-08-09 ENCOUNTER — OFFICE VISIT (OUTPATIENT)
Dept: DERMATOLOGY | Facility: CLINIC | Age: 68
End: 2018-08-09
Payer: COMMERCIAL

## 2018-08-09 DIAGNOSIS — L08.1 ERYTHRASMA: ICD-10-CM

## 2018-08-09 DIAGNOSIS — L80 VITILIGO: Primary | ICD-10-CM

## 2018-08-09 ASSESSMENT — PAIN SCALES - GENERAL: PAINLEVEL: NO PAIN (0)

## 2018-08-09 NOTE — PROGRESS NOTES
[facility] Dermatology Note      Dermatology Problem List:  1. Hypo/depigmented macules and patches on scalp as well as depigmented macules on low back, chest, and shins  - Differential diagnosis includes vitiligo, tinea versicolor, actinic poikiloderma/idiopathic guttate hypomelanosis, progressive macular hypomelanosis, postinflammatory hypopigmentation; possible overlap of etiologies  - 5/14/18: TSH/free T4 WNL  -Trial of ketoconazole 2% cream BID to all affected areas for 2 weeks without efficacy  -Protopic 0.1% ointment BID, follow up in three months   2. Erythrasma   -topical erythromycin 2% daily for two weeks      Encounter Date: Aug 9, 2018    CC:   Chief Complaint   Patient presents with     Derm Problem     Ravin is here today for Vitiligo on his scalp.          History of Present Illness:  Mr. Robert B Behr is a 68 year old male who presents as a follow-up for scalp hypo/de-pigmentation. The patient was last seen 7/30/18 when he was prescribed protopic 0.1% ointment BID and was also given topical erythromycin for erythrasma. Since we saw him last he states he feels like his patches of whiteness over his scalp have improved and slowed fading to resemble more of his surrounding skin. He was seen by his primary and also another PA in the interim due to fatigue, aches, and generalized malaise which he was told by his PA (and also he read this on the Internet) may have been caused by immuno suppression of the protopic. He did telephone the dermatology department and was also seen by his primary to discuss whether or not he could try gingko biloba as he read this increased blood flow to the scalp and resulted in a cure for 10/10 patients in a study he read online. He was told by his primary that he could take gingko biloba but was clear that he didn't know whether it would give him much benefit. Today, he has no burning, tingling, or painful sensations on the scalp. He has no other  concerns. He states he liked the topical erythromycin and this has helped the redness and scaling between his toes.     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     Tobacco abuse      Past Surgical History:   Procedure Laterality Date     CATARACT IOL, RT/LT Left 09/15/2017    s/p CE/IOL left eye     CATARACT IOL, RT/LT Right      PHACOEMULSIFICATION CLEAR CORNEA WITH STANDARD INTRAOCULAR LENS IMPLANT Right 9/30/2016    Procedure: PHACOEMULSIFICATION CLEAR CORNEA WITH STANDARD INTRAOCULAR LENS IMPLANT;  Surgeon: Elly Valencia MD;  Location:  [facility]     PHACOEMULSIFICATION WITH STANDARD INTRAOCULAR LENS IMPLANT Left 9/15/2017    Procedure: PHACOEMULSIFICATION WITH STANDARD INTRAOCULAR LENS IMPLANT;  Left Eye Phacoemulsification with Standard Lens;  Surgeon: Elly Valencia MD;  Location: [facility] OR     WRIST SURGERY         Social History:  Social History     Social History     Marital status: Single     Spouse name: N/A     Number of children: N/A     Years of education: N/A     Social History Main Topics     Smoking status: Current Every Day Smoker     Packs/day: 0.10     Years: 45.00     Types: Cigarettes     Smokeless tobacco: Former User      Comment: 3-7 cigarettes/day (8/29/17). 1.5 packs for 45 years smoker     Alcohol use 0.0 oz/week     0 Standard drinks or equivalent per week      Comment: occ beer     Drug use: No     Sexual activity: No     Other Topics Concern     Parent/Sibling W/ Cabg, Mi Or Angioplasty Before 65f 55m? No     Social History Narrative     Single.  Retired.     No children    5 siblings:      No family history of bleeding, clotting disorders or complications with anesthesia.           Family History:  Family History   Problem Relation Age of Onset     Diabetes Brother      living     Cancer Brother      throat     Macular Degeneration Mother      Colon Cancer No family hx of      Glaucoma No family hx of      Retinal detachment No family hx of      Amblyopia No family hx of      Skin Cancer No family hx of      Melanoma No family hx of        Medications:  Current Outpatient Prescriptions   Medication Sig Dispense Refill     ascorbic acid (VITAMIN C) 1000 MG TABS TAKE 1 TABLET BY MOUTH DAILY 90 tablet 11     B Complex-C (VITAMIN B COMPLEX W/VITAMIN C) TABS tablet TAKE 1 TABLET BY MOUTH TWICE DAILY WITH FOLIC ACID 180 tablet 11     Calcium-Magnesium-Vitamin D (CALCIUM 1200+D3) 600- MG-MG-UNIT TB24 Take 1-2 tablets by mouth every morning Reported on 3/14/2017       calcium-vitamin D (CALTRATE) 600-400 MG-UNIT per tablet TAKE 1 TABLET BY MOUTH TWICE DAILY 180 tablet 3     erythromycin with ethanol (EMGEL) 2 % gel Apply topically daily 60 g 0     fish oil-omega-3 fatty acids 1000 MG capsule TAKE 1 CAPSULE BY MOUTH DAILY 90 capsule 11     fluticasone-salmeterol (ADVAIR) 250-50 MCG/DOSE diskus inhaler Inhale 1 puff into the lungs 2 times daily 1 Inhaler 1     fluticasone-salmeterol (ADVAIR) 250-50 MCG/DOSE diskus inhaler Inhale 1 puff into the lungs 2 times daily 1 Inhaler 11     ginkgo biloba 60 MG CAPS capsule Take 1 capsule (60 mg) by mouth daily 90 capsule 3     glucosamine-chondroitinoitin 750-600 MG TABS TAKE 2 TABLETS BY MOUTH TWICE DAILY 120 tablet 5     ipratropium - albuterol 0.5 mg/2.5 mg/3 mL (DUONEB) 0.5-2.5 (3) MG/3ML neb solution Take 1 vial (3 mLs) by nebulization every 6 hours as needed for shortness of breath / dyspnea or wheezing 30 vial 1     ipratropium - albuterol 0.5 mg/2.5 mg/3 mL (DUONEB) 0.5-2.5 (3) MG/3ML neb solution Take 1  vial (3 mLs) by nebulization every 6 hours as needed for shortness of breath / dyspnea or wheezing 30 vial 1     Ipratropium-Albuterol (COMBIVENT RESPIMAT)  MCG/ACT inhaler INHALE ONE PUFF BY MOUTH FOUR TIMES A DAY (NO TO EXCCED 6 DOSES PER DAY) 4 g 3     ketoconazole (NIZORAL) 2 % cream Apply topically twice a week 60 g 1     ketoconazole (NIZORAL) 2 % cream Apply topically 2 times daily To all affected areas with white spots 60 g 3     Lactobacillus-Inulin (CULTURELLE DIGESTIVE HEALTH) CAPS Take 1 capsule by mouth 2 times daily Culturelle or something similiar 100 capsule 3     levofloxacin (QUIXIN) 0.5 % ophthalmic solution 1 drop in surgical eye as directed - 4x daily for 1 week, then stop 1 Bottle 1     nicotine (NICODERM CQ) 14 MG/24HR 24 hr patch Place 1 patch onto the skin every 24 hours 30 patch 1     nicotine polacrilex (COMMIT) 2 MG lozenge Place 1 lozenge (2 mg) inside cheek as needed for smoking cessation Place 1 lozenge inside cheek as needed for smoking cessation. 150 lozenge 3     order for DME Equipment being ordered: nebulizer machine 1 each 0     prednisoLONE acetate (PRED FORTE) 1 % ophthalmic susp 1 drop in surgical eye as directed, 4x daily after surgery for 1 week, 3x daily for 1 week, 2x daily for 1 week, daily for 1 week, then stop 1 Bottle 1     predniSONE (DELTASONE) 20 MG tablet Take 3 tablets (60 mg) by mouth daily 18 tablet 0     predniSONE (DELTASONE) 20 MG tablet Take 1 tablet (20 mg) by mouth 2 times daily 10 tablet 0     Spacer/Aero-Holding Chambers (POCKET SPACER) MARGARITA Attach to albuterol inhaler, use 2 puffs every 4-6 hours as needed. 1 each 0     SPIRIVA RESPIMAT 2.5 MCG/ACT inhalation aerosol INHALE 2 PUFFS INTO THE LUNGS DAILY 4 g 11     tacrolimus (PROTOPIC) 0.1 % ointment Apply topically 2 times daily Apply twice daily to the affected areas of the skin, especially the scalp 60 g 0     vitamin A 29048 UNIT capsule TAKE 1 CAPSULE BY MOUTH DAILY 180 capsule 11     vitamin  B complex with vitamin C (VITAMIN  B COMPLEX) TABS tablet Take 1 tablet by mouth 2 times daily With Folic acid 180 tablet 2     vitamin E (CVS VITAMIN E) 1000 UNIT capsule Take 1 capsule (1,000 Units) by mouth daily 90 capsule 2        No Known Allergies      Review of Systems:  -As per HPI  -Constitutional: The patient is feeling well today.   -Skin: As above in HPI. No additional skin concerns.    Physical exam:  Vitals: There were no vitals taken for this visit.  GEN: This is a well developed, well-nourished male in no acute distress.   SKIN: Sun-exposed skin, which includes the head/face, neck, both arms, digits, and/or nails was examined.   -multiple hypo-depigmented macules coalescing to patches on the scalp (primiarly vertex, frontal, and parietal scalp) in area of former sunburn (photograph obtained).  - improved pigmentation since last visit based on review of photographs  -No other lesions of concern on areas examined.     Impression/Plan:  1. Hypo and depigmented patches on the scalp, chest, back, arms and legs. Differentials include vitiligo, post inflammatory hypopigmentation.    Photograph(s) taken for future comparison and/or reference     The patient is clinically improved today with the appearance of a more blended scalp as far as overall pigmentation of the scalp. It is possible that this represents improving post inflammatory hypopigmentation following recent sunburn or it could be vitiligo which was triggered by the sunburn. He discontinued the tacrolimus on his own, although he felt he was getting some benefit from it.     We discussed the literature supporting the use of topical corticosteroids and TCIs for vitiligo. We did also discuss that there is some literature to suggest that gingko biloba may be helpful. We did discuss that if he has discussed this with his primary and his primary is ok with the gingko biloba, we do not feel it will necessarily be harmful. We did discuss that it may  increase risk of bleeding and recommended he inform his doctors about use and avoid taking NSAID or blood thinning medications.    We will plan to have him continue the protopic as tolerated (he suggested he might try to use this a couple of times per week) and will use sunscreen vigilantly along with a hat.     Discussed that light therapy or excimer laser can be helpful if needed in the future.     1. Erythrasma     We did not perform a focused examination of the feet/toes today as the patient was very adamant that we should discuss his possible diagnosis of vitiligo and treatment options at today's visit. He felt the erythromycin was helpful. We will plan to examine this area at his next visit.     CC Dr. Frausto on close of this encounter.  Follow-up in 6 weeks, earlier for new or changing lesions.       Dr. Gandhi staffed the patient.    Staff Involved:  Resident(Enedelia Calderon)/Staff(as above)      Patient was seen and examined with the dermatology resident. I agree with the history, review of systems, physical examination, assessments and plan.    Raquel Gandhi MD  Professor and  Chair  Department of Dermatology  [facility]

## 2018-08-09 NOTE — MR AVS SNAPSHOT
After Visit Summary   8/9/2018    Robert B Behr    MRN: [id number]           Patient Information     Date Of Birth          1950        Visit Information        Provider Department      8/9/2018 10:00 AM Enedelia Calderon MD [facility] Dermatology        Today's Diagnoses     Vitiligo    -  1      Care Instructions    Your scalp looks great today.     We will have you use the protopic 1-2 weekly as discussed together. You may use gingko biloba if your primary doctor feels this is ok and your risk of bleeding is low however, if you plan for any surgical procedures or are planning on starting an aspirin or other blood thinning agent we do not recommend continuation of this.     Do not take ibuprofen, aspirin, motrin, naproxen or other NSAID with gingko biloba unless OK with primary.     We will see you back in 6 weeks. If not better by then, we can discuss lights.           Follow-ups after your visit        Follow-up notes from your care team     Return in about 6 weeks (around 9/20/2018).      Your next 10 appointments already scheduled     Oct 16, 2018  3:50 PM CDT   (Arrive by 3:35 PM)   Return Visit with Raquel Gandhi MD   [facility] Dermatology ([facility])    [address] [place] [id number] [phone]              Who to contact     Please call your clinic at [phone] to:    Ask questions about your health    Make or cancel appointments    Discuss your medicines    Learn about your test results    Speak to your doctor            Additional Information About Your Visit        MyChart Information     [employer] gives you secure access to your electronic health record. If you see a primary care provider, you can also send messages to your care team and make appointments. If you have questions, please call your primary care clinic.  If you do not have a primary care provider, please call [phone] and they will assist  you.      [email or web address] is an electronic gateway that provides easy, online access to your medical records. With [email or web address], you can request a clinic appointment, read your test results, renew a prescription or communicate with your care team.     To access your existing account, please contact your [facility] or call [phone] for assistance.        Care EveryWhere ID     This is your Care EveryWhere ID. This could be used by other organizations to access your [place] medical records  [id number]         Blood Pressure from Last 3 Encounters:   No data found for BP    Weight from Last 3 Encounters:   No data found for Wt              Today, you had the following     No orders found for display       Primary Care Provider Office Phone # Fax #    Fina Fruasto [phone] [address] [place] [address]        Equal Access to Services     TIARRA PAPPAS : Hadii marylou chaney hadasho Soomaali, waaxda luqadaha, qaybta kaalmada adeegyada, kael rangel . So [facility] [phone].    ATENCIÓN: Si habla español, tiene a rios disposición servicios gratuitos de asistencia lingüística. Kenna al [phone].    We comply with applicable federal civil rights laws and [place] laws. We do not discriminate on the basis of race, color, national origin, age, disability, sex, sexual orientation, or gender identity.            Thank you!     Thank you for choosing [facility] DERMATOLOGY  for your care. Our goal is always to provide you with excellent care. Hearing back from our patients is one way we can continue to improve our services. Please take a few minutes to complete the written survey that you may receive in the mail after your visit with us. Thank you!             Your Updated Medication List - Protect others around you: Learn how to safely use, store and throw away your medicines at www.disposemymeds.org.          This list is accurate as of  8/9/18 11:59 PM.  Always use your most recent med list.                   Brand Name Dispense Instructions for use Diagnosis    ascorbic acid 1000 MG Tabs    vitamin C    90 tablet    TAKE 1 TABLET BY MOUTH DAILY    Encounter for herb and vitamin supplement management       CALCIUM 1200+D3 600- MG-MG-UNIT Tb24   Generic drug:  Calcium-Magnesium-Vitamin D      Take 1-2 tablets by mouth every morning Reported on 3/14/2017        calcium-vitamin D 600-400 MG-UNIT per tablet    CALTRATE    180 tablet    TAKE 1 TABLET BY MOUTH TWICE DAILY    Age-related osteoporosis without current pathological fracture       CULTURELLE DIGESTIVE HEALTH Caps     100 capsule    Take 1 capsule by mouth 2 times daily Culturelle or something similiar    Bloating       erythromycin with ethanol 2 % gel    EMGEL    60 g    Apply topically daily    Erythrasma       fish oil-omega-3 fatty acids 1000 MG capsule     90 capsule    TAKE 1 CAPSULE BY MOUTH DAILY    Encounter for herb and vitamin supplement management       * fluticasone-salmeterol 250-50 MCG/DOSE diskus inhaler    ADVAIR    1 Inhaler    Inhale 1 puff into the lungs 2 times daily    Chronic obstructive pulmonary disease, unspecified COPD type (H)       * fluticasone-salmeterol 250-50 MCG/DOSE diskus inhaler    ADVAIR    1 Inhaler    Inhale 1 puff into the lungs 2 times daily    COPD exacerbation (H)       ginkgo biloba 60 MG Caps capsule     90 capsule    Take 1 capsule (60 mg) by mouth daily    Vitiligo       glucosamine-chondroitinoitin 750-600 MG Tabs     120 tablet    TAKE 2 TABLETS BY MOUTH TWICE DAILY    Osteoarthritis of right hip, unspecified osteoarthritis type       * ipratropium - albuterol 0.5 mg/2.5 mg/3 mL 0.5-2.5 (3) MG/3ML neb solution    DUONEB    30 vial    Take 1 vial (3 mLs) by nebulization every 6 hours as needed for shortness of breath / dyspnea or wheezing    COPD exacerbation (H)       * ipratropium - albuterol 0.5 mg/2.5 mg/3 mL 0.5-2.5 (3) MG/3ML neb  solution    DUONEB    30 vial    Take 1 vial (3 mLs) by nebulization every 6 hours as needed for shortness of breath / dyspnea or wheezing    COPD exacerbation (H)       * Ipratropium-Albuterol  MCG/ACT inhaler    COMBIVENT RESPIMAT    4 g    INHALE ONE PUFF BY MOUTH FOUR TIMES A DAY (NO TO EXCCED 6 DOSES PER DAY)    COPD exacerbation (H)       * ketoconazole 2 % cream    NIZORAL    60 g    Apply topically 2 times daily To all affected areas with white spots    Hypopigmented skin lesion       * ketoconazole 2 % cream    NIZORAL    60 g    Apply topically twice a week    PV (pityriasis versicolor)       levofloxacin 0.5 % ophthalmic solution    QUIXIN    1 Bottle    1 drop in surgical eye as directed - 4x daily for 1 week, then stop    Nuclear cataract of left eye       nicotine 14 MG/24HR 24 hr patch    NICODERM CQ    30 patch    Place 1 patch onto the skin every 24 hours    Tobacco use disorder       nicotine polacrilex 2 MG lozenge    COMMIT    150 lozenge    Place 1 lozenge (2 mg) inside cheek as needed for smoking cessation Place 1 lozenge inside cheek as needed for smoking cessation.    Chronic obstructive pulmonary disease, unspecified COPD type (H), Tobacco abuse       order for DME     1 each    Equipment being ordered: nebulizer machine    COPD exacerbation (H)       pocket spacer Jeanine     1 each    Attach to albuterol inhaler, use 2 puffs every 4-6 hours as needed.    Chronic obstructive pulmonary disease, unspecified COPD type (H)       prednisoLONE acetate 1 % ophthalmic susp    PRED FORTE    1 Bottle    1 drop in surgical eye as directed, 4x daily after surgery for 1 week, 3x daily for 1 week, 2x daily for 1 week, daily for 1 week, then stop    Nuclear cataract of left eye       * predniSONE 20 MG tablet    DELTASONE    10 tablet    Take 1 tablet (20 mg) by mouth 2 times daily    COPD exacerbation (H)       * predniSONE 20 MG tablet    DELTASONE    18 tablet    Take 3 tablets (60 mg) by mouth  daily    COPD exacerbation (H)       SPIRIVA RESPIMAT 2.5 MCG/ACT inhalation aerosol   Generic drug:  tiotropium     4 g    INHALE 2 PUFFS INTO THE LUNGS DAILY    Chronic obstructive pulmonary disease, unspecified COPD type (H)       tacrolimus 0.1 % ointment    PROTOPIC    60 g    Apply topically 2 times daily Apply twice daily to the affected areas of the skin, especially the scalp    Post inflammatory hypopigmentation       vitamin A 10253 UNIT capsule     180 capsule    TAKE 1 CAPSULE BY MOUTH DAILY    Encounter for herb and vitamin supplement management       vitamin b complex w/vitamin C Tabs tablet     180 tablet    TAKE 1 TABLET BY MOUTH TWICE DAILY WITH FOLIC ACID    Takes dietary supplements       vitamin B complex with vitamin C Tabs tablet     180 tablet    Take 1 tablet by mouth 2 times daily With Folic acid    Encounter for herb and vitamin supplement management       vitamin E 1000 UNIT capsule    CVS vitamin E    90 capsule    Take 1 capsule (1,000 Units) by mouth daily    Encounter for herb and vitamin supplement management       * Notice:  This list has 9 medication(s) that are the same as other medications prescribed for you. Read the directions carefully, and ask your doctor or other care provider to review them with you.

## 2018-08-09 NOTE — NURSING NOTE
Dermatology Rooming Note    Robert B Behr's goals for this visit include:   Chief Complaint   Patient presents with     Derm Problem     Ravin is here today for Vitiligo on his scalp.

## 2018-08-09 NOTE — PATIENT INSTRUCTIONS
Your scalp looks great today.     We will have you use the protopic 1-2 weekly as discussed together. You may use gingko biloba if your primary doctor feels this is ok and your risk of bleeding is low however, if you plan for any surgical procedures or are planning on starting an aspirin or other blood thinning agent we do not recommend continuation of this.     Do not take ibuprofen, aspirin, motrin, naproxen or other NSAID with gingko biloba unless OK with primary.     We will see you back in 6 weeks. If not better by then, we can discuss lights.

## 2018-08-09 NOTE — LETTER
8/9/2018       RE: Robert B Behr  [address]  [place] [address]     Dear Colleague,    Thank you for referring your patient, Robert B Behr, to the [facility] DERMATOLOGY at [facility]. Please see a copy of my visit note below.    [facility] Dermatology Note      Dermatology Problem List:  1. Hypo/depigmented macules and patches on scalp as well as depigmented macules on low back, chest, and shins  - Differential diagnosis includes vitiligo, tinea versicolor, actinic poikiloderma/idiopathic guttate hypomelanosis, progressive macular hypomelanosis, postinflammatory hypopigmentation; possible overlap of etiologies  - 5/14/18: TSH/free T4 WNL  -Trial of ketoconazole 2% cream BID to all affected areas for 2 weeks without efficacy  -Protopic 0.1% ointment BID, follow up in three months   2. Erythrasma   -topical erythromycin 2% daily for two weeks      Encounter Date: Aug 9, 2018    CC:   Chief Complaint   Patient presents with     Derm Problem     Ravin is here today for Vitiligo on his scalp.          History of Present Illness:  Mr. Robert B Behr is a 68 year old male who presents as a follow-up for scalp hypo/de-pigmentation. The patient was last seen 7/30/18 when he was prescribed protopic 0.1% ointment BID and was also given topical erythromycin for erythrasma. Since we saw him last he states he feels like his patches of whiteness over his scalp have improved and slowed faded to resemble more of his surrounding skin. He was seen by his primary and also another PA in the interim due to fatigue, aches, and generalized malaise which he was told by his PA (and also he read this on the Internet) may have been caused by immuno suppression of the protopic. He did telephone the dermatology department and was also seen by his primary to discuss whether or not he could try gingko biloba as he read this increased blood flow to the scalp and resulted in  a cure for 10/10 patients in a study he read online. He was told by his primary that he could take gingko biloba but was clear that he didn't know whether it would give him much benefit. Today, he has no burning, tingling, or painful sensations on the scalp. He has no other concerns. He states he liked the topical erythromycin and this has helped the redness and scaling between his toes.     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     Tobacco abuse      Past Surgical History:   Procedure Laterality Date     CATARACT IOL, RT/LT Left 09/15/2017    s/p CE/IOL left eye     CATARACT IOL, RT/LT Right      PHACOEMULSIFICATION CLEAR CORNEA WITH STANDARD INTRAOCULAR LENS IMPLANT Right 9/30/2016    Procedure: PHACOEMULSIFICATION CLEAR CORNEA WITH STANDARD INTRAOCULAR LENS IMPLANT;  Surgeon: Elly Valencia MD;  Location:  [facility]     PHACOEMULSIFICATION WITH STANDARD INTRAOCULAR LENS IMPLANT Left 9/15/2017    Procedure: PHACOEMULSIFICATION WITH STANDARD INTRAOCULAR LENS IMPLANT;  Left Eye Phacoemulsification with Standard Lens;  Surgeon: Elly Valencia MD;  Location: [facility] OR     WRIST SURGERY         Social History:  Social History     Social History     Marital status: Single     Spouse name: N/A     Number of children: N/A     Years of education: N/A     Social History Main Topics     Smoking status: Current Every Day Smoker     Packs/day: 0.10     Years: 45.00     Types: Cigarettes     Smokeless tobacco: Former User      Comment: 3-7 cigarettes/day (8/29/17). 1.5 packs for 45  years smoker     Alcohol use 0.0 oz/week     0 Standard drinks or equivalent per week      Comment: occ beer     Drug use: No     Sexual activity: No     Other Topics Concern     Parent/Sibling W/ Cabg, Mi Or Angioplasty Before 65f 55m? No     Social History Narrative    Single.  Retired.     No children    5 siblings:      No family history of bleeding, clotting disorders or complications with anesthesia.           Family History:  Family History   Problem Relation Age of Onset     Diabetes Brother      living     Cancer Brother      throat     Macular Degeneration Mother      Colon Cancer No family hx of      Glaucoma No family hx of      Retinal detachment No family hx of      Amblyopia No family hx of      Skin Cancer No family hx of      Melanoma No family hx of        Medications:  Current Outpatient Prescriptions   Medication Sig Dispense Refill     ascorbic acid (VITAMIN C) 1000 MG TABS TAKE 1 TABLET BY MOUTH DAILY 90 tablet 11     B Complex-C (VITAMIN B COMPLEX W/VITAMIN C) TABS tablet TAKE 1 TABLET BY MOUTH TWICE DAILY WITH FOLIC ACID 180 tablet 11     Calcium-Magnesium-Vitamin D (CALCIUM 1200+D3) 600- MG-MG-UNIT TB24 Take 1-2 tablets by mouth every morning Reported on 3/14/2017       calcium-vitamin D (CALTRATE) 600-400 MG-UNIT per tablet TAKE 1 TABLET BY MOUTH TWICE DAILY 180 tablet 3     erythromycin with ethanol (EMGEL) 2 % gel Apply topically daily 60 g 0     fish oil-omega-3 fatty acids 1000 MG capsule TAKE 1 CAPSULE BY MOUTH DAILY 90 capsule 11     fluticasone-salmeterol (ADVAIR) 250-50 MCG/DOSE diskus inhaler Inhale 1 puff into the lungs 2 times daily 1 Inhaler 1     fluticasone-salmeterol (ADVAIR) 250-50 MCG/DOSE diskus inhaler Inhale 1 puff into the lungs 2 times daily 1 Inhaler 11     ginkgo biloba 60 MG CAPS capsule Take 1 capsule (60 mg) by mouth daily 90 capsule 3     glucosamine-chondroitinoitin 750-600 MG TABS TAKE 2 TABLETS BY MOUTH TWICE DAILY 120 tablet 5     ipratropium -  albuterol 0.5 mg/2.5 mg/3 mL (DUONEB) 0.5-2.5 (3) MG/3ML neb solution Take 1 vial (3 mLs) by nebulization every 6 hours as needed for shortness of breath / dyspnea or wheezing 30 vial 1     ipratropium - albuterol 0.5 mg/2.5 mg/3 mL (DUONEB) 0.5-2.5 (3) MG/3ML neb solution Take 1 vial (3 mLs) by nebulization every 6 hours as needed for shortness of breath / dyspnea or wheezing 30 vial 1     Ipratropium-Albuterol (COMBIVENT RESPIMAT)  MCG/ACT inhaler INHALE ONE PUFF BY MOUTH FOUR TIMES A DAY (NO TO EXCCED 6 DOSES PER DAY) 4 g 3     ketoconazole (NIZORAL) 2 % cream Apply topically twice a week 60 g 1     ketoconazole (NIZORAL) 2 % cream Apply topically 2 times daily To all affected areas with white spots 60 g 3     Lactobacillus-Inulin (CULTURENanosolarE DIGESTIVE HEALTH) CAPS Take 1 capsule by mouth 2 times daily Culturelle or something similiar 100 capsule 3     levofloxacin (QUIXIN) 0.5 % ophthalmic solution 1 drop in surgical eye as directed - 4x daily for 1 week, then stop 1 Bottle 1     nicotine (NICODERM CQ) 14 MG/24HR 24 hr patch Place 1 patch onto the skin every 24 hours 30 patch 1     nicotine polacrilex (COMMIT) 2 MG lozenge Place 1 lozenge (2 mg) inside cheek as needed for smoking cessation Place 1 lozenge inside cheek as needed for smoking cessation. 150 lozenge 3     order for DME Equipment being ordered: nebulizer machine 1 each 0     prednisoLONE acetate (PRED FORTE) 1 % ophthalmic susp 1 drop in surgical eye as directed, 4x daily after surgery for 1 week, 3x daily for 1 week, 2x daily for 1 week, daily for 1 week, then stop 1 Bottle 1     predniSONE (DELTASONE) 20 MG tablet Take 3 tablets (60 mg) by mouth daily 18 tablet 0     predniSONE (DELTASONE) 20 MG tablet Take 1 tablet (20 mg) by mouth 2 times daily 10 tablet 0     Spacer/Aero-Holding Chambers (POCKET SPACER) MARGARITA Attach to albuterol inhaler, use 2 puffs every 4-6 hours as needed. 1 each 0     SPIRIVA RESPIMAT 2.5 MCG/ACT inhalation aerosol  INHALE 2 PUFFS INTO THE LUNGS DAILY 4 g 11     tacrolimus (PROTOPIC) 0.1 % ointment Apply topically 2 times daily Apply twice daily to the affected areas of the skin, especially the scalp 60 g 0     vitamin A 41987 UNIT capsule TAKE 1 CAPSULE BY MOUTH DAILY 180 capsule 11     vitamin B complex with vitamin C (VITAMIN  B COMPLEX) TABS tablet Take 1 tablet by mouth 2 times daily With Folic acid 180 tablet 2     vitamin E ([employer] VITAMIN E) 1000 UNIT capsule Take 1 capsule (1,000 Units) by mouth daily 90 capsule 2        No Known Allergies      Review of Systems:  -As per HPI  -Constitutional: The patient is feeling well today.   -Skin: As above in HPI. No additional skin concerns.    Physical exam:  Vitals: There were no vitals taken for this visit.  GEN: This is a well developed, well-nourished male in no acute distress.   SKIN: Sun-exposed skin, which includes the head/face, neck, both arms, digits, and/or nails was examined.   -multiple hypo-depigmented macules coalescing to patches on the scalp (primiarly vertex, frontal, and parietal scalp) in area of former sunburn (photograph obtained).  -No other lesions of concern on areas examined.     Impression/Plan:  1. Hypo and depigmented patches on the scalp, chest, back, arms and legs. Differentials include vitiligo, post inflammatory hypopigmentation.    Photograph(s) taken for future comparison and/or reference     The patient is clinically improved today with the appearance of a more blended scalp as far as overall pigmentation of the scalp. It is possible that this represents improving post inflammatory hypopigmentation following recent sunburn or it could be vitiligo which was triggered by the sunburn. He discontinued the tacrolimus on his own, although he felt he was getting some benefit from it.     We discussed the literature supporting the use of topical corticosteroids and TCIs for vitiligo. We did also discussed that there is some literature to suggest that  gingko biloba may be helpful. We did discuss that if he has discussed this with his primary and his primary is ok with the gingko biloba, we do not feel it will necessarily be harmful. We did discuss that it may increase risk of bleeding and recommended he inform his doctors about use and avoid taking NSAID or blood thinning medications.    We will plan to have him continue the protopic as tolerated (he suggested he might try to use this a couple of times per week) and will use sunscreen vigilantly along with a hat.     Discussed that light therapy or excimer laser can be helpful if needed in the future.     1. Erythrasma     We did not perform a focused examination of the feet/toes today as the patient was very adamant that we should discuss his possible diagnosis of vitiligo and treatment options at today's visit. He felt the erythromycin was helpful. We will plan to examine this area at his next visit.     CC Dr. Frausto on close of this encounter.  Follow-up in 6 weeks, earlier for new or changing lesions.     Dr. Gandhi staffed the patient.    Staff Involved:  Resident(Enedelia Calderon)/Staff(as above)  Again, thank you for allowing me to participate in the care of your patient.      Sincerely,    Enedelia Calderon MD

## 2018-08-12 PROBLEM — L81.9 HYPOPIGMENTATION: Status: ACTIVE | Noted: 2018-08-12

## 2018-08-20 DIAGNOSIS — F17.200 TOBACCO USE DISORDER: ICD-10-CM

## 2018-08-20 NOTE — TELEPHONE ENCOUNTER
"Requested Prescriptions   Pending Prescriptions Disp Refills     NICOTINE STEP 2 14 MG/24HR 24 hr patch [Pharmacy Med Name: NICOTINE 14MG/24H PATCH]  Last Written Prescription Date:  5-11-18  Last Fill Quantity: 30 ptch,  # refills: 1   Last office visit: 4/19/2018 with prescribing provider:  Fina Frausto    Future Office Visit:    28 patch 0     Sig: PLACE 1 PATCH TO SKIN EVERY 24 HOURS    Partial Cholinergic Nicotinic Agonist Agents Passed    8/20/2018 10:56 AM       Passed - Blood pressure under 140/90 in past 12 months    BP Readings from Last 3 Encounters:   07/30/18 138/77   07/20/18 120/70   07/16/18 118/80          Passed - Recent (12 mo) or future (30 days) visit within the authorizing provider's specialty    Patient had office visit in the last 12 months or has a visit in the next 30 days with authorizing provider or within the authorizing provider's specialty.  See \"Patient Info\" tab in inbasket, or \"Choose Columns\" in Meds & Orders section of the refill encounter.           Passed - Patient is 18 years of age or older          "

## 2018-08-23 NOTE — TELEPHONE ENCOUNTER
Prescription approved per [facility] Refill Protocol.    Jaye Barclay, BRAYDONN, RN  [facility]

## 2018-09-15 DIAGNOSIS — M81.0 AGE-RELATED OSTEOPOROSIS WITHOUT CURRENT PATHOLOGICAL FRACTURE: ICD-10-CM

## 2018-09-15 NOTE — TELEPHONE ENCOUNTER
"Requested Prescriptions   Pending Prescriptions Disp Refills     calcium carbonate 600 mg-vitamin D 400 units (CALTRATE) 600-400 MG-UNIT per tablet [Pharmacy Med Name: CALCIUM 600+D (400U) TABLETS]  Last Written Prescription Date:  9/1/2017  Last Fill Quantity: 180 tabs,  # refills: 3   Last office visit: 4/19/2018 with prescribing provider:  Lisbet   Future Office Visit:     180 tablet 0     Sig: TAKE 1 TABLET BY MOUTH TWICE DAILY    Vitamin Supplements (Adult) Protocol Passed    9/15/2018  3:37 AM       Passed - High dose Vitamin D not ordered       Passed - Recent (12 mo) or future (30 days) visit within the authorizing provider's specialty    Patient had office visit in the last 12 months or has a visit in the next 30 days with authorizing provider or within the authorizing provider's specialty.  See \"Patient Info\" tab in inbasket, or \"Choose Columns\" in Meds & Orders section of the refill encounter.              "

## 2018-09-24 ENCOUNTER — OFFICE VISIT (OUTPATIENT)
Dept: URGENT CARE | Facility: URGENT CARE | Age: 68
End: 2018-09-24
Payer: COMMERCIAL

## 2018-09-24 VITALS — HEIGHT: 70 IN | TEMPERATURE: 98.4 F | RESPIRATION RATE: 16 BRPM | WEIGHT: 150 LBS | BODY MASS INDEX: 21.47 KG/M2

## 2018-09-24 DIAGNOSIS — J20.9 ACUTE BRONCHITIS WITH COEXISTING CONDITION REQUIRING PROPHYLACTIC TREATMENT: ICD-10-CM

## 2018-09-24 DIAGNOSIS — J42 CHRONIC BRONCHITIS, UNSPECIFIED CHRONIC BRONCHITIS TYPE (H): ICD-10-CM

## 2018-09-24 DIAGNOSIS — F17.200 TOBACCO USE DISORDER: Primary | ICD-10-CM

## 2018-09-24 PROCEDURE — 99214 OFFICE O/P EST MOD 30 MIN: CPT | Performed by: PHYSICIAN ASSISTANT

## 2018-09-24 RX ORDER — AZITHROMYCIN 250 MG/1
TABLET, FILM COATED ORAL
Qty: 6 TABLET | Refills: 0 | Status: SHIPPED | OUTPATIENT
Start: 2018-09-24 | End: 2018-11-25

## 2018-09-24 RX ORDER — NICOTINE 21 MG/24HR
PATCH, TRANSDERMAL 24 HOURS TRANSDERMAL
Qty: 28 PATCH | Refills: 1 | Status: SHIPPED | OUTPATIENT
Start: 2018-09-24 | End: 2019-08-06

## 2018-09-24 NOTE — MR AVS SNAPSHOT
After Visit Summary   9/24/2018    Robert B Behr    MRN: [id number]           Patient Information     Date Of Birth          1950        Visit Information        Provider Department      9/24/2018 5:05 PM Claudia Clemens PA-C [facility]        Today's Diagnoses     COPD exacerbation (H)    -  1    Tobacco use disorder        Acute bronchitis with coexisting condition requiring prophylactic treatment          Care Instructions    1. Smoking cessation  I have refilled your Nicotine patches at your request today. Please follow up with your primary care provider for further refills.    2. Bronchitis  Take azithromycin antibiotic as prescribed x 5 days. Take with food.  Continue your albuterol inhaler 2 puffs every 4-6 hours for cough, wheezing.  Monitor symptoms closely - if worsening cough, developing fever, increasing wheezing or shortness of breath, then return to clinic or ER immediately. If coughing up blood or severe shortness of breath or chest pain, go to the ER.          Follow-ups after your visit        Follow-up notes from your care team     Return if symptoms worsen or fail to improve.      Your next 10 appointments already scheduled     Oct 16, 2018  3:50 PM CDT   (Arrive by 3:35 PM)   Return Visit with Raquel Gandhi MD   [facility] ([facility])    [address] [place] [id number] [phone]              Who to contact     If you have questions or need follow up information about today's clinic visit or your schedule please contact [facility] directly at [phone].  Normal or non-critical lab and imaging results will be communicated to you by MyChart, letter or phone within 4 business days after the clinic has received the results. If you do not hear from us within 7 days, please contact the clinic through MyChart or phone. If you have a critical or  "abnormal lab result, we will notify you by phone as soon as possible.  Submit refill requests through [employer] or call your pharmacy and they will forward the refill request to us. Please allow 3 business days for your refill to be completed.          Additional Information About Your Visit        Mobile [employer] Information     [employer] gives you secure access to your electronic health record. If you see a primary care provider, you can also send messages to your care team and make appointments. If you have questions, please call your primary care clinic.  If you do not have a primary care provider, please call [phone] and they will assist you.        Care EveryWhere ID     This is your Care EveryWhere ID. This could be used by other organizations to access your [facility] medical records  [id number]        Your Vitals Were     Temperature Respirations Height BMI (Body Mass Index)          98.4  F (36.9  C) (Oral) 6 5' 10\" (1.778 m) 21.52 kg/m2         Blood Pressure from Last 3 Encounters:   07/30/18 138/77   07/20/18 120/70   07/16/18 118/80    Weight from Last 3 Encounters:   09/24/18 150 lb (68 kg)   07/30/18 151 lb (68.5 kg)   07/20/18 151 lb (68.5 kg)              Today, you had the following     No orders found for display         Today's Medication Changes          These changes are accurate as of 9/24/18  5:23 PM.  If you have any questions, ask your nurse or doctor.               Start taking these medicines.        Dose/Directions    azithromycin 250 MG tablet   Commonly known as:  ZITHROMAX   Used for:  COPD exacerbation (H), Acute bronchitis with coexisting condition requiring prophylactic treatment   Started by:  Claudia Clemens PA-C        Two tablets first day, then one tablet daily for four days.   Quantity:  6 tablet   Refills:  0         Stop taking these medicines if you haven't already. Please contact your care team if you have questions.     levofloxacin 0.5 % ophthalmic solution   Commonly " known as:  FRANKIIXIN   Stopped by:  Claudia Clemens PA-C                Where to get your medicines      These medications were sent to [id number] - [place], [place] - [address] AT [facility] [address]  [address], [place] [address]     Phone:  [phone]     azithromycin 250 MG tablet    nicotine 14 MG/24HR 24 hr patch                Primary Care Provider Office Phone # Fax #    Fina Frausto [phone]       [address]  [place] [address]        Equal Access to Services     [facility]: Hadii aad ku hadasho Soomaali, waaxda luqadaha, qaybta kaalmada adeegyada, kael rangel . So [facility] [phone].    ATENCIÓN: Si habla español, tiene a rios disposición servicios gratuitos de asistencia lingüística. [facility] [phone].    We comply with applicable federal civil rights laws and [place] laws. We do not discriminate on the basis of race, color, national origin, age, disability, sex, sexual orientation, or gender identity.            Thank you!     Thank you for choosing [facility]  for your care. Our goal is always to provide you with excellent care. Hearing back from our patients is one way we can continue to improve our services. Please take a few minutes to complete the written survey that you may receive in the mail after your visit with us. Thank you!             Your Updated Medication List - Protect others around you: Learn how to safely use, store and throw away your medicines at www.disposemymeds.org.          This list is accurate as of 9/24/18  5:23 PM.  Always use your most recent med list.                   Brand Name Dispense Instructions for use Diagnosis    ascorbic acid 1000 MG Tabs    vitamin C    90 tablet    TAKE 1 TABLET BY MOUTH DAILY    Encounter for herb and vitamin supplement management       azithromycin 250 MG tablet    ZITHROMAX    6 tablet    Two tablets first day,  then one tablet daily for four days.    COPD exacerbation (H), Acute bronchitis with coexisting condition requiring prophylactic treatment       CALCIUM 1200+D3 600- MG-MG-UNIT Tb24   Generic drug:  Calcium-Magnesium-Vitamin D      Take 1-2 tablets by mouth every morning Reported on 3/14/2017        calcium carbonate 600 mg-vitamin D 400 units 600-400 MG-UNIT per tablet    CALTRATE    180 tablet    TAKE 1 TABLET BY MOUTH TWICE DAILY    Age-related osteoporosis without current pathological fracture       CULTURELLE DIGESTIVE HEALTH Caps     100 capsule    Take 1 capsule by mouth 2 times daily Culturelle or something similiar    Bloating       erythromycin with ethanol 2 % gel    EMGEL    60 g    Apply topically daily    Erythrasma       fish oil-omega-3 fatty acids 1000 MG capsule     90 capsule    TAKE 1 CAPSULE BY MOUTH DAILY    Encounter for herb and vitamin supplement management       * fluticasone-salmeterol 250-50 MCG/DOSE diskus inhaler    ADVAIR    1 Inhaler    Inhale 1 puff into the lungs 2 times daily    Chronic obstructive pulmonary disease, unspecified COPD type (H)       * fluticasone-salmeterol 250-50 MCG/DOSE diskus inhaler    ADVAIR    1 Inhaler    Inhale 1 puff into the lungs 2 times daily    COPD exacerbation (H)       ginkgo biloba 60 MG Caps capsule     90 capsule    Take 1 capsule (60 mg) by mouth daily    Vitiligo       glucosamine-chondroitinoitin 750-600 MG Tabs     120 tablet    TAKE 2 TABLETS BY MOUTH TWICE DAILY    Osteoarthritis of right hip, unspecified osteoarthritis type       * ipratropium - albuterol 0.5 mg/2.5 mg/3 mL 0.5-2.5 (3) MG/3ML neb solution    DUONEB    30 vial    Take 1 vial (3 mLs) by nebulization every 6 hours as needed for shortness of breath / dyspnea or wheezing    COPD exacerbation (H)       * ipratropium - albuterol 0.5 mg/2.5 mg/3 mL 0.5-2.5 (3) MG/3ML neb solution    DUONEB    30 vial    Take 1 vial (3 mLs) by nebulization every 6 hours as needed for  shortness of breath / dyspnea or wheezing    COPD exacerbation (H)       * Ipratropium-Albuterol  MCG/ACT inhaler    COMBIVENT RESPIMAT    4 g    INHALE ONE PUFF BY MOUTH FOUR TIMES A DAY (NO TO EXCCED 6 DOSES PER DAY)    COPD exacerbation (H)       * ketoconazole 2 % cream    NIZORAL    60 g    Apply topically 2 times daily To all affected areas with white spots    Hypopigmented skin lesion       * ketoconazole 2 % cream    NIZORAL    60 g    Apply topically twice a week    PV (pityriasis versicolor)       nicotine 14 MG/24HR 24 hr patch    NICOTINE STEP 2    28 patch    PLACE 1 PATCH TO SKIN EVERY 24 HOURS    Tobacco use disorder       nicotine polacrilex 2 MG lozenge    COMMIT    150 lozenge    Place 1 lozenge (2 mg) inside cheek as needed for smoking cessation Place 1 lozenge inside cheek as needed for smoking cessation.    Chronic obstructive pulmonary disease, unspecified COPD type (H), Tobacco abuse       order for DME     1 each    Equipment being ordered: nebulizer machine    COPD exacerbation (H)       pocket spacer Jeanine     1 each    Attach to albuterol inhaler, use 2 puffs every 4-6 hours as needed.    Chronic obstructive pulmonary disease, unspecified COPD type (H)       prednisoLONE acetate 1 % ophthalmic susp    PRED FORTE    1 Bottle    1 drop in surgical eye as directed, 4x daily after surgery for 1 week, 3x daily for 1 week, 2x daily for 1 week, daily for 1 week, then stop    Nuclear cataract of left eye       * predniSONE 20 MG tablet    DELTASONE    10 tablet    Take 1 tablet (20 mg) by mouth 2 times daily    COPD exacerbation (H)       * predniSONE 20 MG tablet    DELTASONE    18 tablet    Take 3 tablets (60 mg) by mouth daily    COPD exacerbation (H)       SPIRIVA RESPIMAT 2.5 MCG/ACT inhalation aerosol   Generic drug:  tiotropium     4 g    INHALE 2 PUFFS INTO THE LUNGS DAILY    Chronic obstructive pulmonary disease, unspecified COPD type (H)       tacrolimus 0.1 % ointment    PROTOPIC     60 g    Apply topically 2 times daily Apply twice daily to the affected areas of the skin, especially the scalp    Post inflammatory hypopigmentation       vitamin A 99302 UNIT capsule     180 capsule    TAKE 1 CAPSULE BY MOUTH DAILY    Encounter for herb and vitamin supplement management       vitamin b complex w/vitamin C Tabs tablet     180 tablet    TAKE 1 TABLET BY MOUTH TWICE DAILY WITH FOLIC ACID    Takes dietary supplements       vitamin B complex with vitamin C Tabs tablet     180 tablet    Take 1 tablet by mouth 2 times daily With Folic acid    Encounter for herb and vitamin supplement management       vitamin E 1000 UNIT capsule    [facility] vitamin E    90 capsule    Take 1 capsule (1,000 Units) by mouth daily    Encounter for herb and vitamin supplement management       * Notice:  This list has 9 medication(s) that are the same as other medications prescribed for you. Read the directions carefully, and ask your doctor or other care provider to review them with you.

## 2018-09-24 NOTE — PATIENT INSTRUCTIONS
1. Smoking cessation  I have refilled your Nicotine patches at your request today. Please follow up with your primary care provider for further refills.    2. Bronchitis  Take azithromycin antibiotic as prescribed x 5 days. Take with food.  Continue your albuterol inhaler 2 puffs every 4-6 hours for cough, wheezing.  Monitor symptoms closely - if worsening cough, developing fever, increasing wheezing or shortness of breath, then return to clinic or ER immediately. If coughing up blood or severe shortness of breath or chest pain, go to the ER.

## 2018-09-24 NOTE — PROGRESS NOTES
SUBJECTIVE:   Robert B Behr is a 68 year old male presenting with a chief complaint of   Chief Complaint   Patient presents with     Urgent Care     URI     sick x 2 days, trouble breathing, coughing. Seems to get this every year.    .    URI Adult    Onset of symptoms was 2 day(s) ago.  Course of illness is worsening.    Severity moderately severe  Current and Associated symptoms: cough- productive of white mucous.  Admits to wheezing but no SOB. It is worse in the mornings. He is taking his albuterol inhaler and his Advair.  Admits to nasal congestion, sinus pain, and rhinorrhea. No fever. No hemoptysis. No vomiting. No headache. No sore throat. No rashes.  Treatment measures tried include: albuterol inhaler  Predisposing factors include: COPD  He is currently in the process of quitting smoking and requests a refill on Nicotine patches.    ROS   See HPI    PMH:  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     Tobacco abuse      Patient Active Problem List   Diagnosis     Osteoporosis     COPD (chronic obstructive pulmonary disease) (H)     Tobacco use disorder     CARDIOVASCULAR SCREENING; LDL GOAL LESS THAN 160     Lung nodules     Health Care Home     Hiatal hernia     Hypopigmentation       Current Medications:  Current Outpatient Prescriptions   Medication Sig Dispense Refill     azithromycin (ZITHROMAX) 250 MG tablet Two tablets first day, then one tablet daily for four days. 6 tablet 0     B Complex-C (VITAMIN B COMPLEX W/VITAMIN C) TABS tablet TAKE 1 TABLET BY MOUTH TWICE DAILY WITH FOLIC ACID 180 tablet 11     calcium carbonate 600 mg-vitamin D 400 units (CALTRATE) 600-400 MG-UNIT per tablet TAKE 1 TABLET BY MOUTH TWICE DAILY 180 tablet 1     fish oil-omega-3 fatty acids 1000 MG capsule TAKE 1 CAPSULE BY MOUTH DAILY 90 capsule 11      fluticasone-salmeterol (ADVAIR) 250-50 MCG/DOSE diskus inhaler Inhale 1 puff into the lungs 2 times daily 1 Inhaler 11     nicotine (NICOTINE STEP 2) 14 MG/24HR 24 hr patch PLACE 1 PATCH TO SKIN EVERY 24 HOURS 28 patch 1     vitamin A 30103 UNIT capsule TAKE 1 CAPSULE BY MOUTH DAILY 180 capsule 11     vitamin B complex with vitamin C (VITAMIN  B COMPLEX) TABS tablet Take 1 tablet by mouth 2 times daily With Folic acid 180 tablet 2     vitamin E ([facility] VITAMIN E) 1000 UNIT capsule Take 1 capsule (1,000 Units) by mouth daily 90 capsule 2     ascorbic acid (VITAMIN C) 1000 MG TABS TAKE 1 TABLET BY MOUTH DAILY 90 tablet 11     Calcium-Magnesium-Vitamin D (CALCIUM 1200+D3) 600- MG-MG-UNIT TB24 Take 1-2 tablets by mouth every morning Reported on 3/14/2017       erythromycin with ethanol (EMGEL) 2 % gel Apply topically daily (Patient not taking: Reported on 9/24/2018) 60 g 0     fluticasone-salmeterol (ADVAIR) 250-50 MCG/DOSE diskus inhaler Inhale 1 puff into the lungs 2 times daily 1 Inhaler 1     ginkgo biloba 60 MG CAPS capsule Take 1 capsule (60 mg) by mouth daily 90 capsule 3     glucosamine-chondroitinoitin 750-600 MG TABS TAKE 2 TABLETS BY MOUTH TWICE DAILY 120 tablet 5     ipratropium - albuterol 0.5 mg/2.5 mg/3 mL (DUONEB) 0.5-2.5 (3) MG/3ML neb solution Take 1 vial (3 mLs) by nebulization every 6 hours as needed for shortness of breath / dyspnea or wheezing 30 vial 1     ipratropium - albuterol 0.5 mg/2.5 mg/3 mL (DUONEB) 0.5-2.5 (3) MG/3ML neb solution Take 1 vial (3 mLs) by nebulization every 6 hours as needed for shortness of breath / dyspnea or wheezing 30 vial 1     Ipratropium-Albuterol (COMBIVENT RESPIMAT)  MCG/ACT inhaler INHALE ONE PUFF BY MOUTH FOUR TIMES A DAY (NO TO EXCCED 6 DOSES PER DAY) 4 g 3     ketoconazole (NIZORAL) 2 % cream Apply topically twice a week 60 g 1     ketoconazole (NIZORAL) 2 % cream Apply topically 2 times daily To all affected areas with white spots 60 g 3      Lactobacillus-Inulin (CULTURELLE DIGESTIVE HEALTH) CAPS Take 1 capsule by mouth 2 times daily Culturelle or something similiar 100 capsule 3     nicotine polacrilex (COMMIT) 2 MG lozenge Place 1 lozenge (2 mg) inside cheek as needed for smoking cessation Place 1 lozenge inside cheek as needed for smoking cessation. 150 lozenge 3     order for DME Equipment being ordered: nebulizer machine 1 each 0     prednisoLONE acetate (PRED FORTE) 1 % ophthalmic susp 1 drop in surgical eye as directed, 4x daily after surgery for 1 week, 3x daily for 1 week, 2x daily for 1 week, daily for 1 week, then stop (Patient not taking: Reported on 9/24/2018) 1 Bottle 1     predniSONE (DELTASONE) 20 MG tablet Take 3 tablets (60 mg) by mouth daily 18 tablet 0     predniSONE (DELTASONE) 20 MG tablet Take 1 tablet (20 mg) by mouth 2 times daily (Patient not taking: Reported on 9/24/2018) 10 tablet 0     Spacer/Aero-Holding Chambers (POCKET SPACER) MARGARITA Attach to albuterol inhaler, use 2 puffs every 4-6 hours as needed. 1 each 0     SPIRIVA RESPIMAT 2.5 MCG/ACT inhalation aerosol INHALE 2 PUFFS INTO THE LUNGS DAILY (Patient not taking: Reported on 9/24/2018) 4 g 11     tacrolimus (PROTOPIC) 0.1 % ointment Apply topically 2 times daily Apply twice daily to the affected areas of the skin, especially the scalp 60 g 0       Surgical History:  Past Surgical History:   Procedure Laterality Date     CATARACT IOL, RT/LT Left 09/15/2017    s/p CE/IOL left eye     CATARACT IOL, RT/LT Right      PHACOEMULSIFICATION CLEAR CORNEA WITH STANDARD INTRAOCULAR LENS IMPLANT Right 9/30/2016    Procedure: PHACOEMULSIFICATION CLEAR CORNEA WITH STANDARD INTRAOCULAR LENS IMPLANT;  Surgeon: Elly Valencia MD;  [facility]     PHACOEMULSIFICATION WITH STANDARD INTRAOCULAR LENS IMPLANT Left 9/15/2017    Procedure: PHACOEMULSIFICATION WITH STANDARD INTRAOCULAR LENS IMPLANT;  Left Eye Phacoemulsification with Standard Lens;  Surgeon: Elly Valencia MD;   "Location: [facility]     WRIST SURGERY         Family History:  Family History   Problem Relation Age of Onset     Diabetes Brother      living     Cancer Brother      throat     Macular Degeneration Mother      Colon Cancer No family hx of      Glaucoma No family hx of      Retinal detachment No family hx of      Amblyopia No family hx of      Skin Cancer No family hx of      Melanoma No family hx of        Social History:  Social History   Substance Use Topics     Smoking status: Current Every Day Smoker     Packs/day: 0.10     Years: 45.00     Types: Cigarettes     Smokeless tobacco: Former User      Comment: 3-7 cigarettes/day (8/29/17). 1.5 packs for 45 years smoker     Alcohol use 0.0 oz/week     0 Standard drinks or equivalent per week      Comment: occ beer              OBJECTIVE  Temp 98.4  F (36.9  C) (Oral)  Resp 16  Ht 5' 10\" (1.778 m)  Wt 150 lb (68 kg)  BMI 21.52 kg/m2    General: alert, appears well, NAD. Afebrile. Talkative.  Skin: no suspicious lesions or rashes.  HEENT: Normocephalic.   Eyes: conjunctiva clear.   Ears: TMs pearly, translucent bilaterally.   Nose: Mild erythema and edema of nasal mucosa. No rhinorrhea.  Oropharynx: MMM.+ posterior pharyngeal erythema, no petechiae, or exudate. No tonsillar hypertrophy. Uvula midline.    Neck: supple, no lymphadenopathy.  Respiratory: No distress. Equal inspiration to bilateral bases. No crackles wheeze, rhonchi, rales.   Cardiovascular: RRR. No murmurs, clicks, gallups, or rub.  Psych: normal mood and pleasant affect.       Labs:  No results found for this or any previous visit (from the past 24 hour(s)).      ASSESSMENT/PLAN:    ICD-10-CM    1. Tobacco use disorder F17.200 nicotine (NICOTINE STEP 2) 14 MG/24HR 24 hr patch   2. Acute bronchitis with coexisting condition requiring prophylactic treatment J20.9 azithromycin (ZITHROMAX) 250 MG tablet   3. Chronic bronchitis, unspecified chronic bronchitis type (H) J42            Medical Decision " Making:    Serious Comorbid Conditions: COPD    2. Cough, wheezing, congestion  Patient with just 2 days of symptoms. He is afebrile, no hypoxia or respiratory distress, and lungs were clear on exam today with good air movement. He does no appear to be in territory of COPD exacerbation today- symptoms well controlled with his inhaler. However, with COPD and history of such symptoms resulting in COPD exacerbation, did feel he was higher risk for bacterial etiology of bronchitis today. Prescribed azithromycin. Do not think that oral steroids are indicated today. Recommend he continue with albuterol inhaler.  Discussed if worsening symptoms, he should be seen immediately.    2. Smoking cessation- patient is in process. Requests refill of Nicotine patches which are working well for him at current dose. I prescribed refill and recommend he follow up with his PCP for further refills.      At the end of the encounter, I discussed all available results, as well as the diagnosis and any associated medications. I discussed red flags for immediate return to clinic/ER, as well as indications for follow up. Refer to patient instructions below, which were all addressed with patient. Patient understood and agreed to plan. Patient was appropriate for discharge.      Patient Instructions   1. Smoking cessation  I have refilled your Nicotine patches at your request today. Please follow up with your primary care provider for further refills.    2. Bronchitis  Take azithromycin antibiotic as prescribed x 5 days. Take with food.  Continue your albuterol inhaler 2 puffs every 4-6 hours for cough, wheezing.  Monitor symptoms closely - if worsening cough, developing fever, increasing wheezing or shortness of breath, then return to clinic or ER immediately. If coughing up blood or severe shortness of breath or chest pain, go to the ER.              Claudia Clemens PA-C

## 2018-09-29 ENCOUNTER — NURSE TRIAGE (OUTPATIENT)
Dept: NURSING | Facility: CLINIC | Age: 68
End: 2018-09-29

## 2018-09-29 NOTE — TELEPHONE ENCOUNTER
Patient calling stating he was seen in Urgent Care for bronchitis. Patient was given a 5 day dose of Zithromax and is requesting a refill. Stating in past he thought he had 10 days of antibiotics. Patient reporting he is improving. Ongoing sinus congestion, white productive mucus, wheezing that he states has improved.   Reviewed Zithromax dosing that this is typically a 5 day dose. Advised patient for any increase symptoms, increasing wheezing or shortness of breath to call back or be seen in ER per discharge instructions on 9/24/18.  Patient will continue to monitor symptoms as they are improving.   To call back with any change or increase in symptoms.     Caller verbalized understanding. Denies further questions.    Tennille Cummings RN  [facility]

## 2018-10-05 DIAGNOSIS — L81.8 POST INFLAMMATORY HYPOPIGMENTATION: ICD-10-CM

## 2018-10-05 DIAGNOSIS — J44.1 COPD EXACERBATION (H): ICD-10-CM

## 2018-10-08 NOTE — TELEPHONE ENCOUNTER
Thank you.  Refill is done. Topical ketoconazole would not be expected to interact in a significant way with advair.

## 2018-10-08 NOTE — TELEPHONE ENCOUNTER
Drug-Drug Interaction Report    Imidazoles / Salmeterol    Significance: Major     Warning: Pharmacologic effects of ADVAIR DISKUS may be increased by ketoconazole. Elevated salmeterol plasma concentrations with cardiovascular toxicity, such as QT prolongation, palpitations, and tachycardia may occur.    Onset: Delayed    Document Level: Suspected    Interacting Medications/Orders:  Imidazoles  Oral or Non-Oral, Systemic Salmeterol  Oral or Non-Oral, Systemic   1. ketoconazole    Order ([id number]): ketoconazole (NIZORAL) 2 % cream Route: Topical  Start: 06/11/2018 End: 10/09/2018 2359 Frequency: TWICE WEEKLY    Order ([id number]): ketoconazole (NIZORAL) 2 % cream Route: Topical  Start: 05/14/2018 End: none Frequency: 2 TIMES DAILY 1. ADVAIR DISKUS    Order: ADVAIR DISKUS 250-50 MCG/DOSE diskus inhaler [Pharmacy Med Name: ADVAIR DISKUS 250/50MCG (YELLOW) 60] Route: none  Start: 10/08/2018 End: none Frequency:     Management Code: Professional intervention required

## 2018-10-08 NOTE — TELEPHONE ENCOUNTER
Writer called patient left non detailed message requesting return call to clinic and ask to speak with nurse    Callie Sequeira RN

## 2018-10-09 RX ORDER — TACROLIMUS 1 MG/G
OINTMENT TOPICAL
Qty: 60 G | Refills: 0 | Status: SHIPPED | OUTPATIENT
Start: 2018-10-09 | End: 2019-05-14

## 2018-10-09 NOTE — TELEPHONE ENCOUNTER
Medication requested: tacrolimus (PROTOPIC) 0.1 %    Last refilled: 7/16/18   Qty: 60 G : 0    Last seen: 8/9/18  Next appt: 10/16/18  Refill decision:  * RF 6 MOS FROM LAST VISIT

## 2018-10-12 DIAGNOSIS — Z71.89 ENCOUNTER FOR HERB AND VITAMIN SUPPLEMENT MANAGEMENT: ICD-10-CM

## 2018-10-12 RX ORDER — MULTIVIT WITH MINERALS/LUTEIN
TABLET ORAL
Qty: 90 CAPSULE | Refills: 0 | Status: SHIPPED | OUTPATIENT
Start: 2018-10-12 | End: 2018-12-31

## 2018-10-12 NOTE — TELEPHONE ENCOUNTER
Prescription approved per [facility] Refill Protocol.  Recommended a 6 month recheck due to health history.  Felicia Yanez RN

## 2018-10-13 ENCOUNTER — OFFICE VISIT (OUTPATIENT)
Dept: URGENT CARE | Facility: URGENT CARE | Age: 68
End: 2018-10-13
Payer: COMMERCIAL

## 2018-10-13 VITALS
HEART RATE: 91 BPM | DIASTOLIC BLOOD PRESSURE: 71 MMHG | WEIGHT: 150 LBS | BODY MASS INDEX: 21.47 KG/M2 | TEMPERATURE: 99.1 F | HEIGHT: 70 IN | SYSTOLIC BLOOD PRESSURE: 122 MMHG | OXYGEN SATURATION: 97 %

## 2018-10-13 DIAGNOSIS — J20.9 ACUTE BRONCHITIS WITH COEXISTING CONDITION REQUIRING PROPHYLACTIC TREATMENT: ICD-10-CM

## 2018-10-13 DIAGNOSIS — J44.1 COPD EXACERBATION (H): Primary | ICD-10-CM

## 2018-10-13 PROCEDURE — 99214 OFFICE O/P EST MOD 30 MIN: CPT | Performed by: INTERNAL MEDICINE

## 2018-10-13 RX ORDER — DOXYCYCLINE 100 MG/1
100 CAPSULE ORAL 2 TIMES DAILY
Qty: 20 CAPSULE | Refills: 0 | Status: SHIPPED | OUTPATIENT
Start: 2018-10-13 | End: 2019-03-27

## 2018-10-13 RX ORDER — PREDNISONE 20 MG/1
40 TABLET ORAL DAILY
Qty: 10 TABLET | Refills: 0 | Status: SHIPPED | OUTPATIENT
Start: 2018-10-13 | End: 2018-10-18

## 2018-10-13 ASSESSMENT — ENCOUNTER SYMPTOMS
SHORTNESS OF BREATH: 0
ARTHRALGIAS: 1
CHILLS: 1
FEVER: 0
MYALGIAS: 1
FATIGUE: 1

## 2018-10-13 NOTE — PROGRESS NOTES
SUBJECTIVE:   Robert B Behr is a 68 year old male presenting with a chief complaint of   Chief Complaint   Patient presents with     Urgent Care     Generalized Body Aches     c/o bodyaches,chils and cold        He is an established patient of [facility].    URI Adult    Onset of symptoms was few week(s) ago.  Course of illness is waxing and waning.    Seen recently treatment zpak  Current and Associated symptoms: cough - productive  Treatment measures tried include Inhalers  Predisposing factors include tobacco use and HX of COPD.  Using patch - cut to 7 day cigs from 1/2ppd  Trying to quit      Review of Systems   Constitutional: Positive for chills and fatigue. Negative for fever.   HENT: Positive for congestion.    Respiratory: Negative for shortness of breath.    Musculoskeletal: Positive for arthralgias and myalgias.       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     Tobacco abuse      Family History   Problem Relation Age of Onset     Diabetes Brother      living     Cancer Brother      throat     Macular Degeneration Mother      Colon Cancer No family hx of      Glaucoma No family hx of      Retinal detachment No family hx of      Amblyopia No family hx of      Skin Cancer No family hx of      Melanoma No family hx of      Current Outpatient Prescriptions   Medication Sig Dispense Refill     ADVAIR DISKUS 250-50 MCG/DOSE diskus inhaler INHALE 1 PUFF INTO THE LUNGS TWICE DAILY 3 Inhaler 3     doxycycline (VIBRAMYCIN) 100 MG capsule Take 1 capsule (100 mg) by mouth 2 times daily 20 capsule 0     fluticasone-salmeterol (ADVAIR) 250-50 MCG/DOSE diskus inhaler Inhale 1 puff into the lungs 2 times daily 1 Inhaler 11     ipratropium - albuterol 0.5 mg/2.5 mg/3 mL (DUONEB) 0.5-2.5 (3) MG/3ML neb solution Take 1 vial (3 mLs) by nebulization  every 6 hours as needed for shortness of breath / dyspnea or wheezing 30 vial 1     Ipratropium-Albuterol (COMBIVENT RESPIMAT)  MCG/ACT inhaler INHALE ONE PUFF BY MOUTH FOUR TIMES A DAY (NO TO EXCCED 6 DOSES PER DAY) 4 g 3     predniSONE (DELTASONE) 20 MG tablet Take 2 tablets (40 mg) by mouth daily for 5 days 10 tablet 0     ascorbic acid (VITAMIN C) 1000 MG TABS TAKE 1 TABLET BY MOUTH DAILY (Patient not taking: Reported on 10/13/2018) 90 tablet 11     azithromycin (ZITHROMAX) 250 MG tablet Two tablets first day, then one tablet daily for four days. (Patient not taking: Reported on 10/13/2018) 6 tablet 0     B Complex-C (VITAMIN B COMPLEX W/VITAMIN C) TABS tablet TAKE 1 TABLET BY MOUTH TWICE DAILY WITH FOLIC ACID (Patient not taking: Reported on 10/13/2018) 180 tablet 11     calcium carbonate 600 mg-vitamin D 400 units (CALTRATE) 600-400 MG-UNIT per tablet TAKE 1 TABLET BY MOUTH TWICE DAILY (Patient not taking: Reported on 10/13/2018) 180 tablet 1     Calcium-Magnesium-Vitamin D (CALCIUM 1200+D3) 600- MG-MG-UNIT TB24 Take 1-2 tablets by mouth every morning Reported on 3/14/2017       erythromycin with ethanol (EMGEL) 2 % gel Apply topically daily (Patient not taking: Reported on 9/24/2018) 60 g 0     fish oil-omega-3 fatty acids 1000 MG capsule TAKE 1 CAPSULE BY MOUTH DAILY (Patient not taking: Reported on 10/13/2018) 90 capsule 11     ginkgo biloba 60 MG CAPS capsule Take 1 capsule (60 mg) by mouth daily (Patient not taking: Reported on 10/13/2018) 90 capsule 3     glucosamine-chondroitinoitin 750-600 MG TABS TAKE 2 TABLETS BY MOUTH TWICE DAILY (Patient not taking: Reported on 10/13/2018) 120 tablet 5     ipratropium - albuterol 0.5 mg/2.5 mg/3 mL (DUONEB) 0.5-2.5 (3) MG/3ML neb solution Take 1 vial (3 mLs) by nebulization every 6 hours as needed for shortness of breath / dyspnea or wheezing (Patient not taking: Reported on 10/13/2018) 30 vial 1     ketoconazole (NIZORAL) 2 % cream Apply topically 2  times daily To all affected areas with white spots (Patient not taking: Reported on 10/13/2018) 60 g 3     Lactobacillus-Inulin (CULTURELLE DIGESTIVE HEALTH) CAPS Take 1 capsule by mouth 2 times daily Culturelle or something similiar (Patient not taking: Reported on 10/13/2018) 100 capsule 3     nicotine (NICOTINE STEP 2) 14 MG/24HR 24 hr patch PLACE 1 PATCH TO SKIN EVERY 24 HOURS (Patient not taking: Reported on 10/13/2018) 28 patch 1     nicotine polacrilex (COMMIT) 2 MG lozenge Place 1 lozenge (2 mg) inside cheek as needed for smoking cessation Place 1 lozenge inside cheek as needed for smoking cessation. (Patient not taking: Reported on 10/13/2018) 150 lozenge 3     order for DME Equipment being ordered: nebulizer machine (Patient not taking: Reported on 10/13/2018) 1 each 0     prednisoLONE acetate (PRED FORTE) 1 % ophthalmic susp 1 drop in surgical eye as directed, 4x daily after surgery for 1 week, 3x daily for 1 week, 2x daily for 1 week, daily for 1 week, then stop (Patient not taking: Reported on 9/24/2018) 1 Bottle 1     predniSONE (DELTASONE) 20 MG tablet Take 3 tablets (60 mg) by mouth daily (Patient not taking: Reported on 10/13/2018) 18 tablet 0     predniSONE (DELTASONE) 20 MG tablet Take 1 tablet (20 mg) by mouth 2 times daily (Patient not taking: Reported on 9/24/2018) 10 tablet 0     Spacer/Aero-Holding Chambers (POCKET SPACER) MARGARITA Attach to albuterol inhaler, use 2 puffs every 4-6 hours as needed. (Patient not taking: Reported on 10/13/2018) 1 each 0     tacrolimus (PROTOPIC) 0.1 % ointment APPLY TOPICALLY TO THE AFFECTED AREAS OF THE SKIN, ESPECIALLY THE SCALP, TWICE DAILY (Patient not taking: Reported on 10/13/2018) 60 g 0     vitamin A 77293 UNIT capsule TAKE 1 CAPSULE BY MOUTH DAILY (Patient not taking: Reported on 10/13/2018) 180 capsule 11     vitamin B complex with vitamin C (VITAMIN  B COMPLEX) TABS tablet Take 1 tablet by mouth 2 times daily With Folic acid (Patient not taking: Reported  "on 10/13/2018) 180 tablet 2     vitamin E (TOCOPHEROL) 1000 UNIT capsule TAKE 1 CAPSULE BY MOUTH DAILY (Patient not taking: Reported on 10/13/2018) 90 capsule 0     Social History   Substance Use Topics     Smoking status: Current Every Day Smoker     Packs/day: 0.10     Years: 45.00     Types: Cigarettes     Smokeless tobacco: Former User      Comment: 3-7 cigarettes/day (8/29/17). 1.5 packs for 45 years smoker     Alcohol use 0.0 oz/week     0 Standard drinks or equivalent per week      Comment: occ beer       OBJECTIVE  /71  Pulse 91  Temp 99.1  F (37.3  C) (Tympanic)  Ht 5' 10\" (1.778 m)  Wt 150 lb (68 kg)  SpO2 97%  BMI 21.52 kg/m2    Physical Exam   Constitutional: He appears well-developed and well-nourished.   HENT:   Mouth/Throat: No oropharyngeal exudate.   Tm nl b   Cardiovascular: Normal rate, regular rhythm and normal heart sounds.    Pulmonary/Chest: Effort normal. No respiratory distress. He has no wheezes.   Vitals reviewed.      Labs:  No results found for this or any previous visit (from the past 24 hour(s)).    X-Ray was not done.    ASSESSMENT:      ICD-10-CM    1. COPD exacerbation (H) J44.1 predniSONE (DELTASONE) 20 MG tablet   2. Acute bronchitis with coexisting condition requiring prophylactic treatment J20.9 doxycycline (VIBRAMYCIN) 100 MG capsule        Medical Decision Making:    Differential Diagnosis:  URI Adult/Peds:  Copd exacerbation with bronchitis    Serious Comorbid Conditions:  Adult:  COPD    PLAN:    Prednisone and doxycycline    Followup:    If not improving or if condition worsens, follow up with your Primary Care Provider          "

## 2018-10-13 NOTE — MR AVS SNAPSHOT
After Visit Summary   10/13/2018    Robert B Behr    MRN: [id number]           Patient Information     Date Of Birth          1950        Visit Information        Provider Department      10/13/2018 3:05 PM Jovita De La Torre MD [facility]        Today's Diagnoses     COPD exacerbation (H)    -  1    Acute bronchitis with coexisting condition requiring prophylactic treatment           Follow-ups after your visit        Your next 10 appointments already scheduled     Oct 16, 2018  3:50 PM CDT   (Arrive by 3:35 PM)   Return Visit with Raquel Gandhi MD   [facility] ([facility])    [address]  3rd Floor  [facility] [address] [phone]              Who to contact     If you have questions or need follow up information about today's clinic visit or your schedule please contact [facility] directly at [phone].  Normal or non-critical lab and imaging results will be communicated to you by MyChart, letter or phone within 4 business days after the clinic has received the results. If you do not hear from us within 7 days, please contact the clinic through [facility] or phone. If you have a critical or abnormal lab result, we will notify you by phone as soon as possible.  Submit refill requests through [facility] or call your pharmacy and they will forward the refill request to us. Please allow 3 business days for your refill to be completed.          Additional Information About Your Visit        [facility] Information     [facility] gives you secure access to your electronic health record. If you see a primary care provider, you can also send messages to your care team and make appointments. If you have questions, please call your primary care clinic.  If you do not have a primary care provider, please call [phone] and they will assist you.        Care EveryWhere ID     This is your Care  "EveryWhere ID. This could be used by other organizations to access your [facility] medical records  [id number]        Your Vitals Were     Pulse Temperature Height Pulse Oximetry BMI (Body Mass Index)       91 99.1  F (37.3  C) (Tympanic) 5' 10\" (1.778 m) 97% 21.52 kg/m2        Blood Pressure from Last 3 Encounters:   10/13/18 122/71   07/30/18 138/77   07/20/18 120/70    Weight from Last 3 Encounters:   10/13/18 150 lb (68 kg)   09/24/18 150 lb (68 kg)   07/30/18 151 lb (68.5 kg)              Today, you had the following     No orders found for display         Today's Medication Changes          These changes are accurate as of 10/13/18  3:51 PM.  If you have any questions, ask your nurse or doctor.               Start taking these medicines.        Dose/Directions    doxycycline 100 MG capsule   Commonly known as:  VIBRAMYCIN   Used for:  Acute bronchitis with coexisting condition requiring prophylactic treatment   Started by:  Jovita De La Torre MD        Dose:  100 mg   Take 1 capsule (100 mg) by mouth 2 times daily   Quantity:  20 capsule   Refills:  0         These medicines have changed or have updated prescriptions.        Dose/Directions    * predniSONE 20 MG tablet   Commonly known as:  DELTASONE   This may have changed:  Another medication with the same name was added. Make sure you understand how and when to take each.   Used for:  COPD exacerbation (H)   Changed by:  Jovita De La Torre MD        Dose:  20 mg   Take 1 tablet (20 mg) by mouth 2 times daily   Quantity:  10 tablet   Refills:  0       * predniSONE 20 MG tablet   Commonly known as:  DELTASONE   This may have changed:  Another medication with the same name was added. Make sure you understand how and when to take each.   Used for:  COPD exacerbation (H)   Changed by:  Jovita De La Torre MD        Dose:  60 mg   Take 3 tablets (60 mg) by mouth daily   Quantity:  18 tablet   Refills:  0       * predniSONE 20 MG tablet   Commonly " known as:  DELTASONE   This may have changed:  You were already taking a medication with the same name, and this prescription was added. Make sure you understand how and when to take each.   Used for:  COPD exacerbation (H)   Changed by:  Jovita De La Torre MD        Dose:  40 mg   Take 2 tablets (40 mg) by mouth daily for 5 days   Quantity:  10 tablet   Refills:  0       * Notice:  This list has 3 medication(s) that are the same as other medications prescribed for you. Read the directions carefully, and ask your doctor or other care provider to review them with you.         Where to get your medicines      These medications were sent to [facility] "[facility] [id number] - [place], [place] - [address] AT [facility] [address]  [address], [place] [address]     Phone:  [phone]     doxycycline 100 MG capsule    predniSONE 20 MG tablet                Primary Care Provider Office Phone # Fax #    Fina Frausto [phone] [address] [place] [address]        Equal Access to Services     [facility]: Hadii marylou chaney hadasho Sojasmin, waaxda luqadaha, qaybta kaalmada adekayeyaмарина, kael rangel . So [facility] [phone].    ATENCIÓN: Si habla español, tiene a rios disposición servicios gratuitos de asistencia lingüística. Llame al [phone].    We comply with applicable federal civil rights laws and [place] laws. We do not discriminate on the basis of race, color, national origin, age, disability, sex, sexual orientation, or gender identity.            Thank you!     Thank you for choosing [facility]  for your care. Our goal is always to provide you with excellent care. Hearing back from our patients is one way we can continue to improve our services. Please take a few minutes to complete the written survey that you may receive in the mail after your visit with us. Thank you!             Your Updated Medication List -  Protect others around you: Learn how to safely use, store and throw away your medicines at www.disposemymeds.org.          This list is accurate as of 10/13/18  3:51 PM.  Always use your most recent med list.                   Brand Name Dispense Instructions for use Diagnosis    ascorbic acid 1000 MG Tabs    vitamin C    90 tablet    TAKE 1 TABLET BY MOUTH DAILY    Encounter for herb and vitamin supplement management       azithromycin 250 MG tablet    ZITHROMAX    6 tablet    Two tablets first day, then one tablet daily for four days.    Acute bronchitis with coexisting condition requiring prophylactic treatment       CALCIUM 1200+D3 600- MG-MG-UNIT Tb24   Generic drug:  Calcium-Magnesium-Vitamin D      Take 1-2 tablets by mouth every morning Reported on 3/14/2017        calcium carbonate 600 mg-vitamin D 400 units 600-400 MG-UNIT per tablet    CALTRATE    180 tablet    TAKE 1 TABLET BY MOUTH TWICE DAILY    Age-related osteoporosis without current pathological fracture       CULTURELLE DIGESTIVE HEALTH Caps     100 capsule    Take 1 capsule by mouth 2 times daily Culturelle or something similiar    Bloating       doxycycline 100 MG capsule    VIBRAMYCIN    20 capsule    Take 1 capsule (100 mg) by mouth 2 times daily    Acute bronchitis with coexisting condition requiring prophylactic treatment       erythromycin with ethanol 2 % gel    EMGEL    60 g    Apply topically daily    Erythrasma       fish oil-omega-3 fatty acids 1000 MG capsule     90 capsule    TAKE 1 CAPSULE BY MOUTH DAILY    Encounter for herb and vitamin supplement management       * fluticasone-salmeterol 250-50 MCG/DOSE diskus inhaler    ADVAIR    1 Inhaler    Inhale 1 puff into the lungs 2 times daily    Chronic obstructive pulmonary disease, unspecified COPD type (H)       * ADVAIR DISKUS 250-50 MCG/DOSE diskus inhaler   Generic drug:  fluticasone-salmeterol     3 Inhaler    INHALE 1 PUFF INTO THE LUNGS TWICE DAILY    COPD exacerbation (H)        ginkgo biloba 60 MG Caps capsule     90 capsule    Take 1 capsule (60 mg) by mouth daily    Vitiligo       glucosamine-chondroitinoitin 750-600 MG Tabs     120 tablet    TAKE 2 TABLETS BY MOUTH TWICE DAILY    Osteoarthritis of right hip, unspecified osteoarthritis type       * ipratropium - albuterol 0.5 mg/2.5 mg/3 mL 0.5-2.5 (3) MG/3ML neb solution    DUONEB    30 vial    Take 1 vial (3 mLs) by nebulization every 6 hours as needed for shortness of breath / dyspnea or wheezing    COPD exacerbation (H)       * ipratropium - albuterol 0.5 mg/2.5 mg/3 mL 0.5-2.5 (3) MG/3ML neb solution    DUONEB    30 vial    Take 1 vial (3 mLs) by nebulization every 6 hours as needed for shortness of breath / dyspnea or wheezing    COPD exacerbation (H)       * Ipratropium-Albuterol  MCG/ACT inhaler    COMBIVENT RESPIMAT    4 g    INHALE ONE PUFF BY MOUTH FOUR TIMES A DAY (NO TO EXCCED 6 DOSES PER DAY)    COPD exacerbation (H)       ketoconazole 2 % cream    NIZORAL    60 g    Apply topically 2 times daily To all affected areas with white spots    Hypopigmented skin lesion       nicotine 14 MG/24HR 24 hr patch    NICOTINE STEP 2    28 patch    PLACE 1 PATCH TO SKIN EVERY 24 HOURS    Tobacco use disorder       nicotine polacrilex 2 MG lozenge    COMMIT    150 lozenge    Place 1 lozenge (2 mg) inside cheek as needed for smoking cessation Place 1 lozenge inside cheek as needed for smoking cessation.    Chronic obstructive pulmonary disease, unspecified COPD type (H), Tobacco abuse       order for DME     1 each    Equipment being ordered: nebulizer machine    COPD exacerbation (H)       pocket spacer Jeanine     1 each    Attach to albuterol inhaler, use 2 puffs every 4-6 hours as needed.    Chronic obstructive pulmonary disease, unspecified COPD type (H)       prednisoLONE acetate 1 % ophthalmic susp    PRED FORTE    1 Bottle    1 drop in surgical eye as directed, 4x daily after surgery for 1 week, 3x daily for 1 week, 2x  daily for 1 week, daily for 1 week, then stop    Nuclear cataract of left eye       * predniSONE 20 MG tablet    DELTASONE    10 tablet    Take 1 tablet (20 mg) by mouth 2 times daily    COPD exacerbation (H)       * predniSONE 20 MG tablet    DELTASONE    18 tablet    Take 3 tablets (60 mg) by mouth daily    COPD exacerbation (H)       * predniSONE 20 MG tablet    DELTASONE    10 tablet    Take 2 tablets (40 mg) by mouth daily for 5 days    COPD exacerbation (H)       tacrolimus 0.1 % ointment    PROTOPIC    60 g    APPLY TOPICALLY TO THE AFFECTED AREAS OF THE SKIN, ESPECIALLY THE SCALP, TWICE DAILY    Post inflammatory hypopigmentation       vitamin A 24049 UNIT capsule     180 capsule    TAKE 1 CAPSULE BY MOUTH DAILY    Encounter for herb and vitamin supplement management       vitamin b complex w/vitamin C Tabs tablet     180 tablet    TAKE 1 TABLET BY MOUTH TWICE DAILY WITH FOLIC ACID    Takes dietary supplements       vitamin B complex with vitamin C Tabs tablet     180 tablet    Take 1 tablet by mouth 2 times daily With Folic acid    Encounter for herb and vitamin supplement management       vitamin E 1000 UNIT capsule    TOCOPHEROL    90 capsule    TAKE 1 CAPSULE BY MOUTH DAILY    Encounter for herb and vitamin supplement management       * Notice:  This list has 8 medication(s) that are the same as other medications prescribed for you. Read the directions carefully, and ask your doctor or other care provider to review them with you.

## 2018-10-15 ENCOUNTER — PATIENT OUTREACH (OUTPATIENT)
Dept: GERIATRIC MEDICINE | Facility: CLINIC | Age: 68
End: 2018-10-15

## 2018-10-15 NOTE — PROGRESS NOTES
[facility] Care Coordination Contact  Arranged transportation thru [facility] for the below appt:  Appt Date & Time: 10/16 at 3:35pm  Clinic Name & Address:  [facility], [address] [place] [address],[facility]  Transportation Provider: Airport   time:  2:35-3:05pm    Notified member of  time.  Nai Soliman  Case Management Specialist  [facility]  [phone]

## 2018-10-16 ENCOUNTER — OFFICE VISIT (OUTPATIENT)
Dept: DERMATOLOGY | Facility: CLINIC | Age: 68
End: 2018-10-16
Payer: COMMERCIAL

## 2018-10-16 VITALS — HEART RATE: 79 BPM | DIASTOLIC BLOOD PRESSURE: 70 MMHG | SYSTOLIC BLOOD PRESSURE: 123 MMHG

## 2018-10-16 DIAGNOSIS — L81.9 HYPOPIGMENTATION: Primary | ICD-10-CM

## 2018-10-16 ASSESSMENT — PAIN SCALES - GENERAL: PAINLEVEL: NO PAIN (0)

## 2018-10-16 NOTE — LETTER
10/16/2018       RE: Robert B Behr  [address]  [place] [address]     Dear Colleague,    Thank you for referring your patient, Robert B Behr, to the [facility] DERMATOLOGY at [facility]. Please see a copy of my visit note below.    [facility] Dermatology Note    Dermatology Problem List:  1. Hypo/depigmented macules and patches on scalp as well as depigmented macules on low back, chest, and shins  - Differential diagnosis includes vitiligo, tinea versicolor, actinic poikiloderma/idiopathic guttate hypomelanosis, progressive macular hypomelanosis; possible overlap of etiologies  - 5/14/18: TSH/free T4     Encounter Date: Oct 16, 2018    CC:   Chief Complaint   Patient presents with     Derm Problem     Ravin is here for a follow up for vitiligio. States there's been improvement.        History of Present Illness:  Mr. Robert B Behr is a 68 year old male who presents as a follow-up for hyperpigmented patches on scalp. The patient was last seen 7/16/18 when he was placed on topical erythromycin 2% twice daily.  The patient feels his hypopigmentation has improved on his scalp.  He noted no issues on his back, chest, arms.  He noted that his skin has become lighter now that he is no longer in the sun as he was during the summer.    He has also been taking ginkgo biloba twice per day, but he is not sure how much he is taking.  He noted systemic adverse effects with tacrolimus topical ointment.  He denies any bleeding issues.    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     Tobacco abuse      Past Surgical History:   Procedure Laterality Date     CATARACT IOL, RT/LT Left 09/15/2017    s/p CE/IOL left eye     CATARACT IOL, RT/LT Right      PHACOEMULSIFICATION CLEAR CORNEA WITH STANDARD INTRAOCULAR LENS IMPLANT Right 9/30/2016    Procedure: PHACOEMULSIFICATION CLEAR CORNEA WITH STANDARD INTRAOCULAR LENS IMPLANT;  Surgeon: Elly Valencia MD;  Location: [facility]     PHACOEMULSIFICATION WITH STANDARD INTRAOCULAR LENS IMPLANT Left 9/15/2017    Procedure: PHACOEMULSIFICATION WITH STANDARD INTRAOCULAR LENS IMPLANT;  Left Eye Phacoemulsification with Standard Lens;  Surgeon: Elly Valencia MD;  [facility]     WRIST SURGERY         Medications:  Current Outpatient Prescriptions   Medication Sig Dispense Refill     ADVAIR DISKUS 250-50 MCG/DOSE diskus inhaler INHALE 1 PUFF INTO THE LUNGS TWICE DAILY 3 Inhaler 3     ascorbic acid (VITAMIN C) 1000 MG TABS TAKE 1 TABLET BY MOUTH DAILY 90 tablet 11     azithromycin (ZITHROMAX) 250 MG tablet Two tablets first day, then one tablet daily for four days. 6 tablet 0     B Complex-C (VITAMIN B COMPLEX W/VITAMIN C) TABS tablet TAKE 1 TABLET BY MOUTH TWICE DAILY WITH FOLIC ACID 180 tablet 11     calcium carbonate 600 mg-vitamin D 400 units (CALTRATE) 600-400 MG-UNIT per tablet TAKE 1 TABLET BY MOUTH TWICE DAILY 180 tablet 1     Calcium-Magnesium-Vitamin D (CALCIUM 1200+D3) 600- MG-MG-UNIT TB24 Take 1-2 tablets by mouth every morning Reported on 3/14/2017       doxycycline (VIBRAMYCIN) 100 MG capsule Take 1 capsule (100 mg) by mouth 2 times daily 20 capsule 0     erythromycin with ethanol (EMGEL) 2 % gel Apply topically daily 60 g 0     fish oil-omega-3 fatty acids 1000 MG capsule TAKE 1 CAPSULE BY MOUTH DAILY 90 capsule 11     fluticasone-salmeterol (ADVAIR) 250-50 MCG/DOSE diskus inhaler Inhale 1 puff into the lungs 2 times daily 1 Inhaler 11     ginkgo  biloba 60 MG CAPS capsule Take 1 capsule (60 mg) by mouth daily 90 capsule 3     glucosamine-chondroitinoitin 750-600 MG TABS TAKE 2 TABLETS BY MOUTH TWICE DAILY 120 tablet 5     ipratropium - albuterol 0.5 mg/2.5 mg/3 mL (DUONEB) 0.5-2.5 (3) MG/3ML neb solution Take 1 vial (3 mLs) by nebulization every 6 hours as needed for shortness of breath / dyspnea or wheezing 30 vial 1     ipratropium - albuterol 0.5 mg/2.5 mg/3 mL (DUONEB) 0.5-2.5 (3) MG/3ML neb solution Take 1 vial (3 mLs) by nebulization every 6 hours as needed for shortness of breath / dyspnea or wheezing 30 vial 1     Ipratropium-Albuterol (COMBIVENT RESPIMAT)  MCG/ACT inhaler INHALE ONE PUFF BY MOUTH FOUR TIMES A DAY (NO TO EXCCED 6 DOSES PER DAY) 4 g 3     ketoconazole (NIZORAL) 2 % cream Apply topically 2 times daily To all affected areas with white spots 60 g 3     Lactobacillus-Inulin (CULTURELLE DIGESTIVE HEALTH) CAPS Take 1 capsule by mouth 2 times daily Culturelle or something similiar 100 capsule 3     nicotine (NICOTINE STEP 2) 14 MG/24HR 24 hr patch PLACE 1 PATCH TO SKIN EVERY 24 HOURS 28 patch 1     nicotine polacrilex (COMMIT) 2 MG lozenge Place 1 lozenge (2 mg) inside cheek as needed for smoking cessation Place 1 lozenge inside cheek as needed for smoking cessation. 150 lozenge 3     order for DME Equipment being ordered: nebulizer machine 1 each 0     prednisoLONE acetate (PRED FORTE) 1 % ophthalmic susp 1 drop in surgical eye as directed, 4x daily after surgery for 1 week, 3x daily for 1 week, 2x daily for 1 week, daily for 1 week, then stop 1 Bottle 1     predniSONE (DELTASONE) 20 MG tablet Take 2 tablets (40 mg) by mouth daily for 5 days 10 tablet 0     predniSONE (DELTASONE) 20 MG tablet Take 3 tablets (60 mg) by mouth daily 18 tablet 0     predniSONE (DELTASONE) 20 MG tablet Take 1 tablet (20 mg) by mouth 2 times daily 10 tablet 0     Spacer/Aero-Holding Chambers (POCKET SPACER) MARGARITA Attach to albuterol inhaler, use 2 puffs  every 4-6 hours as needed. 1 each 0     tacrolimus (PROTOPIC) 0.1 % ointment APPLY TOPICALLY TO THE AFFECTED AREAS OF THE SKIN, ESPECIALLY THE SCALP, TWICE DAILY 60 g 0     vitamin A 41029 UNIT capsule TAKE 1 CAPSULE BY MOUTH DAILY 180 capsule 11     vitamin B complex with vitamin C (VITAMIN  B COMPLEX) TABS tablet Take 1 tablet by mouth 2 times daily With Folic acid 180 tablet 2     vitamin E (TOCOPHEROL) 1000 UNIT capsule TAKE 1 CAPSULE BY MOUTH DAILY 90 capsule 0        Allergies:  No Known Allergies      Review of Systems:  Constitutional: No recent fevers, chills, night sweats.  Skin: As above in HPI. Otherwise no additional skin rashes or changes.    Physical exam:  Vitals: /70 (BP Location: Right arm, Patient Position: Chair, Cuff Size: Adult Regular)  Pulse 79  GEN: This is a well developed, well-nourished male in no acute distress, in a pleasant mood.    SKIN: Waist-up skin, which includes the head/face, neck, both arms, chest, back, abdomen, digits and/or nails was examined.  -Hypopigmented patches are  present on scalp, no erythema or raised areas.  Clean margins present in areas of hypopigmentation. Negative woods lamp evaluation  -No other lesions of concern on areas examined.     Impression/Plan:  1. Hypopigmented patches on scalp, improving.  Differential includes vitiligo versus postinflammatory hyperpigmentation.  He did not tolerate Protopic 0.1% ointment twice daily due to systemic adverse effects.  He noted he is tolerating ginkgo biloba PO and feels this is helpful.    Continue using OTC head and shoulder shampoo    Continue ginkgo biloba if no adverse effects      Return to clinic in 4-5 months    Dr. Gandhi staffed the patient.    Edgard Parsons MD  Internal Medicine PGY-3    Patient was seen and examined with the medicine resident. I agree with the history, review of systems, physical examination, assessments and plan.    Raquel Gandhi MD  Professor and  Chair  Department of  Dermatology  [facility]

## 2018-10-16 NOTE — NURSING NOTE
Dermatology Rooming Note    Robert B Behr's goals for this visit include:   Chief Complaint   Patient presents with     Derm Problem     Ravin is here for a follow up for vitiligio. States there's been improvement.        Kelley Haas LPN

## 2018-10-16 NOTE — PROGRESS NOTES
[facility] Dermatology Note    Dermatology Problem List:  1. Hypo/depigmented macules and patches on scalp as well as depigmented macules on low back, chest, and shins  - Differential diagnosis includes vitiligo, tinea versicolor, actinic poikiloderma/idiopathic guttate hypomelanosis, progressive macular hypomelanosis; possible overlap of etiologies  - 5/14/18: TSH/free T4     Encounter Date: Oct 16, 2018    CC:   Chief Complaint   Patient presents with     Derm Problem     Ravin is here for a follow up for vitiligio. States there's been improvement.        History of Present Illness:  Mr. Robert B Behr is a 68 year old male who presents as a follow-up for hyperpigmented patches on scalp. The patient was last seen 7/16/18 when he was placed on topical erythromycin 2% twice daily.  The patient feels his hypopigmentation has improved on his scalp.  He noted no issues on his back, chest, arms.  He noted that his skin has become lighter now that he is no longer in the sun as he was during the summer.    He has also been taking ginkgo biloba twice per day, but he is not sure how much he is taking.  He noted systemic adverse effects with tacrolimus topical ointment.  He denies any bleeding issues.    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     Tobacco abuse      Past Surgical History:   Procedure Laterality Date     CATARACT IOL, RT/LT Left 09/15/2017    s/p CE/IOL left eye     CATARACT IOL, RT/LT Right      PHACOEMULSIFICATION CLEAR  CORNEA WITH STANDARD INTRAOCULAR LENS IMPLANT Right 9/30/2016    Procedure: PHACOEMULSIFICATION CLEAR CORNEA WITH STANDARD INTRAOCULAR LENS IMPLANT;  Surgeon: Elly Valencia MD;  Location: [facility]     PHACOEMULSIFICATION WITH STANDARD INTRAOCULAR LENS IMPLANT Left 9/15/2017    Procedure: PHACOEMULSIFICATION WITH STANDARD INTRAOCULAR LENS IMPLANT;  Left Eye Phacoemulsification with Standard Lens;  Surgeon: Elly Valencia MD;  [facility]     WRIST SURGERY         Medications:  Current Outpatient Prescriptions   Medication Sig Dispense Refill     ADVAIR DISKUS 250-50 MCG/DOSE diskus inhaler INHALE 1 PUFF INTO THE LUNGS TWICE DAILY 3 Inhaler 3     ascorbic acid (VITAMIN C) 1000 MG TABS TAKE 1 TABLET BY MOUTH DAILY 90 tablet 11     azithromycin (ZITHROMAX) 250 MG tablet Two tablets first day, then one tablet daily for four days. 6 tablet 0     B Complex-C (VITAMIN B COMPLEX W/VITAMIN C) TABS tablet TAKE 1 TABLET BY MOUTH TWICE DAILY WITH FOLIC ACID 180 tablet 11     calcium carbonate 600 mg-vitamin D 400 units (CALTRATE) 600-400 MG-UNIT per tablet TAKE 1 TABLET BY MOUTH TWICE DAILY 180 tablet 1     Calcium-Magnesium-Vitamin D (CALCIUM 1200+D3) 600- MG-MG-UNIT TB24 Take 1-2 tablets by mouth every morning Reported on 3/14/2017       doxycycline (VIBRAMYCIN) 100 MG capsule Take 1 capsule (100 mg) by mouth 2 times daily 20 capsule 0     erythromycin with ethanol (EMGEL) 2 % gel Apply topically daily 60 g 0     fish oil-omega-3 fatty acids 1000 MG capsule TAKE 1 CAPSULE BY MOUTH DAILY 90 capsule 11     fluticasone-salmeterol (ADVAIR) 250-50 MCG/DOSE diskus inhaler Inhale 1 puff into the lungs 2 times daily 1 Inhaler 11     ginkgo biloba 60 MG CAPS capsule Take 1 capsule (60 mg) by mouth daily 90 capsule 3     glucosamine-chondroitinoitin 750-600 MG TABS TAKE 2 TABLETS BY MOUTH TWICE DAILY 120 tablet 5     ipratropium - albuterol 0.5 mg/2.5 mg/3 mL (DUONEB) 0.5-2.5 (3) MG/3ML neb solution Take 1 vial (3 mLs) by  nebulization every 6 hours as needed for shortness of breath / dyspnea or wheezing 30 vial 1     ipratropium - albuterol 0.5 mg/2.5 mg/3 mL (DUONEB) 0.5-2.5 (3) MG/3ML neb solution Take 1 vial (3 mLs) by nebulization every 6 hours as needed for shortness of breath / dyspnea or wheezing 30 vial 1     Ipratropium-Albuterol (COMBIVENT RESPIMAT)  MCG/ACT inhaler INHALE ONE PUFF BY MOUTH FOUR TIMES A DAY (NO TO EXCCED 6 DOSES PER DAY) 4 g 3     ketoconazole (NIZORAL) 2 % cream Apply topically 2 times daily To all affected areas with white spots 60 g 3     Lactobacillus-Inulin (CULTUREStreamStarE DIGESTIVE HEALTH) CAPS Take 1 capsule by mouth 2 times daily Culturelle or something similiar 100 capsule 3     nicotine (NICOTINE STEP 2) 14 MG/24HR 24 hr patch PLACE 1 PATCH TO SKIN EVERY 24 HOURS 28 patch 1     nicotine polacrilex (COMMIT) 2 MG lozenge Place 1 lozenge (2 mg) inside cheek as needed for smoking cessation Place 1 lozenge inside cheek as needed for smoking cessation. 150 lozenge 3     order for DME Equipment being ordered: nebulizer machine 1 each 0     prednisoLONE acetate (PRED FORTE) 1 % ophthalmic susp 1 drop in surgical eye as directed, 4x daily after surgery for 1 week, 3x daily for 1 week, 2x daily for 1 week, daily for 1 week, then stop 1 Bottle 1     predniSONE (DELTASONE) 20 MG tablet Take 2 tablets (40 mg) by mouth daily for 5 days 10 tablet 0     predniSONE (DELTASONE) 20 MG tablet Take 3 tablets (60 mg) by mouth daily 18 tablet 0     predniSONE (DELTASONE) 20 MG tablet Take 1 tablet (20 mg) by mouth 2 times daily 10 tablet 0     Spacer/Aero-Holding Chambers (POCKET SPACER) MARGARITA Attach to albuterol inhaler, use 2 puffs every 4-6 hours as needed. 1 each 0     tacrolimus (PROTOPIC) 0.1 % ointment APPLY TOPICALLY TO THE AFFECTED AREAS OF THE SKIN, ESPECIALLY THE SCALP, TWICE DAILY 60 g 0     vitamin A 16614 UNIT capsule TAKE 1 CAPSULE BY MOUTH DAILY 180 capsule 11     vitamin B complex with vitamin C  (VITAMIN  B COMPLEX) TABS tablet Take 1 tablet by mouth 2 times daily With Folic acid 180 tablet 2     vitamin E (TOCOPHEROL) 1000 UNIT capsule TAKE 1 CAPSULE BY MOUTH DAILY 90 capsule 0        Allergies:  No Known Allergies      Review of Systems:  Constitutional: No recent fevers, chills, night sweats.  Skin: As above in HPI. Otherwise no additional skin rashes or changes.    Physical exam:  Vitals: /70 (BP Location: Right arm, Patient Position: Chair, Cuff Size: Adult Regular)  Pulse 79  GEN: This is a well developed, well-nourished male in no acute distress, in a pleasant mood.    SKIN: Waist-up skin, which includes the head/face, neck, both arms, chest, back, abdomen, digits and/or nails was examined.  -Hypopigmented patches are  present on scalp, no erythema or raised areas.  Clean margins present in areas of hypopigmentation. Negative woods lamp evaluation  -No other lesions of concern on areas examined.     Impression/Plan:  1. Hypopigmented patches on scalp, improving.  Differential includes vitiligo versus postinflammatory hyperpigmentation.  He did not tolerate Protopic 0.1% ointment twice daily due to systemic adverse effects.  He noted he is tolerating ginkgo biloba PO and feels this is helpful.    Continue using OTC head and shoulder shampoo    Continue ginkgo biloba if no adverse effects      Return to clinic in 4-5 months    Dr. Gandhi staffed the patient.    Edgard Parsons MD  Internal Medicine PGY-3    Patient was seen and examined with the medicine resident. I agree with the history, review of systems, physical examination, assessments and plan.    Raquel Gandhi MD  Professor and  Chair  Department of Dermatology  [facility]

## 2018-10-16 NOTE — MR AVS SNAPSHOT
After Visit Summary   10/16/2018    Robert B Behr    MRN: [id number]           Patient Information     Date Of Birth          1950        Visit Information        Provider Department      10/16/2018 3:50 PM Raquel Gandhi MD [facility] Dermatology        Today's Diagnoses     Hypopigmentation    -  1       Follow-ups after your visit        Follow-up notes from your care team     Return in about 4 months (around 2/16/2019).      Who to contact     Please call your clinic at [phone] to:    Ask questions about your health    Make or cancel appointments    Discuss your medicines    Learn about your test results    Speak to your doctor            Additional Information About Your Visit        [employer] Information     [employer] gives you secure access to your electronic health record. If you see a primary care provider, you can also send messages to your care team and make appointments. If you have questions, please call your primary care clinic.  If you do not have a primary care provider, please call [phone] and they will assist you.      [employer] is an electronic gateway that provides easy, online access to your medical records. With [employer], you can request a clinic appointment, read your test results, renew a prescription or communicate with your care team.     To access your existing account, please contact your [facility] or call [phone] for assistance.        Care EveryWhere ID     This is your Care EveryWhere ID. This could be used by other organizations to access your [place] medical records  [id number]        Your Vitals Were     Pulse                   79            Blood Pressure from Last 3 Encounters:   11/08/18 134/78   10/16/18 123/70   10/13/18 122/71    Weight from Last 3 Encounters:   11/08/18 68.5 kg (151 lb)   10/13/18 68 kg (150 lb)   09/24/18 68 kg (150 lb)              Today, you had the following     No orders found for  display       Primary Care Provider Office Phone # Fax #    Fina Frausto [phone] [address] [place]  [place] [address]        Equal Access to Services     TIARRA PAPPAS : Hadii aad ku hadleannjuvenal Sojasmin, waaxda luqadaha, qaybta kaalmada adedennisda, kael rader darylkaye chino claire guerrero. So [facility] [phone].    ATENCIÓN: Si habla español, tiene a rios disposición servicios gratuitos de asistencia lingüística. Llame al [phone].    We comply with applicable federal civil rights laws and [place] laws. We do not discriminate on the basis of race, color, national origin, age, disability, sex, sexual orientation, or gender identity.            Thank you!     Thank you for choosing [facility] DERMATOLOGY  for your care. Our goal is always to provide you with excellent care. Hearing back from our patients is one way we can continue to improve our services. Please take a few minutes to complete the written survey that you may receive in the mail after your visit with us. Thank you!             Your Updated Medication List - Protect others around you: Learn how to safely use, store and throw away your medicines at www.disposemymeds.org.          This list is accurate as of 10/16/18 11:59 PM.  Always use your most recent med list.                   Brand Name Dispense Instructions for use Diagnosis    ascorbic acid 1000 MG Tabs    vitamin C    90 tablet    TAKE 1 TABLET BY MOUTH DAILY    Encounter for herb and vitamin supplement management       azithromycin 250 MG tablet    ZITHROMAX    6 tablet    Two tablets first day, then one tablet daily for four days.    Acute bronchitis with coexisting condition requiring prophylactic treatment       CALCIUM 1200+D3 600- MG-MG-UNIT Tb24   Generic drug:  Calcium-Magnesium-Vitamin D      Take 1-2 tablets by mouth every morning Reported on 3/14/2017        [facility] DIGESTIVE HEALTH Caps     100 capsule    Take 1 capsule by mouth 2 times  daily Culturelle or something similiar    Bloating       doxycycline 100 MG capsule    VIBRAMYCIN    20 capsule    Take 1 capsule (100 mg) by mouth 2 times daily    Acute bronchitis with coexisting condition requiring prophylactic treatment       erythromycin with ethanol 2 % gel    EMGEL    60 g    Apply topically daily    Erythrasma       fish oil-omega-3 fatty acids 1000 MG capsule     90 capsule    TAKE 1 CAPSULE BY MOUTH DAILY    Encounter for herb and vitamin supplement management       * fluticasone-salmeterol 250-50 MCG/DOSE diskus inhaler    ADVAIR    1 Inhaler    Inhale 1 puff into the lungs 2 times daily    Chronic obstructive pulmonary disease, unspecified COPD type (H)       * ADVAIR DISKUS 250-50 MCG/DOSE diskus inhaler   Generic drug:  fluticasone-salmeterol     3 Inhaler    INHALE 1 PUFF INTO THE LUNGS TWICE DAILY    COPD exacerbation (H)       ginkgo biloba 60 MG Caps capsule     90 capsule    Take 1 capsule (60 mg) by mouth daily    Vitiligo       * ipratropium - albuterol 0.5 mg/2.5 mg/3 mL 0.5-2.5 (3) MG/3ML neb solution    DUONEB    30 vial    Take 1 vial (3 mLs) by nebulization every 6 hours as needed for shortness of breath / dyspnea or wheezing    COPD exacerbation (H)       * ipratropium - albuterol 0.5 mg/2.5 mg/3 mL 0.5-2.5 (3) MG/3ML neb solution    DUONEB    30 vial    Take 1 vial (3 mLs) by nebulization every 6 hours as needed for shortness of breath / dyspnea or wheezing    COPD exacerbation (H)       * Ipratropium-Albuterol  MCG/ACT inhaler    COMBIVENT RESPIMAT    4 g    INHALE ONE PUFF BY MOUTH FOUR TIMES A DAY (NO TO EXCCED 6 DOSES PER DAY)    COPD exacerbation (H)       ketoconazole 2 % cream    NIZORAL    60 g    Apply topically 2 times daily To all affected areas with white spots    Hypopigmented skin lesion       nicotine 14 MG/24HR 24 hr patch    NICOTINE STEP 2    28 patch    PLACE 1 PATCH TO SKIN EVERY 24 HOURS    Tobacco use disorder       nicotine polacrilex 2 MG  lozenge    COMMIT    150 lozenge    Place 1 lozenge (2 mg) inside cheek as needed for smoking cessation Place 1 lozenge inside cheek as needed for smoking cessation.    Chronic obstructive pulmonary disease, unspecified COPD type (H), Tobacco abuse       order for DME     1 each    Equipment being ordered: nebulizer machine    COPD exacerbation (H)       pocket spacer Jeanine     1 each    Attach to albuterol inhaler, use 2 puffs every 4-6 hours as needed.    Chronic obstructive pulmonary disease, unspecified COPD type (H)       prednisoLONE acetate 1 % ophthalmic susp    PRED FORTE    1 Bottle    1 drop in surgical eye as directed, 4x daily after surgery for 1 week, 3x daily for 1 week, 2x daily for 1 week, daily for 1 week, then stop    Nuclear cataract of left eye       * predniSONE 20 MG tablet    DELTASONE    10 tablet    Take 1 tablet (20 mg) by mouth 2 times daily    COPD exacerbation (H)       * predniSONE 20 MG tablet    DELTASONE    18 tablet    Take 3 tablets (60 mg) by mouth daily    COPD exacerbation (H)       * predniSONE 20 MG tablet    DELTASONE    10 tablet    Take 2 tablets (40 mg) by mouth daily for 5 days    COPD exacerbation (H)       tacrolimus 0.1 % ointment    PROTOPIC    60 g    APPLY TOPICALLY TO THE AFFECTED AREAS OF THE SKIN, ESPECIALLY THE SCALP, TWICE DAILY    Post inflammatory hypopigmentation       vitamin A 83364 UNIT capsule     180 capsule    TAKE 1 CAPSULE BY MOUTH DAILY    Encounter for herb and vitamin supplement management       vitamin b complex w/vitamin C Tabs tablet     180 tablet    TAKE 1 TABLET BY MOUTH TWICE DAILY WITH FOLIC ACID    Takes dietary supplements       vitamin B complex with vitamin C Tabs tablet     180 tablet    Take 1 tablet by mouth 2 times daily With Folic acid    Encounter for herb and vitamin supplement management       vitamin E 1000 UNIT capsule    TOCOPHEROL    90 capsule    TAKE 1 CAPSULE BY MOUTH DAILY    Encounter for herb and vitamin supplement  management       * Notice:  This list has 8 medication(s) that are the same as other medications prescribed for you. Read the directions carefully, and ask your doctor or other care provider to review them with you.

## 2018-11-05 DIAGNOSIS — M81.0 AGE-RELATED OSTEOPOROSIS WITHOUT CURRENT PATHOLOGICAL FRACTURE: ICD-10-CM

## 2018-11-06 NOTE — TELEPHONE ENCOUNTER
"Requested Prescriptions   Pending Prescriptions Disp Refills     calcium carbonate 600 mg-vitamin D 400 units (CALTRATE) 600-400 MG-UNIT per tablet [Pharmacy Med Name: CALCIUM 600+D (400U) TABLETS] 180 tablet 0     Sig: TAKE 1 TABLET BY MOUTH TWICE DAILY    Vitamin Supplements (Adult) Protocol Passed    11/5/2018 12:27 PM       Passed - High dose Vitamin D not ordered       Passed - Recent (12 mo) or future (30 days) visit within the authorizing provider's specialty    Patient had office visit in the last 12 months or has a visit in the next 30 days with authorizing provider or within the authorizing provider's specialty.  See \"Patient Info\" tab in inbasket, or \"Choose Columns\" in Meds & Orders section of the refill encounter.              Refused Prescriptions:                       Disp   Refills    calcium carbonate 600 mg-vitamin D 400 uni*0.01 t*0        Sig: TAKE 1 TABLET BY MOUTH TWICE DAILY  Refused By: KANDI ELI  Reason for Refusal: Duplicate      Closing encounter - no further actions needed at this time    Kandi Eli RN    "

## 2018-11-08 ENCOUNTER — OFFICE VISIT (OUTPATIENT)
Dept: URGENT CARE | Facility: URGENT CARE | Age: 68
End: 2018-11-08
Payer: COMMERCIAL

## 2018-11-08 VITALS
BODY MASS INDEX: 21.67 KG/M2 | OXYGEN SATURATION: 97 % | DIASTOLIC BLOOD PRESSURE: 78 MMHG | WEIGHT: 151 LBS | SYSTOLIC BLOOD PRESSURE: 134 MMHG | TEMPERATURE: 97.7 F | HEART RATE: 86 BPM

## 2018-11-08 DIAGNOSIS — J44.1 COPD EXACERBATION (H): Primary | ICD-10-CM

## 2018-11-08 DIAGNOSIS — R03.0 ELEVATED BP WITHOUT DIAGNOSIS OF HYPERTENSION: ICD-10-CM

## 2018-11-08 DIAGNOSIS — J06.9 VIRAL URI WITH COUGH: ICD-10-CM

## 2018-11-08 DIAGNOSIS — F17.200 TOBACCO USE DISORDER: ICD-10-CM

## 2018-11-08 PROCEDURE — 99213 OFFICE O/P EST LOW 20 MIN: CPT | Performed by: INTERNAL MEDICINE

## 2018-11-08 RX ORDER — PREDNISONE 20 MG/1
40 TABLET ORAL DAILY
Qty: 10 TABLET | Refills: 0 | Status: SHIPPED | OUTPATIENT
Start: 2018-11-08 | End: 2018-11-13

## 2018-11-08 RX ORDER — NICOTINE 21 MG/24HR
1 PATCH, TRANSDERMAL 24 HOURS TRANSDERMAL EVERY 24 HOURS
Qty: 30 PATCH | Refills: 2 | Status: SHIPPED | OUTPATIENT
Start: 2018-11-08 | End: 2019-03-27

## 2018-11-08 ASSESSMENT — ENCOUNTER SYMPTOMS
ACTIVITY CHANGE: 0
SHORTNESS OF BREATH: 0
FEVER: 0

## 2018-11-08 NOTE — MR AVS SNAPSHOT
After Visit Summary   11/8/2018    Robert B Behr    MRN: [id number]           Patient Information     Date Of Birth          1950        Visit Information        Provider Department      11/8/2018 5:00 PM Jovita De La Torre MD [facility]        Today's Diagnoses     COPD exacerbation (H)    -  1    Viral URI with cough        Tobacco use disorder           Follow-ups after your visit        Who to contact     If you have questions or need follow up information about today's clinic visit or your schedule please contact [facility] directly at [phone].  Normal or non-critical lab and imaging results will be communicated to you by MyChart, letter or phone within 4 business days after the clinic has received the results. If you do not hear from us within 7 days, please contact the clinic through F[employer] or phone. If you have a critical or abnormal lab result, we will notify you by phone as soon as possible.  Submit refill requests through [employer] or call your pharmacy and they will forward the refill request to us. Please allow 3 business days for your refill to be completed.          Additional Information About Your Visit        MyChart Information     [employer] gives you secure access to your electronic health record. If you see a primary care provider, you can also send messages to your care team and make appointments. If you have questions, please call your primary care clinic.  If you do not have a primary care provider, please call [phone] and they will assist you.        Care EveryWhere ID     This is your Care EveryWhere ID. This could be used by other organizations to access your [facility] medical records  [id number]        Your Vitals Were     Pulse Temperature Pulse Oximetry BMI (Body Mass Index)          86 97.7  F (36.5  C) (Tympanic) 97% 21.67 kg/m2         Blood Pressure from Last 3 Encounters:   11/08/18 150/89   10/16/18  123/70   10/13/18 122/71    Weight from Last 3 Encounters:   11/08/18 151 lb (68.5 kg)   10/13/18 150 lb (68 kg)   09/24/18 150 lb (68 kg)              Today, you had the following     No orders found for display         Today's Medication Changes          These changes are accurate as of 11/8/18  5:25 PM.  If you have any questions, ask your nurse or doctor.               These medicines have changed or have updated prescriptions.        Dose/Directions    * ipratropium - albuterol 0.5 mg/2.5 mg/3 mL 0.5-2.5 (3) MG/3ML neb solution   Commonly known as:  DUONEB   This may have changed:  Another medication with the same name was added. Make sure you understand how and when to take each.   Used for:  COPD exacerbation (H)   Changed by:  Jovita De La Torre MD        Dose:  1 vial   Take 1 vial (3 mLs) by nebulization every 6 hours as needed for shortness of breath / dyspnea or wheezing   Quantity:  30 vial   Refills:  1       * ipratropium - albuterol 0.5 mg/2.5 mg/3 mL 0.5-2.5 (3) MG/3ML neb solution   Commonly known as:  DUONEB   This may have changed:  Another medication with the same name was added. Make sure you understand how and when to take each.   Used for:  COPD exacerbation (H)   Changed by:  Jovita De La Torre MD        Dose:  1 vial   Take 1 vial (3 mLs) by nebulization every 6 hours as needed for shortness of breath / dyspnea or wheezing   Quantity:  30 vial   Refills:  1       * Ipratropium-Albuterol  MCG/ACT inhaler   Commonly known as:  COMBIVENT RESPIMAT   This may have changed:  Another medication with the same name was added. Make sure you understand how and when to take each.   Used for:  COPD exacerbation (H)   Changed by:  Jovita De La Torre MD        INHALE ONE PUFF BY MOUTH FOUR TIMES A DAY (NO TO EXCCED 6 DOSES PER DAY)   Quantity:  4 g   Refills:  3       * Ipratropium-Albuterol  MCG/ACT inhaler   Commonly known as:  COMBIVENT RESPIMAT   This may have changed:  You  were already taking a medication with the same name, and this prescription was added. Make sure you understand how and when to take each.   Used for:  COPD exacerbation (H)   Changed by:  Jovita De La Torre MD        Dose:  1 puff   Inhale 1 puff into the lungs 4 times daily Not to exceed 6 doses per day.   Quantity:  1 Inhaler   Refills:  1       * nicotine 14 MG/24HR 24 hr patch   Commonly known as:  NICOTINE STEP 2   This may have changed:  Another medication with the same name was added. Make sure you understand how and when to take each.   Used for:  Tobacco use disorder   Changed by:  Jovita De La Torre MD        PLACE 1 PATCH TO SKIN EVERY 24 HOURS   Quantity:  28 patch   Refills:  1       * nicotine 14 MG/24HR 24 hr patch   Commonly known as:  NICODERM CQ   This may have changed:  You were already taking a medication with the same name, and this prescription was added. Make sure you understand how and when to take each.   Used for:  Tobacco use disorder   Changed by:  Jovita De La Torre MD        Dose:  1 patch   Place 1 patch onto the skin every 24 hours   Quantity:  30 patch   Refills:  2       * predniSONE 20 MG tablet   Commonly known as:  DELTASONE   This may have changed:  Another medication with the same name was added. Make sure you understand how and when to take each.   Used for:  COPD exacerbation (H)   Changed by:  Jovita De La Torre MD        Dose:  20 mg   Take 1 tablet (20 mg) by mouth 2 times daily   Quantity:  10 tablet   Refills:  0       * predniSONE 20 MG tablet   Commonly known as:  DELTASONE   This may have changed:  Another medication with the same name was added. Make sure you understand how and when to take each.   Used for:  COPD exacerbation (H)   Changed by:  Jovita De La Torre MD        Dose:  60 mg   Take 3 tablets (60 mg) by mouth daily   Quantity:  18 tablet   Refills:  0       * predniSONE 20 MG tablet   Commonly known as:  DELTASONE   This may have  changed:  You were already taking a medication with the same name, and this prescription was added. Make sure you understand how and when to take each.   Used for:  COPD exacerbation (H)   Changed by:  Jovita De La Torre MD        Dose:  40 mg   Take 2 tablets (40 mg) by mouth daily for 5 days   Quantity:  10 tablet   Refills:  0       * Notice:  This list has 9 medication(s) that are the same as other medications prescribed for you. Read the directions carefully, and ask your doctor or other care provider to review them with you.         Where to get your medicines      These medications were sent to [id number] - [place], [place] - [address] AT [facility]  [address], [place] [address]     Phone:  [phone]     Ipratropium-Albuterol  MCG/ACT inhaler    nicotine 14 MG/24HR 24 hr patch    predniSONE 20 MG tablet                Primary Care Provider Office Phone # Fax #    Fina Frausto [phone] [address] [place] [address]        Equal Access to Services     [facility]: Hadii marylou ku hadasho Sojasmin, waaxda luqadaha, qaybta kaalmada adediane, kael rangel . So [facility] [phone].    ATENCIÓN: Si habla español, tiene a rios disposición servicios gratuitos de asistencia lingüística. [facility] [phone].    We comply with applicable federal civil rights laws and [place] laws. We do not discriminate on the basis of race, color, national origin, age, disability, sex, sexual orientation, or gender identity.            Thank you!     Thank you for choosing [facility]  for your care. Our goal is always to provide you with excellent care. Hearing back from our patients is one way we can continue to improve our services. Please take a few minutes to complete the written survey that you may receive in the mail after your visit with us. Thank you!             Your Updated  Medication List - Protect others around you: Learn how to safely use, store and throw away your medicines at www.disposemymeds.org.          This list is accurate as of 11/8/18  5:25 PM.  Always use your most recent med list.                   Brand Name Dispense Instructions for use Diagnosis    ascorbic acid 1000 MG Tabs    vitamin C    90 tablet    TAKE 1 TABLET BY MOUTH DAILY    Encounter for herb and vitamin supplement management       azithromycin 250 MG tablet    ZITHROMAX    6 tablet    Two tablets first day, then one tablet daily for four days.    Acute bronchitis with coexisting condition requiring prophylactic treatment       CALCIUM 1200+D3 600- MG-MG-UNIT Tb24   Generic drug:  Calcium-Magnesium-Vitamin D      Take 1-2 tablets by mouth every morning Reported on 3/14/2017        calcium carbonate 600 mg-vitamin D 400 units 600-400 MG-UNIT per tablet    CALTRATE    180 tablet    TAKE 1 TABLET BY MOUTH TWICE DAILY    Age-related osteoporosis without current pathological fracture       CULTURELLE DIGESTIVE HEALTH Caps     100 capsule    Take 1 capsule by mouth 2 times daily Culturelle or something similiar    Bloating       doxycycline 100 MG capsule    VIBRAMYCIN    20 capsule    Take 1 capsule (100 mg) by mouth 2 times daily    Acute bronchitis with coexisting condition requiring prophylactic treatment       erythromycin with ethanol 2 % gel    EMGEL    60 g    Apply topically daily    Erythrasma       fish oil-omega-3 fatty acids 1000 MG capsule     90 capsule    TAKE 1 CAPSULE BY MOUTH DAILY    Encounter for herb and vitamin supplement management       * fluticasone-salmeterol 250-50 MCG/DOSE diskus inhaler    ADVAIR    1 Inhaler    Inhale 1 puff into the lungs 2 times daily    Chronic obstructive pulmonary disease, unspecified COPD type (H)       * ADVAIR DISKUS 250-50 MCG/DOSE diskus inhaler   Generic drug:  fluticasone-salmeterol     3 Inhaler    INHALE 1 PUFF INTO THE LUNGS TWICE DAILY    COPD  exacerbation (H)       ginkgo biloba 60 MG Caps capsule     90 capsule    Take 1 capsule (60 mg) by mouth daily    Vitiligo       glucosamine-chondroitinoitin 750-600 MG Tabs     120 tablet    TAKE 2 TABLETS BY MOUTH TWICE DAILY    Osteoarthritis of right hip, unspecified osteoarthritis type       * ipratropium - albuterol 0.5 mg/2.5 mg/3 mL 0.5-2.5 (3) MG/3ML neb solution    DUONEB    30 vial    Take 1 vial (3 mLs) by nebulization every 6 hours as needed for shortness of breath / dyspnea or wheezing    COPD exacerbation (H)       * ipratropium - albuterol 0.5 mg/2.5 mg/3 mL 0.5-2.5 (3) MG/3ML neb solution    DUONEB    30 vial    Take 1 vial (3 mLs) by nebulization every 6 hours as needed for shortness of breath / dyspnea or wheezing    COPD exacerbation (H)       * Ipratropium-Albuterol  MCG/ACT inhaler    COMBIVENT RESPIMAT    4 g    INHALE ONE PUFF BY MOUTH FOUR TIMES A DAY (NO TO EXCCED 6 DOSES PER DAY)    COPD exacerbation (H)       * Ipratropium-Albuterol  MCG/ACT inhaler    COMBIVENT RESPIMAT    1 Inhaler    Inhale 1 puff into the lungs 4 times daily Not to exceed 6 doses per day.    COPD exacerbation (H)       ketoconazole 2 % cream    NIZORAL    60 g    Apply topically 2 times daily To all affected areas with white spots    Hypopigmented skin lesion       * nicotine 14 MG/24HR 24 hr patch    NICOTINE STEP 2    28 patch    PLACE 1 PATCH TO SKIN EVERY 24 HOURS    Tobacco use disorder       * nicotine 14 MG/24HR 24 hr patch    NICODERM CQ    30 patch    Place 1 patch onto the skin every 24 hours    Tobacco use disorder       nicotine polacrilex 2 MG lozenge    COMMIT    150 lozenge    Place 1 lozenge (2 mg) inside cheek as needed for smoking cessation Place 1 lozenge inside cheek as needed for smoking cessation.    Chronic obstructive pulmonary disease, unspecified COPD type (H), Tobacco abuse       order for DME     1 each    Equipment being ordered: nebulizer machine    COPD exacerbation (H)        pocket spacer Jeanine     1 each    Attach to albuterol inhaler, use 2 puffs every 4-6 hours as needed.    Chronic obstructive pulmonary disease, unspecified COPD type (H)       prednisoLONE acetate 1 % ophthalmic susp    PRED FORTE    1 Bottle    1 drop in surgical eye as directed, 4x daily after surgery for 1 week, 3x daily for 1 week, 2x daily for 1 week, daily for 1 week, then stop    Nuclear cataract of left eye       * predniSONE 20 MG tablet    DELTASONE    10 tablet    Take 1 tablet (20 mg) by mouth 2 times daily    COPD exacerbation (H)       * predniSONE 20 MG tablet    DELTASONE    18 tablet    Take 3 tablets (60 mg) by mouth daily    COPD exacerbation (H)       * predniSONE 20 MG tablet    DELTASONE    10 tablet    Take 2 tablets (40 mg) by mouth daily for 5 days    COPD exacerbation (H)       tacrolimus 0.1 % ointment    PROTOPIC    60 g    APPLY TOPICALLY TO THE AFFECTED AREAS OF THE SKIN, ESPECIALLY THE SCALP, TWICE DAILY    Post inflammatory hypopigmentation       vitamin A 50169 UNIT capsule     180 capsule    TAKE 1 CAPSULE BY MOUTH DAILY    Encounter for herb and vitamin supplement management       vitamin b complex w/vitamin C Tabs tablet     180 tablet    TAKE 1 TABLET BY MOUTH TWICE DAILY WITH FOLIC ACID    Takes dietary supplements       vitamin B complex with vitamin C Tabs tablet     180 tablet    Take 1 tablet by mouth 2 times daily With Folic acid    Encounter for herb and vitamin supplement management       vitamin E 1000 UNIT capsule    TOCOPHEROL    90 capsule    TAKE 1 CAPSULE BY MOUTH DAILY    Encounter for herb and vitamin supplement management       * Notice:  This list has 11 medication(s) that are the same as other medications prescribed for you. Read the directions carefully, and ask your doctor or other care provider to review them with you.

## 2018-11-09 ENCOUNTER — TELEPHONE (OUTPATIENT)
Dept: FAMILY MEDICINE | Facility: CLINIC | Age: 68
End: 2018-11-09

## 2018-11-09 DIAGNOSIS — M81.0 AGE-RELATED OSTEOPOROSIS WITHOUT CURRENT PATHOLOGICAL FRACTURE: ICD-10-CM

## 2018-11-09 NOTE — TELEPHONE ENCOUNTER
Contacted patient and informed him of the medication refill.   Closing encounter.       Elena NORRIS     [facility]

## 2018-11-13 DIAGNOSIS — M16.11 OSTEOARTHRITIS OF RIGHT HIP, UNSPECIFIED OSTEOARTHRITIS TYPE: ICD-10-CM

## 2018-11-13 RX ORDER — MAGNESIUM CARB/ALUMINUM HYDROX 105-160MG
TABLET,CHEWABLE ORAL
Qty: 120 TABLET | Refills: 0 | Status: SHIPPED | OUTPATIENT
Start: 2018-11-13 | End: 2018-12-12

## 2018-11-13 NOTE — TELEPHONE ENCOUNTER
glucosamine-chondroitinoitin 750-600 MG TABS      Last Written Prescription Date:  5-11-18  Last Fill Quantity: 120 tab,   # refills: 5  Last Office Visit: 10-15-18  Future Office visit:       Routing refill request to provider for review/approval because:  Drug not on the [facility], P or [facility] refill protocol or controlled substance

## 2018-11-14 ENCOUNTER — PATIENT OUTREACH (OUTPATIENT)
Dept: GERIATRIC MEDICINE | Facility: CLINIC | Age: 68
End: 2018-11-14

## 2018-11-14 ASSESSMENT — ACTIVITIES OF DAILY LIVING (ADL): DEPENDENT_IADLS:: INDEPENDENT

## 2018-11-14 NOTE — PROGRESS NOTES
[facility] Care Coordination Contact      [facility] Refusal Telephone Assessment    Member refused home visit HRA on November 14, 2018 (reason: Client feels healthy and feels there is no need for a HRA).    ER visits: No  Hospitalizations: No  Health concerns: None  Falls/Injuries: No  ADL/IADL Dependencies:  Dependent ADLs:: Independent  Dependent IADLs:: Independent  Member currently receiving the following home care services:     Member currently receiving the following community resources: None  Informal support(s):      Advanced Care Planning discussion, complete code section.    [facility] sponsored benefits: Shared information re: Silver Sneakers/gym memberships, ASA, Calcium +D.    Follow-Up Plan: Member informed of future contact, plan to f/u with member with a 6 month telephone assessment and offer a home visit.  Contact information shared with member and family, encouraged member to call with any questions or concerns at any time.    Requested CMS to mail refusal letter.    Leeanna Sigala RN, BSN, PHN  [facility] Care Coordinator  [phone]

## 2018-11-14 NOTE — PROGRESS NOTES
[facility] Care Coordination Contact    Called member to schedule annual HRA home visit. Left a message requesting a return call to schedule HRA. or to see if he would like a visit this year. (client has declined visits in the past)  Leeanna Sigala RN, BSN, PHN  [facility] Care Coordinator  [phone]

## 2018-11-14 NOTE — LETTER
[address], [address]  [place] [address]  Phone:  [phone]  Fax:  [phone]        November 15, 2018    ROBERT B BEHR  [address]  [place] [address]    Dear Ravin,    My name is Leeanna Sigala RN, BSN, PHN and I am your [facility] Care Coordinator.  You have indicated that you are not interested in meeting with me in person to complete a Health Risk Assessment.     As your care coordinator, I work in collaboration with your primary physician and clinic.  We also want you to be aware of the health plan initiatives that are available to you, free of charge.     Currently, the health plan is supporting the pneumonia vaccine and prescription ordered calcium and vitamin D supplements.  Please call me for more details, my number is listed below.    [facility] encourages all members to contact their care coordinator if they have had any change in condition, a recent emergency room visit or hospital stay.    Thank you,       Leeanna Sigala RN, BSN, PHN  Care Coordinator  [facility]  [phone]

## 2018-11-15 NOTE — PROGRESS NOTES
"[facility] Care Coordination Contact    Per CC, mailed client a \"Refusal of Home Visit\" letter.  MMIS entry.    Cristina Wheatley  Care Management Specialist  [facility]  [phone]       "

## 2018-11-25 ENCOUNTER — RADIANT APPOINTMENT (OUTPATIENT)
Dept: GENERAL RADIOLOGY | Facility: CLINIC | Age: 68
End: 2018-11-25
Attending: PHYSICIAN ASSISTANT
Payer: COMMERCIAL

## 2018-11-25 ENCOUNTER — OFFICE VISIT (OUTPATIENT)
Dept: URGENT CARE | Facility: URGENT CARE | Age: 68
End: 2018-11-25
Payer: COMMERCIAL

## 2018-11-25 VITALS
HEIGHT: 70 IN | DIASTOLIC BLOOD PRESSURE: 86 MMHG | OXYGEN SATURATION: 97 % | WEIGHT: 152 LBS | HEART RATE: 86 BPM | TEMPERATURE: 98.4 F | SYSTOLIC BLOOD PRESSURE: 146 MMHG | BODY MASS INDEX: 21.76 KG/M2

## 2018-11-25 DIAGNOSIS — J44.0 CHRONIC OBSTRUCTIVE PULMONARY DISEASE WITH ACUTE LOWER RESPIRATORY INFECTION (H): ICD-10-CM

## 2018-11-25 DIAGNOSIS — J44.0 CHRONIC OBSTRUCTIVE PULMONARY DISEASE WITH ACUTE LOWER RESPIRATORY INFECTION (H): Primary | ICD-10-CM

## 2018-11-25 PROCEDURE — 99214 OFFICE O/P EST MOD 30 MIN: CPT | Performed by: PHYSICIAN ASSISTANT

## 2018-11-25 PROCEDURE — 71046 X-RAY EXAM CHEST 2 VIEWS: CPT

## 2018-11-25 RX ORDER — NICOTINE 21 MG/24HR
1 PATCH, TRANSDERMAL 24 HOURS TRANSDERMAL EVERY 24 HOURS
Qty: 30 PATCH | Refills: 0 | Status: SHIPPED | OUTPATIENT
Start: 2018-11-25 | End: 2019-08-01

## 2018-11-25 RX ORDER — DOXYCYCLINE 100 MG/1
100 CAPSULE ORAL 2 TIMES DAILY
Qty: 20 CAPSULE | Refills: 0 | Status: SHIPPED | OUTPATIENT
Start: 2018-11-25 | End: 2019-03-22

## 2018-11-25 RX ORDER — PREDNISONE 20 MG/1
20 TABLET ORAL DAILY
Qty: 3 TABLET | Refills: 0 | Status: SHIPPED | OUTPATIENT
Start: 2018-11-25 | End: 2018-11-28

## 2018-11-25 NOTE — PROGRESS NOTES
SUBJECTIVE:  Robert B Behr is a 68 year old male who presents to the clinic today with a chief complaint of cough  and wheezing. for 3 week(s).  His cough is described as persistent and productive of yellow sputum.    The patient's symptoms are moderate and worsening.  Associated symptoms include congestion. The patient's symptoms are exacerbated by no particular triggers  Patient has been using steroids and inhaler  to improve symptoms.  Patient has a history of COPD and lung nodules. He is a current everyday smoker.   He denies fever/chills, HA, CP, pleuritic chest pain, abd pain, N/V/D, rash, or any other symptoms. Patient denies history of DVT/PE, recent travel/surgery, tobacco use, leg pain or swelling, or history of cancer.    Patient requesting prescription for Selenium - E vs just vitamin E. He has taken in the past without problem.   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     Tobacco abuse        Current Outpatient Prescriptions   Medication Sig Dispense Refill     ADVAIR DISKUS 250-50 MCG/DOSE diskus inhaler INHALE 1 PUFF INTO THE LUNGS TWICE DAILY 3 Inhaler 3     ascorbic acid (VITAMIN C) 1000 MG TABS TAKE 1 TABLET BY MOUTH DAILY 90 tablet 11     B Complex-C (VITAMIN B COMPLEX W/VITAMIN C) TABS tablet TAKE 1 TABLET BY MOUTH TWICE DAILY WITH FOLIC ACID 180 tablet 11     calcium carbonate 600 mg-vitamin D 400 units (CALTRATE) 600-400 MG-UNIT per tablet Take 1 tablet by mouth 2 times daily 180 tablet 1     Calcium-Magnesium-Vitamin D (CALCIUM 1200+D3) 600- MG-MG-UNIT TB24 Take 1-2 tablets by mouth every morning Reported on 3/14/2017       doxycycline (VIBRAMYCIN) 100 MG capsule Take 1 capsule (100 mg) by mouth 2 times daily 20 capsule 0     erythromycin with ethanol (EMGEL) 2 % gel Apply topically daily 60 g 0     fish  oil-omega-3 fatty acids 1000 MG capsule TAKE 1 CAPSULE BY MOUTH DAILY 90 capsule 11     fluticasone-salmeterol (ADVAIR) 250-50 MCG/DOSE diskus inhaler Inhale 1 puff into the lungs 2 times daily 1 Inhaler 11     ginkgo biloba 60 MG CAPS capsule Take 1 capsule (60 mg) by mouth daily 90 capsule 3     glucosamine-chondroitinoitin 750-600 MG TABS TAKE 2 TABLETS BY MOUTH TWICE DAILY 120 tablet 0     ipratropium - albuterol 0.5 mg/2.5 mg/3 mL (DUONEB) 0.5-2.5 (3) MG/3ML neb solution Take 1 vial (3 mLs) by nebulization every 6 hours as needed for shortness of breath / dyspnea or wheezing 30 vial 1     ipratropium - albuterol 0.5 mg/2.5 mg/3 mL (DUONEB) 0.5-2.5 (3) MG/3ML neb solution Take 1 vial (3 mLs) by nebulization every 6 hours as needed for shortness of breath / dyspnea or wheezing 30 vial 1     Ipratropium-Albuterol (COMBIVENT RESPIMAT)  MCG/ACT inhaler Inhale 1 puff into the lungs 4 times daily Not to exceed 6 doses per day. 1 Inhaler 1     Ipratropium-Albuterol (COMBIVENT RESPIMAT)  MCG/ACT inhaler INHALE ONE PUFF BY MOUTH FOUR TIMES A DAY (NO TO EXCCED 6 DOSES PER DAY) 4 g 3     ketoconazole (NIZORAL) 2 % cream Apply topically 2 times daily To all affected areas with white spots 60 g 3     Lactobacillus-Inulin (CULTURELLE DIGESTIVE HEALTH) CAPS Take 1 capsule by mouth 2 times daily Culturelle or something similiar 100 capsule 3     nicotine (NICODERM CQ) 14 MG/24HR 24 hr patch Place 1 patch onto the skin every 24 hours 30 patch 2     nicotine (NICOTINE STEP 2) 14 MG/24HR 24 hr patch PLACE 1 PATCH TO SKIN EVERY 24 HOURS 28 patch 1     nicotine polacrilex (COMMIT) 2 MG lozenge Place 1 lozenge (2 mg) inside cheek as needed for smoking cessation Place 1 lozenge inside cheek as needed for smoking cessation. 150 lozenge 3     order for DME Equipment being ordered: nebulizer machine 1 each 0     prednisoLONE acetate (PRED FORTE) 1 % ophthalmic susp 1 drop in surgical eye as directed, 4x daily after surgery  "for 1 week, 3x daily for 1 week, 2x daily for 1 week, daily for 1 week, then stop 1 Bottle 1     predniSONE (DELTASONE) 20 MG tablet Take 3 tablets (60 mg) by mouth daily 18 tablet 0     predniSONE (DELTASONE) 20 MG tablet Take 1 tablet (20 mg) by mouth 2 times daily 10 tablet 0     Spacer/Aero-Holding Chambers (POCKET SPACER) MARGARITA Attach to albuterol inhaler, use 2 puffs every 4-6 hours as needed. 1 each 0     tacrolimus (PROTOPIC) 0.1 % ointment APPLY TOPICALLY TO THE AFFECTED AREAS OF THE SKIN, ESPECIALLY THE SCALP, TWICE DAILY 60 g 0     vitamin A 41494 UNIT capsule TAKE 1 CAPSULE BY MOUTH DAILY 180 capsule 11     vitamin B complex with vitamin C (VITAMIN  B COMPLEX) TABS tablet Take 1 tablet by mouth 2 times daily With Folic acid 180 tablet 2     vitamin E (TOCOPHEROL) 1000 UNIT capsule TAKE 1 CAPSULE BY MOUTH DAILY 90 capsule 0       Social History   Substance Use Topics     Smoking status: Current Some Day Smoker     Packs/day: 0.10     Years: 45.00     Types: Cigarettes     Smokeless tobacco: Former User      Comment: 3-7 cigarettes/day (8/29/17). 1.5 packs for 45 years smoker     Alcohol use 0.0 oz/week     0 Standard drinks or equivalent per week      Comment: occ beer       ROS  10 Review of systems negative except as stated above.    OBJECTIVE:  /86  Pulse 86  Temp 98.4  F (36.9  C) (Tympanic)  Ht 5' 10\" (1.778 m)  Wt 152 lb (68.9 kg)  SpO2 97%  BMI 21.81 kg/m2  GENERAL APPEARANCE: healthy, alert and no distress  EYES: EOMI,  PERRL, conjunctiva clear  HENT: MMM  NECK: supple, nontender, no lymphadenopathy  RESP: lungs clear to auscultation - no rales, rhonchi or wheezes  CV: regular rates and rhythm, normal S1 S2, no murmur noted  NEURO: Normal strength and tone, sensory exam grossly normal,  normal speech and mentation  SKIN: no suspicious lesions or rashes    ASSESSMENT / PLAN:  1. Chronic obstructive pulmonary disease with acute lower respiratory infection (H)  Symptomatic measures " encouraged, humidified air, plenty of fluids. Smoking Cessation!  - Vitamin Mixture (SELENIUM-VITAMIN E)  MCG-UNIT CAPS; Take 1 tablet by mouth daily  Dispense: 30 capsule; Refill: 3  - XR Chest 2 Views; Future  - doxycycline (VIBRAMYCIN) 100 MG capsule; Take 1 capsule (100 mg) by mouth 2 times daily  Dispense: 20 capsule; Refill: 0  - predniSONE (DELTASONE) 20 MG tablet; Take 1 tablet (20 mg) by mouth daily for 3 days  Dispense: 3 tablet; Refill: 0  - nicotine (NICODERM CQ) 14 MG/24HR 24 hr patch; Place 1 patch onto the skin every 24 hours  Dispense: 30 patch; Refill: 0    Worsening cough with production consistent with COPD exacerbation. CXR was performed and no new infiltrate is noted.  DDX: pneumonia, PE, pneumothorax, bronchitis, allergies. I will give him another 3 days of steroids as well as add on antibiotic. In regards to Selen - E, his Renal function is fine and has taken in the past with benefit. Not monitored by FDA and patient assumes risk with taking medication. Will give him months supply, and advised that he follow-up with his PCP for annual physical exam, discuss yearly MRI of chest order and to discuss supplement use.     I have discussed the patient's diagnosis and my plan of treatment with the patient. We went over any labs or imaging. Patient is aware to come back in with worsening symptoms or if no relief despite treatment plan.  Patient verbalizes understanding. All questions were addressed and answered.   Renee Palencia PA-C

## 2018-11-25 NOTE — PATIENT INSTRUCTIONS
COPD Flare    You have had a flare-up of your COPD.  COPD, or chronic obstructive pulmonary disease, is a common lung disease. It causes your airways to become irritated and narrower. This makes it harder for you to breathe. Emphysema and chronic bronchitis are both types of COPD. This is a chronic condition, which means you always have it. Sometimes it gets worse. When this happens, it is called a flare-up.  Symptoms of COPD  People with COPD may have symptoms most of the time. In a flare-up, your symptoms get worse. These symptoms may mean you are having a flare-up:    Shortness of breath, shallow or rapid breathing, or wheezing that gets worse    Lung infection    Cough that gets worse    More mucus, thicker mucus or mucus of a different color    Tiredness, decreased energy, or trouble doing your usual activities    Fever    Chest tightness    Your symptoms don t get better even when you use your usual medicines, inhalers, and nebulizer    Trouble talking    You feel confused  Causes of flare-ups  Unfortunately, a flare-up can happen even though you did everything right, and you followed your doctor s instructions. Some causes of flare-ups are:    Smoking or secondhand smoke    Colds, the flu, or respiratory infections    Air pollution    Sudden change in the weather    Dust, irritating chemicals, or strong fumes    Not taking your medicines as prescribed  Home care  Here are some things you can do at home to treat a flare-up:    Try not to panic. This makes it harder to breathe, and keeps you from doing the right things.    Don t smoke or be around others who are smoking.    Try to drink more fluids than usual during a flare-up, unless your doctor has told you not to because of heart and kidney problems. More fluids can help loosen the mucus.    Use your inhalers and nebulizer, if you have one, as you have been told to.    If you were given antibiotics, take them until they are used up or your doctor tells you  to stop. It s important to finish the antibiotics, even though you feel better. This will make sure the infection has cleared.    If you were given prednisone or another steroid, finish it even if you feel better.  Preventing a flare-up  Even though flare-ups happen, the best way to treat one is to prevent it before it starts. Here are some pointers:    Don t smoke or be around others who are smoking.    Take your medicines as you have been told.    Talk with your doctor about getting a flu shot every year. Also find out if you need a pneumonia shot.    If there is a weather advisory warning to stay indoors, try to stay inside when possible.    Try to eat healthy and get plenty of sleep.    Try to avoid things that usually set you off, like dust, chemical fumes, hairsprays, or strong perfumes.  Follow-up care  Follow up with your healthcare provider, or as advised.  If a culture was done, you will be told if your treatment needs to be changed. You can call as directed for the results.  If X-rays were done, you will be notified of any new findings that may affect your care.  Call 911  Call 911 if any of these occur:    You have trouble breathing    You feel confused or it s difficult to wake you up    You faint or lose consciousness    You have a rapid heart rate    You have new pain in your chest, arm, shoulder, neck or upper back  When to seek medical advice  Call your healthcare provider right away if any of these occur:    Wheezing or shortness of breath gets worse    You need to use your inhalers more often than usual without relief    Fever of 100.4 F (38 C) or higher, or as directed by your healthcare provider    Coughing up lots of dark-colored or bloody mucus (sputum)    Chest pain with each breath    You do not start to get better within 24 hours    Swelling of your ankles gets worse    Dizziness or weakness  Date Last Reviewed: 9/1/2016 2000-2018 [employer]. [address] [facility],  [place] [address]. All rights reserved. This information is not intended as a substitute for professional medical care. Always follow your healthcare professional's instructions.

## 2018-11-25 NOTE — MR AVS SNAPSHOT
After Visit Summary   11/25/2018    Robert B Behr    MRN: [id number]           Patient Information     Date Of Birth          1950        Visit Information        Provider Department      11/25/2018 10:10 AM Renee Palencia PA-C [facility]        Today's Diagnoses     Chronic obstructive pulmonary disease with acute lower respiratory infection (H)    -  1      Care Instructions      COPD Flare    You have had a flare-up of your COPD.  COPD, or chronic obstructive pulmonary disease, is a common lung disease. It causes your airways to become irritated and narrower. This makes it harder for you to breathe. Emphysema and chronic bronchitis are both types of COPD. This is a chronic condition, which means you always have it. Sometimes it gets worse. When this happens, it is called a flare-up.  Symptoms of COPD  People with COPD may have symptoms most of the time. In a flare-up, your symptoms get worse. These symptoms may mean you are having a flare-up:    Shortness of breath, shallow or rapid breathing, or wheezing that gets worse    Lung infection    Cough that gets worse    More mucus, thicker mucus or mucus of a different color    Tiredness, decreased energy, or trouble doing your usual activities    Fever    Chest tightness    Your symptoms don t get better even when you use your usual medicines, inhalers, and nebulizer    Trouble talking    You feel confused  Causes of flare-ups  Unfortunately, a flare-up can happen even though you did everything right, and you followed your doctor s instructions. Some causes of flare-ups are:    Smoking or secondhand smoke    Colds, the flu, or respiratory infections    Air pollution    Sudden change in the weather    Dust, irritating chemicals, or strong fumes    Not taking your medicines as prescribed  Home care  Here are some things you can do at home to treat a flare-up:    Try not to panic. This makes it harder to breathe, and  keeps you from doing the right things.    Don t smoke or be around others who are smoking.    Try to drink more fluids than usual during a flare-up, unless your doctor has told you not to because of heart and kidney problems. More fluids can help loosen the mucus.    Use your inhalers and nebulizer, if you have one, as you have been told to.    If you were given antibiotics, take them until they are used up or your doctor tells you to stop. It s important to finish the antibiotics, even though you feel better. This will make sure the infection has cleared.    If you were given prednisone or another steroid, finish it even if you feel better.  Preventing a flare-up  Even though flare-ups happen, the best way to treat one is to prevent it before it starts. Here are some pointers:    Don t smoke or be around others who are smoking.    Take your medicines as you have been told.    Talk with your doctor about getting a flu shot every year. Also find out if you need a pneumonia shot.    If there is a weather advisory warning to stay indoors, try to stay inside when possible.    Try to eat healthy and get plenty of sleep.    Try to avoid things that usually set you off, like dust, chemical fumes, hairsprays, or strong perfumes.  Follow-up care  Follow up with your healthcare provider, or as advised.  If a culture was done, you will be told if your treatment needs to be changed. You can call as directed for the results.  If X-rays were done, you will be notified of any new findings that may affect your care.  Call 911  Call 911 if any of these occur:    You have trouble breathing    You feel confused or it s difficult to wake you up    You faint or lose consciousness    You have a rapid heart rate    You have new pain in your chest, arm, shoulder, neck or upper back  When to seek medical advice  Call your healthcare provider right away if any of these occur:    Wheezing or shortness of breath gets worse    You need to use  your inhalers more often than usual without relief    Fever of 100.4 F (38 C) or higher, or as directed by your healthcare provider    Coughing up lots of dark-colored or bloody mucus (sputum)    Chest pain with each breath    You do not start to get better within 24 hours    Swelling of your ankles gets worse    Dizziness or weakness  Date Last Reviewed: 9/1/2016 2000-2018 [employer]. [address] [place] [address]. All rights reserved. This information is not intended as a substitute for professional medical care. Always follow your healthcare professional's instructions.                Follow-ups after your visit        Who to contact     If you have questions or need follow up information about today's clinic visit or your schedule please contact [facility] directly at [phone].  Normal or non-critical lab and imaging results will be communicated to you by [employer], letter or phone within 4 business days after the clinic has received the results. If you do not hear from us within 7 days, please contact the clinic through [employer] or phone. If you have a critical or abnormal lab result, we will notify you by phone as soon as possible.  Submit refill requests through [employer] or call your pharmacy and they will forward the refill request to us. Please allow 3 business days for your refill to be completed.          Additional Information About Your Visit        [employer] Information     [employer] gives you secure access to your electronic health record. If you see a primary care provider, you can also send messages to your care team and make appointments. If you have questions, please call your primary care clinic.  If you do not have a primary care provider, please call [phone] and they will assist you.        Care EveryWhere ID     This is your Care EveryWhere ID. This could be used by other organizations to access your [facility]  "records  [id number]        Your Vitals Were     Pulse Temperature Height Pulse Oximetry BMI (Body Mass Index)       86 98.4  F (36.9  C) (Tympanic) 5' 10\" (1.778 m) 97% 21.81 kg/m2        Blood Pressure from Last 3 Encounters:   11/25/18 146/86   11/08/18 134/78   10/16/18 123/70    Weight from Last 3 Encounters:   11/25/18 152 lb (68.9 kg)   11/08/18 151 lb (68.5 kg)   10/13/18 150 lb (68 kg)                 Today's Medication Changes          These changes are accurate as of 11/25/18 11:44 AM.  If you have any questions, ask your nurse or doctor.               Start taking these medicines.        Dose/Directions    Selenium-Vitamin E  MCG-UNIT Caps   Used for:  Chronic obstructive pulmonary disease with acute lower respiratory infection (H)   Started by:  Renee Palencia PA-C        Dose:  1 tablet   Take 1 tablet by mouth daily   Quantity:  30 capsule   Refills:  3         These medicines have changed or have updated prescriptions.        Dose/Directions    * doxycycline 100 MG capsule   Commonly known as:  VIBRAMYCIN   This may have changed:  Another medication with the same name was added. Make sure you understand how and when to take each.   Used for:  Acute bronchitis with coexisting condition requiring prophylactic treatment   Changed by:  Renee Palencia PA-C        Dose:  100 mg   Take 1 capsule (100 mg) by mouth 2 times daily   Quantity:  20 capsule   Refills:  0       * doxycycline 100 MG capsule   Commonly known as:  VIBRAMYCIN   This may have changed:  You were already taking a medication with the same name, and this prescription was added. Make sure you understand how and when to take each.   Used for:  Chronic obstructive pulmonary disease with acute lower respiratory infection (H)   Changed by:  Renee Palencia PA-C        Dose:  100 mg   Take 1 capsule (100 mg) by mouth 2 times daily   Quantity:  20 capsule   Refills:  0       * predniSONE 20 MG tablet   Commonly known " as:  DELTASONE   This may have changed:  Another medication with the same name was added. Make sure you understand how and when to take each.   Used for:  COPD exacerbation (H)   Changed by:  Renee Palencia PA-C        Dose:  20 mg   Take 1 tablet (20 mg) by mouth 2 times daily   Quantity:  10 tablet   Refills:  0       * predniSONE 20 MG tablet   Commonly known as:  DELTASONE   This may have changed:  Another medication with the same name was added. Make sure you understand how and when to take each.   Used for:  COPD exacerbation (H)   Changed by:  Renee Palencia PA-C        Dose:  60 mg   Take 3 tablets (60 mg) by mouth daily   Quantity:  18 tablet   Refills:  0       * predniSONE 20 MG tablet   Commonly known as:  DELTASONE   This may have changed:  You were already taking a medication with the same name, and this prescription was added. Make sure you understand how and when to take each.   Used for:  Chronic obstructive pulmonary disease with acute lower respiratory infection (H)   Changed by:  Renee Palencia PA-C        Dose:  20 mg   Take 1 tablet (20 mg) by mouth daily for 3 days   Quantity:  3 tablet   Refills:  0       * Notice:  This list has 5 medication(s) that are the same as other medications prescribed for you. Read the directions carefully, and ask your doctor or other care provider to review them with you.      Stop taking these medicines if you haven't already. Please contact your care team if you have questions.     azithromycin 250 MG tablet   Commonly known as:  ZITHROMAX   Stopped by:  Renee Palencia PA-C                Where to get your medicines      These medications were sent to [id number] - [place], [place] - [address] AT [facility] [address]  [address], [place] [address]     Phone:  [phone]     doxycycline 100 MG capsule    Selenium-Vitamin E  MCG-UNIT Caps         Call your pharmacy to confirm that your medication  is ready for pickup. It may take up to 24 hours for them to receive the prescription. If the prescription is not ready within 3 business days, please contact your clinic or your provider.     We will let you know when these medications are ready. If you don't hear back within 3 business days, please contact us.     predniSONE 20 MG tablet                Primary Care Provider Office Phone # Fax #    Fina Frausto [phone] [address]  [place] [address]        Equal Access to Services     TIARRA PAPPAS : Hadii aad ku hadasho Soomaali, waaxda luqadaha, qaybta kaalmada adeegyada, waxay stanin haysamir rangel . So [facility] [phone].    ATENCIÓN: Si habla español, tiene a iros disposición servicios gratuitos de asistencia lingüística. [facility] [phone].    We comply with applicable federal civil rights laws and [place] laws. We do not discriminate on the basis of race, color, national origin, age, disability, sex, sexual orientation, or gender identity.            Thank you!     Thank you for choosing [facility]  for your care. Our goal is always to provide you with excellent care. Hearing back from our patients is one way we can continue to improve our services. Please take a few minutes to complete the written survey that you may receive in the mail after your visit with us. Thank you!             Your Updated Medication List - Protect others around you: Learn how to safely use, store and throw away your medicines at www.disposemymeds.org.          This list is accurate as of 11/25/18 11:44 AM.  Always use your most recent med list.                   Brand Name Dispense Instructions for use Diagnosis    ascorbic acid 1000 MG Tabs    vitamin C    90 tablet    TAKE 1 TABLET BY MOUTH DAILY    Encounter for herb and vitamin supplement management       CALCIUM 1200+D3 600- MG-MG-UNIT Tb24   Generic drug:  Calcium-Magnesium-Vitamin D       Take 1-2 tablets by mouth every morning Reported on 3/14/2017        calcium carbonate 600 mg-vitamin D 400 units 600-400 MG-UNIT per tablet    CALTRATE    180 tablet    Take 1 tablet by mouth 2 times daily    Age-related osteoporosis without current pathological fracture       CULTURELLE DIGESTIVE HEALTH Caps     100 capsule    Take 1 capsule by mouth 2 times daily Culturelle or something similiar    Bloating       * doxycycline 100 MG capsule    VIBRAMYCIN    20 capsule    Take 1 capsule (100 mg) by mouth 2 times daily    Acute bronchitis with coexisting condition requiring prophylactic treatment       * doxycycline 100 MG capsule    VIBRAMYCIN    20 capsule    Take 1 capsule (100 mg) by mouth 2 times daily    Chronic obstructive pulmonary disease with acute lower respiratory infection (H)       erythromycin with ethanol 2 % gel    EMGEL    60 g    Apply topically daily    Erythrasma       fish oil-omega-3 fatty acids 1000 MG capsule     90 capsule    TAKE 1 CAPSULE BY MOUTH DAILY    Encounter for herb and vitamin supplement management       * fluticasone-salmeterol 250-50 MCG/DOSE inhaler    ADVAIR    1 Inhaler    Inhale 1 puff into the lungs 2 times daily    Chronic obstructive pulmonary disease, unspecified COPD type (H)       * ADVAIR DISKUS 250-50 MCG/DOSE inhaler   Generic drug:  fluticasone-salmeterol     3 Inhaler    INHALE 1 PUFF INTO THE LUNGS TWICE DAILY    COPD exacerbation (H)       ginkgo biloba 60 MG Caps capsule     90 capsule    Take 1 capsule (60 mg) by mouth daily    Vitiligo       glucosamine-chondroitinoitin 750-600 MG Tabs     120 tablet    TAKE 2 TABLETS BY MOUTH TWICE DAILY    Osteoarthritis of right hip, unspecified osteoarthritis type       * ipratropium - albuterol 0.5 mg/2.5 mg/3 mL 0.5-2.5 (3) MG/3ML neb solution    DUONEB    30 vial    Take 1 vial (3 mLs) by nebulization every 6 hours as needed for shortness of breath / dyspnea or wheezing    COPD exacerbation (H)       * ipratropium  - albuterol 0.5 mg/2.5 mg/3 mL 0.5-2.5 (3) MG/3ML neb solution    DUONEB    30 vial    Take 1 vial (3 mLs) by nebulization every 6 hours as needed for shortness of breath / dyspnea or wheezing    COPD exacerbation (H)       * Ipratropium-Albuterol  MCG/ACT inhaler    COMBIVENT RESPIMAT    4 g    INHALE ONE PUFF BY MOUTH FOUR TIMES A DAY (NO TO EXCCED 6 DOSES PER DAY)    COPD exacerbation (H)       * Ipratropium-Albuterol  MCG/ACT inhaler    COMBIVENT RESPIMAT    1 Inhaler    Inhale 1 puff into the lungs 4 times daily Not to exceed 6 doses per day.    COPD exacerbation (H)       ketoconazole 2 % cream    NIZORAL    60 g    Apply topically 2 times daily To all affected areas with white spots    Hypopigmented skin lesion       * nicotine 14 MG/24HR 24 hr patch    NICOTINE STEP 2    28 patch    PLACE 1 PATCH TO SKIN EVERY 24 HOURS    Tobacco use disorder       * nicotine 14 MG/24HR 24 hr patch    NICODERM CQ    30 patch    Place 1 patch onto the skin every 24 hours    Tobacco use disorder       nicotine polacrilex 2 MG lozenge    COMMIT    150 lozenge    Place 1 lozenge (2 mg) inside cheek as needed for smoking cessation Place 1 lozenge inside cheek as needed for smoking cessation.    Chronic obstructive pulmonary disease, unspecified COPD type (H), Tobacco abuse       order for DME     1 each    Equipment being ordered: nebulizer machine    COPD exacerbation (H)       pocket spacer Jeanine     1 each    Attach to albuterol inhaler, use 2 puffs every 4-6 hours as needed.    Chronic obstructive pulmonary disease, unspecified COPD type (H)       prednisoLONE acetate 1 % ophthalmic susp    PRED FORTE    1 Bottle    1 drop in surgical eye as directed, 4x daily after surgery for 1 week, 3x daily for 1 week, 2x daily for 1 week, daily for 1 week, then stop    Nuclear cataract of left eye       * predniSONE 20 MG tablet    DELTASONE    10 tablet    Take 1 tablet (20 mg) by mouth 2 times daily    COPD exacerbation (H)        * predniSONE 20 MG tablet    DELTASONE    18 tablet    Take 3 tablets (60 mg) by mouth daily    COPD exacerbation (H)       * predniSONE 20 MG tablet    DELTASONE    3 tablet    Take 1 tablet (20 mg) by mouth daily for 3 days    Chronic obstructive pulmonary disease with acute lower respiratory infection (H)       Selenium-Vitamin E  MCG-UNIT Caps     30 capsule    Take 1 tablet by mouth daily    Chronic obstructive pulmonary disease with acute lower respiratory infection (H)       tacrolimus 0.1 % ointment    PROTOPIC    60 g    APPLY TOPICALLY TO THE AFFECTED AREAS OF THE SKIN, ESPECIALLY THE SCALP, TWICE DAILY    Post inflammatory hypopigmentation       vitamin A 09685 UNIT capsule     180 capsule    TAKE 1 CAPSULE BY MOUTH DAILY    Encounter for herb and vitamin supplement management       vitamin b complex w/vitamin C Tabs tablet     180 tablet    TAKE 1 TABLET BY MOUTH TWICE DAILY WITH FOLIC ACID    Takes dietary supplements       vitamin B complex with vitamin C Tabs tablet     180 tablet    Take 1 tablet by mouth 2 times daily With Folic acid    Encounter for herb and vitamin supplement management       vitamin E 1000 UNIT capsule    TOCOPHEROL    90 capsule    TAKE 1 CAPSULE BY MOUTH DAILY    Encounter for herb and vitamin supplement management       * Notice:  This list has 13 medication(s) that are the same as other medications prescribed for you. Read the directions carefully, and ask your doctor or other care provider to review them with you.

## 2018-12-12 DIAGNOSIS — M16.11 OSTEOARTHRITIS OF RIGHT HIP, UNSPECIFIED OSTEOARTHRITIS TYPE: ICD-10-CM

## 2018-12-13 NOTE — TELEPHONE ENCOUNTER
glucosamine-chondroitinoitin 750-600 MG TABS      Last Written Prescription Date:  11-13-18  Last Fill Quantity: 120 tab,   # refills: 0  Last Office Visit: 10-15-18  Future Office visit:       Routing refill request to provider for review/approval because:  Drug not on the [facility], P or [facility] refill protocol or controlled substance

## 2018-12-14 RX ORDER — MAGNESIUM CARB/ALUMINUM HYDROX 105-160MG
TABLET,CHEWABLE ORAL
Qty: 120 TABLET | Refills: 3 | Status: SHIPPED | OUTPATIENT
Start: 2018-12-14 | End: 2019-06-17

## 2018-12-31 DIAGNOSIS — Z71.89 ENCOUNTER FOR HERB AND VITAMIN SUPPLEMENT MANAGEMENT: ICD-10-CM

## 2018-12-31 RX ORDER — MULTIVIT WITH MINERALS/LUTEIN
TABLET ORAL
Qty: 90 CAPSULE | Refills: 0 | Status: SHIPPED | OUTPATIENT
Start: 2018-12-31 | End: 2019-01-19

## 2018-12-31 NOTE — TELEPHONE ENCOUNTER
vitamin E (TOCOPHEROL) 1000 UNIT capsule   Last Written Prescription Date:  10-12-18  Last Fill Quantity: 90 cap,   # refills: 0  Last Office Visit: 10-15-18  Future Office visit:       Routing refill request to provider for review/approval because:  Drug not on the [facility], P or [facility] refill protocol or controlled substance

## 2019-01-10 DIAGNOSIS — Z71.89 ENCOUNTER FOR HERB AND VITAMIN SUPPLEMENT MANAGEMENT: ICD-10-CM

## 2019-01-10 NOTE — TELEPHONE ENCOUNTER
LOV: 4/19/2018    Order expires after a year although there appears to be refills left. Patient needs to be seen by April 2019    Prescription approved per [facility] Refill Protocol.  Thanks! Rosy Beck RN

## 2019-01-16 ENCOUNTER — OFFICE VISIT (OUTPATIENT)
Dept: URGENT CARE | Facility: URGENT CARE | Age: 69
End: 2019-01-16
Payer: COMMERCIAL

## 2019-01-16 VITALS
OXYGEN SATURATION: 95 % | HEART RATE: 73 BPM | SYSTOLIC BLOOD PRESSURE: 122 MMHG | RESPIRATION RATE: 18 BRPM | BODY MASS INDEX: 21.47 KG/M2 | TEMPERATURE: 97.8 F | WEIGHT: 150 LBS | DIASTOLIC BLOOD PRESSURE: 68 MMHG | HEIGHT: 70 IN

## 2019-01-16 DIAGNOSIS — J44.0: Primary | ICD-10-CM

## 2019-01-16 PROCEDURE — 99214 OFFICE O/P EST MOD 30 MIN: CPT | Performed by: PHYSICIAN ASSISTANT

## 2019-01-16 RX ORDER — DOXYCYCLINE HYCLATE 100 MG
100 TABLET ORAL 2 TIMES DAILY
Qty: 20 TABLET | Refills: 0 | Status: SHIPPED | OUTPATIENT
Start: 2019-01-16 | End: 2019-03-22

## 2019-01-16 RX ORDER — BENZONATATE 100 MG/1
100 CAPSULE ORAL 2 TIMES DAILY PRN
Qty: 15 CAPSULE | Refills: 0 | Status: SHIPPED | OUTPATIENT
Start: 2019-01-16 | End: 2019-06-16

## 2019-01-16 RX ORDER — PREDNISONE 20 MG/1
20 TABLET ORAL DAILY
Qty: 5 TABLET | Refills: 0 | Status: SHIPPED | OUTPATIENT
Start: 2019-01-16 | End: 2019-06-16

## 2019-01-16 ASSESSMENT — MIFFLIN-ST. JEOR: SCORE: 1446.65

## 2019-01-16 NOTE — PROGRESS NOTES
SUBJECTIVE:   Robert B Behr is a 69 year old male presenting with a chief complaint of   Chief Complaint   Patient presents with     Urgent Care     URI     sick x 2 weeks, coughing, sinus symptoms.        He is an established patient of [facility].    URI Adult   Chest congestion, runny nose, for 2 weeks. Chest tightness.   Onset of symptoms was 2 week(s) ago.  Course of illness is same.    Severity mild  Current and Associated symptoms: fever, runny nose, stuffy nose, cough - non-productive and hoarse voice  Treatment measures tried include Decongestants.  Predisposing factors include None. He goes to the [facility], where there are a lot of people sick         Review of Systems    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     Tobacco abuse      Family History   Problem Relation Age of Onset     Diabetes Brother         living     Cancer Brother         throat     Macular Degeneration Mother      Colon Cancer No family hx of      Glaucoma No family hx of      Retinal detachment No family hx of      Amblyopia No family hx of      Skin Cancer No family hx of      Melanoma No family hx of      Current Outpatient Medications   Medication Sig Dispense Refill     ADVAIR DISKUS 250-50 MCG/DOSE diskus inhaler INHALE 1 PUFF INTO THE LUNGS TWICE DAILY 3 Inhaler 3     ascorbic acid (VITAMIN C) 1000 MG TABS TAKE 1 TABLET BY MOUTH DAILY 90 tablet 11     B Complex-C (VITAMIN B COMPLEX W/VITAMIN C) TABS tablet TAKE 1 TABLET BY MOUTH TWICE DAILY WITH FOLIC ACID 180 tablet 11     benzonatate (TESSALON) 100 MG capsule Take 1 capsule (100 mg) by mouth 2 times daily as needed for cough 15 capsule 0     calcium carbonate 600 mg-vitamin D 400 units (CALTRATE) 600-400 MG-UNIT per tablet Take 1 tablet by mouth 2 times daily 180 tablet 1     Calcium-Magnesium-Vitamin D  (CALCIUM 1200+D3) 600- MG-MG-UNIT TB24 Take 1-2 tablets by mouth every morning Reported on 3/14/2017       doxycycline hyclate (VIBRA-TABS) 100 MG tablet Take 1 tablet (100 mg) by mouth 2 times daily for 10 days 20 tablet 0     fish oil-omega-3 fatty acids 1000 MG capsule TAKE 1 CAPSULE BY MOUTH DAILY 90 capsule 11     ginkgo biloba 60 MG CAPS capsule Take 1 capsule (60 mg) by mouth daily 90 capsule 3     glucosamine-chondroitinoitin 750-600 MG TABS TAKE 2 TABLETS BY MOUTH TWICE DAILY 120 tablet 3     Lactobacillus-Inulin (CULTURELLE DIGESTIVE [facility]) CAPS Take 1 capsule by mouth 2 times daily Culturelle or something similiar 100 capsule 3     predniSONE (DELTASONE) 20 MG tablet Take 20 mg by mouth daily for 5 days. 5 tablet 0     vitamin A 10,000 units (5500 mcg) capsule TAKE 1 CAPSULE BY MOUTH DAILY 90 capsule 0     doxycycline (VIBRAMYCIN) 100 MG capsule Take 1 capsule (100 mg) by mouth 2 times daily 20 capsule 0     doxycycline (VIBRAMYCIN) 100 MG capsule Take 1 capsule (100 mg) by mouth 2 times daily (Patient not taking: Reported on 11/25/2018) 20 capsule 0     erythromycin with ethanol (EMGEL) 2 % gel Apply topically daily (Patient not taking: Reported on 11/25/2018) 60 g 0     fluticasone-salmeterol (ADVAIR) 250-50 MCG/DOSE diskus inhaler Inhale 1 puff into the lungs 2 times daily 1 Inhaler 11     ipratropium - albuterol 0.5 mg/2.5 mg/3 mL (DUONEB) 0.5-2.5 (3) MG/3ML neb solution Take 1 vial (3 mLs) by nebulization once for 1 dose 3 mL 0     ipratropium - albuterol 0.5 mg/2.5 mg/3 mL (DUONEB) 0.5-2.5 (3) MG/3ML neb solution Take 1 vial (3 mLs) by nebulization every 6 hours as needed for shortness of breath / dyspnea or wheezing 30 vial 1     ipratropium - albuterol 0.5 mg/2.5 mg/3 mL (DUONEB) 0.5-2.5 (3) MG/3ML neb solution Take 1 vial (3 mLs) by nebulization every 6 hours as needed for shortness of breath / dyspnea or wheezing (Patient not taking: Reported on 1/16/2019) 30 vial 1     ipratropium -  albuterol 0.5 mg/2.5 mg/3 mL (DUONEB) 0.5-2.5 (3) MG/3ML neb solution Take 1 vial (3 mLs) by nebulization once for 1 dose 30 vial 1     ipratropium - albuterol 0.5 mg/2.5 mg/3 mL (DUONEB) 0.5-2.5 (3) MG/3ML neb solution Take 1 vial (3 mLs) by nebulization once for 1 dose 1 vial 0     Ipratropium-Albuterol (COMBIVENT RESPIMAT)  MCG/ACT inhaler Inhale 1 puff into the lungs 4 times daily Not to exceed 6 doses per day. 1 Inhaler 1     Ipratropium-Albuterol (COMBIVENT RESPIMAT)  MCG/ACT inhaler INHALE ONE PUFF BY MOUTH FOUR TIMES A DAY (NO TO EXCCED 6 DOSES PER DAY) 4 g 3     ketoconazole (NIZORAL) 2 % cream Apply topically 2 times daily To all affected areas with white spots 60 g 3     nicotine (NICODERM CQ) 14 MG/24HR 24 hr patch Place 1 patch onto the skin every 24 hours 30 patch 0     nicotine (NICODERM CQ) 14 MG/24HR 24 hr patch Place 1 patch onto the skin every 24 hours 30 patch 2     nicotine (NICOTINE STEP 2) 14 MG/24HR 24 hr patch PLACE 1 PATCH TO SKIN EVERY 24 HOURS 28 patch 1     nicotine polacrilex (COMMIT) 2 MG lozenge Place 1 lozenge (2 mg) inside cheek as needed for smoking cessation Place 1 lozenge inside cheek as needed for smoking cessation. 150 lozenge 3     order for DME Equipment being ordered: nebulizer machine 1 each 0     prednisoLONE acetate (PRED FORTE) 1 % ophthalmic susp 1 drop in surgical eye as directed, 4x daily after surgery for 1 week, 3x daily for 1 week, 2x daily for 1 week, daily for 1 week, then stop (Patient not taking: Reported on 11/25/2018) 1 Bottle 1     predniSONE (DELTASONE) 20 MG tablet Take 3 tablets (60 mg) by mouth daily (Patient not taking: Reported on 11/25/2018) 18 tablet 0     predniSONE (DELTASONE) 20 MG tablet Take 1 tablet (20 mg) by mouth 2 times daily (Patient not taking: Reported on 1/16/2019.) 10 tablet 0     Spacer/Aero-Holding Chambers (POCKET SPACER) MARGARITA Attach to albuterol inhaler, use 2 puffs every 4-6 hours as needed. 1 each 0     tacrolimus  "(PROTOPIC) 0.1 % ointment APPLY TOPICALLY TO THE AFFECTED AREAS OF THE SKIN, ESPECIALLY THE SCALP, TWICE DAILY 60 g 0     vitamin B complex with vitamin C (VITAMIN  B COMPLEX) TABS tablet Take 1 tablet by mouth 2 times daily With Folic acid 180 tablet 2     vitamin E (TOCOPHEROL) 1000 units (450 mg) capsule TAKE 1 CAPSULE BY MOUTH DAILY 90 capsule 0     Vitamin Mixture (SELENIUM-VITAMIN E)  MCG-UNIT CAPS Take 1 tablet by mouth daily 30 capsule 3     Social History     Tobacco Use     Smoking status: Current Some Day Smoker     Packs/day: 0.10     Years: 45.00     Pack years: 4.50     Types: Cigarettes     Smokeless tobacco: Former User     Tobacco comment: 3-7 cigarettes/day (8/29/17). 1.5 packs for 45 years smoker   Substance Use Topics     Alcohol use: Yes     Alcohol/week: 0.0 oz     Comment: occ beer       OBJECTIVE  /68   Pulse 73   Temp 97.8  F (36.6  C) (Oral)   Resp 18   Ht 1.778 m (5' 10\")   Wt 68 kg (150 lb)   SpO2 95%   BMI 21.52 kg/m      Physical Exam   Constitutional: He is oriented to person, place, and time. He appears well-developed and well-nourished. No distress.   HENT:   Head: Normocephalic.   Left Ear: External ear normal.   Nose: Nose normal.   Mouth/Throat: Oropharyngeal exudate present.   Eyes: Conjunctivae and EOM are normal. Pupils are equal, round, and reactive to light. Right eye exhibits no discharge. Left eye exhibits no discharge. No scleral icterus.   Neck: Normal range of motion. Neck supple. No JVD present. No tracheal deviation present. No thyromegaly present.   Cardiovascular: Normal rate, regular rhythm and normal heart sounds.   Pulmonary/Chest: Effort normal. He has wheezes (Bilateral scattered wheezes on both lung fields).   Abdominal: Soft.   Musculoskeletal: Normal range of motion. He exhibits no edema, tenderness or deformity.   Lymphadenopathy:     He has no cervical adenopathy.   Neurological: He is alert and oriented to person, place, and time. "   Skin: Skin is warm. He is not diaphoretic.   Psychiatric: He has a normal mood and affect. His behavior is normal. Judgment and thought content normal.       Labs:  No results found for this or any previous visit (from the past 24 hour(s)).    X-Ray was not done.  Xray advised but Patient declined.     ASSESSMENT:      ICD-10-CM    1. Chronic obstructive pulmonary disease with lower respiratory infection (H) J44.0 doxycycline hyclate (VIBRA-TABS) 100 MG tablet     predniSONE (DELTASONE) 20 MG tablet     benzonatate (TESSALON) 100 MG capsule        Medical Decision Making:    Differential Diagnosis:  URI Adult/Peds:  Acute right otitis media, Acute left otitis media, Asthma, Bronchitis-viral, Bronchospasm, Pneumonia, Viral syndrome and Viral upper respiratory illness, COPD with lower respiratory infection    Serious Comorbid Conditions:  Adult:  COPD    PLAN:    COPD with lower respiratory infections: Doxycycline, prednisone, Tessalon Perles, fluids rest Tylenol ibuprofen.  Patient has COPD.  He has been having productive cough chest tightness, breathing difficulty, congestion, sinus pain and pressure for 2 weeks.  He also mentioned that he has subjective fever.  Chest x-ray was advised to rule out pneumonia but patient declined.  Antibiotic has been prescribed as patient has COPD with possible lower respiratory infection.     -Please complete the antibiotic dose as advised-  -use the inhaler as needed  -If symptoms are not improving in 1 week and follow-up with the primary care.   -Please go to the ER if any of the red flag signs appear.    I have discussed the patient's diagnosis and my plan of treatment with the patient. We went over any labs or imaging. Patient is aware to come back in with worsening symptoms or if no relief despite treatment plan.  Patient verbalizes understanding. All questions were addressed and answered.     Followup:    If not improving or if condition worsens, follow up with your Primary  Care Provider    Patient Instructions     Patient Education     What is COPD?  COPD stands for chronic obstructive pulmonary disease. It means the airways in your lungs are blocked (obstructed). Because of this, it is hard to breathe. You may have trouble with daily activities because of shortness of breath. Over time the shortness of breath usually worsens making it more and more difficult to take care of yourself and take part in activities. Chronic bronchitis and emphysema are two common types of COPD.  What happens in chronic bronchitis?    The cells in the airways make more mucus than normal. The mucus builds up, narrowing the airways. This means less air travels into and out of the lungs. The lining of the airways may also become inflamed (swollen) and causes the airways to narrow even more.        What happens in emphysema?    The small airways are damaged and lose their stretchiness. The airways collapse when you exhale, causing air to get trapped in the air sacs. This means that less oxygen enters the blood vessels and less oxygen is delivered to all of the cells of your body. This makes it hard to breathe.     Damage to cilia    Cilia are small hairs that line and protect the airways. Smoking damages the cilia. Damaged cilia can t sweep mucus and particles away. Some of the cilia are destroyed. This damage worsens COPD.  How did I get COPD?  Most people get COPD from smoking. Cigarette smoke damages lungs, which can develop into COPD over many years.  How COPD affects you  COPD makes you work harder to breathe. Air may get trapped in the lungs, which prevents your lungs from filling completely with fresh oxygen-filled air when you inhale (breathe in). It's harder to take deep breaths especially when you are active and start breathing faster. Over time, your lungs may become enlarged filling the lung with air that does not transfer oxygen into the blood. These problems cause you to have shortness of breath  (also called dyspnea). Wheezing (hoarse, whistling breathing), chronic cough, and fatigue (feeling tired and worn out) are also common.   Date Last Reviewed: 5/1/2016 2000-2018 [employer]. [address] [place] [address], [place] [address]. All rights reserved. This information is not intended as a substitute for professional medical care. Always follow your healthcare professional's instructions.

## 2019-01-16 NOTE — PATIENT INSTRUCTIONS
Patient Education     What is COPD?  COPD stands for chronic obstructive pulmonary disease. It means the airways in your lungs are blocked (obstructed). Because of this, it is hard to breathe. You may have trouble with daily activities because of shortness of breath. Over time the shortness of breath usually worsens making it more and more difficult to take care of yourself and take part in activities. Chronic bronchitis and emphysema are two common types of COPD.  What happens in chronic bronchitis?    The cells in the airways make more mucus than normal. The mucus builds up, narrowing the airways. This means less air travels into and out of the lungs. The lining of the airways may also become inflamed (swollen) and causes the airways to narrow even more.        What happens in emphysema?    The small airways are damaged and lose their stretchiness. The airways collapse when you exhale, causing air to get trapped in the air sacs. This means that less oxygen enters the blood vessels and less oxygen is delivered to all of the cells of your body. This makes it hard to breathe.     Damage to cilia    Cilia are small hairs that line and protect the airways. Smoking damages the cilia. Damaged cilia can t sweep mucus and particles away. Some of the cilia are destroyed. This damage worsens COPD.  How did I get COPD?  Most people get COPD from smoking. Cigarette smoke damages lungs, which can develop into COPD over many years.  How COPD affects you  COPD makes you work harder to breathe. Air may get trapped in the lungs, which prevents your lungs from filling completely with fresh oxygen-filled air when you inhale (breathe in). It's harder to take deep breaths especially when you are active and start breathing faster. Over time, your lungs may become enlarged filling the lung with air that does not transfer oxygen into the blood. These problems cause you to have shortness of breath (also called dyspnea). Wheezing (hoarse,  whistling breathing), chronic cough, and fatigue (feeling tired and worn out) are also common.   Date Last Reviewed: 5/1/2016 2000-2018 [employer]. [address] [place] [address], [place] [address]. All rights reserved. This information is not intended as a substitute for professional medical care. Always follow your healthcare professional's instructions.

## 2019-01-19 DIAGNOSIS — Z71.89 ENCOUNTER FOR HERB AND VITAMIN SUPPLEMENT MANAGEMENT: ICD-10-CM

## 2019-01-19 NOTE — TELEPHONE ENCOUNTER
Medication Detail      Disp Refills Start End ARELY   vitamin A 10,000 units (5500 mcg) capsule 90 capsule 0 1/10/2019  No   Sig: TAKE 1 CAPSULE BY MOUTH DAILY   Sent to pharmacy as: vitamin A 10,000 units (5500 mcg) capsule   Class: E-Prescribe   Order: [id number]   E-Prescribing Status: Receipt confirmed by pharmacy (1/10/2019  9:13 AM CST)       Last Office Visit: 10/15/2018  Future Office visit:       Routing refill request to provider for review/approval because:  Drug not on the [facility] refill protocol or controlled substance    Medication Detail      Disp Refills Start End ARELY   ascorbic acid (VITAMIN C) 1000 MG TABS 90 tablet 11 1/8/2018  No   Sig: TAKE 1 TABLET BY MOUTH DAILY   Sent to pharmacy as: ascorbic acid (VITAMIN C) 1000 MG TABS   Class: E-Prescribe   Order: [id number]   E-Prescribing Status: Receipt confirmed by pharmacy (1/8/2018  2:23 PM CST)       Last Office Visit: 10/15/2018  Future Office visit:       Routing refill request to provider for review/approval because:  Drug not on the [facility] refill protocol or controlled substance    Medication Detail      Disp Refills Start End ARELY   vitamin E (TOCOPHEROL) 1000 units (450 mg) capsule 90 capsule 0 12/31/2018  No   Sig: TAKE 1 CAPSULE BY MOUTH DAILY   Sent to pharmacy as: vitamin E (TOCOPHEROL) 1000 units (450 mg) capsule   Class: E-Prescribe   Order: [id number]   E-Prescribing Status: Receipt confirmed by pharmacy (12/31/2018 10:32 PM CST)       Last Office Visit: 10/15/2018  Future Office visit:       Routing refill request to provider for review/approval because:  Drug not on the [facility], [facility] refill protocol or controlled substance    Requested Prescriptions   Pending Prescriptions Disp Refills     fish oil-omega-3 fatty acids 1000 MG capsule [Pharmacy Med Name: FISH OIL 1000MG CAPSULES]  Last Written Prescription Date:  1/8/2018  Last Fill Quantity: 90 caps,  # refills: 11   Last office visit: 4/19/2018 with  "prescribing provider:  Serjio   Future Office Visit:     90 capsule 0     Sig: TAKE 1 CAPSULE BY MOUTH EVERY DAY    Vitamin Supplements (Adult) Protocol Passed - 1/19/2019  2:29 PM       Passed - High dose Vitamin D not ordered       Passed - Recent (12 mo) or future (30 days) visit within the authorizing provider's specialty    Patient had office visit in the last 12 months or has a visit in the next 30 days with authorizing provider or within the authorizing provider's specialty.  See \"Patient Info\" tab in inbasket, or \"Choose Columns\" in Meds & Orders section of the refill encounter.             Passed - Medication is active on med list                                                                      "

## 2019-01-22 ENCOUNTER — TELEPHONE (OUTPATIENT)
Dept: FAMILY MEDICINE | Facility: CLINIC | Age: 69
End: 2019-01-22

## 2019-01-22 DIAGNOSIS — J44.0 CHRONIC OBSTRUCTIVE PULMONARY DISEASE WITH ACUTE LOWER RESPIRATORY INFECTION (H): ICD-10-CM

## 2019-01-22 RX ORDER — MULTIVIT WITH MINERALS/LUTEIN
TABLET ORAL
Qty: 90 CAPSULE | Refills: 0 | Status: SHIPPED | OUTPATIENT
Start: 2019-01-22 | End: 2019-06-12

## 2019-01-22 RX ORDER — MULTIVIT WITH MINERALS/LUTEIN
TABLET ORAL
Qty: 90 TABLET | Refills: 0 | Status: SHIPPED | OUTPATIENT
Start: 2019-01-22 | End: 2019-06-12

## 2019-01-22 RX ORDER — CHLORAL HYDRATE 500 MG
CAPSULE ORAL
Qty: 90 CAPSULE | Refills: 0 | Status: SHIPPED | OUTPATIENT
Start: 2019-01-22 | End: 2019-04-14

## 2019-01-22 NOTE — TELEPHONE ENCOUNTER
Writer left detailed message for patient on identified voicemail relaying comments per Dr. Frausto.  JUDAH Kirby, BSN, RN

## 2019-01-22 NOTE — TELEPHONE ENCOUNTER
Patient said he was taking selenium 200 mcg and would like this changed back to the 200 MCG, please sign off on script.  thanks

## 2019-01-22 NOTE — TELEPHONE ENCOUNTER
"Dr. Frausto-Please review and advise/sign if agree.    Writer called patient and reviewed message per Dr. Frausto.    Patient verbalized understanding, asked if provider would prescribe Selenium 100 mcg dose and does not understand why the \"standard\" dose of 200 mcg cannot be prescribed.    Writer again reviewed message per Dr. Frausto and informed patient writer would get message to provider regarding Selenium 100 mcg.    Thank you!  BRAYDON WareN, RN    "

## 2019-01-22 NOTE — TELEPHONE ENCOUNTER
"  Will sign off on 100mcg.      There is a narrow range for optimal selenium intake, and there have been several cases of selenium toxicity when people take too much. The dietary allowance is 55mcg daily.  It is found in drinking water, some grains and seeds, meats, seafood and other sources--in other words, those who eat a regular diet and thus get selenium naturally do not need supplementation at all.  For patients who are on nutrition through an IV, we give a max dose of 100mcg.  There is not a \"standard dosing\" of 200mcg, except perhaps as recommended by the supplement seller, who is not monitored by the FDA.     "

## 2019-01-22 NOTE — TELEPHONE ENCOUNTER
Routing refill request to provider for review/approval because:  Drug not on the [facility] refill protocol     Pending Prescriptions:                       Disp   Refills    fish oil-omega-3 fatty acids 1000 MG caps*90 cap*0            Sig: TAKE 1 CAPSULE BY MOUTH EVERY DAY    vitamin A 10,000 units (5500 mcg) capsule*90 cap*0            Sig: TAKE 1 CAPSULE BY MOUTH DAILY    vitamin C (ASCORBIC ACID) 1000 MG TABS [P*90 tab*0            Sig: TAKE 1 TABLET BY MOUTH DAILY    vitamin E (TOCOPHEROL) 1000 units (450 mg*90 cap*0            Sig: TAKE 1 CAPSULE BY MOUTH DAILY    Melissa Dahl, Registered Nurse   [facility]

## 2019-01-22 NOTE — TELEPHONE ENCOUNTER
"Left message on voicemail to call back and leave more specifics on what dosing he is wanting as he is already getting 50/400  And note says he wants \"high dose of 200\" did he mean for the selenium?    Please clarify.   Thanks!     Aditi Webb RN    "

## 2019-01-22 NOTE — TELEPHONE ENCOUNTER
100mcg is the highest dose I've seen, even for patients who are on parenteral nutrition (which he is not).

## 2019-01-22 NOTE — TELEPHONE ENCOUNTER
Reason for Call:  Medication or medication refill:    Do you use a [facility]? No     Name of the pharmacy and phone number for the current request: [id number] - [place], [place] - [address] AT [facility] [address]    Name of the medication requested: Vitamin Mixture (SELENIUM-VITAMIN E)  MCG-UNIT CAPS    Other request: pt came into the clinic and wants to get a refill but a high dose 200 please advise    Can we leave a detailed message on this number? YES    Phone number patient can be reached at: Home number on file [phone] (home)    Best Time: any    Call taken on 1/22/2019 at 2:45 PM by Juliet Bates

## 2019-02-26 ENCOUNTER — TELEPHONE (OUTPATIENT)
Dept: FAMILY MEDICINE | Facility: CLINIC | Age: 69
End: 2019-02-26

## 2019-02-26 NOTE — TELEPHONE ENCOUNTER
Reason for Call:  Other Add to medication list    Detailed comments: Pt came in today and would like to add this medication back on his list.   ipratropium - albuterol 0.5 mg/2.5 mg/3 mL (DUONEB) 0.5-2.5 (3) MG/3ML neb solution. No other action needed he is just requesting it to be put back onto his list. Please Advise thank you  Phone Number Patient can be reached at: Home number on file [phone] (home)    Best Time: anytime    Can we leave a detailed message on this number? YES    Call taken on 2/26/2019 at 10:09 AM by Elly Salgado

## 2019-03-22 ENCOUNTER — OFFICE VISIT (OUTPATIENT)
Dept: URGENT CARE | Facility: URGENT CARE | Age: 69
End: 2019-03-22
Payer: COMMERCIAL

## 2019-03-22 VITALS
DIASTOLIC BLOOD PRESSURE: 60 MMHG | TEMPERATURE: 99.5 F | OXYGEN SATURATION: 96 % | SYSTOLIC BLOOD PRESSURE: 126 MMHG | HEIGHT: 70 IN | BODY MASS INDEX: 22.48 KG/M2 | HEART RATE: 89 BPM | WEIGHT: 157 LBS

## 2019-03-22 DIAGNOSIS — R05.9 COUGH: Primary | ICD-10-CM

## 2019-03-22 PROCEDURE — 99214 OFFICE O/P EST MOD 30 MIN: CPT | Performed by: FAMILY MEDICINE

## 2019-03-22 RX ORDER — PREDNISONE 20 MG/1
40 TABLET ORAL DAILY
Qty: 10 TABLET | Refills: 0 | Status: SHIPPED | OUTPATIENT
Start: 2019-03-22 | End: 2019-06-16

## 2019-03-22 RX ORDER — AZITHROMYCIN 250 MG/1
TABLET, FILM COATED ORAL
Qty: 6 TABLET | Refills: 0 | Status: SHIPPED | OUTPATIENT
Start: 2019-03-22 | End: 2019-03-27

## 2019-03-22 ASSESSMENT — MIFFLIN-ST. JEOR: SCORE: 1478.4

## 2019-03-22 NOTE — PROGRESS NOTES
SUBJECTIVE:   Chief Complaint   Patient presents with     Urgent Care     URI     Reports has had cold for 6 weeks with congested cough and sinus congestion; thinks he had the flu.  Was starting to feel better and exercising but reports now feeling more short of breath than usual, coughing up mucous, sinuses feel blocked.       URI     Hx COPD.     Patient  is a 69 year old male who presented to urgent care clinic for evaluation of shortness of breath and cough.     Acute Illness   Concerns: Cough and shortness of breath   When did it start? Six weeks ago  Is it getting better, worse or staying the same? worse: Shortness of breath     Fatigue/Achiness?:No     Fever?: No     Chills/Sweats?: No     Headache (location?)No     Sinus Pressure?: YES     Eye redness/Discharge?: No     Ear Pain?: No     Runny nose?: No     Congestion?:  YES     Sore Throat?: No   Respiratory    Cough?:  YES-non-productive    Wheeze?: No   GI/    Decreased Appetite?: No     Nausea?:No     Vomiting?: No     Diarrhea?:  No     Dysuria/Frequency?.:No       Any Illness Exposure?: No     Any foreign travel or contact with anyone ill who travelled abroad? No     Therapies Tried and outcome: Has a history of COPD and used his inhalers (Advair and combivent). He also has a history of tobacco abuse and continues to smoke.     Review of Systems review of system negative except as mentioned in HPI.       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     Tobacco abuse      Family History   Problem Relation Age of Onset     Diabetes Brother         living     Cancer Brother         throat     Macular Degeneration Mother      Colon Cancer No family hx of      Glaucoma No family hx of      Retinal detachment No family hx of      Amblyopia No family hx of      Skin Cancer No family  hx of      Melanoma No family hx of      Current Outpatient Medications   Medication Sig Dispense Refill     ADVAIR DISKUS 250-50 MCG/DOSE diskus inhaler INHALE 1 PUFF INTO THE LUNGS TWICE DAILY 3 Inhaler 3     B Complex-C (VITAMIN B COMPLEX W/VITAMIN C) TABS tablet TAKE 1 TABLET BY MOUTH TWICE DAILY WITH FOLIC ACID 180 tablet 11     calcium carbonate 600 mg-vitamin D 400 units (CALTRATE) 600-400 MG-UNIT per tablet Take 1 tablet by mouth 2 times daily 180 tablet 1     fish oil-omega-3 fatty acids 1000 MG capsule TAKE 1 CAPSULE BY MOUTH EVERY DAY 90 capsule 0     fluticasone-salmeterol (ADVAIR) 250-50 MCG/DOSE diskus inhaler Inhale 1 puff into the lungs 2 times daily 1 Inhaler 11     ginkgo biloba 60 MG CAPS capsule Take 1 capsule (60 mg) by mouth daily 90 capsule 3     glucosamine-chondroitinoitin 750-600 MG TABS TAKE 2 TABLETS BY MOUTH TWICE DAILY 120 tablet 3     nicotine (NICODERM CQ) 14 MG/24HR 24 hr patch Place 1 patch onto the skin every 24 hours 30 patch 2     Selenium 100 MCG CAPS Take 100 mcg by mouth daily 90 capsule 3     Spacer/Aero-Holding Chambers (POCKET SPACER) MARGARITA Attach to albuterol inhaler, use 2 puffs every 4-6 hours as needed. 1 each 0     vitamin A 10,000 units (5500 mcg) capsule TAKE 1 CAPSULE BY MOUTH DAILY 90 capsule 0     vitamin C (ASCORBIC ACID) 1000 MG TABS TAKE 1 TABLET BY MOUTH DAILY 90 tablet 0     vitamin E (TOCOPHEROL) 1000 units (450 mg) capsule TAKE 1 CAPSULE BY MOUTH DAILY 90 capsule 0     Calcium-Magnesium-Vitamin D (CALCIUM 1200+D3) 600- MG-MG-UNIT TB24 Take 1-2 tablets by mouth every morning Reported on 3/14/2017       doxycycline (VIBRAMYCIN) 100 MG capsule Take 1 capsule (100 mg) by mouth 2 times daily (Patient not taking: Reported on 11/25/2018) 20 capsule 0     ipratropium - albuterol 0.5 mg/2.5 mg/3 mL (DUONEB) 0.5-2.5 (3) MG/3ML neb solution Take 1 vial (3 mLs) by nebulization once for 1 dose 3 mL 0     ipratropium - albuterol 0.5 mg/2.5 mg/3 mL (DUONEB) 0.5-2.5  (3) MG/3ML neb solution Take 1 vial (3 mLs) by nebulization every 6 hours as needed for shortness of breath / dyspnea or wheezing 30 vial 1     ipratropium - albuterol 0.5 mg/2.5 mg/3 mL (DUONEB) 0.5-2.5 (3) MG/3ML neb solution Take 1 vial (3 mLs) by nebulization every 6 hours as needed for shortness of breath / dyspnea or wheezing (Patient not taking: Reported on 1/16/2019) 30 vial 1     ipratropium - albuterol 0.5 mg/2.5 mg/3 mL (DUONEB) 0.5-2.5 (3) MG/3ML neb solution Take 1 vial (3 mLs) by nebulization once for 1 dose 30 vial 1     ipratropium - albuterol 0.5 mg/2.5 mg/3 mL (DUONEB) 0.5-2.5 (3) MG/3ML neb solution Take 1 vial (3 mLs) by nebulization once for 1 dose 1 vial 0     Ipratropium-Albuterol (COMBIVENT RESPIMAT)  MCG/ACT inhaler Inhale 1 puff into the lungs 4 times daily Not to exceed 6 doses per day. 1 Inhaler 1     Ipratropium-Albuterol (COMBIVENT RESPIMAT)  MCG/ACT inhaler INHALE ONE PUFF BY MOUTH FOUR TIMES A DAY (NO TO EXCCED 6 DOSES PER DAY) 4 g 3     Lactobacillus-Inulin (CULTUREE DIGESTIVE [facility]) CAPS Take 1 capsule by mouth 2 times daily Culturelle or something similiar (Patient not taking: Reported on 3/22/2019) 100 capsule 3     nicotine (NICODERM CQ) 14 MG/24HR 24 hr patch Place 1 patch onto the skin every 24 hours 30 patch 0     nicotine (NICOTINE STEP 2) 14 MG/24HR 24 hr patch PLACE 1 PATCH TO SKIN EVERY 24 HOURS 28 patch 1     nicotine polacrilex (COMMIT) 2 MG lozenge Place 1 lozenge (2 mg) inside cheek as needed for smoking cessation Place 1 lozenge inside cheek as needed for smoking cessation. 150 lozenge 3     order for DME Equipment being ordered: nebulizer machine 1 each 0     predniSONE (DELTASONE) 20 MG tablet Take 3 tablets (60 mg) by mouth daily (Patient not taking: Reported on 11/25/2018) 18 tablet 0     predniSONE (DELTASONE) 20 MG tablet Take 1 tablet (20 mg) by mouth 2 times daily (Patient not taking: Reported on 1/16/2019.) 10 tablet 0     tacrolimus (PROTOPIC)  "0.1 % ointment APPLY TOPICALLY TO THE AFFECTED AREAS OF THE SKIN, ESPECIALLY THE SCALP, TWICE DAILY (Patient not taking: Reported on 3/22/2019) 60 g 0     vitamin B complex with vitamin C (VITAMIN  B COMPLEX) TABS tablet Take 1 tablet by mouth 2 times daily With Folic acid 180 tablet 2     Vitamin Mixture (SELENIUM-VITAMIN E)  MCG-UNIT CAPS Take 1 tablet by mouth daily (Patient not taking: Reported on 3/22/2019) 30 capsule 3     Social History     Tobacco Use     Smoking status: Current Some Day Smoker     Packs/day: 0.10     Years: 45.00     Pack years: 4.50     Types: Cigarettes     Smokeless tobacco: Former User     Tobacco comment: 3-7 cigarettes/day (8/29/17). 1.5 packs for 45 years smoker   Substance Use Topics     Alcohol use: Yes     Alcohol/week: 0.0 oz     Comment: occ beer       OBJECTIVE  /60   Pulse 89   Temp 99.5  F (37.5  C) (Oral)   Ht 1.778 m (5' 10\")   Wt 71.2 kg (157 lb)   SpO2 96%   BMI 22.53 kg/m      Physical Exam   Constitutional: He appears well-developed and well-nourished. No distress.   Coughing throughout encounter   HENT:   Head: Normocephalic and atraumatic.   Right Ear: External ear normal.   Left Ear: External ear normal.   Nose: Nose normal.   Mouth/Throat: Oropharynx is clear and moist. No oropharyngeal exudate.   Eyes: Conjunctivae are normal. Right eye exhibits no discharge. Left eye exhibits no discharge.   Neck: Neck supple.   Cardiovascular: Normal rate and regular rhythm.   No murmur heard.  Pulmonary/Chest: Effort normal. No respiratory distress. He has wheezes (Expiratory wheezing and Rhonchi ).   Lymphadenopathy:     He has no cervical adenopathy.   Neurological: He is alert.   Skin: Skin is warm. He is not diaphoretic.   No rash on exposed skin       Labs:  No results found for this or any previous visit (from the past 24 hour(s)).    X-Ray was not done.    ASSESSMENT:    Ravin was seen today for urgent care, uri and uri.    Diagnoses and all orders for " this visit:    Cough: Likely Viral   Patient presented to the clinic with respiratory symptoms. He has a history of COPD and tobacco use. His physical exam was consistent with expiratory wheezing and rhonchi. His vitals are stable and he is sating at 96%. Considering history shared decision was made to treat patient as COPD exacerbation to prevent further decline in respiratory status. Prednisone burst and Z-jessica was prescribed. Tobacco cessation education was provided. He will follow up with his PCP in 3-4 days for close monitoring.  -     predniSONE (DELTASONE) 20 MG tablet; Take 40 mg by mouth daily for 5 days.  -     azithromycin (ZITHROMAX) 250 MG tablet; Take 2 tablets (500 mg) by mouth daily for 1 day, THEN 1 tablet (250 mg) daily for 4 days.    Joao Vega MD

## 2019-03-22 NOTE — PATIENT INSTRUCTIONS
Patient instruction:     You were seen at the urgent care for evaluation and treatment. You were diagnosed with the following below:     Ravin was seen today for urgent care, uri and uri.    Diagnoses and all orders for this visit:    COPD exacerbation (H)  -     predniSONE (DELTASONE) 20 MG tablet; Take 40 mg by mouth daily for 5 days.  -     azithromycin (ZITHROMAX) 250 MG tablet; Take 2 tablets (500 mg) by mouth daily for 1 day, THEN 1 tablet (250 mg) daily for 4 days.      - Please take any prescribed medication as directed.  - Please follow up with your primary care physician in 3-4 days for close monitoring   - Please return back to the clinic or go to your nearest Emergency Department if you develop worsening or new symptoms such as: Persistent fever, chills, headaches, blurry vision, chest pain, shortness of breath, abdominal pain, nausea, vomiting, and diarrhea.    Joao Nieto. MD

## 2019-03-27 ENCOUNTER — OFFICE VISIT (OUTPATIENT)
Dept: URGENT CARE | Facility: URGENT CARE | Age: 69
End: 2019-03-27
Payer: COMMERCIAL

## 2019-03-27 VITALS
RESPIRATION RATE: 14 BRPM | SYSTOLIC BLOOD PRESSURE: 120 MMHG | HEIGHT: 70 IN | WEIGHT: 157 LBS | BODY MASS INDEX: 22.48 KG/M2 | TEMPERATURE: 98.2 F | DIASTOLIC BLOOD PRESSURE: 72 MMHG | OXYGEN SATURATION: 95 % | HEART RATE: 80 BPM

## 2019-03-27 DIAGNOSIS — J44.0: Primary | ICD-10-CM

## 2019-03-27 PROCEDURE — 99214 OFFICE O/P EST MOD 30 MIN: CPT | Performed by: PHYSICIAN ASSISTANT

## 2019-03-27 RX ORDER — DOXYCYCLINE HYCLATE 100 MG
100 TABLET ORAL 2 TIMES DAILY
Qty: 20 TABLET | Refills: 0 | Status: SHIPPED | OUTPATIENT
Start: 2019-03-27 | End: 2019-08-06

## 2019-03-27 RX ORDER — PREDNISONE 20 MG/1
TABLET ORAL
Qty: 7 TABLET | Refills: 0 | Status: SHIPPED | OUTPATIENT
Start: 2019-03-27 | End: 2019-08-06

## 2019-03-27 RX ORDER — GUAIFENESIN 600 MG/1
TABLET, EXTENDED RELEASE ORAL
Qty: 30 TABLET | Refills: 0 | Status: SHIPPED | OUTPATIENT
Start: 2019-03-27 | End: 2019-08-06

## 2019-03-27 ASSESSMENT — MIFFLIN-ST. JEOR: SCORE: 1478.4

## 2019-03-27 NOTE — PROGRESS NOTES
SUBJECTIVE:   Robert B Behr is a 69 year old male presenting with a chief complaint of cough and sinus congestion. Patient reports that he symptoms began months ago, he was seen 6 days ago and finished taking his azithromycin and prednisone today. He notes a great improvement in symptoms, but continues to have cough and congestion. Denies having a fever, chills, chest pain, shortness or breath, hemoptysis, leg swelling or pain, rash or any other symptoms. He has a history of COPD and is a smoker. He has cut back from 1.5 ppd to 7 cigarettes.     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     Tobacco abuse      Current Outpatient Medications   Medication Sig Dispense Refill     ADVAIR DISKUS 250-50 MCG/DOSE diskus inhaler INHALE 1 PUFF INTO THE LUNGS TWICE DAILY 3 Inhaler 3     B Complex-C (VITAMIN B COMPLEX W/VITAMIN C) TABS tablet TAKE 1 TABLET BY MOUTH TWICE DAILY WITH FOLIC ACID 180 tablet 11     calcium carbonate 600 mg-vitamin D 400 units (CALTRATE) 600-400 MG-UNIT per tablet Take 1 tablet by mouth 2 times daily 180 tablet 1     doxycycline hyclate (VIBRA-TABS) 100 MG tablet Take 1 tablet (100 mg) by mouth 2 times daily 20 tablet 0     fish oil-omega-3 fatty acids 1000 MG capsule TAKE 1 CAPSULE BY MOUTH EVERY DAY 90 capsule 0     ginkgo biloba 60 MG CAPS capsule Take 1 capsule (60 mg) by mouth daily 90 capsule 3     glucosamine-chondroitinoitin 750-600 MG TABS TAKE 2 TABLETS BY MOUTH TWICE DAILY 120 tablet 3     Ipratropium-Albuterol (COMBIVENT RESPIMAT)  MCG/ACT inhaler INHALE ONE PUFF BY MOUTH FOUR TIMES A DAY (NO TO EXCCED 6 DOSES PER DAY) 4 g 3     predniSONE (DELTASONE) 20 MG tablet Take 40 mg by mouth daily for 2 days, THEN 20 mg daily for 3 days. 7 tablet 0     Selenium 100 MCG CAPS Take 100 mcg by mouth daily 90 capsule 3      Calcium-Magnesium-Vitamin D (CALCIUM 1200+D3) 600- MG-MG-UNIT TB24 Take 1-2 tablets by mouth every morning Reported on 3/14/2017       fluticasone-salmeterol (ADVAIR) 250-50 MCG/DOSE diskus inhaler Inhale 1 puff into the lungs 2 times daily 1 Inhaler 11     ipratropium - albuterol 0.5 mg/2.5 mg/3 mL (DUONEB) 0.5-2.5 (3) MG/3ML neb solution Take 1 vial (3 mLs) by nebulization once for 1 dose 3 mL 0     ipratropium - albuterol 0.5 mg/2.5 mg/3 mL (DUONEB) 0.5-2.5 (3) MG/3ML neb solution Take 1 vial (3 mLs) by nebulization every 6 hours as needed for shortness of breath / dyspnea or wheezing 30 vial 1     ipratropium - albuterol 0.5 mg/2.5 mg/3 mL (DUONEB) 0.5-2.5 (3) MG/3ML neb solution Take 1 vial (3 mLs) by nebulization once for 1 dose 30 vial 1     nicotine (NICODERM CQ) 14 MG/24HR 24 hr patch Place 1 patch onto the skin every 24 hours 30 patch 0     nicotine (NICOTINE STEP 2) 14 MG/24HR 24 hr patch PLACE 1 PATCH TO SKIN EVERY 24 HOURS 28 patch 1     nicotine polacrilex (COMMIT) 2 MG lozenge Place 1 lozenge (2 mg) inside cheek as needed for smoking cessation Place 1 lozenge inside cheek as needed for smoking cessation. 150 lozenge 3     order for DME Equipment being ordered: nebulizer machine 1 each 0     predniSONE (DELTASONE) 20 MG tablet Take 40 mg by mouth daily for 5 days. 10 tablet 0     predniSONE (DELTASONE) 20 MG tablet Take 3 tablets (60 mg) by mouth daily (Patient not taking: Reported on 11/25/2018) 18 tablet 0     predniSONE (DELTASONE) 20 MG tablet Take 1 tablet (20 mg) by mouth 2 times daily (Patient not taking: Reported on 1/16/2019.) 10 tablet 0     Spacer/Aero-Holding Chambers (POCKET SPACER) MARGARITA Attach to albuterol inhaler, use 2 puffs every 4-6 hours as needed. 1 each 0     tacrolimus (PROTOPIC) 0.1 % ointment APPLY TOPICALLY TO THE AFFECTED AREAS OF THE SKIN, ESPECIALLY THE SCALP, TWICE DAILY (Patient not taking: Reported on 3/22/2019) 60 g 0     vitamin A 10,000 units (5500 mcg)  "capsule TAKE 1 CAPSULE BY MOUTH DAILY 90 capsule 0     vitamin B complex with vitamin C (VITAMIN  B COMPLEX) TABS tablet Take 1 tablet by mouth 2 times daily With Folic acid 180 tablet 2     vitamin C (ASCORBIC ACID) 1000 MG TABS TAKE 1 TABLET BY MOUTH DAILY 90 tablet 0     vitamin E (TOCOPHEROL) 1000 units (450 mg) capsule TAKE 1 CAPSULE BY MOUTH DAILY 90 capsule 0     Vitamin Mixture (SELENIUM-VITAMIN E)  MCG-UNIT CAPS Take 1 tablet by mouth daily (Patient not taking: Reported on 3/22/2019) 30 capsule 3     Social History     Tobacco Use     Smoking status: Current Some Day Smoker     Packs/day: 0.10     Years: 45.00     Pack years: 4.50     Types: Cigarettes     Smokeless tobacco: Former User     Tobacco comment: 3-7 cigarettes/day (8/29/17). 1.5 packs for 45 years smoker   Substance Use Topics     Alcohol use: Yes     Alcohol/week: 0.0 oz     Comment: occ beer       ROS:  Review of systems negative except as stated above.    OBJECTIVE:  /72   Pulse 80   Temp 98.2  F (36.8  C) (Oral)   Resp 14   Ht 1.778 m (5' 10\")   Wt 71.2 kg (157 lb)   SpO2 95%   BMI 22.53 kg/m    GENERAL APPEARANCE: healthy, alert and no distress  EYES: EOMI,  PERRL, conjunctiva clear  HENT: ear canals and TM's normal.  Nose and mouth without ulcers, erythema or lesions  NECK: supple, nontender, no lymphadenopathy  RESP: lungs clear to auscultation - no rales, rhonchi or wheezes and expiratory wheezes R mid posterior, R lower posterior, L mid posterior and L lower posterior  CV: regular rates and rhythm, normal S1 S2, no murmur noted  ABDOMEN:  soft, nontender  NEURO: Normal strength and tone, sensory exam grossly normal,  normal speech and mentation  SKIN: no suspicious lesions or rashes    ASSESSMENT / PLAN:  1. COPD with lower respiratory infection (H)  Patient's symptoms have greatly improved with addition of antibiotic and prednisone but he is still symptomatic. Continued to encourage supportive cares, fluids and rest. " Azithromycin stays in system for 10 days, so will rx prednisone for wheezing and have him discontinue after 3 days if symptoms have improved / resolved. Also, advised that he use mucinex for cough. If symptoms are still present in 5 days, can have doxycycline filled. Discussed overuse of antibiotics and risks associated. Reviewed Red flag symptoms.   - predniSONE (DELTASONE) 20 MG tablet; Take 40 mg by mouth daily for 2 days, THEN 20 mg daily for 3 days.  Dispense: 7 tablet; Refill: 0  - doxycycline hyclate (VIBRA-TABS) 100 MG tablet; Take 1 tablet (100 mg) by mouth 2 times daily  Dispense: 20 tablet; Refill: 0    I have discussed the patient's diagnosis and my plan of treatment with the patient. We went over any labs or imaging. Patient is aware to come back in with worsening symptoms or if no relief despite treatment plan.  Patient verbalizes understanding. All questions were addressed and answered.   Renee Palencia PA-C

## 2019-03-29 NOTE — PROCEDURES
Ophthalmology Post-op Procedure Note    Surgical Service:    Ophthalmology & Visual Sciences  Date Performed:      September 15, 2017  Location: [facility]      Pre-operative Diagnosis: Visually significant cataract, left eye  Post-operative Diagnosis:  Pseudophakia, left eye  Operative Procedure:  Phacoemulsification with intraocular lens implantation, left eye    Surgeon(s):  Fellow/Staff Surgeon:       Elly Valencia MD  Resident Surgeon:            Umm Lara MD    Anesthesia:   Topical/MAC  Findings:  No unusual findings   Blood Loss:    Minimal  Implants:  ZXR00 19.0 intraocular lens  Specimens:  None     Complications:  The patient did not experience any complications.   Condition: Stable    Operative Report Completion:    Description of Operation/Procedure:    After appropriate informed consent was obtained, the patient was brought to the operating room. The appropriate cardiac and blood pressure monitors were placed. A final pause occurred just before the start of the procedure during which the entire procedure team actively confirmed the correct patient, procedure, site, special equipment and special requirements. The patient was prepped and draped in the usual sterile fashion using 5% povidone/iodine.     An eyelid speculum was placed to open the eyelids. A paracentesis port was placed approximately sixty degrees to the left of the planned temporal incision location using the sideport blade. Approximately 0.8 cc of 1% nonpreserved lidocaine was placed into the anterior chamber. The anterior chamber was filled with dispersive viscoelastic. A clear corneal temporal incision was made with a metal 2.6 mm keratome. A round continuous tear capsulorhexis was initiated with a cystotome and completed with the Utrata forceps. Balanced salt solution on a cannula was used to perform hydrodissection. The nucleus was removed using phacoemulsification with a chop technique. The remaining  cortical material was removed using the irrigation/aspiration handpiece. The capsular bag was filled with dispersive viscoelastic. A lens of the  model and power listed above was placed into the capsular bag using the cartridge injection system. The remaining viscoelastic was removed using the irrigation aspiration handpiece. The paracentesis and temporal wounds were hydrated with balanced salt solution. At the conclusion of the case, the wounds were felt to be watertight, the pupil was round, the lens was centered, and the anterior chamber was deep. A few drops of antibiotic and prednisolone were given to the operative eye. The eyelid speculum was removed. A shield was placed over the operative eye.    Attending Attestation:  I was present for and performed the entire procedure.  Elly Valencia MD       no

## 2019-04-14 DIAGNOSIS — Z71.89 ENCOUNTER FOR HERB AND VITAMIN SUPPLEMENT MANAGEMENT: ICD-10-CM

## 2019-04-14 NOTE — LETTER
[address] [address]  Phone: [phone]    04/17/19    Robert B Behr  [address]  [place] [address]      To whom it may concern:     In order to ensure we are providing the best quality care, we have reviewed your chart and see that you are due for a annual physical.    We have also sent your fish oil-omega-3 fatty acids 1000 MG capsule  medication to your pharmacy. For future medication refills, please contact your primary care clinic to schedule a annual physical appointment. This can be requested via [employer] or by calling the clinic at [phone].    We greatly appreciate the opportunity to serve you. Thank you for trusting us with your health care.      Sincerely,      Your care team at [facility]

## 2019-04-15 ENCOUNTER — TELEPHONE (OUTPATIENT)
Dept: FAMILY MEDICINE | Facility: CLINIC | Age: 69
End: 2019-04-15

## 2019-04-15 NOTE — TELEPHONE ENCOUNTER
There is not a medical indication for this.  I have previously advised him on dosing as below:   There is a narrow range for optimal selenium intake, and there have been several cases of selenium toxicity when people take too much. The dietary allowance is 55mcg daily.  It is found in drinking water, some grains and seeds, meats, seafood and other sources--in other words, those who eat a regular diet and thus get selenium naturally do not need supplementation at all.  For patients who are on nutrition through an IV, we give a max dose of 100mcg.      I do not prescribe supratherapeutic vitamin doses.  I do not know if taking it every other day is safe or not, so I am not going to prescribe it that way.

## 2019-04-15 NOTE — TELEPHONE ENCOUNTER
Selenium does not come in 100 mg tabs. It comes in 50s but not available. The 200 he can't cut in half so can he take one every other day?    Verbal confirmation ok if MD says can do 200 every other day    Suad Boggs RN, BSN

## 2019-04-15 NOTE — TELEPHONE ENCOUNTER
Writer spoke with pharmacy and relayed Dr. Frausto's message to Pharmacy who will relay to patient. Pt welcome to call clinic to get message as well.     Thanks! Rosy Beck RN

## 2019-04-15 NOTE — TELEPHONE ENCOUNTER
Medication is being filled for 1 time refill only due to:  Patient needs labs fasting.   Nisha Betancourt RN

## 2019-04-17 RX ORDER — CHLORAL HYDRATE 500 MG
CAPSULE ORAL
Qty: 90 CAPSULE | Refills: 0 | Status: SHIPPED | OUTPATIENT
Start: 2019-04-17 | End: 2019-06-12

## 2019-04-17 NOTE — TELEPHONE ENCOUNTER
"Prescription approved per [facility] Refill Protocol.    Team coordinators-Please contact patient to schedule annual physical.      Last annual physical: 11/2/17    Last office visit for acute problem: 4/19/18.    Thank you!  CHANEL Ware, RN          Requested Prescriptions   Pending Prescriptions Disp Refills     fish oil-omega-3 fatty acids 1000 MG capsule [Pharmacy Med Name: FISH OIL 1000MG CAPSULES] 90 capsule 0     Sig: TAKE 1 CAPSULE BY MOUTH EVERY DAY       Vitamin Supplements (Adult) Protocol Passed - 4/15/2019  2:06 PM        Passed - High dose Vitamin D not ordered        Passed - Recent (12 mo) or future (30 days) visit within the authorizing provider's specialty     Patient had office visit in the last 12 months or has a visit in the next 30 days with authorizing provider or within the authorizing provider's specialty.  See \"Patient Info\" tab in inbasket, or \"Choose Columns\" in Meds & Orders section of the refill encounter.              Passed - Medication is active on med list          "

## 2019-04-17 NOTE — TELEPHONE ENCOUNTER
Left message on answering machine that medication was approved and sent to pharmacy and that an additional appointment must be scheduled prior to additional refills given. Letter sent.    Joy Causey

## 2019-04-28 ENCOUNTER — NURSE TRIAGE (OUTPATIENT)
Dept: NURSING | Facility: CLINIC | Age: 69
End: 2019-04-28

## 2019-04-28 DIAGNOSIS — J44.1 COPD EXACERBATION (H): ICD-10-CM

## 2019-04-28 RX ORDER — IPRATROPIUM BROMIDE AND ALBUTEROL 20; 100 UG/1; UG/1
SPRAY, METERED RESPIRATORY (INHALATION)
Qty: 4 G | Refills: 0 | Status: SHIPPED | OUTPATIENT
Start: 2019-04-28 | End: 2019-05-14

## 2019-04-28 NOTE — TELEPHONE ENCOUNTER
Ravin calls and before this RN was able to speak with Ravin, he hung up, thus ending this call.    Additional Information    Negative: [1] Caller is not with the adult (patient) AND [2] reporting urgent symptoms    Negative: Lab result questions    Negative: Medication questions    Negative: Caller cannot be reached by phone    Negative: Caller has already spoken to PCP or another triager    Negative: RN needs further essential information from caller in order to complete triage    Negative: Requesting regular office appointment    Negative: [1] Caller requesting NON-URGENT health information AND [2] PCP's office is the best resource    Negative: Health Information question, no triage required and triager able to answer question    Negative: General information question, no triage required and triager able to answer question    Negative: Question about upcoming scheduled test, no triage required and triager able to answer question    Negative: [1] Caller is not with the adult (patient) AND [2] probable NON-URGENT symptoms    [1] Follow-up call to recent contact AND [2] information only call, no triage required    Protocols used: IN[facility]

## 2019-04-28 NOTE — TELEPHONE ENCOUNTER
Pt came to Urgent Care as his inhaler was almost out. Dr Blum signed refill, 1 inhaler. Pt knows he needs a physical soon. Will schedule.

## 2019-05-13 ASSESSMENT — ENCOUNTER SYMPTOMS
ARTHRALGIAS: 0
NERVOUS/ANXIOUS: 0
JOINT SWELLING: 0
NAUSEA: 0
HEMATOCHEZIA: 0
MYALGIAS: 0
CHILLS: 0
CONSTIPATION: 0
WEAKNESS: 0
EYE PAIN: 0
DYSURIA: 0
PALPITATIONS: 0
FREQUENCY: 0
PARESTHESIAS: 0
SORE THROAT: 0
COUGH: 0
HEARTBURN: 1
ABDOMINAL PAIN: 0
FEVER: 0
HEMATURIA: 0
SHORTNESS OF BREATH: 1
HEADACHES: 0
DIZZINESS: 0
DIARRHEA: 0

## 2019-05-13 ASSESSMENT — ACTIVITIES OF DAILY LIVING (ADL): CURRENT_FUNCTION: NO ASSISTANCE NEEDED

## 2019-05-14 ENCOUNTER — OFFICE VISIT (OUTPATIENT)
Dept: FAMILY MEDICINE | Facility: CLINIC | Age: 69
End: 2019-05-14
Payer: COMMERCIAL

## 2019-05-14 VITALS
SYSTOLIC BLOOD PRESSURE: 118 MMHG | WEIGHT: 156 LBS | OXYGEN SATURATION: 96 % | HEART RATE: 66 BPM | DIASTOLIC BLOOD PRESSURE: 69 MMHG | TEMPERATURE: 98 F | BODY MASS INDEX: 22.38 KG/M2 | RESPIRATION RATE: 16 BRPM

## 2019-05-14 DIAGNOSIS — Z87.891 PERSONAL HISTORY OF TOBACCO USE: ICD-10-CM

## 2019-05-14 DIAGNOSIS — L80 VITILIGO: ICD-10-CM

## 2019-05-14 DIAGNOSIS — Z00.00 MEDICARE ANNUAL WELLNESS VISIT, SUBSEQUENT: Primary | ICD-10-CM

## 2019-05-14 DIAGNOSIS — E78.5 DYSLIPIDEMIA: ICD-10-CM

## 2019-05-14 DIAGNOSIS — J43.9 PULMONARY EMPHYSEMA, UNSPECIFIED EMPHYSEMA TYPE (H): ICD-10-CM

## 2019-05-14 DIAGNOSIS — Z12.11 SPECIAL SCREENING FOR MALIGNANT NEOPLASMS, COLON: ICD-10-CM

## 2019-05-14 DIAGNOSIS — Z12.5 SCREENING FOR PROSTATE CANCER: ICD-10-CM

## 2019-05-14 PROCEDURE — 36415 COLL VENOUS BLD VENIPUNCTURE: CPT | Performed by: FAMILY MEDICINE

## 2019-05-14 PROCEDURE — 80061 LIPID PANEL: CPT | Performed by: FAMILY MEDICINE

## 2019-05-14 PROCEDURE — G0103 PSA SCREENING: HCPCS | Performed by: FAMILY MEDICINE

## 2019-05-14 PROCEDURE — G0296 VISIT TO DETERM LDCT ELIG: HCPCS | Performed by: FAMILY MEDICINE

## 2019-05-14 PROCEDURE — G0438 PPPS, INITIAL VISIT: HCPCS | Performed by: FAMILY MEDICINE

## 2019-05-14 RX ORDER — GARLIC 180 MG
60 TABLET, DELAYED RELEASE (ENTERIC COATED) ORAL DAILY
Qty: 90 CAPSULE | Refills: 3 | Status: SHIPPED | OUTPATIENT
Start: 2019-05-14 | End: 2020-01-24

## 2019-05-14 ASSESSMENT — ENCOUNTER SYMPTOMS
FREQUENCY: 0
CHILLS: 0
SORE THROAT: 0
HEMATURIA: 0
PARESTHESIAS: 0
DIARRHEA: 0
JOINT SWELLING: 0
EYE PAIN: 0
DIZZINESS: 0
FEVER: 0
WEAKNESS: 0
HEMATOCHEZIA: 0
CONSTIPATION: 0
DYSURIA: 0
NERVOUS/ANXIOUS: 0
PALPITATIONS: 0
SHORTNESS OF BREATH: 1
HEARTBURN: 1
ARTHRALGIAS: 0
COUGH: 0
NAUSEA: 0
MYALGIAS: 0
ABDOMINAL PAIN: 0
HEADACHES: 0

## 2019-05-14 ASSESSMENT — ACTIVITIES OF DAILY LIVING (ADL): CURRENT_FUNCTION: NO ASSISTANCE NEEDED

## 2019-05-14 NOTE — PROGRESS NOTES
"  Lung Cancer Screening Shared Decision Making Visit     Robert B Behr is eligible for lung cancer screening on the basis of the information provided in my signed lung cancer screening order.     I have discussed with patient the risks and benefits of screening for lung cancer with low-dose CT.     The risks include:  radiation exposure: one low dose chest CT has as much ionizing radiation as about 15 chest x-rays or 6 months of background radiation living in [place]    false positives: 96% of positive findings/nodules are NOT cancer, but some might still require additional diagnostic evaluation, including biopsy  over-diagnosis: some slow growing cancers that might never have been clinically significant will be detected and treated unnecessarily     The benefit of early detection of lung cancer is contingent upon adherence to annual screening or more frequent follow up if indicated.     Furthermore, reaping the benefits of screening requires Robert B Behr to be willing and physically able to undergo diagnostic procedures, if indicated. Although no specific guide is available for determining severity of comorbidities, it is reasonable to withhold screening in patients who have greater mortality risk from other diseases.     We did discuss that the only way to prevent lung cancer is to not smoke. Smoking cessation assistance was offered.    I did not offer risk estimation using a calculator such as this one:    ShouldIScreen  Answers for HPI/ROS submitted by the patient on 5/13/2019   Annual Exam:  In general, how would you rate your overall physical health?: good  Frequency of exercise:: 4-5 days/week  Do you usually eat at least 4 servings of fruit and vegetables a day, include whole grains & fiber, and avoid regularly eating high fat or \"junk\" foods? : No  Taking medications regularly:: Yes  Medication side effects:: None  Activities of Daily Living: no assistance needed  Home safety: no safety concerns " identified  Hearing Impairment:: need to ask people to speak up or repeat themselves  In the past 6 months, have you been bothered by leaking of urine?: No  abdominal pain: No  Blood in stool: No  Blood in urine: No  chest pain: No  chills: No  congestion: Yes  constipation: No  cough: No  diarrhea: No  dizziness: No  ear pain: No  eye pain: No  nervous/anxious: No  fever: No  frequency: No  genital sores: No  headaches: No  hearing loss: No  heartburn: Yes  arthralgias: No  joint swelling: No  peripheral edema: No  mood changes: No  myalgias: No  nausea: No  dysuria: No  palpitations: No  Skin sensation changes: No  sore throat: No  rash: No  shortness of breath: Yes  visual disturbance: No  weakness: No  impotence: No  penile discharge: No  In general, how would you rate your overall mental or emotional health?: good  Additional concerns today:: No  Duration of exercise:: 45-60 minutes

## 2019-05-14 NOTE — PROGRESS NOTES
"SUBJECTIVE:   Robert B Behr is a 69 year old male who presents for Preventive Visit.  Are you in the first 12 months of your Medicare coverage?  No    Healthy Habits:     In general, how would you rate your overall health?  Good    Frequency of exercise:  4-5 days/week    Duration of exercise:  45-60 minutes    Do you usually eat at least 4 servings of fruit and vegetables a day, include whole grains    & fiber and avoid regularly eating high fat or \"junk\" foods?  No    Taking medications regularly:  Yes    Medication side effects:  None    Ability to successfully perform activities of daily living:  No assistance needed    Home Safety:  No safety concerns identified    Hearing Impairment:  Need to ask people to speak up or repeat themselves    In the past 6 months, have you been bothered by leaking of urine?  No    In general, how would you rate your overall mental or emotional health?  Good      PHQ-2 Total Score: 0    Additional concerns today:  No    Do you feel safe in your environment? Yes    Do you have a Health Care Directive? No: Advance care planning was reviewed with patient; patient declined at this time.  CV:  Ravin continues to work on smoking cessation.  He otherwise is very healthy, exercising regularly (biking, swimming).  He does note eating more junk food lately.  He had negative AAA screening.  Malignancy: no colon or prostate cancer in the family.  One of his brothers, who smoked, developed tongue and bronchial (?) cancer; he states that his brother  from \"coughing up his heart.\"  He has not had colonoscopy; he is due for FIT screening.   He is followed with yearly CT scan for lung cancer screening.    Bone health: he reports a h/o osteoporosis on dexa scan about 2-3 years ago, was not prescribed a medication, but takes calcium and D regularly.   Immunizations: tdap, PCV13 and pneumovax are up to date; needs Shingrix.  Depression screen: neg     COPD: continues on advair.  Unfortunately " continues to smoke a few sticks, but still trying to quit.      Vitiligo: has found ginko biloba helpful for this requests I prescribe the supplement.      Fall risk  Fallen 2 or more times in the past year?: No  Any fall with injury in the past year?: No    Cognitive Screening   1) Repeat 3 items (Leader, Season, Table)    2) Clock draw: NORMAL  3) 3 item recall: Recalls 2 objects   Results: NORMAL clock, 2 items recalled: COGNITIVE IMPAIRMENT LESS LIKELY    Mini-CogTM Copyright S Bryon. Licensed by the author for use in [facility]; reprinted with permission (fitz@[facility]). All rights reserved.          Reviewed and updated as needed this visit by clinical staff  Tobacco  Allergies  Meds  Med Hx  Surg Hx  Fam Hx  Soc Hx        Reviewed and updated as needed this visit by Provider        Social History     Tobacco Use     Smoking status: Current Some Day Smoker     Packs/day: 0.10     Years: 45.00     Pack years: 4.50     Types: Cigarettes     Smokeless tobacco: Former User     Tobacco comment: 3-7 cigarettes/day (8/29/17). 1.5 packs for 45 years smoker   Substance Use Topics     Alcohol use: Yes     Alcohol/week: 0.0 oz     Comment: occ beer     If you drink alcohol do you typically have >3 drinks per day or >7 drinks per week? No    Alcohol Use 5/13/2019   Prescreen: >3 drinks/day or >7 drinks/week? No   Prescreen: >3 drinks/day or >7 drinks/week? -   No flowsheet data found.    Current providers sharing in care for this patient include:   Patient Care Team:  Fina Frausto MD as PCP - General (Family Practice)  Fina Frausto MD as Assigned PCP  Jovon, Leeanna Lauren RN as Lead Care Coordinator  Alison Torres as Other (see comments)  Marielena Feng Maria Kramarczuk, MD as MD (Dermatology)  Nayan Liu MD as MD (Dermatology)    The following health maintenance items are reviewed in Epic and correct as of today:  Health Maintenance   Topic Date Due     HEPATITIS C  SCREENING  01/16/1968     ADVANCE DIRECTIVE PLANNING Q5 YRS  01/16/2004     ZOSTER IMMUNIZATION (2 of 3) 06/10/2015     COPD ACTION PLAN Q1 YR  04/05/2017     LUNG CANCER SCREENING ANNUAL  06/10/2018     MEDICARE ANNUAL WELLNESS VISIT  11/02/2018     INFLUENZA VACCINE (Season Ended) 09/01/2019     FALL RISK ASSESSMENT  11/14/2019     LIPID SCREEN Q5 YR MALE (SYSTEM ASSIGNED)  03/09/2023     COLON CANCER SCREEN (SYSTEM ASSIGNED)  10/19/2025     DTAP/TDAP/TD IMMUNIZATION (2 - Td) 02/24/2026     SPIROMETRY ONETIME  Completed     PHQ-2  Completed     AORTIC ANEURYSM SCREENING (SYSTEM ASSIGNED)  Completed     IPV IMMUNIZATION  Aged Out     MENINGITIS IMMUNIZATION  Aged Out     Patient Active Problem List   Diagnosis     Osteoporosis     COPD (chronic obstructive pulmonary disease) (H)     Tobacco use disorder     CARDIOVASCULAR SCREENING; LDL GOAL LESS THAN 160     Lung nodules     Health Care Home     Hiatal hernia     Hypopigmentation     Past Surgical History:   Procedure Laterality Date     CATARACT IOL, RT/LT Left 09/15/2017    s/p CE/IOL left eye     CATARACT IOL, RT/LT Right      PHACOEMULSIFICATION CLEAR CORNEA WITH STANDARD INTRAOCULAR LENS IMPLANT Right 9/30/2016    Procedure: PHACOEMULSIFICATION CLEAR CORNEA WITH STANDARD INTRAOCULAR LENS IMPLANT;  Surgeon: Elly Valencia MD;  [facility]     PHACOEMULSIFICATION WITH STANDARD INTRAOCULAR LENS IMPLANT Left 9/15/2017    Procedure: PHACOEMULSIFICATION WITH STANDARD INTRAOCULAR LENS IMPLANT;  Left Eye Phacoemulsification with Standard Lens;  Surgeon: Elly Valencia MD;  Location: [facility] OR     WRIST SURGERY         Social History     Tobacco Use     Smoking status: Current Some Day Smoker     Packs/day: 0.10     Years: 45.00     Pack years: 4.50     Types: Cigarettes     Smokeless tobacco: Former User     Tobacco comment: 3-7 cigarettes/day (8/29/17). 1.5 packs for 45 years smoker   Substance Use Topics     Alcohol use: Yes     Alcohol/week: 0.0 oz      Comment: occ beer     Family History   Problem Relation Age of Onset     Diabetes Brother         living     Cancer Brother         throat     Macular Degeneration Mother      Colon Cancer No family hx of      Glaucoma No family hx of      Retinal detachment No family hx of      Amblyopia No family hx of      Skin Cancer No family hx of      Melanoma No family hx of          Current Outpatient Medications   Medication Sig Dispense Refill     B Complex-C (VITAMIN B COMPLEX W/VITAMIN C) TABS tablet TAKE 1 TABLET BY MOUTH TWICE DAILY WITH FOLIC ACID 180 tablet 11     calcium carbonate 600 mg-vitamin D 400 units (CALTRATE) 600-400 MG-UNIT per tablet Take 1 tablet by mouth 2 times daily 180 tablet 1     Calcium-Magnesium-Vitamin D (CALCIUM 1200+D3) 600- MG-MG-UNIT TB24 Take 1-2 tablets by mouth every morning Reported on 3/14/2017       doxycycline hyclate (VIBRA-TABS) 100 MG tablet Take 1 tablet (100 mg) by mouth 2 times daily 20 tablet 0     fish oil-omega-3 fatty acids 1000 MG capsule TAKE 1 CAPSULE BY MOUTH EVERY DAY 90 capsule 0     fluticasone-salmeterol (ADVAIR DISKUS) 250-50 MCG/DOSE inhaler INHALE 1 PUFF INTO THE LUNGS TWICE DAILY 3 Inhaler 4     ginkgo biloba 60 MG CAPS capsule Take 1 capsule (60 mg) by mouth daily 90 capsule 3     glucosamine-chondroitinoitin 750-600 MG TABS TAKE 2 TABLETS BY MOUTH TWICE DAILY 120 tablet 3     guaiFENesin (MUCINEX) 600 MG 12 hr tablet Take 1-2 tabs every 12 hours. Do not exceed 4 tabs (2400mg) in one day. 30 tablet 0     ipratropium - albuterol 0.5 mg/2.5 mg/3 mL (DUONEB) 0.5-2.5 (3) MG/3ML neb solution Take 1 vial (3 mLs) by nebulization every 6 hours as needed for shortness of breath / dyspnea or wheezing 30 vial 1     Ipratropium-Albuterol (COMBIVENT RESPIMAT)  MCG/ACT inhaler INHALE 1 PUFF FOUR TIMES DAILY- DO NOT EXCEED 6 DOSES PER DAY 4 g 6     nicotine (NICODERM CQ) 14 MG/24HR 24 hr patch Place 1 patch onto the skin every 24 hours 30 patch 0     nicotine  (NICOTINE STEP 2) 14 MG/24HR 24 hr patch PLACE 1 PATCH TO SKIN EVERY 24 HOURS 28 patch 1     nicotine polacrilex (COMMIT) 2 MG lozenge Place 1 lozenge (2 mg) inside cheek as needed for smoking cessation Place 1 lozenge inside cheek as needed for smoking cessation. 150 lozenge 3     order for DME Equipment being ordered: nebulizer machine 1 each 0     Selenium 100 MCG CAPS Take 100 mcg by mouth daily 90 capsule 3     Spacer/Aero-Holding Chambers (POCKET SPACER) MARGARITA Attach to albuterol inhaler, use 2 puffs every 4-6 hours as needed. 1 each 0     vitamin A 10,000 units (5500 mcg) capsule TAKE 1 CAPSULE BY MOUTH DAILY 90 capsule 0     vitamin B complex with vitamin C (VITAMIN  B COMPLEX) TABS tablet Take 1 tablet by mouth 2 times daily With Folic acid 180 tablet 2     vitamin C (ASCORBIC ACID) 1000 MG TABS TAKE 1 TABLET BY MOUTH DAILY 90 tablet 0     vitamin E (TOCOPHEROL) 1000 units (450 mg) capsule TAKE 1 CAPSULE BY MOUTH DAILY 90 capsule 0     Vitamin Mixture (SELENIUM-VITAMIN E)  MCG-UNIT CAPS Take 1 tablet by mouth daily 30 capsule 3     No Known Allergies      Review of Systems   Constitutional: Negative for chills and fever.   HENT: Positive for congestion. Negative for ear pain, hearing loss and sore throat.    Eyes: Negative for pain and visual disturbance.   Respiratory: Positive for shortness of breath. Negative for cough.    Cardiovascular: Negative for chest pain, palpitations and peripheral edema.   Gastrointestinal: Positive for heartburn. Negative for abdominal pain, constipation, diarrhea, hematochezia and nausea.   Genitourinary: Negative for discharge, dysuria, frequency, genital sores, hematuria and impotence.   Musculoskeletal: Negative for arthralgias, joint swelling and myalgias.   Skin: Negative for rash.   Neurological: Negative for dizziness, weakness, headaches and paresthesias.   Psychiatric/Behavioral: Negative for mood changes. The patient is not nervous/anxious.          OBJECTIVE:  "  There were no vitals taken for this visit. Estimated body mass index is 22.53 kg/m  as calculated from the following:    Height as of 3/27/19: 1.778 m (5' 10\").    Weight as of 3/27/19: 71.2 kg (157 lb).  Physical Exam  GENERAL: healthy, alert and no distress  EYES: Eyes grossly normal to inspection, PERRL and conjunctivae and sclerae normal  HENT: ear canals and TM's normal, nose and mouth without ulcers or lesions  NECK: no adenopathy, no asymmetry, masses, or scars and thyroid normal to palpation  RESP: lungs clear to auscultation - no rales, rhonchi or wheezes, decreased breath sounds in the bases bilaterally.  CV: regular rate and rhythm, normal S1 S2, no S3 or S4, no murmur, click or rub, no peripheral edema and peripheral pulses strong  ABDOMEN: soft, nontender, no hepatosplenomegaly, no masses and bowel sounds normal  MS: no gross musculoskeletal defects noted, no edema  SKIN: no suspicious lesions or rashes; some improving vitiligo to his forehead.    NEURO: Normal strength and tone, mentation intact and speech normal  PSYCH: mentation appears normal, affect normal/bright        ASSESSMENT / PLAN:   1. Medicare annual wellness visit, subsequent  Recommending FIT, PSA, lipids, CT chest for yearly lung cancer screen, and Shingrix at pharmacy;  Will continue to advise smoking cessation.      2. COPD(H)  Medications refilled.    - fluticasone-salmeterol (ADVAIR DISKUS) 250-50 MCG/DOSE inhaler; INHALE 1 PUFF INTO THE LUNGS TWICE DAILY  Dispense: 3 Inhaler; Refill: 4  - Ipratropium-Albuterol (COMBIVENT RESPIMAT)  MCG/ACT inhaler; INHALE 1 PUFF FOUR TIMES DAILY- DO NOT EXCEED 6 DOSES PER DAY  Dispense: 4 g; Refill: 6    3. Special screening for malignant neoplasms, colon    - Fecal colorectal cancer screen (FIT); Future    4. Screening for prostate cancer    - PSA, screen    5. Dyslipidemia    - Lipid Profile    6. Personal history of tobacco use    - Prof fee: Shared Decisionmaking for Lung Cancer " "Screening  - CT Chest Lung Cancer Scrn Low Dose wo; Future    7. Vitiligo    - ginkgo biloba 60 MG CAPS capsule; Take 1 capsule (60 mg) by mouth daily  Dispense: 90 capsule; Refill: 3    End of Life Planning:  Patient currently has an advanced directive:     COUNSELING:  Reviewed preventive health counseling, as reflected in patient instructions    Estimated body mass index is 22.53 kg/m  as calculated from the following:    Height as of 3/27/19: 1.778 m (5' 10\").    Weight as of 3/27/19: 71.2 kg (157 lb).         reports that he has been smoking cigarettes.  He has a 4.50 pack-year smoking history. He has quit using smokeless tobacco.      Appropriate preventive services were discussed with this patient, including applicable screening as appropriate for cardiovascular disease, diabetes, osteopenia/osteoporosis, and glaucoma.  As appropriate for age/gender, discussed screening for colorectal cancer, prostate cancer, breast cancer, and cervical cancer. Checklist reviewing preventive services available has been given to the patient.    Reviewed patients plan of care and provided an AVS. The Intermediate Care Plan ( asthma action plan, low back pain action plan, and migraine action plan) for Ravin meets the Care Plan requirement. This Care Plan has been established and reviewed with the Patient.    Counseling Resources:  ATP IV Guidelines  Pooled Cohorts Equation Calculator  Breast Cancer Risk Calculator  FRAX Risk Assessment  ICSI Preventive Guidelines  Dietary Guidelines for Americans, 2010  [employer] MyPlate  ASA Prophylaxis  Lung CA Screening    Fina Frausto MD  [facility]    Identified Health Risks:  "

## 2019-05-14 NOTE — PATIENT INSTRUCTIONS
Patient Education   Personalized Prevention Plan  You are due for the preventive services outlined below.  Your care team is available to assist you in scheduling these services.  If you have already completed any of these items, please share that information with your care team to update in your medical record.  Health Maintenance Due   Topic Date Due     Hepatitis C Screening  01/16/1968     Discuss Advance Directive Planning  01/16/2004     Zoster (Shingles) Vaccine (2 of 3) 06/10/2015     COPD ACTION PLAN Q1 YR  04/05/2017     Lung Cancer Screening (CT Scan) - yearly  06/10/2018     Annual Wellness Visit  11/02/2018        Lung Cancer Screening   Frequently Asked Questions  If you are at high-risk for lung cancer, getting screened with low-dose computed tomography (LDCT) every year can help save your life. This handout offers answers to some of the most common questions about lung cancer screening. If you have other questions, please call [phone] ([phone]).     What is it?  Lung cancer screening uses special X-ray technology to create an image of your lung tissue. The exam is quick and easy and takes less than 10 seconds. We don t give you any medicine or use any needles. You can eat before and after the exam. You don t need to change your clothes as long as the clothing on your chest doesn t contain metal. But, you do need to be able to hold your breath for at least 6 seconds during the exam.    What is the goal of lung cancer screening?  The goal of lung cancer screening is to save lives. Many times, lung cancer is not found until a person starts having physical symptoms. Lung cancer screening can help detect lung cancer in the earliest stages when it may be easier to treat.    Who should be screened for lung cancer?  We suggest lung cancer screening for anyone who is at high-risk for lung cancer. You are in the high-risk group if you:      are between the ages of 55 and 79, and    have  smoked at least 1 pack of cigarettes a day for 30 or more years, and    still smoke or have quit within the past 15 years.    However, if you have a new cough or shortness of breath, you should talk to your doctor before being screened.    Some national lung health advocacy groups also recommend screening for people ages 50 to 79 who have smoked an average of 1 pack of cigarettes a day for 20 years. They must also have at least 1 other risk factor for lung cancer, not including exposure to secondhand smoke. Other risk factors are having had cancer in the past, emphysema, pulmonary fibrosis, COPD, a family history of lung cancer, or exposure to certain materials such as arsenic, asbestos, beryllium, cadmium, chromium, diesel fumes, nickel, radon or silica. Your care team can help you know if you have one of these risk factors.     Why does it matter if I have symptoms?  Certain symptoms can be a sign that you have a condition in your lungs that should be checked and treated by your doctor. These symptoms include fever, chest pain, a new or changing cough, shortness of breath that you have never felt before, coughing up blood or unexplained weight loss. Having any of these symptoms can greatly affect the results of lung cancer screening.       Should all smokers get an LDCT lung cancer screening exam?  It depends. Lung cancer screening is for a very specific group of men and women who have a history of heavy smoking over a long period of time (see  Who should be screened for lung cancer  above).  I am in the high-risk group, but have been diagnosed with cancer in the past. Is LDCT lung cancer screening right for me?  In some cases, you should not have LDCT lung screening, such as when your doctor is already following your cancer with CT scan studies. Your doctor will help you decide if LDCT lung screening is right for you.  Do I need to have a screening exam every year?  Yes. If you are in the high-risk group  described earlier, you should get an LDCT lung cancer screening exam every year until you are 79, or are no longer willing or able to undergo screening and possible procedures to diagnose and treat lung cancer.  How effective is LDCT at preventing death from lung cancer?  Studies have shown that LDCT lung cancer screening can lower the risk of death from lung cancer by 20 percent in people who are at high-risk.  What are the risks?  There are some risks and limitations of LDCT lung cancer screening. We want to make sure you understand the risks and benefits, so please let us know if you have any questions. Your doctor may want to talk with you more about these risks.    Radiation exposure: As with any exam that uses radiation, there is a very small increased risk of cancer. The amount of radiation in LDCT is small--about the same amount a person would get from a mammogram. Your doctor orders the exam when he or she feels the potential benefits outweigh the risks.    False negatives: No test is perfect, including LDCT. It is possible that you may have a medical condition, including lung cancer, that is not found during your exam. This is called a false negative result.    False positives and more testing: LDCT very often finds something in the lung that could be cancer, but in fact is not. This is called a false positive result. False positive tests often cause anxiety. To make sure these findings are not cancer, you may need to have more tests. These tests will be done only if you give us permission. Sometimes patients need a treatment that can have side effects, such as a biopsy. For more information on false positives, see  What can I expect from the results?     Findings not related to lung cancer: Your LDCT exam also takes pictures of areas of your body next to your lungs. In a very small number of cases, the CT scan will show an abnormal finding in one of these areas, such as your kidneys, adrenal glands, liver  or thyroid. This finding may not be serious, but you may need more tests. Your doctor can help you decide what other tests you may need, if any.  What can I expect from the results?  About 1 out of 4 LDCT exams will find something that may need more tests. Most of the time, these findings are lung nodules. Lung nodules are very small collections of tissue in the lung. These nodules are very common, and the vast majority--more than 97 percent--are not cancer (benign). Most are normal lymph nodes or small areas of scarring from past infections.  But, if a small lung nodule is found to be cancer, the cancer can be cured more than 90 percent of the time. To know if the nodule is cancer, we may need to get more images before your next yearly screening exam. If the nodule has suspicious features (for example, it is large, has an odd shape or grows over time), we will refer you to a specialist for further testing.  Will my doctor also get the results?  Yes. Your doctor will get a copy of your results.  Is it okay to keep smoking now that there s a cancer screening exam?  No. Tobacco is one of the strongest cancer-causing agents. It causes not only lung cancer, but other cancers and cardiovascular (heart) diseases as well. The damage caused by smoking builds over time. This means that the longer you smoke, the higher your risk of disease. While it is never too late to quit, the sooner you quit, the better.  Where can I find help to quit smoking?  The best way to prevent lung cancer is to stop smoking. If you have already quit smoking, congratulations and keep it up! For help on quitting smoking, please call [employer] at 3-859-546-UBUH ([phone]) or the American [employer] Society at 1-816.884.5555 to find local resources near you.  One-on-one health coaching:  If you d prefer to work individually with a health care provider on tobacco cessation, we offer:      Medication Therapy Management:  Our specially trained pharmacists work  closely with you and your doctor to help you quit smoking.  Call [phone] or [phone] (toll free).     Can Do: Health coaching offered by [facility].  [email or web address]

## 2019-05-15 LAB
CHOLEST SERPL-MCNC: 271 MG/DL
HDLC SERPL-MCNC: 76 MG/DL
LDLC SERPL CALC-MCNC: 157 MG/DL
NONHDLC SERPL-MCNC: 195 MG/DL
PSA SERPL-ACNC: 0.77 UG/L (ref 0–4)
TRIGL SERPL-MCNC: 189 MG/DL

## 2019-05-15 PROCEDURE — 82274 ASSAY TEST FOR BLOOD FECAL: CPT | Performed by: FAMILY MEDICINE

## 2019-05-19 LAB — HEMOCCULT STL QL IA: NEGATIVE

## 2019-05-20 DIAGNOSIS — Z12.11 SPECIAL SCREENING FOR MALIGNANT NEOPLASMS, COLON: ICD-10-CM

## 2019-05-22 ENCOUNTER — ANCILLARY PROCEDURE (OUTPATIENT)
Dept: CT IMAGING | Facility: CLINIC | Age: 69
End: 2019-05-22
Attending: FAMILY MEDICINE
Payer: COMMERCIAL

## 2019-05-22 ENCOUNTER — OFFICE VISIT (OUTPATIENT)
Dept: FAMILY MEDICINE | Facility: CLINIC | Age: 69
End: 2019-05-22
Payer: COMMERCIAL

## 2019-05-22 VITALS
BODY MASS INDEX: 22.81 KG/M2 | DIASTOLIC BLOOD PRESSURE: 72 MMHG | TEMPERATURE: 98.4 F | RESPIRATION RATE: 18 BRPM | WEIGHT: 159 LBS | SYSTOLIC BLOOD PRESSURE: 132 MMHG | OXYGEN SATURATION: 98 % | HEART RATE: 57 BPM

## 2019-05-22 DIAGNOSIS — E78.5 DYSLIPIDEMIA: Primary | ICD-10-CM

## 2019-05-22 DIAGNOSIS — Z87.891 PERSONAL HISTORY OF TOBACCO USE: ICD-10-CM

## 2019-05-22 PROCEDURE — 99213 OFFICE O/P EST LOW 20 MIN: CPT | Performed by: FAMILY MEDICINE

## 2019-05-22 NOTE — PROGRESS NOTES
Subjective     Robert B Behr is a 69 year old male who presents to clinic today for the following health issues:    HPI     Pt is here to follow up from physical for dyslipidemia, which is a new problem.  He admits to dietary indiscretions (chili dogs and other junk food at night).  He does exercise regularly.  He is trying to quit smoking.  He would like to get his diet back under control and recheck his lipids prior to considering a medication.     Patient Active Problem List   Diagnosis     Osteoporosis     COPD (chronic obstructive pulmonary disease) (H)     Tobacco use disorder     CARDIOVASCULAR SCREENING; LDL GOAL LESS THAN 160     Lung nodules     Health Care Home     Hiatal hernia     Hypopigmentation     Past Surgical History:   Procedure Laterality Date     CATARACT IOL, RT/LT Left 09/15/2017    s/p CE/IOL left eye     CATARACT IOL, RT/LT Right      PHACOEMULSIFICATION CLEAR CORNEA WITH STANDARD INTRAOCULAR LENS IMPLANT Right 9/30/2016    Procedure: PHACOEMULSIFICATION CLEAR CORNEA WITH STANDARD INTRAOCULAR LENS IMPLANT;  Surgeon: Elly Valencia MD;  [facility]     PHACOEMULSIFICATION WITH STANDARD INTRAOCULAR LENS IMPLANT Left 9/15/2017    Procedure: PHACOEMULSIFICATION WITH STANDARD INTRAOCULAR LENS IMPLANT;  Left Eye Phacoemulsification with Standard Lens;  Surgeon: Elly Valencia MD;  Location: [facility] OR     WRIST SURGERY         Social History     Tobacco Use     Smoking status: Current Some Day Smoker     Packs/day: 0.10     Years: 45.00     Pack years: 4.50     Types: Cigarettes     Smokeless tobacco: Former User     Tobacco comment: 3-7 cigarettes/day (8/29/17). 1.5 packs for 45 years smoker   Substance Use Topics     Alcohol use: Yes     Alcohol/week: 0.0 oz     Comment: occ beer     Family History   Problem Relation Age of Onset     Diabetes Brother         living     Cancer Brother         throat     Macular Degeneration Mother      Colon Cancer No family hx of      Glaucoma No family hx  of      Retinal detachment No family hx of      Amblyopia No family hx of      Skin Cancer No family hx of      Melanoma No family hx of          Current Outpatient Medications   Medication Sig Dispense Refill     B Complex-C (VITAMIN B COMPLEX W/VITAMIN C) TABS tablet TAKE 1 TABLET BY MOUTH TWICE DAILY WITH FOLIC ACID 180 tablet 11     calcium carbonate 600 mg-vitamin D 400 units (CALTRATE) 600-400 MG-UNIT per tablet Take 1 tablet by mouth 2 times daily 180 tablet 1     Calcium-Magnesium-Vitamin D (CALCIUM 1200+D3) 600- MG-MG-UNIT TB24 Take 1-2 tablets by mouth every morning Reported on 3/14/2017       doxycycline hyclate (VIBRA-TABS) 100 MG tablet Take 1 tablet (100 mg) by mouth 2 times daily 20 tablet 0     fish oil-omega-3 fatty acids 1000 MG capsule TAKE 1 CAPSULE BY MOUTH EVERY DAY 90 capsule 0     fluticasone-salmeterol (ADVAIR DISKUS) 250-50 MCG/DOSE inhaler INHALE 1 PUFF INTO THE LUNGS TWICE DAILY 3 Inhaler 4     ginkgo biloba 60 MG CAPS capsule Take 1 capsule (60 mg) by mouth daily 90 capsule 3     glucosamine-chondroitinoitin 750-600 MG TABS TAKE 2 TABLETS BY MOUTH TWICE DAILY 120 tablet 3     guaiFENesin (MUCINEX) 600 MG 12 hr tablet Take 1-2 tabs every 12 hours. Do not exceed 4 tabs (2400mg) in one day. 30 tablet 0     ipratropium - albuterol 0.5 mg/2.5 mg/3 mL (DUONEB) 0.5-2.5 (3) MG/3ML neb solution Take 1 vial (3 mLs) by nebulization every 6 hours as needed for shortness of breath / dyspnea or wheezing 30 vial 1     Ipratropium-Albuterol (COMBIVENT RESPIMAT)  MCG/ACT inhaler INHALE 1 PUFF FOUR TIMES DAILY- DO NOT EXCEED 6 DOSES PER DAY 4 g 6     nicotine (NICODERM CQ) 14 MG/24HR 24 hr patch Place 1 patch onto the skin every 24 hours 30 patch 0     nicotine (NICOTINE STEP 2) 14 MG/24HR 24 hr patch PLACE 1 PATCH TO SKIN EVERY 24 HOURS 28 patch 1     nicotine polacrilex (COMMIT) 2 MG lozenge Place 1 lozenge (2 mg) inside cheek as needed for smoking cessation Place 1 lozenge inside cheek as  needed for smoking cessation. 150 lozenge 3     order for DME Equipment being ordered: nebulizer machine 1 each 0     Selenium 100 MCG CAPS Take 100 mcg by mouth daily 90 capsule 3     Spacer/Aero-Holding Chambers (POCKET SPACER) MARGARITA Attach to albuterol inhaler, use 2 puffs every 4-6 hours as needed. 1 each 0     vitamin A 10,000 units (5500 mcg) capsule TAKE 1 CAPSULE BY MOUTH DAILY 90 capsule 0     vitamin B complex with vitamin C (VITAMIN  B COMPLEX) TABS tablet Take 1 tablet by mouth 2 times daily With Folic acid 180 tablet 2     vitamin C (ASCORBIC ACID) 1000 MG TABS TAKE 1 TABLET BY MOUTH DAILY 90 tablet 0     vitamin E (TOCOPHEROL) 1000 units (450 mg) capsule TAKE 1 CAPSULE BY MOUTH DAILY 90 capsule 0     Vitamin Mixture (SELENIUM-VITAMIN E)  MCG-UNIT CAPS Take 1 tablet by mouth daily 30 capsule 3     No Known Allergies      Reviewed and updated as needed this visit by Provider         Review of Systems   ROS COMP: Constitutional, HEENT, cardiovascular, pulmonary, gi and gu systems are negative, except as otherwise noted.      Objective    There were no vitals taken for this visit.  There is no height or weight on file to calculate BMI.  Physical Exam   GENERAL APPEARANCE: healthy, alert and no distress  EYES: Eyes grossly normal to inspection, PERRL and conjunctivae and sclerae normal  RESP: lungs clear to auscultation - no rales, rhonchi or wheezes, decreased breath sounds in the bases bilaterally  CV: regular rates and rhythm, normal S1 S2, no S3 or S4 and no murmur, click or rub            Assessment & Plan     1. Dyslipidemia  Patient to implement dietary changes and repeat lipids at the end of the summer.   Otherwise, would recommend a statin:  The 10-year ASCVD risk score ([place] SIDDHARTHA Jr., et al., 2013) is: 22.2%    Values used to calculate the score:      Age: 69 years      Sex: Male      Is Non- : No      Diabetic: No      Tobacco smoker: Yes      Systolic Blood Pressure:  132 mmHg      Is BP treated: No      HDL Cholesterol: 76 mg/dL      Total Cholesterol: 271 mg/dL  - Lipid Profile; Future     Tobacco Cessation:   reports that he has been smoking cigarettes.  He has a 4.50 pack-year smoking history. He has quit using smokeless tobacco.              No follow-ups on file.    Fina Frausto MD  [facility]

## 2019-05-22 NOTE — LETTER
My COPD Action Plan     Name: Robert B Behr    YOB: 1950   Date: 5/22/2019    My doctor: Fina Frausto MD   My clinic: [address] [address] [phone]  My Controller Medicine: Serevent/Fluticasone (Advair)   Dose:      My Rescue Medicine: combivent   Dose: 2 puffs up to four times daily     My Flare Up Medicine: Prednisone and Azithromycin (Zithromax or Zithromax Z-jessica)   Dose:      My COPD Severity:       Use of Oxygen: Oxygen Not Prescribed      Make sure you've had your pneumonia   vaccines.          GREEN ZONE       Doing well today      Usual level of activity and exercise    Usual amount of cough and mucus    No shortness of breath    Usual level of health (thinking clearly, sleeping well, feel like eating) Actions:      Take daily medicines    Use oxygen as prescribed    Follow regular exercise and diet plan    Avoid cigarette smoke and other irritants that harm the lungs           YELLOW ZONE          Having a bad day or flare up      Short of breath more than usual    A lot more sputum (mucus) than usual    Sputum looks yellow, green, tan, brown or bloody    More coughing or wheezing    Fever or chills    Less energy; trouble completing activities    Trouble thinking or focusing    Using quick relief inhaler or nebulizer more often    Poor sleep; symptoms wake me up    Do not feel like eating Actions:      Get plenty of rest    Take daily medicines    Use quick relief inhaler every 4 hours    If you use oxygen, call you doctor to see if you should adjust your oxygen    Do breathing exercises or other things to help you relax    Let a loved one, friend or neighbor know you are feeling worse    Call your care team if you have 2 or more symptoms.  Start taking steroids or antibiotics if directed by your care team           RED ZONE       Need medical care now      Severe shortness of breath (feel you can't breathe)    Fever,  chills    Not enough breath to do any activity    Trouble coughing up mucus, walking or talking    Blood in mucus    Frequent coughing   Rescue medicines are not working    Not able to sleep because of breathing    Feel confused or drowsy    Chest pain    Actions:      Call your health care team.  If you cannot reach your care team, call 911 or go to the emergency room.        Annual Reminders:  Meet with Care Team, Flu Shot every Fall  Pharmacy: [id number] - [place], [place] - [address] AT [facility] [address]

## 2019-06-12 ENCOUNTER — TELEPHONE (OUTPATIENT)
Dept: FAMILY MEDICINE | Facility: CLINIC | Age: 69
End: 2019-06-12

## 2019-06-12 DIAGNOSIS — M16.11 OSTEOARTHRITIS OF RIGHT HIP, UNSPECIFIED OSTEOARTHRITIS TYPE: ICD-10-CM

## 2019-06-12 DIAGNOSIS — Z71.89 ENCOUNTER FOR HERB AND VITAMIN SUPPLEMENT MANAGEMENT: ICD-10-CM

## 2019-06-13 DIAGNOSIS — M81.0 AGE-RELATED OSTEOPOROSIS WITHOUT CURRENT PATHOLOGICAL FRACTURE: ICD-10-CM

## 2019-06-13 NOTE — TELEPHONE ENCOUNTER
"Requested Prescriptions   Pending Prescriptions Disp Refills     calcium carbonate 600 mg-vitamin D 400 units (CALTRATE) 600-400 MG-UNIT per tablet 180 tablet 1     Sig: Take 1 tablet by mouth 2 times daily  Last Written Prescription Date:  11/9/2018  Last Fill Quantity: 180tablet,  # refills: 1   Last Office Visit: 5/22/2019 [facility]  Future Office Visit:            Vitamin Supplements (Adult) Protocol Passed - 6/13/2019  2:40 PM        Passed - High dose Vitamin D not ordered        Passed - Recent (12 mo) or future (30 days) visit within the authorizing provider's specialty     Patient had office visit in the last 12 months or has a visit in the next 30 days with authorizing provider or within the authorizing provider's specialty.  See \"Patient Info\" tab in inbasket, or \"Choose Columns\" in Meds & Orders section of the refill encounter.              Passed - Medication is active on med list          "

## 2019-06-13 NOTE — TELEPHONE ENCOUNTER
FISH OIL 1000MG CAPSULES      Last Written Prescription Date:  4/17/2019  Last Fill Quantity: 90capsule,   # refills: 0  Last Office Visit: 05/22/2019 [facility]  Future Office visit:       Routing refill request to provider for review/approval because:  Drug not on the [facility], [facility] or [facility] refill protocol or controlled substance    VITAMIN C 1000MG TABLETS      Last Written Prescription Date:  1/22/2019  Last Fill Quantity: 90tablet,   # refills: 0  Last Office Visit: 5/22/2019   Future Office visit:       Routing refill request to provider for review/approval because:  Drug not on the [facility], [facility] or [facility] refill protocol or controlled substance    VITAMIN E 1000IU CAPSULES N/M      Last Written Prescription Date:  1/22/2019  Last Fill Quantity: 90capsule,   # refills: 0  Last Office Visit: 5/22/2019  Future Office visit:       Routing refill request to provider for review/approval because:  Drug not on the [facility], [facility] or [facility] refill protocol or controlled substance    VITAMIN A 10,000UNT CAPSULES      Last Written Prescription Date:  1/22/2019  Last Fill Quantity: 90capsule,   # refills: 0  Last Office Visit: 1/22/2019 Lisbet  Future Office visit:       Routing refill request to provider for review/approval because:  Drug not on the [facility], [facility] or [facility] refill protocol or controlled substance

## 2019-06-16 ENCOUNTER — OFFICE VISIT (OUTPATIENT)
Dept: URGENT CARE | Facility: URGENT CARE | Age: 69
End: 2019-06-16
Payer: COMMERCIAL

## 2019-06-16 VITALS
BODY MASS INDEX: 22.67 KG/M2 | SYSTOLIC BLOOD PRESSURE: 132 MMHG | RESPIRATION RATE: 16 BRPM | HEART RATE: 76 BPM | DIASTOLIC BLOOD PRESSURE: 80 MMHG | WEIGHT: 158 LBS | OXYGEN SATURATION: 96 % | TEMPERATURE: 98.9 F

## 2019-06-16 DIAGNOSIS — J44.0: Primary | ICD-10-CM

## 2019-06-16 PROCEDURE — 99214 OFFICE O/P EST MOD 30 MIN: CPT | Performed by: PHYSICIAN ASSISTANT

## 2019-06-16 RX ORDER — PREDNISONE 20 MG/1
TABLET ORAL
Qty: 7 TABLET | Refills: 0 | Status: SHIPPED | OUTPATIENT
Start: 2019-06-16 | End: 2019-08-06

## 2019-06-16 RX ORDER — DOXYCYCLINE HYCLATE 100 MG
100 TABLET ORAL 2 TIMES DAILY
Qty: 20 TABLET | Refills: 0 | Status: SHIPPED | OUTPATIENT
Start: 2019-06-16 | End: 2019-08-06

## 2019-06-16 RX ORDER — MULTIVIT WITH MINERALS/LUTEIN
TABLET ORAL
Qty: 90 TABLET | Refills: 0 | Status: SHIPPED | OUTPATIENT
Start: 2019-06-16 | End: 2019-08-06

## 2019-06-16 RX ORDER — CHLORAL HYDRATE 500 MG
1 CAPSULE ORAL DAILY
Qty: 90 CAPSULE | Refills: 0 | Status: SHIPPED | OUTPATIENT
Start: 2019-06-16 | End: 2019-08-06

## 2019-06-16 RX ORDER — MULTIVIT WITH MINERALS/LUTEIN
TABLET ORAL
Qty: 90 CAPSULE | Refills: 0 | Status: SHIPPED | OUTPATIENT
Start: 2019-06-16 | End: 2019-08-06

## 2019-06-16 NOTE — TELEPHONE ENCOUNTER
Patient walked in to  today and is checking on his medication refill.   He was told by a MA or RN last week that this would be filled.   Routing to provider directly.       Elena NORRIS     [facility]

## 2019-06-16 NOTE — PROGRESS NOTES
SUBJECTIVE:   Robert B Behr is a 69 year old male with a history of COPD presenting with a chief complaint of cough and shortness of breath.  Onset of symptoms was 1 week(s) ago.  Course of illness is worsening.    Severity moderate  Current and Associated symptoms: productive cough and shortness of breath.   Treatment measures tried include OTC Cough med and rescue inhaler  Predisposing factors include HX of COPD.  Denies fever/chills, HA, CP, pleuritic chest pain, abd pain, N/V/D, rash, or any other symptoms.    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     Tobacco abuse      Current Outpatient Medications   Medication Sig Dispense Refill     B Complex-C (VITAMIN B COMPLEX W/VITAMIN C) TABS tablet TAKE 1 TABLET BY MOUTH TWICE DAILY WITH FOLIC ACID 180 tablet 11     calcium carbonate 600 mg-vitamin D 400 units (CALTRATE) 600-400 MG-UNIT per tablet Take 1 tablet by mouth 2 times daily 180 tablet 1     Calcium-Magnesium-Vitamin D (CALCIUM 1200+D3) 600- MG-MG-UNIT TB24 Take 1-2 tablets by mouth every morning Reported on 3/14/2017       doxycycline hyclate (VIBRA-TABS) 100 MG tablet Take 1 tablet (100 mg) by mouth 2 times daily for 10 days 20 tablet 0     doxycycline hyclate (VIBRA-TABS) 100 MG tablet Take 1 tablet (100 mg) by mouth 2 times daily 20 tablet 0     fluticasone-salmeterol (ADVAIR DISKUS) 250-50 MCG/DOSE inhaler INHALE 1 PUFF INTO THE LUNGS TWICE DAILY 3 Inhaler 4     ginkgo biloba 60 MG CAPS capsule Take 1 capsule (60 mg) by mouth daily 90 capsule 3     glucosamine-chondroitinoitin 750-600 MG TABS TAKE 2 TABLETS BY MOUTH TWICE DAILY 120 tablet 3     guaiFENesin (MUCINEX) 600 MG 12 hr tablet Take 1-2 tabs every 12 hours. Do not exceed 4 tabs (2400mg) in one day. 30 tablet 0     ipratropium - albuterol 0.5 mg/2.5 mg/3 mL  (DUONEB) 0.5-2.5 (3) MG/3ML neb solution Take 1 vial (3 mLs) by nebulization every 6 hours as needed for shortness of breath / dyspnea or wheezing 30 vial 1     Ipratropium-Albuterol (COMBIVENT RESPIMAT)  MCG/ACT inhaler INHALE 1 PUFF FOUR TIMES DAILY- DO NOT EXCEED 6 DOSES PER DAY 4 g 6     nicotine (NICODERM CQ) 14 MG/24HR 24 hr patch Place 1 patch onto the skin every 24 hours 30 patch 0     nicotine (NICOTINE STEP 2) 14 MG/24HR 24 hr patch PLACE 1 PATCH TO SKIN EVERY 24 HOURS 28 patch 1     nicotine polacrilex (COMMIT) 2 MG lozenge Place 1 lozenge (2 mg) inside cheek as needed for smoking cessation Place 1 lozenge inside cheek as needed for smoking cessation. 150 lozenge 3     order for DME Equipment being ordered: nebulizer machine 1 each 0     predniSONE (DELTASONE) 20 MG tablet Take 2 tablets (40 mg) by mouth daily for 2 days, THEN 1 tablet (20 mg) daily for 3 days. 7 tablet 0     Selenium 100 MCG CAPS Take 100 mcg by mouth daily 90 capsule 3     Spacer/Aero-Holding Chambers (POCKET SPACER) MARGARITA Attach to albuterol inhaler, use 2 puffs every 4-6 hours as needed. 1 each 0     vitamin A 10,000 units (5500 mcg) capsule TAKE 1 CAPSULE BY MOUTH DAILY 90 capsule 0     vitamin B complex with vitamin C (VITAMIN  B COMPLEX) TABS tablet Take 1 tablet by mouth 2 times daily With Folic acid 180 tablet 2     Vitamin Mixture (SELENIUM-VITAMIN E)  MCG-UNIT CAPS Take 1 tablet by mouth daily 30 capsule 3     fish oil-omega-3 fatty acids 1000 MG capsule Take 1 capsule (1 g) by mouth daily 90 capsule 0     vitamin C (ASCORBIC ACID) 1000 MG TABS TAKE 1 TABLET BY MOUTH DAILY 90 tablet 0     vitamin E (TOCOPHEROL) 1000 units (450 mg) capsule TAKE 1 CAPSULE BY MOUTH DAILY 90 capsule 0     Social History     Tobacco Use     Smoking status: Current Some Day Smoker     Packs/day: 0.10     Years: 45.00     Pack years: 4.50     Types: Cigarettes     Smokeless tobacco: Former User     Tobacco comment: 3-7 cigarettes/day  (8/29/17). 1.5 packs for 45 years smoker   Substance Use Topics     Alcohol use: Yes     Alcohol/week: 0.0 oz     Comment: occ beer       ROS:  Review of systems negative except as stated above.    OBJECTIVE:  /80   Pulse 76   Temp 98.9  F (37.2  C) (Oral)   Resp 16   Wt 71.7 kg (158 lb)   SpO2 96%   BMI 22.67 kg/m    GENERAL APPEARANCE: healthy, alert and no distress  EYES: EOMI,  PERRL, conjunctiva clear  HENT: ear canals and TM's normal.  Nose and mouth without ulcers, erythema or lesions  NECK: supple, nontender, no lymphadenopathy  RESP: lungs clear to auscultation - no rales, rhonchi or wheezes  CV: regular rates and rhythm, normal S1 S2, no murmur noted  Extremities: Negative Homans sign B/L  NEURO: Normal strength and tone, sensory exam grossly normal,  normal speech and mentation  SKIN: no suspicious lesions or rashes    ASSESSMENT / PLAN:  1. Chronic obstructive pulmonary disease with lower respiratory infection (H)  69 year old male patient with COPD presents for eval of a cough for the past week. On exam, he looks well and lungs are clear. We discussed that his symptoms are possibly viral, but with his history we will treat with antibiotics to limit progression of illness. He does not endorse any chest pain, hemoptysis, dizziness, weakness or palpitations, and I think that PE and MI are both very unlikely. Encouraged supportive cares, fluids and rest. Follow-up in one week with PCP if symptoms fail to resolve. Sooner in clinic or ED if symptoms worsen.   - predniSONE (DELTASONE) 20 MG tablet; Take 2 tablets (40 mg) by mouth daily for 2 days, THEN 1 tablet (20 mg) daily for 3 days.  Dispense: 7 tablet; Refill: 0  - doxycycline hyclate (VIBRA-TABS) 100 MG tablet; Take 1 tablet (100 mg) by mouth 2 times daily for 10 days  Dispense: 20 tablet; Refill: 0    I have discussed the patient's diagnosis and my plan of treatment with the patient. We went over any labs or imaging. Patient is aware to come  back in with worsening symptoms or if no relief despite treatment plan.  Patient verbalizes understanding. All questions were addressed and answered.   Renee Palencia PA-C

## 2019-06-16 NOTE — TELEPHONE ENCOUNTER
Prescription approved per [facility] Refill Protocol.  Nisha Betancourt RN

## 2019-06-16 NOTE — TELEPHONE ENCOUNTER
Patient walked in to  today and is checking on his medication refill.   He was told by a MA or RN last week that this would be filled.   Routing to provider directly.      Elena NORRIS     [facility]

## 2019-06-17 RX ORDER — MAGNESIUM CARB/ALUMINUM HYDROX 105-160MG
TABLET,CHEWABLE ORAL
Qty: 120 TABLET | Refills: 3 | Status: SHIPPED | OUTPATIENT
Start: 2019-06-17 | End: 2019-12-02

## 2019-06-17 NOTE — TELEPHONE ENCOUNTER
Pt stopped in clinic , said glucosamine-chondroitinoitin 750-600 MG TABS should have been ready to be picked up but was not and wondering why it was not automatically refilled. Please advise.

## 2019-06-27 ENCOUNTER — PATIENT OUTREACH (OUTPATIENT)
Dept: GERIATRIC MEDICINE | Facility: CLINIC | Age: 69
End: 2019-06-27

## 2019-06-27 NOTE — PROGRESS NOTES
[facility] Care Coordination Contact      [facility] Six-Month Telephone Assessment    6 month telephone assessment completed on June 27, 2019.    ER visits: No  Hospitalizations: No  TCU stays: No  Significant health status changes: none  Falls/Injuries: No  ADL/IADL changes: No  Changes in services: No    Caregiver Assessment follow up:  NA    Goals: See POC in chart for goal progress documentation.      Client reports he is doing great. He is biking a swimming a lot this summer. He had bronchitis, but got antibiotics and is doing well.    Will see member in 6 months for an annual health risk assessment.   Encouraged member to call CC with any questions or concerns in the meantime.     Leeanna Sigala, RN, BSN, PHN  [facility] Care Coordinator  [phone]

## 2019-07-23 ENCOUNTER — OFFICE VISIT (OUTPATIENT)
Dept: URGENT CARE | Facility: URGENT CARE | Age: 69
End: 2019-07-23
Payer: COMMERCIAL

## 2019-07-23 VITALS
HEART RATE: 85 BPM | OXYGEN SATURATION: 96 % | BODY MASS INDEX: 22.38 KG/M2 | WEIGHT: 156 LBS | TEMPERATURE: 98.2 F | DIASTOLIC BLOOD PRESSURE: 66 MMHG | SYSTOLIC BLOOD PRESSURE: 124 MMHG

## 2019-07-23 DIAGNOSIS — J44.0 CHRONIC OBSTRUCTIVE PULMONARY DISEASE WITH ACUTE LOWER RESPIRATORY INFECTION (H): ICD-10-CM

## 2019-07-23 DIAGNOSIS — Z29.9 PREVENTIVE MEASURE: ICD-10-CM

## 2019-07-23 DIAGNOSIS — J44.1 COPD EXACERBATION (H): Primary | ICD-10-CM

## 2019-07-23 PROCEDURE — 99214 OFFICE O/P EST MOD 30 MIN: CPT | Performed by: INTERNAL MEDICINE

## 2019-07-23 RX ORDER — AZITHROMYCIN 250 MG/1
TABLET, FILM COATED ORAL
Qty: 6 TABLET | Refills: 0 | Status: SHIPPED | OUTPATIENT
Start: 2019-07-23 | End: 2019-08-06

## 2019-07-23 RX ORDER — PREDNISONE 20 MG/1
40 TABLET ORAL DAILY
Qty: 10 TABLET | Refills: 0 | Status: SHIPPED | OUTPATIENT
Start: 2019-07-23 | End: 2019-08-06

## 2019-07-23 ASSESSMENT — ENCOUNTER SYMPTOMS
FEVER: 0
CHEST TIGHTNESS: 1
RHINORRHEA: 0
WHEEZING: 1

## 2019-07-23 NOTE — PROGRESS NOTES
SUBJECTIVE:   Robert B Behr is a 69 year old male presenting with a chief complaint of   Chief Complaint   Patient presents with     Urgent Care     Pt in clinic to have eval for cough.     Cough       He is an established patient of [facility].    URI Adult    Onset of symptoms was 1 week(s) ago.  Course of illness is same.      Current and Associated symptoms: cough - productive/white  Treatment measures tried include Inhaler (name: advair 2 x day) and Nebulizer /inhaler(name: 3 x day).  Predisposing factors include 5 cigs tobacco use and HX of COPD.  Hot weather  nicotine patches    Review of Systems   Constitutional: Negative for fever.   HENT: Negative for rhinorrhea.    Respiratory: Positive for chest tightness and wheezing.        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 6 tablet 0     B Complex-C (VITAMIN B COMPLEX W/VITAMIN C) TABS tablet TAKE 1 TABLET BY MOUTH TWICE DAILY WITH FOLIC ACID 180 tablet 11     calcium carbonate 600 mg-vitamin D 400 units (CALTRATE) 600-400 MG-UNIT per tablet Take 1 tablet by mouth 2 times daily 180 tablet 1     Calcium-Magnesium-Vitamin D (CALCIUM 1200+D3) 600- MG-MG-UNIT TB24 Take 1-2 tablets by mouth every morning Reported on  3/14/2017       doxycycline hyclate (VIBRA-TABS) 100 MG tablet Take 1 tablet (100 mg) by mouth 2 times daily 20 tablet 0     fish oil-omega-3 fatty acids 1000 MG capsule Take 1 capsule (1 g) by mouth daily 90 capsule 0     fluticasone-salmeterol (ADVAIR DISKUS) 250-50 MCG/DOSE inhaler INHALE 1 PUFF INTO THE LUNGS TWICE DAILY 3 Inhaler 4     ginkgo biloba 60 MG CAPS capsule Take 1 capsule (60 mg) by mouth daily 90 capsule 3     glucosamine-chondroitinoitin 750-600 MG TABS TAKE 2 TABLETS BY MOUTH TWICE DAILY 120 tablet 3     guaiFENesin (MUCINEX) 600 MG 12 hr tablet Take 1-2 tabs every 12 hours. Do not exceed 4 tabs (2400mg) in one day. 30 tablet 0     ipratropium - albuterol 0.5 mg/2.5 mg/3 mL (DUONEB) 0.5-2.5 (3) MG/3ML neb solution Take 1 vial (3 mLs) by nebulization every 6 hours as needed for shortness of breath / dyspnea or wheezing 30 vial 1     Ipratropium-Albuterol (COMBIVENT RESPIMAT)  MCG/ACT inhaler INHALE 1 PUFF FOUR TIMES DAILY- DO NOT EXCEED 6 DOSES PER DAY 4 g 6     nicotine (NICODERM CQ) 14 MG/24HR 24 hr patch Place 1 patch onto the skin every 24 hours 30 patch 0     nicotine (NICOTINE STEP 2) 14 MG/24HR 24 hr patch PLACE 1 PATCH TO SKIN EVERY 24 HOURS 28 patch 1     nicotine polacrilex (COMMIT) 2 MG lozenge Place 1 lozenge (2 mg) inside cheek as needed for smoking cessation Place 1 lozenge inside cheek as needed for smoking cessation. 150 lozenge 3     order for DME Equipment being ordered: nebulizer machine 1 each 0     predniSONE (DELTASONE) 20 MG tablet Take 2 tablets (40 mg) by mouth daily for 5 days 10 tablet 0     Selenium (SELENIMIN-200) 200 MCG TABS tablet Take 1 tablet (200 mcg) by mouth daily 90 tablet 1     Spacer/Aero-Holding Chambers (POCKET SPACER) MARGARITA Attach to albuterol inhaler, use 2 puffs every 4-6 hours as needed. 1 each 0     vitamin A 10,000 units (5500 mcg) capsule TAKE 1 CAPSULE BY MOUTH DAILY 90 capsule 3     vitamin B complex with vitamin C (VITAMIN  B COMPLEX) TABS  tablet Take 1 tablet by mouth 2 times daily With Folic acid 180 tablet 2     vitamin C (ASCORBIC ACID) 1000 MG TABS TAKE 1 TABLET BY MOUTH DAILY 90 tablet 0     vitamin E (TOCOPHEROL) 1000 units (450 mg) capsule TAKE 1 CAPSULE BY MOUTH DAILY 90 capsule 0     Social History     Tobacco Use     Smoking status: Current Some Day Smoker     Packs/day: 0.10     Years: 45.00     Pack years: 4.50     Types: Cigarettes     Smokeless tobacco: Former User     Tobacco comment: 3-7 cigarettes/day (8/29/17). 1.5 packs for 45 years smoker   Substance Use Topics     Alcohol use: Yes     Alcohol/week: 0.0 oz     Comment: occ beer       OBJECTIVE  /66   Pulse 85   Temp 98.2  F (36.8  C) (Oral)   Wt 70.8 kg (156 lb)   SpO2 96%   BMI 22.38 kg/m      Physical Exam   Constitutional: He appears well-developed and well-nourished.   HENT:   Mouth/Throat: Oropharynx is clear and moist.   Tm nl b   Eyes: Conjunctivae are normal.   Cardiovascular: Normal rate, regular rhythm and normal heart sounds.   Pulmonary/Chest: Effort normal and breath sounds normal. He has no wheezes.   Vitals reviewed.      Labs:  No results found for this or any previous visit (from the past 24 hour(s)).        ASSESSMENT:      ICD-10-CM    1. COPD exacerbation (H) J44.1 predniSONE (DELTASONE) 20 MG tablet     azithromycin (ZITHROMAX) 250 MG tablet   2. Chronic obstructive pulmonary disease with acute lower respiratory infection (H) J44.0    3. Preventive measure Z29.9 Selenium (SELENIMIN-200) 200 MCG TABS tablet      Serious Comorbid Conditions:  Adult:  COPD    PLAN:  Prednisone 5 day burst.  Zpak.  Suggested waiting for few days prior to taking as mucous white.   Advair 250/50 and combivent/duoneb    Followup:    If not improving or if condition worsens, follow up with your Primary Care Provider 1 week if not better.    Also, he has additional complaints of refills..    Patient Instructions         establish care Beverley Bassett MD

## 2019-08-01 DIAGNOSIS — J44.0 CHRONIC OBSTRUCTIVE PULMONARY DISEASE WITH ACUTE LOWER RESPIRATORY INFECTION (H): ICD-10-CM

## 2019-08-01 RX ORDER — NICOTINE 21 MG/24HR
1 PATCH, TRANSDERMAL 24 HOURS TRANSDERMAL EVERY 24 HOURS
Qty: 30 PATCH | Refills: 3 | Status: SHIPPED | OUTPATIENT
Start: 2019-08-01 | End: 2019-12-28

## 2019-08-01 NOTE — TELEPHONE ENCOUNTER
Reason for Call:  Medication or medication refill:    Do you use a [facility]?  Name of the pharmacy and phone number for the current request:  [facility] on [address] - [phone]    Name of the medication requested: nicotine (NICODERM CQ) 14 MG/24HR 24 hr patch    Other request: pt states he has 1 left, working well for him , would like asap and request a call when he may  please    Can we leave a detailed message on this number? YES    Phone number patient can be reached at: Home number on file [phone] (home)    Best Time: asap    Call taken on 8/1/2019 at 12:35 PM by Aissatou Dickinson

## 2019-08-05 DIAGNOSIS — Z71.89 ENCOUNTER FOR HERB AND VITAMIN SUPPLEMENT MANAGEMENT: ICD-10-CM

## 2019-08-06 ENCOUNTER — OFFICE VISIT (OUTPATIENT)
Dept: FAMILY MEDICINE | Facility: CLINIC | Age: 69
End: 2019-08-06
Payer: COMMERCIAL

## 2019-08-06 VITALS
SYSTOLIC BLOOD PRESSURE: 110 MMHG | TEMPERATURE: 96.1 F | WEIGHT: 158 LBS | HEIGHT: 70 IN | DIASTOLIC BLOOD PRESSURE: 70 MMHG | BODY MASS INDEX: 22.62 KG/M2 | HEART RATE: 56 BPM | RESPIRATION RATE: 18 BRPM

## 2019-08-06 DIAGNOSIS — F17.200 TOBACCO USE DISORDER: Primary | ICD-10-CM

## 2019-08-06 DIAGNOSIS — J43.9 PULMONARY EMPHYSEMA, UNSPECIFIED EMPHYSEMA TYPE (H): ICD-10-CM

## 2019-08-06 DIAGNOSIS — Z71.89 ENCOUNTER FOR HERB AND VITAMIN SUPPLEMENT MANAGEMENT: ICD-10-CM

## 2019-08-06 DIAGNOSIS — M81.0 AGE-RELATED OSTEOPOROSIS WITHOUT CURRENT PATHOLOGICAL FRACTURE: ICD-10-CM

## 2019-08-06 PROCEDURE — 99214 OFFICE O/P EST MOD 30 MIN: CPT | Performed by: FAMILY MEDICINE

## 2019-08-06 RX ORDER — MULTIVIT WITH MINERALS/LUTEIN
1000 TABLET ORAL DAILY
Qty: 90 TABLET | Refills: 3 | Status: SHIPPED | OUTPATIENT
Start: 2019-08-06 | End: 2020-06-05

## 2019-08-06 RX ORDER — CHLORAL HYDRATE 500 MG
1 CAPSULE ORAL DAILY
Qty: 90 CAPSULE | Refills: 3 | Status: SHIPPED | OUTPATIENT
Start: 2019-08-06 | End: 2020-08-09

## 2019-08-06 RX ORDER — MULTIVIT WITH MINERALS/LUTEIN
1000 TABLET ORAL DAILY
Qty: 90 CAPSULE | Refills: 3 | Status: SHIPPED | OUTPATIENT
Start: 2019-08-06 | End: 2020-08-09

## 2019-08-06 ASSESSMENT — MIFFLIN-ST. JEOR: SCORE: 1482.93

## 2019-08-06 NOTE — PROGRESS NOTES
Subjective     Robert B Behr is a 69 year old male who presents to clinic today for the following health issues:    HPI     Here to establish care.    Current Chronic Medical Conditions  COPD  Tobacco abuse  Osteoporosis    Social History  Lives alone.  No kids.  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  2 sisters and a brother live in town.      No acute concerns.  Requests refills of his medications today.      Patient Active Problem List   Diagnosis     Osteoporosis     COPD (chronic obstructive pulmonary disease) (H)     Tobacco use disorder     CARDIOVASCULAR SCREENING; LDL GOAL LESS THAN 160     Lung nodules     Health Care Home     Hiatal hernia     Hypopigmentation     Past Surgical History:   Procedure Laterality Date     CATARACT IOL, RT/LT Left 09/15/2017    s/p CE/IOL left eye     CATARACT IOL, RT/LT Right      PHACOEMULSIFICATION CLEAR CORNEA WITH STANDARD INTRAOCULAR LENS IMPLANT Right 9/30/2016    Procedure: PHACOEMULSIFICATION CLEAR CORNEA WITH STANDARD INTRAOCULAR LENS IMPLANT;  Surgeon: Elly Valencia MD;  Location: [facility]     PHACOEMULSIFICATION WITH STANDARD INTRAOCULAR LENS IMPLANT Left 9/15/2017    Procedure: PHACOEMULSIFICATION WITH STANDARD INTRAOCULAR LENS IMPLANT;  Left Eye Phacoemulsification with Standard Lens;  Surgeon: Elly Valencia MD;  Location: [facility] OR     WRIST SURGERY         Social History     Tobacco Use     Smoking status: Current Some Day Smoker     Packs/day: 0.25     Years: 45.00     Pack years: 11.25     Types: Cigarettes     Smokeless tobacco: Former User     Tobacco comment: 5 cigs per day   Substance Use Topics     Alcohol use: Yes     Alcohol/week: 0.0 oz     Comment: occ beer     Family History   Problem Relation Age of Onset     Diabetes Brother         living     Cancer Brother         throat     Macular Degeneration Mother      Colon Cancer  No family hx of      Glaucoma No family hx of      Retinal detachment No family hx of      Amblyopia No family hx of      Skin Cancer No family hx of      Melanoma No family hx of          Current Outpatient Medications   Medication Sig Dispense Refill     B Complex-C (VITAMIN B COMPLEX W/VITAMIN C) TABS tablet TAKE 1 TABLET BY MOUTH TWICE DAILY WITH FOLIC ACID 180 tablet 11     calcium carbonate 600 mg-vitamin D 400 units (CALTRATE) 600-400 MG-UNIT per tablet Take 1 tablet by mouth 2 times daily 180 tablet 3     fish oil-omega-3 fatty acids 1000 MG capsule Take 1 capsule (1 g) by mouth daily 90 capsule 3     fluticasone-salmeterol (ADVAIR DISKUS) 250-50 MCG/DOSE inhaler INHALE 1 PUFF INTO THE LUNGS TWICE DAILY 3 Inhaler 4     ginkgo biloba 60 MG CAPS capsule Take 1 capsule (60 mg) by mouth daily 90 capsule 3     glucosamine-chondroitinoitin 750-600 MG TABS TAKE 2 TABLETS BY MOUTH TWICE DAILY 120 tablet 3     ipratropium - albuterol 0.5 mg/2.5 mg/3 mL (DUONEB) 0.5-2.5 (3) MG/3ML neb solution Take 1 vial (3 mLs) by nebulization every 6 hours as needed for shortness of breath / dyspnea or wheezing 30 vial 1     Ipratropium-Albuterol (COMBIVENT RESPIMAT)  MCG/ACT inhaler INHALE 1 PUFF FOUR TIMES DAILY- DO NOT EXCEED 6 DOSES PER DAY 4 g 6     nicotine (NICODERM CQ) 14 MG/24HR 24 hr patch Place 1 patch onto the skin every 24 hours 30 patch 3     Selenium (SELENIMIN-200) 200 MCG TABS tablet Take 1 tablet (200 mcg) by mouth daily 90 tablet 3     vitamin A 10,000 units capsule Take 1 capsule (10,000 Units) by mouth daily 90 capsule 3     vitamin B complex with vitamin C (VITAMIN  B COMPLEX) tablet Take 1 tablet by mouth 2 times daily With Folic acid 180 tablet 3     vitamin C (ASCORBIC ACID) 1000 MG TABS Take 1 tablet (1,000 mg) by mouth daily 90 tablet 3     vitamin E (TOCOPHEROL) 1000 units (450 mg) capsule Take 1 capsule (1,000 Units) by mouth daily 90 capsule 3     No Known Allergies  Recent Labs   Lab Test  "05/14/19  1457 05/14/18  1502 03/09/18  1411 11/02/17  1333 06/10/17  0054 02/24/16  1132   *  --  128* 118*  --  123*   HDL 76  --  70 73  --  81   TRIG 189*  --  181* 221*  --  152*   CR  --   --   --   --  0.78 0.90   GFRESTIMATED  --   --   --   --  >90  Non  GFR Calc   84   GFRESTBLACK  --   --   --   --  >90   GFR Calc   >90   GFR Calc     POTASSIUM  --   --   --   --  3.4 4.2   TSH  --  1.08  --   --   --   --       BP Readings from Last 3 Encounters:   08/06/19 110/70   07/23/19 124/66   06/16/19 132/80    Wt Readings from Last 3 Encounters:   08/06/19 71.7 kg (158 lb)   07/23/19 70.8 kg (156 lb)   06/16/19 71.7 kg (158 lb)                    Reviewed and updated as needed this visit by Provider         Review of Systems   ROS COMP: Constitutional, HEENT, cardiovascular, pulmonary, gi and gu systems are negative, except as otherwise noted.      Objective    /70   Pulse 56   Temp 96.1  F (35.6  C) (Tympanic)   Resp 18   Ht 1.778 m (5' 10\")   Wt 71.7 kg (158 lb)   BMI 22.67 kg/m    Body mass index is 22.67 kg/m .  Physical Exam   GENERAL: healthy, alert and no distress  EYES: Eyes grossly normal to inspection, PERRL and conjunctivae and sclerae normal  HENT: ear canals and TM's normal, nose and mouth without ulcers or lesions  NECK: no adenopathy, no asymmetry, masses, or scars and thyroid normal to palpation  RESP: lungs clear to auscultation - no rales, rhonchi or wheezes  CV: regular rate and rhythm, normal S1 S2, no S3 or S4, no murmur, click or rub, no peripheral edema and peripheral pulses strong  ABDOMEN: soft, nontender, no hepatosplenomegaly, no masses and bowel sounds normal  MS: no gross musculoskeletal defects noted, no edema  NEURO: Normal strength and tone, mentation intact and speech normal  PSYCH: mentation appears normal, affect normal/bright      Assessment & Plan     1. Pulmonary emphysema, unspecified emphysema type " (H)    Stable on Advair and albuterol prn.  Continues with excellent exercise regimen.      2. Tobacco use disorder    Advised complete smoking cessation-- patient working on continuing to cut back.    3. Age-related osteoporosis without current pathological fracture    - calcium carbonate 600 mg-vitamin D 400 units (CALTRATE) 600-400 MG-UNIT per tablet; Take 1 tablet by mouth 2 times daily  Dispense: 180 tablet; Refill: 3    Continue excellent exercise and calcium and Vitamin D supplementation.    4. Encounter for herb and vitamin supplement management    - vitamin B complex with vitamin C (VITAMIN  B COMPLEX) tablet; Take 1 tablet by mouth 2 times daily With Folic acid  Dispense: 180 tablet; Refill: 3  - fish oil-omega-3 fatty acids 1000 MG capsule; Take 1 capsule (1 g) by mouth daily  Dispense: 90 capsule; Refill: 3  - vitamin E (TOCOPHEROL) 1000 units (450 mg) capsule; Take 1 capsule (1,000 Units) by mouth daily  Dispense: 90 capsule; Refill: 3  - vitamin C (ASCORBIC ACID) 1000 MG TABS; Take 1 tablet (1,000 mg) by mouth daily  Dispense: 90 tablet; Refill: 3  - vitamin A 10,000 units capsule; Take 1 capsule (10,000 Units) by mouth daily  Dispense: 90 capsule; Refill: 3     Melba Bassett MD  [facility]

## 2019-08-06 NOTE — TELEPHONE ENCOUNTER
"Requested Prescriptions   Pending Prescriptions Disp Refills     fish oil-omega-3 fatty acids 1000 MG capsule [Pharmacy Med Name: FISH OIL 1000MG CAPSULES]  Last Written Prescription Date:  6-16-19  Last Fill Quantity: 90 cap,  # refills: 0   Last office visit: 5/22/2019 with prescribing provider:  Fina Frausto    Future Office Visit:   Next 5 appointments (look out 90 days)    Aug 06, 2019 11:40 AM CDT  Office Visit with Melba Bassett MD  [facility] ([facility] [address] [place] [id number]  [phone]       90 capsule 0     Sig: TAKE 1 CAPSULE BY MOUTH EVERY DAY. NEED APPOINTMENT FOR FURTHER REFILLS.       Vitamin Supplements (Adult) Protocol Passed - 8/5/2019  8:11 PM        Passed - High dose Vitamin D not ordered        Passed - Recent (12 mo) or future (30 days) visit within the authorizing provider's specialty     Patient had office visit in the last 12 months or has a visit in the next 30 days with authorizing provider or within the authorizing provider's specialty.  See \"Patient Info\" tab in inbasket, or \"Choose Columns\" in Meds & Orders section of the refill encounter.          Passed - Medication is active on med list         "

## 2019-08-07 RX ORDER — CHLORAL HYDRATE 500 MG
CAPSULE ORAL
Qty: 0.1 CAPSULE | Refills: 0 | OUTPATIENT
Start: 2019-08-07

## 2019-08-08 DIAGNOSIS — Z78.9 TAKES DIETARY SUPPLEMENTS: ICD-10-CM

## 2019-08-08 NOTE — TELEPHONE ENCOUNTER
Refused Prescriptions:                       Disp   Refills    fish oil-omega-3 fatty acids 1000 MG capsu*0.1 ca*0        Sig: TAKE 1 CAPSULE BY MOUTH EVERY DAY. NEED APPOINTMENT           FOR FURTHER REFILLS.  Refused By: KANDI ELI  Reason for Refusal: Patient has requested refill too soon

## 2019-08-15 ENCOUNTER — OFFICE VISIT (OUTPATIENT)
Dept: FAMILY MEDICINE | Facility: CLINIC | Age: 69
End: 2019-08-15
Payer: COMMERCIAL

## 2019-08-15 ENCOUNTER — NURSE TRIAGE (OUTPATIENT)
Dept: FAMILY MEDICINE | Facility: CLINIC | Age: 69
End: 2019-08-15

## 2019-08-15 VITALS
TEMPERATURE: 99.7 F | HEIGHT: 70 IN | HEART RATE: 83 BPM | BODY MASS INDEX: 22.62 KG/M2 | DIASTOLIC BLOOD PRESSURE: 60 MMHG | WEIGHT: 158 LBS | OXYGEN SATURATION: 99 % | RESPIRATION RATE: 16 BRPM | SYSTOLIC BLOOD PRESSURE: 98 MMHG

## 2019-08-15 DIAGNOSIS — R09.3 INCREASED SPUTUM PRODUCTION: ICD-10-CM

## 2019-08-15 DIAGNOSIS — R05.9 COUGH: ICD-10-CM

## 2019-08-15 DIAGNOSIS — J44.1 COPD EXACERBATION (H): Primary | ICD-10-CM

## 2019-08-15 PROCEDURE — 99214 OFFICE O/P EST MOD 30 MIN: CPT | Performed by: PHYSICIAN ASSISTANT

## 2019-08-15 RX ORDER — DOXYCYCLINE HYCLATE 100 MG
100 TABLET ORAL 2 TIMES DAILY
Qty: 20 TABLET | Refills: 0 | Status: SHIPPED | OUTPATIENT
Start: 2019-08-15 | End: 2019-08-25

## 2019-08-15 RX ORDER — PREDNISONE 20 MG/1
40 TABLET ORAL DAILY
Qty: 10 TABLET | Refills: 0 | Status: SHIPPED | OUTPATIENT
Start: 2019-08-15 | End: 2019-08-20

## 2019-08-15 ASSESSMENT — MIFFLIN-ST. JEOR: SCORE: 1482.93

## 2019-08-15 NOTE — PATIENT INSTRUCTIONS
Take the antibiotic and steroid as prescribed   Push fluids and rest   If worsening symptoms please return to care

## 2019-08-15 NOTE — PROGRESS NOTES
SUBJECTIVE:   Robert B Behr is a 69 year old male presenting with a chief complaint of   Chief Complaint   Patient presents with     Urgent Care     URI     concern of bronchitis       He is an established patient of [facility].    URI Adult    Onset of symptoms was 3 day(s) ago.  Course of illness is worsening.    Severity moderate  Current and Associated symptoms: sweats, cough - non-productive, cough - productive, wheezing and hoarse voice, coughing up mucous and sputum changes   Treatment measures tried include None tried.  Predisposing factors include HX of COPD.      Review of Systems   ROS: 10 point ROS neg other than the symptoms noted above in the HPI.    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     Tobacco abuse      Family History   Problem Relation Age of Onset     Diabetes Brother         living     Cancer Brother         throat     Macular Degeneration Mother      Colon Cancer No family hx of      Glaucoma No family hx of      Retinal detachment No family hx of      Amblyopia No family hx of      Skin Cancer No family hx of      Melanoma No family hx of      Current Outpatient Medications   Medication Sig Dispense Refill     B Complex-C (VITAMIN B COMPLEX W/VITAMIN C) TABS tablet TAKE 1 TABLET BY MOUTH TWICE DAILY WITH FOLIC ACID 180 tablet 11     calcium carbonate 600 mg-vitamin D 400 units (CALTRATE) 600-400 MG-UNIT per tablet Take 1 tablet by mouth 2 times daily 180 tablet 3     doxycycline hyclate (VIBRA-TABS) 100 MG tablet Take 1 tablet (100 mg) by mouth 2 times daily for 10 days 20 tablet 0     fish oil-omega-3 fatty acids 1000 MG capsule Take 1 capsule (1 g) by mouth daily 90 capsule 3     fluticasone-salmeterol (ADVAIR DISKUS) 250-50 MCG/DOSE inhaler INHALE 1 PUFF INTO THE LUNGS TWICE DAILY 3 Inhaler 4     ginkgo biloba  "60 MG CAPS capsule Take 1 capsule (60 mg) by mouth daily 90 capsule 3     glucosamine-chondroitinoitin 750-600 MG TABS TAKE 2 TABLETS BY MOUTH TWICE DAILY 120 tablet 3     ipratropium - albuterol 0.5 mg/2.5 mg/3 mL (DUONEB) 0.5-2.5 (3) MG/3ML neb solution Take 1 vial (3 mLs) by nebulization every 6 hours as needed for shortness of breath / dyspnea or wheezing 30 vial 1     Ipratropium-Albuterol (COMBIVENT RESPIMAT)  MCG/ACT inhaler INHALE 1 PUFF FOUR TIMES DAILY- DO NOT EXCEED 6 DOSES PER DAY 4 g 6     nicotine (NICODERM CQ) 14 MG/24HR 24 hr patch Place 1 patch onto the skin every 24 hours 30 patch 3     predniSONE (DELTASONE) 20 MG tablet Take 2 tablets (40 mg) by mouth daily for 5 days 10 tablet 0     Selenium (SELENIMIN-200) 200 MCG TABS tablet Take 1 tablet (200 mcg) by mouth daily 90 tablet 3     vitamin A 10,000 units capsule Take 1 capsule (10,000 Units) by mouth daily 90 capsule 3     vitamin B complex with vitamin C (VITAMIN  B COMPLEX) tablet Take 1 tablet by mouth 2 times daily With Folic acid 180 tablet 3     vitamin C (ASCORBIC ACID) 1000 MG TABS Take 1 tablet (1,000 mg) by mouth daily 90 tablet 3     vitamin E (TOCOPHEROL) 1000 units (450 mg) capsule Take 1 capsule (1,000 Units) by mouth daily 90 capsule 3     Social History     Tobacco Use     Smoking status: Current Some Day Smoker     Packs/day: 0.25     Years: 45.00     Pack years: 11.25     Types: Cigarettes     Smokeless tobacco: Former User     Tobacco comment: 5 cigs per day   Substance Use Topics     Alcohol use: Yes     Alcohol/week: 0.0 oz     Comment: occ beer       OBJECTIVE  BP 98/60   Pulse 83   Temp 99.7  F (37.6  C) (Oral)   Resp 16   Ht 1.778 m (5' 10\")   Wt 71.7 kg (158 lb)   SpO2 99%   BMI 22.67 kg/m      Physical Exam   Constitutional: He is oriented to person, place, and time. He appears well-developed and well-nourished. No distress.   HENT:   Head: Normocephalic and atraumatic.   Right Ear: Hearing, tympanic " membrane, external ear and ear canal normal.   Left Ear: Hearing, tympanic membrane, external ear and ear canal normal.   Nose: Nose normal.   Mouth/Throat: Uvula is midline, oropharynx is clear and moist and mucous membranes are normal. No oropharyngeal exudate.   Eyes: Conjunctivae and EOM are normal.   Neck: Normal range of motion.   Cardiovascular: Normal rate, regular rhythm and normal heart sounds.   Pulmonary/Chest: Effort normal and breath sounds normal. No stridor. No respiratory distress. He has no wheezes. He has no rales. He exhibits no tenderness.   Musculoskeletal: Normal range of motion.   Neurological: He is alert and oriented to person, place, and time. No sensory deficit. He exhibits normal muscle tone.   Skin: Skin is warm and dry. He is not diaphoretic.   Psychiatric: He has a normal mood and affect. His behavior is normal.   Nursing note and vitals reviewed.      Labs:  No results found for this or any previous visit (from the past 24 hour(s)).    X-Ray was not done.    ASSESSMENT:      ICD-10-CM    1. COPD exacerbation (H) J44.1 doxycycline hyclate (VIBRA-TABS) 100 MG tablet     predniSONE (DELTASONE) 20 MG tablet        Medical Decision Making:    Differential Diagnosis:  URI Adult/Peds:  Bronchitis-viral, Bronchospasm, Pneumonia, Viral syndrome, Viral upper respiratory illness and COPD exacerbation    Serious Comorbid Conditions:  Adult:  COPD    PLAN:    URI Adult:  Tylenol, Ibuprofen, Fluids, Rest and Rx COPD exacerbation doxycyline BID x 10 days and prednisone 40 mg x 5 days.     Followup:    If not improving or if condition worsens, follow up with your Primary Care Provider, If not improving or if conditions worsens over the next 12-24 hours, go to the Emergency Department    Patient Instructions   Take the antibiotic and steroid as prescribed   Push fluids and rest   If worsening symptoms please return to care       Kieran Murillo PA-C

## 2019-08-15 NOTE — TELEPHONE ENCOUNTER
Reason for Disposition    SEVERE asthma attack (e.g., very SOB at rest, speaks in single words, loud wheezes)    Additional Information    Negative: Breathing stopped and hasn't returned    Negative: Choking on something    Negative: SEVERE difficulty breathing (e.g., struggling for each breath, speaks in single words, pulse > 120)    Negative: Bluish (or gray) lips or face    Negative: Difficult to awaken or acting confused (e.g., disoriented, slurred speech)    Negative: Passed out (i.e., fainted, collapsed and was not responding)    Negative: Wheezing started suddenly after medicine, an allergic food, or bee sting    Negative: Stridor    Negative: Slow, shallow and weak breathing    Negative: Sounds like a life-threatening emergency to the triager    Wheezing (high pitched whistling sound) and previous asthma attacks or use of asthma medicines    Negative: Severe difficulty breathing (e.g., struggling for each breath, speaks in single words)    Negative: Bluish (or gray) lips or face    Negative: Wheezing started suddenly after medicine, an allergic food, or bee sting    Negative: Passed out (i.e., fainted, collapsed and was not responding)    Negative: Sounds like a life-threatening emergency to the triager    Protocols used: ASTHMA ATTACK-A-OH, BREATHING DIFFICULTY-A-OH      Although pt has COPD and not asthma because he has wheezing the triage guidelines lead to above triage for asthma.     He has had sx for 2-3 days . Does feel shortness of breath at rest therefore he was advised to go to ED now. He declines. He feels he will be ok until 1pm and plans to come to . His rescue inhaler helps but only temporarily. He feels he needs prednisone and antibx which he has needed in the past.     Again pt was advised to go to ED but he declines    Suad Boggs, RN, BSN

## 2019-08-15 NOTE — TELEPHONE ENCOUNTER
Reason for call:  Patient reporting a symptom    Symptom or request: COPD / breathing concerns    Additional comments: Patient called to schedule a same day appointment. Stated PCP told him if he ever experiences these symptoms, that he should call and she would fit him right in. Informed patient Dr. Bassett is not in clinic until late next week and there are no openings. Please return call to patient to advise on what he should do. Thanks!    Phone Number patient can be reached at:  Home number on file [phone] (home)    Best Time:  Any    Can we leave a detailed message on this number:  YES    Call taken on 8/15/2019 at 9:08 AM by Antonieta Somers

## 2019-09-08 ENCOUNTER — OFFICE VISIT (OUTPATIENT)
Dept: URGENT CARE | Facility: URGENT CARE | Age: 69
End: 2019-09-08
Payer: COMMERCIAL

## 2019-09-08 VITALS
SYSTOLIC BLOOD PRESSURE: 140 MMHG | TEMPERATURE: 98.9 F | HEART RATE: 56 BPM | OXYGEN SATURATION: 100 % | WEIGHT: 155.4 LBS | BODY MASS INDEX: 22.3 KG/M2 | DIASTOLIC BLOOD PRESSURE: 88 MMHG

## 2019-09-08 DIAGNOSIS — J22 LOWER RESPIRATORY INFECTION: Primary | ICD-10-CM

## 2019-09-08 DIAGNOSIS — J44.1 CHRONIC OBSTRUCTIVE PULMONARY DISEASE WITH ACUTE EXACERBATION (H): ICD-10-CM

## 2019-09-08 PROCEDURE — 99214 OFFICE O/P EST MOD 30 MIN: CPT | Performed by: PHYSICIAN ASSISTANT

## 2019-09-08 RX ORDER — DOXYCYCLINE HYCLATE 100 MG
100 TABLET ORAL 2 TIMES DAILY
Qty: 10 TABLET | Refills: 0 | Status: SHIPPED | OUTPATIENT
Start: 2019-09-08 | End: 2019-09-13

## 2019-09-08 RX ORDER — AMOXICILLIN AND CLAVULANATE POTASSIUM 562.5; 437.5; 62.5 MG/1; MG/1; MG/1
2 TABLET, MULTILAYER, EXTENDED RELEASE ORAL 2 TIMES DAILY
Qty: 20 TABLET | Refills: 0 | Status: SHIPPED | OUTPATIENT
Start: 2019-09-08 | End: 2020-03-03

## 2019-09-08 RX ORDER — PREDNISONE 20 MG/1
20 TABLET ORAL 2 TIMES DAILY
Qty: 10 TABLET | Refills: 0 | Status: SHIPPED | OUTPATIENT
Start: 2019-09-08 | End: 2019-09-13

## 2019-09-08 ASSESSMENT — ENCOUNTER SYMPTOMS
CHILLS: 0
COUGH: 1
ABDOMINAL PAIN: 0
SORE THROAT: 0
FOCAL WEAKNESS: 0
FEVER: 0
DIARRHEA: 0
WHEEZING: 1
VOMITING: 0
SHORTNESS OF BREATH: 1
HEADACHES: 0
NAUSEA: 0

## 2019-09-08 NOTE — PROGRESS NOTES
"HPI  September 8, 2019    HPI: Robert B Behr is a 69 year old male with hx COPD who complains of moderate SOB, wheezing, & cough onset 1 week ago. Pt uses Advair & albuterol for his COPD. States he \"gets this about twice/year\". He was treated on 6/16, 7/23, and 8/15 with antibiotics & prednisone for COPD exacerbation. Pt reports symptoms improved between last antibiotic and 1 week ago. Symptoms are constant in duration. Denies fever/chills, HA, CP, nasal congestion, sinus pressure, sore throat, rhinorrhea, abd pain, N/V/D, rash, or any other symptoms. Patient denies sick contacts.    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     Tobacco abuse      Past Surgical History:   Procedure Laterality Date     CATARACT IOL, RT/LT Left 09/15/2017    s/p CE/IOL left eye     CATARACT IOL, RT/LT Right      PHACOEMULSIFICATION CLEAR CORNEA WITH STANDARD INTRAOCULAR LENS IMPLANT Right 9/30/2016    Procedure: PHACOEMULSIFICATION CLEAR CORNEA WITH STANDARD INTRAOCULAR LENS IMPLANT;  Surgeon: Elly Valencia MD;  [facility]: [facility]     PHACOEMULSIFICATION WITH STANDARD INTRAOCULAR LENS IMPLANT Left 9/15/2017    Procedure: PHACOEMULSIFICATION WITH STANDARD INTRAOCULAR LENS IMPLANT;  Left Eye Phacoemulsification with Standard Lens;  Surgeon: Elly Valencia MD;  Location: [facility] OR     WRIST SURGERY       Social History     Tobacco Use     Smoking status: Current Some Day Smoker     Packs/day: 0.25     Years: 45.00     Pack years: 11.25     Types: Cigarettes     Smokeless tobacco: Former User     Tobacco comment: 5 cigs per day   Substance Use Topics     Alcohol use: Yes     Alcohol/week: 0.0 oz     Comment: occ beer     Drug use: No     Patient Active Problem List   Diagnosis     Osteoporosis     COPD (chronic obstructive pulmonary disease) (H)     Tobacco use " disorder     CARDIOVASCULAR SCREENING; LDL GOAL LESS THAN 160     Lung nodules     Health Care Home     Hiatal hernia     Hypopigmentation     Family History   Problem Relation Age of Onset     Diabetes Brother         living     Cancer Brother         throat     Macular Degeneration Mother      Colon Cancer No family hx of      Glaucoma No family hx of      Retinal detachment No family hx of      Amblyopia No family hx of      Skin Cancer No family hx of      Melanoma No family hx of         Problem list, Medication list, Allergies, and Medical/Social/Surgical histories reviewed in [facility] and updated as appropriate.      Review of Systems   Constitutional: Negative for chills and fever.   HENT: Negative for congestion and sore throat.    Respiratory: Positive for cough, shortness of breath and wheezing.    Cardiovascular: Negative for chest pain.   Gastrointestinal: Negative for abdominal pain, diarrhea, nausea and vomiting.   Skin: Negative for rash.   Neurological: Negative for focal weakness and headaches.   All other systems reviewed and are negative.        Physical Exam   Constitutional: He is oriented to person, place, and time.   HENT:   Head: Normocephalic and atraumatic.   Right Ear: Tympanic membrane and external ear normal.   Left Ear: Tympanic membrane and external ear normal.   Nose: Nose normal.   Mouth/Throat: Uvula is midline, oropharynx is clear and moist and mucous membranes are normal.   Cardiovascular: Normal rate, regular rhythm and normal heart sounds.   Pulmonary/Chest: Effort normal. No respiratory distress. He has decreased breath sounds. He has no wheezes. He has rales in the left lower field.   Musculoskeletal: Normal range of motion.   Neurological: He is alert and oriented to person, place, and time.   Skin: Skin is warm and dry.   Nursing note and vitals reviewed.    Vital Signs  BP (!) 140/88   Pulse 56   Temp 98.9  F (37.2  C) (Oral)   Wt 70.5 kg (155 lb 6.4 oz)   SpO2 100%   BMI  22.30 kg/m       Diagnostic Test Results:  none     ASSESSMENT/PLAN      ICD-10-CM    1. Lower respiratory infection J22 amoxicillin-clavulanate (AUGMENTIN XR) 1000-62.5 MG 12 hr tablet     doxycycline hyclate (VIBRA-TABS) 100 MG tablet   2. Chronic obstructive pulmonary disease with acute exacerbation (H) J44.1 predniSONE (DELTASONE) 20 MG tablet      Pt with rales in L lower lung field, suspect pneumonia. No resp distress, speaking in complete sentences, O2 sat 100%. Recommended CXR but pt declines. Discussed high risk of complications in patients with COPD & pneumonia, but pt does not want an X-ray. Would like to be given prednisone & antibiotic as he usually is. Will treat for pneumonia with Augmentin XR & doxycycline, also given prednisone. Avoid flouroquinolone in combination w/ prednisone, avoid macrolides due to risk of QT prolongation in combination with Advair. Continue Advair & albuterol. Pt reports he has an appt with his PCP in 2 days- can have recheck at that time. Discussed that COPD is likely not well controlled given his frequent exacerbations- discuss further with PCP.    I have discussed any lab or imaging results, the patient's diagnosis, and my plan of treatment with the patient and/or family. Patient is aware to come back in if with worsening symptoms or if no relief despite treatment plan.  Patient voiced understanding and had no further questions.       Follow Up: Return in about 2 days (around 9/10/2019) for Recheck with primary care provider.    PUJA Ibarra, PAMireyaC  [facility] U[facility]

## 2019-09-10 ENCOUNTER — OFFICE VISIT (OUTPATIENT)
Dept: FAMILY MEDICINE | Facility: CLINIC | Age: 69
End: 2019-09-10
Payer: COMMERCIAL

## 2019-09-10 VITALS
SYSTOLIC BLOOD PRESSURE: 138 MMHG | BODY MASS INDEX: 21.67 KG/M2 | OXYGEN SATURATION: 96 % | DIASTOLIC BLOOD PRESSURE: 68 MMHG | TEMPERATURE: 97.5 F | WEIGHT: 151 LBS | HEART RATE: 104 BPM | RESPIRATION RATE: 20 BRPM

## 2019-09-10 DIAGNOSIS — J43.9 PULMONARY EMPHYSEMA, UNSPECIFIED EMPHYSEMA TYPE (H): Primary | ICD-10-CM

## 2019-09-10 PROCEDURE — 99213 OFFICE O/P EST LOW 20 MIN: CPT | Performed by: FAMILY MEDICINE

## 2019-09-10 NOTE — PROGRESS NOTES
Subjective     Robert B Behr is a 69 year old male who presents to clinic today for the following health issues:    HPI     Presents today to discuss possible PRP-PC (plasma rich plasma platelet-concentrate) nebulized therapy he has been researching to help treat his COPD.    Patient has had recent COPD exacerbations this past month necessitating prednisone bursts x2 and Abx in past 4 weeks.  He otherwise has been well-maintained on his Advair and Combivent inhalers.  HE continues to smoke.    He wanted to discuss today PRP therapy and if he was a possible candidate and could be referred to treatment at the [facility].    Current Chronic Medical Conditions  COPD  Tobacco abuse  Osteoporosis     Social History  Lives alone.  No kids.  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  2 sisters and a brother live in town.      Today he feels well- no acute SOB.    Patient Active Problem List   Diagnosis     Osteoporosis     COPD (chronic obstructive pulmonary disease) (H)     Tobacco use disorder     CARDIOVASCULAR SCREENING; LDL GOAL LESS THAN 160     Lung nodules     Health Care Home     Hiatal hernia     Hypopigmentation     Past Surgical History:   Procedure Laterality Date     CATARACT IOL, RT/LT Left 09/15/2017    s/p CE/IOL left eye     CATARACT IOL, RT/LT Right      PHACOEMULSIFICATION CLEAR CORNEA WITH STANDARD INTRAOCULAR LENS IMPLANT Right 9/30/2016    Procedure: PHACOEMULSIFICATION CLEAR CORNEA WITH STANDARD INTRAOCULAR LENS IMPLANT;  Surgeon: Elly Valencia MD;  Location: [facility]     PHACOEMULSIFICATION WITH STANDARD INTRAOCULAR LENS IMPLANT Left 9/15/2017    Procedure: PHACOEMULSIFICATION WITH STANDARD INTRAOCULAR LENS IMPLANT;  Left Eye Phacoemulsification with Standard Lens;  Surgeon: Elly Valencia MD;  Location:  OR     WRIST SURGERY         Social History     Tobacco  Use     Smoking status: Current Some Day Smoker     Packs/day: 0.25     Years: 45.00     Pack years: 11.25     Types: Cigarettes     Smokeless tobacco: Former User     Tobacco comment: 5 cigs per day   Substance Use Topics     Alcohol use: Yes     Alcohol/week: 0.0 oz     Comment: occ beer     Family History   Problem Relation Age of Onset     Diabetes Brother         living     Cancer Brother         throat     Macular Degeneration Mother      Colon Cancer No family hx of      Glaucoma No family hx of      Retinal detachment No family hx of      Amblyopia No family hx of      Skin Cancer No family hx of      Melanoma No family hx of          Current Outpatient Medications   Medication Sig Dispense Refill     amoxicillin-clavulanate (AUGMENTIN XR) 1000-62.5 MG 12 hr tablet Take 2 tablets by mouth 2 times daily for 5 days 20 tablet 0     B Complex-C (VITAMIN B COMPLEX W/VITAMIN C) TABS tablet TAKE 1 TABLET BY MOUTH TWICE DAILY WITH FOLIC ACID 180 tablet 11     calcium carbonate 600 mg-vitamin D 400 units (CALTRATE) 600-400 MG-UNIT per tablet Take 1 tablet by mouth 2 times daily 180 tablet 3     doxycycline hyclate (VIBRA-TABS) 100 MG tablet Take 1 tablet (100 mg) by mouth 2 times daily for 5 days 10 tablet 0     fish oil-omega-3 fatty acids 1000 MG capsule Take 1 capsule (1 g) by mouth daily 90 capsule 3     fluticasone-salmeterol (ADVAIR DISKUS) 250-50 MCG/DOSE inhaler INHALE 1 PUFF INTO THE LUNGS TWICE DAILY 3 Inhaler 4     ginkgo biloba 60 MG CAPS capsule Take 1 capsule (60 mg) by mouth daily 90 capsule 3     glucosamine-chondroitinoitin 750-600 MG TABS TAKE 2 TABLETS BY MOUTH TWICE DAILY 120 tablet 3     ipratropium - albuterol 0.5 mg/2.5 mg/3 mL (DUONEB) 0.5-2.5 (3) MG/3ML neb solution Take 1 vial (3 mLs) by nebulization every 6 hours as needed for shortness of breath / dyspnea or wheezing 30 vial 1     Ipratropium-Albuterol (COMBIVENT RESPIMAT)  MCG/ACT inhaler INHALE 1 PUFF FOUR TIMES DAILY- DO NOT  EXCEED 6 DOSES PER DAY 4 g 6     nicotine (NICODERM CQ) 14 MG/24HR 24 hr patch Place 1 patch onto the skin every 24 hours 30 patch 3     predniSONE (DELTASONE) 20 MG tablet Take 1 tablet (20 mg) by mouth 2 times daily for 5 days 10 tablet 0     Selenium (SELENIMIN-200) 200 MCG TABS tablet Take 1 tablet (200 mcg) by mouth daily 90 tablet 3     vitamin A 10,000 units capsule Take 1 capsule (10,000 Units) by mouth daily 90 capsule 3     vitamin B complex with vitamin C (VITAMIN  B COMPLEX) tablet Take 1 tablet by mouth 2 times daily With Folic acid 180 tablet 3     vitamin C (ASCORBIC ACID) 1000 MG TABS Take 1 tablet (1,000 mg) by mouth daily 90 tablet 3     vitamin E (TOCOPHEROL) 1000 units (450 mg) capsule Take 1 capsule (1,000 Units) by mouth daily 90 capsule 3     No Known Allergies  Recent Labs   Lab Test 05/14/19  1457 05/14/18  1502 03/09/18  1411 11/02/17  1333 06/10/17  0054 02/24/16  1132   *  --  128* 118*  --  123*   HDL 76  --  70 73  --  81   TRIG 189*  --  181* 221*  --  152*   CR  --   --   --   --  0.78 0.90   GFRESTIMATED  --   --   --   --  >90  Non  GFR Calc   84   GFRESTBLACK  --   --   --   --  >90   GFR Calc   >90   GFR Calc     POTASSIUM  --   --   --   --  3.4 4.2   TSH  --  1.08  --   --   --   --       BP Readings from Last 3 Encounters:   09/10/19 138/68   09/08/19 (!) 140/88   08/15/19 98/60    Wt Readings from Last 3 Encounters:   09/10/19 68.5 kg (151 lb)   09/08/19 70.5 kg (155 lb 6.4 oz)   08/15/19 71.7 kg (158 lb)                    Reviewed and updated as needed this visit by Provider         Review of Systems   ROS COMP: Constitutional, HEENT, cardiovascular, pulmonary, gi and gu systems are negative, except as otherwise noted.      Objective    /68   Pulse 104   Temp 97.5  F (36.4  C) (Tympanic)   Resp 20   Wt 68.5 kg (151 lb)   SpO2 96%   BMI 21.67 kg/m    Body mass index is 21.67 kg/m .  Physical Exam   GENERAL:  healthy, alert and no distress  EYES: Eyes grossly normal to inspection, PERRL and conjunctivae and sclerae normal  NECK: no adenopathy, no asymmetry, masses, or scars and thyroid normal to palpation  MS: no gross musculoskeletal defects noted, no edema  SKIN: no suspicious lesions or rashes  NEURO: Normal strength and tone, mentation intact and speech normal  PSYCH: mentation appears normal, affect normal/bright            Assessment & Plan     1. Pulmonary emphysema, unspecified emphysema type (H)    Reviewed PRP therapy with patient today and discussed that this is still in early stages with no agreed upon benefit and is out-of-pocket and not yet covered by insurance.  Patient advised to continue on current therapies and work at becoming tobacco free.  He desires to continue to research and may opt to pursue at his own expense.  Continue to support.    Melba Bassett MD  [facility]

## 2019-09-29 ENCOUNTER — HEALTH MAINTENANCE LETTER (OUTPATIENT)
Age: 69
End: 2019-09-29

## 2019-10-03 ENCOUNTER — OFFICE VISIT (OUTPATIENT)
Dept: URGENT CARE | Facility: URGENT CARE | Age: 69
End: 2019-10-03
Payer: COMMERCIAL

## 2019-10-03 ENCOUNTER — ANCILLARY PROCEDURE (OUTPATIENT)
Dept: GENERAL RADIOLOGY | Facility: CLINIC | Age: 69
End: 2019-10-03
Attending: NURSE PRACTITIONER
Payer: COMMERCIAL

## 2019-10-03 VITALS
TEMPERATURE: 98.7 F | WEIGHT: 151 LBS | HEART RATE: 91 BPM | BODY MASS INDEX: 21.67 KG/M2 | OXYGEN SATURATION: 95 % | DIASTOLIC BLOOD PRESSURE: 84 MMHG | SYSTOLIC BLOOD PRESSURE: 144 MMHG

## 2019-10-03 DIAGNOSIS — J44.1 COPD EXACERBATION (H): Primary | ICD-10-CM

## 2019-10-03 DIAGNOSIS — R05.9 COUGH: ICD-10-CM

## 2019-10-03 DIAGNOSIS — Z76.0 ENCOUNTER FOR MEDICATION REFILL: ICD-10-CM

## 2019-10-03 LAB
BASOPHILS # BLD AUTO: 0 10E9/L (ref 0–0.2)
BASOPHILS NFR BLD AUTO: 0.3 %
DIFFERENTIAL METHOD BLD: NORMAL
EOSINOPHIL # BLD AUTO: 0.2 10E9/L (ref 0–0.7)
EOSINOPHIL NFR BLD AUTO: 1.6 %
LYMPHOCYTES # BLD AUTO: 1.8 10E9/L (ref 0.8–5.3)
LYMPHOCYTES NFR BLD AUTO: 18.5 %
MONOCYTES # BLD AUTO: 1.2 10E9/L (ref 0–1.3)
MONOCYTES NFR BLD AUTO: 12.1 %
NEUTROPHILS # BLD AUTO: 6.6 10E9/L (ref 1.6–8.3)
NEUTROPHILS NFR BLD AUTO: 67.5 %
WBC # BLD AUTO: 9.7 10E9/L (ref 4–11)

## 2019-10-03 PROCEDURE — 36415 COLL VENOUS BLD VENIPUNCTURE: CPT | Performed by: NURSE PRACTITIONER

## 2019-10-03 PROCEDURE — 99214 OFFICE O/P EST MOD 30 MIN: CPT | Performed by: NURSE PRACTITIONER

## 2019-10-03 PROCEDURE — 71046 X-RAY EXAM CHEST 2 VIEWS: CPT

## 2019-10-03 PROCEDURE — 85004 AUTOMATED DIFF WBC COUNT: CPT | Performed by: NURSE PRACTITIONER

## 2019-10-03 PROCEDURE — 85048 AUTOMATED LEUKOCYTE COUNT: CPT | Mod: 59 | Performed by: NURSE PRACTITIONER

## 2019-10-03 RX ORDER — CEFDINIR 300 MG/1
300 CAPSULE ORAL 2 TIMES DAILY
Qty: 14 CAPSULE | Refills: 0 | Status: SHIPPED | OUTPATIENT
Start: 2019-10-03 | End: 2020-03-03

## 2019-10-03 RX ORDER — CEFDINIR 300 MG/1
300 CAPSULE ORAL 2 TIMES DAILY
Qty: 14 CAPSULE | Refills: 0 | Status: CANCELLED | OUTPATIENT
Start: 2019-10-03 | End: 2019-10-10

## 2019-10-03 RX ORDER — PREDNISONE 20 MG/1
TABLET ORAL
Qty: 20 TABLET | Refills: 0 | Status: SHIPPED | OUTPATIENT
Start: 2019-10-03 | End: 2019-11-29

## 2019-10-03 ASSESSMENT — ENCOUNTER SYMPTOMS
HEADACHES: 0
WHEEZING: 1
LIGHT-HEADEDNESS: 0
SORE THROAT: 1
MYALGIAS: 1
COUGH: 1
NUMBNESS: 0
VOMITING: 0
DIARRHEA: 0
NAUSEA: 0
ARTHRALGIAS: 1
WEAKNESS: 0
FEVER: 0
ABDOMINAL PAIN: 0
PALPITATIONS: 0
DIZZINESS: 0
SHORTNESS OF BREATH: 1
RHINORRHEA: 1

## 2019-10-03 NOTE — PROGRESS NOTES
predSUBJECTIVE:   Robert B Behr is a 69 year old male presenting with a chief complaint of   Chief Complaint   Patient presents with     Urgent Care     Cough     c/o cough for 2 days       He is an established patient of [facility].    URI Adult    Onset of symptoms was 2 day(s) ago.  Course of illness is worsening.    Severity moderate  Current and Associated symptoms: runny nose, stuffy nose, cough - non-productive, wheezing, shortness of breath and body aches  Treatment measures tried include Inhaler (name: albuterol), advair.   Predisposing factors include tobacco use and HX of COPD.  Was seen a few weeks ago and was given prednisone and antibiotics states did get better but seemed to get worse again over the last couple of days.       Review of Systems   Constitutional: Negative for fever.   HENT: Positive for congestion, rhinorrhea and sore throat. Negative for ear pain.    Eyes: Negative for visual disturbance.   Respiratory: Positive for cough, shortness of breath and wheezing.    Cardiovascular: Negative for chest pain, palpitations and leg swelling.   Gastrointestinal: Negative for abdominal pain, diarrhea, nausea and vomiting.   Musculoskeletal: Positive for arthralgias and myalgias.   Skin: Negative for rash.   Neurological: Negative for dizziness, weakness, light-headedness, numbness and headaches.       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     Tobacco abuse      Family History   Problem Relation Age of Onset     Diabetes Brother         living     Cancer Brother         throat     Macular Degeneration Mother      Colon Cancer No family hx of      Glaucoma No family hx of      Retinal detachment No family hx of      Amblyopia No family hx of      Skin Cancer No family hx of      Melanoma No family hx of      Current  Outpatient Medications   Medication Sig Dispense Refill     B Complex-C (VITAMIN B COMPLEX W/VITAMIN C) TABS tablet TAKE 1 TABLET BY MOUTH TWICE DAILY WITH FOLIC ACID 180 tablet 11     calcium carbonate 600 mg-vitamin D 400 units (CALTRATE) 600-400 MG-UNIT per tablet Take 1 tablet by mouth 2 times daily 180 tablet 3     cefdinir (OMNICEF) 300 MG capsule Take 1 capsule (300 mg) by mouth 2 times daily for 7 days 14 capsule 0     fish oil-omega-3 fatty acids 1000 MG capsule Take 1 capsule (1 g) by mouth daily 90 capsule 3     fluticasone-salmeterol (ADVAIR DISKUS) 250-50 MCG/DOSE inhaler INHALE 1 PUFF INTO THE LUNGS TWICE DAILY 3 Inhaler 4     ginkgo biloba 60 MG CAPS capsule Take 1 capsule (60 mg) by mouth daily 90 capsule 3     glucosamine-chondroitinoitin 750-600 MG TABS TAKE 2 TABLETS BY MOUTH TWICE DAILY 120 tablet 3     ipratropium - albuterol 0.5 mg/2.5 mg/3 mL (DUONEB) 0.5-2.5 (3) MG/3ML neb solution Take 1 vial (3 mLs) by nebulization every 6 hours as needed for shortness of breath / dyspnea or wheezing 30 vial 1     Ipratropium-Albuterol (COMBIVENT RESPIMAT)  MCG/ACT inhaler INHALE 1 PUFF FOUR TIMES DAILY- DO NOT EXCEED 6 DOSES PER DAY 4 g 6     nicotine (NICODERM CQ) 14 MG/24HR 24 hr patch Place 1 patch onto the skin every 24 hours 30 patch 3     predniSONE (DELTASONE) 20 MG tablet Take 3 tabs by mouth daily x 3 days, then 2 tabs daily x 3 days, then 1 tab daily x 3 days, then 1/2 tab daily x 3 days. 20 tablet 0     Selenium (SELENIMIN-200) 200 MCG TABS tablet Take 1 tablet (200 mcg) by mouth daily 90 tablet 3     vitamin A 10,000 units capsule Take 1 capsule (10,000 Units) by mouth daily 90 capsule 3     vitamin B complex with vitamin C (VITAMIN  B COMPLEX) tablet Take 1 tablet by mouth 2 times daily With Folic acid 180 tablet 3     vitamin C (ASCORBIC ACID) 1000 MG TABS Take 1 tablet (1,000 mg) by mouth daily 90 tablet 3     vitamin E (TOCOPHEROL) 1000 units (450 mg) capsule Take 1 capsule (1,000  Units) by mouth daily 90 capsule 3     Social History     Tobacco Use     Smoking status: Current Some Day Smoker     Packs/day: 0.25     Years: 45.00     Pack years: 11.25     Types: Cigarettes     Smokeless tobacco: Former User     Tobacco comment: 5 cigs per day   Substance Use Topics     Alcohol use: Yes     Alcohol/week: 0.0 standard drinks     Comment: occ beer       OBJECTIVE  BP (!) 144/84   Pulse 91   Temp 98.7  F (37.1  C) (Oral)   Wt 68.5 kg (151 lb)   SpO2 95%   BMI 21.67 kg/m      Physical Exam  Vitals signs and nursing note reviewed.   Constitutional:       Appearance: Normal appearance. He is well-developed.   HENT:      Head: Normocephalic and atraumatic.      Right Ear: Tympanic membrane, ear canal and external ear normal.      Left Ear: Tympanic membrane, ear canal and external ear normal.      Nose: Rhinorrhea present.      Mouth/Throat:      Mouth: Mucous membranes are moist.      Pharynx: Oropharynx is clear.   Eyes:      Extraocular Movements: Extraocular movements intact.      Conjunctiva/sclera: Conjunctivae normal.      Pupils: Pupils are equal, round, and reactive to light.   Neck:      Musculoskeletal: Normal range of motion and neck supple.   Cardiovascular:      Rate and Rhythm: Normal rate and regular rhythm.      Heart sounds: Normal heart sounds.   Pulmonary:      Effort: Tachypnea present.      Breath sounds: Examination of the right-lower field reveals decreased breath sounds and rales. Decreased breath sounds and rales present.   Lymphadenopathy:      Head:      Right side of head: No submandibular or tonsillar adenopathy.      Left side of head: No submandibular or tonsillar adenopathy.      Cervical: No cervical adenopathy.   Skin:     General: Skin is warm and dry.      Capillary Refill: Capillary refill takes less than 2 seconds.   Neurological:      Mental Status: He is alert and oriented to person, place, and time.   Psychiatric:         Behavior: Behavior is  cooperative.         Labs:  Results for orders placed or performed in visit on 10/03/19 (from the past 24 hour(s))   WBC with Diff   Result Value Ref Range    WBC 9.7 4.0 - 11.0 10e9/L    % Neutrophils 67.5 %    % Lymphocytes 18.5 %    % Monocytes 12.1 %    % Eosinophils 1.6 %    % Basophils 0.3 %    Absolute Neutrophil 6.6 1.6 - 8.3 10e9/L    Absolute Lymphocytes 1.8 0.8 - 5.3 10e9/L    Absolute Monocytes 1.2 0.0 - 1.3 10e9/L    Absolute Eosinophils 0.2 0.0 - 0.7 10e9/L    Absolute Basophils 0.0 0.0 - 0.2 10e9/L    Diff Method Automated Method        X-Ray was done, my findings are: no acute cardiopulmonary changes. There is a lung nodule noted to the right, however this was seen on previous cxr's. Xray was compared to previous cxr done on 11/25/18.     ASSESSMENT:      ICD-10-CM    1. COPD exacerbation (H) J44.1 cefdinir (OMNICEF) 300 MG capsule     predniSONE (DELTASONE) 20 MG tablet   2. Cough R05 XR Chest 2 Views     WBC with Diff        Medical Decision Making:    Differential Diagnosis:  URI Adult/Peds:  Bronchitis-viral, Pneumonia and Viral upper respiratory illness    Serious Comorbid Conditions:  Adult:  COPD    PLAN:  Discussed with patient WBC and CXR findings. Since he has recently been treat with both augmentin, doxycycline, and zithromax will treat with omnicef to see if will help. Will also do a prednisone taper. Additional symptomatic treatment recommendations were discussed. Education was added to AVS. Patient was agreeable to plan and verbalized understanding.       Followup:    If not improving in 3-5 days or if condition worsens, follow up with your Primary Care Provider    Patient Instructions   omnicef twice a day for 7 days  Prednisone as prescribed.  Push fluids  Plenty of rest  Albuterol as needed for cough, wheeze, or shortness of breath  Continue with advair as prescribed.   Tylenol and ibuprofen to help with pain  flonase nasal spray to help with congestion

## 2019-10-03 NOTE — TELEPHONE ENCOUNTER
Reason for Call:  Medication or medication refill:    Do you use a [facility]?  Name of the pharmacy and phone number for the current request:  [address] - [phone]    Name of the medication requested: albuterol sulfate    Other request: pt states not on current list but Dr. Frausto gave it to him and its working well.    Can we leave a detailed message on this number? YES    Phone number patient can be reached at: Home number on file [phone] (home)    Best Time: asap    Call taken on 10/3/2019 at 9:32 AM by Aissatou Dickinson

## 2019-10-03 NOTE — PATIENT INSTRUCTIONS
omnicef twice a day for 7 days  Prednisone as prescribed.  Push fluids  Plenty of rest  Albuterol as needed for cough, wheeze, or shortness of breath  Continue with advair as prescribed.   Tylenol and ibuprofen to help with pain  flonase nasal spray to help with congestion

## 2019-10-03 NOTE — TELEPHONE ENCOUNTER
Spoke with pt to clarify what he is taking and how often  Pt takes advair twice a day- everyday  He has been using the respimat for rescue- but thinks the albuterol inhaler works better  Would like a refill of it.    Is he supposed to use the respimat for rescue? It is rx'd as daily  He is doing well overall - when we spoke he had just finished his swimming workout at the [facility].    Please advise on inhaler use- as well as refill the albuterol, if ok.   The med and pharmacy are billy;d up.    Thanks!     Aditi Webb RN

## 2019-10-03 NOTE — TELEPHONE ENCOUNTER
Left message on voicemail to call back for triage  Pt requesting albuterol  Hasn't had this filled since 2017  We need to clarify if he using the combivent daily and the albuterol for rescue-prn  Then forward to Vanessa Bassett MD for auth.   Thanks!     Aditi Webb RN

## 2019-10-05 RX ORDER — ALBUTEROL SULFATE 90 UG/1
1-2 AEROSOL, METERED RESPIRATORY (INHALATION) EVERY 4 HOURS PRN
Qty: 1 INHALER | Refills: 0 | Status: SHIPPED | OUTPATIENT
Start: 2019-10-05 | End: 2019-12-02

## 2019-10-11 ENCOUNTER — OFFICE VISIT (OUTPATIENT)
Dept: URGENT CARE | Facility: URGENT CARE | Age: 69
End: 2019-10-11
Payer: COMMERCIAL

## 2019-10-11 VITALS
HEART RATE: 81 BPM | TEMPERATURE: 98.1 F | DIASTOLIC BLOOD PRESSURE: 62 MMHG | SYSTOLIC BLOOD PRESSURE: 110 MMHG | WEIGHT: 151 LBS | BODY MASS INDEX: 21.62 KG/M2 | HEIGHT: 70 IN | RESPIRATION RATE: 15 BRPM

## 2019-10-11 DIAGNOSIS — J44.1 COPD EXACERBATION (H): ICD-10-CM

## 2019-10-11 DIAGNOSIS — R09.81 CONGESTION OF PARANASAL SINUS: Primary | ICD-10-CM

## 2019-10-11 PROCEDURE — 99213 OFFICE O/P EST LOW 20 MIN: CPT | Performed by: NURSE PRACTITIONER

## 2019-10-11 ASSESSMENT — MIFFLIN-ST. JEOR: SCORE: 1451.18

## 2019-10-11 NOTE — PROGRESS NOTES
SUBJECTIVE:   Robert B Behr is a 69 year old male presenting with a chief complaint of   Chief Complaint   Patient presents with     Urgent Care     URI     bronchitis. Was seen here last week, was put on antibiotic and steroids. Still feels tight in chest and coughing. pressure around eyes and hard time breathing.        He is an established patient of [facility].    URI Adult    Onset of symptoms was 10 day(s) ago.  Course of illness is same.    Severity moderate  Current and Associated symptoms: cough - productive, wheezing, shortness of breath and facial pain/pressure  Treatment measures tried include below .  Predisposing factors include tobacco use and HX of COPD.  advair and combivent normally  Was seen in  a week ago and given antibiotics and didn't get prednisone right away due to insurance shortage so just started it couple days ago, is helping a little, did take entire antibiotics course   Has done two different ones    Review of Systems   Constitutional, eye, ENT, skin, respiratory, cardiac, GI, MSK, neuro, and allergy are normal except as otherwise noted.     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     Tobacco abuse      Family History   Problem Relation Age of Onset     Diabetes Brother         living     Cancer Brother         throat     Macular Degeneration Mother      Colon Cancer No family hx of      Glaucoma No family hx of      Retinal detachment No family hx of      Amblyopia No family hx of      Skin Cancer No family hx of      Melanoma No family hx of      Current Outpatient Medications   Medication Sig Dispense Refill     albuterol (PROAIR HFA/PROVENTIL HFA/VENTOLIN HFA) 108 (90 Base) MCG/ACT inhaler Inhale 1-2 puffs into the lungs every 4 hours as needed for shortness of breath / dyspnea or wheezing 1 Inhaler 0      fluticasone-salmeterol (ADVAIR DISKUS) 250-50 MCG/DOSE inhaler INHALE 1 PUFF INTO THE LUNGS TWICE DAILY 3 Inhaler 4     Ipratropium-Albuterol (COMBIVENT RESPIMAT)  MCG/ACT inhaler INHALE 1 PUFF FOUR TIMES DAILY- DO NOT EXCEED 6 DOSES PER DAY 4 g 6     Selenium (SELENIMIN-200) 200 MCG TABS tablet Take 1 tablet (200 mcg) by mouth daily 90 tablet 3     vitamin A 10,000 units capsule Take 1 capsule (10,000 Units) by mouth daily 90 capsule 3     vitamin B complex with vitamin C (VITAMIN  B COMPLEX) tablet Take 1 tablet by mouth 2 times daily With Folic acid 180 tablet 3     vitamin C (ASCORBIC ACID) 1000 MG TABS Take 1 tablet (1,000 mg) by mouth daily 90 tablet 3     vitamin E (TOCOPHEROL) 1000 units (450 mg) capsule Take 1 capsule (1,000 Units) by mouth daily 90 capsule 3     B Complex-C (VITAMIN B COMPLEX W/VITAMIN C) TABS tablet TAKE 1 TABLET BY MOUTH TWICE DAILY WITH FOLIC ACID 180 tablet 11     calcium carbonate 600 mg-vitamin D 400 units (CALTRATE) 600-400 MG-UNIT per tablet Take 1 tablet by mouth 2 times daily 180 tablet 3     fish oil-omega-3 fatty acids 1000 MG capsule Take 1 capsule (1 g) by mouth daily 90 capsule 3     ginkgo biloba 60 MG CAPS capsule Take 1 capsule (60 mg) by mouth daily 90 capsule 3     glucosamine-chondroitinoitin 750-600 MG TABS TAKE 2 TABLETS BY MOUTH TWICE DAILY 120 tablet 3     ipratropium - albuterol 0.5 mg/2.5 mg/3 mL (DUONEB) 0.5-2.5 (3) MG/3ML neb solution Take 1 vial (3 mLs) by nebulization every 6 hours as needed for shortness of breath / dyspnea or wheezing 30 vial 1     nicotine (NICODERM CQ) 14 MG/24HR 24 hr patch Place 1 patch onto the skin every 24 hours 30 patch 3     predniSONE (DELTASONE) 20 MG tablet Take 3 tabs by mouth daily x 3 days, then 2 tabs daily x 3 days, then 1 tab daily x 3 days, then 1/2 tab daily x 3 days. 20 tablet 0     Social History     Tobacco Use     Smoking status: Current Some Day Smoker     Packs/day: 0.25     Years: 45.00     Pack years:  "11.25     Types: Cigarettes     Smokeless tobacco: Former User     Tobacco comment: 5 cigs per day   Substance Use Topics     Alcohol use: Yes     Alcohol/week: 0.0 standard drinks     Comment: occ beer       OBJECTIVE  /62   Pulse 81   Temp 98.1  F (36.7  C) (Oral)   Resp 15   Ht 1.778 m (5' 10\")   Wt 68.5 kg (151 lb)   BMI 21.67 kg/m      Physical Exam  GENERAL: mildly ill appearing, alert and in no distress  EYES: Eyes grossly normal to inspection, no discharge.    HENT: Normocephalic, ear canals- normal; No sinus tenderness Nose- normal with clear rhinnorhea; oropharynx without erythema no exudates, Mouth- no ulcers, no lesions and mucous membranes moist  NECK: no tenderness, mild bilateral anterior cervical adenopathy, no asymmetry, no masses, no stiffness  RESP: lungs with scattered exp wheezes otherwise good airflow  CV: regular rates and rhythm, normal S1 S2, no S3 or S4 and no murmur, no click or rub  ABDOMEN: soft, no tenderness   MS: extremities- no gross deformities noted, no edema  SKIN: no suspicious lesions, no rashes, good skin turgor  NEURO: strength and tone- normal, sensory exam- grossly normal, mentation appears normal     Labs:  No results found for this or any previous visit (from the past 24 hour(s)).    X-Ray was not done.    ASSESSMENT/PLAN:      ICD-10-CM    1. Congestion of paranasal sinus R09.81    2. COPD exacerbation (H) J44.1     on antibiotics and prednisone he just started for COPD exacerbation, is starting to feel a bit better on these, most complaints are from pnd and congestion. Work on sinus washes or nasal spray, steam and clearing this out, drink more water and start mucinex  continue rest of plan. No further antibiotics needed at this time.       Followup:    If not improving or if condition worsens, follow up with your Primary Care Provider    There are no Patient Instructions on file for this visit.     Chastity Kenny APRN CNP   "

## 2019-10-13 DIAGNOSIS — J44.1 COPD EXACERBATION (H): ICD-10-CM

## 2019-10-16 ENCOUNTER — PATIENT OUTREACH (OUTPATIENT)
Dept: GERIATRIC MEDICINE | Facility: CLINIC | Age: 69
End: 2019-10-16

## 2019-10-16 ASSESSMENT — ACTIVITIES OF DAILY LIVING (ADL): DEPENDENT_IADLS:: INDEPENDENT

## 2019-10-16 NOTE — PROGRESS NOTES
[facility] Care Coordination Contact      [facility] Refusal Telephone Assessment    Member refused home visit HRA on October 16, 2019 (reason: Doesn't feel he needs a visit).    ER visits: No  Hospitalizations: No  Health concerns: none  Falls/Injuries: No  ADL/IADL Dependencies: None        Member currently receiving the following home care services:   None  Member currently receiving the following community resources:  None  Informal support(s):      Advanced Care Planning discussion, complete code section.    [facility] sponsored benefits: Shared information re: Silver Sneakers/gym memberships, ASA, Calcium +D.    Follow-Up Plan: Member informed of future contact, plan to f/u with member with a 6 month telephone assessment and offer a home visit.  Contact information shared with member and family, encouraged member to call with any questions or concerns at any time.    Requested CMS to mail refusal letter.    Leeanna Sigala RN, BSN, PHN  [facility] Care Coordinator  [phone]

## 2019-10-16 NOTE — PROGRESS NOTES
[facility] Care Coordination Contact    Called member to schedule annual HRA home visit. Left a message requesting a return call to schedule HRA.   Leeanna Sigala RN, BSN, PHN  [facility] Care Coordinator  [phone]

## 2019-10-30 DIAGNOSIS — Z71.89 ENCOUNTER FOR HERB AND VITAMIN SUPPLEMENT MANAGEMENT: ICD-10-CM

## 2019-10-30 RX ORDER — MULTIVIT WITH MINERALS/LUTEIN
TABLET ORAL
Qty: 90 CAPSULE | Refills: 0 | OUTPATIENT
Start: 2019-10-30

## 2019-10-30 NOTE — TELEPHONE ENCOUNTER
Message sent to pharmacy - Patient has requested refill too soon (SEE ORDER SENT 8/6/19 FOR ONE YEAR SUPPLY.).  Gianna OREILLY

## 2019-11-11 DIAGNOSIS — Z71.89 ENCOUNTER FOR HERB AND VITAMIN SUPPLEMENT MANAGEMENT: ICD-10-CM

## 2019-11-11 RX ORDER — MULTIVIT WITH MINERALS/LUTEIN
TABLET ORAL
Qty: 90 CAPSULE | Refills: 0 | OUTPATIENT
Start: 2019-11-11

## 2019-11-11 NOTE — TELEPHONE ENCOUNTER
Dr Bassett already approved Vit E for one year on 8/6/19 to this same pharmacy. Denied Vit E rx as duplicate    Suad Boggs, RN, BSN

## 2019-11-29 ENCOUNTER — OFFICE VISIT (OUTPATIENT)
Dept: URGENT CARE | Facility: URGENT CARE | Age: 69
End: 2019-11-29
Payer: COMMERCIAL

## 2019-11-29 VITALS
BODY MASS INDEX: 21.67 KG/M2 | DIASTOLIC BLOOD PRESSURE: 72 MMHG | SYSTOLIC BLOOD PRESSURE: 130 MMHG | TEMPERATURE: 97.6 F | RESPIRATION RATE: 16 BRPM | HEIGHT: 70 IN | OXYGEN SATURATION: 96 % | HEART RATE: 72 BPM

## 2019-11-29 DIAGNOSIS — J20.9 ACUTE EXACERBATION OF CHRONIC BRONCHITIS (H): Primary | ICD-10-CM

## 2019-11-29 DIAGNOSIS — J43.9 PULMONARY EMPHYSEMA, UNSPECIFIED EMPHYSEMA TYPE (H): Primary | ICD-10-CM

## 2019-11-29 DIAGNOSIS — M16.11 OSTEOARTHRITIS OF RIGHT HIP, UNSPECIFIED OSTEOARTHRITIS TYPE: ICD-10-CM

## 2019-11-29 DIAGNOSIS — Z76.0 ENCOUNTER FOR MEDICATION REFILL: ICD-10-CM

## 2019-11-29 DIAGNOSIS — J42 ACUTE EXACERBATION OF CHRONIC BRONCHITIS (H): Primary | ICD-10-CM

## 2019-11-29 PROCEDURE — 99213 OFFICE O/P EST LOW 20 MIN: CPT | Performed by: FAMILY MEDICINE

## 2019-11-29 RX ORDER — PREDNISONE 20 MG/1
40 TABLET ORAL DAILY
Qty: 10 TABLET | Refills: 0 | Status: SHIPPED | OUTPATIENT
Start: 2019-11-29 | End: 2019-12-04

## 2019-11-29 RX ORDER — DOXYCYCLINE HYCLATE 100 MG
100 TABLET ORAL 2 TIMES DAILY
Qty: 14 TABLET | Refills: 0 | Status: SHIPPED | OUTPATIENT
Start: 2019-11-29 | End: 2019-12-06

## 2019-11-29 NOTE — PROGRESS NOTES
Robert B Behr with presents with 7 days symptoms including increased cough and sptum production on patient with CPOD. No fever. Not much for wheezing. Currently on advair and combivent.     Treatment measures tried include Inhalers.  No  Exposure   No  recent travel     Current Outpatient Medications   Medication Sig Dispense Refill     albuterol (PROAIR HFA/PROVENTIL HFA/VENTOLIN HFA) 108 (90 Base) MCG/ACT inhaler Inhale 1-2 puffs into the lungs every 4 hours as needed for shortness of breath / dyspnea or wheezing 1 Inhaler 0     B Complex-C (VITAMIN B COMPLEX W/VITAMIN C) TABS tablet TAKE 1 TABLET BY MOUTH TWICE DAILY WITH FOLIC ACID 180 tablet 11     calcium carbonate 600 mg-vitamin D 400 units (CALTRATE) 600-400 MG-UNIT per tablet Take 1 tablet by mouth 2 times daily 180 tablet 3     fish oil-omega-3 fatty acids 1000 MG capsule Take 1 capsule (1 g) by mouth daily 90 capsule 3     fluticasone-salmeterol (ADVAIR DISKUS) 250-50 MCG/DOSE inhaler INHALE 1 PUFF INTO THE LUNGS TWICE DAILY 3 Inhaler 4     ginkgo biloba 60 MG CAPS capsule Take 1 capsule (60 mg) by mouth daily 90 capsule 3     glucosamine-chondroitinoitin 750-600 MG TABS TAKE 2 TABLETS BY MOUTH TWICE DAILY 120 tablet 3     ipratropium - albuterol 0.5 mg/2.5 mg/3 mL (DUONEB) 0.5-2.5 (3) MG/3ML neb solution Take 1 vial (3 mLs) by nebulization every 6 hours as needed for shortness of breath / dyspnea or wheezing 30 vial 1     Ipratropium-Albuterol (COMBIVENT RESPIMAT)  MCG/ACT inhaler INHALE 1 PUFF FOUR TIMES DAILY- DO NOT EXCEED 6 DOSES PER DAY 4 g 6     nicotine (NICODERM CQ) 14 MG/24HR 24 hr patch Place 1 patch onto the skin every 24 hours 30 patch 3     predniSONE (DELTASONE) 20 MG tablet Take 3 tabs by mouth daily x 3 days, then 2 tabs daily x 3 days, then 1 tab daily x 3 days, then 1/2 tab daily x 3 days. 20 tablet 0     Selenium (SELENIMIN-200) 200 MCG TABS tablet Take 1 tablet (200 mcg) by mouth daily 90 tablet 3     vitamin A 10,000 units  "capsule Take 1 capsule (10,000 Units) by mouth daily 90 capsule 3     vitamin B complex with vitamin C (VITAMIN  B COMPLEX) tablet Take 1 tablet by mouth 2 times daily With Folic acid 180 tablet 3     vitamin C (ASCORBIC ACID) 1000 MG TABS Take 1 tablet (1,000 mg) by mouth daily 90 tablet 3     vitamin E (TOCOPHEROL) 1000 units (450 mg) capsule Take 1 capsule (1,000 Units) by mouth daily 90 capsule 3       ROS otherwise negative for resp., ID,  HEENT symptoms.    Objective: /72 (BP Location: Right arm, Patient Position: Sitting, Cuff Size: Adult Regular)   Pulse 72   Temp 97.6  F (36.4  C) (Tympanic)   Resp 16   Ht 1.778 m (5' 10\")   SpO2 96%   BMI 21.67 kg/m    Exam:  GENERAL APPEARANCE: healthy, alert and no distress  EYES: Eyes grossly normal to inspection  HENT: ear canals and TM's normal and nose and mouth without ulcers or lesions  NECK: no adenopathy, no asymmetry, masses, or scars and thyroid normal to palpation  RESP: lungs clear to auscultation - no rales, rhonchi or wheezes  CV: regular rates and rhythm, normal S1 S2, no S3 or S4 and no murmur, click or rub -       ICD-10-CM    1. Acute exacerbation of chronic bronchitis (H) J20.9 predniSONE (DELTASONE) 20 MG tablet    J42 doxycycline hyclate (VIBRA-TABS) 100 MG tablet     Prednisone and doxy prescribed for AECB. follow up discussed. He has questions regardign a refill but it looks like there is a tele encounter out.   "

## 2019-11-29 NOTE — TELEPHONE ENCOUNTER
Pt would like a phone call when these are refilled. Pt very upset that he is not able to talk to nurse in person at .

## 2019-12-02 RX ORDER — MAGNESIUM CARB/ALUMINUM HYDROX 105-160MG
TABLET,CHEWABLE ORAL
Qty: 120 TABLET | Refills: 3 | Status: SHIPPED | OUTPATIENT
Start: 2019-12-02 | End: 2021-08-30

## 2019-12-02 RX ORDER — ALBUTEROL SULFATE 90 UG/1
1-2 AEROSOL, METERED RESPIRATORY (INHALATION) EVERY 4 HOURS PRN
Qty: 1 INHALER | Refills: 0 | Status: SHIPPED | OUTPATIENT
Start: 2019-12-02 | End: 2020-08-09

## 2019-12-02 NOTE — TELEPHONE ENCOUNTER
Pt informed directly about inhaler.   He was informed we are still waiting to refill the other medication listed.     Elena NORRIS     [facility]

## 2019-12-02 NOTE — TELEPHONE ENCOUNTER
"Routing refill request to provider for review/approval because:  --Glucosamine not on refill protocol.     ,  --Please contact patient and tell him albuterol order sent.  --A refill request for glucosamine was sent to the provider.            Last Office Visit: 9/10/2019   Future Office Visit:  NONE        Last Spirometry or PFT'S : 8/28/17      Requested Prescriptions   Pending Prescriptions Disp Refills     albuterol (PROAIR HFA/PROVENTIL HFA/VENTOLIN HFA) 108 (90 Base) MCG/ACT inhaler 1 Inhaler 0     Sig: Inhale 1-2 puffs into the lungs every 4 hours as needed for shortness of breath / dyspnea or wheezing       Asthma Maintenance Inhalers - Anticholinergics Passed - 11/29/2019 10:17 AM        Passed - Patient is age 12 years or older        Passed - Recent (12 mo) or future (30 days) visit within the authorizing provider's specialty     Patient has had an office visit with the authorizing provider or a provider within the authorizing providers department within the previous 12 mos or has a future within next 30 days. See \"Patient Info\" tab in inbasket, or \"Choose Columns\" in Meds & Orders section of the refill encounter.              Passed - Medication is active on med list            "

## 2019-12-03 DIAGNOSIS — J44.1 COPD EXACERBATION (H): Primary | ICD-10-CM

## 2019-12-03 RX ORDER — IPRATROPIUM BROMIDE AND ALBUTEROL SULFATE 2.5; .5 MG/3ML; MG/3ML
1 SOLUTION RESPIRATORY (INHALATION) EVERY 6 HOURS PRN
Qty: 30 VIAL | Refills: 1 | Status: CANCELLED | OUTPATIENT
Start: 2019-12-03

## 2019-12-03 NOTE — TELEPHONE ENCOUNTER
Refill request: ipratropium - albuterol 0.5 mg/2.5 mg/3 mL (DUONEB) 0.5-2.5 (3) MG/3ML neb solution    Patient states there was an error when he tried to request this and instead the albuterol inhaler was refilled     Serjio - patient requests discontinuation of albuterol (PROAIR HFA/PROVENTIL HFA/VENTOLIN HFA) 108 (90 Base) MCG/ACT inhaler - he says he never uses this - not when out of the house or traveling or during emergencies      Discussed refill request will be submitted please allow 72 hours for processing patient can expect to contact the pharmacy at this time - if for any reason f/u is need on this medication he would then get a call back

## 2019-12-03 NOTE — TELEPHONE ENCOUNTER
Reason for Call:  Other call back    Detailed comments: Pt came in and said he requested the nebulizer treatment Albuterol and is requesting that someone please take the Proair off his Insurance even thought he picked it up already from the pharmacy . m    Phone Number Patient can be reached at: Cell number on file:    Telephone Information:   Mobile [phone]       Best Time: ASAP    Can we leave a detailed message on this number? YES    Call taken on 12/3/2019 at 11:09 AM by JULIO De Luna  [place]   Patient Rep

## 2019-12-07 NOTE — TELEPHONE ENCOUNTER
",  --I authorized refill on this neb but please see Sol notes previous in this encounter.    Last Office Visit: 9/10/2019   Future Office Visit:  None    Last Spirometry or PFT'S : 8/28/17        Requested Prescriptions   Pending Prescriptions Disp Refills     Ipratropium-Albuterol (COMBIVENT RESPIMAT)  MCG/ACT inhaler 4 g 6     Sig: INHALE 1 PUFF FOUR TIMES DAILY- DO NOT EXCEED 6 DOSES PER DAY       Short-Acting Beta Agonist Inhalers Protocol  Passed - 12/7/2019 12:24 PM        Passed - Patient is age 12 or older        Passed - Recent (12 mo) or future (30 days) visit within the authorizing provider's specialty     Patient has had an office visit with the authorizing provider or a provider within the authorizing providers department within the previous 12 mos or has a future within next 30 days. See \"Patient Info\" tab in inbasket, or \"Choose Columns\" in Meds & Orders section of the refill encounter.              Passed - Medication is active on med list                  "

## 2019-12-22 ENCOUNTER — TELEPHONE (OUTPATIENT)
Dept: FAMILY MEDICINE | Facility: CLINIC | Age: 69
End: 2019-12-22

## 2019-12-22 DIAGNOSIS — J44.1 COPD EXACERBATION (H): ICD-10-CM

## 2019-12-22 NOTE — TELEPHONE ENCOUNTER
Ravin stopped into Urgent Care today requesting help with getting a refill on his albuterol for his neb machine, says he rarely uses it but has finally run out.  isabel moura on [address].  Please call pt when done or declined, thank you

## 2019-12-23 DIAGNOSIS — J44.1 COPD EXACERBATION (H): ICD-10-CM

## 2019-12-23 RX ORDER — IPRATROPIUM BROMIDE AND ALBUTEROL SULFATE 2.5; .5 MG/3ML; MG/3ML
1 SOLUTION RESPIRATORY (INHALATION) EVERY 6 HOURS PRN
Qty: 30 VIAL | Refills: 1 | Status: SHIPPED | OUTPATIENT
Start: 2019-12-23 | End: 2019-12-27

## 2019-12-24 DIAGNOSIS — J44.0 CHRONIC OBSTRUCTIVE PULMONARY DISEASE WITH ACUTE LOWER RESPIRATORY INFECTION (H): ICD-10-CM

## 2019-12-24 NOTE — TELEPHONE ENCOUNTER
"Requested Prescriptions   Pending Prescriptions Disp Refills     ipratropium - albuterol 0.5 mg/2.5 mg/3 mL (DUONEB) 0.5-2.5 (3) MG/3ML neb solution [Pharmacy Med Name: IPRATROPI/ALB 0.5/3MG INH SL 30X3ML]  Last Written Prescription Date:  12-23-19  Last Fill Quantity: 30 vial,  # refills: 1   Last office visit: 9/10/2019 with prescribing provider:  ARAM FOURNIER    Future Office Visit:         Sig: INHALE 1 VIAL VIA NEBULIZER EVERY 6 HOURS AS NEEDED FOR SHORTNESS OF BREATH/ DYSPNEA OR WHEEZING       Short-Acting Beta Agonist Inhalers Protocol  Passed - 12/23/2019  9:11 AM        Passed - Patient is age 12 or older        Passed - Recent (12 mo) or future (30 days) visit within the authorizing provider's specialty     Patient has had an office visit with the authorizing provider or a provider within the authorizing providers department within the previous 12 mos or has a future within next 30 days. See \"Patient Info\" tab in inbasket, or \"Choose Columns\" in Meds & Orders section of the refill encounter.            Passed - Medication is active on med list         "

## 2019-12-26 NOTE — TELEPHONE ENCOUNTER
"Requested Prescriptions   Pending Prescriptions Disp Refills     nicotine (NICODERM CQ) 14 MG/24HR 24 hr patch [Pharmacy Med Name: NICOTINE 14MG/24H PATCH]  Last Written Prescription Date:  8-1-19  Last Fill Quantity: 30 patch,  # refills: 3   Last office visit: 9/10/2019 with prescribing provider:  ARAM FOURNIER    Future Office Visit:   28 patch      Sig: APPLY 1 PATCH EXTERNALLY TO THE SKIN EVERY 24 HOURS       Partial Cholinergic Nicotinic Agonist Agents Passed - 12/24/2019  4:03 PM        Passed - Blood pressure under 140/90 in past 12 months     BP Readings from Last 3 Encounters:   11/29/19 130/72   10/11/19 110/62   10/03/19 (!) 144/84           Passed - Recent (12 mo) or future (30 days) visit within the authorizing provider's specialty     Patient has had an office visit with the authorizing provider or a provider within the authorizing providers department within the previous 12 mos or has a future within next 30 days. See \"Patient Info\" tab in inbasket, or \"Choose Columns\" in Meds & Orders section of the refill encounter.            Passed - Medication is active on med list        Passed - Patient is 18 years of age or older         "

## 2019-12-27 DIAGNOSIS — J44.1 COPD EXACERBATION (H): ICD-10-CM

## 2019-12-27 RX ORDER — IPRATROPIUM BROMIDE AND ALBUTEROL SULFATE 2.5; .5 MG/3ML; MG/3ML
SOLUTION RESPIRATORY (INHALATION)
Qty: 180 VIAL | Refills: 0 | Status: SHIPPED | OUTPATIENT
Start: 2019-12-27 | End: 2020-09-02

## 2019-12-28 RX ORDER — NICOTINE 21 MG/24HR
PATCH, TRANSDERMAL 24 HOURS TRANSDERMAL
Qty: 28 PATCH | Refills: 5 | Status: SHIPPED | OUTPATIENT
Start: 2019-12-28 | End: 2020-02-07

## 2019-12-28 NOTE — TELEPHONE ENCOUNTER
Signed Prescriptions:                        Disp   Refills    nicotine (NICODERM CQ) 14 MG/24HR 24 hr pa*28 pat*5        Sig: APPLY 1 PATCH EXTERNALLY TO THE SKIN EVERY 24 HOURS  Authorizing Provider: ARAM FOURNIER  Ordering User: KANDI ELI

## 2020-01-01 RX ORDER — IPRATROPIUM BROMIDE AND ALBUTEROL SULFATE 2.5; .5 MG/3ML; MG/3ML
SOLUTION RESPIRATORY (INHALATION)
OUTPATIENT
Start: 2020-01-01

## 2020-01-01 NOTE — TELEPHONE ENCOUNTER
Message sent to pharmacy - Refusal reason: Duplicate (SEE ORDER WITH REMINDER SENT 12/27/19.).  Gianna OREILLY

## 2020-01-06 ENCOUNTER — OFFICE VISIT (OUTPATIENT)
Dept: URGENT CARE | Facility: URGENT CARE | Age: 70
End: 2020-01-06
Payer: COMMERCIAL

## 2020-01-06 VITALS
HEART RATE: 90 BPM | TEMPERATURE: 97.5 F | SYSTOLIC BLOOD PRESSURE: 148 MMHG | WEIGHT: 155 LBS | HEIGHT: 70 IN | DIASTOLIC BLOOD PRESSURE: 87 MMHG | OXYGEN SATURATION: 97 % | BODY MASS INDEX: 22.19 KG/M2

## 2020-01-06 DIAGNOSIS — J42 CHRONIC BRONCHITIS, UNSPECIFIED CHRONIC BRONCHITIS TYPE (H): Primary | ICD-10-CM

## 2020-01-06 PROCEDURE — 99213 OFFICE O/P EST LOW 20 MIN: CPT | Performed by: FAMILY MEDICINE

## 2020-01-06 RX ORDER — AZITHROMYCIN 500 MG/1
INJECTION, POWDER, LYOPHILIZED, FOR SOLUTION INTRAVENOUS
Qty: 12 EACH | Refills: 0 | Status: SHIPPED | OUTPATIENT
Start: 2020-01-06 | End: 2020-02-07

## 2020-01-06 ASSESSMENT — MIFFLIN-ST. JEOR: SCORE: 1469.33

## 2020-01-06 NOTE — PROGRESS NOTES
Subjective: Patient has COPD, saw a pulmonary specialist in 2017, has been on inhalers with steroids as well as ipratropium bromide and albuterol but in the last 2 months he has just been having increased amounts of mucus.  It does not seem infected.  He takes Mucinex for about 3 times a day which helps.  He just does not know why it is happening and wonders what else he can do.  He is very interested in finding out what the latest potential treatments are for COPD including stem cells so he is anxious to meet with pulmonary.    Objective: ENT is normal.  Neck is normal.  Lungs are clear but with prolonged expiratory phase.  Heart is regular without murmurs.  No edema.    Assessment and plan: COPD with may be gradual worsening, unsure what the next step should be but I talked to him about a trial of Zithromax 500 mg 3 times a week to see if it can make a difference as some new findings suggested that that could be helpful.  In the meantime he will make an appointment with the pulmonary specialist to talk about what to do next.

## 2020-01-22 DIAGNOSIS — J42 CHRONIC BRONCHITIS, UNSPECIFIED CHRONIC BRONCHITIS TYPE (H): ICD-10-CM

## 2020-01-22 RX ORDER — AZITHROMYCIN 500 MG/1
TABLET, FILM COATED ORAL
Start: 2020-01-22

## 2020-01-22 NOTE — TELEPHONE ENCOUNTER
Pt is here at clinic stating Dr from Urgent care wants him to remain on this med for ever as a on going as a on going med. Please Advise  Silvia Causey   Patient Rep

## 2020-02-07 ENCOUNTER — OFFICE VISIT (OUTPATIENT)
Dept: URGENT CARE | Facility: URGENT CARE | Age: 70
End: 2020-02-07
Payer: COMMERCIAL

## 2020-02-07 VITALS
RESPIRATION RATE: 16 BRPM | TEMPERATURE: 98.1 F | BODY MASS INDEX: 22.84 KG/M2 | DIASTOLIC BLOOD PRESSURE: 78 MMHG | SYSTOLIC BLOOD PRESSURE: 136 MMHG | HEART RATE: 84 BPM | WEIGHT: 159.2 LBS | OXYGEN SATURATION: 96 %

## 2020-02-07 DIAGNOSIS — J42 CHRONIC BRONCHITIS, UNSPECIFIED CHRONIC BRONCHITIS TYPE (H): ICD-10-CM

## 2020-02-07 DIAGNOSIS — J44.0 CHRONIC OBSTRUCTIVE PULMONARY DISEASE WITH ACUTE LOWER RESPIRATORY INFECTION (H): ICD-10-CM

## 2020-02-07 PROCEDURE — 99213 OFFICE O/P EST LOW 20 MIN: CPT | Performed by: HOSPITALIST

## 2020-02-07 RX ORDER — AZITHROMYCIN 500 MG/1
INJECTION, POWDER, LYOPHILIZED, FOR SOLUTION INTRAVENOUS
Qty: 12 EACH | Refills: 0 | Status: SHIPPED | OUTPATIENT
Start: 2020-02-07 | End: 2021-08-30

## 2020-02-07 RX ORDER — NICOTINE 21 MG/24HR
1 PATCH, TRANSDERMAL 24 HOURS TRANSDERMAL EVERY 24 HOURS
Qty: 28 PATCH | Refills: 5 | Status: SHIPPED | OUTPATIENT
Start: 2020-02-07 | End: 2020-08-09

## 2020-02-07 NOTE — PROGRESS NOTES
Pt came here for refill of his advair and also his nicotine patch and also his azithromycin. Pt is on azithromycin 250mg 3x weekly for prophylaxis. Apparently pt is run out right now    No Known Allergies    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     Tobacco abuse        albuterol (PROAIR HFA/PROVENTIL HFA/VENTOLIN HFA) 108 (90 Base) MCG/ACT inhaler, Inhale 1-2 puffs into the lungs every 4 hours as needed for shortness of breath / dyspnea or wheezing  B Complex-C (VITAMIN B COMPLEX W/VITAMIN C) TABS tablet, TAKE 1 TABLET BY MOUTH TWICE DAILY WITH FOLIC ACID  calcium carbonate 600 mg-vitamin D 400 units (CALTRATE) 600-400 MG-UNIT per tablet, Take 1 tablet by mouth 2 times daily  fish oil-omega-3 fatty acids 1000 MG capsule, Take 1 capsule (1 g) by mouth daily  Ginkgo Biloba 60 MG TABS, TAKE 1 TABLET BY MOUTH EVERY DAY.  glucosamine-chondroitinoitin 750-600 MG TABS, TAKE 2 TABLETS BY MOUTH TWICE DAILY  glucosamine-chondroitinoitin 750-600 MG TABS, TAKE 2 TABLETS BY MOUTH TWICE DAILY  ipratropium - albuterol 0.5 mg/2.5 mg/3 mL (DUONEB) 0.5-2.5 (3) MG/3ML neb solution, INHALE 1 VIAL VIA NEBULIZER EVERY 6 HOURS AS NEEDED FOR SHORTNESS OF BREATH/ DYSPNEA OR WHEEZING  Ipratropium-Albuterol (COMBIVENT RESPIMAT)  MCG/ACT inhaler, INHALE 1 PUFF FOUR TIMES DAILY- DO NOT EXCEED 6 DOSES PER DAY  Selenium (SELENIMIN-200) 200 MCG TABS tablet, Take 1 tablet (200 mcg) by mouth daily  vitamin A 10,000 units capsule, Take 1 capsule (10,000 Units) by mouth daily  vitamin B complex with vitamin C (VITAMIN  B COMPLEX) tablet, Take 1 tablet by mouth 2 times daily With Folic acid  vitamin C (ASCORBIC ACID) 1000 MG TABS, Take 1 tablet (1,000 mg) by mouth daily  vitamin E (TOCOPHEROL) 1000 units (450 mg) capsule, Take 1 capsule (1,000 Units) by mouth  daily  [] amoxicillin-clavulanate (AUGMENTIN XR) 1000-62.5 MG 12 hr tablet, Take 2 tablets by mouth 2 times daily for 5 days  [] cefdinir (OMNICEF) 300 MG capsule, Take 1 capsule (300 mg) by mouth 2 times daily for 7 days  [] doxycycline hyclate (VIBRA-TABS) 100 MG tablet, Take 1 tablet (100 mg) by mouth 2 times daily for 7 days  [] doxycycline hyclate (VIBRA-TABS) 100 MG tablet, Take 1 tablet (100 mg) by mouth 2 times daily for 5 days  [] doxycycline hyclate (VIBRA-TABS) 100 MG tablet, Take 1 tablet (100 mg) by mouth 2 times daily for 10 days  [] predniSONE (DELTASONE) 20 MG tablet, Take 2 tablets (40 mg) by mouth daily for 5 days  [] predniSONE (DELTASONE) 20 MG tablet, Take 1 tablet (20 mg) by mouth 2 times daily for 5 days  [] predniSONE (DELTASONE) 20 MG tablet, Take 2 tablets (40 mg) by mouth daily for 5 days    No current facility-administered medications on file prior to visit.       Social History     Tobacco Use     Smoking status: Current Some Day Smoker     Packs/day: 0.25     Years: 45.00     Pack years: 11.25     Types: Cigarettes     Smokeless tobacco: Former User     Tobacco comment: 5 cigs per day   Substance Use Topics     Alcohol use: Yes     Alcohol/week: 0.0 standard drinks     Comment: occ beer       ROS:  12 point ROS is done and aside that mention above all other review of system is negative    OBJECTIVE:  /78   Pulse 84   Temp 98.1  F (36.7  C) (Tympanic)   Resp 16   Wt 72.2 kg (159 lb 3.2 oz)   SpO2 96%   BMI 22.84 kg/m    GENERAL APPEARANCE: healthy, alert and minimal distress  EYES: conjunctiva clear  THROAT: no erythema w/ no tonsillar enlargement . no exudates  NECK: supple, nontender, no lymphadenopathy  RESP:minimal wheezing. Bronchial sound. No crackles   CV: regular rates and rhythm, normal S1 S2, no murmur noted  NEURO: awake, alert        No results found for this or any previous visit (from the past 168  hour(s)).     ASSESSMENT:     ICD-10-CM    1. Chronic bronchitis, unspecified chronic bronchitis type (H) J42 azithromycin (ZITHROMAX) 500 MG vial     fluticasone-salmeterol (ADVAIR DISKUS) 250-50 MCG/DOSE inhaler   2. Chronic obstructive pulmonary disease with acute lower respiratory infection (H) J44.0 nicotine (NICODERM CQ) 14 MG/24HR 24 hr patch         PLAN:    Will refill the advair, nicotine patch and also azithromycin. Recommend call his regular physician for refill in the future. Patient understand and agreeable with this plan, all question answered. Follow up as needed only    Lucila Lundberg MD MD

## 2020-03-03 ENCOUNTER — OFFICE VISIT (OUTPATIENT)
Dept: URGENT CARE | Facility: URGENT CARE | Age: 70
End: 2020-03-03
Payer: COMMERCIAL

## 2020-03-03 VITALS
SYSTOLIC BLOOD PRESSURE: 124 MMHG | OXYGEN SATURATION: 98 % | BODY MASS INDEX: 22.76 KG/M2 | TEMPERATURE: 98 F | DIASTOLIC BLOOD PRESSURE: 78 MMHG | HEART RATE: 78 BPM | WEIGHT: 159 LBS | RESPIRATION RATE: 15 BRPM | HEIGHT: 70 IN

## 2020-03-03 DIAGNOSIS — J44.1 COPD EXACERBATION (H): Primary | ICD-10-CM

## 2020-03-03 PROCEDURE — 99214 OFFICE O/P EST MOD 30 MIN: CPT | Performed by: FAMILY MEDICINE

## 2020-03-03 RX ORDER — AZITHROMYCIN 250 MG/1
TABLET, FILM COATED ORAL
Qty: 6 TABLET | Refills: 0 | Status: SHIPPED | OUTPATIENT
Start: 2020-03-03 | End: 2020-07-21

## 2020-03-03 ASSESSMENT — MIFFLIN-ST. JEOR: SCORE: 1482.47

## 2020-03-03 NOTE — PATIENT INSTRUCTIONS
Take a dose of the combivent before you go outside to do exercise    Continue your advair twice a day as prescribed    Azithromycin take for 5 days as prescribed      If you continue to have mucus production issues please make an appointment with your primary care physician or see a pulmonologist like you have seen previously

## 2020-03-03 NOTE — PROGRESS NOTES
Subjective:   Robert B Behr is a 70 year old male who presents for   Chief Complaint   Patient presents with     Urgent Care     URI     [place]eps getting URI[place] [facility] [place] [facility] [place] H/O COPD. a lot of mucus     [place] some individuals who oftentimes will cough.   Patient was given azithromycin on 2020 about a month ago for a COPD bronchitis/exacerbation. -- did see some improvement.   He notes over last 9 months he has had increased mucus production. He is working after 50 years of being a smoker to come off of smoking, now down to less than 5 cigs/day.     Patient denies any fevers.   He denies any heart problems.     He reports increased use of combivent 2x more than normal, typically once every 3 days or so -- this has been going on over the past year.     He elaborates that the increased mucus production in his throat is typically resolved with a course of antibiotics    Patient Active Problem List    Diagnosis Date Noted     Hypopigmentation 2018     Priority: Medium     Hiatal hernia 2017     Priority: Medium     Health Care Home 2016     Priority: Medium     [facility] Case Management  Leeanna Siagla RN  [phone]    [facility] CARE PLAN SUMMARY    Client Name:  Robert B Behr  Address:  [address] [place] [address] Phone: [phone] (home)    :  1950 Date of Assessment: 10/16/2019 Refusal    Health Plan:  [facility]  Health Plan Number: [id number] Medical Assistance Number: [id number]  Financial Worker:    Case #:     FVP :  Leeanna Sigala RN  Phone:  [phone]   Fax:  [phone]   P Enrollment Date: 16 Case Mix: L   Rate Cell:  A  Waiver Type:  None   Emergency Contact:   ALYSHA MOLINA  [place] Phone: [phone]  Relation: Friend  Secondary Emergency Contact: BRIANNA BENTLEY  [place] Phone: [phone]  Relation: Friend Language:  English  :  No   Health Care Agent/POA:  "  Advanced Directives/Living Will:     Primary Care Clinic/Phone/Fax:  [facility]/(p) [phone], (f) [phone] Primary Dx:  COPD J44.9  Secondary Dx:  Osteoporosis M81.0   Primary Physician:  Fina Frausto   Height:  5'10\"  Weight:  154 lbs    Specialty Physician:    Audiologist:     Eye Care Provider:   Dental Care Provider:    [facility]: [facility] [phone] or [phone]   Other:               Lung nodules      Priority: Medium     CT scan 2016       Tobacco use disorder 04/13/2016     Priority: Medium     CARDIOVASCULAR SCREENING; LDL GOAL LESS THAN 160 04/13/2016     Priority: Medium     COPD (chronic obstructive pulmonary disease) (H)      Priority: Medium     diagnosed with spirometry        Osteoporosis      Priority: Medium       Current Outpatient Medications   Medication     albuterol (PROAIR HFA/PROVENTIL HFA/VENTOLIN HFA) 108 (90 Base) MCG/ACT inhaler     azithromycin (ZITHROMAX) 250 MG tablet     fluticasone-salmeterol (ADVAIR DISKUS) 250-50 MCG/DOSE inhaler     azithromycin (ZITHROMAX) 500 MG vial     B Complex-C (VITAMIN B COMPLEX W/VITAMIN C) TABS tablet     calcium carbonate 600 mg-vitamin D 400 units (CALTRATE) 600-400 MG-UNIT per tablet     fish oil-omega-3 fatty acids 1000 MG capsule     Ginkgo Biloba 60 MG TABS     glucosamine-chondroitinoitin 750-600 MG TABS     glucosamine-chondroitinoitin 750-600 MG TABS     ipratropium - albuterol 0.5 mg/2.5 mg/3 mL (DUONEB) 0.5-2.5 (3) MG/3ML neb solution     Ipratropium-Albuterol (COMBIVENT RESPIMAT)  MCG/ACT inhaler     nicotine (NICODERM CQ) 14 MG/24HR 24 hr patch     Selenium (SELENIMIN-200) 200 MCG TABS tablet     vitamin A 10,000 units capsule     vitamin B complex with vitamin C (VITAMIN  B COMPLEX) tablet     vitamin C (ASCORBIC ACID) 1000 MG TABS     vitamin E (TOCOPHEROL) 1000 units (450 mg) capsule     No current facility-administered medications for this visit.        ROS:  As above per " "HPI    Objective:   /78   Pulse 78   Temp 98  F (36.7  C) (Oral)   Resp 15   Ht 1.778 m (5' 10\")   Wt 72.1 kg (159 lb)   SpO2 98%   BMI 22.81 kg/m  , Body mass index is 22.81 kg/m .  Gen:  NAD, well-nourished, sitting in chair comfortably  HEENT: EOMI, sclera anicteric, Head normocephalic, ; nares patent; moist mucous membranes  Neck: trachea midline, no thyromegaly  CV:  Hemodynamically stable, RRR  Pulm:  no increased work of breathing , CTAB, no wheezes/rales/rhonchi   Extrem: no cyanosis, edema or clubbing  Skin: no obvious rashes or abnormalities  Psych: Euthymic, linear thoughts, normal rate of speech    No results found for any visits on 03/03/20.    Assessment & Plan:   Robert B Behr, 70 year old male who presents with:    COPD exacerbation (H)  I provided reassurance to patient today that his vital signs are stable and his lung exam without any wheezing or crackles.  He is just concerned about the increased phlegm production in his upper airway states that azithromycin has been helpful in the past he did ask for a 30-day supply which I feel is unnecessary at this time we will do a 5-day course of azithromycin.  He elaborates on his condition of COPD and is hoping to get in touch with cutting edge technology or clinical trials he thinks he found one either at [facility] or [place] and will pursue this I did discuss with him that on review of pulmonary function test he does not have severe COPD but he is insistent on trying to get involved with stem cell treatment. Continue advair as prescribed. Recommended combivent before outside exercise in the cold air.   - azithromycin (ZITHROMAX) 250 MG tablet  Dispense: 6 tablet; Refill: 0      Lance Vega MD   [facility]    The use of Dragon/99testsation services may have been used to construct the content in this note; any grammatical or spelling errors are non-intentional. Please contact the author of this note directly " if you are in need of any clarification.

## 2020-03-12 ENCOUNTER — TRANSFERRED RECORDS (OUTPATIENT)
Dept: HEALTH INFORMATION MANAGEMENT | Facility: CLINIC | Age: 70
End: 2020-03-12

## 2020-03-14 ENCOUNTER — OFFICE VISIT (OUTPATIENT)
Dept: URGENT CARE | Facility: URGENT CARE | Age: 70
End: 2020-03-14
Payer: COMMERCIAL

## 2020-03-14 VITALS
SYSTOLIC BLOOD PRESSURE: 108 MMHG | OXYGEN SATURATION: 98 % | WEIGHT: 156 LBS | HEART RATE: 72 BPM | BODY MASS INDEX: 22.38 KG/M2 | TEMPERATURE: 98.3 F | DIASTOLIC BLOOD PRESSURE: 74 MMHG

## 2020-03-14 DIAGNOSIS — J44.1 COPD EXACERBATION (H): Primary | ICD-10-CM

## 2020-03-14 PROCEDURE — 99214 OFFICE O/P EST MOD 30 MIN: CPT | Performed by: FAMILY MEDICINE

## 2020-03-14 RX ORDER — GUAIFENESIN 200 MG/1
200 TABLET ORAL EVERY 4 HOURS PRN
Qty: 30 TABLET | Refills: 0 | Status: SHIPPED | OUTPATIENT
Start: 2020-03-14 | End: 2021-06-09

## 2020-03-14 RX ORDER — PREDNISONE 50 MG/1
50 TABLET ORAL DAILY
Qty: 5 TABLET | Refills: 0 | Status: SHIPPED | OUTPATIENT
Start: 2020-03-14 | End: 2021-06-09

## 2020-03-14 ASSESSMENT — ENCOUNTER SYMPTOMS
FEVER: 0
COUGH: 1
WHEEZING: 0
SHORTNESS OF BREATH: 0

## 2020-03-14 NOTE — PROGRESS NOTES
Subjective     Robert B Behr is a 70 year old male who presents to clinic today for the following health issues:    HPI     Patient is a 70-year-old male history of non-oxygen, COPD who presents to urgent care with concerns of cough.     He was recently seen on 3-3-2020 for COPD exacerbation, status post azithromycin.  Reports that he continues to have congestion and some phlegm in the center of his chest that he is unable to get up.  Historically, reports had good results after taking a prednisone burst.  Since we last saw him in urgent care, he denies any fever, wheezing, shortness of breath or chest pain.    He continues to work on smoking cessation, currently wearing a nicotine patch.    Reviewed and updated as needed this visit by Provider         Review of Systems   Constitutional: Negative for fever.   Respiratory: Positive for cough. Negative for shortness of breath and wheezing.       Medications updated and reviewed.  Past, family and surgical history is updated and reviewed in the record.  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     Tobacco abuse             Objective    /74 (BP Location: Left arm, Patient Position: Sitting, Cuff Size: Adult Regular)   Pulse 72   Temp 98.3  F (36.8  C) (Oral)   Wt 70.8 kg (156 lb)   SpO2 98%   BMI 22.38 kg/m    Body mass index is 22.38 kg/m .  Physical Exam  Constitutional:       Appearance: He is not ill-appearing.   Cardiovascular:      Rate and Rhythm: Normal rate and regular rhythm.   Pulmonary:      Comments: Decreased air entry into all fields with prolonged expiratory wheezing phase.  There are no focal rhonchi or rails.  Chest wall movement is symmetric.  There is no labored breathing or accessory muscle use.  Neurological:      Mental Status: He is alert.               Assessment & Plan     1. COPD exacerbation (H)  Acute problem.  Recently completed Z-Sylvester.  He has no symptoms to further necessitated use of antibiotics at this time.  Recommend starting prednisone x5 days to help decrease symptom duration and start a mucolytic to help him expel sputum.  Advised smoking cessation.  - predniSONE (DELTASONE) 50 MG tablet; Take 1 tablet (50 mg) by mouth daily  Dispense: 5 tablet; Refill: 0  - guaiFENesin (ORGANIDIN) 200 MG tablet; Take 1 tablet (200 mg) by mouth every 4 hours as needed for cough  Dispense: 30 tablet; Refill: 0       Return in about 1 week (around 3/21/2020) for If symptoms do not improve or gets worse..    David Hubbard MD  [facility]    Documentation was prepared using Dragon voice recognition software. Please excuse typographical errors. Please contact me if documentation is unclear.

## 2020-05-02 DIAGNOSIS — E78.5 DYSLIPIDEMIA: ICD-10-CM

## 2020-05-02 PROCEDURE — 80061 LIPID PANEL: CPT | Performed by: FAMILY MEDICINE

## 2020-05-02 PROCEDURE — 36415 COLL VENOUS BLD VENIPUNCTURE: CPT | Performed by: FAMILY MEDICINE

## 2020-05-03 LAB
CHOLEST SERPL-MCNC: 220 MG/DL
HDLC SERPL-MCNC: 77 MG/DL
LDLC SERPL CALC-MCNC: 110 MG/DL
NONHDLC SERPL-MCNC: 143 MG/DL
TRIGL SERPL-MCNC: 167 MG/DL

## 2020-05-14 ENCOUNTER — OFFICE VISIT (OUTPATIENT)
Dept: URGENT CARE | Facility: URGENT CARE | Age: 70
End: 2020-05-14
Payer: COMMERCIAL

## 2020-05-14 VITALS
DIASTOLIC BLOOD PRESSURE: 79 MMHG | WEIGHT: 156 LBS | HEIGHT: 70 IN | TEMPERATURE: 97 F | HEART RATE: 79 BPM | BODY MASS INDEX: 22.33 KG/M2 | SYSTOLIC BLOOD PRESSURE: 132 MMHG | OXYGEN SATURATION: 97 %

## 2020-05-14 DIAGNOSIS — J44.9 CHRONIC OBSTRUCTIVE PULMONARY DISEASE, UNSPECIFIED COPD TYPE (H): Primary | ICD-10-CM

## 2020-05-14 DIAGNOSIS — R06.00 DYSPNEA, UNSPECIFIED TYPE: ICD-10-CM

## 2020-05-14 PROCEDURE — 99214 OFFICE O/P EST MOD 30 MIN: CPT | Performed by: FAMILY MEDICINE

## 2020-05-14 RX ORDER — PREDNISONE 50 MG/1
50 TABLET ORAL DAILY
Qty: 5 TABLET | Refills: 0 | Status: SHIPPED | OUTPATIENT
Start: 2020-05-14 | End: 2020-05-19

## 2020-05-14 ASSESSMENT — MIFFLIN-ST. JEOR: SCORE: 1468.86

## 2020-05-14 NOTE — PROGRESS NOTES
Subjective:   Robert B Behr is a 70 year old male who presents for   Chief Complaint   Patient presents with     Urgent Care     Cough     c/o cough for 1 day     Patient clarifies that he is not experiencing any cough, feels like he is relying on inhaler more than usual for shortness of breath. Using albuterol  1 puff every 4-6 hours.   Denies fevers. Occasionally feels a little tight in his breathing, wonders if this warrants a dose of prednisone as this has worked in the past. Has to catch his breath at times even at rest. No hx of heart problems. NO new swelling of the lower extremities.     Has not yet been screened for COVID  No known positives  Denies body aches, diarrhea, loss of taste or smell,headaches.    Chronic conditions include: COPD     Patient Active Problem List    Diagnosis Date Noted     Hypopigmentation 2018     Priority: Medium     Hiatal hernia 2017     Priority: Medium     Health Care Home 2016     Priority: Medium     [facility] Case Management  Leeanna Sigala RN  [phone]    [facility] CARE [facility] SUMMARY    Client Name:  Robert B Behr  Address:  [address] [place] [address] Phone: [phone] (home)    :  1950 Date of Assessment: 10/16/2019 Refusal    Health Plan:  [facility]  Health Plan Number: [id number] Medical Assistance Number: [id number]  Financial Worker:    Case #:     FVP :  Leeanna Sigala RN  Phone:  [phone]   Fax:  [phone]   P Enrollment Date: 16 Case Mix: L   Rate Cell:  A  Waiver Type:  None   Emergency Contact:   ALYSHA MOLINA  [place] Phone: [phone]  Relation: Friend  Secondary Emergency Contact: BRIANNA BENTLEY  [employer] Phone: [phone]  Relation: Friend Language:  English  :  No   Health Care Agent/POA:   Advanced Directives/Living Will:     Primary Care Clinic/Phone/Fax:  [facility]/(p) [phone], (f) [phone] Primary Dx:  COPD  "J44.9  Secondary Dx:  Osteoporosis M81.0   Primary Physician:  Fina Frausto   Height:  5'10\"  Weight:  154 lbs    Specialty Physician:    Audiologist:     Eye Care Provider:   Dental Care Provider:    Gutierrez: [facility] [phone] or [phone]   Other:               Lung nodules      Priority: Medium     CT scan 2016       Tobacco use disorder 04/13/2016     Priority: Medium     CARDIOVASCULAR SCREENING; LDL GOAL LESS THAN 160 04/13/2016     Priority: Medium     COPD (chronic obstructive pulmonary disease) (H)      Priority: Medium     diagnosed with spirometry        Osteoporosis      Priority: Medium       Current Outpatient Medications   Medication     B Complex-C (VITAMIN B COMPLEX W/VITAMIN C) TABS tablet     calcium carbonate 600 mg-vitamin D 400 units (CALTRATE) 600-400 MG-UNIT per tablet     fish oil-omega-3 fatty acids 1000 MG capsule     fluticasone-salmeterol (ADVAIR DISKUS) 250-50 MCG/DOSE inhaler     Ginkgo Biloba 60 MG TABS     glucosamine-chondroitinoitin 750-600 MG TABS     glucosamine-chondroitinoitin 750-600 MG TABS     guaiFENesin (ORGANIDIN) 200 MG tablet     ipratropium - albuterol 0.5 mg/2.5 mg/3 mL (DUONEB) 0.5-2.5 (3) MG/3ML neb solution     Ipratropium-Albuterol (COMBIVENT RESPIMAT)  MCG/ACT inhaler     nicotine (NICODERM CQ) 14 MG/24HR 24 hr patch     predniSONE (DELTASONE) 50 MG tablet     predniSONE (DELTASONE) 50 MG tablet     Selenium (SELENIMIN-200) 200 MCG TABS tablet     vitamin A 10,000 units capsule     vitamin B complex with vitamin C (VITAMIN  B COMPLEX) tablet     vitamin C (ASCORBIC ACID) 1000 MG TABS     vitamin E (TOCOPHEROL) 1000 units (450 mg) capsule     albuterol (PROAIR HFA/PROVENTIL HFA/VENTOLIN HFA) 108 (90 Base) MCG/ACT inhaler     azithromycin (ZITHROMAX) 500 MG vial     No current facility-administered medications for this visit.      ROS:  As above per HPI    Objective:   /79   Pulse 79   Temp 97  F (36.1  C) (Oral)   Ht 1.778 " "m (5' 10\")   Wt 70.8 kg (156 lb)   SpO2 97%   BMI 22.38 kg/m  , Body mass index is 22.38 kg/m .  Gen:  NAD, well-nourished, sitting in chair comfortably  HEENT: EOMI, sclera anicteric, Head normocephalic, ; nares patent; moist mucous membranes  Neck: trachea midline, no thyromegaly  CV:  Hemodynamically stable  Pulm:  no increased work of breathing , distant without wheezes/rales/crackles  Extrem: no cyanosis, edema or clubbing  Skin: no obvious rashes or abnormalities  Psych: Euthymic, linear thoughts, normal rate of speech    No results found for any visits on 05/14/20.    Assessment & Plan:   Robert B Behr, 70 year old male who presents with:    Dyspnea, unspecified type  Chronic obstructive pulmonary disease, unspecified COPD type (H)  Patient was advised that prednisone is not recommended especially with his risk factors in this current environment of possible COVID-19 infection, he insists he does not have the infection. HIs O2 saturation is also wnl and displays no obvious wheezing on exam. He insists on proceeding with the 5 day course of prednisone and will take the risk that this could potentially cause worsening of his respiratory symptoms. Patient will be referred for curbside testing given his intermittent shortness of breath. On exam he does not show labored work of breathing or needing to catch his breath. Albuterol PRN.   - predniSONE (DELTASONE) 50 MG tablet  Dispense: 5 tablet; Refill: 0         Diagnostic criteria for COVID-19 testing: COPD, Age > 65, shortness of breath    He was offered the Get Well Loop but declines, no access to a computer    Lance Vega MD   [place] [facility]    The use of Dragon/Skylabs dictation services may have been used to construct the content in this note; any grammatical or spelling errors are non-intentional. Please contact the author of this note directly if you are in need of any clarification.   "

## 2020-05-14 NOTE — PATIENT INSTRUCTIONS
You should be receiving a phone call by tomorrow afternoon regarding the COVID-19 testing if you have not heard back please call us back at [phone]      Prednisone 50mg a day for 5 days'      Albuterol 2 puffs every 4-6 hours as needed for shortness of breath    If your symptoms get worse please call us or come in right away to be re-evaluated    --      First, stay home and away from others (self-isolate) until:    You've had no fever--and no medicine that reduces fever--for 3 full days (72 hours). And   Your other symptoms have gotten better. For example, your cough or breathing has improved. And   At least 7 days have passed since your symptoms started.    During this time:  Don't go to work, school or anywhere else.  Stay away from others in your home. No hugging, kissing or shaking hands.    Don't let anyone visit.    Cover your mouth and nose with a mask, tissue or wash cloth to avoid spreading germs.    Wash your hands and face often. Use soap and water.

## 2020-05-27 DIAGNOSIS — J42 CHRONIC BRONCHITIS, UNSPECIFIED CHRONIC BRONCHITIS TYPE (H): ICD-10-CM

## 2020-05-27 NOTE — TELEPHONE ENCOUNTER
Reason for Call:  Medication or medication refill:    Do you use a [facility]?  Name of the pharmacy and phone number for the current request:  [facility] on [address] - [phone]    Name of the medication requested: fluticasone-salmeterol (ADVAIR DISKUS) 250-50 MCG/DOSE inhaler    Other request: Patient came in clinic states he is complete out of inhaler and needs it refilled ASAP    Can we leave a detailed message on this number? YES    Phone number patient can be reached at: Cell number on file:    Telephone Information:   Mobile [phone]       Best Time: ASAP    Call taken on 5/27/2020 at 11:51 AM by JULIO De Luna  [place]   Patient Rep

## 2020-05-28 ENCOUNTER — VIRTUAL VISIT (OUTPATIENT)
Dept: FAMILY MEDICINE | Facility: CLINIC | Age: 70
End: 2020-05-28
Payer: COMMERCIAL

## 2020-05-28 DIAGNOSIS — Z87.891 PERSONAL HISTORY OF NICOTINE DEPENDENCE: Primary | ICD-10-CM

## 2020-05-28 DIAGNOSIS — J44.9 CHRONIC OBSTRUCTIVE PULMONARY DISEASE, UNSPECIFIED COPD TYPE (H): ICD-10-CM

## 2020-05-28 PROCEDURE — 99213 OFFICE O/P EST LOW 20 MIN: CPT | Mod: 95 | Performed by: PHYSICIAN ASSISTANT

## 2020-05-28 NOTE — PROGRESS NOTES
"Robert B Behr is a 70 year old male who is being evaluated via a billable telephone visit.      The patient has been notified of following:     \"This telephone visit will be conducted via a call between you and your physician/provider. We have found that certain health care needs can be provided without the need for a physical exam.  This service lets us provide the care you need with a short phone conversation.  If a prescription is necessary we can send it directly to your pharmacy.  If lab work is needed we can place an order for that and you can then stop by our lab to have the test done at a later time.    Telephone visits are billed at different rates depending on your insurance coverage. During this emergency period, for some insurers they may be billed the same as an in-person visit.  Please reach out to your insurance provider with any questions.    If during the course of the call the physician/provider feels a telephone visit is not appropriate, you will not be charged for this service.\"    Patient has given verbal consent for Telephone visit?  Yes    What phone number would you like to be contacted at? [phone] (M)    How would you like to obtain your AVS? Traceyhart    Subjective     Robert B Behr is a 70 year old male who presents via phone visit today for the following health issues:    HPI     Requesting for CT Chest Lung Cancer Scrn Low Dose wo - per HM reminders.  Patient got letter from [facility] to schedule appointment.    No acute symptoms or changes in baseline at this time.  Patient seen in  5/14/20 and given oral prednisone with overall improvement in symptoms.    Patient to call radiology to make appointment: [phone]      Patient Active Problem List   Diagnosis     Osteoporosis     COPD (chronic obstructive pulmonary disease) (H)     Tobacco use disorder     CARDIOVASCULAR SCREENING; LDL GOAL LESS THAN 160     Lung nodules     Health Care Home     Hiatal hernia     Hypopigmentation    "  Past Surgical History:   Procedure Laterality Date     CATARACT IOL, RT/LT Left 09/15/2017    s/p CE/IOL left eye     CATARACT IOL, RT/LT Right      PHACOEMULSIFICATION CLEAR CORNEA WITH STANDARD INTRAOCULAR LENS IMPLANT Right 9/30/2016    Procedure: PHACOEMULSIFICATION CLEAR CORNEA WITH STANDARD INTRAOCULAR LENS IMPLANT;  Surgeon: Elly Valencia MD;  Location: [facility]     PHACOEMULSIFICATION WITH STANDARD INTRAOCULAR LENS IMPLANT Left 9/15/2017    Procedure: PHACOEMULSIFICATION WITH STANDARD INTRAOCULAR LENS IMPLANT;  Left Eye Phacoemulsification with Standard Lens;  Surgeon: Elly Valencia MD;  Location: [facility] OR     WRIST SURGERY         Social History     Tobacco Use     Smoking status: Current Some Day Smoker     Packs/day: 0.20     Years: 45.00     Pack years: 9.00     Types: Cigarettes     Smokeless tobacco: Former User     Tobacco comment: 5 cigs per day   Substance Use Topics     Alcohol use: Yes     Alcohol/week: 0.0 standard drinks     Comment: occ beer     Family History   Problem Relation Age of Onset     Diabetes Brother         living     Cancer Brother         throat     Macular Degeneration Mother      Colon Cancer No family hx of      Glaucoma No family hx of      Retinal detachment No family hx of      Amblyopia No family hx of      Skin Cancer No family hx of      Melanoma No family hx of          Current Outpatient Medications   Medication Sig Dispense Refill     B Complex-C (VITAMIN B COMPLEX W/VITAMIN C) TABS tablet TAKE 1 TABLET BY MOUTH TWICE DAILY WITH FOLIC ACID 180 tablet 11     calcium carbonate 600 mg-vitamin D 400 units (CALTRATE) 600-400 MG-UNIT per tablet Take 1 tablet by mouth 2 times daily 180 tablet 3     fish oil-omega-3 fatty acids 1000 MG capsule Take 1 capsule (1 g) by mouth daily 90 capsule 3     fluticasone-salmeterol (ADVAIR DISKUS) 250-50 MCG/DOSE inhaler INHALE 1 PUFF INTO THE LUNGS TWICE DAILY 3 Inhaler 0     Ginkgo Biloba 60 MG TABS TAKE 1 TABLET BY MOUTH EVERY DAY.  90 tablet 3     glucosamine-chondroitinoitin 750-600 MG TABS TAKE 2 TABLETS BY MOUTH TWICE DAILY 120 tablet 5     glucosamine-chondroitinoitin 750-600 MG TABS TAKE 2 TABLETS BY MOUTH TWICE DAILY 120 tablet 3     guaiFENesin (ORGANIDIN) 200 MG tablet Take 1 tablet (200 mg) by mouth every 4 hours as needed for cough 30 tablet 0     ipratropium - albuterol 0.5 mg/2.5 mg/3 mL (DUONEB) 0.5-2.5 (3) MG/3ML neb solution INHALE 1 VIAL VIA NEBULIZER EVERY 6 HOURS AS NEEDED FOR SHORTNESS OF BREATH/ DYSPNEA OR WHEEZING 180 vial 0     Ipratropium-Albuterol (COMBIVENT RESPIMAT)  MCG/ACT inhaler INHALE 1 PUFF FOUR TIMES DAILY- DO NOT EXCEED 6 DOSES PER DAY 4 g 6     nicotine (NICODERM CQ) 14 MG/24HR 24 hr patch Place 1 patch onto the skin every 24 hours 28 patch 5     predniSONE (DELTASONE) 50 MG tablet Take 1 tablet (50 mg) by mouth daily 5 tablet 0     Selenium (SELENIMIN-200) 200 MCG TABS tablet Take 1 tablet (200 mcg) by mouth daily 90 tablet 3     vitamin A 10,000 units capsule Take 1 capsule (10,000 Units) by mouth daily 90 capsule 3     vitamin B complex with vitamin C (VITAMIN  B COMPLEX) tablet Take 1 tablet by mouth 2 times daily With Folic acid 180 tablet 3     vitamin C (ASCORBIC ACID) 1000 MG TABS Take 1 tablet (1,000 mg) by mouth daily 90 tablet 3     vitamin E (TOCOPHEROL) 1000 units (450 mg) capsule Take 1 capsule (1,000 Units) by mouth daily 90 capsule 3     albuterol (PROAIR HFA/PROVENTIL HFA/VENTOLIN HFA) 108 (90 Base) MCG/ACT inhaler Inhale 1-2 puffs into the lungs every 4 hours as needed for shortness of breath / dyspnea or wheezing (Patient not taking: Reported on 5/14/2020) 1 Inhaler 0     azithromycin (ZITHROMAX) 500 MG vial 1 three times weekly, MW for 1 month (Patient not taking: Reported on 5/14/2020) 12 each 0     No Known Allergies  Recent Labs   Lab Test 05/02/20  1324 05/14/19  1457 05/14/18  1502 03/09/18  1411  06/10/17  0054 02/24/16  1132   * 157*  --  128*   < >  --  123*   HDL 77  76  --  70   < >  --  81   TRIG 167* 189*  --  181*   < >  --  152*   CR  --   --   --   --   --  0.78 0.90   GFRESTIMATED  --   --   --   --   --  >90  Non  GFR Calc   84   GFRESTBLACK  --   --   --   --   --  >90   GFR Calc   >90   GFR Calc     POTASSIUM  --   --   --   --   --  3.4 4.2   TSH  --   --  1.08  --   --   --   --     < > = values in this interval not displayed.      BP Readings from Last 3 Encounters:   05/14/20 132/79   03/14/20 108/74   03/03/20 124/78    Wt Readings from Last 3 Encounters:   05/14/20 70.8 kg (156 lb)   03/14/20 70.8 kg (156 lb)   03/03/20 72.1 kg (159 lb)                    Reviewed and updated as needed this visit by Provider  Tobacco  Allergies  Meds  Problems  Med Hx  Surg Hx  Fam Hx         Review of Systems   Constitutional, HEENT, cardiovascular, pulmonary, GI, , musculoskeletal, neuro, skin, endocrine and psych systems are negative, except as otherwise noted.       Objective   Reported vitals:  There were no vitals taken for this visit.   healthy, alert and no distress  PSYCH: Alert and oriented times 3; coherent speech, normal   rate and volume, able to articulate logical thoughts, able   to abstract reason, no tangential thoughts, no hallucinations   or delusions  His affect is normal  RESP: No cough, no audible wheezing, able to talk in full sentences  Remainder of exam unable to be completed due to telephone visits    Diagnostic Test Results:  Labs reviewed in Epic        Assessment/Plan:  1. Personal history of nicotine dependence   Long standing, chronic - CT scan placed  - CT Chest Lung Cancer Scrn Low Dose wo; Future    2. Chronic obstructive pulmonary disease, unspecified COPD type (H)  Long standing, chronic, controlled-  No acute concerns at this time but due to repeat yearly CT scan  - CT Chest Lung Cancer Scrn Low Dose wo; Future    Return in about 6 months (around 11/28/2020) for Routine visit, or sooner  with worsening symptoms.      Phone call duration:  6 minutes    Taisha Fuller PA-C

## 2020-06-02 DIAGNOSIS — Z71.89 ENCOUNTER FOR HERB AND VITAMIN SUPPLEMENT MANAGEMENT: ICD-10-CM

## 2020-06-04 RX ORDER — MULTIVIT WITH MINERALS/LUTEIN
TABLET ORAL
Qty: 90 TABLET | Refills: 3 | OUTPATIENT
Start: 2020-06-04

## 2020-06-04 NOTE — TELEPHONE ENCOUNTER
vitamin C (ASCORBIC ACID) 1000 MG TABS -     Message sent to pharmacy - Refusal reason: Should already have refills on file (SEE ORDER SENT 8/6/19 FOR 1 YR SUPPLY TO [place] [id number].) .  Gianna OREILLY

## 2020-06-05 DIAGNOSIS — Z71.89 ENCOUNTER FOR HERB AND VITAMIN SUPPLEMENT MANAGEMENT: ICD-10-CM

## 2020-06-05 RX ORDER — MULTIVIT WITH MINERALS/LUTEIN
TABLET ORAL
Qty: 90 TABLET | Refills: 0 | Status: SHIPPED | OUTPATIENT
Start: 2020-06-05 | End: 2020-08-09

## 2020-06-05 NOTE — TELEPHONE ENCOUNTER
Pt walked into clinic.  Problem getting his vit C from his pharmacy.  I sent in the existing refill left on med list.  AFIA Foster

## 2020-06-10 ENCOUNTER — ANCILLARY PROCEDURE (OUTPATIENT)
Dept: CT IMAGING | Facility: CLINIC | Age: 70
End: 2020-06-10
Attending: PHYSICIAN ASSISTANT
Payer: COMMERCIAL

## 2020-06-10 DIAGNOSIS — Z87.891 PERSONAL HISTORY OF NICOTINE DEPENDENCE: ICD-10-CM

## 2020-06-10 DIAGNOSIS — J44.9 CHRONIC OBSTRUCTIVE PULMONARY DISEASE, UNSPECIFIED COPD TYPE (H): ICD-10-CM

## 2020-06-11 NOTE — RESULT ENCOUNTER NOTE
"Jon Alicia  Your attached CT scan is stable and yearly recheck is still recommended.  Please follow up with PCP for any acute changes in your symptoms.    Please contact the office with any questions or concerns.    Taisha Love \"Sg\" BELKIS Fuller    "

## 2020-07-02 ENCOUNTER — PATIENT OUTREACH (OUTPATIENT)
Dept: GERIATRIC MEDICINE | Facility: CLINIC | Age: 70
End: 2020-07-02

## 2020-07-02 NOTE — PROGRESS NOTES
[facility] Care Coordination Contact      [facility] Six-Month Telephone Assessment    6 month telephone assessment completed on July 2, 2020.    ER visits: No  Hospitalizations: No  TCU stays: No  Significant health status changes: None  Falls/Injuries: No  ADL/IADL changes: No  Changes in services: No    Caregiver Assessment follow up:  NA    Goals: See POC in chart for goal progress documentation.      Sergio reports he is doing well. He hasn't been [place] [facility], but he gets out every day and rides his bike a lot. His COPD is an issue. He has a pulmonary appointment later this month to discuss stem cell replacement.    Will offer member an annual health risk assessment  in 6 months. Encouraged member to call CC with any questions or concerns in the meantime.   Leeanna Sigala RN, BSN, PHN  [facility] Care Coordinator  [phone]

## 2020-07-15 ENCOUNTER — OFFICE VISIT (OUTPATIENT)
Dept: URGENT CARE | Facility: URGENT CARE | Age: 70
End: 2020-07-15
Payer: COMMERCIAL

## 2020-07-15 VITALS
BODY MASS INDEX: 22.19 KG/M2 | HEIGHT: 70 IN | WEIGHT: 155 LBS | HEART RATE: 74 BPM | TEMPERATURE: 98.4 F | DIASTOLIC BLOOD PRESSURE: 74 MMHG | RESPIRATION RATE: 14 BRPM | SYSTOLIC BLOOD PRESSURE: 138 MMHG

## 2020-07-15 DIAGNOSIS — J44.1 COPD EXACERBATION (H): Primary | ICD-10-CM

## 2020-07-15 PROCEDURE — 99214 OFFICE O/P EST MOD 30 MIN: CPT | Performed by: INTERNAL MEDICINE

## 2020-07-15 RX ORDER — CEFDINIR 300 MG/1
300 CAPSULE ORAL 2 TIMES DAILY
Qty: 20 CAPSULE | Refills: 0 | Status: SHIPPED | OUTPATIENT
Start: 2020-07-15 | End: 2020-07-25

## 2020-07-15 RX ORDER — PREDNISONE 20 MG/1
20 TABLET ORAL DAILY
Qty: 5 TABLET | Refills: 0 | Status: SHIPPED | OUTPATIENT
Start: 2020-07-15 | End: 2020-07-20

## 2020-07-15 ASSESSMENT — ENCOUNTER SYMPTOMS
HEADACHES: 0
CHILLS: 0
DIAPHORESIS: 0
FEVER: 0
MYALGIAS: 0
RHINORRHEA: 0

## 2020-07-15 ASSESSMENT — MIFFLIN-ST. JEOR: SCORE: 1464.33

## 2020-07-15 NOTE — PROGRESS NOTES
SUBJECTIVE:   Robert B Behr is a 70 year old male presenting with a chief complaint of   Chief Complaint   Patient presents with     Urgent Care     Cough     H/O Bronchitis, flare up recently. This happens once in awhile.        He is an established patient of [facility].    URI Adult    Onset of symptoms was 1 week(s) ago.    Current and Associated symptoms: cough - productive and shortness of breath  Treatment measures tried include Inhaler (name: advair, albuterol).  Predisposing factors include HX of COPD and   No illness contact.    Biking today    Needs prednisone course & antibiotics     Review of Systems   Constitutional: Negative for chills, diaphoresis and fever.   HENT: Negative for ear pain and rhinorrhea.    Musculoskeletal: Negative for myalgias.   Skin: Negative for rash.   Neurological: Negative for headaches.       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     Tobacco abuse      Family History   Problem Relation Age of Onset     Diabetes Brother         living     Cancer Brother         throat     Macular Degeneration Mother      Colon Cancer No family hx of      Glaucoma No family hx of      Retinal detachment No family hx of      Amblyopia No family hx of      Skin Cancer No family hx of      Melanoma No family hx of      Current Outpatient Medications   Medication Sig Dispense Refill     albuterol (PROAIR HFA/PROVENTIL HFA/VENTOLIN HFA) 108 (90 Base) MCG/ACT inhaler Inhale 1-2 puffs into the lungs every 4 hours as needed for shortness of breath / dyspnea or wheezing 1 Inhaler 0     B Complex-C (VITAMIN B COMPLEX W/VITAMIN C) TABS tablet TAKE 1 TABLET BY MOUTH TWICE DAILY WITH FOLIC ACID 180 tablet 11     calcium carbonate 600 mg-vitamin D 400 units (CALTRATE) 600-400 MG-UNIT per tablet Take 1 tablet by mouth 2 times daily  180 tablet 3     cefdinir (OMNICEF) 300 MG capsule Take 1 capsule (300 mg) by mouth 2 times daily for 10 days 20 capsule 0     fish oil-omega-3 fatty acids 1000 MG capsule Take 1 capsule (1 g) by mouth daily 90 capsule 3     fluticasone-salmeterol (ADVAIR DISKUS) 250-50 MCG/DOSE inhaler INHALE 1 PUFF INTO THE LUNGS TWICE DAILY 3 Inhaler 0     Ginkgo Biloba 60 MG TABS TAKE 1 TABLET BY MOUTH EVERY DAY. 90 tablet 3     glucosamine-chondroitinoitin 750-600 MG TABS TAKE 2 TABLETS BY MOUTH TWICE DAILY 120 tablet 5     Ipratropium-Albuterol (COMBIVENT RESPIMAT)  MCG/ACT inhaler INHALE 1 PUFF FOUR TIMES DAILY- DO NOT EXCEED 6 DOSES PER DAY 4 g 6     predniSONE (DELTASONE) 20 MG tablet Take 1 tablet (20 mg) by mouth daily for 5 days 5 tablet 0     Selenium (SELENIMIN-200) 200 MCG TABS tablet Take 1 tablet (200 mcg) by mouth daily 90 tablet 3     vitamin A 10,000 units capsule Take 1 capsule (10,000 Units) by mouth daily 90 capsule 3     vitamin B complex with vitamin C (VITAMIN  B COMPLEX) tablet Take 1 tablet by mouth 2 times daily With Folic acid 180 tablet 3     vitamin C (ASCORBIC ACID) 1000 MG TABS TAKE 1 TABLET(1000 MG) BY MOUTH DAILY 90 tablet 0     vitamin E (TOCOPHEROL) 1000 units (450 mg) capsule Take 1 capsule (1,000 Units) by mouth daily 90 capsule 3     azithromycin (ZITHROMAX) 500 MG vial 1 three times weekly, MWF for 1 month (Patient not taking: Reported on 5/14/2020) 12 each 0     glucosamine-chondroitinoitin 750-600 MG TABS TAKE 2 TABLETS BY MOUTH TWICE DAILY 120 tablet 3     guaiFENesin (ORGANIDIN) 200 MG tablet Take 1 tablet (200 mg) by mouth every 4 hours as needed for cough 30 tablet 0     ipratropium - albuterol 0.5 mg/2.5 mg/3 mL (DUONEB) 0.5-2.5 (3) MG/3ML neb solution INHALE 1 VIAL VIA NEBULIZER EVERY 6 HOURS AS NEEDED FOR SHORTNESS OF BREATH/ DYSPNEA OR WHEEZING 180 vial 0     nicotine (NICODERM CQ) 14 MG/24HR 24 hr patch Place 1 patch onto the skin every 24 hours 28 patch 5     predniSONE  "(DELTASONE) 50 MG tablet Take 1 tablet (50 mg) by mouth daily (Patient not taking: Reported on 7/15/2020) 5 tablet 0     Social History     Tobacco Use     Smoking status: Current Some Day Smoker     Packs/day: 0.20     Years: 45.00     Pack years: 9.00     Types: Cigarettes     Smokeless tobacco: Former User     Tobacco comment: 5 cigs per day   Substance Use Topics     Alcohol use: Yes     Alcohol/week: 0.0 standard drinks     Comment: occ beer       OBJECTIVE  /74   Pulse 74   Temp 98.4  F (36.9  C) (Oral)   Resp 14   Ht 1.778 m (5' 10\")   Wt 70.3 kg (155 lb)   BMI 22.24 kg/m      Physical Exam  Vitals signs reviewed.   Constitutional:       Appearance: Normal appearance.   HENT:      Right Ear: Tympanic membrane normal.      Left Ear: Tympanic membrane normal.      Mouth/Throat:      Mouth: Mucous membranes are moist.   Cardiovascular:      Rate and Rhythm: Normal rate and regular rhythm.      Pulses: Normal pulses.      Heart sounds: Normal heart sounds.   Pulmonary:      Effort: Pulmonary effort is normal.      Comments: decreased air sounds, faint wheeze  Neurological:      General: No focal deficit present.      Mental Status: He is alert.   Psychiatric:         Mood and Affect: Mood normal.         Labs:  No results found for this or any previous visit (from the past 24 hour(s)).    X-Ray was not done.    ASSESSMENT:      ICD-10-CM    1. COPD exacerbation (H)  J44.1 predniSONE (DELTASONE) 20 MG tablet     cefdinir (OMNICEF) 300 MG capsule        Medical Decision Making:  Declines covid testing    Serious Comorbid Conditions:  Adult:  COPD    PLAN:  Prefers 10 .day antibiotics   5 day prednisone burst  Continue inhalers      Followup:    If not improving or if condition worsens, follow up with your Primary Care Provider          "

## 2020-07-21 ENCOUNTER — OFFICE VISIT (OUTPATIENT)
Dept: PULMONOLOGY | Facility: CLINIC | Age: 70
End: 2020-07-21
Attending: INTERNAL MEDICINE
Payer: COMMERCIAL

## 2020-07-21 VITALS
SYSTOLIC BLOOD PRESSURE: 135 MMHG | BODY MASS INDEX: 22.9 KG/M2 | TEMPERATURE: 98.3 F | HEART RATE: 78 BPM | OXYGEN SATURATION: 95 % | HEIGHT: 70 IN | DIASTOLIC BLOOD PRESSURE: 81 MMHG | WEIGHT: 160 LBS

## 2020-07-21 DIAGNOSIS — J44.1 CHRONIC OBSTRUCTIVE PULMONARY DISEASE WITH ACUTE EXACERBATION (H): Primary | ICD-10-CM

## 2020-07-21 PROCEDURE — G0463 HOSPITAL OUTPT CLINIC VISIT: HCPCS | Mod: ZF

## 2020-07-21 RX ORDER — AZITHROMYCIN 500 MG/1
TABLET, FILM COATED ORAL
COMMUNITY
Start: 2020-02-07 | End: 2021-07-20

## 2020-07-21 ASSESSMENT — MIFFLIN-ST. JEOR: SCORE: 1487.01

## 2020-07-21 NOTE — NURSING NOTE
Chief Complaint   Patient presents with     Follow Up     chronic obstructive pulmonary disease     Vitals were taken and medications were reconciled.     JOSE Mendoza

## 2020-07-21 NOTE — PATIENT INSTRUCTIONS
I don't think there is enough scientific evidence to suggest that stem cell  therapy is beneficial for the lungs.     I recommend increase Advair dose  Replace rescue inhaler (Combivent) with Spiriva, it's a once daily inhaler.

## 2020-07-21 NOTE — PROGRESS NOTES
Reason for Visit  Robert B Behr is a 67 year old male who is followed for COPD  Pulmonary HPI  Since our last visit just under 3 years ago, he has been seen at least 6 times in the last year for acute exacerbation of COPD. He states that he has been doing well, getting to the [facility]. He is interested in stem cell therapy for COPD. He had been swimming a lot at the [facility], going into the steam room. He wonders if this was contributing to frequent infections. He is cutting back on smoking, rolls his own, down to about 5 cig/day. He is using rescue inhaler more lately, four times a day. He bikes in the city, in traffic. He bikes 4 miles to the [facility], swims there, and bikes home. He experiences an increase in mucus that is clear, chest tightness when he has an exacerbation.     The patient was seen and examined by Svitlana Taylor MD   Current Outpatient Medications   Medication     albuterol (PROAIR HFA/PROVENTIL HFA/VENTOLIN HFA) 108 (90 Base) MCG/ACT inhaler     azithromycin (ZITHROMAX) 500 MG vial     B Complex-C (VITAMIN B COMPLEX W/VITAMIN C) TABS tablet     calcium carbonate 600 mg-vitamin D 400 units (CALTRATE) 600-400 MG-UNIT per tablet     cefdinir (OMNICEF) 300 MG capsule     fish oil-omega-3 fatty acids 1000 MG capsule     fluticasone-salmeterol (ADVAIR DISKUS) 250-50 MCG/DOSE inhaler     Ginkgo Biloba 60 MG TABS     glucosamine-chondroitinoitin 750-600 MG TABS     glucosamine-chondroitinoitin 750-600 MG TABS     guaiFENesin (ORGANIDIN) 200 MG tablet     ipratropium - albuterol 0.5 mg/2.5 mg/3 mL (DUONEB) 0.5-2.5 (3) MG/3ML neb solution     Ipratropium-Albuterol (COMBIVENT RESPIMAT)  MCG/ACT inhaler     nicotine (NICODERM CQ) 14 MG/24HR 24 hr patch     predniSONE (DELTASONE) 50 MG tablet     Selenium (SELENIMIN-200) 200 MCG TABS tablet     vitamin A 10,000 units capsule     vitamin B complex with vitamin C (VITAMIN  B COMPLEX) tablet     vitamin C (ASCORBIC ACID) 1000 MG TABS     vitamin E (TOCOPHEROL)  1000 units (450 mg) capsule     No current facility-administered medications for this visit.      No Known Allergies  Social History     Socioeconomic History     Marital status: Single     Spouse name: Not on file     Number of children: Not on file     Years of education: Not on file     Highest education level: Not on file   Occupational History     Not on file   Social Needs     Financial resource strain: Not on file     Food insecurity     Worry: Not on file     Inability: Not on file     Transportation needs     Medical: Not on file     Non-medical: Not on file   Tobacco Use     Smoking status: Current Some Day Smoker     Packs/day: 2.00     Years: 45.00     Pack years: 90.00     Types: Cigarettes     Smokeless tobacco: Former User     Tobacco comment: 5 cigs per day   Substance and Sexual Activity     Alcohol use: Yes     Alcohol/week: 0.0 standard drinks     Comment: occ beer     Drug use: No     Sexual activity: Not Currently   Lifestyle     Physical activity     Days per week: Not on file     Minutes per session: Not on file     Stress: Not on file   Relationships     Social connections     Talks on phone: Not on file     Gets together: Not on file     Attends [employer]ism service: Not on file     Active member of club or organization: Not on file     Attends meetings of clubs or organizations: Not on file     Relationship status: Not on file     Intimate partner violence     Fear of current or ex partner: Not on file     Emotionally abused: Not on file     Physically abused: Not on file     Forced sexual activity: Not on file   Other Topics Concern     Parent/sibling w/ CABG, MI or angioplasty before 65F 55M? No   Social History Narrative    Single.  Retired.     No children    5 siblings:      No family history of bleeding, clotting disorders or complications with anesthesia.     Past Medical History:   Diagnosis Date     Cataract      COPD (chronic obstructive pulmonary disease) (H)     diagnosed with  "spirometry      Lung nodules     CT scan 2016     Osteoporosis      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     Tobacco abuse      Past Surgical History:   Procedure Laterality Date     CATARACT IOL, RT/LT Left 09/15/2017    s/p CE/IOL left eye     CATARACT IOL, RT/LT Right      PHACOEMULSIFICATION CLEAR CORNEA WITH STANDARD INTRAOCULAR LENS IMPLANT Right 9/30/2016    Procedure: PHACOEMULSIFICATION CLEAR CORNEA WITH STANDARD INTRAOCULAR LENS IMPLANT;  Surgeon: Elly Valencia MD;  Location:  [facility]     PHACOEMULSIFICATION WITH STANDARD INTRAOCULAR LENS IMPLANT Left 9/15/2017    Procedure: PHACOEMULSIFICATION WITH STANDARD INTRAOCULAR LENS IMPLANT;  Left Eye Phacoemulsification with Standard Lens;  Surgeon: Elly Valencia MD;  [facility]: [facility]     WRIST SURGERY       Family History   Problem Relation Age of Onset     Diabetes Brother         living     Cancer Brother         throat     Macular Degeneration Mother      Colon Cancer No family hx of      Glaucoma No family hx of      Retinal detachment No family hx of      Amblyopia No family hx of      Skin Cancer No family hx of      Melanoma No family hx of        ROS Pulmonary  Dyspnea: No, Cough: No, Chest pain: No, Wheezing: No, Sputum Production: No, Hemoptysis: No  A complete ROS was otherwise negative except as noted in the HPI.  /81   Pulse 78   Temp 98.3  F (36.8  C) (Oral)   Ht 1.778 m (5' 10\")   Wt 72.6 kg (160 lb)   SpO2 95%   BMI 22.96 kg/m    Exam:   GENERAL APPEARANCE: Well developed, well nourished, alert, and in no apparent distress.  EYES: PERRL, EOMI  HENT: Nasal mucosa with no edema and no hyperemia. No nasal polyps.  EARS: Canals clear, TMs normal  MOUTH: Oral mucosa is moist, without any lesions, no tonsillar enlargement, no oropharyngeal exudate.  NECK: supple, no masses, no thyromegaly.  LYMPHATICS: No significant axillary, cervical, or supraclavicular nodes.  RESP: normal percussion, reduced air flow throughout.  No crackles. No rhonchi. No " wheezes.  CV: Normal S1, S2, regular rhythm, normal rate. No murmur.  No rub. No gallop. No LE edema.   ABDOMEN:  Bowel sounds normal, soft, nontender, no HSM or masses.   MS: extremities normal. No clubbing. No cyanosis.  SKIN: no rash on limited exam  NEURO: Mentation intact, speech normal, normal strength and tone, normal gait and stance  PSYCH: mentation appears normal. and affect normal/bright  Results:  PFTs 8/2017 mild obstruction, normal lung volumes and diffusion -spirometry in March 2020 at [facility] showed an FEV1 of 58% of predicted and normal diffusion capacity  Chest imaging reviewed: CXR today stable    Assessment and plan: Ravin is a 67-year-old male with COPD being seen today for consultation.  COPD-he appears to have had a decline in his clinical status for unclear reasons although he does have ongoing exposure to smoke and air pollution, his diffusion capacity is normal.  It looks like at [facility] earlier this year his spirometry had significantly declined from August 2017 he has phenotype of chronic bronchitis, which currently poorly controlled with approximately 6 exacerbations requiring treatment in the last year.  Some of these may have been mild, with what he describes as clear sputum and chest tightness.  -Continue Advair b.i.d. increased dose to high dose inhaled corticosteroid  -Stop Combivent and start Spiriva daily  -He declined alpha one antitrypsin deficiency testing today  Nicotine dependence-we discussed the importance of quitting smoking  Today the main purpose of his visit was to discuss stem cell therapies for lung disease, so we talked about the lack of proven efficacy for stem cell therapy and I provided him with a patient information sheet from the American Journal of respiratory and critical care medicine in 2017.  Return to clinic as needed (at his request no follow-up is scheduled now)

## 2020-07-21 NOTE — LETTER
7/21/2020         RE: Robert B Behr  [address]  [place] [address]        Dear Colleague,    Thank you for referring your patient, Robert B Behr, to the [facility]. Please see a copy of my visit note below.    Reason for Visit  Robert B Behr is a 67 year old male who is followed for COPD  Pulmonary HPI  Since our last visit just under 3 years ago, he has been seen at least 6 times in the last year for acute exacerbation of COPD. He states that he has been doing well, getting to the [facility]. He is interested in stem cell therapy for COPD. He had been swimming a lot at the [facility], going into the steam room. He wonders if this was contributing to frequent infections. He is cutting back on smoking, rolls his own, down to about 5 cig/day. He is using rescue inhaler more lately, four times a day. He bikes in the city, in traffic. He bikes 4 miles to the [facility], swims there, and bikes home. He experiences an increase in mucus that is clear, chest tightness when he has an exacerbation.     The patient was seen and examined by Svitlana Taylor MD   Current Outpatient Medications   Medication     albuterol (PROAIR HFA/PROVENTIL HFA/VENTOLIN HFA) 108 (90 Base) MCG/ACT inhaler     azithromycin (ZITHROMAX) 500 MG vial     B Complex-C (VITAMIN B COMPLEX W/VITAMIN C) TABS tablet     calcium carbonate 600 mg-vitamin D 400 units (CALTRATE) 600-400 MG-UNIT per tablet     cefdinir (OMNICEF) 300 MG capsule     fish oil-omega-3 fatty acids 1000 MG capsule     fluticasone-salmeterol (ADVAIR DISKUS) 250-50 MCG/DOSE inhaler     Ginkgo Biloba 60 MG TABS     glucosamine-chondroitinoitin 750-600 MG TABS     glucosamine-chondroitinoitin 750-600 MG TABS     guaiFENesin (ORGANIDIN) 200 MG tablet     ipratropium - albuterol 0.5 mg/2.5 mg/3 mL (DUONEB) 0.5-2.5 (3) MG/3ML neb solution     Ipratropium-Albuterol (COMBIVENT RESPIMAT)  MCG/ACT inhaler     nicotine (NICODERM CQ) 14 MG/24HR 24 hr  patch     predniSONE (DELTASONE) 50 MG tablet     Selenium (SELENIMIN-200) 200 MCG TABS tablet     vitamin A 10,000 units capsule     vitamin B complex with vitamin C (VITAMIN  B COMPLEX) tablet     vitamin C (ASCORBIC ACID) 1000 MG TABS     vitamin E (TOCOPHEROL) 1000 units (450 mg) capsule     No current facility-administered medications for this visit.      No Known Allergies  Social History     Socioeconomic History     Marital status: Single     Spouse name: Not on file     Number of children: Not on file     Years of education: Not on file     Highest education level: Not on file   Occupational History     Not on file   Social Needs     Financial resource strain: Not on file     Food insecurity     Worry: Not on file     Inability: Not on file     Transportation needs     Medical: Not on file     Non-medical: Not on file   Tobacco Use     Smoking status: Current Some Day Smoker     Packs/day: 2.00     Years: 45.00     Pack years: 90.00     Types: Cigarettes     Smokeless tobacco: Former User     Tobacco comment: 5 cigs per day   Substance and Sexual Activity     Alcohol use: Yes     Alcohol/week: 0.0 standard drinks     Comment: occ beer     Drug use: No     Sexual activity: Not Currently   Lifestyle     Physical activity     Days per week: Not on file     Minutes per session: Not on file     Stress: Not on file   Relationships     Social connections     Talks on phone: Not on file     Gets together: Not on file     Attends [employer] service: Not on file     Active member of club or organization: Not on file     Attends meetings of clubs or organizations: Not on file     Relationship status: Not on file     Intimate partner violence     Fear of current or ex partner: Not on file     Emotionally abused: Not on file     Physically abused: Not on file     Forced sexual activity: Not on file   Other Topics Concern     Parent/sibling w/ CABG, MI or angioplasty before 65F 55M? No   Social History Narrative     "Single.  Retired.     No children    5 siblings:      No family history of bleeding, clotting disorders or complications with anesthesia.     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     Tobacco abuse      Past Surgical History:   Procedure Laterality Date     CATARACT IOL, RT/LT Left 09/15/2017    s/p CE/IOL left eye     CATARACT IOL, RT/LT Right      PHACOEMULSIFICATION CLEAR CORNEA WITH STANDARD INTRAOCULAR LENS IMPLANT Right 9/30/2016    Procedure: PHACOEMULSIFICATION CLEAR CORNEA WITH STANDARD INTRAOCULAR LENS IMPLANT;  Surgeon: Elly Valencia MD;  Location:  [facility]     PHACOEMULSIFICATION WITH STANDARD INTRAOCULAR LENS IMPLANT Left 9/15/2017    Procedure: PHACOEMULSIFICATION WITH STANDARD INTRAOCULAR LENS IMPLANT;  Left Eye Phacoemulsification with Standard Lens;  Surgeon: Elly Valencia MD;  [facility]: [facility]     WRIST SURGERY       Family History   Problem Relation Age of Onset     Diabetes Brother         living     Cancer Brother         throat     Macular Degeneration Mother      Colon Cancer No family hx of      Glaucoma No family hx of      Retinal detachment No family hx of      Amblyopia No family hx of      Skin Cancer No family hx of      Melanoma No family hx of        ROS Pulmonary  Dyspnea: No, Cough: No, Chest pain: No, Wheezing: No, Sputum Production: No, Hemoptysis: No  A complete ROS was otherwise negative except as noted in the HPI.  /81   Pulse 78   Temp 98.3  F (36.8  C) (Oral)   Ht 1.778 m (5' 10\")   Wt 72.6 kg (160 lb)   SpO2 95%   BMI 22.96 kg/m    Exam:   GENERAL APPEARANCE: Well developed, well nourished, alert, and in no apparent distress.  EYES: PERRL, EOMI  HENT: Nasal mucosa with no edema and no hyperemia. No nasal polyps.  EARS: Canals clear, TMs normal  MOUTH: Oral mucosa is moist, " without any lesions, no tonsillar enlargement, no oropharyngeal exudate.  NECK: supple, no masses, no thyromegaly.  LYMPHATICS: No significant axillary, cervical, or supraclavicular nodes.  RESP: normal percussion, reduced air flow throughout.  No crackles. No rhonchi. No wheezes.  CV: Normal S1, S2, regular rhythm, normal rate. No murmur.  No rub. No gallop. No LE edema.   ABDOMEN:  Bowel sounds normal, soft, nontender, no HSM or masses.   MS: extremities normal. No clubbing. No cyanosis.  SKIN: no rash on limited exam  NEURO: Mentation intact, speech normal, normal strength and tone, normal gait and stance  PSYCH: mentation appears normal. and affect normal/bright  Results:  PFTs 8/2017 mild obstruction, normal lung volumes and diffusion -spirometry in March 2020 at [facility] showed an FEV1 of 58% of predicted and normal diffusion capacity  Chest imaging reviewed: CXR today stable    Assessment and plan: Ravin is a 67-year-old male with COPD being seen today for consultation.  COPD-he appears to have had a decline in his clinical status for unclear reasons although he does have ongoing exposure to smoke and air pollution, his diffusion capacity is normal.  It looks like at [facility] earlier this year his spirometry had significantly declined from August 2017 he has phenotype of chronic bronchitis, which currently poorly controlled with approximately 6 exacerbations requiring treatment in the last year.  Some of these may have been mild, with what he describes as clear sputum and chest tightness.  -Continue Advair b.i.d. increased dose to high dose inhaled corticosteroid  -Stop Combivent and start Spiriva daily  -He declined alpha one antitrypsin deficiency testing today  Nicotine dependence-we discussed the importance of quitting smoking  Today the main purpose of his visit was to discuss stem cell therapies for lung disease, so we talked about the lack of proven efficacy for stem cell therapy and I  provided him with a patient information sheet from the American Journal of respiratory and critical care medicine in 2017.  Return to clinic as needed (at his request no follow-up is scheduled now)              Again, thank you for allowing me to participate in the care of your patient.        Sincerely,        Svitlana Taylor MD

## 2020-08-09 ENCOUNTER — OFFICE VISIT (OUTPATIENT)
Dept: URGENT CARE | Facility: URGENT CARE | Age: 70
End: 2020-08-09
Payer: COMMERCIAL

## 2020-08-09 VITALS
HEART RATE: 54 BPM | BODY MASS INDEX: 22.19 KG/M2 | OXYGEN SATURATION: 95 % | DIASTOLIC BLOOD PRESSURE: 77 MMHG | WEIGHT: 155 LBS | TEMPERATURE: 98.5 F | SYSTOLIC BLOOD PRESSURE: 114 MMHG | HEIGHT: 70 IN

## 2020-08-09 DIAGNOSIS — J44.1 CHRONIC OBSTRUCTIVE PULMONARY DISEASE WITH ACUTE EXACERBATION (H): ICD-10-CM

## 2020-08-09 DIAGNOSIS — Z71.89 ENCOUNTER FOR HERB AND VITAMIN SUPPLEMENT MANAGEMENT: ICD-10-CM

## 2020-08-09 DIAGNOSIS — M81.0 AGE-RELATED OSTEOPOROSIS WITHOUT CURRENT PATHOLOGICAL FRACTURE: ICD-10-CM

## 2020-08-09 DIAGNOSIS — J43.9 PULMONARY EMPHYSEMA, UNSPECIFIED EMPHYSEMA TYPE (H): ICD-10-CM

## 2020-08-09 DIAGNOSIS — Z76.0 ENCOUNTER FOR MEDICATION REFILL: ICD-10-CM

## 2020-08-09 DIAGNOSIS — L80 VITILIGO: ICD-10-CM

## 2020-08-09 DIAGNOSIS — J44.1 COPD EXACERBATION (H): ICD-10-CM

## 2020-08-09 DIAGNOSIS — J44.0 CHRONIC OBSTRUCTIVE PULMONARY DISEASE WITH ACUTE LOWER RESPIRATORY INFECTION (H): ICD-10-CM

## 2020-08-09 DIAGNOSIS — M16.11 OSTEOARTHRITIS OF RIGHT HIP, UNSPECIFIED OSTEOARTHRITIS TYPE: ICD-10-CM

## 2020-08-09 DIAGNOSIS — R53.83 FATIGUE, UNSPECIFIED TYPE: Primary | ICD-10-CM

## 2020-08-09 PROCEDURE — 99214 OFFICE O/P EST MOD 30 MIN: CPT | Performed by: PHYSICIAN ASSISTANT

## 2020-08-09 RX ORDER — CHLORAL HYDRATE 500 MG
1 CAPSULE ORAL DAILY
Qty: 90 CAPSULE | Refills: 6 | Status: SHIPPED | OUTPATIENT
Start: 2020-08-09 | End: 2021-02-22

## 2020-08-09 RX ORDER — MULTIVIT WITH MINERALS/LUTEIN
TABLET ORAL
Qty: 90 TABLET | Refills: 6 | Status: SHIPPED | OUTPATIENT
Start: 2020-08-09 | End: 2020-10-09

## 2020-08-09 RX ORDER — MULTIVIT WITH MINERALS/LUTEIN
1000 TABLET ORAL DAILY
Qty: 90 CAPSULE | Refills: 6 | Status: SHIPPED | OUTPATIENT
Start: 2020-08-09 | End: 2021-08-25

## 2020-08-09 RX ORDER — NICOTINE 21 MG/24HR
1 PATCH, TRANSDERMAL 24 HOURS TRANSDERMAL EVERY 24 HOURS
Qty: 28 PATCH | Refills: 6 | Status: SHIPPED | OUTPATIENT
Start: 2020-08-09 | End: 2021-04-07

## 2020-08-09 RX ORDER — ALBUTEROL SULFATE 90 UG/1
1-2 AEROSOL, METERED RESPIRATORY (INHALATION) EVERY 4 HOURS PRN
Qty: 1 INHALER | Refills: 6 | Status: SHIPPED | OUTPATIENT
Start: 2020-08-09 | End: 2020-09-02

## 2020-08-09 RX ORDER — MAGNESIUM CARB/ALUMINUM HYDROX 105-160MG
TABLET,CHEWABLE ORAL
Qty: 120 TABLET | Refills: 6 | Status: SHIPPED | OUTPATIENT
Start: 2020-08-09 | End: 2020-09-03

## 2020-08-09 RX ORDER — ACETAMINOPHEN 160 MG/5ML
1 SUSPENSION, ORAL (FINAL DOSE FORM) ORAL DAILY
Qty: 90 TABLET | Refills: 6 | Status: SHIPPED | OUTPATIENT
Start: 2020-08-09 | End: 2020-09-03

## 2020-08-09 RX ORDER — VITAMIN A 10000 UNIT
1 TABLET ORAL 2 TIMES DAILY
Qty: 30 TABLET | Refills: 6 | Status: SHIPPED | OUTPATIENT
Start: 2020-08-09 | End: 2021-07-20

## 2020-08-09 ASSESSMENT — ENCOUNTER SYMPTOMS
PSYCHIATRIC NEGATIVE: 1
EYES NEGATIVE: 1
NEUROLOGICAL NEGATIVE: 1
MUSCULOSKELETAL NEGATIVE: 1
RESPIRATORY NEGATIVE: 1
GASTROINTESTINAL NEGATIVE: 1
CONSTITUTIONAL NEGATIVE: 1
CARDIOVASCULAR NEGATIVE: 1

## 2020-08-09 ASSESSMENT — MIFFLIN-ST. JEOR: SCORE: 1464.33

## 2020-08-09 NOTE — PROGRESS NOTES
SUBJECTIVE:   Robert B Behr is a 70 year old male presenting with a chief complaint of   Chief Complaint   Patient presents with     Urgent Care     Pt in clinic requesting medication refills.     Medication Refill       He is an established patient of [facility].    Patient visits today for medication/supplementation refills. Patient has been taking these for many years for various preventative reasons. He denies any adverse effects and believes they have been helping him greatly.     Review of Systems   Constitutional: Negative.    HENT: Negative.    Eyes: Negative.    Respiratory: Negative.    Cardiovascular: Negative.    Gastrointestinal: Negative.    Genitourinary: Negative.    Musculoskeletal: Negative.    Neurological: Negative.    Psychiatric/Behavioral: Negative.        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     Tobacco abuse      Family History   Problem Relation Age of Onset     Diabetes Brother         living     Cancer Brother         throat     Macular Degeneration Mother      Colon Cancer No family hx of      Glaucoma No family hx of      Retinal detachment No family hx of      Amblyopia No family hx of      Skin Cancer No family hx of      Melanoma No family hx of      Current Outpatient Medications   Medication Sig Dispense Refill     albuterol (PROAIR HFA/PROVENTIL HFA/VENTOLIN HFA) 108 (90 Base) MCG/ACT inhaler Inhale 1-2 puffs into the lungs every 4 hours as needed for shortness of breath / dyspnea or wheezing 1 Inhaler 0     B Complex-C (VITAMIN B COMPLEX W/VITAMIN C) TABS tablet TAKE 1 TABLET BY MOUTH TWICE DAILY WITH FOLIC ACID 180 tablet 11     calcium carbonate 600 mg-vitamin D 400 units (CALTRATE) 600-400 MG-UNIT per tablet Take 1 tablet by mouth 2 times daily 180 tablet 3     fish oil-omega-3 fatty acids 1000 MG  capsule Take 1 capsule (1 g) by mouth daily 90 capsule 3     fluticasone-salmeterol (ADVAIR DISKUS) 250-50 MCG/DOSE inhaler INHALE 1 PUFF INTO THE LUNGS TWICE DAILY 3 Inhaler 0     fluticasone-salmeterol (ADVAIR) 500-50 MCG/DOSE inhaler Inhale 1 puff into the lungs 2 times daily 3 Inhaler 4     Ginkgo Biloba 60 MG TABS TAKE 1 TABLET BY MOUTH EVERY DAY. 90 tablet 3     glucosamine-chondroitinoitin 750-600 MG TABS TAKE 2 TABLETS BY MOUTH TWICE DAILY 120 tablet 5     glucosamine-chondroitinoitin 750-600 MG TABS TAKE 2 TABLETS BY MOUTH TWICE DAILY 120 tablet 3     nicotine (NICODERM CQ) 14 MG/24HR 24 hr patch Place 1 patch onto the skin every 24 hours 28 patch 5     Selenium (SELENIMIN-200) 200 MCG TABS tablet Take 1 tablet (200 mcg) by mouth daily 90 tablet 3     tiotropium (SPIRIVA RESPIMAT) 2.5 MCG/ACT inhaler Inhale 2 puffs into the lungs daily 3 Inhaler 4     vitamin A 10,000 units capsule Take 1 capsule (10,000 Units) by mouth daily 90 capsule 3     vitamin B complex with vitamin C (VITAMIN  B COMPLEX) tablet Take 1 tablet by mouth 2 times daily With Folic acid 180 tablet 3     vitamin C (ASCORBIC ACID) 1000 MG TABS TAKE 1 TABLET(1000 MG) BY MOUTH DAILY 90 tablet 0     vitamin E (TOCOPHEROL) 1000 units (450 mg) capsule Take 1 capsule (1,000 Units) by mouth daily 90 capsule 3     azithromycin (ZITHROMAX) 500 MG tablet        azithromycin (ZITHROMAX) 500 MG vial 1 three times weekly, MW for 1 month (Patient not taking: Reported on 5/14/2020) 12 each 0     guaiFENesin (ORGANIDIN) 200 MG tablet Take 1 tablet (200 mg) by mouth every 4 hours as needed for cough (Patient not taking: Reported on 8/9/2020) 30 tablet 0     ipratropium - albuterol 0.5 mg/2.5 mg/3 mL (DUONEB) 0.5-2.5 (3) MG/3ML neb solution INHALE 1 VIAL VIA NEBULIZER EVERY 6 HOURS AS NEEDED FOR SHORTNESS OF BREATH/ DYSPNEA OR WHEEZING (Patient not taking: Reported on 8/9/2020) 180 vial 0     Ipratropium-Albuterol (COMBIVENT RESPIMAT)  MCG/ACT inhaler  "INHALE 1 PUFF FOUR TIMES DAILY- DO NOT EXCEED 6 DOSES PER DAY (Patient not taking: Reported on 8/9/2020) 4 g 6     predniSONE (DELTASONE) 50 MG tablet Take 1 tablet (50 mg) by mouth daily (Patient not taking: Reported on 8/9/2020) 5 tablet 0     Social History     Tobacco Use     Smoking status: Current Some Day Smoker     Packs/day: 2.00     Years: 45.00     Pack years: 90.00     Types: Cigarettes     Smokeless tobacco: Former User     Tobacco comment: 5 cigs per day   Substance Use Topics     Alcohol use: Yes     Alcohol/week: 0.0 standard drinks     Comment: occ beer       OBJECTIVE  /77   Pulse 54   Temp 98.5  F (36.9  C) (Oral)   Ht 1.778 m (5' 10\")   Wt 70.3 kg (155 lb)   SpO2 95%   BMI 22.24 kg/m      Physical Exam  Constitutional:       General: He is not in acute distress.     Appearance: Normal appearance. He is normal weight. He is not ill-appearing, toxic-appearing or diaphoretic.   HENT:      Head: Normocephalic and atraumatic.   Cardiovascular:      Rate and Rhythm: Normal rate and regular rhythm.      Pulses: Normal pulses.      Heart sounds: Normal heart sounds.   Pulmonary:      Effort: Pulmonary effort is normal. No respiratory distress.      Breath sounds: Normal breath sounds.   Neurological:      General: No focal deficit present.      Mental Status: He is alert and oriented to person, place, and time. Mental status is at baseline.      Gait: Gait normal.   Psychiatric:         Mood and Affect: Mood normal.         Behavior: Behavior normal.         Thought Content: Thought content normal.         Judgment: Judgment normal.       ASSESSMENT/PLAN:    (R53.83) Fatigue, unspecified type  (primary encounter diagnosis)  Plan: vitamin B-Complex, Vitamin A 3 MG (18920 UT)         TABS    (Z71.89) Encounter for herb and vitamin supplement management  Plan: vitamin E (TOCOPHEROL) 1000 units (900 mg)         capsule, vitamin C (ASCORBIC ACID) 1000 MG         TABS, vitamin B-Complex, fish " oil-omega-3 fatty        acids 1000 MG capsule    (J44.1) Chronic obstructive pulmonary disease with acute exacerbation (H)  Plan: tiotropium (SPIRIVA RESPIMAT) 2.5 MCG/ACT         inhaler, fluticasone-salmeterol (ADVAIR) 500-50        MCG/DOSE inhaler    (J44.0) Chronic obstructive pulmonary disease with acute lower respiratory infection (H)  Plan: nicotine (NICODERM CQ) 14 MG/24HR 24 hr patch    (J44.1) COPD exacerbation (H)  Plan: Selenium (SELENIMIN-200) 200 MCG TABS tablet    (M16.11) Osteoarthritis of right hip, unspecified osteoarthritis type  Plan: glucosamine-chondroitinoitin 750-600 MG TABS    (L80) Vitiligo  Plan: Ginkgo Biloba 60 MG TABS    (M81.0) Age-related osteoporosis without current pathological fracture  Plan: calcium carbonate 600 mg-vitamin D 400 units         (CALTRATE) 600-400 MG-UNIT per tablet    (Z76.0) Encounter for medication refill  Plan: albuterol (PROAIR HFA/PROVENTIL HFA/VENTOLIN         HFA) 108 (90 Base) MCG/ACT inhaler    (J43.9) Pulmonary emphysema, unspecified emphysema type (H)  Plan: albuterol (PROAIR HFA/PROVENTIL HFA/VENTOLIN         HFA) 108 (90 Base) MCG/ACT inhaler    Junior Doss PA-C on 8/9/2020 at 12:25 PM

## 2020-08-12 RX ORDER — MULTIVIT WITH MINERALS/LUTEIN
TABLET ORAL
Qty: 90 TABLET | Refills: 0 | Status: SHIPPED | OUTPATIENT
Start: 2020-08-12 | End: 2020-09-03

## 2020-08-12 RX ORDER — CHOLECALCIFEROL (VITAMIN D3) 125 MCG
CAPSULE ORAL
Qty: 90 CAPSULE | Refills: 3 | Status: SHIPPED | OUTPATIENT
Start: 2020-08-12 | End: 2021-07-07

## 2020-08-12 RX ORDER — ACETAMINOPHEN 160 MG/5ML
SUSPENSION, ORAL (FINAL DOSE FORM) ORAL
Qty: 90 TABLET | Refills: 3 | OUTPATIENT
Start: 2020-08-12

## 2020-08-12 RX ORDER — CHLORAL HYDRATE 500 MG
CAPSULE ORAL
Qty: 90 CAPSULE | Refills: 3 | OUTPATIENT
Start: 2020-08-12

## 2020-09-02 DIAGNOSIS — L80 VITILIGO: ICD-10-CM

## 2020-09-02 DIAGNOSIS — Z71.89 ENCOUNTER FOR HERB AND VITAMIN SUPPLEMENT MANAGEMENT: ICD-10-CM

## 2020-09-02 DIAGNOSIS — M16.11 OSTEOARTHRITIS OF RIGHT HIP, UNSPECIFIED OSTEOARTHRITIS TYPE: ICD-10-CM

## 2020-09-02 DIAGNOSIS — Z76.0 ENCOUNTER FOR MEDICATION REFILL: ICD-10-CM

## 2020-09-02 DIAGNOSIS — J43.9 PULMONARY EMPHYSEMA, UNSPECIFIED EMPHYSEMA TYPE (H): ICD-10-CM

## 2020-09-02 DIAGNOSIS — J44.1 COPD EXACERBATION (H): ICD-10-CM

## 2020-09-02 RX ORDER — IPRATROPIUM BROMIDE AND ALBUTEROL SULFATE 2.5; .5 MG/3ML; MG/3ML
SOLUTION RESPIRATORY (INHALATION)
Qty: 90 ML | Refills: 3 | Status: SHIPPED | OUTPATIENT
Start: 2020-09-02 | End: 2021-06-09

## 2020-09-02 RX ORDER — ALBUTEROL SULFATE 90 UG/1
AEROSOL, METERED RESPIRATORY (INHALATION)
Qty: 8.5 G | Refills: 3 | Status: SHIPPED | OUTPATIENT
Start: 2020-09-02 | End: 2022-02-08

## 2020-09-03 RX ORDER — ACETAMINOPHEN 160 MG/5ML
SUSPENSION, ORAL (FINAL DOSE FORM) ORAL
Qty: 90 TABLET | Refills: 6 | Status: SHIPPED | OUTPATIENT
Start: 2020-09-03 | End: 2021-08-25

## 2020-09-03 RX ORDER — MAGNESIUM CARB/ALUMINUM HYDROX 105-160MG
TABLET,CHEWABLE ORAL
Qty: 120 TABLET | Refills: 6 | Status: SHIPPED | OUTPATIENT
Start: 2020-09-03 | End: 2021-06-07

## 2020-09-03 RX ORDER — MULTIVIT WITH MINERALS/LUTEIN
TABLET ORAL
Qty: 90 TABLET | Refills: 0 | Status: SHIPPED | OUTPATIENT
Start: 2020-09-03 | End: 2021-08-25

## 2020-09-22 ENCOUNTER — PATIENT OUTREACH (OUTPATIENT)
Dept: GERIATRIC MEDICINE | Facility: CLINIC | Age: 70
End: 2020-09-22

## 2020-10-08 DIAGNOSIS — Z71.89 ENCOUNTER FOR HERB AND VITAMIN SUPPLEMENT MANAGEMENT: ICD-10-CM

## 2020-10-08 ASSESSMENT — ACTIVITIES OF DAILY LIVING (ADL): DEPENDENT_IADLS:: INDEPENDENT

## 2020-10-08 NOTE — PROGRESS NOTES
[facility] Care Coordination Contact      [facility] Refusal Telephone Assessment    Member refused home visit HRA on September 22, 2020 (reason: He reports he is in very good health and doesn't need a visit at this time.).    ER visits: No  Hospitalizations: No  Health concerns: None  Falls/Injuries: No  ADL/IADL Dependencies:None        Member currently receiving the following home care services:   None  Member currently receiving the following community resources:  None  Informal support(s):      Advanced Care Planning discussion, complete code section.    [facility] sponsored benefits: Shared information re: Silver Sneakers/gym memberships, ASA, Calcium +D.    Sergio reports he is doing well. He has been riding his bike and swimming a lot this past summer.    Follow-Up Plan: Member informed of future contact, plan to f/u with member with a 6 month telephone assessment and offer a home visit.  Contact information shared with member and family, encouraged member to call with any questions or concerns at any time.    Leeanna Sigala RN, BSN, PHN  [facility] Care Coordinator  [phone]

## 2020-10-09 NOTE — TELEPHONE ENCOUNTER
Routing refill request to provider for review/approval because:  Drug not on the [facility] refill protocol     .  Last Written Prescription Date:  8/9/2020  Last Fill Quantity: 90,  # refills: 0   Last office visit: 9/10/2019 with prescribing provider:     Future Office Visit:

## 2020-10-11 RX ORDER — MULTIVIT WITH MINERALS/LUTEIN
TABLET ORAL
Qty: 90 TABLET | Refills: 4 | Status: SHIPPED | OUTPATIENT
Start: 2020-10-11 | End: 2021-07-20

## 2020-10-21 ENCOUNTER — PATIENT OUTREACH (OUTPATIENT)
Dept: GERIATRIC MEDICINE | Facility: CLINIC | Age: 70
End: 2020-10-21

## 2020-11-28 NOTE — PROGRESS NOTES
[facility] Care Coordination Contact    Late entry 10/21/20:  TC from member. He stated that he wanted to get on Medicare. Discussed that he was on Medicare. He stated that he is paying $144 a month from his social security. He spoke with someone who told him he needed to get on medicare and he wouldn't have to pay that. CC spoke with a co-worker about this who stated that  has QMB (qualifed medicare benefit) through medical assistance, so he shouldn't have to pay the medicare premium. TC to member to inform him of this and advise he speak to the social security office ([phone])  to find out why they are taking this out of his social security. He is going to look further into this.  Leeanna Sigala RN, BSN, PHN  [facility] Care Coordinator  [phone]

## 2021-01-15 ENCOUNTER — HEALTH MAINTENANCE LETTER (OUTPATIENT)
Age: 71
End: 2021-01-15

## 2021-03-15 ENCOUNTER — PATIENT OUTREACH (OUTPATIENT)
Dept: GERIATRIC MEDICINE | Facility: CLINIC | Age: 71
End: 2021-03-15

## 2021-03-15 NOTE — PROGRESS NOTES
[facility] Care Coordination Contact      [facility] Six-Month Telephone Assessment    6 month telephone assessment completed on March 15, 2021.    ER visits: No  Hospitalizations: No  TCU stays: No  Significant health status changes: none  Falls/Injuries: No  ADL/IADL changes: No  Changes in services: No    Caregiver Assessment follow up:  NA    Goals: See POC in chart for goal progress documentation.      Sergio reports he is doing good, taking his vitamins, exercising regularly. He is cutting down on his cigarette use. Discussed Covid vaccine. He doesn't feel he needs one. Doesn't want to be asked about this any more, but advised to call CC if he changes his mind.    Will see member in 6 months for an annual health risk assessment.   Encouraged member to call CC with any questions or concerns in the meantime.     Leeanna Sigala RN, BSN, PHN  [facility] Care Coordinator  [phone]

## 2021-03-15 NOTE — PROGRESS NOTES
[facility] Care Coordination Contact    Called member to complete six month assessment and left a message requesting a return call.  Leeanna Sigala RN, BSN, PHN  [facility] Care Coordinator  [phone]

## 2021-04-02 DIAGNOSIS — J44.0 CHRONIC OBSTRUCTIVE PULMONARY DISEASE WITH ACUTE LOWER RESPIRATORY INFECTION (H): ICD-10-CM

## 2021-04-07 ENCOUNTER — OFFICE VISIT (OUTPATIENT)
Dept: FAMILY MEDICINE | Facility: CLINIC | Age: 71
End: 2021-04-07
Payer: COMMERCIAL

## 2021-04-07 VITALS
TEMPERATURE: 98.7 F | SYSTOLIC BLOOD PRESSURE: 120 MMHG | BODY MASS INDEX: 23.79 KG/M2 | WEIGHT: 157 LBS | HEART RATE: 89 BPM | RESPIRATION RATE: 16 BRPM | OXYGEN SATURATION: 98 % | DIASTOLIC BLOOD PRESSURE: 68 MMHG | HEIGHT: 68 IN

## 2021-04-07 DIAGNOSIS — Z87.891 PERSONAL HISTORY OF TOBACCO USE: ICD-10-CM

## 2021-04-07 DIAGNOSIS — Z13.6 SCREENING FOR CARDIOVASCULAR CONDITION: ICD-10-CM

## 2021-04-07 DIAGNOSIS — F17.200 TOBACCO USE DISORDER: ICD-10-CM

## 2021-04-07 DIAGNOSIS — Z00.00 ENCOUNTER FOR MEDICARE ANNUAL WELLNESS EXAM: Primary | ICD-10-CM

## 2021-04-07 DIAGNOSIS — Z12.11 SCREEN FOR COLON CANCER: ICD-10-CM

## 2021-04-07 DIAGNOSIS — J44.9 CHRONIC OBSTRUCTIVE PULMONARY DISEASE, UNSPECIFIED COPD TYPE (H): ICD-10-CM

## 2021-04-07 DIAGNOSIS — Z11.59 ENCOUNTER FOR HEPATITIS C SCREENING TEST FOR LOW RISK PATIENT: ICD-10-CM

## 2021-04-07 DIAGNOSIS — Z13.1 SCREENING FOR DIABETES MELLITUS: ICD-10-CM

## 2021-04-07 PROCEDURE — G0296 VISIT TO DETERM LDCT ELIG: HCPCS | Performed by: FAMILY MEDICINE

## 2021-04-07 PROCEDURE — 82947 ASSAY GLUCOSE BLOOD QUANT: CPT | Performed by: FAMILY MEDICINE

## 2021-04-07 PROCEDURE — 86803 HEPATITIS C AB TEST: CPT | Performed by: FAMILY MEDICINE

## 2021-04-07 PROCEDURE — 80061 LIPID PANEL: CPT | Performed by: FAMILY MEDICINE

## 2021-04-07 PROCEDURE — 99397 PER PM REEVAL EST PAT 65+ YR: CPT | Performed by: FAMILY MEDICINE

## 2021-04-07 PROCEDURE — 36415 COLL VENOUS BLD VENIPUNCTURE: CPT | Performed by: FAMILY MEDICINE

## 2021-04-07 RX ORDER — NICOTINE 21 MG/24HR
1 PATCH, TRANSDERMAL 24 HOURS TRANSDERMAL EVERY 24 HOURS
Qty: 28 PATCH | Refills: 6 | Status: SHIPPED | OUTPATIENT
Start: 2021-04-07 | End: 2021-06-09

## 2021-04-07 ASSESSMENT — ENCOUNTER SYMPTOMS
NAUSEA: 0
DYSURIA: 0
CONSTIPATION: 0
ARTHRALGIAS: 0
PALPITATIONS: 0
HEARTBURN: 1
JOINT SWELLING: 0
CHILLS: 0
DIARRHEA: 0
EYE PAIN: 0
ABDOMINAL PAIN: 0
HEMATURIA: 0
SORE THROAT: 0
FEVER: 0
HEADACHES: 0
NERVOUS/ANXIOUS: 0
HEMATOCHEZIA: 0
COUGH: 1
MYALGIAS: 0
FREQUENCY: 0
SHORTNESS OF BREATH: 1
WEAKNESS: 0
DIZZINESS: 0
PARESTHESIAS: 0

## 2021-04-07 ASSESSMENT — ACTIVITIES OF DAILY LIVING (ADL): CURRENT_FUNCTION: NO ASSISTANCE NEEDED

## 2021-04-07 ASSESSMENT — MIFFLIN-ST. JEOR: SCORE: 1436.65

## 2021-04-07 NOTE — PATIENT INSTRUCTIONS
Patient Education   Personalized Prevention Plan  You are due for the preventive services outlined below.  Your care team is available to assist you in scheduling these services.  If you have already completed any of these items, please share that information with your care team to update in your medical record.  Health Maintenance Due   Topic Date Due     Discuss Advance Care Planning  Never done     Hepatitis C Screening  Never done     Zoster (Shingles) Vaccine (2 of 3) 06/10/2015     Annual Wellness Visit  05/14/2020     Flu Vaccine (1) 09/01/2020     PHQ-2  01/01/2021        Lung Cancer Screening   Frequently Asked Questions  If you are at high-risk for lung cancer, getting screened with low-dose computed tomography (LDCT) every year can help save your life. This handout offers answers to some of the most common questions about lung cancer screening. If you have other questions, please call [phone] ([phone]).     What is it?  Lung cancer screening uses special X-ray technology to create an image of your lung tissue. The exam is quick and easy and takes less than 10 seconds. We don t give you any medicine or use any needles. You can eat before and after the exam. You don t need to change your clothes as long as the clothing on your chest doesn t contain metal. But, you do need to be able to hold your breath for at least 6 seconds during the exam.    What is the goal of lung cancer screening?  The goal of lung cancer screening is to save lives. Many times, lung cancer is not found until a person starts having physical symptoms. Lung cancer screening can help detect lung cancer in the earliest stages when it may be easier to treat.    Who should be screened for lung cancer?  We suggest lung cancer screening for anyone who is at high-risk for lung cancer. You are in the high-risk group if you:      are between the ages of 55 and 79, and    have smoked at least 1 pack of cigarettes a day for 30 or  more years, and    still smoke or have quit within the past 15 years.    However, if you have a new cough or shortness of breath, you should talk to your doctor before being screened.    Some national lung health advocacy groups also recommend screening for people ages 50 to 79 who have smoked an average of 1 pack of cigarettes a day for 20 years. They must also have at least 1 other risk factor for lung cancer, not including exposure to secondhand smoke. Other risk factors are having had cancer in the past, emphysema, pulmonary fibrosis, COPD, a family history of lung cancer, or exposure to certain materials such as arsenic, asbestos, beryllium, cadmium, chromium, diesel fumes, nickel, radon or silica. Your care team can help you know if you have one of these risk factors.     Why does it matter if I have symptoms?  Certain symptoms can be a sign that you have a condition in your lungs that should be checked and treated by your doctor. These symptoms include fever, chest pain, a new or changing cough, shortness of breath that you have never felt before, coughing up blood or unexplained weight loss. Having any of these symptoms can greatly affect the results of lung cancer screening.       Should all smokers get an LDCT lung cancer screening exam?  It depends. Lung cancer screening is for a very specific group of men and women who have a history of heavy smoking over a long period of time (see  Who should be screened for lung cancer  above).  I am in the high-risk group, but have been diagnosed with cancer in the past. Is LDCT lung cancer screening right for me?  In some cases, you should not have LDCT lung screening, such as when your doctor is already following your cancer with CT scan studies. Your doctor will help you decide if LDCT lung screening is right for you.  Do I need to have a screening exam every year?  Yes. If you are in the high-risk group described earlier, you should get an LDCT lung cancer  screening exam every year until you are 79, or are no longer willing or able to undergo screening and possible procedures to diagnose and treat lung cancer.  How effective is LDCT at preventing death from lung cancer?  Studies have shown that LDCT lung cancer screening can lower the risk of death from lung cancer by 20 percent in people who are at high-risk.  What are the risks?  There are some risks and limitations of LDCT lung cancer screening. We want to make sure you understand the risks and benefits, so please let us know if you have any questions. Your doctor may want to talk with you more about these risks.    Radiation exposure: As with any exam that uses radiation, there is a very small increased risk of cancer. The amount of radiation in LDCT is small--about the same amount a person would get from a mammogram. Your doctor orders the exam when he or she feels the potential benefits outweigh the risks.    False negatives: No test is perfect, including LDCT. It is possible that you may have a medical condition, including lung cancer, that is not found during your exam. This is called a false negative result.    False positives and more testing: LDCT very often finds something in the lung that could be cancer, but in fact is not. This is called a false positive result. False positive tests often cause anxiety. To make sure these findings are not cancer, you may need to have more tests. These tests will be done only if you give us permission. Sometimes patients need a treatment that can have side effects, such as a biopsy. For more information on false positives, see  What can I expect from the results?     Findings not related to lung cancer: Your LDCT exam also takes pictures of areas of your body next to your lungs. In a very small number of cases, the CT scan will show an abnormal finding in one of these areas, such as your kidneys, adrenal glands, liver or thyroid. This finding may not be serious, but you  may need more tests. Your doctor can help you decide what other tests you may need, if any.  What can I expect from the results?  About 1 out of 4 LDCT exams will find something that may need more tests. Most of the time, these findings are lung nodules. Lung nodules are very small collections of tissue in the lung. These nodules are very common, and the vast majority--more than 97 percent--are not cancer (benign). Most are normal lymph nodes or small areas of scarring from past infections.  But, if a small lung nodule is found to be cancer, the cancer can be cured more than 90 percent of the time. To know if the nodule is cancer, we may need to get more images before your next yearly screening exam. If the nodule has suspicious features (for example, it is large, has an odd shape or grows over time), we will refer you to a specialist for further testing.  Will my doctor also get the results?  Yes. Your doctor will get a copy of your results.  Is it okay to keep smoking now that there s a cancer screening exam?  No. Tobacco is one of the strongest cancer-causing agents. It causes not only lung cancer, but other cancers and cardiovascular (heart) diseases as well. The damage caused by smoking builds over time. This means that the longer you smoke, the higher your risk of disease. While it is never too late to quit, the sooner you quit, the better.  Where can I find help to quit smoking?  The best way to prevent lung cancer is to stop smoking. If you have already quit smoking, congratulations and keep it up! For help on quitting smoking, please call [place] at 6-780-689-IVWJ ([phone]) or the American Cancer Society at 1-336.442.3428 to find local resources near you.  One-on-one health coaching:  If you d prefer to work individually with a health care provider on tobacco cessation, we offer:      Medication Therapy Management:  Our specially trained pharmacists work closely with you and your doctor to help you quit  smoking.  Call [phone] or [phone] (toll free).     Can Do: Health coaching offered by [facility].  [email or web address]

## 2021-04-07 NOTE — PROGRESS NOTES
"SUBJECTIVE:   Robert B Behr is a 71 year old male who presents for Preventive Visit.    Patient has been advised of split billing requirements and indicates understanding: Yes   Are you in the first 12 months of your Medicare coverage?  No    Healthy Habits:     In general, how would you rate your overall health?  Good    Frequency of exercise:  4-5 days/week    Duration of exercise:  45-60 minutes    Do you usually eat at least 4 servings of fruit and vegetables a day, include whole grains    & fiber and avoid regularly eating high fat or \"junk\" foods?  No    Taking medications regularly:  Yes    Medication side effects:  None    Ability to successfully perform activities of daily living:  No assistance needed    Home Safety:  No safety concerns identified    Hearing Impairment:  Need to ask people to speak up or repeat themselves    In the past 6 months, have you been bothered by leaking of urine?  No    In general, how would you rate your overall mental or emotional health?  Good      PHQ-2 Total Score: 0    Additional concerns today:  No    Do you feel safe in your environment? Yes    Have you ever done Advance Care Planning? (For example, a Health Directive, POLST, or a discussion with a medical provider or your loved ones about your wishes): No, advance care planning information given to patient to review.  Advanced care planning was discussed at today's visit.       Fall risk  Fallen 2 or more times in the past year?: No  Any fall with injury in the past year?: No    Cognitive Screening   1) Repeat 3 items (Leader, Season, Table)    2) Clock draw: NORMAL  3) 3 item recall: Recalls 2 objects   Results: NORMAL clock, 1-2 items recalled: COGNITIVE IMPAIRMENT LESS LIKELY    Mini-CogTM Copyright LORENA Slater. Licensed by the author for use in [facility]; reprinted with permission (fitz@.[facility]). All rights reserved.      Do you have sleep apnea, excessive snoring or daytime drowsiness?: no    Reviewed and " updated as needed this visit by clinical staff  Tobacco  Allergies  Meds   Med Hx  Surg Hx  Fam Hx  Soc Hx        Reviewed and updated as needed this visit by Provider                Social History     Tobacco Use     Smoking status: Current Some Day Smoker     Packs/day: 2.00     Years: 45.00     Pack years: 90.00     Types: Cigarettes     Smokeless tobacco: Former User     Tobacco comment: 5 cigs per day   Substance Use Topics     Alcohol use: Yes     Alcohol/week: 0.0 standard drinks     Comment: occ beer     If you drink alcohol do you typically have >3 drinks per day or >7 drinks per week? No    Alcohol Use 4/7/2021   Prescreen: >3 drinks/day or >7 drinks/week? No   Prescreen: >3 drinks/day or >7 drinks/week? -   No flowsheet data found.      Current providers sharing in care for this patient include:   Patient Care Team:  Melba Bassett MD as PCP - General (Family Practice)  Jovon, Leeanna Lauren, RN as Lead Care Coordinator  Alison Torres as Other (see comments)  Marielena Feng Maria Kramarczuk, MD as MD (Dermatology)  Nayan Liu MD as MD (Dermatology)  Taisha Fuller PA-C as Assigned PCP  Svitlana Taylor MD as Assigned Pulmonology Provider    The following health maintenance items are reviewed in Epic and correct as of today:  Health Maintenance Due   Topic Date Due     ADVANCE CARE PLANNING  Never done     HEPATITIS C SCREENING  Never done     ZOSTER IMMUNIZATION (2 of 3) 06/10/2015     MEDICARE ANNUAL WELLNESS VISIT  05/14/2020     INFLUENZA VACCINE (1) 09/01/2020     PHQ-2  01/01/2021     Copd and hiatal hernia.     Breathing is ok. Winded when walk. Seeing pulmonologist.     History of polio. Riding bike and swimming 5 days per week.     Nicotine dep - almost done! On patch - 5 cigs per day. Makes his own cigarette. Feels more trigger than decadency.    Started smoking when 15. For last 55 yrs regularly. Used to smoke 1-2 pack per day.  "    Not using spiriva. Using combivent.  (pulmonary wanted him to be on spiriva instead of combivent but he has not changed, he feels it is working well)    Review of Systems   Constitutional: Negative for chills and fever.   HENT: Positive for hearing loss. Negative for congestion, ear pain and sore throat.    Eyes: Negative for pain and visual disturbance.   Respiratory: Positive for cough and shortness of breath.    Cardiovascular: Negative for chest pain, palpitations and peripheral edema.   Gastrointestinal: Positive for heartburn. Negative for abdominal pain, constipation, diarrhea, hematochezia and nausea.   Genitourinary: Negative for discharge, dysuria, frequency, genital sores, hematuria, impotence and urgency.   Musculoskeletal: Negative for arthralgias, joint swelling and myalgias.   Skin: Negative for rash.   Neurological: Negative for dizziness, weakness, headaches and paresthesias.   Psychiatric/Behavioral: Negative for mood changes. The patient is not nervous/anxious.           OBJECTIVE:   /68 (BP Location: Left arm, Patient Position: Sitting, Cuff Size: Adult Regular)   Pulse 89   Temp 98.7  F (37.1  C) (Tympanic)   Resp 16   Ht 1.727 m (5' 8\")   Wt 71.2 kg (157 lb)   SpO2 98%   BMI 23.87 kg/m   Estimated body mass index is 23.87 kg/m  as calculated from the following:    Height as of this encounter: 1.727 m (5' 8\").    Weight as of this encounter: 71.2 kg (157 lb).  Physical Exam  GENERAL: healthy, alert and no distress  EYES: Eyes grossly normal to inspection, PERRL and conjunctivae and sclerae normal  HENT: ear canals and TM's normal, nose and mouth without ulcers or lesions  NECK: no adenopathy, no asymmetry, masses, or scars and thyroid normal to palpation  RESP: lungs clear to auscultation - no rales, rhonchi or wheezes  CV: regular rate and rhythm, normal S1 S2, no S3 or S4, no murmur, click or rub, no peripheral edema and peripheral pulses strong  ABDOMEN: soft, nontender, no " hepatosplenomegaly, no masses and bowel sounds normal  MS: no gross musculoskeletal defects noted, no edema  SKIN: no suspicious lesions or rashes  NEURO: Normal strength and tone, mentation intact and speech normal  PSYCH: mentation appears normal, affect normal/bright    ASSESSMENT / PLAN:   1. Encounter for Medicare annual wellness exam       2. Chronic obstructive pulmonary disease, unspecified COPD type (H)  Was advised spiriva instead of combivent along with advair. He is not stopped combivent and not started spiriva. He wishes to stick to his current regime that he is using. He is planning to see pulmonologist in 6 months.     4. Tobacco use disorder  Ongoing. On patches. Working on his own to cut down. Needs ct scan follow up for lung cancer screening.   - Prof fee: Shared Decisionmaking for Lung Cancer Screening  - CT Chest Lung Cancer Scrn Low Dose wo; Future  - nicotine (NICODERM CQ) 7 MG/24HR 24 hr patch; Place 1 patch onto the skin every 24 hours  Dispense: 30 patch; Refill: 0    5. Personal history of tobacco use     - Prof fee: Shared Decisionmaking for Lung Cancer Screening  - CT Chest Lung Cancer Scrn Low Dose wo; Future    6. Screening for diabetes mellitus     - Glucose    7. Screening for cardiovascular condition     - Lipid panel reflex to direct LDL Non-fasting    8. Encounter for hepatitis C screening test for low risk patient     - **Hepatitis C Screen Reflex to RNA FUTURE anytime    9. Screen for colon cancer     - VARSHA([employer])    Patient has been advised of split billing requirements and indicates understanding: No  COUNSELING:  Reviewed preventive health counseling, as reflected in patient instructions  Special attention given to:       Regular exercise       Healthy diet/nutrition       Vision screening       Hearing screening       Dental care       Bladder control       Fall risk prevention    Estimated body mass index is 23.87 kg/m  as calculated from the following:     "Height as of this encounter: 1.727 m (5' 8\").    Weight as of this encounter: 71.2 kg (157 lb).        He reports that he has been smoking cigarettes. He has a 90.00 pack-year smoking history. He has quit using smokeless tobacco.  Tobacco Cessation Action Plan:   using patches. Self help info given.      Appropriate preventive services were discussed with this patient, including applicable screening as appropriate for cardiovascular disease, diabetes, osteopenia/osteoporosis, and glaucoma.  As appropriate for age/gender, discussed screening for colorectal cancer, prostate cancer, breast cancer, and cervical cancer. Checklist reviewing preventive services available has been given to the patient.    Reviewed patients plan of care and provided an AVS. The Basic Care Plan (routine screening as documented in Health Maintenance) for Ravin meets the Care Plan requirement. This Care Plan has been established and reviewed with the Patient.    Counseling Resources:  ATP IV Guidelines  Pooled Cohorts Equation Calculator  Breast Cancer Risk Calculator  Breast Cancer: Medication to Reduce Risk  FRAX Risk Assessment  ICSI Preventive Guidelines  Dietary Guidelines for Americans, 2010  [place] MyPlate  ASA Prophylaxis  Lung CA Screening    King Lo MD, MD  [facility]    Identified Health Risks:  Lung Cancer Screening Shared Decision Making Visit     Robert B Behr is eligible for lung cancer screening on the basis of the information provided in my signed lung cancer screening order.     I have discussed with patient the risks and benefits of screening for lung cancer with low-dose CT.     The risks include:  radiation exposure: one low dose chest CT has as much ionizing radiation as about 15 chest x-rays or 6 months of background radiation living in [place]    false positives: 96% of positive findings/nodules are NOT cancer, but some might still require additional diagnostic evaluation, " including biopsy  over-diagnosis: some slow growing cancers that might never have been clinically significant will be detected and treated unnecessarily     The benefit of early detection of lung cancer is contingent upon adherence to annual screening or more frequent follow up if indicated.     Furthermore, reaping the benefits of screening requires Ravin B Behr to be willing and physically able to undergo diagnostic procedures, if indicated. Although no specific guide is available for determining severity of comorbidities, it is reasonable to withhold screening in patients who have greater mortality risk from other diseases.     We did discuss that the only way to prevent lung cancer is to not smoke. Smoking cessation counseling was not given.      I did not offer risk estimation using a calculator such as this one:    ShouldIScreen       /

## 2021-04-08 LAB
CHOLEST SERPL-MCNC: 260 MG/DL
GLUCOSE SERPL-MCNC: 84 MG/DL (ref 70–99)
HCV AB SERPL QL IA: NONREACTIVE
HDLC SERPL-MCNC: 67 MG/DL
LDLC SERPL CALC-MCNC: 151 MG/DL
NONHDLC SERPL-MCNC: 193 MG/DL
TRIGL SERPL-MCNC: 208 MG/DL

## 2021-04-14 LAB — COLOGUARD-ABSTRACT: NEGATIVE

## 2021-04-16 ENCOUNTER — ANCILLARY PROCEDURE (OUTPATIENT)
Dept: CT IMAGING | Facility: CLINIC | Age: 71
End: 2021-04-16
Attending: FAMILY MEDICINE
Payer: COMMERCIAL

## 2021-04-16 DIAGNOSIS — Z87.891 PERSONAL HISTORY OF TOBACCO USE: ICD-10-CM

## 2021-04-16 DIAGNOSIS — F17.200 TOBACCO USE DISORDER: ICD-10-CM

## 2021-04-16 PROCEDURE — 71271 CT THORAX LUNG CANCER SCR C-: CPT | Mod: GC | Performed by: RADIOLOGY

## 2021-04-19 DIAGNOSIS — R91.8 LUNG NODULES: Primary | ICD-10-CM

## 2021-04-20 ENCOUNTER — TELEPHONE (OUTPATIENT)
Dept: FAMILY MEDICINE | Facility: CLINIC | Age: 71
End: 2021-04-20

## 2021-04-20 ENCOUNTER — TELEPHONE (OUTPATIENT)
Dept: FAMILY MEDICINE | Facility: CLINIC | Age: 71
End: 2021-04-20
Payer: COMMERCIAL

## 2021-04-20 ENCOUNTER — TELEPHONE (OUTPATIENT)
Dept: ONCOLOGY | Facility: CLINIC | Age: 71
End: 2021-04-20

## 2021-04-20 DIAGNOSIS — R93.89 ABNORMAL CT OF THE CHEST: Primary | ICD-10-CM

## 2021-04-20 NOTE — TELEPHONE ENCOUNTER
ANUSHA Rajput from imaging calling  Radiology wants to point out    In the report under impression #2 it is noted that there is stable scattered calcified nodules in the lateral right upper lobe.  Stable 6 mm non obstructing left renal stone  Basilar predominant bronchial wall thickening  Scattered areas of peripheral mucous plugging  Moderate to large hiatal hernia    Suad Boggs, RN, BSN  [facility]

## 2021-04-20 NOTE — TELEPHONE ENCOUNTER
Left message to call back and ask to speak with an available triage nurse.  BRAYDON WareN, RN  [facility]

## 2021-04-20 NOTE — TELEPHONE ENCOUNTER
Please call patient, lung cancer screening CT scan shows abnormality and concerning for possible cancer versus infection.  He needs to go for another CT scan with and without contrast as per radiologist.     Also need to see lung specialist - signed referral.

## 2021-04-22 NOTE — TELEPHONE ENCOUNTER
RECORDS RECEIVED FROM: internal    DATE RECEIVED: 7.1.21    NOTES STATUS DETAILS   OFFICE NOTE from referring provider internal  King Lo   OFFICE NOTE from other specialist internal  7.21.20 Raina      DISCHARGE SUMMARY from hospital na    DISCHARGE REPORT from the ER internal  [facility]- 8.9.20 Romario PETERS  7.15.20 Britt   5.28.20 Alina VARELA  5.14.20 Gary GILMAN  1.22.20 Patrick   More in epic    MEDICATION LIST internal     IMAGING  (NEED IMAGES AND REPORTS)     CT SCAN internal  4.26.21, 6.10.20, 5.22.19,   Scheduled 4.28.21   CHEST XRAY (CXR) internal  10.3.19     TESTS     PULMONARY FUNCTION TESTING (PFT) internal  Scheduled 7.1.21

## 2021-04-28 ENCOUNTER — ANCILLARY PROCEDURE (OUTPATIENT)
Dept: CT IMAGING | Facility: CLINIC | Age: 71
End: 2021-04-28
Attending: FAMILY MEDICINE
Payer: COMMERCIAL

## 2021-04-28 DIAGNOSIS — R93.89 ABNORMAL CT OF THE CHEST: ICD-10-CM

## 2021-04-28 LAB — RADIOLOGIST FLAGS: NORMAL

## 2021-04-28 PROCEDURE — 71250 CT THORAX DX C-: CPT | Mod: GC | Performed by: RADIOLOGY

## 2021-04-28 NOTE — RESULT ENCOUNTER NOTE
I see you are scheduled to see a lung specialist in few months.  Please keep that appointment.  Your right upper lobe lung shows possible early changes of cancer or infection.  Your lungs also show COPD or emphysematous changes.  You may need to have another CT scan when you see your lung specialist.  If your symptoms are worsening please see us back in the clinic with any question.  Quit smoking if you still smoke.    King Lo MD  [facility]

## 2021-05-19 ENCOUNTER — OFFICE VISIT (OUTPATIENT)
Dept: URGENT CARE | Facility: URGENT CARE | Age: 71
End: 2021-05-19
Payer: COMMERCIAL

## 2021-05-19 VITALS
HEIGHT: 68 IN | WEIGHT: 155 LBS | RESPIRATION RATE: 14 BRPM | HEART RATE: 91 BPM | DIASTOLIC BLOOD PRESSURE: 70 MMHG | OXYGEN SATURATION: 97 % | TEMPERATURE: 99 F | SYSTOLIC BLOOD PRESSURE: 120 MMHG | BODY MASS INDEX: 23.49 KG/M2

## 2021-05-19 DIAGNOSIS — K21.9 GASTROESOPHAGEAL REFLUX DISEASE WITHOUT ESOPHAGITIS: Primary | ICD-10-CM

## 2021-05-19 PROCEDURE — 99214 OFFICE O/P EST MOD 30 MIN: CPT | Performed by: FAMILY MEDICINE

## 2021-05-19 RX ORDER — OMEPRAZOLE 20 MG/1
20 TABLET, DELAYED RELEASE ORAL DAILY
Qty: 90 TABLET | Refills: 3 | Status: SHIPPED | OUTPATIENT
Start: 2021-05-19 | End: 2021-08-30

## 2021-05-19 RX ORDER — SACCHAROMYCES BOULARDII 250 MG
250 CAPSULE ORAL 2 TIMES DAILY
Qty: 180 CAPSULE | Refills: 3 | Status: SHIPPED | OUTPATIENT
Start: 2021-05-19 | End: 2021-08-17

## 2021-05-19 ASSESSMENT — MIFFLIN-ST. JEOR: SCORE: 1427.58

## 2021-05-19 NOTE — PROGRESS NOTES
Subjective: Patient has known from CT scans of his chest that he has a hiatal hernia that seems to be getting bigger and recently he has been having significant symptoms from it.  When he eats certain foods at night, usually at 5 or 6 in the evening, when he goes to bed at 9 and lies down he will get terrible heartburn, has to take some baking soda until he belches some up and sometimes some food comes to and this is happening 3 times a week now.  No alcohol or anti-inflammatories but he does have 5 cups of coffee a day.  It seems to be prepared foods, particularly with a lot of fat in them.  He understands that there is possibly a surgery in the future that could try to restore anatomy.  He exercises every day but that is during the day, never after dinner.    Objective: Reviewed CT scans of the chest.  Heart is regular without murmurs.  The lungs are clear.  Abdomen benign.    Assessment and plan: His problem is really reflux and certainly the hiatal hernia which is large is contributing but there are a number of things he could do short of surgery which she really should attempt.  He can cut back his caffeine to 1 a day in the morning, he can do something physical after dinner to try to encourage emptying of the stomach, he can take probiotics without the milk component which she is doing now and which may be causing more gas.  He should start taking Prilosec and if things are doing very well after couple of weeks he could try weaning off it but taking that every day would be better than needing to have surgery.  He can certainly talk to gastroenterology if things are not going well as well.

## 2021-05-25 ENCOUNTER — TELEPHONE (OUTPATIENT)
Dept: PULMONOLOGY | Facility: CLINIC | Age: 71
End: 2021-05-25

## 2021-05-25 DIAGNOSIS — J44.9 COPD (CHRONIC OBSTRUCTIVE PULMONARY DISEASE) (H): Primary | ICD-10-CM

## 2021-05-25 NOTE — TELEPHONE ENCOUNTER
Spoke with pt regarding upcoming appt with Dr. Chris 7/1 and informed him that Dr. Diallo would not be available. Discussed rescheduling appt and after discussion and chart review, appears pt has been previously seen by Dr. Taylor (last aug 2020). Thus this would not need to be a NEW appt, but rather return. Pt requested FPFT and this was arranged and soonest video appt with Dr. Taylor (in the time frame pt requested) was scheduled. Details confirmed with pt.

## 2021-05-29 ENCOUNTER — RECORDS - HEALTHEAST (OUTPATIENT)
Dept: ADMINISTRATIVE | Facility: CLINIC | Age: 71
End: 2021-05-29

## 2021-06-04 DIAGNOSIS — F17.200 TOBACCO USE DISORDER: ICD-10-CM

## 2021-06-04 DIAGNOSIS — M16.11 OSTEOARTHRITIS OF RIGHT HIP, UNSPECIFIED OSTEOARTHRITIS TYPE: ICD-10-CM

## 2021-06-04 NOTE — TELEPHONE ENCOUNTER
Reason for Call:  Medication or medication refill:    Do you use a [facility]?  Name of the pharmacy and phone number for the current request:  [facility] on [address] - [phone]    Name of the medication requested: nicotine (NICODERM CQ) 7 MG/24HR 24 hr patch    Other request: Needs refill on 7 mg patch    Can we leave a detailed message on this number? YES    Phone number patient can be reached at: Cell number on file:    Telephone Information:   Mobile [phone]       Best Time: any    Call taken on 6/4/2021 at 1:43 PM by Cheyenne Hernandez

## 2021-06-07 RX ORDER — MAGNESIUM CARB/ALUMINUM HYDROX 105-160MG
TABLET,CHEWABLE ORAL
Qty: 120 TABLET | Refills: 6 | Status: SHIPPED | OUTPATIENT
Start: 2021-06-07 | End: 2021-07-20

## 2021-06-09 ENCOUNTER — OFFICE VISIT (OUTPATIENT)
Dept: FAMILY MEDICINE | Facility: CLINIC | Age: 71
End: 2021-06-09
Payer: COMMERCIAL

## 2021-06-09 VITALS
SYSTOLIC BLOOD PRESSURE: 133 MMHG | OXYGEN SATURATION: 95 % | TEMPERATURE: 97.8 F | BODY MASS INDEX: 24.18 KG/M2 | RESPIRATION RATE: 14 BRPM | HEART RATE: 81 BPM | WEIGHT: 159 LBS | DIASTOLIC BLOOD PRESSURE: 86 MMHG

## 2021-06-09 DIAGNOSIS — E78.2 MIXED HYPERLIPIDEMIA: Primary | ICD-10-CM

## 2021-06-09 DIAGNOSIS — K21.00 GASTROESOPHAGEAL REFLUX DISEASE WITH ESOPHAGITIS WITHOUT HEMORRHAGE: ICD-10-CM

## 2021-06-09 DIAGNOSIS — F17.200 TOBACCO USE DISORDER: ICD-10-CM

## 2021-06-09 DIAGNOSIS — K44.9 HIATAL HERNIA: ICD-10-CM

## 2021-06-09 DIAGNOSIS — J44.9 CHRONIC OBSTRUCTIVE PULMONARY DISEASE, UNSPECIFIED COPD TYPE (H): ICD-10-CM

## 2021-06-09 PROCEDURE — 99214 OFFICE O/P EST MOD 30 MIN: CPT | Performed by: PHYSICIAN ASSISTANT

## 2021-06-09 RX ORDER — ATORVASTATIN CALCIUM 10 MG/1
10 TABLET, FILM COATED ORAL DAILY
Qty: 90 TABLET | Refills: 3 | Status: SHIPPED | OUTPATIENT
Start: 2021-06-09 | End: 2021-08-30

## 2021-06-09 NOTE — PROGRESS NOTES
Assessment & Plan     Mixed hyperlipidemia  Reviewed recent lipid panel with patient which was remarkable for , , . His ASCVD risk is elevated at 25.1%. Recent lung cancer screening CT scan did show mild CA calcifications. We discussed these findings and Ravin agreed to start statin medication. Can recheck levels at next appointment. Continue excellent exercise practices.   - atorvastatin (LIPITOR) 10 MG tablet; Take 1 tablet (10 mg) by mouth daily    Chronic obstructive pulmonary disease, unspecified COPD type (H)  Tobacco use disorder  Continuing to work on smoking cessation. Using 7 mg nicotine patch. If he does smoke he rolls his own cigarettes. Estimates maybe 5 cigarettes per day currently. Trying to avoid environmental triggers. He reports his breathing is well controlled overall with Advair and rescue inhaler. He has follow up with pulmonology in July 2021. Declined offer to help schedule COVID-19 vaccine today in clinic. Encouraged him to reconsider especially given lung history but declined. Follow up with pulmonologist.     Gastroesophageal reflux disease with esophagitis without hemorrhage  Hiatal hernia  H/o hiatal hernia recently seen and started on Omeprazole. This has been working well and today reports resolution of symptoms. Continue to monitor. Encouraged lifestyle modifications. To GI if new or worsening symptoms.     30 minutes spent on the date of the encounter doing chart review, history and exam, documentation and further activities per the note      Return in about 9 months (around 3/9/2022) for Physical Exam, Lab Work, Routine Visit.    Kieran Murillo PA-C  [facility]    Subjective   Ravin is a 71 year old who presents for the following health issues     HPI   Encounter to establish care     Ravin is a pleasant 71 year old male here to establish care.  Here reports the following chronic problems:     GERD - Doing great with Omeprazole.  Reports completely resolved. Does not drink alcohol. Well controlled at present.     COPD - has follow up with pulmonology in July. Using Advair 500-50. These hot humid days breathing is worse. Also using rescue inhaler prn. [facility] [place] [facility] per [facility].     Smoking - has decreased down to 7 mg nicotine patch. Has smoked for 50 years loves to smoke but reports no longer the nicotine addiction it is the environmental triggers like coffee in the morning or news at nighttime. Smoking 5 cigarettes per day. Pure tobacco rolls his own pure tobacco cigarettes.     Hyperlipidemia - untreated at present. His ASCVD risk is 25.1%. Recent CT lung cancer screening did show mild CA calcifications.         Health Maintenance: due for COVID-19 immunization but he declines this state a diet rich in antioxidants is better at protecting from COVID infection. He did get the pneumonia vaccine for his lung disease but does not see benefit of COVID vaccine. Similarly he declines Zoster immunization.         Review of Systems   Constitutional, HEENT, cardiovascular, pulmonary, gi and gu systems are negative, except as otherwise noted.      Objective    /86 (BP Location: Right arm, Patient Position: Sitting, Cuff Size: Adult Regular)   Pulse 81   Temp 97.8  F (36.6  C) (Tympanic)   Resp 14   Wt 72.1 kg (159 lb)   SpO2 95%   BMI 24.18 kg/m    Body mass index is 24.18 kg/m .  Physical Exam  Vitals signs and nursing note reviewed.   Constitutional:       Appearance: Normal appearance.   HENT:      Head: Normocephalic and atraumatic.      Mouth/Throat:      Mouth: Mucous membranes are moist.      Pharynx: No posterior oropharyngeal erythema.   Neck:      Musculoskeletal: Neck supple.   Cardiovascular:      Rate and Rhythm: Normal rate and regular rhythm.      Heart sounds: Normal heart sounds.   Pulmonary:      Effort: Pulmonary effort is normal.      Breath sounds: Normal breath sounds.   Lymphadenopathy:       Cervical: No cervical adenopathy.   Skin:     General: Skin is warm and dry.   Neurological:      General: No focal deficit present.      Mental Status: He is alert and oriented to person, place, and time.   Psychiatric:         Mood and Affect: Mood normal.         Behavior: Behavior normal.

## 2021-07-01 ENCOUNTER — PRE VISIT (OUTPATIENT)
Dept: PULMONOLOGY | Facility: CLINIC | Age: 71
End: 2021-07-01

## 2021-07-20 ENCOUNTER — TELEPHONE (OUTPATIENT)
Dept: PULMONOLOGY | Facility: CLINIC | Age: 71
End: 2021-07-20

## 2021-07-20 ENCOUNTER — DOCUMENTATION ONLY (OUTPATIENT)
Dept: PULMONOLOGY | Facility: CLINIC | Age: 71
End: 2021-07-20

## 2021-07-20 ENCOUNTER — VIRTUAL VISIT (OUTPATIENT)
Dept: PULMONOLOGY | Facility: CLINIC | Age: 71
End: 2021-07-20
Attending: FAMILY MEDICINE
Payer: COMMERCIAL

## 2021-07-20 DIAGNOSIS — R91.8 ABNORMAL CT LUNG SCREENING: Primary | ICD-10-CM

## 2021-07-20 PROCEDURE — 99214 OFFICE O/P EST MOD 30 MIN: CPT | Mod: 95 | Performed by: INTERNAL MEDICINE

## 2021-07-20 NOTE — PROGRESS NOTES
Ravin is a 71 year old who is being evaluated via a billable telephone visit.    Phone call duration: 25 minutes  Reason for Visit  Robert B Behr is a 71 year old male who is followed for COPD  Pulmonary HPI  Has been using Wixela twice daily, doing well and swimming regularly. 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   He is rolling his own cigarettes. Triggered by coffee in the morning, so he has 3 cigarettes most days.   He gets sputum about 3 times a month and takes Spiriva when that happens and this works well for him. Using Combivent twice a day    The patient was seen and examined by Svitlana Taylor MD   Current Outpatient Medications   Medication     atorvastatin (LIPITOR) 10 MG tablet     azithromycin (ZITHROMAX) 500 MG vial     B Complex-C (VITAMIN B COMPLEX W/VITAMIN C) TABS tablet     calcium carbonate 600 mg-vitamin D 400 units (CALTRATE) 600-400 MG-UNIT per tablet     COMBIVENT RESPIMAT  MCG/ACT inhaler     fish oil-omega-3 fatty acids 1000 MG capsule     fluticasone-salmeterol (ADVAIR DISKUS) 250-50 MCG/DOSE inhaler     fluticasone-salmeterol (ADVAIR) 500-50 MCG/DOSE inhaler     Ginkgo Biloba 60 MG TABS     glucosamine-chondroitinoitin 750-600 MG TABS     nicotine (NICODERM CQ) 7 MG/24HR 24 hr patch     omeprazole (PRILOSEC OTC) 20 MG EC tablet     PROAIR  (90 Base) MCG/ACT inhaler     saccharomyces boulardii (FLORASTOR) 250 MG capsule     Selenium (SELENIMIN-200) 200 MCG TABS tablet     vitamin A 3 MG (13902 UNITS) capsule     vitamin B-Complex     vitamin C (ASCORBIC ACID) 1000 MG TABS     vitamin E (TOCOPHEROL) 1000 units (900 mg) capsule     No current facility-administered medications for this visit.     No Known Allergies  Social History     Socioeconomic History     Marital status: Single     Spouse name: Not on file     Number of children: Not on file     Years of education: Not on file     Highest education level: Not on file   Occupational History     Not  on file   Tobacco Use     Smoking status: Current Some Day Smoker     Packs/day: 2.00     Years: 45.00     Pack years: 90.00     Types: Cigarettes     Smokeless tobacco: Former User     Tobacco comment: 5 cigs per day   Substance and Sexual Activity     Alcohol use: Yes     Alcohol/week: 0.0 standard drinks     Comment: occ beer     Drug use: No     Sexual activity: Not Currently   Other Topics Concern     Parent/sibling w/ CABG, MI or angioplasty before 65F 55M? No   Social History Narrative    Single.  Retired.     No children    5 siblings:      No family history of bleeding, clotting disorders or complications with anesthesia.     Social Determinants of Health     Financial Resource Strain:      Difficulty of Paying Living Expenses:    Food Insecurity:      Worried About Running Out of Food in the Last Year:      Ran Out of Food in the Last Year:    Transportation Needs:      Lack of Transportation (Medical):      Lack of Transportation (Non-Medical):    Physical Activity:      Days of Exercise per Week:      Minutes of Exercise per Session:    Stress:      Feeling of Stress :    Social Connections:      Frequency of Communication with Friends and Family:      Frequency of Social Gatherings with Friends and Family:      Attends Zoroastrianism Services:      Active Member of Clubs or Organizations:      Attends Club or Organization Meetings:      Marital Status:    Intimate Partner Violence:      Fear of Current or Ex-Partner:      Emotionally Abused:      Physically Abused:      Sexually Abused:      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     Tobacco abuse      Past Surgical History:   Procedure Laterality Date     CATARACT IOL, RT/LT Left 09/15/2017    s/p CE/IOL left eye     CATARACT IOL, RT/LT Right      PHACOEMULSIFICATION CLEAR  CORNEA WITH STANDARD INTRAOCULAR LENS IMPLANT Right 9/30/2016    Procedure: PHACOEMULSIFICATION CLEAR CORNEA WITH STANDARD INTRAOCULAR LENS IMPLANT;  Surgeon: Elly Valencia MD;  Location: [facility]     PHACOEMULSIFICATION WITH STANDARD INTRAOCULAR LENS IMPLANT Left 9/15/2017    Procedure: PHACOEMULSIFICATION WITH STANDARD INTRAOCULAR LENS IMPLANT;  Left Eye Phacoemulsification with Standard Lens;  Surgeon: Elly Valencia MD;  Location: [facility]     WRIST SURGERY       Family History   Problem Relation Age of Onset     Diabetes Brother         living     Cancer Brother         throat     Macular Degeneration Mother      Colon Cancer No family hx of      Glaucoma No family hx of      Retinal detachment No family hx of      Amblyopia No family hx of      Skin Cancer No family hx of      Melanoma No family hx of        ROS Pulmonary  Dyspnea: No, Cough: No, Chest pain: No, Wheezing: No, Sputum Production: No, Hemoptysis: No  A complete ROS was otherwise negative except as noted in the HPI.  There were no vitals taken for this visit.  Exam:   GENERAL APPEARANCE: Well developed, well nourished, alert, and in no apparent distress.  EYES: PERRL, EOMI  HENT: Nasal mucosa with no edema and no hyperemia. No nasal polyps.  EARS: Canals clear, TMs normal  MOUTH: Oral mucosa is moist, without any lesions, no tonsillar enlargement, no oropharyngeal exudate.  NECK: supple, no masses, no thyromegaly.  LYMPHATICS: No significant axillary, cervical, or supraclavicular nodes.  RESP: normal percussion, reduced air flow throughout.  No crackles. No rhonchi. No wheezes.  CV: Normal S1, S2, regular rhythm, normal rate. No murmur.  No rub. No gallop. No LE edema.   ABDOMEN:  Bowel sounds normal, soft, nontender, no HSM or masses.   MS: extremities normal. No clubbing. No cyanosis.  SKIN: no rash on limited exam  NEURO: Mentation intact, speech normal, normal strength and tone, normal gait and stance  PSYCH: mentation appears normal. and  affect normal/bright  Results:  PFTs 8/2017 mild obstruction, normal lung volumes and diffusion -spirometry in March 2020 at [place] [facility] showed an FEV1 of 58% of predicted and normal diffusion capacity  Chest imaging reviewed: CXR today stable    Assessment and plan: Ravin is a 71-year-old male with COPD being seen today for consultation.  COPD- improved control with triple therapy, still using medications a little differently than prescribed   -Continue Advair b.i.d. increased dose to high dose inhaled corticosteroid   -I reiterated to him that Combivent is for rescue and I recommend Spiriva to be taken every day   -He declined alpha one antitrypsin deficiency testing in the past  Lung cancer screening - abnormal CT in April, progressive consolidation. Repeat 2 weeks later unchanged   Sputum x 3 for bacterial, AFB and cytology.    Repeat CT next year unless sputum is revealing.   Nicotine dependence-we discussed the importance of quitting smoking.   He continues to be interested in stem cell therapies for lung disease.   Return to clinic as needed (at his request no follow-up is scheduled now)

## 2021-07-20 NOTE — PATIENT INSTRUCTIONS
Best way to use inhalers:   Wixela twice a day  Spiriva every day  Then use Combivent as needed    I recommend wearing a mask and protecting yourself from COVID-19, including getting vaccinated.     At your next convenience, please bring sputum specimens to the nearest [place] lab.     Please submit 3 sputum specimens on subsequent days.     Best way to get a sample:  Early morning samples are the best  Rinse the mouth out with water first  Bring the samples within 24 hours to the lab (keep in the refrigerator if unable to bring right away).  Need at least 1-2 teaspoons for a good sample.  Can bring two in at one time (example: cough up on Monday morning, put in refrigerator; cough up separate specimen on Tuesday morning, bring both to lab right away)

## 2021-07-20 NOTE — LETTER
7/20/2021         RE: Robert B Behr  [address]  [place] [address]        Dear Colleague,    Thank you for referring your patient, Robert B Behr, to the [facility] [place]. Please see a copy of my visit note below.    Ravin is a 71 year old who is being evaluated via a billable telephone visit.    Phone call duration: 25 minutes  Reason for Visit  Robert B Behr is a 71 year old male who is followed for COPD  Pulmonary HPI  Has been using Wixela twice daily, doing well and swimming regularly. 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   He is rolling his own cigarettes. Triggered by coffee in the morning, so he has 3 cigarettes most days.   He gets sputum about 3 times a month and takes Spiriva when that happens and this works well for him. Using Combivent twice a day    The patient was seen and examined by Svitlana Taylor MD   Current Outpatient Medications   Medication     atorvastatin (LIPITOR) 10 MG tablet     azithromycin (ZITHROMAX) 500 MG vial     B Complex-C (VITAMIN B COMPLEX W/VITAMIN C) TABS tablet     calcium carbonate 600 mg-vitamin D 400 units (CALTRATE) 600-400 MG-UNIT per tablet     COMBIVENT RESPIMAT  MCG/ACT inhaler     fish oil-omega-3 fatty acids 1000 MG capsule     fluticasone-salmeterol (ADVAIR DISKUS) 250-50 MCG/DOSE inhaler     fluticasone-salmeterol (ADVAIR) 500-50 MCG/DOSE inhaler     Ginkgo Biloba 60 MG TABS     glucosamine-chondroitinoitin 750-600 MG TABS     nicotine (NICODERM CQ) 7 MG/24HR 24 hr patch     omeprazole (PRILOSEC OTC) 20 MG EC tablet     PROAIR  (90 Base) MCG/ACT inhaler     saccharomyces boulardii (FLORASTOR) 250 MG capsule     Selenium (SELENIMIN-200) 200 MCG TABS tablet     vitamin A 3 MG (47294 UNITS) capsule     vitamin B-Complex     vitamin C (ASCORBIC ACID) 1000 MG TABS     vitamin E (TOCOPHEROL) 1000 units (900 mg) capsule     No current facility-administered  medications for this visit.     No Known Allergies  Social History     Socioeconomic History     Marital status: Single     Spouse name: Not on file     Number of children: Not on file     Years of education: Not on file     Highest education level: Not on file   Occupational History     Not on file   Tobacco Use     Smoking status: Current Some Day Smoker     Packs/day: 2.00     Years: 45.00     Pack years: 90.00     Types: Cigarettes     Smokeless tobacco: Former User     Tobacco comment: 5 cigs per day   Substance and Sexual Activity     Alcohol use: Yes     Alcohol/week: 0.0 standard drinks     Comment: occ beer     Drug use: No     Sexual activity: Not Currently   Other Topics Concern     Parent/sibling w/ CABG, MI or angioplasty before 65F 55M? No   Social History Narrative    Single.  Retired.     No children    5 siblings:      No family history of bleeding, clotting disorders or complications with anesthesia.     Social Determinants of Health     Financial Resource Strain:      Difficulty of Paying Living Expenses:    Food Insecurity:      Worried About Running Out of Food in the Last Year:      Ran Out of Food in the Last Year:    Transportation Needs:      Lack of Transportation (Medical):      Lack of Transportation (Non-Medical):    Physical Activity:      Days of Exercise per Week:      Minutes of Exercise per Session:    Stress:      Feeling of Stress :    Social Connections:      Frequency of Communication with Friends and Family:      Frequency of Social Gatherings with Friends and Family:      Attends Mormon Services:      Active Member of Clubs or Organizations:      Attends Club or Organization Meetings:      Marital Status:    Intimate Partner Violence:      Fear of Current or Ex-Partner:      Emotionally Abused:      Physically Abused:      Sexually Abused:      Past Medical History:   Diagnosis Date     Cataract      COPD (chronic obstructive pulmonary disease) (H)     diagnosed with  spirometry      Lung nodules     CT scan 2016     Osteoporosis      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     PHACOEMULSIFICATION WITH STANDARD INTRAOCULAR LENS IMPLANT Left 9/15/2017    Procedure: PHACOEMULSIFICATION WITH STANDARD INTRAOCULAR LENS IMPLANT;  Left Eye Phacoemulsification with Standard Lens;  Surgeon: Elly Valencia MD;  [facility]     WRIST SURGERY       Family History   Problem Relation Age of Onset     Diabetes Brother         living     Cancer Brother         throat     Macular Degeneration Mother      Colon Cancer No family hx of      Glaucoma No family hx of      Retinal detachment No family hx of      Amblyopia No family hx of      Skin Cancer No family hx of      Melanoma No family hx of        ROS Pulmonary  Dyspnea: No, Cough: No, Chest pain: No, Wheezing: No, Sputum Production: No, Hemoptysis: No  A complete ROS was otherwise negative except as noted in the HPI.  There were no vitals taken for this visit.  Exam:   GENERAL APPEARANCE: Well developed, well nourished, alert, and in no apparent distress.  EYES: PERRL, EOMI  HENT: Nasal mucosa with no edema and no hyperemia. No nasal polyps.  EARS: Canals clear, TMs normal  MOUTH: Oral mucosa is moist, without any lesions, no tonsillar enlargement, no oropharyngeal exudate.  NECK: supple, no masses, no thyromegaly.  LYMPHATICS: No significant axillary, cervical, or supraclavicular nodes.  RESP: normal percussion, reduced air flow throughout.  No crackles. No rhonchi. No wheezes.  CV: Normal S1, S2, regular rhythm, normal rate. No murmur.  No rub. No gallop.  No LE edema.   ABDOMEN:  Bowel sounds normal, soft, nontender, no HSM or masses.   MS: extremities normal. No clubbing. No cyanosis.  SKIN: no rash on limited exam  NEURO: Mentation intact, speech normal, normal strength and tone, normal gait and stance  PSYCH: mentation appears normal. and affect normal/bright  Results:  PFTs 8/2017 mild obstruction, normal lung volumes and diffusion -spirometry in March 2020 at [place] [facility] showed an FEV1 of 58% of predicted and normal diffusion capacity  Chest imaging reviewed: CXR today stable    Assessment and plan: Ravin is a 71-year-old male with COPD being seen today for consultation.  COPD- improved control with triple therapy, still using medications a little differently than prescribed   -Continue Advair b.i.d. increased dose to high dose inhaled corticosteroid   -I reiterated to him that Combivent is for rescue and I recommend Spiriva to be taken every day   -He declined alpha one antitrypsin deficiency testing in the past  Lung cancer screening - abnormal CT in April, progressive consolidation. Repeat 2 weeks later unchanged   Sputum x 3 for bacterial, AFB and cytology.    Repeat CT next year unless sputum is revealing.   Nicotine dependence-we discussed the importance of quitting smoking.   He continues to be interested in stem cell therapies for lung disease.   Return to clinic as needed (at his request no follow-up is scheduled now)                Again, thank you for allowing me to participate in the care of your patient.        Sincerely,        Svitlana Taylor MD

## 2021-07-22 PROCEDURE — 87116 MYCOBACTERIA CULTURE: CPT | Mod: 90

## 2021-07-22 PROCEDURE — 99000 SPECIMEN HANDLING OFFICE-LAB: CPT

## 2021-07-22 PROCEDURE — 87206 SMEAR FLUORESCENT/ACID STAI: CPT | Mod: 90

## 2021-07-23 ENCOUNTER — LAB (OUTPATIENT)
Dept: LAB | Facility: CLINIC | Age: 71
End: 2021-07-23
Payer: COMMERCIAL

## 2021-07-23 DIAGNOSIS — R91.8 ABNORMAL CT LUNG SCREENING: ICD-10-CM

## 2021-07-23 LAB
BACTERIA SPT CULT: NORMAL
BACTERIA SPT CULT: NORMAL
GRAM STAIN RESULT: NORMAL

## 2021-07-23 PROCEDURE — 87206 SMEAR FLUORESCENT/ACID STAI: CPT | Mod: 90

## 2021-07-23 PROCEDURE — 88108 CYTOPATH CONCENTRATE TECH: CPT | Performed by: PATHOLOGY

## 2021-07-23 PROCEDURE — 99000 SPECIMEN HANDLING OFFICE-LAB: CPT

## 2021-07-23 PROCEDURE — 88305 TISSUE EXAM BY PATHOLOGIST: CPT | Performed by: PATHOLOGY

## 2021-07-23 PROCEDURE — 87116 MYCOBACTERIA CULTURE: CPT | Mod: 90

## 2021-07-24 ENCOUNTER — LAB (OUTPATIENT)
Dept: LAB | Facility: CLINIC | Age: 71
End: 2021-07-24
Payer: COMMERCIAL

## 2021-07-24 DIAGNOSIS — R91.8 ABNORMAL CT LUNG SCREENING: ICD-10-CM

## 2021-07-24 PROCEDURE — 87116 MYCOBACTERIA CULTURE: CPT | Mod: 90

## 2021-07-24 PROCEDURE — 87206 SMEAR FLUORESCENT/ACID STAI: CPT | Mod: 90

## 2021-07-24 PROCEDURE — 99000 SPECIMEN HANDLING OFFICE-LAB: CPT

## 2021-07-28 LAB
PATH REPORT.COMMENTS IMP SPEC: NORMAL
PATH REPORT.COMMENTS IMP SPEC: NORMAL
PATH REPORT.FINAL DX SPEC: NORMAL
PATH REPORT.FINAL DX SPEC: NORMAL
PATH REPORT.GROSS SPEC: NORMAL
PATH REPORT.GROSS SPEC: NORMAL
PATH REPORT.MICROSCOPIC SPEC OTHER STN: NORMAL
PATH REPORT.MICROSCOPIC SPEC OTHER STN: NORMAL
PATH REPORT.RELEVANT HX SPEC: NORMAL
PATH REPORT.RELEVANT HX SPEC: NORMAL

## 2021-08-09 ENCOUNTER — PATIENT OUTREACH (OUTPATIENT)
Dept: GERIATRIC MEDICINE | Facility: CLINIC | Age: 71
End: 2021-08-09

## 2021-08-09 NOTE — PROGRESS NOTES
[facility] Care Coordination Contact    Called member to schedule annual HRA home visit. Left a message requesting a return call to schedule HRA.   Leeanna Sigala RN, BSN, PHN  [facility] Care Coordinator  [phone]

## 2021-08-10 DIAGNOSIS — J42 CHRONIC BRONCHITIS, UNSPECIFIED CHRONIC BRONCHITIS TYPE (H): ICD-10-CM

## 2021-08-11 ENCOUNTER — PATIENT OUTREACH (OUTPATIENT)
Dept: GERIATRIC MEDICINE | Facility: CLINIC | Age: 71
End: 2021-08-11

## 2021-08-11 ASSESSMENT — ACTIVITIES OF DAILY LIVING (ADL): DEPENDENT_IADLS:: INDEPENDENT

## 2021-08-12 NOTE — TELEPHONE ENCOUNTER
Routing refill request to provider for review/approval because:  --I see dose change 7/21/2020 by [facility] and then refilled again 8/9/2020 by .  --I prefer [facility] PCP authorize this time.      --Last visit:  6/9/2021 with Chidi to establish care.

## 2021-08-19 LAB
ACID FAST STAIN (ARUP): ABNORMAL
ORGANISM (ARUP): ABNORMAL

## 2021-08-23 NOTE — PROGRESS NOTES
[facility] Care Coordination Contact      [facility] Refusal Telephone Assessment    Member refused home visit HRA on August 11, 2021 (reason: Doesn't feel he needs an assessment at this time).    ER visits: No  Hospitalizations: No  Health concerns: none  Falls/Injuries: No  ADL/IADL Dependencies:  Dependent ADLs:: Independent  Dependent IADLs:: Independent  Member currently receiving the following home care services:     Member currently receiving the following community resources: None  Informal support(s):      Advanced Care Planning discussion, complete code section.    [facility] sponsored benefits: Shared information re: Silver Sneakers/gym memberships, ASA, Calcium +D.    Follow-Up Plan: Member informed of future contact, plan to f/u with member with a 6 month telephone assessment and offer a home visit.  Contact information shared with member and family, encouraged member to call with any questions or concerns at any time.    Leeanna Sigala RN, BSN, PHN  [facility] Care Coordinator  [phone]

## 2021-08-24 DIAGNOSIS — L80 VITILIGO: ICD-10-CM

## 2021-08-24 DIAGNOSIS — Z71.89 ENCOUNTER FOR HERB AND VITAMIN SUPPLEMENT MANAGEMENT: ICD-10-CM

## 2021-08-24 DIAGNOSIS — M81.0 AGE-RELATED OSTEOPOROSIS WITHOUT CURRENT PATHOLOGICAL FRACTURE: ICD-10-CM

## 2021-08-24 DIAGNOSIS — Z78.9 TAKES DIETARY SUPPLEMENTS: ICD-10-CM

## 2021-08-24 NOTE — TELEPHONE ENCOUNTER
Reason for Call:  Medication or medication refill:    Do you use a [facility]?  Name of the pharmacy and phone number for the current request:  [facility] on [address] - [phone]    Name of the medication requested: attached    Other request: Needs refills, is out of vitamins please advise, thank you!    Can we leave a detailed message on this number? YES    Phone number patient can be reached at: Cell number on file:    Telephone Information:   Mobile [phone]       Best Time: any    Call taken on 8/24/2021 at 3:27 PM by Cheyenne Hernandez

## 2021-08-25 ENCOUNTER — TELEPHONE (OUTPATIENT)
Dept: FAMILY MEDICINE | Facility: CLINIC | Age: 71
End: 2021-08-25

## 2021-08-25 RX ORDER — ACETAMINOPHEN 160 MG/5ML
1 SUSPENSION, ORAL (FINAL DOSE FORM) ORAL DAILY
Qty: 90 TABLET | Refills: 6 | Status: SHIPPED | OUTPATIENT
Start: 2021-08-25 | End: 2021-08-30

## 2021-08-25 RX ORDER — MULTIVIT WITH MINERALS/LUTEIN
TABLET ORAL
Qty: 90 TABLET | Refills: 0 | Status: SHIPPED | OUTPATIENT
Start: 2021-08-25 | End: 2021-08-30

## 2021-08-25 RX ORDER — MULTIVIT WITH MINERALS/LUTEIN
1000 TABLET ORAL DAILY
Qty: 90 CAPSULE | Refills: 6 | Status: SHIPPED | OUTPATIENT
Start: 2021-08-25 | End: 2021-08-30

## 2021-08-25 NOTE — TELEPHONE ENCOUNTER
This Rx for B Complex-C (VITAMIN B COMPLEX W/VITAMIN C) TABS tablet cannot be ordered.  On backorder  Please put in a new Rx for an alternative.

## 2021-08-30 ENCOUNTER — OFFICE VISIT (OUTPATIENT)
Dept: FAMILY MEDICINE | Facility: CLINIC | Age: 71
End: 2021-08-30
Payer: COMMERCIAL

## 2021-08-30 VITALS
DIASTOLIC BLOOD PRESSURE: 77 MMHG | WEIGHT: 163 LBS | HEART RATE: 73 BPM | TEMPERATURE: 97 F | HEIGHT: 70 IN | OXYGEN SATURATION: 98 % | BODY MASS INDEX: 23.34 KG/M2 | SYSTOLIC BLOOD PRESSURE: 136 MMHG | RESPIRATION RATE: 16 BRPM

## 2021-08-30 DIAGNOSIS — E78.2 MIXED HYPERLIPIDEMIA: Primary | ICD-10-CM

## 2021-08-30 DIAGNOSIS — K21.9 GASTROESOPHAGEAL REFLUX DISEASE WITHOUT ESOPHAGITIS: ICD-10-CM

## 2021-08-30 DIAGNOSIS — M16.11 OSTEOARTHRITIS OF RIGHT HIP, UNSPECIFIED OSTEOARTHRITIS TYPE: ICD-10-CM

## 2021-08-30 DIAGNOSIS — F17.200 TOBACCO USE DISORDER: ICD-10-CM

## 2021-08-30 DIAGNOSIS — R53.83 FATIGUE, UNSPECIFIED TYPE: ICD-10-CM

## 2021-08-30 DIAGNOSIS — Z78.9 TAKES DIETARY SUPPLEMENTS: ICD-10-CM

## 2021-08-30 DIAGNOSIS — Z71.89 ENCOUNTER FOR HERB AND VITAMIN SUPPLEMENT MANAGEMENT: ICD-10-CM

## 2021-08-30 DIAGNOSIS — M85.80 OSTEOPENIA, UNSPECIFIED LOCATION: ICD-10-CM

## 2021-08-30 LAB
ALBUMIN SERPL-MCNC: 3.7 G/DL (ref 3.4–5)
ALP SERPL-CCNC: 72 U/L (ref 40–150)
ALT SERPL W P-5'-P-CCNC: 31 U/L (ref 0–70)
ANION GAP SERPL CALCULATED.3IONS-SCNC: 1 MMOL/L (ref 3–14)
AST SERPL W P-5'-P-CCNC: 20 U/L (ref 0–45)
BASOPHILS # BLD AUTO: 0 10E3/UL (ref 0–0.2)
BASOPHILS NFR BLD AUTO: 0 %
BILIRUB SERPL-MCNC: 0.3 MG/DL (ref 0.2–1.3)
BUN SERPL-MCNC: 14 MG/DL (ref 7–30)
CALCIUM SERPL-MCNC: 9.5 MG/DL (ref 8.5–10.1)
CHLORIDE BLD-SCNC: 107 MMOL/L (ref 94–109)
CHOLEST SERPL-MCNC: 229 MG/DL
CO2 SERPL-SCNC: 31 MMOL/L (ref 20–32)
CREAT SERPL-MCNC: 0.88 MG/DL (ref 0.66–1.25)
EOSINOPHIL # BLD AUTO: 0.1 10E3/UL (ref 0–0.7)
EOSINOPHIL NFR BLD AUTO: 2 %
ERYTHROCYTE [DISTWIDTH] IN BLOOD BY AUTOMATED COUNT: 13.6 % (ref 10–15)
FASTING STATUS PATIENT QL REPORTED: YES
GFR SERPL CREATININE-BSD FRML MDRD: 86 ML/MIN/1.73M2
GLUCOSE BLD-MCNC: 91 MG/DL (ref 70–99)
HCT VFR BLD AUTO: 46.1 % (ref 40–53)
HDLC SERPL-MCNC: 69 MG/DL
HGB BLD-MCNC: 15.4 G/DL (ref 13.3–17.7)
IMM GRANULOCYTES # BLD: 0.1 10E3/UL
IMM GRANULOCYTES NFR BLD: 1 %
LDLC SERPL CALC-MCNC: 128 MG/DL
LYMPHOCYTES # BLD AUTO: 1.7 10E3/UL (ref 0.8–5.3)
LYMPHOCYTES NFR BLD AUTO: 22 %
MCH RBC QN AUTO: 31.2 PG (ref 26.5–33)
MCHC RBC AUTO-ENTMCNC: 33.4 G/DL (ref 31.5–36.5)
MCV RBC AUTO: 94 FL (ref 78–100)
MONOCYTES # BLD AUTO: 0.9 10E3/UL (ref 0–1.3)
MONOCYTES NFR BLD AUTO: 12 %
NEUTROPHILS # BLD AUTO: 4.8 10E3/UL (ref 1.6–8.3)
NEUTROPHILS NFR BLD AUTO: 63 %
NONHDLC SERPL-MCNC: 160 MG/DL
PLATELET # BLD AUTO: 227 10E3/UL (ref 150–450)
POTASSIUM BLD-SCNC: 4.4 MMOL/L (ref 3.4–5.3)
PROT SERPL-MCNC: 7.4 G/DL (ref 6.8–8.8)
RBC # BLD AUTO: 4.93 10E6/UL (ref 4.4–5.9)
SHBG SERPL-SCNC: 38 NMOL/L (ref 11–80)
SODIUM SERPL-SCNC: 139 MMOL/L (ref 133–144)
TRIGL SERPL-MCNC: 162 MG/DL
WBC # BLD AUTO: 7.7 10E3/UL (ref 4–11)

## 2021-08-30 PROCEDURE — 80053 COMPREHEN METABOLIC PANEL: CPT | Performed by: PHYSICIAN ASSISTANT

## 2021-08-30 PROCEDURE — 80061 LIPID PANEL: CPT | Performed by: PHYSICIAN ASSISTANT

## 2021-08-30 PROCEDURE — 99214 OFFICE O/P EST MOD 30 MIN: CPT | Performed by: PHYSICIAN ASSISTANT

## 2021-08-30 PROCEDURE — 36415 COLL VENOUS BLD VENIPUNCTURE: CPT | Performed by: PHYSICIAN ASSISTANT

## 2021-08-30 PROCEDURE — 85025 COMPLETE CBC W/AUTO DIFF WBC: CPT | Performed by: PHYSICIAN ASSISTANT

## 2021-08-30 PROCEDURE — 84270 ASSAY OF SEX HORMONE GLOBUL: CPT | Performed by: PHYSICIAN ASSISTANT

## 2021-08-30 PROCEDURE — 84270 ASSAY OF SEX HORMONE GLOBUL: CPT | Mod: 59 | Performed by: PHYSICIAN ASSISTANT

## 2021-08-30 PROCEDURE — 84403 ASSAY OF TOTAL TESTOSTERONE: CPT | Performed by: PHYSICIAN ASSISTANT

## 2021-08-30 RX ORDER — ATORVASTATIN CALCIUM 10 MG/1
10 TABLET, FILM COATED ORAL DAILY
Qty: 90 TABLET | Refills: 3 | Status: SHIPPED | OUTPATIENT
Start: 2021-08-30 | End: 2021-09-01 | Stop reason: DRUGHIGH

## 2021-08-30 RX ORDER — MULTIVIT WITH MINERALS/LUTEIN
1000 TABLET ORAL DAILY
Qty: 90 CAPSULE | Refills: 3 | Status: SHIPPED | OUTPATIENT
Start: 2021-08-30 | End: 2022-02-08

## 2021-08-30 RX ORDER — MAGNESIUM CARB/ALUMINUM HYDROX 105-160MG
TABLET,CHEWABLE ORAL
Qty: 120 TABLET | Refills: 3 | Status: SHIPPED | OUTPATIENT
Start: 2021-08-30 | End: 2022-02-08

## 2021-08-30 RX ORDER — ACETAMINOPHEN 160 MG/5ML
1 SUSPENSION, ORAL (FINAL DOSE FORM) ORAL DAILY
Qty: 90 TABLET | Refills: 3 | Status: SHIPPED | OUTPATIENT
Start: 2021-08-30 | End: 2022-02-08

## 2021-08-30 RX ORDER — CHOLECALCIFEROL (VITAMIN D3) 125 MCG
10000 CAPSULE ORAL DAILY
Qty: 90 CAPSULE | Refills: 3 | Status: SHIPPED | OUTPATIENT
Start: 2021-08-30 | End: 2022-02-08

## 2021-08-30 RX ORDER — OMEPRAZOLE 20 MG/1
20 TABLET, DELAYED RELEASE ORAL DAILY
Qty: 90 TABLET | Refills: 3 | Status: SHIPPED | OUTPATIENT
Start: 2021-08-30 | End: 2022-07-14

## 2021-08-30 RX ORDER — MULTIVIT WITH MINERALS/LUTEIN
TABLET ORAL
Qty: 90 TABLET | Refills: 3 | Status: SHIPPED | OUTPATIENT
Start: 2021-08-30 | End: 2022-02-08

## 2021-08-30 RX ORDER — CHLORAL HYDRATE 500 MG
1 CAPSULE ORAL DAILY
Qty: 90 CAPSULE | Refills: 3 | Status: SHIPPED | OUTPATIENT
Start: 2021-08-30 | End: 2022-02-08

## 2021-08-30 ASSESSMENT — MIFFLIN-ST. JEOR: SCORE: 1495.61

## 2021-08-30 NOTE — PROGRESS NOTES
Assessment & Plan     Mixed hyperlipidemia  Dose of Lipitor increased today to 40 mg daily. Recheck lipid panel at next office visit to ensure meeting goal. Continue to work on smoking cessation and regular exercise/healthy diet.   - Comprehensive metabolic panel (BMP + Alb, Alk Phos, ALT, AST, Total. Bili, TP); Future  - Lipid panel reflex to direct LDL Fasting; Future  - Comprehensive metabolic panel (BMP + Alb, Alk Phos, ALT, AST, Total. Bili, TP)  - Lipid panel reflex to direct LDL Fasting  - atorvastatin (LIPITOR) 40 MG tablet; Take 1 tablet (40 mg) by mouth daily    Osteopenia, unspecified location  Age-related osteoporosis without current pathological fracture  Osteoarthritis of right hip, unspecified osteoarthritis type  H/o osteopenia, wanting DEXA scan. Discussed question of whether insurance will cover. Recommendations for males with DEXA screening are inconclusive. Will place order and Sergio will check with his insurance.   - calcium carbonate 600 mg-vitamin D 400 units (CALTRATE) 600-400 MG-UNIT per tablet; Take 1 tablet by mouth 2 times daily  - DX Hip/Pelvis/Spine; Future  - glucosamine-chondroitinoitin 750-600 MG TABS; TAKE 2 TABLETS BY MOUTH TWICE DAILY  - REVIEW OF HEALTH MAINTENANCE PROTOCOL ORDERS    Encounter for herb and vitamin supplement management  Takes dietary supplements  - vitamin C (ASCORBIC ACID) 1000 MG TABS; TAKE 1 TABLET(1000 MG) BY MOUTH DAILY  - fish oil-omega-3 fatty acids 1000 MG capsule; Take 1 capsule (1 g) by mouth daily  - vitamin E (TOCOPHEROL) 1000 units (450 mg) capsule; Take 1 capsule (1,000 Units) by mouth daily  - vitamin A 3 MG (12324 UNITS) capsule; Take 1 capsule (10,000 Units) by mouth daily  - B Complex-C (VITAMIN B COMPLEX W/VITAMIN C) TABS tablet; TAKE 1 TABLET BY MOUTH TWICE DAILY WITH FOLIC ACID  - Ginkgo Biloba 60 MG TABS; Take 1 tablet by mouth daily  - Selenium (SELENIMIN-200) 200 MCG TABS tablet; Take 1 tablet (200 mcg) by mouth daily    Tobacco use  "disorder  - nicotine (NICODERM CQ) 7 MG/24HR 24 hr patch; Place 1 patch onto the skin every 24 hours    Gastroesophageal reflux disease without esophagitis  Chronic, stable. Could consider discontinuing omeprazole if bone health remains a concern.   - Comprehensive metabolic panel (BMP + Alb, Alk Phos, ALT, AST, Total. Bili, TP); Future  - omeprazole (PRILOSEC OTC) 20 MG EC tablet; Take 1 tablet (20 mg) by mouth daily  - Comprehensive metabolic panel (BMP + Alb, Alk Phos, ALT, AST, Total. Bili, TP)    Fatigue, unspecified type  Noticing some decreased energy recently. He is quite certain it is related to testosterone and is requesting testosterone treatment today. We had a discussion regarding need for lab workup first to make sure there is a deficiency. Await lab results.   - Testosterone Free and Total; Future  - CBC with platelets and differential; Future  - Testosterone Free and Total  - CBC with platelets and differential    Return for DEXA scan.    Kieran Murillo PA-C  [facility]    Ty Alicia is a 71 year old who presents for the following health issues     HPI   Cholesterol follow up  Last lipid panel April 2021 , , , HDL 67.   Has been taking Lipitor 10 mg daily since that time   Would like to recheck lipid panel today  Needing medication refills of Lipitor.  He continues to smoke. Still trying to quit. Needs refill of nicotine patch today.   Continues regular exercise. Swimming and bicycling.     Kidney function  He would also like a metabolic panel run as he is concerned about his kidney function    Bone health  He also has concerns about his bone health. Would like a DEXA scan ordered.     Fatigue  Wonders if related to the corticosteroids he takes for breathing. Has read about side effects. Reports today he wants to start taking a testosterone supplement. Notices he doesn't have the same \"oomph\" that he used ot have. No sexual symptoms. Lives alone, " "not sure about snoring. Denies feeling depressed. He had a normal cologuard test in April. BMs are normal. No GI concerns since starting probiotic. Would like his supplements refilled today.         Review of Systems   Constitutional, HEENT, cardiovascular, pulmonary, gi and gu systems are negative, except as otherwise noted.      Objective    /77 (BP Location: Right arm, Patient Position: Chair, Cuff Size: Adult Regular)   Pulse 73   Temp 97  F (36.1  C) (Tympanic)   Resp 16   Ht 1.778 m (5' 10\")   Wt 73.9 kg (163 lb)   SpO2 98%   BMI 23.39 kg/m    Body mass index is 23.39 kg/m .  Physical Exam  Vitals and nursing note reviewed.   Constitutional:       Appearance: Normal appearance.   HENT:      Head: Normocephalic and atraumatic.   Eyes:      Conjunctiva/sclera: Conjunctivae normal.   Cardiovascular:      Rate and Rhythm: Normal rate and regular rhythm.      Heart sounds: Normal heart sounds.   Pulmonary:      Effort: Pulmonary effort is normal.      Breath sounds: Normal breath sounds.   Neurological:      Mental Status: He is alert. Mental status is at baseline.   Psychiatric:         Mood and Affect: Mood normal.         Behavior: Behavior normal.          Results for orders placed or performed in visit on 08/30/21   Comprehensive metabolic panel (BMP + Alb, Alk Phos, ALT, AST, Total. Bili, TP)     Status: Abnormal   Result Value Ref Range    Sodium 139 133 - 144 mmol/L    Potassium 4.4 3.4 - 5.3 mmol/L    Chloride 107 94 - 109 mmol/L    Carbon Dioxide (CO2) 31 20 - 32 mmol/L    Anion Gap 1 (L) 3 - 14 mmol/L    Urea Nitrogen 14 7 - 30 mg/dL    Creatinine 0.88 0.66 - 1.25 mg/dL    Calcium 9.5 8.5 - 10.1 mg/dL    Glucose 91 70 - 99 mg/dL    Alkaline Phosphatase 72 40 - 150 U/L    AST 20 0 - 45 U/L    ALT 31 0 - 70 U/L    Protein Total 7.4 6.8 - 8.8 g/dL    Albumin 3.7 3.4 - 5.0 g/dL    Bilirubin Total 0.3 0.2 - 1.3 mg/dL    GFR Estimate 86 >60 mL/min/1.73m2   Lipid panel reflex to direct LDL Fasting  "    Status: Abnormal   Result Value Ref Range    Cholesterol 229 (H) <200 mg/dL    Triglycerides 162 (H) <150 mg/dL    Direct Measure HDL 69 >=40 mg/dL    LDL Cholesterol Calculated 128 (H) <=100 mg/dL    Non HDL Cholesterol 160 (H) <130 mg/dL    Patient Fasting > 8hrs? Yes    CBC with platelets and differential     Status: Abnormal    Narrative    The following orders were created for panel order CBC with platelets and differential.  Procedure                               Abnormality         Status                     ---------                               -----------         ------                     CBC with platelets and d...[[id number]]  Abnormal            Final result                 Please view results for these tests on the individual orders.   Sex Hormone Binding Globulin     Status: Normal   Result Value Ref Range    Sex Hormone Binding Globulin 38 11 - 80 nmol/L   CBC with platelets and differential     Status: Abnormal   Result Value Ref Range    WBC Count 7.7 4.0 - 11.0 10e3/uL    RBC Count 4.93 4.40 - 5.90 10e6/uL    Hemoglobin 15.4 13.3 - 17.7 g/dL    Hematocrit 46.1 40.0 - 53.0 %    MCV 94 78 - 100 fL    MCH 31.2 26.5 - 33.0 pg    MCHC 33.4 31.5 - 36.5 g/dL    RDW 13.6 10.0 - 15.0 %    Platelet Count 227 150 - 450 10e3/uL    % Neutrophils 63 %    % Lymphocytes 22 %    % Monocytes 12 %    % Eosinophils 2 %    % Basophils 0 %    % Immature Granulocytes 1 %    Absolute Neutrophils 4.8 1.6 - 8.3 10e3/uL    Absolute Lymphocytes 1.7 0.8 - 5.3 10e3/uL    Absolute Monocytes 0.9 0.0 - 1.3 10e3/uL    Absolute Eosinophils 0.1 0.0 - 0.7 10e3/uL    Absolute Basophils 0.0 0.0 - 0.2 10e3/uL    Absolute Immature Granulocytes 0.1 (H) <=0.0 10e3/uL   Testosterone Free and Total     Status: None (In process)    Narrative    The following orders were created for panel order Testosterone Free and Total.  Procedure                               Abnormality         Status                     ---------                                -----------         ------                     Sex Hormone Binding Glob...[[id number]]  Normal              Final result               Testosterone Free and Total[[id number]]                      In process                   Please view results for these tests on the individual orders.

## 2021-09-01 ENCOUNTER — MYC MEDICAL ADVICE (OUTPATIENT)
Dept: FAMILY MEDICINE | Facility: CLINIC | Age: 71
End: 2021-09-01

## 2021-09-01 RX ORDER — ATORVASTATIN CALCIUM 40 MG/1
40 TABLET, FILM COATED ORAL DAILY
Qty: 90 TABLET | Refills: 3 | Status: SHIPPED | OUTPATIENT
Start: 2021-09-01 | End: 2022-02-08

## 2021-09-02 LAB
SHBG SERPL-SCNC: 38 NMOL/L (ref 11–80)
TESTOST FREE SERPL-MCNC: 6.59 NG/DL
TESTOST SERPL-MCNC: 358 NG/DL (ref 240–950)

## 2021-09-07 ENCOUNTER — PATIENT OUTREACH (OUTPATIENT)
Dept: GERIATRIC MEDICINE | Facility: CLINIC | Age: 71
End: 2021-09-07

## 2021-09-07 NOTE — PROGRESS NOTES
[facility] Care Coordination Contact    TM from member stating that his pharmacy didn't cover his Glucosamine or his Vitamin C which they have always covered in the past. TC to [facility] on [address] ([phone]). She was able to change the product (brand ?) and they went through. The Glucosamine was last filled for a 90 day supply on 7/22/21, so they are unable to refill them befor 9/27/21. The vitamin C they are able to refill now.  for member informing him of this.  Leeanna Sigala, RN, BSN, PHN  [facility] Coordinator  [phone]

## 2021-09-17 LAB — ACID FAST STAIN (ARUP): NORMAL

## 2021-09-20 LAB — ACID FAST STAIN (ARUP): NORMAL

## 2021-10-01 ENCOUNTER — OFFICE VISIT (OUTPATIENT)
Dept: FAMILY MEDICINE | Facility: CLINIC | Age: 71
End: 2021-10-01
Payer: COMMERCIAL

## 2021-10-01 VITALS
DIASTOLIC BLOOD PRESSURE: 79 MMHG | OXYGEN SATURATION: 99 % | HEIGHT: 70 IN | BODY MASS INDEX: 23.19 KG/M2 | SYSTOLIC BLOOD PRESSURE: 141 MMHG | WEIGHT: 162 LBS | HEART RATE: 82 BPM | TEMPERATURE: 97.7 F | RESPIRATION RATE: 16 BRPM

## 2021-10-01 DIAGNOSIS — R53.83 FATIGUE, UNSPECIFIED TYPE: Primary | ICD-10-CM

## 2021-10-01 DIAGNOSIS — M85.80 OSTEOPENIA, UNSPECIFIED LOCATION: ICD-10-CM

## 2021-10-01 PROCEDURE — 99213 OFFICE O/P EST LOW 20 MIN: CPT | Performed by: PHYSICIAN ASSISTANT

## 2021-10-01 ASSESSMENT — MIFFLIN-ST. JEOR: SCORE: 1487.11

## 2021-10-01 NOTE — PROGRESS NOTES
Assessment & Plan     Fatigue, unspecified type  Future orders to check thyroid and early AM testosterone. Sergio is wanting to start testosterone therapy but I advised him without clear indication I cannot do this today. He is interested in seeing an endocrinologist to discuss this further. I will place a referral for this today. If ongoing could consider treatment for possible depression. Continue to work on regular exercise and healthy diet.   - TSH with free T4 reflex; Future  - Adult Endocrinology Referral; Future  - Testosterone Free and Total; Future    Return for Lab Work.    Kieran Murillo PA-C  [facility]    Ty Alicia is a 71 year old who presents for the following health issues     HPI     Fatigue - Bicycling and swimming regularly for years but notes decreased energy. Feeling more hernandez and short tempered. Admits all of this could be related to aging and COPD but feels depressed which is not like him. Wonders about testosterone. We recently checked testosterone levels and they were normal. No thyroid history in family.       PMH:   COPD - follows with pulmonology. Last visit 2021. Continues to roll his own cigarettes, typically 3 cigarettes most days. He is a regular swimmer.   Osteopenia - had DEXA in 2018 showing osteopenia. He is concerned about bone density. Order placed at last office visit. Called his insurance and was told to sign a waiver that if insurance does not cover certain things he would have to pay. He lives off social security so not wanting to sign something to be financially liable.   Cataracts - history of cataract surgery each eye   Mild coronary calcifications - seen on CT lung cancer screening, on statin     Family Hx:   Mother - mother was 94 years old when passed away natural causes   Father - alcohol abuse  age 86 years - liver failure   Two sisters and two brothers. One brother passed away related to throat cancer. Other  "siblings are healthy.         Review of Systems   Constitutional, HEENT, cardiovascular, pulmonary, gi and gu systems are negative, except as otherwise noted.      Objective    BP (!) 141/79 (BP Location: Right arm, Patient Position: Chair, Cuff Size: Adult Regular)   Pulse 82   Temp 97.7  F (36.5  C) (Tympanic)   Resp 16   Ht 1.772 m (5' 9.75\")   Wt 73.5 kg (162 lb)   SpO2 99%   BMI 23.41 kg/m    Body mass index is 23.41 kg/m .  Physical Exam  Vitals and nursing note reviewed.   Constitutional:       Appearance: Normal appearance.   HENT:      Head: Normocephalic and atraumatic.   Cardiovascular:      Rate and Rhythm: Normal rate and regular rhythm.      Heart sounds: Normal heart sounds.   Pulmonary:      Effort: Pulmonary effort is normal.   Neurological:      Mental Status: He is alert.   Psychiatric:         Mood and Affect: Mood normal.         Behavior: Behavior normal.                        "

## 2021-10-13 ENCOUNTER — LAB (OUTPATIENT)
Dept: LAB | Facility: CLINIC | Age: 71
End: 2021-10-13
Payer: COMMERCIAL

## 2021-10-13 DIAGNOSIS — R53.83 FATIGUE, UNSPECIFIED TYPE: ICD-10-CM

## 2021-10-13 LAB
SHBG SERPL-SCNC: 39 NMOL/L (ref 11–80)
TSH SERPL DL<=0.005 MIU/L-ACNC: 2.12 MU/L (ref 0.4–4)

## 2021-10-13 PROCEDURE — 36415 COLL VENOUS BLD VENIPUNCTURE: CPT

## 2021-10-13 PROCEDURE — 84270 ASSAY OF SEX HORMONE GLOBUL: CPT

## 2021-10-13 PROCEDURE — 84443 ASSAY THYROID STIM HORMONE: CPT

## 2021-10-13 PROCEDURE — 84403 ASSAY OF TOTAL TESTOSTERONE: CPT

## 2021-10-14 ENCOUNTER — PATIENT OUTREACH (OUTPATIENT)
Dept: GERIATRIC MEDICINE | Facility: CLINIC | Age: 71
End: 2021-10-14

## 2021-10-14 ENCOUNTER — TELEPHONE (OUTPATIENT)
Dept: PULMONOLOGY | Facility: CLINIC | Age: 71
End: 2021-10-14

## 2021-10-14 NOTE — TELEPHONE ENCOUNTER
Left voicemail for patient to contact me (direct number provided) or the call center (number provided) to discuss scheduling an appointment as records indicate they are due for a follow up visit with Dr. Taylor for some time in April as provider starting to book into Feb already. As they do have a [employer] account, informed them that I would send them a message detailing the same information.

## 2021-10-14 NOTE — PROGRESS NOTES
[facility] Care Coordination Contact    TM from member stating that he wants to schedule a Dexa scan that has been order by his PCP, but the Imaging location wants him to sign a ABN form stating that if insurance doesn't cover the Dexa, he will be responsible for paying for it. CC send a message to the Clinical Liaison at [facility] asking if it would be covered - waiting to hear from them. TC to member informing him of this. Also provided him with [facility]'s Customer Service Line [phone] to call also to find out if it would be covered.  Leeanna Sigala RN, BSN, PHN  [facility] Coordinator  [phone]

## 2021-10-14 NOTE — TELEPHONE ENCOUNTER
Pt returned my call and able to arrange in-person visit with Dr. Taylor in April. Appt details confirmed with pt who requested reminder via Email as well.

## 2021-10-15 ENCOUNTER — MYC MEDICAL ADVICE (OUTPATIENT)
Dept: FAMILY MEDICINE | Facility: CLINIC | Age: 71
End: 2021-10-15

## 2021-10-15 DIAGNOSIS — M81.0 AGE-RELATED OSTEOPOROSIS WITHOUT CURRENT PATHOLOGICAL FRACTURE: Primary | ICD-10-CM

## 2021-10-15 LAB
SHBG SERPL-SCNC: 39 NMOL/L (ref 11–80)
TESTOST FREE SERPL-MCNC: 7.69 NG/DL
TESTOST SERPL-MCNC: 417 NG/DL (ref 240–950)

## 2021-10-24 ENCOUNTER — HEALTH MAINTENANCE LETTER (OUTPATIENT)
Age: 71
End: 2021-10-24

## 2021-12-01 NOTE — TELEPHONE ENCOUNTER
RECORDS RECEIVED FROM: Internal   DATE RECEIVED: 12.13.21   NOTES (FOR ALL VISITS) STATUS DETAILS   OFFICE NOTES from referring provider Internal 10.1.21 PRASANNA Murillo [place]   OFFICE NOTES from other specialist N/A    ED NOTES N/A    OPERATIVE REPORT  (thyroid, pituitary, adrenal, parathyroid) N/A    MEDICATION LIST Internal    IMAGING      DEXASCAN N/A    MRI (BRAIN) N/A    XR (Chest) N/A    CT (HEAD/NECK/CHEST/ABDOMEN) Internal 4.28.21 chest  4.16.21 chest  6.10.20 chest   NUCLEAR  N/A    ULTRASOUND (HEAD/NECK) N/A    LABS     DIABETES: HBGA1C, CREATININE, FASTING LIPIDS, MICROALBUMIN URINE, POTASSIUM, TSH, T4    THYROID: TSH, T4, CBC, THYRODLONULIN, TOTAL T3, FREE T4, CALCITONIN, CEA Internal

## 2021-12-13 ENCOUNTER — OFFICE VISIT (OUTPATIENT)
Dept: ENDOCRINOLOGY | Facility: CLINIC | Age: 71
End: 2021-12-13
Payer: COMMERCIAL

## 2021-12-13 ENCOUNTER — PRE VISIT (OUTPATIENT)
Dept: ENDOCRINOLOGY | Facility: CLINIC | Age: 71
End: 2021-12-13
Payer: COMMERCIAL

## 2021-12-13 VITALS
HEART RATE: 79 BPM | HEIGHT: 69 IN | DIASTOLIC BLOOD PRESSURE: 97 MMHG | SYSTOLIC BLOOD PRESSURE: 157 MMHG | BODY MASS INDEX: 24.88 KG/M2 | WEIGHT: 168 LBS

## 2021-12-13 DIAGNOSIS — R53.83 LOW ENERGY: ICD-10-CM

## 2021-12-13 DIAGNOSIS — R53.83 FATIGUE, UNSPECIFIED TYPE: Primary | ICD-10-CM

## 2021-12-13 PROCEDURE — 99204 OFFICE O/P NEW MOD 45 MIN: CPT | Performed by: STUDENT IN AN ORGANIZED HEALTH CARE EDUCATION/TRAINING PROGRAM

## 2021-12-13 ASSESSMENT — MIFFLIN-ST. JEOR: SCORE: 1502.42

## 2021-12-13 ASSESSMENT — PAIN SCALES - GENERAL: PAINLEVEL: NO PAIN (0)

## 2021-12-13 NOTE — PROGRESS NOTES
Endocrinology Clinic Visit 12/13/2021    NAME:  Robert B Behr  PCP:  Kieran Murillo  MRN:  [id number]  Reason for Consult:  fatigue  Requesting Provider:  Kieran Murillo    Chief Complaint     Chief Complaint   Patient presents with     Consult     Fatigue, anxiety     Osteoporosis       History of Present Illness     Robert B Behr is a 71 year old male who is seen in clinic for fatigue and T replacement    He is here today to discuss T replacement. Her reported low energy compared to previous, still swimming 5 times a week but feels like a decline, irritable on little things, low mood. He thought might be related to age but it got worse the past year. He also notice decrease in libido, he has no partner. No morning erections for the past year. 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     PMH: significant for smoking and COPD    Symptoms of hypogonadism:   Refer to above    History of:  - Normal pubertal development: yes  - Osteoporosis: no  - Fathered children: no but never wanted to have kids.   - Infertility: no  - Cryptorchidism: no  - Testicular trauma of infection: no  - Narcotic use: no  - Steroid Use: no  - Ketoconazole use: no  - Alcohol abuse: no  - Other illicit drug use: no  - Anosmia: no    Other related co-morbidities: No prostate problems or LUTS. No sleep apnea. No heart problems or chest pain.      He had the following labs:    Results for BEHR, ROBERT B (MRN [id number]) as of 12/13/2021 13:41   Ref. 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 He is smoker but cutting down. He drinks alcohol  On the wekend  Problem List     Patient Active  Problem List   Diagnosis     Osteoporosis     COPD (chronic obstructive pulmonary disease) (H)     Tobacco use disorder     Hyperlipidemia LDL goal <130     Lung nodules     Health Care Home     Hiatal hernia     Hypopigmentation        Medications     Current Outpatient Medications   Medication     atorvastatin (LIPITOR) 40 MG tablet     B Complex-C (VITAMIN B COMPLEX W/VITAMIN C) TABS tablet     calcium carbonate 600 mg-vitamin D 400 units (CALTRATE) 600-400 MG-UNIT per tablet     COMBIVENT RESPIMAT  MCG/ACT inhaler     fish oil-omega-3 fatty acids 1000 MG capsule     fluticasone-salmeterol (ADVAIR) 500-50 MCG/DOSE inhaler     Ginkgo Biloba 60 MG TABS     glucosamine-chondroitinoitin 750-600 MG TABS     nicotine (NICODERM CQ) 7 MG/24HR 24 hr patch     omeprazole (PRILOSEC OTC) 20 MG EC tablet     PROAIR  (90 Base) MCG/ACT inhaler     Selenium (SELENIMIN-200) 200 MCG TABS tablet     vitamin A 3 MG (28391 UNITS) capsule     vitamin C (ASCORBIC ACID) 1000 MG TABS     vitamin E (TOCOPHEROL) 1000 units (450 mg) capsule     WIXELA INHUB 250-50 MCG/DOSE inhaler     No current facility-administered medications for this visit.        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     Tobacco abuse      Past Surgical History:   Procedure Laterality Date     CATARACT IOL, RT/LT Left 09/15/2017    s/p CE/IOL left eye     CATARACT IOL, RT/LT Right      PHACOEMULSIFICATION CLEAR CORNEA WITH STANDARD INTRAOCULAR LENS IMPLANT Right 9/30/2016    Procedure: PHACOEMULSIFICATION CLEAR CORNEA WITH STANDARD INTRAOCULAR LENS IMPLANT;  Surgeon: Elly Valencia MD;  Location: [facility]     PHACOEMULSIFICATION WITH STANDARD INTRAOCULAR LENS IMPLANT Left 9/15/2017    Procedure:  PHACOEMULSIFICATION WITH STANDARD INTRAOCULAR LENS IMPLANT;  Left Eye Phacoemulsification with Standard Lens;  Surgeon: Elly Valencia MD;  [facility]     WRIST SURGERY         Social History     Social History     Socioeconomic History     Marital status: Single     Spouse name: Not on file     Number of children: Not on file     Years of education: Not on file     Highest education level: Not on file   Occupational History     Not on file   Tobacco Use     Smoking status: Current Some Day Smoker     Packs/day: 2.00     Years: 45.00     Pack years: 90.00     Types: Cigarettes     Smokeless tobacco: Former User     Tobacco comment: 5 cigs per day   Substance and Sexual Activity     Alcohol use: Yes     Alcohol/week: 0.0 standard drinks     Comment: occ beer     Drug use: No     Sexual activity: Not Currently   Other Topics Concern     Parent/sibling w/ CABG, MI or angioplasty before 65F 55M? No   Social History Narrative    Single.  Retired.     No children    5 siblings:      No family history of bleeding, clotting disorders or complications with anesthesia.     Social Determinants of Health     Financial Resource Strain: Not on file   Food Insecurity: Not on file   Transportation Needs: Not on file   Physical Activity: Not on file   Stress: Not on file   Social Connections: Not on file   Intimate Partner Violence: Not on file   Housing Stability: Not on file       Family History   Reviewed and unremarkable   Family History   Problem Relation Age of Onset     Diabetes Brother         living     Cancer Brother         throat     Macular Degeneration Mother      Colon Cancer No family hx of      Glaucoma No family hx of      Retinal detachment No family hx of      Amblyopia No family hx of      Skin Cancer No family hx of      Melanoma No family hx of        ROS     12 ROS completed, pertinent positive and negative in HPI    Physical Exam   BP (!) 157/97 (BP Location: Left arm, Patient Position: Sitting, Cuff  "Size: Adult Regular)   Pulse 79   Ht 1.753 m (5' 9\")   Wt 76.2 kg (168 lb)   BMI 24.81 kg/m       General: Comfortable, no obvious distress, normal body habitus  Eyes: Sclera anicteric, moist conjunctiva  HENT: wearing mask  CV: normal rate.   Resp:  good effort, no evidence of loud wheezing  Abdomen:  obese, non distended.   Skin: No rashes, lesions, or subcutaneous nodules on exposed skin.   Psych: Alert and oriented x 3. Appropriate affect, good insight  Extremities: No peripheral edema  Musculoskeletal: Appropriate muscle bulk and strength  Neuro: Moves all four extremities. No focal deficits on limited exam. Gait normal.     Labs/Imaging     Pertinent Labs were reviewed and updated in [facility].  Radiology Results were  reviewed and updated in EPIC.    Summary of recent findings:   No results found for: A1C    TSH   Date Value Ref Range Status   10/13/2021 2.12 0.40 - 4.00 mU/L Final   05/14/2018 1.08 0.40 - 4.00 mU/L Final       Creatinine   Date Value Ref Range Status   08/30/2021 0.88 0.66 - 1.25 mg/dL Final   06/10/2017 0.78 0.66 - 1.25 mg/dL Final       Recent Labs   Lab Test 08/30/21  1106 04/07/21  1754   CHOL 229* 260*   HDL 69 67   * 151*   TRIG 162* 208*       No results found for: [id number], [id number], [id number]    I personally reviewed the patient's outside records from [facility] EMR. Summary of pertinent findings in HPI.    Impression / Plan     1. Fatigue  2. Low libido  3. Low energy    Patient wanted to discuss his Testosterone level and option for T replacement. He had a normal T level. We discussed that his symptoms are not due to Testosterone deficiency/hypogonadism and that replacement wont fix his symptoms but might put him at risk of T replacement side effects which I reviewed with him. His symptoms are not specific and might due to stress or low mood or worsening COPD. I advised him to continue working with pcp to look for etiology. Smoking cessation counseling ( already using a " nicotine patch and cutting down on cigarettes)     Test and/or medications prescribed today:  No orders of the defined types were placed in this encounter.        Follow up: RICHARD Franco MD  Endocrinology, Diabetes and Metabolism  [facility]    Review of the result(s) of each unique test - refer to HPI  50 minutes spent on the date of the encounter doing chart review, history and exam, documentation and further activities per the note

## 2021-12-13 NOTE — NURSING NOTE
"Chief Complaint   Patient presents with     Consult     Fatigue, anxiety     Osteoporosis     Vital signs:      BP: (!) 157/97 Pulse: 79           Height: 175.3 cm (5' 9\") Weight: 76.2 kg (168 lb)  Estimated body mass index is 24.81 kg/m  as calculated from the following:    Height as of this encounter: 1.753 m (5' 9\").    Weight as of this encounter: 76.2 kg (168 lb).  Mayra Joel, EMT    "

## 2021-12-13 NOTE — LETTER
12/13/2021       RE: Robert B Behr  [address]  [place] [address]     Dear Colleague,    Thank you for referring your patient, Robert B Behr, to the [facility] [place] [facility]. Please see a copy of my visit note below.    Endocrinology Clinic Visit 12/13/2021    NAME:  Robert B Behr  PCP:  Kieran Murillo  MRN:  [id number]  Reason for Consult:  fatigue  Requesting Provider:  Kieran Murillo    Chief Complaint     Chief Complaint   Patient presents with     Consult     Fatigue, anxiety     Osteoporosis       History of Present Illness     Robert B Behr is a 71 year old male who is seen in clinic for fatigue and T replacement    He is here today to discuss T replacement. Her reported low energy compared to previous, still swimming 5 times a week but feels like a decline, irritable on little things, low mood. He thought might be related to age but it got worse the past year. He also notice decrease in libido, he has no partner. No morning erections for the past year. 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     PMH: significant for smoking and COPD    Symptoms of hypogonadism:   Refer to above    History of:  - Normal pubertal development: yes  - Osteoporosis: no  - Fathered children: no but never wanted to have kids.   - Infertility: no  - Cryptorchidism: no  - Testicular trauma of infection: no  - Narcotic use: no  - Steroid Use: no  - Ketoconazole use: no  - Alcohol abuse: no  - Other illicit drug use: no  - Anosmia: no    Other related co-morbidities: No prostate problems or LUTS. No sleep apnea. No heart problems or chest pain.      He had the following labs:    Results for BEHR, ROBERT B (MRN [id number]) as of 12/13/2021 13:41   Ref. 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 He is smoker but cutting down. He drinks alcohol  On the wekend  Problem List     Patient Active Problem List   Diagnosis     Osteoporosis     COPD (chronic obstructive pulmonary disease) (H)     Tobacco use disorder     Hyperlipidemia LDL goal <130     Lung nodules     Health Care Home     Hiatal hernia     Hypopigmentation        Medications     Current Outpatient Medications   Medication     atorvastatin (LIPITOR) 40 MG tablet     B Complex-C (VITAMIN B COMPLEX W/VITAMIN C) TABS tablet     calcium carbonate 600 mg-vitamin D 400 units (CALTRATE) 600-400 MG-UNIT per tablet     COMBIVENT RESPIMAT  MCG/ACT inhaler     fish oil-omega-3 fatty acids 1000 MG capsule     fluticasone-salmeterol (ADVAIR) 500-50 MCG/DOSE inhaler     Ginkgo Biloba 60 MG TABS     glucosamine-chondroitinoitin 750-600 MG TABS     nicotine (NICODERM CQ) 7 MG/24HR 24 hr patch     omeprazole (PRILOSEC OTC) 20 MG EC tablet     PROAIR  (90 Base) MCG/ACT inhaler     Selenium (SELENIMIN-200) 200 MCG TABS tablet     vitamin A 3 MG (57690 UNITS) capsule     vitamin C (ASCORBIC ACID) 1000 MG TABS     vitamin E (TOCOPHEROL) 1000 units (450 mg) capsule     WIXELA INHUB 250-50 MCG/DOSE inhaler     No current facility-administered medications for this visit.        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     Tobacco abuse      Past Surgical History:   Procedure Laterality Date     CATARACT IOL, RT/LT Left 09/15/2017    s/p CE/IOL left eye     CATARACT IOL, RT/LT Right       PHACOEMULSIFICATION CLEAR CORNEA WITH STANDARD INTRAOCULAR LENS IMPLANT Right 9/30/2016    Procedure: PHACOEMULSIFICATION CLEAR CORNEA WITH STANDARD INTRAOCULAR LENS IMPLANT;  Surgeon: Elly Valencia MD;  Location: [facility]     PHACOEMULSIFICATION WITH STANDARD INTRAOCULAR LENS IMPLANT Left 9/15/2017    Procedure: PHACOEMULSIFICATION WITH STANDARD INTRAOCULAR LENS IMPLANT;  Left Eye Phacoemulsification with Standard Lens;  Surgeon: Elly Valencia MD;  Location: [facility]     WRIST SURGERY         Social History     Social History     Socioeconomic History     Marital status: Single     Spouse name: Not on file     Number of children: Not on file     Years of education: Not on file     Highest education level: Not on file   Occupational History     Not on file   Tobacco Use     Smoking status: Current Some Day Smoker     Packs/day: 2.00     Years: 45.00     Pack years: 90.00     Types: Cigarettes     Smokeless tobacco: Former User     Tobacco comment: 5 cigs per day   Substance and Sexual Activity     Alcohol use: Yes     Alcohol/week: 0.0 standard drinks     Comment: occ beer     Drug use: No     Sexual activity: Not Currently   Other Topics Concern     Parent/sibling w/ CABG, MI or angioplasty before 65F 55M? No   Social History Narrative    Single.  Retired.     No children    5 siblings:      No family history of bleeding, clotting disorders or complications with anesthesia.     Social Determinants of Health     Financial Resource Strain: Not on file   Food Insecurity: Not on file   Transportation Needs: Not on file   Physical Activity: Not on file   Stress: Not on file   Social Connections: Not on file   Intimate Partner Violence: Not on file   Housing Stability: Not on file       Family History   Reviewed and unremarkable   Family History   Problem Relation Age of Onset     Diabetes Brother         living     Cancer Brother         throat     Macular Degeneration Mother      Colon Cancer No family hx of       "Glaucoma No family hx of      Retinal detachment No family hx of      Amblyopia No family hx of      Skin Cancer No family hx of      Melanoma No family hx of        ROS     12 ROS completed, pertinent positive and negative in HPI    Physical Exam   BP (!) 157/97 (BP Location: Left arm, Patient Position: Sitting, Cuff Size: Adult Regular)   Pulse 79   Ht 1.753 m (5' 9\")   Wt 76.2 kg (168 lb)   BMI 24.81 kg/m       General: Comfortable, no obvious distress, normal body habitus  Eyes: Sclera anicteric, moist conjunctiva  HENT: wearing mask  CV: normal rate.   Resp:  good effort, no evidence of loud wheezing  Abdomen:  obese, non distended.   Skin: No rashes, lesions, or subcutaneous nodules on exposed skin.   Psych: Alert and oriented x 3. Appropriate affect, good insight  Extremities: No peripheral edema  Musculoskeletal: Appropriate muscle bulk and strength  Neuro: Moves all four extremities. No focal deficits on limited exam. Gait normal.     Labs/Imaging     Pertinent Labs were reviewed and updated in [facility].  Radiology Results were  reviewed and updated in EPIC.    Summary of recent findings:   No results found for: A1C    TSH   Date Value Ref Range Status   10/13/2021 2.12 0.40 - 4.00 mU/L Final   05/14/2018 1.08 0.40 - 4.00 mU/L Final       Creatinine   Date Value Ref Range Status   08/30/2021 0.88 0.66 - 1.25 mg/dL Final   06/10/2017 0.78 0.66 - 1.25 mg/dL Final       Recent Labs   Lab Test 08/30/21  1106 04/07/21  1754   CHOL 229* 260*   HDL 69 67   * 151*   TRIG 162* 208*       No results found for: [id number], [id number], [id number]    I personally reviewed the patient's outside records from [facility] EMR. Summary of pertinent findings in HPI.    Impression / Plan     1. Fatigue  2. Low libido  3. Low energy    Patient wanted to discuss his Testosterone level and option for T replacement. He had a normal T level. We discussed that his symptoms are not due to Testosterone deficiency/hypogonadism and " that replacement wont fix his symptoms but might put him at risk of T replacement side effects which I reviewed with him. His symptoms are not specific and might due to stress or low mood or worsening COPD. I advised him to continue working with pcp to look for etiology. Smoking cessation counseling ( already using a nicotine patch and cutting down on cigarettes)     Test and/or medications prescribed today:  No orders of the defined types were placed in this encounter.        Follow up: RICHARD Franco MD  Endocrinology, Diabetes and Metabolism  [facility]    Review of the result(s) of each unique test - refer to HPI  50 minutes spent on the date of the encounter doing chart review, history and exam, documentation and further activities per the note

## 2021-12-15 NOTE — PROGRESS NOTES
TC from client. He had questions about his insurance and was wondering why it didn't state senior on his card. Cm informed him that he has [employer] ([place] [employer] on his card. He stated that he was going to go to the dentist, but they weren't going to see him because it didn't state senior on his card. CM provided client with the [facility]ctions phone ([phone]) for him to call if he wanted to check on his dental benefits.  Leeanna San RN Case Manager  [facility]  [phone]     Abnormal Lactate: > 2

## 2021-12-20 ENCOUNTER — ANCILLARY PROCEDURE (OUTPATIENT)
Dept: BONE DENSITY | Facility: CLINIC | Age: 71
End: 2021-12-20
Attending: PHYSICIAN ASSISTANT
Payer: COMMERCIAL

## 2021-12-20 DIAGNOSIS — M81.0 AGE-RELATED OSTEOPOROSIS WITHOUT CURRENT PATHOLOGICAL FRACTURE: ICD-10-CM

## 2021-12-20 PROCEDURE — 77080 DXA BONE DENSITY AXIAL: CPT | Performed by: INTERNAL MEDICINE

## 2022-01-02 DIAGNOSIS — J44.1 COPD EXACERBATION (H): ICD-10-CM

## 2022-01-02 RX ORDER — IPRATROPIUM BROMIDE AND ALBUTEROL 20; 100 UG/1; UG/1
1 SPRAY, METERED RESPIRATORY (INHALATION) 4 TIMES DAILY
Qty: 12 G | Refills: 3 | Status: SHIPPED | OUTPATIENT
Start: 2022-01-02 | End: 2022-02-08

## 2022-02-02 ENCOUNTER — PATIENT OUTREACH (OUTPATIENT)
Dept: GERIATRIC MEDICINE | Facility: CLINIC | Age: 72
End: 2022-02-02
Payer: COMMERCIAL

## 2022-02-02 NOTE — LETTER
[facility] [employer] Advance Care Planning       Robert B Behr  [address]  [place] [address]      Dear Ravin,    You shared with me your interest in receiving information on Advance Care Planning and Health Care Directives. Discussing and making decisions about this part of our health is very important.  A Health Care Directive is a written document that outlines your goals, values, beliefs and choices for health care and medical treatment in the event you are unable to speak for yourself.     We greatly value the opportunity to assist you in documenting your choices and to honor your   wishes. We ve enclosed HCD and Goals Worksheet to help you get started thinking about your values and goals. We have several options for additional resources:       Health Care Directives and Advance Care Planning resources can be viewed and printed   for free at our web site:  [email or web address].       Free group classes on Advance Care Planning and completing a Health Care Directive are available at multiple locations and times. These classes are led by trained staff who will provide information and guide you through a Health Care Directive.  They can also review, notarize and add your Health Care Directive to your medical record. [place] for a class at [email or web address] or by calling [employer] at [phone] or toll free [phone].      COPIES of completed Health Care Directives can be brought or mailed to any of our   locations, including the address listed below. You can also email a copy to [email or web address] .      Email or call me at the contact information listed below for questions, assistance, or to   make an appointment to discuss creating a Health Care Directive. You can also contact   our [facility] [employer] Department for questions or assistance.       Sincerely,   Leeanna Sigala RN  [employer]  [phone]

## 2022-02-02 NOTE — PROGRESS NOTES
[facility] Care Coordination Contact      [facility] Six-Month Telephone Assessment    6 month telephone assessment completed on 2/2/22.    ER visits: No  Hospitalizations: No  TCU stays: No  Significant health status changes: none  Falls/Injuries: No  ADL/IADL changes: No  Changes in services: No    Caregiver Assessment follow up:  No caregivers    Goals: See POC in chart for goal progress documentation.      Member reports he is doing good. He is taking advantage of [employer] and going to the [facility]. They have a steam room open which he likes. Discussed a Health Care Directive. He is interested in completing this, so will get information mailed out to him. Advised him to Call CC if he has any questions about this.    Will see member in 6 months for an annual health risk assessment.   Encouraged member to call CC with any questions or concerns in the meantime.     Leeanna Sigala RN, BSN, PHN  [facility] Care Coordinator  [phone]

## 2022-02-03 NOTE — PROGRESS NOTES
[facility] Care Coordination Contact    Per CC, mailed HCD and goals worksheet to member.     Cristina Wheatley  Care Management Specialist  [facility]  [phone]

## 2022-02-08 ENCOUNTER — OFFICE VISIT (OUTPATIENT)
Dept: FAMILY MEDICINE | Facility: CLINIC | Age: 72
End: 2022-02-08
Payer: COMMERCIAL

## 2022-02-08 VITALS
HEART RATE: 74 BPM | HEIGHT: 69 IN | DIASTOLIC BLOOD PRESSURE: 70 MMHG | SYSTOLIC BLOOD PRESSURE: 130 MMHG | BODY MASS INDEX: 24.56 KG/M2 | OXYGEN SATURATION: 99 % | WEIGHT: 165.8 LBS | TEMPERATURE: 97.7 F

## 2022-02-08 DIAGNOSIS — M16.11 OSTEOARTHRITIS OF RIGHT HIP, UNSPECIFIED OSTEOARTHRITIS TYPE: ICD-10-CM

## 2022-02-08 DIAGNOSIS — J44.9 CHRONIC OBSTRUCTIVE PULMONARY DISEASE, UNSPECIFIED COPD TYPE (H): ICD-10-CM

## 2022-02-08 DIAGNOSIS — Z00.00 ENCOUNTER FOR MEDICARE ANNUAL WELLNESS EXAM: Primary | ICD-10-CM

## 2022-02-08 DIAGNOSIS — Z78.9 TAKES DIETARY SUPPLEMENTS: ICD-10-CM

## 2022-02-08 DIAGNOSIS — E78.2 MIXED HYPERLIPIDEMIA: ICD-10-CM

## 2022-02-08 DIAGNOSIS — Z71.89 ENCOUNTER FOR HERB AND VITAMIN SUPPLEMENT MANAGEMENT: ICD-10-CM

## 2022-02-08 PROCEDURE — 99397 PER PM REEVAL EST PAT 65+ YR: CPT | Performed by: NURSE PRACTITIONER

## 2022-02-08 PROCEDURE — 99214 OFFICE O/P EST MOD 30 MIN: CPT | Mod: 25 | Performed by: NURSE PRACTITIONER

## 2022-02-08 RX ORDER — ACETAMINOPHEN 160 MG/5ML
1 SUSPENSION, ORAL (FINAL DOSE FORM) ORAL DAILY
Qty: 90 TABLET | Refills: 3 | Status: SHIPPED | OUTPATIENT
Start: 2022-02-08 | End: 2023-04-03

## 2022-02-08 RX ORDER — IPRATROPIUM BROMIDE AND ALBUTEROL 20; 100 UG/1; UG/1
1 SPRAY, METERED RESPIRATORY (INHALATION) 4 TIMES DAILY
Qty: 12 G | Refills: 3 | Status: ON HOLD | OUTPATIENT
Start: 2022-02-08 | End: 2023-02-17

## 2022-02-08 RX ORDER — CHLORAL HYDRATE 500 MG
1 CAPSULE ORAL DAILY
Qty: 90 CAPSULE | Refills: 3 | Status: SHIPPED | OUTPATIENT
Start: 2022-02-08 | End: 2023-04-03

## 2022-02-08 RX ORDER — ATORVASTATIN CALCIUM 40 MG/1
40 TABLET, FILM COATED ORAL DAILY
Qty: 90 TABLET | Refills: 3 | Status: SHIPPED | OUTPATIENT
Start: 2022-02-08 | End: 2023-04-03

## 2022-02-08 RX ORDER — MAGNESIUM CARB/ALUMINUM HYDROX 105-160MG
TABLET,CHEWABLE ORAL
Qty: 120 TABLET | Refills: 3 | Status: SHIPPED | OUTPATIENT
Start: 2022-02-08 | End: 2022-07-05

## 2022-02-08 RX ORDER — MULTIVIT WITH MINERALS/LUTEIN
1000 TABLET ORAL DAILY
Qty: 90 CAPSULE | Refills: 3 | Status: SHIPPED | OUTPATIENT
Start: 2022-02-08 | End: 2023-03-23

## 2022-02-08 RX ORDER — NICOTINE POLACRILEX 4 MG/1
20 GUM, CHEWING ORAL DAILY
COMMUNITY
Start: 2022-02-03 | End: 2022-02-08

## 2022-02-08 RX ORDER — MULTIVIT WITH MINERALS/LUTEIN
TABLET ORAL
Qty: 90 TABLET | Refills: 3 | Status: SHIPPED | OUTPATIENT
Start: 2022-02-08 | End: 2022-12-26

## 2022-02-08 RX ORDER — CHOLECALCIFEROL (VITAMIN D3) 125 MCG
10000 CAPSULE ORAL DAILY
Qty: 90 CAPSULE | Refills: 3 | Status: SHIPPED | OUTPATIENT
Start: 2022-02-08 | End: 2023-04-03

## 2022-02-08 RX ORDER — ALBUTEROL SULFATE 90 UG/1
AEROSOL, METERED RESPIRATORY (INHALATION)
Qty: 8.5 G | Refills: 3 | Status: SHIPPED | OUTPATIENT
Start: 2022-02-08 | End: 2023-04-03

## 2022-02-08 ASSESSMENT — MIFFLIN-ST. JEOR: SCORE: 1482.67

## 2022-02-08 ASSESSMENT — PATIENT HEALTH QUESTIONNAIRE - PHQ9
10. IF YOU CHECKED OFF ANY PROBLEMS, HOW DIFFICULT HAVE THESE PROBLEMS MADE IT FOR YOU TO DO YOUR WORK, TAKE CARE OF THINGS AT HOME, OR GET ALONG WITH OTHER PEOPLE: SOMEWHAT DIFFICULT
SUM OF ALL RESPONSES TO PHQ QUESTIONS 1-9: 2
SUM OF ALL RESPONSES TO PHQ QUESTIONS 1-9: 2

## 2022-02-08 NOTE — PATIENT INSTRUCTIONS
Patient Education   Personalized Prevention Plan  You are due for the preventive services outlined below.  Your care team is available to assist you in scheduling these services.  If you have already completed any of these items, please share that information with your care team to update in your medical record.  Health Maintenance Due   Topic Date Due     Zoster (Shingles) Vaccine (2 of 3) 06/10/2015     Flu Vaccine (1) 09/01/2021

## 2022-02-08 NOTE — PROGRESS NOTES
"  SUBJECTIVE:   Robert B Behr is a 72 year old male who presents for Preventive Visit.      Patient has been advised of split billing requirements and indicates understanding: Yes  Are you in the first 12 months of your Medicare Part B coverage?  No    Physical Health:    In general, how would you rate your overall physical health? good    Outside of work, how many days during the week do you exercise? 4-5 days/week    Outside of work, approximately how many minutes a day do you exercise?15-30 minutes    If you drink alcohol do you typically have >3 drinks per day or >7 drinks per week? No    Do you usually eat at least 4 servings of fruit and vegetables a day, include whole grains & fiber and avoid regularly eating high fat or \"junk\" foods? Yes    Do you have any problems taking medications regularly?  No    Do you have any side effects from medications? none    Needs assistance for the following daily activities: no assistance needed    Which of the following safety concerns are present in your home?  none identified     Hearing impairment: No    In the past 6 months, have you been bothered by leaking of urine? no    Mental Health:    In general, how would you rate your overall mental or emotional health? good  PHQ-2 Score: (!) (P) 3    Do you feel safe in your environment? Yes    Have you ever done Advance Care Planning? (For example, a Health Directive, POLST, or a discussion with a medical provider or your loved ones about your wishes): No, advance care planning information given to patient to review.  Patient declined advance care planning discussion at this time.    Additional concerns to address?  No    Fall risk:  Fallen 2 or more times in the past year?: No  Any fall with injury in the past year?: No    Cognitive Screenin) Repeat 3 items (Leader, Season, Table)    2) Clock draw: know he I doing but got it wrong   3) 3 item recall: Recalls 1 object   Results: ABNORMAL clock, 1-2 items recalled: " PROBABLE COGNITIVE IMPAIRMENT, **INFORM PROVIDER**    Mini-CogTM Copyright LORENA Slater. Licensed by the author for use in [facility]; reprinted with permission (fitz@[facility]). All rights reserved.      Do you have sleep apnea, excessive snoring or daytime drowsiness?: no        Reviewed and updated as needed this visit by clinical staff  Tobacco  Allergies    Med Hx  Surg Hx  Fam Hx  Soc Hx       Reviewed and updated as needed this visit by Provider               Social History     Tobacco Use     Smoking status: Current Some Day Smoker     Packs/day: 2.00     Years: 45.00     Pack years: 90.00     Types: Cigarettes     Smokeless tobacco: Former User     Tobacco comment: 5 cigs per day   Substance Use Topics     Alcohol use: Yes     Alcohol/week: 0.0 standard drinks     Comment: occ beer                           Current providers sharing in care for this patient include:   Patient Care Team:  Kieran Murillo PA-C as PCP - General (Family Medicine)  Jovon, Leeanna Lauren, RN as Lead Care Coordinator  Alison Torres as Other (see comments)  Marielena Feng, Raquel Mckenna MD as MD (Dermatology)  Nayan Liu MD as MD (Dermatology)  Svitlana Taylor MD as Assigned Pulmonology Provider  Kieran Murillo PA-C as Assigned PCP  Vero Franco MD as MD (Endocrinology, Diabetes, and Metabolism)  Vero Franco MD as Assigned Endocrinology Provider    The following health maintenance items are reviewed in Epic and correct as of today:  Health Maintenance   Topic Date Due     ZOSTER IMMUNIZATION (2 of 3) 06/10/2015     INFLUENZA VACCINE (1) 09/01/2021     MEDICARE ANNUAL WELLNESS VISIT  04/07/2022     FALL RISK ASSESSMENT  04/07/2022     LUNG CANCER SCREENING  04/28/2022     ANNUAL REVIEW OF HM ORDERS  08/30/2022     COLORECTAL CANCER SCREENING  10/19/2025     DTAP/TDAP/TD IMMUNIZATION (2 - Td or Tdap) 02/24/2026     ADVANCE CARE PLANNING  04/08/2026     LIPID  08/30/2026     SPIROMETRY   "Completed     HEPATITIS C SCREENING  Completed     COPD ACTION PLAN  Completed     PHQ-2  Completed     Pneumococcal Vaccine: 65+ Years  Completed     AORTIC ANEURYSM SCREENING (SYSTEM ASSIGNED)  Completed     IPV IMMUNIZATION  Aged Out     MENINGITIS IMMUNIZATION  Aged Out     HEPATITIS B IMMUNIZATION  Aged Out     COVID-19 Vaccine  Discontinued     Home-rolls cigarettes, about 5 a day.  No longer using nicotine patches.  Has been able to reduce and hoping to quit.      Follows with pulmonology.  Breathing has been well controlled.       ROS:  Constitutional, HEENT, cardiovascular, pulmonary, gi and gu systems are negative, except as otherwise noted.    OBJECTIVE:   /70 (BP Location: Left arm, Patient Position: Sitting, Cuff Size: Adult Regular)   Pulse 74   Temp 97.7  F (36.5  C) (Temporal)   Ht 1.745 m (5' 8.7\")   Wt 75.2 kg (165 lb 12.8 oz)   SpO2 99%   BMI 24.70 kg/m   Estimated body mass index is 24.7 kg/m  as calculated from the following:    Height as of this encounter: 1.745 m (5' 8.7\").    Weight as of this encounter: 75.2 kg (165 lb 12.8 oz).  EXAM:   GENERAL: healthy, alert and no distress  EYES: Eyes grossly normal to inspection, PERRL and conjunctivae and sclerae normal  NECK: no adenopathy and no asymmetry, masses, or scars  RESP: unlabored  MS: no gross musculoskeletal defects noted, no edema    Diagnostic Test Results:  Labs reviewed in Epic    ASSESSMENT / PLAN:   (Z00.00) Encounter for Medicare annual wellness exam  (primary encounter diagnosis)  Comment: Reviewed medical history and health maintenance. Patient declined Influenza and Shingles shot.  Plan:     (J44.9) Chronic obstructive pulmonary disease, unspecified COPD type (H)  Comment: Stable, follows with Pulmonology  Plan:  fluticasone-salmeterol (ADVAIR) 500-50 MCG/DOSE        Inhaler   albuterol (PROAIR HFA) 108 (90 Base) MCG/ACT         inhaler   ipratropium-albuterol (COMBIVENT RESPIMAT)          MCG/ACT " "inhaler      (E78.2) Mixed hyperlipidemia  Comment:  Stable, tolerating med, no changes at this time  Plan: atorvastatin (LIPITOR) 40 MG tablet            (Z78.9) Takes dietary supplements  Comment: Refills provided  Plan: B Complex-C (VITAMIN B COMPLEX W/VITAMIN C)         TABS tablet, calcium carbonate 600 mg-vitamin D        400 units (CALTRATE) 600-400 MG-UNIT per         tablet, Ginkgo Biloba 60 MG TABS            (Z71.89) Encounter for herb and vitamin supplement management  Comment: Refills provided  Plan: calcium carbonate 600 mg-vitamin D 400 units         (CALTRATE) 600-400 MG-UNIT per tablet, fish         oil-omega-3 fatty acids 1000 MG capsule, Ginkgo        Biloba 60 MG TABS, Selenium (SELENIMIN-200) 200        MCG TABS tablet, vitamin A 3 MG (71630 UNITS)         capsule, vitamin C (ASCORBIC ACID) 1000 MG         TABS, vitamin E (VITAMIN E) 1000 units (450 mg)        CAPS capsule      (M16.11) Osteoarthritis of right hip, unspecified osteoarthritis type  Comment: Refills provided  Plan: glucosamine-chondroitinoitin 750-600 MG TABS                COUNSELING:  Reviewed preventive health counseling, as reflected in patient instructions    Estimated body mass index is 24.7 kg/m  as calculated from the following:    Height as of this encounter: 1.745 m (5' 8.7\").    Weight as of this encounter: 75.2 kg (165 lb 12.8 oz).        He reports that he has been smoking cigarettes. He has a 90.00 pack-year smoking history. He has quit using smokeless tobacco.  Tobacco Cessation Action Plan:   Actively in the process of quitting    Appropriate preventive services were discussed with this patient, including applicable screening as appropriate for cardiovascular disease, diabetes, osteopenia/osteoporosis, and glaucoma.  As appropriate for age/gender, discussed screening for colorectal cancer, prostate cancer, breast cancer, and cervical cancer. Checklist reviewing preventive services available has been given to the " patient.    Reviewed patients plan of care and provided an AVS. The Intermediate Care Plan ( asthma action plan, low back pain action plan, and migraine action plan) for Ravin meets the Care Plan requirement. This Care Plan has been established and reviewed with the Patient.    Counseling Resources:  ATP IV Guidelines  Pooled Cohorts Equation Calculator  Breast Cancer Risk Calculator  BRCA-Related Cancer Risk Assessment: FHS-7 Tool  FRAX Risk Assessment  ICSI Preventive Guidelines  Dietary Guidelines for Americans, 2010  USDA's MyPlate  ASA Prophylaxis  Lung CA Screening    DARIEL Blanchard [facility]

## 2022-02-09 ASSESSMENT — PATIENT HEALTH QUESTIONNAIRE - PHQ9: SUM OF ALL RESPONSES TO PHQ QUESTIONS 1-9: 2

## 2022-03-08 NOTE — TELEPHONE ENCOUNTER
Pt is calling as to why his Calcium-Magnesium-Vitamin D (CALCIUM 1200+D3) 600- MG-MG-UNIT TB24 medication is not being filled.     Please call [phone] to f/u. Ok to leave detailed VM.      Preferred Pharmacy:    [id number] - [place], [place] - [address] AT [facility] [address]  [address]  [place] [address]  Phone: [phone] Fax: [phone]        Elena NORRIS     [facility]         day(s)

## 2022-04-05 ENCOUNTER — OFFICE VISIT (OUTPATIENT)
Dept: PULMONOLOGY | Facility: CLINIC | Age: 72
End: 2022-04-05
Attending: INTERNAL MEDICINE
Payer: COMMERCIAL

## 2022-04-05 VITALS
HEART RATE: 89 BPM | SYSTOLIC BLOOD PRESSURE: 135 MMHG | DIASTOLIC BLOOD PRESSURE: 84 MMHG | BODY MASS INDEX: 25.01 KG/M2 | WEIGHT: 165 LBS | HEIGHT: 68 IN | OXYGEN SATURATION: 95 %

## 2022-04-05 DIAGNOSIS — Z87.891 PERSONAL HISTORY OF TOBACCO USE: Primary | ICD-10-CM

## 2022-04-05 PROCEDURE — 99213 OFFICE O/P EST LOW 20 MIN: CPT | Mod: GC | Performed by: INTERNAL MEDICINE

## 2022-04-05 PROCEDURE — G0463 HOSPITAL OUTPT CLINIC VISIT: HCPCS

## 2022-04-05 ASSESSMENT — PAIN SCALES - GENERAL: PAINLEVEL: NO PAIN (0)

## 2022-04-05 NOTE — PATIENT INSTRUCTIONS
Spiriva and Combivent are similar, so I recommend taking the Spiriva once a day  Then you can probably not need the Combivent throughout the day    Lung Cancer Screening   Frequently Asked Questions  If you are at high-risk for lung cancer, getting screened with low-dose computed tomography (LDCT) every year can help save your life. This handout offers answers to some of the most common questions about lung cancer screening. If you have other questions, please call [phone] ([phone]).     What is it?  Lung cancer screening uses special X-ray technology to create an image of your lung tissue. The exam is quick and easy and takes less than 10 seconds. We don t give you any medicine or use any needles. You can eat before and after the exam. You don t need to change your clothes as long as the clothing on your chest doesn t contain metal. But, you do need to be able to hold your breath for at least 6 seconds during the exam.    What is the goal of lung cancer screening?  The goal of lung cancer screening is to save lives. Many times, lung cancer is not found until a person starts having physical symptoms. Lung cancer screening can help detect lung cancer in the earliest stages when it may be easier to treat.    Who should be screened for lung cancer?  We suggest lung cancer screening for anyone who is at high-risk for lung cancer. You are in the high-risk group if you:      are between the ages of 55 and 79, and    have smoked at least 1 pack of cigarettes a day for 20 or more years, and    still smoke or have quit within the past 15 years.    However, if you have a new cough or shortness of breath, you should talk to your doctor before being screened.    Why does it matter if I have symptoms?  Certain symptoms can be a sign that you have a condition in your lungs that should be checked and treated by your doctor. These symptoms include fever, chest pain, a new or changing cough, shortness of breath that  you have never felt before, coughing up blood or unexplained weight loss. Having any of these symptoms can greatly affect the results of lung cancer screening.       Should all smokers get an LDCT lung cancer screening exam?  It depends. Lung cancer screening is for a very specific group of men and women who have a history of heavy smoking over a long period of time (see  Who should be screened for lung cancer  above).  I am in the high-risk group, but have been diagnosed with cancer in the past. Is LDCT lung cancer screening right for me?  In some cases, you should not have LDCT lung screening, such as when your doctor is already following your cancer with CT scan studies. Your doctor will help you decide if LDCT lung screening is right for you.  Do I need to have a screening exam every year?  Yes. If you are in the high-risk group described earlier, you should get an LDCT lung cancer screening exam every year until you are 79, or are no longer willing or able to undergo screening and possible procedures to diagnose and treat lung cancer.  How effective is LDCT at preventing death from lung cancer?  Studies have shown that LDCT lung cancer screening can lower the risk of death from lung cancer by 20 percent in people who are at high-risk.  What are the risks?  There are some risks and limitations of LDCT lung cancer screening. We want to make sure you understand the risks and benefits, so please let us know if you have any questions. Your doctor may want to talk with you more about these risks.    Radiation exposure: As with any exam that uses radiation, there is a very small increased risk of cancer. The amount of radiation in LDCT is small--about the same amount a person would get from a mammogram. Your doctor orders the exam when he or she feels the potential benefits outweigh the risks.    False negatives: No test is perfect, including LDCT. It is possible that you may have a medical condition, including lung  cancer, that is not found during your exam. This is called a false negative result.    False positives and more testing: LDCT very often finds something in the lung that could be cancer, but in fact is not. This is called a false positive result. False positive tests often cause anxiety. To make sure these findings are not cancer, you may need to have more tests. These tests will be done only if you give us permission. Sometimes patients need a treatment that can have side effects, such as a biopsy. For more information on false positives, see  What can I expect from the results?     Findings not related to lung cancer: Your LDCT exam also takes pictures of areas of your body next to your lungs. In a very small number of cases, the CT scan will show an abnormal finding in one of these areas, such as your kidneys, adrenal glands, liver or thyroid. This finding may not be serious, but you may need more tests. Your doctor can help you decide what other tests you may need, if any.  What can I expect from the results?  About 1 out of 4 LDCT exams will find something that may need more tests. Most of the time, these findings are lung nodules. Lung nodules are very small collections of tissue in the lung. These nodules are very common, and the vast majority--more than 97 percent--are not cancer (benign). Most are normal lymph nodes or small areas of scarring from past infections.  But, if a small lung nodule is found to be cancer, the cancer can be cured more than 90 percent of the time. To know if the nodule is cancer, we may need to get more images before your next yearly screening exam. If the nodule has suspicious features (for example, it is large, has an odd shape or grows over time), we will refer you to a specialist for further testing.  Will my doctor also get the results?  Yes. Your doctor will get a copy of your results.  Is it okay to keep smoking now that there s a cancer screening exam?  No. Tobacco is one of  the strongest cancer-causing agents. It causes not only lung cancer, but other cancers and cardiovascular (heart) diseases as well. The damage caused by smoking builds over time. This means that the longer you smoke, the higher your risk of disease. While it is never too late to quit, the sooner you quit, the better.  Where can I find help to quit smoking?  The best way to prevent lung cancer is to stop smoking. If you have already quit smoking, congratulations and keep it up! For help on quitting smoking, please call [employer] at 5-298-QUITNOW (1-494.910.4883) or the American Cancer Society at 1-107.715.4372 to find local resources near you.  One-on-one health coaching:  If you d prefer to work individually with a health care provider on tobacco cessation, we offer:      Medication Therapy Management:  Our specially trained pharmacists work closely with you and your doctor to help you quit smoking.  Call [phone] or [phone] (toll free).

## 2022-04-05 NOTE — PROGRESS NOTES
"Reason for Visit  Robert B Behr is a 72 year old year old male who is being seen for Follow Up (return appt )  Pulmonary HPI  Robert B Behr is a 72 year old male with a history of COPD, lung nodules, and tobacco use who presents for follow-up.    He was last seen for a virtual visit in July 2021. He reports a significant reduction in sputum, down maybe 90%. When he does get the sputum, it is clear, without any sputum. He has been rarely using the Spiriva, maybe a dozen times since his last appointment. He has been regularly swimming (35 minutes per day), and is getting ready to get the bike ready. The breathing has gotten better since the inhaler changes at the time of his last appointment. He notices his breathing more with walking and lifting weights, saying that he notices problems with climbing 2 flights of stairs. He is getting back to walking and climbing stairs. He is using his rescue inhalers about 3 times per day when he \"tightens up,\" frequently using before activity.    He denies weight loss (actually has gained weight), fevers, or night sweats.    He smokes 3-5 cigarettes per day, down from 2 packs per day (where he was at since his teenage years). He rolls his own cigarettes at home. He has been smoking for 65 years.    He is holding out hope for stem cell therapies for COPD.     The patient was seen and examined by Ricky Santizo MD     Current Outpatient Medications   Medication     albuterol (PROAIR HFA) 108 (90 Base) MCG/ACT inhaler     atorvastatin (LIPITOR) 40 MG tablet     B Complex-C (VITAMIN B COMPLEX W/VITAMIN C) TABS tablet     calcium carbonate 600 mg-vitamin D 400 units (CALTRATE) 600-400 MG-UNIT per tablet     fish oil-omega-3 fatty acids 1000 MG capsule     fluticasone-salmeterol (ADVAIR) 500-50 MCG/DOSE inhaler     fluticasone-salmeterol (WIXELA INHUB) 250-50 MCG/DOSE inhaler     Ginkgo Biloba 60 MG TABS     glucosamine-chondroitinoitin 750-600 MG TABS     ipratropium-albuterol " (COMBIVENT RESPIMAT)  MCG/ACT inhaler     omeprazole (PRILOSEC OTC) 20 MG EC tablet     Selenium (SELENIMIN-200) 200 MCG TABS tablet     vitamin A 3 MG (88977 UNITS) capsule     vitamin C (ASCORBIC ACID) 1000 MG TABS     vitamin E (VITAMIN E) 1000 units (450 mg) CAPS capsule     No current facility-administered medications for this visit.     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     Tobacco abuse        Past Surgical History:   Procedure Laterality Date     CATARACT IOL, RT/LT Left 09/15/2017    s/p CE/IOL left eye     CATARACT IOL, RT/LT Right      PHACOEMULSIFICATION CLEAR CORNEA WITH STANDARD INTRAOCULAR LENS IMPLANT Right 9/30/2016    Procedure: PHACOEMULSIFICATION CLEAR CORNEA WITH STANDARD INTRAOCULAR LENS IMPLANT;  Surgeon: Elly Valencia MD;  Location:  [facility]     PHACOEMULSIFICATION WITH STANDARD INTRAOCULAR LENS IMPLANT Left 9/15/2017    Procedure: PHACOEMULSIFICATION WITH STANDARD INTRAOCULAR LENS IMPLANT;  Left Eye Phacoemulsification with Standard Lens;  Surgeon: Elly Valencia MD;  Location: [facility] OR     WRIST SURGERY         Social History     Socioeconomic History     Marital status: Single     Spouse name: Not on file     Number of children: Not on file     Years of education: Not on file     Highest education level: Not on file   Occupational History     Not on file   Tobacco Use     Smoking status: Light Tobacco Smoker     Packs/day: 2.00     Years: 45.00     Pack years: 90.00     Types: Cigarettes     Smokeless tobacco: Former User     Tobacco comment: 5 cigs per day   Substance and Sexual Activity     Alcohol use: Yes     Alcohol/week: 0.0 standard drinks     Comment: occ beer     Drug use: No     Sexual activity: Not Currently   Other Topics Concern     Parent/sibling w/ CABG, MI or  "angioplasty before 65F 55M? No   Social History Narrative    Single.  Retired.     No children    5 siblings:      No family history of bleeding, clotting disorders or complications with anesthesia.     Social Determinants of Health     Financial Resource Strain: Not on file   Food Insecurity: Not on file   Transportation Needs: Not on file   Physical Activity: Not on file   Stress: Not on file   Social Connections: Not on file   Intimate Partner Violence: Not on file   Housing Stability: Not on file     A complete ROS was otherwise negative except as noted in the HPI.  Ht 1.727 m (5' 8\")   Wt 74.8 kg (165 lb)   BMI 25.09 kg/m    Exam:   GENERAL APPEARANCE: Well developed, well nourished, alert, and in no apparent distress.  EYES: PERRL, EOMI  HENT: Nasal mucosa with no edema and no hyperemia. No nasal polyps.  EARS: Canals clear, TMs normal  MOUTH: Oral mucosa is moist, without any lesions, no tonsillar enlargement, no oropharyngeal exudate.  NECK: supple, no masses, no thyromegaly.  LYMPHATICS: No significant axillary, cervical, or supraclavicular nodes.  RESP: normal percussion, good air flow throughout.  No crackles. No rhonchi. No wheezes.  CV: Normal S1, S2, regular rhythm, normal rate. No murmur.  No rub. No gallop. No LE edema.   ABDOMEN:  Bowel sounds normal, soft, nontender, no HSM or masses.   MS: extremities normal. No clubbing. No cyanosis.  SKIN: no rash on limited exam  NEURO: Mentation intact, speech normal, normal strength and tone, normal gait and stance  PSYCH: mentation appears normal. and affect normal/bright  Results:  Spirometry March 2020 at [place] [facility] moderate obstruction normal diffusion    Assessment and plan: Robert B Behr is a 72 year old male with a history of COPD, lung nodules, and tobacco use who presents for follow-up.    # COPD:  Currently on triple therapy.  - Maintenance therapy: Advair BID, Spiriva; reiterated the recommendation to use Spiriva daily for maintenance  - Rescue: " Combivent, Albuterol    # Tobacco use:  # Lung Cancer screening ordered today:  consolidation noted on April 2021 imaging.     # Vaccinations: he has declined COVID vaccinations in the past    Patient staffed with attending physician, Dr. Taylor.    [facility]  Internal Medicine Resident, PGY-2    I interviewed and examined the patient and agree with the contents of the note  Svitlana Taylor MD

## 2022-04-05 NOTE — LETTER
"  4/5/2022     RE: Robert B Behr  [address]  [place] [address]    Dear Colleague,    Thank you for referring your patient, Robert B Behr, to the [facility] [place]. Please see a copy of my visit note below.    Reason for Visit  Robert B Behr is a 72 year old year old male who is being seen for Follow Up (return appt )  Pulmonary HPI  Robert B Behr is a 72 year old male with a history of COPD, lung nodules, and tobacco use who presents for follow-up.    He was last seen for a virtual visit in July 2021. He reports a significant reduction in sputum, down maybe 90%. When he does get the sputum, it is clear, without any sputum. He has been rarely using the Spiriva, maybe a dozen times since his last appointment. He has been regularly swimming (35 minutes per day), and is getting ready to get the bike ready. The breathing has gotten better since the inhaler changes at the time of his last appointment. He notices his breathing more with walking and lifting weights, saying that he notices problems with climbing 2 flights of stairs. He is getting back to walking and climbing stairs. He is using his rescue inhalers about 3 times per day when he \"tightens up,\" frequently using before activity.    He denies weight loss (actually has gained weight), fevers, or night sweats.    He smokes 3-5 cigarettes per day, down from 2 packs per day (where he was at since his teenage years). He rolls his own cigarettes at home. He has been smoking for 65 years.    He is holding out hope for stem cell therapies for COPD.     The patient was seen and examined by Ricky Santizo MD     Current Outpatient Medications   Medication     albuterol (PROAIR HFA) 108 (90 Base) MCG/ACT inhaler     atorvastatin (LIPITOR) 40 MG tablet     B Complex-C (VITAMIN B COMPLEX W/VITAMIN C) TABS tablet     calcium carbonate 600 mg-vitamin D 400 units (CALTRATE) 600-400 MG-UNIT per tablet     " fish oil-omega-3 fatty acids 1000 MG capsule     fluticasone-salmeterol (ADVAIR) 500-50 MCG/DOSE inhaler     fluticasone-salmeterol (WIXELA INHUB) 250-50 MCG/DOSE inhaler     Ginkgo Biloba 60 MG TABS     glucosamine-chondroitinoitin 750-600 MG TABS     ipratropium-albuterol (COMBIVENT RESPIMAT)  MCG/ACT inhaler     omeprazole (PRILOSEC OTC) 20 MG EC tablet     Selenium (SELENIMIN-200) 200 MCG TABS tablet     vitamin A 3 MG (94115 UNITS) capsule     vitamin C (ASCORBIC ACID) 1000 MG TABS     vitamin E (VITAMIN E) 1000 units (450 mg) CAPS capsule     No current facility-administered medications for this visit.     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     Tobacco abuse        Past Surgical History:   Procedure Laterality Date     CATARACT IOL, RT/LT Left 09/15/2017    s/p CE/IOL left eye     CATARACT IOL, RT/LT Right      PHACOEMULSIFICATION CLEAR CORNEA WITH STANDARD INTRAOCULAR LENS IMPLANT Right 9/30/2016    Procedure: PHACOEMULSIFICATION CLEAR CORNEA WITH STANDARD INTRAOCULAR LENS IMPLANT;  Surgeon: Elly Valencia MD;  Location:  [facility]     PHACOEMULSIFICATION WITH STANDARD INTRAOCULAR LENS IMPLANT Left 9/15/2017    Procedure: PHACOEMULSIFICATION WITH STANDARD INTRAOCULAR LENS IMPLANT;  Left Eye Phacoemulsification with Standard Lens;  Surgeon: Elly Valencia MD;  Location: [facility] OR     WRIST SURGERY         Social History     Socioeconomic History     Marital status: Single     Spouse name: Not on file     Number of children: Not on file     Years of education: Not on file     Highest education level: Not on file   Occupational History     Not on file   Tobacco Use     Smoking status: Light Tobacco Smoker     Packs/day: 2.00     Years: 45.00     Pack years: 90.00     Types: Cigarettes     Smokeless tobacco:  "Former User     Tobacco comment: 5 cigs per day   Substance and Sexual Activity     Alcohol use: Yes     Alcohol/week: 0.0 standard drinks     Comment: occ beer     Drug use: No     Sexual activity: Not Currently   Other Topics Concern     Parent/sibling w/ CABG, MI or angioplasty before 65F 55M? No   Social History Narrative    Single.  Retired.     No children    5 siblings:      No family history of bleeding, clotting disorders or complications with anesthesia.     Social Determinants of Health     Financial Resource Strain: Not on file   Food Insecurity: Not on file   Transportation Needs: Not on file   Physical Activity: Not on file   Stress: Not on file   Social Connections: Not on file   Intimate Partner Violence: Not on file   Housing Stability: Not on file     A complete ROS was otherwise negative except as noted in the HPI.  Ht 1.727 m (5' 8\")   Wt 74.8 kg (165 lb)   BMI 25.09 kg/m    Exam:   GENERAL APPEARANCE: Well developed, well nourished, alert, and in no apparent distress.  EYES: PERRL, EOMI  HENT: Nasal mucosa with no edema and no hyperemia. No nasal polyps.  EARS: Canals clear, TMs normal  MOUTH: Oral mucosa is moist, without any lesions, no tonsillar enlargement, no oropharyngeal exudate.  NECK: supple, no masses, no thyromegaly.  LYMPHATICS: No significant axillary, cervical, or supraclavicular nodes.  RESP: normal percussion, good air flow throughout.  No crackles. No rhonchi. No wheezes.  CV: Normal S1, S2, regular rhythm, normal rate. No murmur.  No rub. No gallop. No LE edema.   ABDOMEN:  Bowel sounds normal, soft, nontender, no HSM or masses.   MS: extremities normal. No clubbing. No cyanosis.  SKIN: no rash on limited exam  NEURO: Mentation intact, speech normal, normal strength and tone, normal gait and stance  PSYCH: mentation appears normal. and affect normal/bright  Results:  Spirometry March 2020 at [place] [facility] moderate obstruction normal diffusion    Assessment and plan: Robert B Behr " is a 72 year old male with a history of COPD, lung nodules, and tobacco use who presents for follow-up.    # COPD:  Currently on triple therapy.  - Maintenance therapy: Advair BID, Spiriva; reiterated the recommendation to use Spiriva daily for maintenance  - Rescue: Combivent, Albuterol    # Tobacco use:  # Lung Cancer screening ordered today:  consolidation noted on April 2021 imaging.     # Vaccinations: he has declined COVID vaccinations in the past    Patient staffed with attending physician, Dr. Taylor.    [facility]  Internal Medicine Resident, PGY-2    I interviewed and examined the patient and agree with the contents of the note  Svitlana Taylor MD

## 2022-04-21 ENCOUNTER — TELEPHONE (OUTPATIENT)
Dept: PULMONOLOGY | Facility: CLINIC | Age: 72
End: 2022-04-21
Payer: COMMERCIAL

## 2022-04-21 NOTE — TELEPHONE ENCOUNTER
LVM regarding scheduling follow up appointment with Dr. Taylor. Patient needs to schedule 1 year follow up appointment with Dr. Taylor, in addition to a CT scan ordered by Dr. Taylor at his earliest convenience. Left direct call back phone number and call center's phone number. I will send Guverat message.

## 2022-04-21 NOTE — TELEPHONE ENCOUNTER
Talked with patient and scheduled 1 year follow up appointment with Dr. Taylor on 4/4/2023. Details of appointment confirmed with patient.

## 2022-04-23 ENCOUNTER — ANCILLARY PROCEDURE (OUTPATIENT)
Dept: CT IMAGING | Facility: CLINIC | Age: 72
End: 2022-04-23
Attending: INTERNAL MEDICINE
Payer: COMMERCIAL

## 2022-04-23 DIAGNOSIS — Z87.891 PERSONAL HISTORY OF TOBACCO USE: ICD-10-CM

## 2022-04-23 PROCEDURE — 71271 CT THORAX LUNG CANCER SCR C-: CPT | Performed by: RADIOLOGY

## 2022-04-28 DIAGNOSIS — J44.9 COLD (CHRONIC OBSTRUCTIVE LUNG DISEASE) (H): Primary | ICD-10-CM

## 2022-06-15 ENCOUNTER — OFFICE VISIT (OUTPATIENT)
Dept: URGENT CARE | Facility: URGENT CARE | Age: 72
End: 2022-06-15
Payer: COMMERCIAL

## 2022-06-15 VITALS
DIASTOLIC BLOOD PRESSURE: 76 MMHG | WEIGHT: 165 LBS | TEMPERATURE: 97.9 F | RESPIRATION RATE: 15 BRPM | OXYGEN SATURATION: 98 % | BODY MASS INDEX: 25.01 KG/M2 | SYSTOLIC BLOOD PRESSURE: 142 MMHG | HEIGHT: 68 IN | HEART RATE: 78 BPM

## 2022-06-15 DIAGNOSIS — S39.92XA TAILBONE INJURY, INITIAL ENCOUNTER: Primary | ICD-10-CM

## 2022-06-15 PROCEDURE — 99214 OFFICE O/P EST MOD 30 MIN: CPT | Performed by: FAMILY MEDICINE

## 2022-06-15 NOTE — PROGRESS NOTES
Assessment & Plan     Tailbone injury, initial encounter  Expect gradual improvement in pain over 4 weeks. At this time is able to sit comfortably. No suggestion to suggest nerve impingement. OTC analgesics PRN for pain.   - XR Sacrum and Coccyx 2 Views           Lance Vega MD   [facility]    Subjective     Ravin is a 72 year old male who presents to clinic today for the following health issues:  Chief Complaint   Patient presents with     Urgent Care     Back Pain     Fell and landed on tailbone 2 days ago.      HPI    Patient was at home 2 days ago when he slipped in the kitchen and fell almost the wave going onto his tailbone.  He reports no head or neck injuries.  Has not passed out or lost consciousness.  He has taken ibuprofen which has provided some relief.  He is able to sit comfortably.  Most of the pain arises when he gets up and starts moving around.  He is a regular swimmer but has not gone in the last couple days.    Denies any new weakness or numbness in his lower extremities      Patient Active Problem List    Diagnosis Date Noted     Hypopigmentation 2018     Priority: Medium     Hiatal hernia 2017     Priority: [facility]     Health Care Home 2016     Priority: Medium     [facility] Case Management  Leeanna Sigala RN  [phone]    [facility] CARE PLAN SUMMARY    Client Name:  Robert B Behr  Address:  [address] [place] [address] Phone: [phone] (home)    :  1950 Date of Assessment: 2020 Refusal    Health Plan:  [facility]  Health Plan Number: [id number] Medical Assistance Number: [id number]  Financial Worker:    Case #:     FVP :  Leeanna Sigala RN  Phone:  [phone]   Fax:  [phone]   FVP Enrollment Date: 16 Case Mix: L   Rate Cell:  A  Waiver Type:  None   Emergency Contact:   ALYSHA MOLINA  [place] Phone: [phone]  Relation: Friend  Secondary Emergency Contact: LANCE BENTLEY  "Phone: [phone]  Relation: Friend Language:  English  :  No   Health Care Agent/POA:   Advanced Directives/Living Will:     Primary Care Clinic/Phone/Fax:  [facility]/(p) [phone], (f) [phone] Primary Dx:  COPD J44.9  Secondary Dx:  Osteoporosis M81.0   Primary Physician:  Fina Frausto   Height:  5'10\"  Weight:  154 lbs    Specialty Physician:    Audiologist:     Eye Care Provider:   Dental Care Provider:    are: [facility] [phone] or [phone]   Other:               Tobacco use disorder 04/13/2016     Priority: Medium     Hyperlipidemia LDL goal <130 04/13/2016     Priority: Medium     COPD (chronic obstructive pulmonary disease) (H)      Priority: Medium     diagnosed with spirometry        Osteoporosis      Priority: Medium       Current Outpatient Medications   Medication     albuterol (PROAIR HFA) 108 (90 Base) MCG/ACT inhaler     atorvastatin (LIPITOR) 40 MG tablet     fluticasone-salmeterol (ADVAIR) 500-50 MCG/DOSE inhaler     Ginkgo Biloba 60 MG TABS     glucosamine-chondroitinoitin 750-600 MG TABS     ipratropium-albuterol (COMBIVENT RESPIMAT)  MCG/ACT inhaler     B Complex-C (VITAMIN B COMPLEX W/VITAMIN C) TABS tablet     calcium carbonate 600 mg-vitamin D 400 units (CALTRATE) 600-400 MG-UNIT per tablet     fish oil-omega-3 fatty acids 1000 MG capsule     fluticasone-salmeterol (WIXELA INHUB) 250-50 MCG/DOSE inhaler     omeprazole (PRILOSEC OTC) 20 MG EC tablet     Selenium (SELENIMIN-200) 200 MCG TABS tablet     tiotropium (SPIRIVA RESPIMAT) 2.5 MCG/ACT inhaler     vitamin A 3 MG (74322 UNITS) capsule     vitamin C (ASCORBIC ACID) 1000 MG TABS     vitamin E (VITAMIN E) 1000 units (450 mg) CAPS capsule     No current facility-administered medications for this visit.         Objective    BP (!) 142/76   Pulse 78   Temp 97.9  F (36.6  C) (Temporal)   Resp 15   Ht 1.727 m (5' 8\")   Wt 74.8 kg (165 lb)   SpO2 98%   BMI " 25.09 kg/m    Physical Exam     Gait: stable/straight. Slight pain in the buttocks area based on movement    Xray sacrum/tailbone per my read: no evidence of fracture    Results for orders placed or performed in visit on 06/15/22   XR Sacrum and Coccyx 2 Views     Status: None    Narrative    SACRUM AND COCCYX TWO VIEWS Loraine 15, 2022 12:37 PM     HISTORY: Ground level fall two days ago. Tailbone injury, initial  encounter.    COMPARISON: None.      Impression    IMPRESSION: Alignment of the sacral and coccygeal segments of the  spine is normal. No fractures. The sacroiliac joints bilaterally are  patent and normally aligned.    JESSICA BILLINGSLEY MD         SYSTEM ID:  [id number]             The use of Dragon/TapInfluence dictation services may have been used to construct the content in this note; any grammatical or spelling errors are non-intentional. Please contact the author of this note directly if you are in need of any clarification.

## 2022-06-15 NOTE — PATIENT INSTRUCTIONS
Ibuprofen 400mg every 4-6 hours as needed for pain   Additional tylenol 325-650mg every 4-6 hours as needed for pain     Expect gradual improvement over the next 4-6 weeks      Okay to exercise as tolerating      If symptoms worsen at any point or if you have no improvement within the next couple weeks return to be evaluated      We will call you if the radiologist notes any abnormalities

## 2022-07-03 DIAGNOSIS — M16.11 OSTEOARTHRITIS OF RIGHT HIP, UNSPECIFIED OSTEOARTHRITIS TYPE: ICD-10-CM

## 2022-07-05 RX ORDER — MAGNESIUM CARB/ALUMINUM HYDROX 105-160MG
TABLET,CHEWABLE ORAL
Qty: 120 TABLET | Refills: 3 | Status: SHIPPED | OUTPATIENT
Start: 2022-07-05 | End: 2022-11-22

## 2022-07-05 NOTE — TELEPHONE ENCOUNTER
Routing refill request to provider for review/approval because:  --No EHR protocol for glucosamine-chondroitinoitin 750-600 MG TABS.        --Last visit:  2/8/2022 [facility] visit.

## 2022-07-07 ENCOUNTER — PATIENT OUTREACH (OUTPATIENT)
Dept: GERIATRIC MEDICINE | Facility: CLINIC | Age: 72
End: 2022-07-07

## 2022-07-07 NOTE — PROGRESS NOTES
"[facility] Care Coordination Contact    Contact to member to offer annual health risk assessment on behalf of primary CC Lea LEUNG  Member answered the telephone and stated, \" I can't talk to you, I'm driving.\" He then disconnected line.     CC will attempt to reach again at a later date.    Jose Swain, [facility]  [phone]    "

## 2022-07-14 ENCOUNTER — OFFICE VISIT (OUTPATIENT)
Dept: URGENT CARE | Facility: URGENT CARE | Age: 72
End: 2022-07-14
Payer: COMMERCIAL

## 2022-07-14 VITALS
HEART RATE: 54 BPM | TEMPERATURE: 97.3 F | DIASTOLIC BLOOD PRESSURE: 81 MMHG | OXYGEN SATURATION: 96 % | SYSTOLIC BLOOD PRESSURE: 126 MMHG | BODY MASS INDEX: 24.33 KG/M2 | WEIGHT: 160 LBS

## 2022-07-14 DIAGNOSIS — Z78.9 TAKES DIETARY SUPPLEMENTS: ICD-10-CM

## 2022-07-14 DIAGNOSIS — K21.00 GASTROESOPHAGEAL REFLUX DISEASE WITH ESOPHAGITIS WITHOUT HEMORRHAGE: Primary | ICD-10-CM

## 2022-07-14 DIAGNOSIS — Z71.89 ENCOUNTER FOR HERB AND VITAMIN SUPPLEMENT MANAGEMENT: ICD-10-CM

## 2022-07-14 PROCEDURE — 99214 OFFICE O/P EST MOD 30 MIN: CPT | Performed by: PHYSICIAN ASSISTANT

## 2022-07-14 RX ORDER — GLUCOSAMINE/CHONDR SU A SOD 750-600 MG
1 TABLET ORAL 2 TIMES DAILY
Qty: 60 TABLET | Refills: 11 | Status: ON HOLD | OUTPATIENT
Start: 2022-07-14 | End: 2023-02-13

## 2022-07-14 NOTE — PROGRESS NOTES
Takes dietary supplements  - Ginkgo Biloba 120 MG TABS; Take 1 Tablespoonful by mouth 2 times daily    Encounter for herb and vitamin supplement management  - Ginkgo Biloba 120 MG TABS; Take 1 Tablespoonful by mouth 2 times daily    Gastroesophageal reflux disease with esophagitis without hemorrhage  - omeprazole (PRILOSEC) 20 MG DR capsule; Take 1 capsule (20 mg) by mouth 2 times daily for 150 days        Tips to Control Acid Reflux    To control acid reflux, you ll need to make some basic diet and lifestyle changes. The simple steps outlined below may be all you ll need to ease discomfort.   Watch what you eat    Don't have fatty foods or spicy foods.    Eat fewer acidic foods, such as citrus and tomato-based foods. These can increase symptoms.    Limit drinking alcohol, caffeine, and fizzy beverages. All increase acid reflux.    Try limiting chocolate, peppermint, and spearmint. These can make acid reflux worse in some people.     Watch when you eat    Don't lie down for 3 hours after eating.    Don't snack before going to bed.     Raise your head  Raising your head and upper body by 4 to 6 inches helps limit reflux when you re lying down. Put blocks under the head of your bed frame or a wedge under your mattress to raise it.   Other changes    Lose weight, if you need to    Don t exercise near bedtime    Don't wear tight-fitting clothes    Limit aspirin and ibuprofen    Stop smoking     StayWell last reviewed this educational content on 6/1/2019 2000-2021 [employer]. All rights reserved. This information is not intended as a substitute for professional medical care. Always follow your healthcare professional's instructions.                 Junior Doss PA-C  [facility] URGENT CARE    Subjective   72 year old who presents to clinic today for the following health issues:    Urgent Care       HPI     Patient has been taking Gingko tablets for many years and states that it has helped  him greatly with memory and attention. He states that he has been taking this for better blood flow to the brain. He currently takes 60 mg twice a day. Patient would like to increase to 120 mg twice a day as he has not had any adverse effects.     Patient would also like a refill on his omeprazole which he has been using for his GERD. He has not been using this for the last several months but reports good relief when he has taken it in the past. No adverse effects.     Review of Systems   Review of Systems   See HPI     Objective    Temp: 97.3  F (36.3  C) Temp src: Temporal BP: 126/81 Pulse: 54     SpO2: 96 %       Physical Exam   Physical Exam  Constitutional:       General: He is not in acute distress.     Appearance: Normal appearance. He is normal weight. He is not ill-appearing, toxic-appearing or diaphoretic.   HENT:      Head: Normocephalic and atraumatic.   Cardiovascular:      Rate and Rhythm: Normal rate.      Pulses: Normal pulses.   Pulmonary:      Effort: Pulmonary effort is normal. No respiratory distress.   Neurological:      Mental Status: He is alert.   Psychiatric:         Mood and Affect: Mood normal.         Behavior: Behavior normal.         Thought Content: Thought content normal.         Judgment: Judgment normal.          No results found for this or any previous visit (from the past 24 hour(s)).

## 2022-07-15 NOTE — PROGRESS NOTES
[facility] Care Coordination Contact    7/15/22- Message left for Sergio on his personal voicemail indicating I was calling to offer health risk assessment. Requested he call back.  CC also sent him an email last week introducing myself and reason for contact.  No response thus far.    Jose Swain, [facility]  [phone]

## 2022-07-16 DIAGNOSIS — K21.00 GASTROESOPHAGEAL REFLUX DISEASE WITH ESOPHAGITIS WITHOUT HEMORRHAGE: ICD-10-CM

## 2022-07-18 ENCOUNTER — PATIENT OUTREACH (OUTPATIENT)
Dept: GERIATRIC MEDICINE | Facility: CLINIC | Age: 72
End: 2022-07-18

## 2022-07-18 NOTE — LETTER
July 20, 2022    ROBERT B BEHR  [address]  [place] [address]        Dear Shirley:    As a member of [facility], we offer a health risk assessment at no cost to you. I know you don't want to have the assessment right now. If you change your mind, please call me at the number below.    Who performs the health risk assessment?  A [facility] Care Coordinator performs the assessment. Our Care Coordinators can also help you understand your benefits. They can tell you about services to aid you at home, such as managing your care with your doctors if your health worsens.    Our Care Coordinators are here for you if you need:    Support for activities you used to do by yourself (including making meals, bathing, and paying bills)    Equipment for bathroom or home safety    Help finding a new place to live    Information on staying healthy, preventing falls and immunizations    Questions?  If you have questions, or you would like to do the assessment, call me at [phone]. TTY users call [phone]. I'm here from 8am to 5pm. I may reach out to you again soon.      Sincerely,        Jose Swain, [facility]  [phone]  [email or web address]    <[id number] accepted    [id number] (12/2021)  [id number]>

## 2022-07-18 NOTE — PROGRESS NOTES
"[facility] Care Coordination Contact      [facility] Refusal Telephone Assessment    Member refused home visit HRA on 7/18/22 (reason: \"I'm doing well and just don't need it.\").    ER visits: No  Hospitalizations: No  Health concerns: n/a  Falls/Injuries: No  ADL/IADL Dependencies: no        Member currently receiving the following home care services:     Member currently receiving the following community resources:    Informal support(s):      Advanced Care Planning discussion, complete code section.    [facility] sponsored benefits: Shared information re: Silver Sneakers/gym memberships, ASA, Calcium +D.    Follow-Up Plan: Member informed of future contact, plan to f/u with member with a 6 month telephone assessment and offer a home visit.  Contact information shared with member and family, encouraged member to call with any questions or concerns at any time.    This CC note routed to PCP.    Jose Swain, [facility]  [phone]    "

## 2022-07-20 NOTE — PROGRESS NOTES
"[facility] Care Coordination Contact    Per CC, mailed client a \"Refusal of Home Visit\" letter.        Lola Enriquez  Care Management Specialist  [facility]  [phone]          "

## 2022-08-17 ENCOUNTER — PATIENT OUTREACH (OUTPATIENT)
Dept: GERIATRIC MEDICINE | Facility: CLINIC | Age: 72
End: 2022-08-17

## 2022-08-17 NOTE — PROGRESS NOTES
[facility] Care Coordination Contact    No call to member. [facility] regulatory update.  Leeanna Sigala RN, BSN, PHN  [facility] Coordinator  [phone]

## 2022-08-18 ENCOUNTER — TELEPHONE (OUTPATIENT)
Dept: PULMONOLOGY | Facility: CLINIC | Age: 72
End: 2022-08-18

## 2022-08-18 DIAGNOSIS — J44.9 CHRONIC OBSTRUCTIVE PULMONARY DISEASE, UNSPECIFIED COPD TYPE (H): Primary | ICD-10-CM

## 2022-08-18 DIAGNOSIS — Z78.9 TAKES DIETARY SUPPLEMENTS: ICD-10-CM

## 2022-08-18 DIAGNOSIS — Z71.89 ENCOUNTER FOR HERB AND VITAMIN SUPPLEMENT MANAGEMENT: ICD-10-CM

## 2022-08-18 RX ORDER — FLUTICASONE PROPIONATE AND SALMETEROL 500; 50 UG/1; UG/1
1 POWDER RESPIRATORY (INHALATION) 2 TIMES DAILY
Qty: 60 EACH | Refills: 3 | Status: SHIPPED | OUTPATIENT
Start: 2022-08-18 | End: 2022-12-13

## 2022-08-18 NOTE — TELEPHONE ENCOUNTER
Called patient to let him know his Advair prescription was refilled.   Order placed for Advair per note on 4/5.

## 2022-10-15 ENCOUNTER — HEALTH MAINTENANCE LETTER (OUTPATIENT)
Age: 72
End: 2022-10-15

## 2022-11-21 DIAGNOSIS — M16.11 OSTEOARTHRITIS OF RIGHT HIP, UNSPECIFIED OSTEOARTHRITIS TYPE: ICD-10-CM

## 2022-11-22 RX ORDER — MAGNESIUM CARB/ALUMINUM HYDROX 105-160MG
TABLET,CHEWABLE ORAL
Qty: 120 TABLET | Refills: 3 | Status: SHIPPED | OUTPATIENT
Start: 2022-11-22 | End: 2023-04-03

## 2022-11-22 NOTE — TELEPHONE ENCOUNTER
Routing refill request to provider for review/approval because:  --No EHR protocol for glucosamine-chondroitinoitin 750-600 MG TABS.    --Last visit:  2/8/2022 [facility] for Medicare wellness.    --Future Visit: none.    --Last Written Prescription Date:       Disp Refills Start End ARELY   glucosamine-chondroitinoitin 750-600 MG TABS 120 tablet 3 7/5/2022  No   Sig: TAKE 2 TABLETS BY MOUTH TWICE DAILY

## 2022-11-23 ENCOUNTER — OFFICE VISIT (OUTPATIENT)
Dept: URGENT CARE | Facility: URGENT CARE | Age: 72
End: 2022-11-23
Payer: COMMERCIAL

## 2022-11-23 VITALS
TEMPERATURE: 98.1 F | OXYGEN SATURATION: 97 % | SYSTOLIC BLOOD PRESSURE: 127 MMHG | HEART RATE: 58 BPM | DIASTOLIC BLOOD PRESSURE: 70 MMHG

## 2022-11-23 DIAGNOSIS — J44.1 COPD EXACERBATION (H): Primary | ICD-10-CM

## 2022-11-23 PROCEDURE — 99213 OFFICE O/P EST LOW 20 MIN: CPT | Performed by: INTERNAL MEDICINE

## 2022-11-23 RX ORDER — AMOXICILLIN 875 MG
875 TABLET ORAL 2 TIMES DAILY
Qty: 14 TABLET | Refills: 0 | Status: SHIPPED | OUTPATIENT
Start: 2022-11-23 | End: 2022-11-30

## 2022-11-23 RX ORDER — PREDNISONE 20 MG/1
40 TABLET ORAL DAILY
Qty: 10 TABLET | Refills: 0 | Status: SHIPPED | OUTPATIENT
Start: 2022-11-23 | End: 2022-11-28

## 2022-11-23 NOTE — PROGRESS NOTES
"  Assessment & Plan     COPD exacerbation (H)  Discussed the varying indications for antibiotics in COPD exacerbations with the patient and that his current state does not clearly warrant antibiotic therapy.  Discussed the risk/benefit in terms of empiric antibiotic use.  The patient is certain that he requires antibiotics based upon prior treatment patterns; he is skeptical of the discussion regarding lack of benefit from antibiotics in some COPD exacerbations.  He is not commonly treated with antibiotics (last Rx we have on file was 7/15/2020).      In terms of therapeutic decision-making, needed to weigh risk:benefit of empiric antibiotics at this point knowing that the benefit is likely small.  Risks of direct harm from empiric antibiotics at this time are also relatively small due to infrequent use for this patient.  Soft risks of withholding therapy when he is convinced that this is necessary include damage to his trust in the healthcare system and providers.  This is a risk in itself which I judged to be greater than the risk of direct harm from empiric antibiotic therapy.    - predniSONE (DELTASONE) 20 MG tablet; Take 2 tablets (40 mg) by mouth daily for 5 days  - amoxicillin (AMOXIL) 875 MG tablet; Take 1 tablet (875 mg) by mouth 2 times daily for 7 days    Obey Sawant MD  [facility] URGENT CARE [place]    Ty Alicia is a 72 year old, presenting for the following health issues:  Urgent Care (Pt is in to get bronchitis treatment, has history of it)      HPI   Chief complaint of \"I think I have bronchitis.\"   Patient has a history of COPD.   Noting increasing shortness of breath and some sweating on the chest.  Achiness in the joints.  He is noting cough productive of white sputum.  Denies wheeze.  Notes more dyspnea today. A little increase in physical.  Treated with antibiotics in the past for this sort of symptom.      Review of Systems   Constitutional, HEENT, cardiovascular, " pulmonary, gi and gu systems are negative, except as otherwise noted.      Objective    /70 (BP Location: Right arm, Patient Position: Sitting, Cuff Size: Adult Regular)   Pulse 58   Temp 98.1  F (36.7  C) (Temporal)   SpO2 97%   There is no height or weight on file to calculate BMI.  Physical Exam   GENERAL APPEARANCE: healthy, alert and no distress  HENT: ear canals and TM's normal and nose and mouth without ulcers or lesions  NECK: no adenopathy and no asymmetry, masses, or scars  RESP: diffuse end expiratory wheeze with scattered rhonchi  CV: regular rates and rhythm, normal S1 S2, no S3 or S4 and no murmur, click or rub

## 2022-12-12 DIAGNOSIS — J44.9 CHRONIC OBSTRUCTIVE PULMONARY DISEASE, UNSPECIFIED COPD TYPE (H): ICD-10-CM

## 2022-12-13 ENCOUNTER — TELEPHONE (OUTPATIENT)
Dept: PULMONOLOGY | Facility: CLINIC | Age: 72
End: 2022-12-13

## 2022-12-13 DIAGNOSIS — R91.8 ABNORMAL CT LUNG SCREENING: Primary | ICD-10-CM

## 2022-12-13 RX ORDER — FLUTICASONE PROPIONATE AND SALMETEROL 500; 50 UG/1; UG/1
POWDER RESPIRATORY (INHALATION)
Qty: 60 EACH | Refills: 3 | Status: ON HOLD | OUTPATIENT
Start: 2022-12-13 | End: 2023-02-17

## 2022-12-13 NOTE — TELEPHONE ENCOUNTER
Contacted pt to let him know that Dr Taylor would like him to get a Chest CT asap, he was due in October.   Left message for call back to discuss. Will provide number for scheduling [phone].

## 2022-12-13 NOTE — TELEPHONE ENCOUNTER
Ravin calling about status of WIXELA?    Ravin doran would like new [address] [facility] [address] [facility] [address]

## 2022-12-24 DIAGNOSIS — Z71.89 ENCOUNTER FOR HERB AND VITAMIN SUPPLEMENT MANAGEMENT: ICD-10-CM

## 2022-12-26 RX ORDER — MULTIVIT WITH MINERALS/LUTEIN
TABLET ORAL
Qty: 90 TABLET | Refills: 0 | Status: SHIPPED | OUTPATIENT
Start: 2022-12-26 | End: 2023-02-27

## 2022-12-26 NOTE — TELEPHONE ENCOUNTER
Routing refill request to provider for review/approval because:  --Drug not on the [facility] refill protocol vitamin C (ASCORBIC ACID) 1000 MG TABS.          --Last visit:  2/8/2022 [facility] for Medicare.    --Future Visit: none with family practice.      --Last Written Prescription Date:     Disp Refills Start End ARELY   vitamin C (ASCORBIC ACID) 1000 MG TABS 90 tablet 3 2/8/2022  No   Sig: TAKE 1 TABLET(1000 MG) BY MOUTH DAILY

## 2022-12-29 ENCOUNTER — ANCILLARY PROCEDURE (OUTPATIENT)
Dept: CT IMAGING | Facility: CLINIC | Age: 72
End: 2022-12-29
Attending: INTERNAL MEDICINE
Payer: COMMERCIAL

## 2022-12-29 DIAGNOSIS — R91.8 ABNORMAL CT LUNG SCREENING: ICD-10-CM

## 2022-12-29 PROCEDURE — 71250 CT THORAX DX C-: CPT | Mod: GC | Performed by: RADIOLOGY

## 2023-01-11 ENCOUNTER — TELEPHONE (OUTPATIENT)
Dept: PULMONOLOGY | Facility: CLINIC | Age: 73
End: 2023-01-11

## 2023-01-13 ENCOUNTER — TELEPHONE (OUTPATIENT)
Dept: PULMONOLOGY | Facility: CLINIC | Age: 73
End: 2023-01-13

## 2023-01-25 ENCOUNTER — OFFICE VISIT (OUTPATIENT)
Dept: URGENT CARE | Facility: URGENT CARE | Age: 73
End: 2023-01-25
Payer: COMMERCIAL

## 2023-01-25 VITALS
TEMPERATURE: 98 F | SYSTOLIC BLOOD PRESSURE: 152 MMHG | HEART RATE: 87 BPM | BODY MASS INDEX: 24.25 KG/M2 | HEIGHT: 68 IN | WEIGHT: 160 LBS | OXYGEN SATURATION: 98 % | DIASTOLIC BLOOD PRESSURE: 95 MMHG

## 2023-01-25 DIAGNOSIS — J44.1 CHRONIC OBSTRUCTIVE PULMONARY DISEASE WITH ACUTE EXACERBATION (H): Primary | ICD-10-CM

## 2023-01-25 DIAGNOSIS — J20.9 ACUTE BRONCHITIS WITH COEXISTING CONDITION REQUIRING PROPHYLACTIC TREATMENT: ICD-10-CM

## 2023-01-25 PROCEDURE — 99214 OFFICE O/P EST MOD 30 MIN: CPT | Performed by: NURSE PRACTITIONER

## 2023-01-25 RX ORDER — AMOXICILLIN 875 MG
875 TABLET ORAL 2 TIMES DAILY
Qty: 14 TABLET | Refills: 0 | Status: SHIPPED | OUTPATIENT
Start: 2023-01-25 | End: 2023-02-01

## 2023-01-25 RX ORDER — PREDNISONE 20 MG/1
40 TABLET ORAL DAILY
Qty: 10 TABLET | Refills: 0 | Status: SHIPPED | OUTPATIENT
Start: 2023-01-25 | End: 2023-01-30

## 2023-01-25 NOTE — PROGRESS NOTES
"Chief Complaint   Patient presents with     Cough     Pt has cough, sob, congestion, annual flare up         ICD-10-CM    1. Chronic obstructive pulmonary disease with acute exacerbation (H)  J44.1 amoxicillin (AMOXIL) 875 MG tablet     predniSONE (DELTASONE) 20 MG tablet      2. Acute bronchitis with coexisting condition requiring prophylactic treatment  J20.9 amoxicillin (AMOXIL) 875 MG tablet     predniSONE (DELTASONE) 20 MG tablet      Continue your inhalers, start steroids and antibiotics.  If his symptoms worsen he is instructed to go to the emergency room for further assessment and treatment immediately.    Subjective     Robert B Behr is an 73 year old male who presents to clinic today for acute cough, clear sputum. increased shortness of breath for 5 days    ROS: 10 point ROS neg other than the symptoms noted above in the HPI.       Objective    BP (!) 152/95 (BP Location: Left arm, Patient Position: Sitting, Cuff Size: Adult Regular)   Pulse 87   Temp 98  F (36.7  C) (Oral)   Ht 1.727 m (5' 8\")   Wt 72.6 kg (160 lb)   SpO2 98%   BMI 24.33 kg/m    Nurses notes and VS have been reviewed.    Physical Exam       GENERAL APPEARANCE: alert and moderate distress     EYES: PERRL, EOMI, sclera non-icteric     HENT: oral exam benign, mucus membranes intact, without ulcers or lesions     NECK: no adenopathy or asymmetry, thyroid normal to palpation     RESP: Decreased breath sounds throughout with expiratory wheezes     CV: regular rates and rhythm, no murmurs, rubs, or gallop     SKIN: no suspicious lesions or rashes    Patient Instructions   Take medications as prescribed.    Go to ER if symptoms worsen.      DARIEL Newton, CNP  [facility] Provider    The use of Dragon/DoctorC dictation services may have been used to construct the content in this note; any grammatical or spelling errors are non-intentional. Please contact the author of this note directly if you are in need of any " clarification.

## 2023-02-01 ENCOUNTER — PATIENT OUTREACH (OUTPATIENT)
Dept: GERIATRIC MEDICINE | Facility: CLINIC | Age: 73
End: 2023-02-01
Payer: COMMERCIAL

## 2023-02-01 NOTE — PROGRESS NOTES
[facility] Care Coordination Contact      [facility] Six-Month Telephone Assessment    6 month telephone assessment completed on 2/1/23.    ER visits: No  Hospitalizations: No  TCU stays: No  Significant health status changes: None  Falls/Injuries: No  ADL/IADL changes: No  Changes in services: No    Caregiver Assessment follow up:  None    Goals: See POC in chart for goal progress documentation.      Member reports he is doing well. Doesn't need a visit or anything at this time.    Doesn't ride his bike in the winter any longer. He is getting a new bike in the spring. His current bike has a flat tire and is down to 1 gear. He goes the the [employer] 4-5 times a week to exercise.    Will offer member an annual health risk assessment in six months. Encouraged member to call CC with any questions or concerns in the meantime.     Leeanna Sigala RN, BSN, PHN  [facility] Care Coordinator  [phone]

## 2023-02-02 DIAGNOSIS — K21.00 GASTROESOPHAGEAL REFLUX DISEASE WITH ESOPHAGITIS WITHOUT HEMORRHAGE: ICD-10-CM

## 2023-02-02 NOTE — TELEPHONE ENCOUNTER
omeprazole (PRILOSEC) 20 MG DR capsule      Last Written Prescription Date:  7-14-22  Last Fill Quantity: 60 CAP,   # refills: 4  Last Office Visit: 2-8-22  Future Office visit:       Routing refill request to provider for review/approval because:  Medication is reported/historical

## 2023-02-02 NOTE — TELEPHONE ENCOUNTER
Chastity-Please review and sign if agree.    Team Coordinators-Please contact patient to schedule annual Medicare wellness exam.    Last office visit was 2/8/22 (Medicare annual wellness).    Last Written Prescription Date:  7/14/22 (Urgent Care)  Last Fill Quantity: 60,  # refills: 4   Last office visit: 2/8/2022 with prescribing provider:  ROCIO Arguello CNP   Future Office Visit:        Thank you!  BRAYDON WareN, RN-BC  [facility]

## 2023-02-09 NOTE — TELEPHONE ENCOUNTER
"Pt seen at  12/28  Reports status quo  Wanting to be seen for recheck per LT\"s advice.  No appointment's here or at  today so pt will come to  at 5.  Pt in agreement with plan and verbalized understanding.  Thanks!  Aditi, RN  Triage Nurse      " Simple / Intermediate / Complex Repair - Final Wound Length In Cm: 3

## 2023-02-12 ENCOUNTER — APPOINTMENT (OUTPATIENT)
Dept: CT IMAGING | Facility: CLINIC | Age: 73
DRG: 177 | End: 2023-02-12
Attending: EMERGENCY MEDICINE
Payer: COMMERCIAL

## 2023-02-12 ENCOUNTER — HOSPITAL ENCOUNTER (INPATIENT)
Facility: CLINIC | Age: 73
LOS: 5 days | Discharge: HOME | DRG: 177 | End: 2023-02-17
Attending: EMERGENCY MEDICINE | Admitting: PEDIATRICS
Payer: COMMERCIAL

## 2023-02-12 ENCOUNTER — APPOINTMENT (OUTPATIENT)
Dept: GENERAL RADIOLOGY | Facility: CLINIC | Age: 73
DRG: 177 | End: 2023-02-12
Attending: EMERGENCY MEDICINE
Payer: COMMERCIAL

## 2023-02-12 DIAGNOSIS — R06.02 SOB (SHORTNESS OF BREATH): ICD-10-CM

## 2023-02-12 DIAGNOSIS — J44.9 CHRONIC OBSTRUCTIVE PULMONARY DISEASE, UNSPECIFIED COPD TYPE (H): ICD-10-CM

## 2023-02-12 DIAGNOSIS — U07.1 INFECTION DUE TO 2019 NOVEL CORONAVIRUS: ICD-10-CM

## 2023-02-12 DIAGNOSIS — R06.02 SHORTNESS OF BREATH: ICD-10-CM

## 2023-02-12 DIAGNOSIS — R09.02 HYPOXIA: ICD-10-CM

## 2023-02-12 DIAGNOSIS — F17.210 CIGARETTE SMOKER: ICD-10-CM

## 2023-02-12 DIAGNOSIS — J44.1 COPD EXACERBATION (H): ICD-10-CM

## 2023-02-12 LAB
ALBUMIN SERPL BCG-MCNC: 3.3 G/DL (ref 3.5–5.2)
ALBUMIN SERPL BCG-MCNC: 3.6 G/DL (ref 3.5–5.2)
ALP SERPL-CCNC: 64 U/L (ref 40–129)
ALP SERPL-CCNC: 70 U/L (ref 40–129)
ALT SERPL W P-5'-P-CCNC: 15 U/L (ref 10–50)
ALT SERPL W P-5'-P-CCNC: 16 U/L (ref 10–50)
ANION GAP SERPL CALCULATED.3IONS-SCNC: 14 MMOL/L (ref 7–15)
ANION GAP SERPL CALCULATED.3IONS-SCNC: 16 MMOL/L (ref 7–15)
AST SERPL W P-5'-P-CCNC: 33 U/L (ref 10–50)
AST SERPL W P-5'-P-CCNC: 38 U/L (ref 10–50)
ATRIAL RATE - MUSE: 92 BPM
BASOPHILS # BLD AUTO: 0 10E3/UL (ref 0–0.2)
BASOPHILS NFR BLD AUTO: 1 %
BILIRUB SERPL-MCNC: 0.3 MG/DL
BILIRUB SERPL-MCNC: 0.6 MG/DL
BUN SERPL-MCNC: 12.6 MG/DL (ref 8–23)
BUN SERPL-MCNC: 13.9 MG/DL (ref 8–23)
CALCIUM SERPL-MCNC: 8.7 MG/DL (ref 8.8–10.2)
CALCIUM SERPL-MCNC: 9.1 MG/DL (ref 8.8–10.2)
CHLORIDE SERPL-SCNC: 101 MMOL/L (ref 98–107)
CHLORIDE SERPL-SCNC: 97 MMOL/L (ref 98–107)
CREAT SERPL-MCNC: 0.78 MG/DL (ref 0.67–1.17)
CREAT SERPL-MCNC: 0.89 MG/DL (ref 0.67–1.17)
CREAT SERPL-MCNC: 0.89 MG/DL (ref 0.67–1.17)
CRP SERPL-MCNC: 197 MG/L
D DIMER PPP FEU-MCNC: 1.72 UG/ML FEU (ref 0–0.5)
D DIMER PPP FEU-MCNC: 1.85 UG/ML FEU (ref 0–0.5)
DEPRECATED HCO3 PLAS-SCNC: 20 MMOL/L (ref 22–29)
DEPRECATED HCO3 PLAS-SCNC: 24 MMOL/L (ref 22–29)
DIASTOLIC BLOOD PRESSURE - MUSE: NORMAL MMHG
EOSINOPHIL # BLD AUTO: 0 10E3/UL (ref 0–0.7)
EOSINOPHIL NFR BLD AUTO: 1 %
ERYTHROCYTE [DISTWIDTH] IN BLOOD BY AUTOMATED COUNT: 13.3 % (ref 10–15)
ERYTHROCYTE [DISTWIDTH] IN BLOOD BY AUTOMATED COUNT: 13.3 % (ref 10–15)
FLUAV RNA SPEC QL NAA+PROBE: NEGATIVE
FLUBV RNA RESP QL NAA+PROBE: NEGATIVE
GFR SERPL CREATININE-BSD FRML MDRD: 90 ML/MIN/1.73M2
GFR SERPL CREATININE-BSD FRML MDRD: 90 ML/MIN/1.73M2
GFR SERPL CREATININE-BSD FRML MDRD: >90 ML/MIN/1.73M2
GLUCOSE SERPL-MCNC: 126 MG/DL (ref 70–99)
GLUCOSE SERPL-MCNC: 142 MG/DL (ref 70–99)
HCT VFR BLD AUTO: 49 % (ref 40–53)
HCT VFR BLD AUTO: 49 % (ref 40–53)
HGB BLD-MCNC: 16.4 G/DL (ref 13.3–17.7)
HGB BLD-MCNC: 16.4 G/DL (ref 13.3–17.7)
HOLD SPECIMEN: NORMAL
HOLD SPECIMEN: NORMAL
IMM GRANULOCYTES # BLD: 0 10E3/UL
IMM GRANULOCYTES NFR BLD: 1 %
INR PPP: 1.04 (ref 0.85–1.15)
INTERPRETATION ECG - MUSE: NORMAL
LACTATE SERPL-SCNC: 1.2 MMOL/L (ref 0.7–2)
LACTATE SERPL-SCNC: 2.3 MMOL/L (ref 0.7–2)
LYMPHOCYTES # BLD AUTO: 0.9 10E3/UL (ref 0.8–5.3)
LYMPHOCYTES NFR BLD AUTO: 19 %
MAGNESIUM SERPL-MCNC: 1.8 MG/DL (ref 1.7–2.3)
MCH RBC QN AUTO: 31.3 PG (ref 26.5–33)
MCH RBC QN AUTO: 31.3 PG (ref 26.5–33)
MCHC RBC AUTO-ENTMCNC: 33.5 G/DL (ref 31.5–36.5)
MCHC RBC AUTO-ENTMCNC: 33.5 G/DL (ref 31.5–36.5)
MCV RBC AUTO: 94 FL (ref 78–100)
MCV RBC AUTO: 94 FL (ref 78–100)
MONOCYTES # BLD AUTO: 0.6 10E3/UL (ref 0–1.3)
MONOCYTES NFR BLD AUTO: 12 %
NEUTROPHILS # BLD AUTO: 3.1 10E3/UL (ref 1.6–8.3)
NEUTROPHILS NFR BLD AUTO: 66 %
P AXIS - MUSE: 33 DEGREES
PLATELET # BLD AUTO: 245 10E3/UL (ref 150–450)
PLATELET # BLD AUTO: 245 10E3/UL (ref 150–450)
POTASSIUM SERPL-SCNC: 3.7 MMOL/L (ref 3.4–5.3)
POTASSIUM SERPL-SCNC: 3.9 MMOL/L (ref 3.4–5.3)
PR INTERVAL - MUSE: 138 MS
PROCALCITONIN SERPL IA-MCNC: 0.12 NG/ML
PROT SERPL-MCNC: 6.5 G/DL (ref 6.4–8.3)
PROT SERPL-MCNC: 7.1 G/DL (ref 6.4–8.3)
QRS DURATION - MUSE: 128 MS
QT - MUSE: 366 MS
QTC - MUSE: 452 MS
R AXIS - MUSE: 40 DEGREES
RBC # BLD AUTO: 5.24 10E6/UL (ref 4.4–5.9)
RBC # BLD AUTO: 5.24 10E6/UL (ref 4.4–5.9)
RSV RNA SPEC NAA+PROBE: NEGATIVE
SARS-COV-2 RNA RESP QL NAA+PROBE: POSITIVE
SODIUM SERPL-SCNC: 135 MMOL/L (ref 136–145)
SODIUM SERPL-SCNC: 137 MMOL/L (ref 136–145)
SYSTOLIC BLOOD PRESSURE - MUSE: NORMAL MMHG
T AXIS - MUSE: 33 DEGREES
TROPONIN T SERPL HS-MCNC: 16 NG/L
VENTRICULAR RATE- MUSE: 92 BPM
WBC # BLD AUTO: 4.6 10E3/UL (ref 4–11)
WBC # BLD AUTO: 4.6 10E3/UL (ref 4–11)

## 2023-02-12 PROCEDURE — 71046 X-RAY EXAM CHEST 2 VIEWS: CPT

## 2023-02-12 PROCEDURE — 71046 X-RAY EXAM CHEST 2 VIEWS: CPT | Mod: 26 | Performed by: RADIOLOGY

## 2023-02-12 PROCEDURE — 36415 COLL VENOUS BLD VENIPUNCTURE: CPT

## 2023-02-12 PROCEDURE — 80053 COMPREHEN METABOLIC PANEL: CPT | Performed by: EMERGENCY MEDICINE

## 2023-02-12 PROCEDURE — 250N000013 HC RX MED GY IP 250 OP 250 PS 637: Performed by: EMERGENCY MEDICINE

## 2023-02-12 PROCEDURE — 83735 ASSAY OF MAGNESIUM: CPT | Performed by: EMERGENCY MEDICINE

## 2023-02-12 PROCEDURE — 85025 COMPLETE CBC W/AUTO DIFF WBC: CPT

## 2023-02-12 PROCEDURE — XW033E5 INTRODUCTION OF REMDESIVIR ANTI-INFECTIVE INTO PERIPHERAL VEIN, PERCUTANEOUS APPROACH, NEW TECHNOLOGY GROUP 5: ICD-10-PCS | Performed by: PEDIATRICS

## 2023-02-12 PROCEDURE — 87040 BLOOD CULTURE FOR BACTERIA: CPT | Performed by: EMERGENCY MEDICINE

## 2023-02-12 PROCEDURE — 85379 FIBRIN DEGRADATION QUANT: CPT | Performed by: EMERGENCY MEDICINE

## 2023-02-12 PROCEDURE — 99222 1ST HOSP IP/OBS MODERATE 55: CPT | Mod: GC | Performed by: PEDIATRICS

## 2023-02-12 PROCEDURE — 85610 PROTHROMBIN TIME: CPT

## 2023-02-12 PROCEDURE — 36415 COLL VENOUS BLD VENIPUNCTURE: CPT | Performed by: EMERGENCY MEDICINE

## 2023-02-12 PROCEDURE — 85379 FIBRIN DEGRADATION QUANT: CPT

## 2023-02-12 PROCEDURE — C9803 HOPD COVID-19 SPEC COLLECT: HCPCS | Performed by: EMERGENCY MEDICINE

## 2023-02-12 PROCEDURE — 99285 EMERGENCY DEPT VISIT HI MDM: CPT | Mod: CS,25 | Performed by: EMERGENCY MEDICINE

## 2023-02-12 PROCEDURE — 84155 ASSAY OF PROTEIN SERUM: CPT

## 2023-02-12 PROCEDURE — 96360 HYDRATION IV INFUSION INIT: CPT | Mod: 59 | Performed by: EMERGENCY MEDICINE

## 2023-02-12 PROCEDURE — 93010 ELECTROCARDIOGRAM REPORT: CPT | Performed by: EMERGENCY MEDICINE

## 2023-02-12 PROCEDURE — 85027 COMPLETE CBC AUTOMATED: CPT | Performed by: EMERGENCY MEDICINE

## 2023-02-12 PROCEDURE — 86140 C-REACTIVE PROTEIN: CPT

## 2023-02-12 PROCEDURE — 87637 SARSCOV2&INF A&B&RSV AMP PRB: CPT | Performed by: EMERGENCY MEDICINE

## 2023-02-12 PROCEDURE — 258N000003 HC RX IP 258 OP 636

## 2023-02-12 PROCEDURE — 71275 CT ANGIOGRAPHY CHEST: CPT

## 2023-02-12 PROCEDURE — 258N000003 HC RX IP 258 OP 636: Performed by: EMERGENCY MEDICINE

## 2023-02-12 PROCEDURE — 120N000002 HC R&B MED SURG/OB UMMC

## 2023-02-12 PROCEDURE — 84145 PROCALCITONIN (PCT): CPT | Performed by: EMERGENCY MEDICINE

## 2023-02-12 PROCEDURE — 250N000011 HC RX IP 250 OP 636

## 2023-02-12 PROCEDURE — 99284 EMERGENCY DEPT VISIT MOD MDM: CPT | Mod: CS | Performed by: EMERGENCY MEDICINE

## 2023-02-12 PROCEDURE — 93005 ELECTROCARDIOGRAM TRACING: CPT | Performed by: EMERGENCY MEDICINE

## 2023-02-12 PROCEDURE — 85048 AUTOMATED LEUKOCYTE COUNT: CPT | Performed by: EMERGENCY MEDICINE

## 2023-02-12 PROCEDURE — 250N000013 HC RX MED GY IP 250 OP 250 PS 637

## 2023-02-12 PROCEDURE — 250N000012 HC RX MED GY IP 250 OP 636 PS 637: Performed by: EMERGENCY MEDICINE

## 2023-02-12 PROCEDURE — 84484 ASSAY OF TROPONIN QUANT: CPT | Performed by: EMERGENCY MEDICINE

## 2023-02-12 PROCEDURE — 250N000009 HC RX 250: Performed by: EMERGENCY MEDICINE

## 2023-02-12 PROCEDURE — 250N000011 HC RX IP 250 OP 636: Performed by: EMERGENCY MEDICINE

## 2023-02-12 PROCEDURE — 84450 TRANSFERASE (AST) (SGOT): CPT

## 2023-02-12 PROCEDURE — 83605 ASSAY OF LACTIC ACID: CPT | Performed by: EMERGENCY MEDICINE

## 2023-02-12 PROCEDURE — 71275 CT ANGIOGRAPHY CHEST: CPT | Mod: 26 | Performed by: RADIOLOGY

## 2023-02-12 RX ORDER — IPRATROPIUM BROMIDE AND ALBUTEROL SULFATE 2.5; .5 MG/3ML; MG/3ML
3 SOLUTION RESPIRATORY (INHALATION)
Status: COMPLETED | OUTPATIENT
Start: 2023-02-12 | End: 2023-02-12

## 2023-02-12 RX ORDER — ATORVASTATIN CALCIUM 40 MG/1
40 TABLET, FILM COATED ORAL DAILY
Status: DISCONTINUED | OUTPATIENT
Start: 2023-02-12 | End: 2023-02-17 | Stop reason: HOSPADM

## 2023-02-12 RX ORDER — AZITHROMYCIN 250 MG/1
500 TABLET, FILM COATED ORAL ONCE
Status: COMPLETED | OUTPATIENT
Start: 2023-02-12 | End: 2023-02-12

## 2023-02-12 RX ORDER — DEXAMETHASONE 2 MG/1
6 TABLET ORAL DAILY
Status: DISCONTINUED | OUTPATIENT
Start: 2023-02-13 | End: 2023-02-17 | Stop reason: HOSPADM

## 2023-02-12 RX ORDER — PANTOPRAZOLE SODIUM 40 MG/1
40 TABLET, DELAYED RELEASE ORAL
Status: DISCONTINUED | OUTPATIENT
Start: 2023-02-13 | End: 2023-02-17 | Stop reason: HOSPADM

## 2023-02-12 RX ORDER — PREDNISONE 20 MG/1
60 TABLET ORAL ONCE
Status: COMPLETED | OUTPATIENT
Start: 2023-02-12 | End: 2023-02-12

## 2023-02-12 RX ORDER — ALBUTEROL SULFATE 90 UG/1
2 AEROSOL, METERED RESPIRATORY (INHALATION)
Status: DISCONTINUED | OUTPATIENT
Start: 2023-02-12 | End: 2023-02-17 | Stop reason: HOSPADM

## 2023-02-12 RX ORDER — POLYETHYLENE GLYCOL 3350 17 G/17G
17 POWDER, FOR SOLUTION ORAL DAILY PRN
Status: DISCONTINUED | OUTPATIENT
Start: 2023-02-12 | End: 2023-02-17 | Stop reason: HOSPADM

## 2023-02-12 RX ORDER — AZITHROMYCIN 250 MG/1
250 TABLET, FILM COATED ORAL DAILY
Status: DISCONTINUED | OUTPATIENT
Start: 2023-02-13 | End: 2023-02-14

## 2023-02-12 RX ORDER — AZITHROMYCIN 250 MG/1
500 TABLET, FILM COATED ORAL ONCE
Status: DISCONTINUED | OUTPATIENT
Start: 2023-02-12 | End: 2023-02-12

## 2023-02-12 RX ORDER — ONDANSETRON 4 MG/1
4 TABLET, ORALLY DISINTEGRATING ORAL EVERY 6 HOURS PRN
Status: DISCONTINUED | OUTPATIENT
Start: 2023-02-12 | End: 2023-02-17 | Stop reason: HOSPADM

## 2023-02-12 RX ORDER — CEFTRIAXONE 2 G/1
2 INJECTION, POWDER, FOR SOLUTION INTRAMUSCULAR; INTRAVENOUS ONCE
Status: COMPLETED | OUTPATIENT
Start: 2023-02-12 | End: 2023-02-12

## 2023-02-12 RX ORDER — IOPAMIDOL 755 MG/ML
56 INJECTION, SOLUTION INTRAVASCULAR ONCE
Status: COMPLETED | OUTPATIENT
Start: 2023-02-12 | End: 2023-02-12

## 2023-02-12 RX ORDER — ACETAMINOPHEN 325 MG/1
975 TABLET ORAL EVERY 8 HOURS PRN
Status: DISCONTINUED | OUTPATIENT
Start: 2023-02-12 | End: 2023-02-17 | Stop reason: HOSPADM

## 2023-02-12 RX ORDER — ENOXAPARIN SODIUM 100 MG/ML
40 INJECTION SUBCUTANEOUS EVERY 24 HOURS
Status: DISCONTINUED | OUTPATIENT
Start: 2023-02-12 | End: 2023-02-17 | Stop reason: HOSPADM

## 2023-02-12 RX ORDER — LIDOCAINE 40 MG/G
CREAM TOPICAL
Status: DISCONTINUED | OUTPATIENT
Start: 2023-02-12 | End: 2023-02-17 | Stop reason: HOSPADM

## 2023-02-12 RX ORDER — ONDANSETRON 2 MG/ML
4 INJECTION INTRAMUSCULAR; INTRAVENOUS EVERY 6 HOURS PRN
Status: DISCONTINUED | OUTPATIENT
Start: 2023-02-12 | End: 2023-02-17 | Stop reason: HOSPADM

## 2023-02-12 RX ORDER — DEXAMETHASONE 2 MG/1
6 TABLET ORAL DAILY
Status: DISCONTINUED | OUTPATIENT
Start: 2023-02-12 | End: 2023-02-12

## 2023-02-12 RX ORDER — CEFTRIAXONE 2 G/1
2 INJECTION, POWDER, FOR SOLUTION INTRAMUSCULAR; INTRAVENOUS EVERY 24 HOURS
Status: DISCONTINUED | OUTPATIENT
Start: 2023-02-13 | End: 2023-02-14

## 2023-02-12 RX ORDER — AZITHROMYCIN 250 MG/1
250 TABLET, FILM COATED ORAL DAILY
Status: DISCONTINUED | OUTPATIENT
Start: 2023-02-13 | End: 2023-02-12

## 2023-02-12 RX ADMIN — REMDESIVIR 200 MG: 100 INJECTION, POWDER, LYOPHILIZED, FOR SOLUTION INTRAVENOUS at 21:52

## 2023-02-12 RX ADMIN — PREDNISONE 60 MG: 20 TABLET ORAL at 13:06

## 2023-02-12 RX ADMIN — CEFTRIAXONE SODIUM 2 G: 2 INJECTION, POWDER, FOR SOLUTION INTRAMUSCULAR; INTRAVENOUS at 16:45

## 2023-02-12 RX ADMIN — SODIUM CHLORIDE 500 ML: 9 INJECTION, SOLUTION INTRAVENOUS at 13:22

## 2023-02-12 RX ADMIN — ENOXAPARIN SODIUM 40 MG: 40 INJECTION SUBCUTANEOUS at 21:13

## 2023-02-12 RX ADMIN — IPRATROPIUM BROMIDE AND ALBUTEROL SULFATE 3 ML: 2.5; .5 SOLUTION RESPIRATORY (INHALATION) at 13:05

## 2023-02-12 RX ADMIN — SODIUM CHLORIDE 50 ML: 9 INJECTION, SOLUTION INTRAVENOUS at 23:45

## 2023-02-12 RX ADMIN — ATORVASTATIN CALCIUM 40 MG: 40 TABLET, FILM COATED ORAL at 21:13

## 2023-02-12 RX ADMIN — IOPAMIDOL 56 ML: 755 INJECTION, SOLUTION INTRAVENOUS at 14:36

## 2023-02-12 RX ADMIN — AZITHROMYCIN MONOHYDRATE 500 MG: 250 TABLET ORAL at 13:23

## 2023-02-12 ASSESSMENT — ACTIVITIES OF DAILY LIVING (ADL)
ADLS_ACUITY_SCORE: 20
ADLS_ACUITY_SCORE: 37
ADLS_ACUITY_SCORE: 20
ADLS_ACUITY_SCORE: 37

## 2023-02-12 NOTE — ED PROVIDER NOTES
[place] EMERGENCY DEPARTMENT ([facility])    2/12/23       ED PROVIDER NOTE      History     Chief Complaint   Patient presents with     Shortness of Breath     The history is provided by the patient and medical records.     Robert B Behr is a 73 year old male with past medical history significant for COPD, tobacco abuse, lung nodules, HLD, osteoporosis, polio with left leg weakness who presents to the ED for evaluation of shortness of breath. He was diagnosed with bronchitis 01/25/2023 and given amoxicillin and prednisone with improvement.  However patient started to feel sick again about 7 days ago and has developed progressive fatigue and shortness of breath.  He says it is so bad that he needs to hold onto the walls when he is walking to the bathroom. He is unable to exercise in the gym like he used to.  He denies fevers or chest pain.  He reports getting lightheaded when standing.  He notes he has been laying down all week with minimal movement.  He denies leg pain or leg swelling.  He denies history of MI.  He says he has been drinking enough water.  No nausea, vomiting, abdominal pain, diarrhea, or other symptoms.    Echo stress test with Definity 06/10/2017  Interpretation Summary  Normal exercise echocardiogram without evidence of inducible ischemia.  Target heart rate was achieved. Heart rate and blood pressure response to  exercise were normal. With stress, the left ventricular ejection fraction  increased from 55-60% to greater than 65% and the left ventricular size  decreased appropriately.  Poor functional capacity. No subjective symptoms to suggest ischemia.  There was no ECG evidence of ischemia.  No significant valve disease on screening doppler evaluation. The aortic root  and visualized ascending aorta are normal.    Past Medical History  Past Medical History:   Diagnosis Date     Cataract      COPD (chronic obstructive pulmonary disease) (H)     diagnosed with spirometry      Lung  nodules     CT scan 2016     Osteoporosis      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     Tobacco abuse      Past Surgical History:   Procedure Laterality Date     CATARACT IOL, RT/LT Left 09/15/2017    s/p CE/IOL left eye     CATARACT IOL, RT/LT Right      PHACOEMULSIFICATION CLEAR CORNEA WITH STANDARD INTRAOCULAR LENS IMPLANT Right 9/30/2016    Procedure: PHACOEMULSIFICATION CLEAR CORNEA WITH STANDARD INTRAOCULAR LENS IMPLANT;  Surgeon: Elly Valencia MD;  [facility]     PHACOEMULSIFICATION WITH STANDARD INTRAOCULAR LENS IMPLANT Left 9/15/2017    Procedure: PHACOEMULSIFICATION WITH STANDARD INTRAOCULAR LENS IMPLANT;  Left Eye Phacoemulsification with Standard Lens;  Surgeon: Elly Valencia MD;  Location: [facility] OR     WRIST SURGERY       albuterol (PROAIR HFA) 108 (90 Base) MCG/ACT inhaler  atorvastatin (LIPITOR) 40 MG tablet  B Complex-C (VITAMIN B COMPLEX W/VITAMIN C) TABS tablet  calcium carbonate 600 mg-vitamin D 400 units (CALTRATE) 600-400 MG-UNIT per tablet  fish oil-omega-3 fatty acids 1000 MG capsule  Ginkgo Biloba 120 MG TABS  Ginkgo Biloba 60 MG TABS  glucosamine-chondroitinoitin 750-600 MG TABS  ipratropium-albuterol (COMBIVENT RESPIMAT)  MCG/ACT inhaler  omeprazole (PRILOSEC) 20 MG DR capsule  Selenium (SELENIMIN-200) 200 MCG TABS tablet  tiotropium (SPIRIVA RESPIMAT) 2.5 MCG/ACT inhaler  vitamin A 3 MG (10443 UNITS) capsule  vitamin C (ASCORBIC ACID) 1000 MG TABS  vitamin E (VITAMIN E) 1000 units (450 mg) CAPS capsule  WIXELA INHUB 500-50 MCG/ACT inhaler      No Known Allergies  Family History  Family History   Problem Relation Age of Onset     Diabetes Brother         living     Cancer Brother         throat     Macular Degeneration Mother      Colon Cancer No family hx of      Glaucoma No family hx of      Retinal detachment No family hx of      Amblyopia No family hx of      Skin Cancer No family hx of      Melanoma No family hx of      Social History   Social History     Tobacco Use     Smoking  "status: Light Smoker     Packs/day: 2.00     Years: 45.00     Pack years: 90.00     Types: Cigarettes     Smokeless tobacco: Former     Tobacco comments:     5 cigs per day   Substance Use Topics     Alcohol use: Yes     Alcohol/week: 0.0 standard drinks     Comment: occ beer     Drug use: No      Past medical history, past surgical history, medications, allergies, family history, and social history were reviewed with the patient. No additional pertinent items.      A medically appropriate review of systems was performed with pertinent positives and negatives noted in the HPI, and all other systems negative.    Physical Exam   BP: 123/80  Pulse: 93  Temp: 97.9  F (36.6  C)  Resp: 22  Height: 177.8 cm (5' 10\")  Weight: 70.3 kg (155 lb)  SpO2: 97 %  Physical Exam  General: Sitting on the bed in no respiratory distress; able to speak in full sentances   Head: atraumatic  Eyes: no paplpebral swelling  Mouth/Throat: mucous membranes moist; uvula is midline with no swelling  Nares: patent  Neck: no stridor, no JVD  Chest/Pulm:tachypnic; decreased air entry with clear BS bilaterally nospiratory wheezes heard; no crackles; not using accessory respiratory muscles to assist w/ breathing  Cardiovascular:  regular; S1 S2, no murmers, cap refill < 2secs  Abdomen:  soft non-tender +BS; not using abdominal muscles to breathe  Extremities: no LE edema, no calf tenderness  Skin: non diaphoretic, warm and dry; no rash      ED Course, Procedures, & Data     12:44 PM  The patient was seen and examined by Chika Calles MD in Room ED23.     Nursing note were reviewed.  Past Medical records were reviewed.  Given patient's history and physical exam, differential diagnosis includes COPD, including bronchitis,asthma, or emphysema, Anemia, pneumonia, pulmonary edema, pneumothorax, PE, ACS, CHF, arrhythmia, valvular disease, and metabolic acidosis.  At this time I believe the most likely cause of patient's SOB is due to a combination of " COVID, and patient COPD, with possible pulmonary embolism..       Patient was placed on oxygen nasal canula soon after arrival.   Patient was stable enough to go to xray, so PA and lateral chest xrays were obtained, but showed no clear signs of pneumonia, no  signs of pleural effusion, or no evidence of a pneumothorax.       I do not believe patient's SOB is related to PNA as patient has no productive cough, no fever,, and CXR shows no evidence of infiltrates or consolidation.      It is also likely patient's SOB is associated with asthma or COPD as patient has a history of COPD, and does have a prolonged expiratory phase, and has wheezing on exam.  Duonbes were given which provided some relief. Prednisone was also started in the ER.        It could be possible that patient's SOB is associated with an arrhythmia, however patient does not feel not irregular beats or palpitations, patient does have PVCs on the monitor.  Patient has not felt light-headed during these events.       Given patient's history and physical exam with COVID diagnosis, as well as elevated D-dimer 1.85, we discussed risks and benefits of CTA to evaluate for pulmonary embolism. In addition, EKG Shows a new RBB.  CTA showed no evidence of pulmonary embolism, did show possible bronchitis, which patient was given antibiotics upon arrival.  Otherwise minimal change from previous imaging.     Patient's EKG showed no evidence of acute ischemia or infarction and troponin was negative.     Given history, exam, and workup in the emergency department, patient is unsafe to be discharged to home at this time and will be admitted to the hospital for further evaluation and treatment.              ED Course Selections:        EKG Interpretation:      Interpreted by Chika Calles MD  Time reviewed: 1PM  Symptoms at time of EKG: shortness of breath     Rhythm: normal sinus   Rate: 92 bpm  Axis: Normal  Ectopy: PVCs  Conduction: RBBB  ST Segments/ T Waves: Normal  QTc  Comparison to prior: New PVCs and RBBB compared to 06/10/2017    Clinical Impression: right bundle branch block         Results for orders placed or performed during the hospital encounter of 02/12/23   Chest XR,  PA & LAT     Status: None    Narrative    EXAM: XR CHEST 2 VIEWS  2/12/2023 1:42 PM      HISTORY: sob and hypoxia, h/o copd but also flu symptoms    COMPARISON: Chest CT 12/29/2022, 4/23/2022.    FINDINGS: Two views of the chest. Trace rightward tracheal deviation  similar to prior. Stable cardiomediastinal silhouette. Patchy  interstitial and airspace opacities predominantly affecting the right  upper lobe. Calcified granuloma on the right. No pneumothorax. No  pleural effusion.      Impression    IMPRESSION: Scattered mixed interstitial and airspace opacities,  predominately effecting the right upper lobe. Findings appear  minimally changed compared with the topogram/CT images from  12/29/2022, although difficult to exclude infection.    I have personally reviewed the examination and initial interpretation  and I agree with the findings.    JEMIMA ELDER MD         SYSTEM ID:  [id number]   [facility] Draw *Canceled*     Status: None ()    Narrative    The following orders were created for panel order [facility] Draw.  Procedure                               Abnormality         Status                     ---------                               -----------         ------                       Please view results for these tests on the individual orders.   Symptomatic Influenza A/B & SARS-CoV2 (COVID-19) Virus PCR Multiplex Nasopharyngeal     Status: Abnormal    Specimen: Nasopharyngeal; Swab   Result Value Ref Range    Influenza A PCR Negative Negative    Influenza B PCR Negative Negative    RSV PCR Negative Negative    SARS CoV2 PCR Positive (A) Negative    Narrative    Testing was performed using the Xpert Xpress CoV2/Flu/RSV Assay on the Cepheid GeneXpert Instrument. This test should be ordered for  the detection of SARS-CoV-2 and influenza viruses in individuals who meet clinical and/or epidemiological criteria. Test performance is unknown in asymptomatic patients. This test is for in vitro diagnostic use under the FDA EUA for laboratories certified under CLIA to perform high or moderate complexity testing. This test has not been FDA cleared or approved. A negative result does not rule out the presence of PCR inhibitors in the specimen or target RNA in concentration below the limit of detection for the assay. If only one viral target is positive but coinfection with multiple targets is suspected, the sample should be re-tested with another FDA cleared, approved, or authorized test, if coinfection would change clinical management. This test was validated by the [facility]. These laboratories are certified under the Clinical Laboratory Improvement Amendments of 1988 (CLIA-88) as qualified to perform high complexity laboratory testing.   CBC with platelets     Status: Normal   Result Value Ref Range    WBC Count 4.6 4.0 - 11.0 10e3/uL    RBC Count 5.24 4.40 - 5.90 10e6/uL    Hemoglobin 16.4 13.3 - 17.7 g/dL    Hematocrit 49.0 40.0 - 53.0 %    MCV 94 78 - 100 fL    MCH 31.3 26.5 - 33.0 pg    MCHC 33.5 31.5 - 36.5 g/dL    RDW 13.3 10.0 - 15.0 %    Platelet Count 245 150 - 450 10e3/uL   Comprehensive metabolic panel     Status: Abnormal   Result Value Ref Range    Sodium 135 (L) 136 - 145 mmol/L    Potassium 3.7 3.4 - 5.3 mmol/L    Chloride 97 (L) 98 - 107 mmol/L    Carbon Dioxide (CO2) 24 22 - 29 mmol/L    Anion Gap 14 7 - 15 mmol/L    Urea Nitrogen 13.9 8.0 - 23.0 mg/dL    Creatinine 0.89 0.67 - 1.17 mg/dL    Calcium 9.1 8.8 - 10.2 mg/dL    Glucose 126 (H) 70 - 99 mg/dL    Alkaline Phosphatase 70 40 - 129 U/L    AST 38 10 - 50 U/L    ALT 16 10 - 50 U/L    Protein Total 7.1 6.4 - 8.3 g/dL    Albumin 3.6 3.5 - 5.2 g/dL    Bilirubin Total 0.6 <=1.2 mg/dL    GFR Estimate 90 >60 mL/min/1.73m2    Lactic acid whole blood     Status: Abnormal   Result Value Ref Range    Lactic Acid 2.3 (H) 0.7 - 2.0 mmol/L   [facility] Draw     Status: None (In process)    Narrative    The following orders were created for panel order [facility] Draw.  Procedure                               Abnormality         Status                     ---------                               -----------         ------                     Extra Blue Top Tube[[id number]]                              Final result               Extra Red Top Tube[[id number]]                               Final result               Extra Green Top (Lithium...[[id number]]                                                   Please view results for these tests on the individual orders.   Extra Blue Top Tube     Status: None   Result Value Ref Range    Hold Specimen JIC    Extra Red Top Tube     Status: None   Result Value Ref Range    Hold Specimen JIC    Magnesium     Status: Normal   Result Value Ref Range    Magnesium 1.8 1.7 - 2.3 mg/dL   D dimer quantitative     Status: Abnormal   Result Value Ref Range    D-Dimer Quantitative 1.85 (H) 0.00 - 0.50 ug/mL FEU    Narrative    This D-dimer assay is intended for use in conjunction with a clinical pretest probability assessment model to exclude pulmonary embolism (PE) and deep venous thrombosis (DVT) in outpatients suspected of PE or DVT. The cut-off value is 0.50 ug/mL FEU.   Troponin T, High Sensitivity     Status: Normal   Result Value Ref Range    Troponin T, High Sensitivity 16 <=22 ng/L   EKG 12 lead     Status: None (Preliminary result)   Result Value Ref Range    Systolic Blood Pressure  mmHg    Diastolic Blood Pressure  mmHg    Ventricular Rate 92 BPM    Atrial Rate 92 BPM    WI Interval 138 ms    QRS Duration 128 ms     ms    QTc 452 ms    P Axis 33 degrees    R AXIS 40 degrees    T Axis 33 degrees    Interpretation ECG       Sinus rhythm with occasional Premature ventricular complexes  Right bundle branch  block  Abnormal ECG       Medications   ipratropium - albuterol 0.5 mg/2.5 mg/3 mL (DUONEB) neb solution 3 mL (3 mLs Nebulization Given 2/12/23 1305)   0.9% sodium chloride BOLUS (500 mLs Intravenous New Bag 2/12/23 1322)   azithromycin (ZITHROMAX) tablet 500 mg (500 mg Oral Given 2/12/23 1323)   predniSONE (DELTASONE) tablet 60 mg (60 mg Oral Given 2/12/23 1306)     Labs Ordered and Resulted from Time of ED Arrival to Time of ED Departure   INFLUENZA A/B & SARS-COV2 PCR MULTIPLEX - Abnormal       Result Value    Influenza A PCR Negative      Influenza B PCR Negative      RSV PCR Negative      SARS CoV2 PCR Positive (*)    COMPREHENSIVE METABOLIC PANEL - Abnormal    Sodium 135 (*)     Potassium 3.7      Chloride 97 (*)     Carbon Dioxide (CO2) 24      Anion Gap 14      Urea Nitrogen 13.9      Creatinine 0.89      Calcium 9.1      Glucose 126 (*)     Alkaline Phosphatase 70      AST 38      ALT 16      Protein Total 7.1      Albumin 3.6      Bilirubin Total 0.6      GFR Estimate 90     LACTIC ACID WHOLE BLOOD - Abnormal    Lactic Acid 2.3 (*)    D DIMER QUANTITATIVE - Abnormal    D-Dimer Quantitative 1.85 (*)    CBC WITH PLATELETS - Normal    WBC Count 4.6      RBC Count 5.24      Hemoglobin 16.4      Hematocrit 49.0      MCV 94      MCH 31.3      MCHC 33.5      RDW 13.3      Platelet Count 245     MAGNESIUM - Normal    Magnesium 1.8     TROPONIN T, HIGH SENSITIVITY - Normal    Troponin T, High Sensitivity 16     BLOOD CULTURE   BLOOD CULTURE     Chest XR,  PA & LAT   Final Result   IMPRESSION: Scattered mixed interstitial and airspace opacities,   predominately effecting the right upper lobe. Findings appear   minimally changed compared with the topogram/CT images from   12/29/2022, although difficult to exclude infection.      I have personally reviewed the examination and initial interpretation   and I agree with the findings.      JEMIMA ELDER MD            SYSTEM ID:  [id number]      CT Chest Pulmonary  Embolism w Contrast    (Results Pending)          Medical Decision Making  The patient's presentation is strongly suggestive of an acute illness with systemic symptoms.    The patient's evaluation involved:  review of external note(s) from 3+ sources (see separate area of note for details)  ordering and/or review of 3+ test(s) in this encounter (see separate area of note for details)  review of 3+ test result(s) ordered prior to this encounter (see separate area of note for details)    The patient's management involved a decision regarding hospitalization.      Assessment & Plan    (U07.1) Infection due to 2019 novel coronavirus    (R06.02) SOB (shortness of breath)    (J44.1) COPD exacerbation (H)    (R09.02) Hypoxia    Plan: Admit to Medicine forfurther evaluation and treatment               I have reviewed the nursing notes. I have reviewed the findings, diagnosis, plan and need for follow up with the patient.    New Prescriptions    No medications on file       Final diagnoses:   Infection due to 2019 novel coronavirus   SOB (shortness of breath)   COPD exacerbation (H)   Hypoxia     I, Kelley Koehler, am serving as a trained medical scribe to document services personally performed by Chika Calles MD based on the provider's statements to me on February 12, 2023.  This document has been checked and approved by the attending provider.    I, Chika Calles MD, was physically present and have reviewed and verified the accuracy of this note documented by Kelley Koehler, medical scribe.        Chika Calles  [facility] EMERGENCY DEPARTMENT  2/12/2023     Chika Calles MD  02/12/23 [id number]

## 2023-02-12 NOTE — ED NOTES
Bed: ED23  Expected date: 2/12/23  Expected time: 12:20 PM  Means of arrival: Ambulance  Comments:  [facility] [address]    72 y/o Male    SOB  Concerned he has COVID  98% NRB

## 2023-02-12 NOTE — H&P
[facility]    History and Physical - Medicine Service, MAROON TEAM 3       Date of Admission:  2/12/2023    Assessment & Plan      Robert B Behr is a 73 year old male admitted on 2/12/2023. 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    # Confirmed COVID-19 infection    # Acute Hypoxic Respiratory Failure secondary to COVID-19 infection   # Suspected Community Acquired Pneumonia   He presented with cough, shortness of breath and was found to have COVID-19.  CT chest on admission was negative for pulmonary embolism, however showed right upper lobe consolidative opacities as well as consolidation in the lower lobes with bronchial wall thickening concerning for bronchitis.  He was recently treated for bronchitis with prednisone for 7 days around 1/25.  Plan to treat COVID-19 with remdesivir and dexamethasone given presence of hypoxia on 2 to 4 L of nasal cannula currently, as well as antibiotics to cover CAP.     Symptom Onset Around 2/6   Date of 1st Positive Test Use date of first positive test. 2/12/2023   Vaccination Status Declines Vaccine       - COVID-19 special precautions, continuous pulse-ox  - Oxygen: continue current support with O2 Device: None (Room air) at Oxygen Delivery: 2 LPM; titrate to keep SpO2 between 90-96%  - Labs: Daily COVID labs ordered x4 days (CBC, BMP, D-dimer, CRP).  Continue to trend after 4 days as needed.   - Imaging: chest CT with contrast ordered -negative for PE  - Breathing treatments: albuterol inhaler four times a day scheduled and PRN and Combivent inhaler four times a day and PRN; avoid nebulizers in favor of MDIs   - IV fluids: not indicated at this time  - Antibiotics: indicated due to  concern of bacterial superinfection; Ceftriaxone and Azithromycin  ordered   - COVID-Focused Medications: Dexamethasone 6 mg x 10 days or until hospital discharge, started on 2/13/2023 and Remdesivir x 5 days or until hospital discharge, started on 2/12/2023  - DVT Prophylaxis:         - At high risk of thrombotic complications due to COVID-19 (DDimer = 1.85 ug/mL FEU (Ref range: 0.00 - 0.50 ug/mL FEU) )         - PROPHYLACTIC dosing: lovenox 40mg daily     #Moderate COPD  He follows with a pulmonologist outpatient.  He is on Advair and Spiriva daily, and he uses albuterol and Combivent as needed for rescue.  CT scanning showed extensive centrilobular emphysematous changes.   -Scheduled albuterol and ipratropium  -Hold Spiriva while on scheduled Combivent   - Follow up with Outpatient Pulmonologist     #Tobacco Use   - Encouraged Smoking Cessation     #Hyperlipidemia   - Continue prior to admission (PTA) atorvastatin        Diet: Combination Diet Regular Diet Adult  DVT Prophylaxis: Enoxaparin (Lovenox) SQ  Mehta Catheter: Not present  Fluids: None   Lines: None     Cardiac Monitoring: None  Code Status: Full Code    Clinically Significant Risk Factors Present on Admission                               Disposition Plan      Expected Discharge Date: 02/14/2023                The patient's care was discussed with the Attending Physician, Dr. Jeronimo and Patient.      Marcia Dacosta MD  Medicine Service, [facility] TEAM [address] [place] [address]  Securely message with [employer] (more info)  Text page via [employer] Paging/Directory   See signed in provider for up to date coverage information  ______________________________________________________________________    Chief Complaint   Shortness of breath    History is obtained from the patient, electronic health record and emergency department physician    History of Present Illness   Robert B Behr is a 73 year old male who presents with a 1 week  "history of cough, and worsening shortness of breath.  He was seen in urgent care on 1/25/2023 and prescribed a course of Augmentin and prednisone for \"bronchitis\".  He reports that his symptoms completely resolved for a period of time, but then he began feeling more fatigued with chills, dry cough, sore throat, and shortness of breath.  He presented to the emergency department due to increasing difficulty with breathing.  He denies any chest pain.  He is not following any sick contacts recently.  He is unvaccinated for COVID.  He uses his Advair inhaler and Spiriva daily as prescribed.  He has been using his rescue inhaler about 3 times daily since his symptoms began.  He denies any headache, lymphadenopathy, nausea, vomiting, diarrhea, belly pain.  He does endorse body aches.  He thinks he had some chills and a fever.    He is a current smoker.  He reports that he rolls his own cigarettes and smokes around 5 per day.  He has cut back as he previously was smoking 2 packs/day.  He is interested in quitting, just not at this time.  He does realize that this is hard on his lungs.  He reports occasional alcohol use maybe 1-2 drinks per week.  No other drug use.  He lives alone.  He is fairly active.  Prior to this current illness, he would be at the gym 5 days a week swimming up to 20 laps.    We discussed the use, risks, and benefits of remdesivir in COVID-19.  Gail gave oral consent to receiving remdesivir in the setting of COVID-19 pneumonia.    ED Course:   He underwent CTA PE due to elevated D-dimer and hypoxia - negative for PE, showed severe emphysematous changes with lower lobe consolidations. He received 500mL NS, 60mg prednisone, 500mg Azithromycin, and Duo-Neb.       Past Medical History    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    left leg " weak     Tobacco abuse        Past Surgical History   Past Surgical History:   Procedure Laterality Date     CATARACT IOL, RT/LT Left 09/15/2017    s/p CE/IOL left eye     CATARACT IOL, RT/LT Right      PHACOEMULSIFICATION CLEAR CORNEA WITH STANDARD INTRAOCULAR LENS IMPLANT Right 9/30/2016    Procedure: PHACOEMULSIFICATION CLEAR CORNEA WITH STANDARD INTRAOCULAR LENS IMPLANT;  Surgeon: Elly Valencia MD;  Location: [facility]     PHACOEMULSIFICATION WITH STANDARD INTRAOCULAR LENS IMPLANT Left 9/15/2017    Procedure: PHACOEMULSIFICATION WITH STANDARD INTRAOCULAR LENS IMPLANT;  Left Eye Phacoemulsification with Standard Lens;  Surgeon: Elly Valencia MD;  Location: [facility] OR     WRIST SURGERY         Prior to Admission Medications   Prior to Admission Medications   Prescriptions Last Dose Informant Patient Reported? Taking?   B Complex-C (VITAMIN B COMPLEX W/VITAMIN C) TABS tablet   No No   Sig: TAKE 1 TABLET BY MOUTH TWICE DAILY WITH FOLIC ACID   Ginkgo Biloba 120 MG TABS   No No   Sig: Take 1 Tablespoonful by mouth 2 times daily   Ginkgo Biloba 60 MG TABS   No No   Sig: Take 1 tablet by mouth daily   Selenium (SELENIMIN-200) 200 MCG TABS tablet   No No   Sig: Take 1 tablet (200 mcg) by mouth daily   WIXELA INHUB 500-50 MCG/ACT inhaler   No No   Sig: INHALE 1 PUFF INTO THE LUNGS TWICE DAILY   albuterol (PROAIR HFA) 108 (90 Base) MCG/ACT inhaler   No No   Sig: INHALE 1 TO 2 PUFFS BY MOUTH EVERY 4 HOURS AS NEEDED FOR SHORTNESS OF BREATH   atorvastatin (LIPITOR) 40 MG tablet   No No   Sig: Take 1 tablet (40 mg) by mouth daily   calcium carbonate 600 mg-vitamin D 400 units (CALTRATE) 600-400 MG-UNIT per tablet   No No   Sig: Take 1 tablet by mouth 2 times daily   fish oil-omega-3 fatty acids 1000 MG capsule   No No   Sig: Take 1 capsule (1 g) by mouth daily   glucosamine-chondroitinoitin 750-600 MG TABS   No No   Sig: TAKE 2 TABLETS BY MOUTH TWICE DAILY   ipratropium-albuterol (COMBIVENT RESPIMAT)  MCG/ACT inhaler   No  No   Sig: Inhale 1 puff into the lungs 4 times daily .  Do not exceed 6 doses per day.   omeprazole (PRILOSEC) 20 MG DR capsule   No No   Sig: Take 1 capsule (20 mg) by mouth 2 times daily   tiotropium (SPIRIVA RESPIMAT) 2.5 MCG/ACT inhaler   No No   Sig: Inhale 2 puffs into the lungs daily   vitamin A 3 MG (84083 UNITS) capsule   No No   Sig: Take 1 capsule (10,000 Units) by mouth daily   vitamin C (ASCORBIC ACID) 1000 MG TABS   No No   Sig: TAKE 1 TABLET(1000 MG) BY MOUTH DAILY   vitamin E (VITAMIN E) 1000 units (450 mg) CAPS capsule   No No   Sig: Take 1 capsule (1,000 Units) by mouth daily      Facility-Administered Medications: None        Review of Systems    The 10 point Review of Systems is negative other than noted in the HPI or here.      Physical Exam   Vital Signs: Temp: 97.6  F (36.4  C) Temp src: Oral BP: 113/68 Pulse: 81   Resp: 18 SpO2: 91 % O2 Device: None (Room air) Oxygen Delivery: 2 LPM  Weight: 158 lbs 4.8 oz    General Appearance: alert, comfortable appearing, NC in place   Respiratory: bilateral expiratory wheezes present, no respiratory distress  Cardiovascular: regular rate and rhythm, no murmur appreciated   GI: soft, nontender, nondistended   Skin: no rashes or lesions   Other: oral pharynx with mild erythema present      Medical Decision Making       Please see A&P for additional details of medical decision making.      Data     I have personally reviewed the following data over the past 24 hrs:    4.6  \   16.4   / 245     135 (L) 97 (L) 13.9 /  126 (H)   3.7 24 0.89; 0.89 \       ALT: 16 AST: 38 AP: 70 TBILI: 0.6   ALB: 3.6 TOT PROTEIN: 7.1 LIPASE: N/A       Trop: 16 BNP: N/A       Procal: 0.12 (H) CRP: N/A Lactic Acid: 1.2       INR:  N/A PTT:  N/A   D-dimer:  1.85 (H) Fibrinogen:  N/A       Imaging results reviewed over the past 24 hrs:   Recent Results (from the past 24 hour(s))   Chest XR,  PA & LAT    Narrative    EXAM: XR CHEST 2 VIEWS  2/12/2023 1:42 PM      HISTORY: sob and  hypoxia, h/o copd but also flu symptoms    COMPARISON: Chest CT 12/29/2022, 4/23/2022.    FINDINGS: Two views of the chest. Trace rightward tracheal deviation  similar to prior. Stable cardiomediastinal silhouette. Patchy  interstitial and airspace opacities predominantly affecting the right  upper lobe. Calcified granuloma on the right. No pneumothorax. No  pleural effusion.      Impression    IMPRESSION: Scattered mixed interstitial and airspace opacities,  predominately effecting the right upper lobe. Findings appear  minimally changed compared with the topogram/CT images from  12/29/2022, although difficult to exclude infection.    I have personally reviewed the examination and initial interpretation  and I agree with the findings.    JEMIMA ELDER MD         SYSTEM ID:  [id number]   CT Chest Pulmonary Embolism w Contrast    Narrative    EXAMINATION: CTA pulmonary angiogram, 2/12/2023 2:51 PM     COMPARISON: Chest CT 12/29/2022, 4/23/2022.    HISTORY: Hypoxia. Coronary and impulsivity.    TECHNIQUE: Volumetric helical acquisition of CT images of the chest  from the lung apices to the kidneys were acquired after the  administration of 80 mL of Isovue-370 IV contrast. Post-processed  multiplanar and/or MIP reformations were obtained, archived to PACS  and used in interpretation of this study.     FINDINGS:      Contrast bolus is: adequate.  Exam is negative for acute pulmonary  embolism.    The largest right ventricle transaxial diameter is (measured from  endocardium to endocardium): 4.8 cm   The largest left ventricle transaxial diameter is (measured from  endocardium to endocardium): 4.7 cm  RV/LV ratio is: less than 1.1 (if ratio greater than 1.1 then sign is  suspicious for right heart strain)  Reflux of contrast into the IVC? no  Paradoxical bowing of the interventricular septum to the left? no  Pericardial effusion?: not present    Remainder of exam:    Devices: None.    Lower neck and axillae: No  enlarged supraclavicular nodes are present.  No actionable nodule is present in the imaged portion of the thyroid  lobes. No axillary lymphadenopathy.    Heart: Normal heart size. No pericardial fluid or thickening.    Mediastinum and laura: No mediastinal mass is present. Scattered  mediastinal lymphadenopathy, borderline enlarged by size criteria. For  example, there is a pretracheal node measuring up to 1.0 cm (series 7,  image 113).    Vessels: Normal caliber of the aorta and main pulmonary artery. Mild  atherosclerotic disease.    Airways: Scattered areas of mucus plugging, most notably in the lower  lobes bilaterally. Circumferential bronchial wall thickening.    Lungs: Relatively unchanged right upper lobe consolidation opacities.  Dependent atelectasis. Scattered calcified granulomata. Severe  centrilobular and paraseptal emphysematous changes. No pleural  effusion or pneumothorax    Upper abdomen: 7 mm nonobstructing calculus of the left kidney.  Moderate size hiatal hernia. Colonic diverticulosis without evidence  of acute diverticulitis.    Bones: Unchanged compression deformity at L1 resulting in  approximately 50% height loss. Chronic right-sided rib fractures.    Soft tissues: Unremarkable.      Impression    IMPRESSION:   1. Exam is negative for acute pulmonary embolism.  2. Unchanged consolidative opacities predominately effecting the right  upper lobe when compared with 12/29/2022 CT.  3. Slightly increased bronchial wall thickening of the lower lobe  bronchi, suggestive of bronchitis.   4. Debris within the distal bronchi supplying the lower lobes.  Findings may be seen with aspiration.      In the event of a positive result for acute pulmonary embolism  resulting in right heart strain, consider calling the   [facility] hospital  for PERT (Pulmonary Embolism Response Team)  Activation?    PERT -- Pulmonary Embolism Response Team (Multidisciplinary team  including cardiology, interventional  radiology, critical care,  hematology)    I have personally reviewed the examination and initial interpretation  and I agree with the findings.    SAM OVALLES MD         SYSTEM ID:  [id number]

## 2023-02-12 NOTE — ED TRIAGE NOTES
Pt BIBA for increased SOB and fatigue. Pt states he has had COPD for 10 years. AOx4, denies pain.      Triage Assessment     Row Name 02/12/23 1223       Triage Assessment (Adult)    Airway WDL WDL       Respiratory WDL    Respiratory WDL X;rhythm/pattern    Rhythm/Pattern, Respiratory shortness of breath       Skin Circulation/Temperature WDL    Skin Circulation/Temperature WDL WDL       Cardiac WDL    Cardiac WDL X;rhythm       Peripheral/Neurovascular WDL    Peripheral Neurovascular WDL WDL       Cognitive/Neuro/Behavioral WDL    Cognitive/Neuro/Behavioral WDL WDL

## 2023-02-13 ENCOUNTER — PATIENT OUTREACH (OUTPATIENT)
Dept: GERIATRIC MEDICINE | Facility: CLINIC | Age: 73
End: 2023-02-13
Payer: COMMERCIAL

## 2023-02-13 LAB
ANION GAP SERPL CALCULATED.3IONS-SCNC: 12 MMOL/L (ref 7–15)
BUN SERPL-MCNC: 13.8 MG/DL (ref 8–23)
CALCIUM SERPL-MCNC: 8.8 MG/DL (ref 8.8–10.2)
CHLORIDE SERPL-SCNC: 104 MMOL/L (ref 98–107)
CREAT SERPL-MCNC: 0.73 MG/DL (ref 0.67–1.17)
CRP SERPL-MCNC: 155 MG/L
D DIMER PPP FEU-MCNC: 1.03 UG/ML FEU (ref 0–0.5)
DEPRECATED HCO3 PLAS-SCNC: 23 MMOL/L (ref 22–29)
ERYTHROCYTE [DISTWIDTH] IN BLOOD BY AUTOMATED COUNT: 13.2 % (ref 10–15)
GFR SERPL CREATININE-BSD FRML MDRD: >90 ML/MIN/1.73M2
GLUCOSE SERPL-MCNC: 111 MG/DL (ref 70–99)
HCT VFR BLD AUTO: 41.6 % (ref 40–53)
HGB BLD-MCNC: 13.7 G/DL (ref 13.3–17.7)
MCH RBC QN AUTO: 31.1 PG (ref 26.5–33)
MCHC RBC AUTO-ENTMCNC: 32.9 G/DL (ref 31.5–36.5)
MCV RBC AUTO: 95 FL (ref 78–100)
PLATELET # BLD AUTO: 238 10E3/UL (ref 150–450)
POTASSIUM SERPL-SCNC: 3.9 MMOL/L (ref 3.4–5.3)
RBC # BLD AUTO: 4.4 10E6/UL (ref 4.4–5.9)
SODIUM SERPL-SCNC: 139 MMOL/L (ref 136–145)
WBC # BLD AUTO: 4.2 10E3/UL (ref 4–11)

## 2023-02-13 PROCEDURE — 99232 SBSQ HOSP IP/OBS MODERATE 35: CPT | Mod: GC | Performed by: PEDIATRICS

## 2023-02-13 PROCEDURE — 86140 C-REACTIVE PROTEIN: CPT

## 2023-02-13 PROCEDURE — 250N000012 HC RX MED GY IP 250 OP 636 PS 637

## 2023-02-13 PROCEDURE — 250N000013 HC RX MED GY IP 250 OP 250 PS 637

## 2023-02-13 PROCEDURE — 36415 COLL VENOUS BLD VENIPUNCTURE: CPT

## 2023-02-13 PROCEDURE — 250N000013 HC RX MED GY IP 250 OP 250 PS 637: Performed by: STUDENT IN AN ORGANIZED HEALTH CARE EDUCATION/TRAINING PROGRAM

## 2023-02-13 PROCEDURE — 85379 FIBRIN DEGRADATION QUANT: CPT

## 2023-02-13 PROCEDURE — 250N000011 HC RX IP 250 OP 636

## 2023-02-13 PROCEDURE — 272N000202 HC AEROBIKA WITH MANOMETER

## 2023-02-13 PROCEDURE — 80048 BASIC METABOLIC PNL TOTAL CA: CPT

## 2023-02-13 PROCEDURE — 99406 BEHAV CHNG SMOKING 3-10 MIN: CPT

## 2023-02-13 PROCEDURE — 85027 COMPLETE CBC AUTOMATED: CPT

## 2023-02-13 PROCEDURE — 999N000032 HC STATISTIC CHRONIC DISEASE SPECIALIST RT CONSULT

## 2023-02-13 PROCEDURE — 250N000009 HC RX 250

## 2023-02-13 PROCEDURE — 999N000033 HC STATISTIC CHRONIC PULMONARY DISEASE SPECIALIST

## 2023-02-13 PROCEDURE — G0463 HOSPITAL OUTPT CLINIC VISIT: HCPCS

## 2023-02-13 PROCEDURE — 120N000002 HC R&B MED SURG/OB UMMC

## 2023-02-13 PROCEDURE — 258N000003 HC RX IP 258 OP 636

## 2023-02-13 RX ORDER — CALCIUM CARBONATE 500 MG/1
500 TABLET, CHEWABLE ORAL 3 TIMES DAILY PRN
Status: DISCONTINUED | OUTPATIENT
Start: 2023-02-13 | End: 2023-02-17 | Stop reason: HOSPADM

## 2023-02-13 RX ADMIN — Medication 1 TABLET: at 14:04

## 2023-02-13 RX ADMIN — AZITHROMYCIN MONOHYDRATE 250 MG: 250 TABLET ORAL at 14:04

## 2023-02-13 RX ADMIN — IPRATROPIUM BROMIDE AND ALBUTEROL 1 PUFF: 20; 100 SPRAY, METERED RESPIRATORY (INHALATION) at 15:18

## 2023-02-13 RX ADMIN — PANTOPRAZOLE SODIUM 40 MG: 40 TABLET, DELAYED RELEASE ORAL at 08:12

## 2023-02-13 RX ADMIN — ANTACID TABLETS 500 MG: 500 TABLET, CHEWABLE ORAL at 10:16

## 2023-02-13 RX ADMIN — DEXAMETHASONE 6 MG: 2 TABLET ORAL at 14:04

## 2023-02-13 RX ADMIN — REMDESIVIR 100 MG: 100 INJECTION, POWDER, LYOPHILIZED, FOR SOLUTION INTRAVENOUS at 21:08

## 2023-02-13 RX ADMIN — IPRATROPIUM BROMIDE AND ALBUTEROL 1 PUFF: 20; 100 SPRAY, METERED RESPIRATORY (INHALATION) at 21:15

## 2023-02-13 RX ADMIN — IPRATROPIUM BROMIDE AND ALBUTEROL 1 PUFF: 20; 100 SPRAY, METERED RESPIRATORY (INHALATION) at 11:00

## 2023-02-13 RX ADMIN — ENOXAPARIN SODIUM 40 MG: 40 INJECTION SUBCUTANEOUS at 21:09

## 2023-02-13 RX ADMIN — ATORVASTATIN CALCIUM 40 MG: 40 TABLET, FILM COATED ORAL at 21:09

## 2023-02-13 RX ADMIN — CEFTRIAXONE SODIUM 2 G: 2 INJECTION, POWDER, FOR SOLUTION INTRAMUSCULAR; INTRAVENOUS at 16:48

## 2023-02-13 ASSESSMENT — ACTIVITIES OF DAILY LIVING (ADL)
ADLS_ACUITY_SCORE: 20

## 2023-02-13 NOTE — PROGRESS NOTES
[facility] Care Coordination Contact    [facility]  Ambulatory Care Coordination to Inpatient Care Management   Hand-In Communication    Date:  February 13, 2023  Name: Robert B Behr is enrolled in [facility] Care Coordination program and I am the Lead Care Coordinator.  CC Contact Information:.   Payor Source: Payor: [facility] / Plan: [facility] DUAL / Product Type: HMO /   Current services in place:  No services - independent at baseline   Please see the CC Snaphot and Care Management Flowsheets for specific  details of this Robert B Behr care plan.   Additional details/specific concerns r/t this admission:    No additional concerns at this time      I will follow this admission in Epic. Please feel free to contact me with questions or for further collaboration in discharge planning.    Leeanna Sigala, RN, BSN, PHN  [facility] Care Coordinator  [phone]

## 2023-02-13 NOTE — CONSULTS
Discharge Pharmacy Test Claim    Patient has $1.45 copays for generic and $4.30 copays for brand. Requested test claims listed below.    Test Claim Copay   spiriva respimat 4.30   trelegy 4.30   wixela 1.45       Tennille Rayo  [facility] Pharmacy Liaison  Ph: [phone] Pager: [phone]   Securely message with the Vocera Web Console (learn more here)

## 2023-02-13 NOTE — PLAN OF CARE
RN assumed cares at [phone]     Vitals: VSS on 1L NC.  Pain: denies  Neuro: A&Ox4; calm and cooperative.   Cardiac: WDL.   Respiratory: Lung sounds clear. Stable on 1L NC No respiratory distress noted overnight.  GI/: Continent of bowel & bladder. Voiding adequately. No BM overnight.   IV/Drains: PIV saline locked.  Skin: Skin check completed without any new concerns.  Activity: SBA.  Nutrition: Regular diet.     Events/plan: continue with remdesivir and dexamethasone.      No acute events overnight. Pt slept between cares. Q2hr rounding completed. Call light within reach and is able to make needs known.     Goal Outcome Evaluation:      Plan of Care Reviewed With: patient    Overall Patient Progress: no changeOverall Patient Progress: no change    Outcome Evaluation: Plan for redesivir and dexamethasone. Pt on 1L NC.

## 2023-02-13 NOTE — CONSULTS
Chronic Pulmonary Disease Specialist Consult   COPD Initial Interview    2023    Patient: Robert B Behr      :  1950                    MRN:[id number]      Reason for Consult:  Consulted to provide COPD education and optimize treatment regimen.     History of Present Illness: Robert B Behr is a 73 year old male admitted on 2023. 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    Smoking Hx: Current 3 cigarettes a day smoker. Ravin has cut down from 1 PPD. Ravin does not want to quit smoking completely at this time. Declines counseling.                    Pulmonologist/Last office visit  Dr. Svitlana Taylor/2022    Most recent  PFT/interpretation on: 2017               FVC          5.90 LPM   137%   FEV1          2.44 LPM    74%   FEV1/FVC   (Ratio)                                                                                      41%   DLCO                              73%   Interpretation Mild airflow obstruction without bronchodilator response. Normal diffusion capacity. Normal lung volumes       Recommendations:      Provide prescription for Trelegy Ellipta 200/62.5/25mcg    Continue with current inpatient respiratory medication schedule.     Will need follow up appointment with Pulmonologist and complete PFT's.     Use Aerobika Oscillating PEP Device for 3 sets of 10 breaths two times daily as directed above     Home Oxygen Assessment Testing 24-48 hours prior to discharge to determine O2 needs at rest and with exertion/activity. If the patient requires oxygen at rest, the walk test must be performed using the new baseline O2 to determine if patient needs more oxygen with activity.     In the event patient qualifies for oxygen, at rest or with  "activity, please use the following verbiage in oxygen order: \"Please provide portable oxygen concentrator AND please test for oxygen conserving device using ____LPM to maintain sats between ____)    At discharge continue with patient's home regimen of respiratory medications(Patient does not want to have his Wixela or Spiriva Respimat discontinued. Patient is willing to try Trelegy for 1 month to see if it is as beneficial as his other LAMA/LABA's).      Sleep Studies:  none    Home Oxygen Use:   none       Pulmonary Rehab History: none-Patient declines referral-Patient swims laps for 35 minutes and day, uses a stair climber, and bikes regularly      Home respiratory medications include:      Albuterol MDI Q 4 prn  (has not used for 2 years)  Combivent Respimat QID prn  Spiriva Respimat 2 p daily (patient only uses as needed)    Writer attempted to educate Ravin on proper use/frequency of his inhalers. Ravin is not open to learning this information. \"I have been doing these medication my way for years and have not been in the hospital, so therefore, they are working for me\".    Assessment:   Ravin was found reclining in bed upon writers entrance to his room. He was slightly SOB and on 2 L oxygen via NC with oxygen saturation of 92%.       Action:         Evaluated patients inspiratory flow using In-Check device:     --Low resistance setting: Patient able to generate 90 LPM,   which is excessive inspiratory flow for a Albuterol rescue inhaler.  Albuterol inhalers require a slow inhalation of   20-60 LPM for optimal drug disposition with 5-10 second breath hold.      --Medium resistance setting: Patient able to generate 70LPM,   which is sufficient inspiratory flow for his Wixela or Trelegy DPI.   Medium resistance inhalers require a fast and deep inhalation of   30-80LPM with 5-10 second breath hold.     --Evaluated patients' coordination and technique with inhaler: Patient did not demonstrate good technique with " all of his inhalers. Educated patient on proper inspiratory flows needed for all his inhalers. Did not provide spacer or education of the device because he no longer uses an inhaler that would require the use of it.       -Patient able to generate a pressure of 10 cm H2O on Aerobika OPEP device for 2-3 seconds that did not generate a cough. The goal for each breath, for a total of 3 sets of 10 breaths, is to exhale at a of pressure 10-20 for 3-4 seconds without fatigue. Educated patient to perform 2 to 3 'godoy coughs'  to clear airway after each set. Shared that consistent use of this device will help open smaller airways, improve mucus clearance, decrease cough frequency, and improve exercise tolerance. Patient will take device home but stated to writer that he will not use it because he exercises enough and his inhalers will help him when he has secretions. Writer attempted to explain the importance of airway clearance because of the bronchiectasis that has been present on his CT scans in the past and the current mucous plugging on CT scan completed during this hospitalization. Writer also attempted to explain the difference between his inhalers, that are used to dilate his airways, and the Aerobika which provides positive pressure in his lungs in order to clear secretions. The goal is to decrease the amount of his secretions, clear mucous plugs and bronchiectasis. Patient was not receptive to this information.     Will continue to follow and support patient as needed. Will follow up with phone call 48 hours after discharge.     I spent 45 minutes with the patient.    Mae Ordaz, BS, RRT, CTTS  Chronic Pulmonary Disease Specialist  Office: [phone]   Pager: [phone]

## 2023-02-13 NOTE — PHARMACY-ADMISSION MEDICATION HISTORY
"Admission Medication History Completed by Pharmacy    See [facility] Admission Navigator for allergy information, preferred outpatient pharmacy, prior to admission medications and immunization status.     Medication History Sources:     External med dispense report    Patient interview    Changes made to PTA medication list (reason):    Added: None    Deleted:   o Duplicate ginkgo biloba    Changed:   o Combivent Respimat to 4x/day as needed per patient report of how he uses it  o Spiriva Respimat to daily as needed per patient report of how he uses it    Additional Information:    Patient manages medications independently and knew all doses and frequencies well. Reports using Wixela as \"main\" inhaler and other inhalers are used when he feels his breathing is getting worse. He understands that only the albuterol is a rescue inhaler.    Patient recently completed a 7-day course of amoxicillin for bronchitis as well as a 5 day course of prednisone 20 mg twice daily (both were prescribed  1/25/23).    Prior to Admission medications    Medication Sig Last Dose   albuterol (PROAIR HFA) 108 (90 Base) MCG/ACT inhaler INHALE 1 TO 2 PUFFS BY MOUTH EVERY 4 HOURS AS NEEDED FOR SHORTNESS OF BREATH 2/11/2023 at AM   atorvastatin (LIPITOR) 40 MG tablet Take 1 tablet (40 mg) by mouth daily 2/11/2023 at AM   B Complex-C (VITAMIN B COMPLEX W/VITAMIN C) TABS tablet TAKE 1 TABLET BY MOUTH TWICE DAILY WITH FOLIC ACID 2/11/2023 at AM   calcium carbonate 600 mg-vitamin D 400 units (CALTRATE) 600-400 MG-UNIT per tablet Take 1 tablet by mouth 2 times daily 2/11/2023 at AM   fish oil-omega-3 fatty acids 1000 MG capsule Take 1 capsule (1 g) by mouth daily 2/11/2023 at AM   Ginkgo Biloba 60 MG TABS Take 1 tablet by mouth daily 2/11/2023 at AM   glucosamine-chondroitinoitin 750-600 MG TABS TAKE 2 TABLETS BY MOUTH TWICE DAILY 2/11/2023 at AM   ipratropium-albuterol (COMBIVENT RESPIMAT)  MCG/ACT inhaler Inhale 1 puff into the lungs 4 times daily " .  Do not exceed 6 doses per day.  Patient taking differently: Inhale 1 puff into the lungs 4 times daily as needed .  Do not exceed 6 doses per day. 2/11/2023 at AM   omeprazole (PRILOSEC) 20 MG DR capsule Take 1 capsule (20 mg) by mouth 2 times daily 2/11/2023 at AM   Selenium (SELENIMIN-200) 200 MCG TABS tablet Take 1 tablet (200 mcg) by mouth daily 2/11/2023 at AM   tiotropium (SPIRIVA RESPIMAT) 2.5 MCG/ACT inhaler Inhale 2 puffs into the lungs daily  Patient taking differently: Inhale 2 puffs into the lungs daily as needed 2/11/2023 at AM   vitamin A 3 MG (80352 UNITS) capsule Take 1 capsule (10,000 Units) by mouth daily 2/11/2023 at AM   vitamin C (ASCORBIC ACID) 1000 MG TABS TAKE 1 TABLET(1000 MG) BY MOUTH DAILY 2/11/2023 at AM   vitamin E (VITAMIN E) 1000 units (450 mg) CAPS capsule Take 1 capsule (1,000 Units) by mouth daily 2/11/2023 at AM   WIXELA INHUB 500-50 MCG/ACT inhaler INHALE 1 PUFF INTO THE LUNGS TWICE DAILY 2/11/2023 at AM       Date completed: 02/13/23    Medication history completed by: Cheyenne More, PharmD

## 2023-02-13 NOTE — PROGRESS NOTES
TRANSITIONS OF CARE (KEN) LOG   KEN tasks should be completed by the CC within one (1) business day of notification of each transition. Follow up contact with member is required after return to their usual care setting.  Note:  If CC finds out about the transitions fifteen (15) days or more after the member has returned to their usual care setting, no KEN log is needed. However, the CC should check in with the member to discuss the transition process, any changes needed to the care plan and document it in a case note.    Member Name:  Robert B Behr MCO Name:  [employer] Member ID#:   [id number]   Product: [facility] Care Coordinator Contact:  Leeanna Sigala RN, BSN, PHN Agency/County/Care System: [facility]   Transition Communication Actions from Care Management Contact   Transition #1   Notification Date: 2/13/23 Transition Date:   2/12/23 Transition From: Home     Is this the member s usual care setting?               yes Transition To: [facility]   Transition Type:  Unplanned  Reason for Admission/Comments:  SOB/Covid  Contact member/responsible party to offer assistance with transition Date completed: NA    Notes from conversation with the member/responsible party, provider, discharging and receiving facility (as applicable):   Date 2/13/23:CC contacted Hospital /discharge planner via the [employer] Transitional Care Hand-In Process, with community care plan included.  CC reached out to Did not reach out to member as he is hospitalized for Covid regarding transition and   Reviewed and update care plan as needed.  Notified community service providers and placed services None on hold as needed.  Transition log initiated.   PCP, Chastity Arguello, notified of hospitalization via EMR.  Leeanna Sigala RN, BSN, PHN  [facility] Care Coordinator  [phone]           Shared CC contact info, care plan/services with receiving  setting--Date completed: 2/13/23   Name & Title of receiving setting contact: [facility]   Notified PCP of transition--Date completed:  2/12/23     via  EMR  Name of PCP: Chastity Arguello     Transition #2   Notification Date: 2/20/23 Transition Date:   2/17/23 Transition From: Hospital, [facility]     Is this the member s usual care setting?               no Transition To: Home   Transition Type:  Planned  Reason for Admission/Comments:  Covid  Contact member/responsible party to offer assistance with transition Date completed: 2/20/23    Notes from conversation with the member/responsible party, provider, discharging and receiving facility (as applicable):   Date 2/20/23:CC contacted member and reviewed discharge summary.  Member has a follow-up appointment with PCP in 7 days: No: Offered Assistance with setting up a follow up appointment Member wants to see a doctor not an NP. The soonest CC could schedule at clinic with an MD is 3/13/23 at 11:15 am with Dr. Arben Montes. TM left for member with appointment info.  Member has had a change in condition: No  Home visit needed: No  Care plan reviewed and updated.  The following home based services None were resumed.  New referrals placed: No  Transition log completed.   PCP, Chastity Arguello, notified of transition back to home via EMR.  Leeanna Sigala RN, BSN, PHN  [facility] Care Coordinator  [phone]           Shared CC contact info, care plan/services with receiving setting--Date completed: NA   Name & Title of receiving setting contact:    Notified PCP of transition--Date completed:  2/17/23     via  EMR  Name of PCP: Chastity Arguello         *RETURN TO USUAL CARE SETTING: *Complete tasks below when the member is discharging TO their usual care setting within one (1) business day of notification..      For situations where the Care Coordinator is notified of the discharge prior to  the date of discharge, the Care Coordinator must follow up with the member or designated representative to confirm that discharge actually occurred and discuss required KEN tasks as outlined in the KEN Instructions.  (This includes situations where it may be a  new  usual care setting for the member. (i.e., a community member who decides upon permanent nursing home placement following hospitalization and rehab).    Discuss with Member/Responsible Party:    Check  Yes  - if the member, family member and/or SNF/facility staff manages the following:    If  No  provide explanation in the comments section.          Date completed: 2/20/23 Communicated with member or their designated representative about the following:  care transition process; about changes to the member s health status; plan of care updates; education about transitions and how to prevent unplanned transitions/readmissions    Four Pillars for Optimal Transition:    Check  Yes  - if the member, family member and/or SNF/facility staff manages the following:    If  No  provide explanation in the comments section.          [x]  Yes     []  No Does the member have a follow-up appointment scheduled with primary care or specialist? (Mental health hospitalizations--the appt. should be w/in 7 days)              For mental health hospitalizations:  []  Yes     []  No     Does the member have a follow-up appointment scheduled with a mental health practitioner within 7 days of discharge?  [x]  Yes     []  No     Has a medication review been completed with member? If no, refer to PCP, home care nurse, MTM, pharmacist  [x]  Yes     []  No     Can the member manage their medications or is there a system in place to manage medications (e.g. home care set-up)?         [x]  Yes     []  No     Can the member verbalize warning signs and symptoms to watch for and how to respond?  [x]  Yes     []  No     Does the member have a copy of and understand their discharge  instructions?  If no, assist to obtain copy of discharge instructions, review discharge instructions, and assist to contact PCP to discuss questions about their recent hospitalization.  [x]  Yes     []  No     Does the member have adequate food, housing and transportation?  If no, add goal and discuss additional supports available to the member                                                                                                                                                                                 [x]  Yes     []  No     Is the member safe in their home?  If no, document needs and support provided                                                                                                                                                                          []  Yes     [x]  No     Are there any concerns of vulnerability, abuse, or neglect?  If yes, document concerns and actions taken by Care Coordinator as a mandated                                                                                                                                                                              [x]  Yes     []  No     Does the member use a Personal Health Care Record?  Check  Yes  if visit summary, discharge summary, and/or healthcare summary are being used as a PHR.                                                                                                                                                                                  [x]  Yes     []  No     Have you reviewed the discharge summary with the member? If  No  provide explanation in comments.  [x]  Yes     []  No     Have you updated the member s care plan/support plan? Add new diagnosis, medications, treatments, goals & interventions, as applicable. If No, provide explanation in comments.    Comments:           Notes from conversation with the member/responsible party, provider, discharging and receiving facility (as  applicable): See above

## 2023-02-13 NOTE — PROGRESS NOTES
[facility]    Progress Note - Medicine Service, [facility] TEAM 3       Date of Admission:  2/12/2023    Assessment & Plan   Robert B Behr is a 73 year old male admitted on 2/12/2023. 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    Changes Today:   - Continue QID inhalers. HOLD PTA Wixela at this time.   - PRN Andres   - Remains on 2L NC - continue Dexamethasone and Remdesivir   - RT COPD Consulted. Appreciate recommendations and assistance.      # Confirmed COVID-19 infection    # Acute Hypoxic Respiratory Failure secondary to COVID-19 infection   # Suspected Community Acquired Pneumonia   He presented with cough, shortness of breath and was found to have COVID-19.  CT chest on admission was negative for pulmonary embolism, however showed right upper lobe consolidative opacities as well as consolidation in the lower lobes with bronchial wall thickening concerning for bronchitis.  He was recently treated for bronchitis with prednisone for 7 days around 1/25.  Plan to treat COVID-19 with remdesivir and dexamethasone given presence of hypoxia on 2 to 4 L of nasal cannula currently, as well as antibiotics to cover CAP.     Symptom Onset Around 2/6   Date of 1st Positive Test Use date of first positive test. 2/12/2023   Vaccination Status Declines Vaccine         - COVID-19 special precautions, continuous pulse-ox  - Oxygen: continue current support with O2 Device: None (Room air) at Oxygen Delivery: 2 LPM; titrate to keep SpO2 between 90-96%  - Labs: Daily COVID labs ordered x4 days (CBC, BMP, D-dimer, CRP).  Continue to trend after 4 days as needed.   - Imaging: chest CT with contrast ordered -negative for PE  - Breathing  treatments: albuterol inhaler four times a day scheduled and PRN and Combivent inhaler four times a day and PRN; avoid nebulizers in favor of MDIs   - IV fluids: not indicated at this time  - Antibiotics: indicated due to concern of bacterial superinfection; Ceftriaxone and Azithromycin X  5 days ordered   - COVID-Focused Medications: Dexamethasone 6 mg x 10 days or until hospital discharge, started on 2/13/2023 and Remdesivir x 5 days or until hospital discharge, started on 2/12/2023  - DVT Prophylaxis:         - At high risk of thrombotic complications due to COVID-19 (DDimer = 1.85 ug/mL FEU (Ref range: 0.00 - 0.50 ug/mL FEU) )         - PROPHYLACTIC dosing: lovenox 40mg daily      #Moderate COPD  He follows with a pulmonologist outpatient.  He is on Advair and Spiriva daily, and he uses albuterol and Combivent as needed for rescue.  CT scanning showed extensive centrilobular emphysematous changes.   -Scheduled albuterol and ipratropium  -Albuterol PRN   -Hold Spiriva while on scheduled Combivent   - Follow up with Outpatient Pulmonologist      #Tobacco Use   - Encouraged Smoking Cessation      #Hyperlipidemia   - Continue prior to admission (PTA) atorvastatin         Diet: Combination Diet Regular Diet Adult    DVT Prophylaxis: Enoxaparin (Lovenox) SQ  Mehta Catheter: Not present  Fluids: None   Lines: None     Cardiac Monitoring: None  Code Status: Full Code      Clinically Significant Risk Factors              # Hypoalbuminemia: Lowest albumin = 3.3 g/dL at 2/12/2023  7:40 PM, will monitor as appropriate                    Disposition Plan      Expected Discharge Date: 02/14/2023        Discharge Comments: Disposition: Home  Progress: off O2        The patient's care was discussed with the Attending Physician, Dr. Jeronimo , Bedside Nurse and Patient.    Marcia Dacosta MD  Medicine Service, [facility] TEAM [address] [place] [address]  Securely message with [employer] (more info)  Text  page via [facility] Paging/Directory   See signed in provider for up to date coverage information  ______________________________________________________________________    Interval History   NAOE. He reports significant heart burn following breakfast. He reports that he usually takes baking soda at home for his heartburn - can order maggy-seltzer. He reports feeling tired. No chest pain. SOB feels improved. Ongoing dry cough. No nausea or diarrhea. He reports concern about serious side effects related to Remdesivir-however agreed that he wanted to continue this medication. Discussed role of scheduled inhalers during this acute illness.     Physical Exam   Vital Signs: Temp: 97.6  F (36.4  C) Temp src: Oral BP: 124/68 Pulse: 80   Resp: 20 SpO2: 92 % O2 Device: Nasal cannula Oxygen Delivery: 2 LPM  Weight: 158 lbs 4.8 oz    General Appearance: Alert, no acute distress, NC in place   Respiratory: end expiratory wheezing present bilaterally, no tachypnea   Cardiovascular: regular rate and rhythm, no murmur   GI: soft, nontender, nondistended   Skin: no rashes or lesions   Other: No pitting edema in LE      Medical Decision Making       Please see A&P for additional details of medical decision making.      Data     I have personally reviewed the following data over the past 24 hrs:    4.2  \   13.7   / 238     139 104 13.8 /  111 (H)   3.9 23 0.73 \       ALT: 15 AST: 33 AP: 64 TBILI: 0.3   ALB: 3.3 (L) TOT PROTEIN: 6.5 LIPASE: N/A       Procal: N/A CRP: 155.00 (H) Lactic Acid: N/A       INR:  N/A PTT:  N/A   D-dimer:  1.03 (H) Fibrinogen:  N/A       Imaging results reviewed over the past 24 hrs:   No results found for this or any previous visit (from the past 24 hour(s)).

## 2023-02-13 NOTE — PLAN OF CARE
Care from [id number] Admitted today for acute hypoxic respiratory failure secondary to COVID-19, with suspected community-acquired pneumonia. Hx of COPD, tobacco use, pulmonary nodule, hypercholesterolemia, osteoporosis, and polio.   Patient is comfortable in room, SBA. No c/o pain. VSS on RA. PIV infusing remdesivir, will need 50 mL bolus after.

## 2023-02-13 NOTE — PLAN OF CARE
Goal Outcome Evaluation: Ongoing; Not progressing.       Plan of Care Reviewed With: patient    Overall Patient Progress: no change    Outcome Evaluation: Admitted from [facility]    Reason for admission: Hypoxia 2/2 Covid pneumonia  Admitted from: [facility]  Report received from: Sam RN  2 RN skin assessment completed by: Shaan RN and Quang RN       -Findings (add LDA if needed): None  Bed Algorithm reevaluated: Yes  Was Pulsate ordered?: No  Care plan (primary problem) and Education initiated:  Yes  Flu shot ordered (October-April only): No  Detailed Belongings: Please see NST note.         RN Decompensation Assessment (Repeat at 12 hours)    (Additional guidance for RRT)      Mentation:  Exam is notable for normal (baseline) mental status    Respiratory mechanics and oxygenation:  Exam is notable for normal/unchanged breathing pattern and stable oxygen requirements    Cardiovascular/perfusion:   Exam is notable for normal/unchanged cardiovascular/perfusion assessment    Overall estimation of the patient s condition/stability (1 = stable patient, 7 = appears very sick)? 2

## 2023-02-14 LAB
ANION GAP SERPL CALCULATED.3IONS-SCNC: 13 MMOL/L (ref 7–15)
BUN SERPL-MCNC: 16.3 MG/DL (ref 8–23)
CALCIUM SERPL-MCNC: 8.7 MG/DL (ref 8.8–10.2)
CHLORIDE SERPL-SCNC: 104 MMOL/L (ref 98–107)
CREAT SERPL-MCNC: 0.69 MG/DL (ref 0.67–1.17)
CRP SERPL-MCNC: 100 MG/L
D DIMER PPP FEU-MCNC: 0.8 UG/ML FEU (ref 0–0.5)
DEPRECATED HCO3 PLAS-SCNC: 21 MMOL/L (ref 22–29)
ERYTHROCYTE [DISTWIDTH] IN BLOOD BY AUTOMATED COUNT: 13.4 % (ref 10–15)
GFR SERPL CREATININE-BSD FRML MDRD: >90 ML/MIN/1.73M2
GLUCOSE SERPL-MCNC: 110 MG/DL (ref 70–99)
HCT VFR BLD AUTO: 38.8 % (ref 40–53)
HGB BLD-MCNC: 12.9 G/DL (ref 13.3–17.7)
MCH RBC QN AUTO: 30.9 PG (ref 26.5–33)
MCHC RBC AUTO-ENTMCNC: 33.2 G/DL (ref 31.5–36.5)
MCV RBC AUTO: 93 FL (ref 78–100)
PLATELET # BLD AUTO: 279 10E3/UL (ref 150–450)
POTASSIUM SERPL-SCNC: 4 MMOL/L (ref 3.4–5.3)
RBC # BLD AUTO: 4.18 10E6/UL (ref 4.4–5.9)
SODIUM SERPL-SCNC: 138 MMOL/L (ref 136–145)
WBC # BLD AUTO: 5 10E3/UL (ref 4–11)

## 2023-02-14 PROCEDURE — 80048 BASIC METABOLIC PNL TOTAL CA: CPT

## 2023-02-14 PROCEDURE — 250N000012 HC RX MED GY IP 250 OP 636 PS 637

## 2023-02-14 PROCEDURE — 36415 COLL VENOUS BLD VENIPUNCTURE: CPT

## 2023-02-14 PROCEDURE — 250N000011 HC RX IP 250 OP 636

## 2023-02-14 PROCEDURE — 99232 SBSQ HOSP IP/OBS MODERATE 35: CPT | Performed by: STUDENT IN AN ORGANIZED HEALTH CARE EDUCATION/TRAINING PROGRAM

## 2023-02-14 PROCEDURE — 120N000002 HC R&B MED SURG/OB UMMC

## 2023-02-14 PROCEDURE — 250N000013 HC RX MED GY IP 250 OP 250 PS 637: Performed by: STUDENT IN AN ORGANIZED HEALTH CARE EDUCATION/TRAINING PROGRAM

## 2023-02-14 PROCEDURE — 85027 COMPLETE CBC AUTOMATED: CPT

## 2023-02-14 PROCEDURE — 85379 FIBRIN DEGRADATION QUANT: CPT

## 2023-02-14 PROCEDURE — 86140 C-REACTIVE PROTEIN: CPT

## 2023-02-14 PROCEDURE — 258N000003 HC RX IP 258 OP 636

## 2023-02-14 PROCEDURE — 250N000013 HC RX MED GY IP 250 OP 250 PS 637

## 2023-02-14 RX ORDER — ASCORBIC ACID 250 MG
250 TABLET,CHEWABLE ORAL DAILY
Status: DISCONTINUED | OUTPATIENT
Start: 2023-02-14 | End: 2023-02-17 | Stop reason: HOSPADM

## 2023-02-14 RX ADMIN — SODIUM CHLORIDE 50 ML: 9 INJECTION, SOLUTION INTRAVENOUS at 00:23

## 2023-02-14 RX ADMIN — ALBUTEROL SULFATE 2 PUFF: 90 AEROSOL, METERED RESPIRATORY (INHALATION) at 13:00

## 2023-02-14 RX ADMIN — ENOXAPARIN SODIUM 40 MG: 40 INJECTION SUBCUTANEOUS at 21:03

## 2023-02-14 RX ADMIN — REMDESIVIR 100 MG: 100 INJECTION, POWDER, LYOPHILIZED, FOR SOLUTION INTRAVENOUS at 23:00

## 2023-02-14 RX ADMIN — Medication 250 MG: at 16:55

## 2023-02-14 RX ADMIN — IPRATROPIUM BROMIDE AND ALBUTEROL 1 PUFF: 20; 100 SPRAY, METERED RESPIRATORY (INHALATION) at 16:55

## 2023-02-14 RX ADMIN — IPRATROPIUM BROMIDE AND ALBUTEROL 1 PUFF: 20; 100 SPRAY, METERED RESPIRATORY (INHALATION) at 08:07

## 2023-02-14 RX ADMIN — IPRATROPIUM BROMIDE AND ALBUTEROL 1 PUFF: 20; 100 SPRAY, METERED RESPIRATORY (INHALATION) at 13:44

## 2023-02-14 RX ADMIN — PANTOPRAZOLE SODIUM 40 MG: 40 TABLET, DELAYED RELEASE ORAL at 08:07

## 2023-02-14 RX ADMIN — IPRATROPIUM BROMIDE AND ALBUTEROL 1 PUFF: 20; 100 SPRAY, METERED RESPIRATORY (INHALATION) at 21:03

## 2023-02-14 RX ADMIN — DEXAMETHASONE 6 MG: 2 TABLET ORAL at 13:44

## 2023-02-14 RX ADMIN — ALBUTEROL SULFATE 2 PUFF: 90 AEROSOL, METERED RESPIRATORY (INHALATION) at 10:00

## 2023-02-14 RX ADMIN — ATORVASTATIN CALCIUM 40 MG: 40 TABLET, FILM COATED ORAL at 21:03

## 2023-02-14 RX ADMIN — AZITHROMYCIN MONOHYDRATE 250 MG: 250 TABLET ORAL at 13:44

## 2023-02-14 ASSESSMENT — ACTIVITIES OF DAILY LIVING (ADL)
ADLS_ACUITY_SCORE: 20

## 2023-02-14 NOTE — PLAN OF CARE
RN assumed cares at [id number]     Vitals: VSS on 2L NS.  Pain: denies pain, 0/10.  Neuro: A&Ox4; calm and cooperative.   Cardiac: WDL.  Peripheral neurovascular: WDL.  Respiratory: Lung sounds clear. Stable on 2L NS at beginning of shift. Weaned down to 1.5L. No respiratory distress noted overnight.  GI/: Continent of bowel & bladder.  IV/Drains: PIV saline locked.  Skin: Skin check completed without any new concerns.  Activity: up independently. SBA.  Nutrition: Regular diet.      Events/plan: No acute events overnight. Pt slept between cares. Q2hr rounding completed. Call light within reach and is able to make needs known. Continue to monitor pt's O2 tolerance. Sputum culture still needed.      Goal Outcome Evaluation:      Plan of Care Reviewed With: patient    Overall Patient Progress: improvingOverall Patient Progress: improving    Outcome Evaluation: pt tolerated IV abx well, denied pain, on 2L NC beginning of shift, weaned down to 1.5L

## 2023-02-14 NOTE — PROGRESS NOTES
Antimicrobial Stewardship Team Note    Antimicrobial Stewardship Program - A joint venture between [facility] and  Physicians to optimize antibiotic management.  NOT a formal consult - Restricted Antimicrobial Review     Patient: Robert B Behr  MRN: [id number]  Allergies: Patient has no known allergies.    Brief Summary: Robert B Behr is a 73 year old male with PMH significant for COPD, tobacco use, pulmonary nodule, hypercholesterolemia, osteoporosis, history of polio with residual left lower extremity.     History of Present Illness: Patient was diagnosed with bronchitis on 1/25/2023 and received prescriptions for a 7-day amoxicillin and 5-day prednisone course. Around 2/6, patient noted shortness of breath and progressive fatigue. He denies fever and chest pain. He presented to the ED for further evaluation on 2/12. Upon examination in the ED, he was afebrile (tmax 97.8), normotensive (111/67), WBC of 4.6 (baseline 7.7). COVID-19 PCR nasal swab positive. Other infectious work up including influenza A, B, and RSV came back negative. Chest X-Ray showed scattered mixed interstitial and airspace opacities, predominately effecting the right upper lobe. Findings appear minimally changed compared with the CT images from 12/29/2022, although difficult to exclude infection. Chest CT showed unchanged consolidative opacities, and slightly increased bronchial wall thickening of the lower lobe bronchi, suggestive of bronchitis. Patient was admitted for medical management. Remdisivir initiated for the treatment of COVID-19 in addition to dexamethasone 6 mg daily. Empiric ceftriaxone and azithromycin were initiated for suspected CAP given thickening of the lower lobe bronchi from CT imaging, planning for a 5-day course. Patient was placed on 2LPM of oxygen via nasal cannula.            Active Anti-infective Medications   (From admission, onward)                 Start     Stop    02/13/23 2030  remdesivir 100  mg IV soln  100 mg,   Intravenous,   125-500 mL/hr,   EVERY 24 HOURS        COVID-19       See Leah for full Linked Orders Report.    02/15/23 2029    02/13/23 1630  cefTRIAXone  2 g,   Intravenous,   EVERY 24 HOURS        Community Acquired Pneumonia        --    02/13/23 1330  azithromycin  250 mg,   Oral,   DAILY        Community Acquired Pneumonia        02/17/23 1329                  Assessment: Bronchitis and COVID-19 infection  Currently, patient is afebrile and hemodynamically stable remaining on 2LPM of oxygen per nasal cannula, on day 3 of remdisivir, ceftriaxone, and azithromycin.  Patient recently completed a 7-day course of amoxicillin that would provide adequate coverage for possible gram-positive and gram-negative organisms associated with bronchitis. Shortness of breath progressed less than a week from completing antimicrobial and steroid therapy suggesting symptoms were managed with prednisone and amoxicillin vs acute COVID-19 infection. Agree with completing 5 days of remdesivir due to oxygen needs. Given the unchanged consolidative opacities from previous imaging the likelihood of an acute bacterial pneumonia is low. Furthermore, lack of leukocytosis and low procalcitonin indicate bacterial infection is not likely. Based on completion of recent antibiotic course and imaging, we recommend discontinuation of ceftriaxone and azithromycin.    Recommendation:   Discontinue ceftriaxone and azithromycin     Pharmacy took the following actions: Electronic Note Created, paged provider .    Discussed with ID Staff: Jasper Mckenna MD; Cheryl Evans, PharmD, [facility]  Chalo Boles PharmD Candidate     Vital Signs/Clinical Features:  Vitals         02/12 0700  02/13 0659 02/13 0700  02/14 0659 02/14 0700  02/14 1311   Most Recent      Temp ( F) 97.6 -  97.9    97.6 -  97.9       97.7 (36.5) 02/14 0538    Pulse 77 -  93    63 -  80       63 02/14 0538    Resp 18 -  22    20 -  21       20 02/14 0538     /67 -  123/80    104/59 -  124/68       114/64 02/14 0538    SpO2 (%) 91 -  97    91 -  92      90     90 02/14 0810            Labs  Estimated Creatinine Clearance: 95.1 mL/min (based on SCr of 0.69 mg/dL).  Recent Labs   Lab Test 06/10/17  0054 08/30/21  1106 02/12/23  1237 02/12/23  1940 02/13/23  0706 02/14/23  0608   CR 0.78 0.88 0.89  0.89 0.78 0.73 0.69       Recent Labs   Lab Test 06/10/17  0054 10/03/19  1752 08/30/21  1106 02/12/23  1237 02/13/23  0706 02/14/23  0608   WBC 12.3* 9.7 7.7 4.6  4.6 4.2 5.0   ANEU 9.3* 6.6  --   --   --   --    ALYM 1.8 1.8  --   --   --   --    AZ 0.8 1.2  --   --   --   --    AEOS 0.3 0.2  --   --   --   --    HGB 13.5  --  15.4 16.4  16.4 13.7 12.9*   HCT 40.8  --  46.1 49.0  49.0 41.6 38.8*   MCV 98  --  94 94  94 95 93     --  227 245  245 238 279       Recent Labs   Lab Test 08/30/21  1106 02/12/23  1237 02/12/23  1940   BILITOTAL 0.3 0.6 0.3   ALKPHOS 72 70 64   ALBUMIN 3.7 3.6 3.3*   AST 20 38 33   ALT 31 16 15       Recent Labs   Lab Test 06/10/17  1051 02/12/23  1237 02/12/23  1520   PCAL 0.27 0.12*  --    LACT  --  2.3* 1.2             Culture Results:  7-Day Micro Results       Procedure Component Value Units Date/Time    Respiratory Aerobic Bacterial Culture with Gram Stain     Order Status: Sent Lab Status: No result     Specimen: Sputum from Expectorate     Blood Culture Peripheral Blood [[id number]]  (Normal) Collected: 02/12/23 1311    Order Status: Completed Lab Status: Preliminary result Updated: 02/13/23 1347    Specimen: Peripheral Blood      Culture No growth after 1 day    Blood Culture Peripheral Blood [[id number]]  (Normal) Collected: 02/12/23 1311    Order Status: Completed Lab Status: Preliminary result Updated: 02/13/23 1347    Specimen: Peripheral Blood      Culture No growth after 1 day                                    Imaging: Chest XR,  PA & LAT    Result Date: 2/12/2023  EXAM: XR CHEST 2 VIEWS  2/12/2023 1:42 PM   HISTORY: sob and hypoxia, h/o copd but also flu symptoms COMPARISON: Chest CT 12/29/2022, 4/23/2022. FINDINGS: Two views of the chest. Trace rightward tracheal deviation similar to prior. Stable cardiomediastinal silhouette. Patchy interstitial and airspace opacities predominantly affecting the right upper lobe. Calcified granuloma on the right. No pneumothorax. No pleural effusion.     IMPRESSION: Scattered mixed interstitial and airspace opacities, predominately effecting the right upper lobe. Findings appear minimally changed compared with the topogram/CT images from 12/29/2022, although difficult to exclude infection. I have personally reviewed the examination and initial interpretation and I agree with the findings. JEMIMA ELDER MD   SYSTEM ID:  [id number]    CT Chest Pulmonary Embolism w Contrast    Result Date: 2/12/2023  EXAMINATION: CTA pulmonary angiogram, 2/12/2023 2:51 PM COMPARISON: Chest CT 12/29/2022, 4/23/2022. HISTORY: Hypoxia. Coronary and impulsivity. TECHNIQUE: Volumetric helical acquisition of CT images of the chest from the lung apices to the kidneys were acquired after the administration of 80 mL of Isovue-370 IV contrast. Post-processed multiplanar and/or MIP reformations were obtained, archived to PACS and used in interpretation of this study. FINDINGS:  Contrast bolus is: adequate.  Exam is negative for acute pulmonary embolism. The largest right ventricle transaxial diameter is (measured from endocardium to endocardium): 4.8 cm The largest left ventricle transaxial diameter is (measured from endocardium to endocardium): 4.7 cm RV/LV ratio is: less than 1.1 (if ratio greater than 1.1 then sign is suspicious for right heart strain) Reflux of contrast into the IVC? no Paradoxical bowing of the interventricular septum to the left? no Pericardial effusion?: not present Remainder of exam: Devices: None. Lower neck and axillae: No enlarged supraclavicular nodes are present. No actionable  nodule is present in the imaged portion of the thyroid lobes. No axillary lymphadenopathy. Heart: Normal heart size. No pericardial fluid or thickening. Mediastinum and laura: No mediastinal mass is present. Scattered mediastinal lymphadenopathy, borderline enlarged by size criteria. For example, there is a pretracheal node measuring up to 1.0 cm (series 7, image 113). Vessels: Normal caliber of the aorta and main pulmonary artery. Mild atherosclerotic disease. Airways: Scattered areas of mucus plugging, most notably in the lower lobes bilaterally. Circumferential bronchial wall thickening. Lungs: Relatively unchanged right upper lobe consolidation opacities. Dependent atelectasis. Scattered calcified granulomata. Severe centrilobular and paraseptal emphysematous changes. No pleural effusion or pneumothorax Upper abdomen: 7 mm nonobstructing calculus of the left kidney. Moderate size hiatal hernia. Colonic diverticulosis without evidence of acute diverticulitis. Bones: Unchanged compression deformity at L1 resulting in approximately 50% height loss. Chronic right-sided rib fractures. Soft tissues: Unremarkable.     IMPRESSION: 1. Exam is negative for acute pulmonary embolism. 2. Unchanged consolidative opacities predominately effecting the right upper lobe when compared with 12/29/2022 CT. 3. Slightly increased bronchial wall thickening of the lower lobe bronchi, suggestive of bronchitis. 4. Debris within the distal bronchi supplying the lower lobes. Findings may be seen with aspiration. In the event of a positive result for acute pulmonary embolism resulting in right heart strain, consider calling the [facility] hospital  for PERT (Pulmonary Embolism Response Team) Activation? PERT -- Pulmonary Embolism Response Team (Multidisciplinary team including cardiology, interventional radiology, critical care, hematology) I have personally reviewed the examination and initial interpretation and I agree with the findings.  SAM OVALLES MD   SYSTEM ID:  [id number]

## 2023-02-14 NOTE — PLAN OF CARE
"/68 (BP Location: Right arm)   Pulse 80   Temp 97.6  F (36.4  C) (Oral)   Resp 20   Ht 1.778 m (5' 10\")   Wt 71.6 kg (157 lb 14.4 oz)   SpO2 91%   BMI 22.66 kg/m      Care from: [id number]      VS & Pain: VSS ex on 1 LPM nc, denied pain    Neuro: A&O x4    Respiratory: dyspnea on exertion, infrequent productive cough, diminished lung sounds in BLL    Cardiac: WDL, NSR on tele    Peripheral neurovascular: WDL    GI/: voids spontaneously, BM x1 this shift    Nutrition: good appetite and oral intake, prn Tums x1 and prn [place] x1 were given for heartburn    Skin: WDL    Musculoskeletal: WDL    Lines: L PIV is TKO w NS    Activity: up independently    Events this shift: Tolerated IV abx well. Call light within reach.    Plan: Continue to follow poc. Plan to wean down O2 and collect sputum sample.      Goal Outcome Evaluation:    Plan of Care Reviewed With: patient    Overall Patient Progress: no change    Outcome Evaluation: pt tolerated IV abx well, denied pain, was on 2 L nc at the beginning of shift, weaned down to 1 L      "

## 2023-02-14 NOTE — PROGRESS NOTES
[facility]    Progress Note - Medicine Service, [facility] TEAM 3       Date of Admission:  2/12/2023    Assessment & Plan   Robert B Behr is a 73 year old male admitted on 2/12/2023. 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    Changes Today:   - Continue QID inhalers. HOLD PTA Wixela at this time.   - PRN Andres   - Remains on 1-2L NC - continue Dexamethasone and Remdesivir   - RT COPD Consulted. Appreciate recommendations and assistance.  - Discontinued Ceftriaxone and azithromycin      # Confirmed COVID-19 infection    # Acute Hypoxic Respiratory Failure secondary to COVID-19 infection   # Suspected Community Acquired Pneumonia   He presented with cough, shortness of breath and was found to have COVID-19.  CT chest on admission was negative for pulmonary embolism, however showed right upper lobe consolidative opacities as well as consolidation in the lower lobes with bronchial wall thickening concerning for bronchitis.  He was recently treated for bronchitis with prednisone for 7 days around 1/25.  Plan to treat COVID-19 with remdesivir and dexamethasone given presence of hypoxia on 2 to 4 L of nasal cannula currently, as well as antibiotics to cover CAP.     Symptom Onset Around 2/6   Date of 1st Positive Test Use date of first positive test. 2/12/2023   Vaccination Status Declines Vaccine         - COVID-19 special precautions, continuous pulse-ox  - Oxygen: continue current support with O2 Device: None (Room air) at Oxygen Delivery: 1-2 LPM; titrate to keep SpO2 between 90-96%  - Labs: Daily COVID labs ordered x4 days (CBC, BMP, D-dimer, CRP).  Continue to trend after 4 days as needed.   - Imaging: chest CT with  contrast ordered -negative for PE  - Breathing treatments: albuterol inhaler four times a day scheduled and PRN and Combivent inhaler four times a day and PRN; avoid nebulizers in favor of MDIs   - IV fluids: not indicated at this time  - Antibiotics: indicated due to concern of bacterial superinfection; Was on IV Ceftriaxone and Azithromycin for 2 days. Discontinued as low suspicion for bacterial superinfection.  - COVID-Focused Medications: Dexamethasone 6 mg x 10 days or until hospital discharge, started on 2/13/2023 and Remdesivir x 5 days or until hospital discharge, started on 2/12/2023  - DVT Prophylaxis:         - At high risk of thrombotic complications due to COVID-19 (DDimer = 1.85 ug/mL FEU (Ref range: 0.00 - 0.50 ug/mL FEU) )         - PROPHYLACTIC dosing: lovenox 40mg daily      #Moderate COPD  He follows with a pulmonologist outpatient.  He is on Advair and Spiriva daily, and he uses albuterol and Combivent as needed for rescue.  CT scanning showed extensive centrilobular emphysematous changes.   - Scheduled albuterol and ipratropium  - Albuterol PRN   - Hold Spiriva while on scheduled Combivent   - Follow up with Outpatient Pulmonologist      #Tobacco Use   - Encouraged Smoking Cessation      #Hyperlipidemia   - Continue prior to admission (PTA) atorvastatin         Diet: Combination Diet Regular Diet Adult    DVT Prophylaxis: Enoxaparin (Lovenox) SQ  Mehta Catheter: Not present  Fluids: None   Lines: None     Cardiac Monitoring: None  Code Status: Full Code      Clinically Significant Risk Factors              # Hypoalbuminemia: Lowest albumin = 3.3 g/dL at 2/12/2023  7:40 PM, will monitor as appropriate                    Disposition Plan     Expected Discharge Date: 02/14/2023        Discharge Comments: Disposition: Home  Progress: off O2        The patient's care was discussed with the Attending Physician, Dr. Jeronimo , Bedside Nurse and Patient.    Jadon Hensley MD  Medicine Service, [facility] TEAM  "3  [facility]  Securely message with [employer] (more info)  Text page via [employer] Paging/Directory   See signed in provider for up to date coverage information  ______________________________________________________________________    Interval History   No acute events overnight. Patient reports feeling better than yesterday, but still feeling weaker than his normal. He reports that he does not believe his inhalers are helping with getting rid of sputum and requests for \"IV Vitamin C\" because he needs antioxidant. Patient told we do not have IV Vitamin C, then he asks for vitamin C orally since he takes them daily at home. Otherwise, he has no other concerns.    Physical Exam   Vital Signs: Temp: 97.6  F (36.4  C) Temp src: Oral BP: 108/64 Pulse: 77   Resp: 22 SpO2: 95 % O2 Device: Nasal cannula Oxygen Delivery: 1 LPM  Weight: 157 lbs 14.4 oz    General Appearance: Alert, no acute distress, NC in place   Respiratory: end expiratory wheezing present bilaterally, no tachypnea   Cardiovascular: regular rate and rhythm, no murmur   GI: soft, nontender, nondistended   Skin: no rashes or lesions   Other: No pitting edema in LE      Medical Decision Making       Please see A&P for additional details of medical decision making.      Data     I have personally reviewed the following data over the past 24 hrs:    5.0  \   12.9 (L)   / 279     138 104 16.3 /  110 (H)   4.0 21 (L) 0.69 \       Procal: N/A CRP: 100.00 (H) Lactic Acid: N/A       INR:  N/A PTT:  N/A   D-dimer:  0.80 (H) Fibrinogen:  N/A       Imaging results reviewed over the past 24 hrs:   No results found for this or any previous visit (from the past 24 hour(s)).  "

## 2023-02-15 LAB
ANION GAP SERPL CALCULATED.3IONS-SCNC: 10 MMOL/L (ref 7–15)
BACTERIA SPT CULT: NORMAL
BUN SERPL-MCNC: 14.1 MG/DL (ref 8–23)
CA-I BLD-MCNC: 4.5 MG/DL (ref 4.4–5.2)
CALCIUM SERPL-MCNC: 8 MG/DL (ref 8.8–10.2)
CHLORIDE SERPL-SCNC: 106 MMOL/L (ref 98–107)
CREAT SERPL-MCNC: 0.71 MG/DL (ref 0.67–1.17)
CRP SERPL-MCNC: 72.6 MG/L
D DIMER PPP FEU-MCNC: 0.59 UG/ML FEU (ref 0–0.5)
DEPRECATED HCO3 PLAS-SCNC: 22 MMOL/L (ref 22–29)
ERYTHROCYTE [DISTWIDTH] IN BLOOD BY AUTOMATED COUNT: 13.4 % (ref 10–15)
GFR SERPL CREATININE-BSD FRML MDRD: >90 ML/MIN/1.73M2
GLUCOSE SERPL-MCNC: 100 MG/DL (ref 70–99)
GRAM STAIN RESULT: NORMAL
HCT VFR BLD AUTO: 36.9 % (ref 40–53)
HGB BLD-MCNC: 12.3 G/DL (ref 13.3–17.7)
MAGNESIUM SERPL-MCNC: 2 MG/DL (ref 1.7–2.3)
MCH RBC QN AUTO: 30.9 PG (ref 26.5–33)
MCHC RBC AUTO-ENTMCNC: 33.3 G/DL (ref 31.5–36.5)
MCV RBC AUTO: 93 FL (ref 78–100)
PHOSPHATE SERPL-MCNC: 2.9 MG/DL (ref 2.5–4.5)
PLATELET # BLD AUTO: 298 10E3/UL (ref 150–450)
POTASSIUM SERPL-SCNC: 3.8 MMOL/L (ref 3.4–5.3)
RBC # BLD AUTO: 3.98 10E6/UL (ref 4.4–5.9)
SODIUM SERPL-SCNC: 138 MMOL/L (ref 136–145)
WBC # BLD AUTO: 5.9 10E3/UL (ref 4–11)

## 2023-02-15 PROCEDURE — 250N000011 HC RX IP 250 OP 636

## 2023-02-15 PROCEDURE — 36415 COLL VENOUS BLD VENIPUNCTURE: CPT

## 2023-02-15 PROCEDURE — 82374 ASSAY BLOOD CARBON DIOXIDE: CPT

## 2023-02-15 PROCEDURE — 87205 SMEAR GRAM STAIN: CPT

## 2023-02-15 PROCEDURE — 258N000003 HC RX IP 258 OP 636

## 2023-02-15 PROCEDURE — 250N000013 HC RX MED GY IP 250 OP 250 PS 637: Performed by: STUDENT IN AN ORGANIZED HEALTH CARE EDUCATION/TRAINING PROGRAM

## 2023-02-15 PROCEDURE — 120N000002 HC R&B MED SURG/OB UMMC

## 2023-02-15 PROCEDURE — 83735 ASSAY OF MAGNESIUM: CPT

## 2023-02-15 PROCEDURE — 85379 FIBRIN DEGRADATION QUANT: CPT

## 2023-02-15 PROCEDURE — 250N000013 HC RX MED GY IP 250 OP 250 PS 637

## 2023-02-15 PROCEDURE — 250N000012 HC RX MED GY IP 250 OP 636 PS 637

## 2023-02-15 PROCEDURE — 86140 C-REACTIVE PROTEIN: CPT

## 2023-02-15 PROCEDURE — 85027 COMPLETE CBC AUTOMATED: CPT

## 2023-02-15 PROCEDURE — 99232 SBSQ HOSP IP/OBS MODERATE 35: CPT | Mod: GC | Performed by: STUDENT IN AN ORGANIZED HEALTH CARE EDUCATION/TRAINING PROGRAM

## 2023-02-15 PROCEDURE — 82310 ASSAY OF CALCIUM: CPT

## 2023-02-15 PROCEDURE — 84100 ASSAY OF PHOSPHORUS: CPT

## 2023-02-15 PROCEDURE — 82330 ASSAY OF CALCIUM: CPT

## 2023-02-15 RX ORDER — GUAIFENESIN AND DEXTROMETHORPHAN HYDROBROMIDE 600; 30 MG/1; MG/1
1 TABLET, EXTENDED RELEASE ORAL 2 TIMES DAILY PRN
Status: DISCONTINUED | OUTPATIENT
Start: 2023-02-15 | End: 2023-02-17 | Stop reason: HOSPADM

## 2023-02-15 RX ADMIN — GUAIFENESIN AND DEXTROMETHORPHAN HYDROBROMIDE 1 TABLET: 600; 30 TABLET, EXTENDED RELEASE ORAL at 17:23

## 2023-02-15 RX ADMIN — IPRATROPIUM BROMIDE AND ALBUTEROL 1 PUFF: 20; 100 SPRAY, METERED RESPIRATORY (INHALATION) at 17:23

## 2023-02-15 RX ADMIN — Medication 250 MG: at 08:20

## 2023-02-15 RX ADMIN — ATORVASTATIN CALCIUM 40 MG: 40 TABLET, FILM COATED ORAL at 21:49

## 2023-02-15 RX ADMIN — IPRATROPIUM BROMIDE AND ALBUTEROL 1 PUFF: 20; 100 SPRAY, METERED RESPIRATORY (INHALATION) at 08:20

## 2023-02-15 RX ADMIN — IPRATROPIUM BROMIDE AND ALBUTEROL 1 PUFF: 20; 100 SPRAY, METERED RESPIRATORY (INHALATION) at 21:50

## 2023-02-15 RX ADMIN — PANTOPRAZOLE SODIUM 40 MG: 40 TABLET, DELAYED RELEASE ORAL at 08:20

## 2023-02-15 RX ADMIN — DEXAMETHASONE 6 MG: 2 TABLET ORAL at 13:14

## 2023-02-15 RX ADMIN — SODIUM CHLORIDE 50 ML: 9 INJECTION, SOLUTION INTRAVENOUS at 00:06

## 2023-02-15 RX ADMIN — IPRATROPIUM BROMIDE AND ALBUTEROL 1 PUFF: 20; 100 SPRAY, METERED RESPIRATORY (INHALATION) at 13:14

## 2023-02-15 RX ADMIN — ENOXAPARIN SODIUM 40 MG: 40 INJECTION SUBCUTANEOUS at 21:49

## 2023-02-15 ASSESSMENT — ACTIVITIES OF DAILY LIVING (ADL)
ADLS_ACUITY_SCORE: 20

## 2023-02-15 NOTE — PLAN OF CARE
"/64 (BP Location: Right arm)   Pulse 77   Temp 97.6  F (36.4  C) (Oral)   Resp 22   Ht 1.778 m (5' 10\")   Wt 71.6 kg (157 lb 14.4 oz)   SpO2 95%   BMI 22.66 kg/m      Care from: [id number]      VS & Pain: VSS ex on 1 LPM nc, weaned down to room air at first but desat to lower than 90% with talking and activity, put pt back to 1 L to stay above 90%, prn inhaler x2 were given, denied pain    Neuro: A&O x4    Respiratory: dyspnea on exertion, infrequent productive cough, diminished lung sounds in BLL    Cardiac: WDL    Peripheral neurovascular: WDL    GI/: voids spontaneously, BM x1 this shift    Nutrition: good appetite and oral intake    Skin: WDL    Musculoskeletal: WDL    Lines: L PIV is saline locked    Activity: up independently    Events this shift: Call light within reach.    Plan: Continue to follow poc. Plan to wean down O2 and collect sputum sample.      Goal Outcome Evaluation:    Plan of Care Reviewed With: patient    Overall Patient Progress: no change    Outcome Evaluation: weaned down to 1 LPM nc, IV abx was discontinued  "

## 2023-02-15 NOTE — PLAN OF CARE
RN assumed cares at [id number]     Vitals: VSS on 2L.  Pain: denies pain, 0/10.  Neuro: A&Ox4; calm and cooperative.   Cardiac: WDL.  Peripheral neurovascular: WDL.  Respiratory: Lung sounds clear. Weaned from 1.5L to 1L to RA. Back on 2L at 0700. No respiratory distress noted overnight.    GI/: Continent of bowel & bladder.   IV/Drains: PIV saline locked.  Skin: Skin check completed without any new concerns.  Activity: Up independently to bathroom with SBA.  Nutrition: Regular diet.      Events/plan: No acute events overnight. Pt slept between cares. Q2hr rounding completed. Call light within reach and is able to make needs known. Continue to monitor pt's O2 tolerance.       Goal Outcome Evaluation:           Overall Patient Progress: improvingOverall Patient Progress: improving    Outcome Evaluation: weaned O2 from 1.5L to 1L to RA, IV remdesivir given

## 2023-02-15 NOTE — PROGRESS NOTES
[facility]    Progress Note - Medicine Service, [facility] TEAM 3       Date of Admission:  2/12/2023    Assessment & Plan   Robert B Behr is a 73 year old male admitted on 2/12/2023. 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    Changes Today:   - Continue QID inhalers. HOLD PTA Wixela at this time.   - Remains on 1-2L NC - continue Dexamethasone and Remdesivir   - RT COPD Consulted. Appreciate recommendations and assistance.  - PO Vitamin C   - Flutter Valve and Mucinex PRN for expectorant       # Confirmed COVID-19 infection    # Acute Hypoxic Respiratory Failure secondary to COVID-19 infection   # Suspected Community Acquired Pneumonia   He presented with cough, shortness of breath and was found to have COVID-19.  CT chest on admission was negative for pulmonary embolism, however showed right upper lobe consolidative opacities as well as consolidation in the lower lobes with bronchial wall thickening concerning for bronchitis.  He was recently treated for bronchitis with prednisone for 7 days around 1/25.  Plan to treat COVID-19 with remdesivir and dexamethasone given presence of hypoxia on 2 to 4 L of nasal cannula currently, as well as antibiotics to cover CAP.     Symptom Onset Around 2/6   Date of 1st Positive Test Use date of first positive test. 2/12/2023   Vaccination Status Declines Vaccine         - COVID-19 special precautions, continuous pulse-ox  - Oxygen: continue current support with O2 Device: None (Room air) at Oxygen Delivery: 1-2 LPM; titrate to keep SpO2 between 90-96%  - Labs: Daily COVID labs ordered x4 days (CBC, BMP, D-dimer, CRP).  Continue to trend after 4 days as needed.   - Imaging: chest CT with  contrast ordered -negative for PE  - Breathing treatments: albuterol inhaler four times a day scheduled and PRN and Combivent inhaler four times a day and PRN; avoid nebulizers in favor of MDIs    -Flutter Valve QID and PRN for clearance    -PRN Mucinex   - IV fluids: not indicated at this time  - Antibiotics: indicated due to concern of bacterial superinfection; Was on IV Ceftriaxone and Azithromycin for 2 days. Discontinued as low suspicion for bacterial superinfection.  - COVID-Focused Medications: Dexamethasone 6 mg x 10 days or until hospital discharge, started on 2/13/2023 and Remdesivir x 5 days or until hospital discharge, started on 2/12/2023  - DVT Prophylaxis:         - At high risk of thrombotic complications due to COVID-19 (DDimer = 1.85 ug/mL FEU (Ref range: 0.00 - 0.50 ug/mL FEU) )         - PROPHYLACTIC dosing: lovenox 40mg daily      #Moderate COPD  He follows with a pulmonologist outpatient.  He is on Advair and Spiriva daily, and he uses albuterol and Combivent as needed for rescue.  CT scanning showed extensive centrilobular emphysematous changes.   - Scheduled albuterol and ipratropium  - Albuterol PRN   - Hold Spiriva while on scheduled Combivent   -Start Trelegy at Discharge   - Follow up with Outpatient Pulmonologist      #Tobacco Use   - Encouraged Smoking Cessation      #Hyperlipidemia   - Continue prior to admission (PTA) atorvastatin         Diet: Combination Diet Regular Diet Adult    DVT Prophylaxis: Enoxaparin (Lovenox) SQ  Mehta Catheter: Not present  Fluids: None   Lines: None     Cardiac Monitoring: None  Code Status: Full Code      Clinically Significant Risk Factors              # Hypoalbuminemia: Lowest albumin = 3.3 g/dL at 2/12/2023  7:40 PM, will monitor as appropriate                    Disposition Plan      Expected Discharge Date: 02/16/2023        Discharge Comments: Disposition: Home  Plan:  Progress:  Delays: O2 requirement        The patient's care was discussed with  the Attending Physician, Dr. Hensley, Bedside Nurse and Patient.    Marcia Dacosta MD  Medicine Service, [facility] TEAM 3  [facility]  Securely message with [employer] (more info)  Text page via [employer] Paging/Directory   See signed in provider for up to date coverage information  ______________________________________________________________________    Interval History   No acute events overnight. Feeling better. Able to better cough up more sputum. No fevers or chills. He endorses shortness of breath with exertion. No chest pain. Eating and drinking OK. Feels his breathing is improved. No diarrhea. No nausea.     Physical Exam   Vital Signs: Temp: 98  F (36.7  C) Temp src: Oral BP: 118/67 Pulse: 76   Resp: 18 SpO2: 91 % O2 Device: Nasal cannula Oxygen Delivery: 1 LPM  Weight: 164 lbs 9.6 oz    General Appearance: Alert, no acute distress, NC in place   Respiratory: CTAB, no wheezes, no tachypnea   Cardiovascular: regular rate and rhythm, no murmur   GI: soft, nontender, nondistended   Skin: no rashes or lesions   Other: No pitting edema in LE      Medical Decision Making       Please see A&P for additional details of medical decision making.      Data     I have personally reviewed the following data over the past 24 hrs:    5.9  \   12.3 (L)   / 298     138 106 14.1 /  100 (H)   3.8 22 0.71 \       Procal: N/A CRP: 72.60 (H) Lactic Acid: N/A       INR:  N/A PTT:  N/A   D-dimer:  0.59 (H) Fibrinogen:  N/A       Imaging results reviewed over the past 24 hrs:   No results found for this or any previous visit (from the past 24 hour(s)).

## 2023-02-15 NOTE — PLAN OF CARE
"/67 (BP Location: Right arm)   Pulse 76   Temp 98  F (36.7  C) (Oral)   Resp 18   Ht 1.778 m (5' 10\")   Wt 72.1 kg (159 lb)   SpO2 94%   BMI 22.81 kg/m      Care from: 5303-1512      VS & Pain: VSS, O2 sat > 90% on rm air for a couple of hours but then desat to high 80s, put pt back on 1 L nc, denied pain    Neuro: A&O x4    Respiratory: dyspnea on exertion, infrequent productive cough, diminished lung sounds in BLL, prn Mucinex x1 was given for congestion    Cardiac: WDL    Peripheral neurovascular: WDL    GI/: voids spontaneously, BM x1 this shift    Nutrition: good appetite and oral intake    Skin: WDL    Musculoskeletal: WDL    Lines: L PIV is saline locked    Activity: up independently    Events this shift: Call light within reach.    Plan: Continue to follow poc.      Goal Outcome Evaluation:      Plan of Care Reviewed With: patient    Overall Patient Progress: no changeOverall Patient Progress: no change    Outcome Evaluation: O2 sat > 90% on rm air for a couple of hours but then desat to high 80s, put pt back on 1 L nc  "

## 2023-02-16 LAB
ANION GAP SERPL CALCULATED.3IONS-SCNC: 10 MMOL/L (ref 7–15)
BUN SERPL-MCNC: 17.4 MG/DL (ref 8–23)
CALCIUM SERPL-MCNC: 7.9 MG/DL (ref 8.8–10.2)
CHLORIDE SERPL-SCNC: 105 MMOL/L (ref 98–107)
CREAT SERPL-MCNC: 0.63 MG/DL (ref 0.67–1.17)
CRP SERPL-MCNC: 71.8 MG/L
D DIMER PPP FEU-MCNC: 1.01 UG/ML FEU (ref 0–0.5)
DEPRECATED HCO3 PLAS-SCNC: 23 MMOL/L (ref 22–29)
ERYTHROCYTE [DISTWIDTH] IN BLOOD BY AUTOMATED COUNT: 13.4 % (ref 10–15)
GFR SERPL CREATININE-BSD FRML MDRD: >90 ML/MIN/1.73M2
GLUCOSE SERPL-MCNC: 147 MG/DL (ref 70–99)
HCT VFR BLD AUTO: 35.6 % (ref 40–53)
HGB BLD-MCNC: 11.9 G/DL (ref 13.3–17.7)
MCH RBC QN AUTO: 31.2 PG (ref 26.5–33)
MCHC RBC AUTO-ENTMCNC: 33.4 G/DL (ref 31.5–36.5)
MCV RBC AUTO: 93 FL (ref 78–100)
PLATELET # BLD AUTO: 328 10E3/UL (ref 150–450)
POTASSIUM SERPL-SCNC: 3.6 MMOL/L (ref 3.4–5.3)
RBC # BLD AUTO: 3.82 10E6/UL (ref 4.4–5.9)
SODIUM SERPL-SCNC: 138 MMOL/L (ref 136–145)
WBC # BLD AUTO: 8.2 10E3/UL (ref 4–11)

## 2023-02-16 PROCEDURE — 94640 AIRWAY INHALATION TREATMENT: CPT | Mod: 76

## 2023-02-16 PROCEDURE — 120N000002 HC R&B MED SURG/OB UMMC

## 2023-02-16 PROCEDURE — 258N000003 HC RX IP 258 OP 636

## 2023-02-16 PROCEDURE — 999N000157 HC STATISTIC RCP TIME EA 10 MIN

## 2023-02-16 PROCEDURE — 250N000013 HC RX MED GY IP 250 OP 250 PS 637: Performed by: STUDENT IN AN ORGANIZED HEALTH CARE EDUCATION/TRAINING PROGRAM

## 2023-02-16 PROCEDURE — 250N000009 HC RX 250

## 2023-02-16 PROCEDURE — 80048 BASIC METABOLIC PNL TOTAL CA: CPT

## 2023-02-16 PROCEDURE — 85027 COMPLETE CBC AUTOMATED: CPT

## 2023-02-16 PROCEDURE — 85379 FIBRIN DEGRADATION QUANT: CPT

## 2023-02-16 PROCEDURE — 94640 AIRWAY INHALATION TREATMENT: CPT

## 2023-02-16 PROCEDURE — 250N000012 HC RX MED GY IP 250 OP 636 PS 637

## 2023-02-16 PROCEDURE — 250N000013 HC RX MED GY IP 250 OP 250 PS 637

## 2023-02-16 PROCEDURE — 36415 COLL VENOUS BLD VENIPUNCTURE: CPT

## 2023-02-16 PROCEDURE — 86140 C-REACTIVE PROTEIN: CPT

## 2023-02-16 PROCEDURE — 250N000011 HC RX IP 250 OP 636

## 2023-02-16 PROCEDURE — 99232 SBSQ HOSP IP/OBS MODERATE 35: CPT | Mod: GC | Performed by: STUDENT IN AN ORGANIZED HEALTH CARE EDUCATION/TRAINING PROGRAM

## 2023-02-16 RX ORDER — IPRATROPIUM BROMIDE AND ALBUTEROL SULFATE 2.5; .5 MG/3ML; MG/3ML
3 SOLUTION RESPIRATORY (INHALATION)
Status: DISCONTINUED | OUTPATIENT
Start: 2023-02-16 | End: 2023-02-17 | Stop reason: HOSPADM

## 2023-02-16 RX ORDER — ALBUTEROL SULFATE 0.83 MG/ML
2.5 SOLUTION RESPIRATORY (INHALATION)
Status: DISCONTINUED | OUTPATIENT
Start: 2023-02-16 | End: 2023-02-17 | Stop reason: HOSPADM

## 2023-02-16 RX ADMIN — SODIUM CHLORIDE, PRESERVATIVE FREE 50 ML: 5 INJECTION INTRAVENOUS at 11:22

## 2023-02-16 RX ADMIN — REMDESIVIR 100 MG: 100 INJECTION, POWDER, LYOPHILIZED, FOR SOLUTION INTRAVENOUS at 09:34

## 2023-02-16 RX ADMIN — ENOXAPARIN SODIUM 40 MG: 40 INJECTION SUBCUTANEOUS at 19:42

## 2023-02-16 RX ADMIN — GUAIFENESIN AND DEXTROMETHORPHAN HYDROBROMIDE 1 TABLET: 600; 30 TABLET, EXTENDED RELEASE ORAL at 19:42

## 2023-02-16 RX ADMIN — Medication 250 MG: at 07:52

## 2023-02-16 RX ADMIN — IPRATROPIUM BROMIDE AND ALBUTEROL SULFATE 3 ML: 2.5; .5 SOLUTION RESPIRATORY (INHALATION) at 14:01

## 2023-02-16 RX ADMIN — IPRATROPIUM BROMIDE AND ALBUTEROL SULFATE 3 ML: 2.5; .5 SOLUTION RESPIRATORY (INHALATION) at 20:15

## 2023-02-16 RX ADMIN — DEXAMETHASONE 6 MG: 2 TABLET ORAL at 13:07

## 2023-02-16 RX ADMIN — IPRATROPIUM BROMIDE AND ALBUTEROL SULFATE 3 ML: 2.5; .5 SOLUTION RESPIRATORY (INHALATION) at 16:55

## 2023-02-16 RX ADMIN — IPRATROPIUM BROMIDE AND ALBUTEROL 1 PUFF: 20; 100 SPRAY, METERED RESPIRATORY (INHALATION) at 07:52

## 2023-02-16 RX ADMIN — ATORVASTATIN CALCIUM 40 MG: 40 TABLET, FILM COATED ORAL at 19:42

## 2023-02-16 RX ADMIN — PANTOPRAZOLE SODIUM 40 MG: 40 TABLET, DELAYED RELEASE ORAL at 07:52

## 2023-02-16 ASSESSMENT — ACTIVITIES OF DAILY LIVING (ADL)
ADLS_ACUITY_SCORE: 20

## 2023-02-16 NOTE — PROGRESS NOTES
[facility]    Progress Note - Medicine Service, MAROON TEAM 3       Date of Admission:  2/12/2023    Assessment & Plan   Robert B Behr is a 73 year old male admitted on 2/12/2023. 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    Changes Today:   - Combivent to Duo-Nebs QID   - Encourage Flutter Valve and Mucinex use for expectorant   - Wean O2 as able  - Continue Remdesivir for 5 day total course        # Confirmed COVID-19 infection    # Acute Hypoxic Respiratory Failure secondary to COVID-19 infection   # Suspected Community Acquired Pneumonia   He presented with cough, shortness of breath and was found to have COVID-19.  CT chest on admission was negative for pulmonary embolism, however showed right upper lobe consolidative opacities as well as consolidation in the lower lobes with bronchial wall thickening concerning for bronchitis.  He was recently treated for bronchitis with prednisone for 7 days around 1/25.  Plan to treat COVID-19 with remdesivir and dexamethasone given presence of hypoxia on 2 to 4 L of nasal cannula currently, as well as antibiotics to cover CAP.     Symptom Onset Around 2/6   Date of 1st Positive Test Use date of first positive test. 2/12/2023   Vaccination Status Declines Vaccine         - COVID-19 special precautions, continuous pulse-ox  - Oxygen: continue current support with O2 Device: None (Room air) at Oxygen Delivery: 1-2 LPM; titrate to keep SpO2 between 90-96%  - Labs: Daily COVID labs ordered x4 days (CBC, BMP, D-dimer, CRP).  Continue to trend after 4 days as needed.   - Imaging: chest CT with contrast ordered -negative for PE  - Breathing treatments:Duo-Nebs four times a day scheduled and  albuterol nebulizer PRN    -Flutter Valve QID and PRN for clearance    -PRN Mucinex   - IV fluids: not indicated at this time  - Antibiotics: Initially started on IV Ceftriaxone and Azithromycin for 2 days. Discontinued as low suspicion for bacterial superinfection.  - COVID-Focused Medications: Dexamethasone 6 mg x 10 days or until hospital discharge, started on 2/13/2023 and Remdesivir x 5 days or until hospital discharge, started on 2/12/2023  - DVT Prophylaxis:         - At high risk of thrombotic complications due to COVID-19 (DDimer = 1.85 ug/mL FEU (Ref range: 0.00 - 0.50 ug/mL FEU) )         - PROPHYLACTIC dosing: lovenox 40mg daily      #Moderate COPD  He follows with a pulmonologist outpatient.  He is on Advair and Spiriva daily, and he uses albuterol and Combivent as needed for rescue.  CT scanning showed extensive centrilobular emphysematous changes.   - Scheduled albuterol and ipratropium  - Albuterol PRN   - Hold Spiriva while on scheduled Combivent   - Start Trelegy at Discharge   - Follow up with Outpatient Pulmonologist      #Tobacco Use   - Encouraged Smoking Cessation      #Hyperlipidemia   - Continue prior to admission (PTA) atorvastatin         Diet: Combination Diet Regular Diet Adult    DVT Prophylaxis: Enoxaparin (Lovenox) SQ  Mehta Catheter: Not present  Fluids: None   Lines: None     Cardiac Monitoring: None  Code Status: Full Code      Clinically Significant Risk Factors              # Hypoalbuminemia: Lowest albumin = 3.3 g/dL at 2/12/2023  7:40 PM, will monitor as appropriate                    Disposition Plan      Expected Discharge Date: 02/16/2023        Discharge Comments: Disposition: Home  Plan:  Progress:  Delays: O2 requirement        The patient's care was discussed with the Attending Physician, Dr. Hensley, Bedside Nurse and Patient.    Marcia Dacosta MD  Medicine Service, [facility] TEAM [address] [place] [address]  Securely message with Vocera (more  info)  Text page via [facility] Paging/Directory   See signed in provider for up to date coverage information  ______________________________________________________________________    Interval History   No acute events overnight.  Sergio reports feeling that his breathing is worse this morning.  He feels more short of breath and complains that his inhalers are drying out his secretions making it harder to cough up.  He denies any chest pain.  He feels like he is more wheezy today and would like to try nebulizers.  He feels the nebulizers help loosen up his his mucus more than the inhalers.  He is eating and drinking okay.  He does have bowel movements.  He denies any abdominal pain. No new leg swelling.  He was able to wean off of oxygen for a period of time yesterday, however today is requiring oxygen.    Physical Exam   Vital Signs: Temp: 97.8  F (36.6  C) Temp src: Oral BP: 126/69 Pulse: 71   Resp: 20 SpO2: 91 % O2 Device: Nasal cannula Oxygen Delivery: 1 LPM  Weight: 164 lbs 9.6 oz    General Appearance: Alert, no acute distress, NC in place   Respiratory: prolonged expiratory wheezing in all lung fields, no tachypnea   Cardiovascular: regular rate and rhythm, no murmur   GI: soft, nontender, nondistended   Skin: no rashes or lesions   Other: No pitting edema in LE      Medical Decision Making       Please see A&P for additional details of medical decision making.      Data     I have personally reviewed the following data over the past 24 hrs:    8.2  \   11.9 (L)   / 328     138 105 17.4 /  147 (H)   3.6 23 0.63 (L) \       Procal: N/A CRP: 71.80 (H) Lactic Acid: N/A       INR:  N/A PTT:  N/A   D-dimer:  N/A Fibrinogen:  N/A       Imaging results reviewed over the past 24 hrs:   No results found for this or any previous visit (from the past 24 hour(s)).

## 2023-02-16 NOTE — PLAN OF CARE
V/S & pain: v/s q8, vitally stable, o2 at 93%  Neuro: alert, oriented x4, calm and cooperative  Respiratory: covid positive, COPD, On nasal cannula at 1lpm. o2 sat goal is 90%  Skin: manjula,warm, dry, intact   GI/: WDL  Nutrition: regular diet  Lines/drains: left PIV on saline locked  Activity: independent. Up to the bathroom    Events this shift: sleeping comfortably this shift. call light w/in reach and able to make needs known, will continue to monitor.    Plan:  For continuity of care

## 2023-02-16 NOTE — PLAN OF CARE
Goal Outcome Evaluation:      Plan of Care Reviewed With: patient    Overall Patient Progress: no change    Outcome Evaluation: Pt A&O. VSS on 1-2 L O2 NC. Reports of chest tightness. RODRIGUEZ. LS wheezy. Started neb treatments. Received remdesivir infusion. Denies pain. Up ind in room. Voiding. Will cont to monitor.

## 2023-02-17 ENCOUNTER — APPOINTMENT (OUTPATIENT)
Dept: GENERAL RADIOLOGY | Facility: CLINIC | Age: 73
DRG: 177 | End: 2023-02-17
Payer: COMMERCIAL

## 2023-02-17 ENCOUNTER — APPOINTMENT (OUTPATIENT)
Dept: CT IMAGING | Facility: CLINIC | Age: 73
DRG: 177 | End: 2023-02-17
Payer: COMMERCIAL

## 2023-02-17 VITALS
WEIGHT: 165.7 LBS | HEART RATE: 77 BPM | HEIGHT: 70 IN | RESPIRATION RATE: 20 BRPM | TEMPERATURE: 97.3 F | DIASTOLIC BLOOD PRESSURE: 80 MMHG | OXYGEN SATURATION: 92 % | BODY MASS INDEX: 23.72 KG/M2 | SYSTOLIC BLOOD PRESSURE: 138 MMHG

## 2023-02-17 LAB
ALBUMIN SERPL BCG-MCNC: 2.9 G/DL (ref 3.5–5.2)
ALP SERPL-CCNC: 63 U/L (ref 40–129)
ALT SERPL W P-5'-P-CCNC: 14 U/L (ref 10–50)
ANION GAP SERPL CALCULATED.3IONS-SCNC: 11 MMOL/L (ref 7–15)
AST SERPL W P-5'-P-CCNC: 19 U/L (ref 10–50)
BACTERIA BLD CULT: NO GROWTH
BACTERIA BLD CULT: NO GROWTH
BILIRUB SERPL-MCNC: 0.2 MG/DL
BUN SERPL-MCNC: 10.3 MG/DL (ref 8–23)
CALCIUM SERPL-MCNC: 8.6 MG/DL (ref 8.8–10.2)
CHLORIDE SERPL-SCNC: 105 MMOL/L (ref 98–107)
CREAT SERPL-MCNC: 0.63 MG/DL (ref 0.67–1.17)
CRP SERPL-MCNC: 57 MG/L
D DIMER PPP FEU-MCNC: 1.24 UG/ML FEU (ref 0–0.5)
DEPRECATED HCO3 PLAS-SCNC: 23 MMOL/L (ref 22–29)
ERYTHROCYTE [DISTWIDTH] IN BLOOD BY AUTOMATED COUNT: 13.5 % (ref 10–15)
FERRITIN SERPL-MCNC: 384 NG/ML (ref 31–409)
GFR SERPL CREATININE-BSD FRML MDRD: >90 ML/MIN/1.73M2
GLUCOSE SERPL-MCNC: 107 MG/DL (ref 70–99)
HCT VFR BLD AUTO: 38 % (ref 40–53)
HGB BLD-MCNC: 12.3 G/DL (ref 13.3–17.7)
MCH RBC QN AUTO: 30.6 PG (ref 26.5–33)
MCHC RBC AUTO-ENTMCNC: 32.4 G/DL (ref 31.5–36.5)
MCV RBC AUTO: 95 FL (ref 78–100)
PLATELET # BLD AUTO: 393 10E3/UL (ref 150–450)
POTASSIUM SERPL-SCNC: 3.6 MMOL/L (ref 3.4–5.3)
PROT SERPL-MCNC: 5.6 G/DL (ref 6.4–8.3)
RBC # BLD AUTO: 4.02 10E6/UL (ref 4.4–5.9)
SARS-COV-2 RNA RESP QL NAA+PROBE: NEGATIVE
SODIUM SERPL-SCNC: 139 MMOL/L (ref 136–145)
WBC # BLD AUTO: 9.5 10E3/UL (ref 4–11)

## 2023-02-17 PROCEDURE — 71275 CT ANGIOGRAPHY CHEST: CPT | Mod: 26 | Performed by: RADIOLOGY

## 2023-02-17 PROCEDURE — 94640 AIRWAY INHALATION TREATMENT: CPT | Mod: 76

## 2023-02-17 PROCEDURE — U0005 INFEC AGEN DETEC AMPLI PROBE: HCPCS

## 2023-02-17 PROCEDURE — 71045 X-RAY EXAM CHEST 1 VIEW: CPT

## 2023-02-17 PROCEDURE — 250N000013 HC RX MED GY IP 250 OP 250 PS 637: Performed by: STUDENT IN AN ORGANIZED HEALTH CARE EDUCATION/TRAINING PROGRAM

## 2023-02-17 PROCEDURE — 36415 COLL VENOUS BLD VENIPUNCTURE: CPT

## 2023-02-17 PROCEDURE — 71045 X-RAY EXAM CHEST 1 VIEW: CPT | Mod: 26 | Performed by: RADIOLOGY

## 2023-02-17 PROCEDURE — 99239 HOSP IP/OBS DSCHRG MGMT >30: CPT | Mod: GC | Performed by: STUDENT IN AN ORGANIZED HEALTH CARE EDUCATION/TRAINING PROGRAM

## 2023-02-17 PROCEDURE — 258N000003 HC RX IP 258 OP 636

## 2023-02-17 PROCEDURE — 71275 CT ANGIOGRAPHY CHEST: CPT

## 2023-02-17 PROCEDURE — 82728 ASSAY OF FERRITIN: CPT

## 2023-02-17 PROCEDURE — 80053 COMPREHEN METABOLIC PANEL: CPT

## 2023-02-17 PROCEDURE — 250N000009 HC RX 250

## 2023-02-17 PROCEDURE — 250N000013 HC RX MED GY IP 250 OP 250 PS 637

## 2023-02-17 PROCEDURE — 85027 COMPLETE CBC AUTOMATED: CPT

## 2023-02-17 PROCEDURE — 250N000011 HC RX IP 250 OP 636

## 2023-02-17 PROCEDURE — 250N000011 HC RX IP 250 OP 636: Performed by: STUDENT IN AN ORGANIZED HEALTH CARE EDUCATION/TRAINING PROGRAM

## 2023-02-17 PROCEDURE — 85379 FIBRIN DEGRADATION QUANT: CPT

## 2023-02-17 PROCEDURE — 86140 C-REACTIVE PROTEIN: CPT

## 2023-02-17 RX ORDER — CHLORAL HYDRATE 500 MG
1 CAPSULE ORAL DAILY
Status: DISCONTINUED | OUTPATIENT
Start: 2023-02-17 | End: 2023-02-17 | Stop reason: HOSPADM

## 2023-02-17 RX ORDER — FLUTICASONE FUROATE AND VILANTEROL 200; 25 UG/1; UG/1
1 POWDER RESPIRATORY (INHALATION) DAILY
Status: CANCELLED | OUTPATIENT
Start: 2023-02-17

## 2023-02-17 RX ORDER — IPRATROPIUM BROMIDE AND ALBUTEROL 20; 100 UG/1; UG/1
1 SPRAY, METERED RESPIRATORY (INHALATION) 4 TIMES DAILY PRN
Qty: 4 G | Refills: 0 | Status: SHIPPED | OUTPATIENT
Start: 2023-02-17 | End: 2023-04-03

## 2023-02-17 RX ORDER — IOPAMIDOL 755 MG/ML
65 INJECTION, SOLUTION INTRAVASCULAR ONCE
Status: COMPLETED | OUTPATIENT
Start: 2023-02-17 | End: 2023-02-17

## 2023-02-17 RX ORDER — FLUTICASONE FUROATE, UMECLIDINIUM BROMIDE AND VILANTEROL TRIFENATATE 200; 62.5; 25 UG/1; UG/1; UG/1
1 POWDER RESPIRATORY (INHALATION) DAILY
Qty: 28 EACH | Refills: 0 | Status: SHIPPED | OUTPATIENT
Start: 2023-02-17 | End: 2023-03-19

## 2023-02-17 RX ADMIN — IPRATROPIUM BROMIDE AND ALBUTEROL SULFATE 3 ML: 2.5; .5 SOLUTION RESPIRATORY (INHALATION) at 08:55

## 2023-02-17 RX ADMIN — IOPAMIDOL 65 ML: 755 INJECTION, SOLUTION INTRAVENOUS at 09:50

## 2023-02-17 RX ADMIN — REMDESIVIR 100 MG: 100 INJECTION, POWDER, LYOPHILIZED, FOR SOLUTION INTRAVENOUS at 09:18

## 2023-02-17 RX ADMIN — B-COMPLEX W/ C & FOLIC ACID TAB 1 TABLET: TAB at 09:16

## 2023-02-17 RX ADMIN — Medication 1 G: at 09:16

## 2023-02-17 RX ADMIN — Medication 250 MG: at 09:16

## 2023-02-17 RX ADMIN — PANTOPRAZOLE SODIUM 40 MG: 40 TABLET, DELAYED RELEASE ORAL at 09:16

## 2023-02-17 RX ADMIN — SODIUM CHLORIDE, PRESERVATIVE FREE 50 ML: 5 INJECTION INTRAVENOUS at 09:16

## 2023-02-17 ASSESSMENT — ACTIVITIES OF DAILY LIVING (ADL)
ADLS_ACUITY_SCORE: 20

## 2023-02-17 NOTE — PROGRESS NOTES
Care Management Discharge Note    Discharge Date: 02/17/2023  Discharge Disposition: Home  Discharge Services: None  Discharge DME: None  Discharge Transportation: Health care plan transportation   Private pay costs discussed: Not applicable  Persons Notified of Discharge Plans: Provider, bedside nurse, PCP, pt  Patient/Family in Agreement with the Plan: yes   Handoff Referral Completed: Yes  Additional Information: Patient is discharging today to return home. He will receive discharge transportation through his health care plan. PCP handoff completed. The patient had no other discharge needs.  __________________________     IFRAH Reyes, SHIRA  /Care Coordinator  [facility]   [address] Medicine Beds 2673-4432 & 5C Medicine Overflow Beds 7667-7569  [email or web address]  Phone: [phone]  Pager: [phone]  Fax: [phone]  Message me on [employer] moises.    Weekend 5A & 5B  Pager: [phone]   Weekend 5A & 5B RNCC Pager: [phone]  Weekdays after hours (1630 - 0000), Saturday & Sunday after hours (0212-2579), & [facility] Recognized Holidays Coverage  Pager: [phone]

## 2023-02-17 NOTE — PLAN OF CARE
Goal Outcome Evaluation: Pt alert and oriented x4. Up in room independently. Denied pain, N/V. Upset and angry at staff because he is not getting immune support vitamins, that we have not been testing him for covid daily, and that he does not have Trelegy ordered. Pt threatened to leave hospital this evening but agreeable to stay overnight. Cross cover provider notified. Covid testing ordered per pt request. Provider will update primary team about other issues in AM. Sats 90% RA, 94% 1L/nc. Continue to monitor respiratory status.                  Outcome Evaluation: Pt generally unhappy with medications and not getting frequent covid testing. Sats 90 or above on 1L/nc. Denies pain. Wants to leave in AM.

## 2023-02-17 NOTE — DISCHARGE SUMMARY
[facility]  Discharge Summary - Medicine & Pediatrics       Date of Admission:  2/12/2023  Date of Discharge:  2/17/2023 12:30 PM  Discharging Provider: Dr. Jadon Hensley    Discharge Service: Medicine Service, VAUGHN TEAM 3    Discharge Diagnoses   Acute Hypoxic Respiratory Failure Secondary to COVID-19   COVID-19 Pneumonia   Moderate COPD with Exacerbation   Tobacco Use   Hyperlipidemia     Follow-ups Needed After Discharge   Follow-up Appointments     Adult [facility] Follow-up and recommended labs and tests      Follow up with primary care provider, Chastity Arguello, within 7 days for   hospital follow- up.  The following labs/tests are recommended: CBC and   CMP.    Follow up with Dr. Graciela Taylor (Pulmonology) , within 4-6 weeks  to   evaluate medication change. No follow up labs or test are needed.    Appointments on [place] and/or [facility] (with [facility] or [facility]   provider or service). Call [phone] if you haven't heard regarding   these appointments within 7 days of discharge.             Unresulted Labs Ordered in the Past 30 Days of this Admission     No orders found from 1/13/2023 to 2/13/2023.        Discharge Disposition   Discharged to home  Condition at discharge: Good    Hospital Course   Robert B Behr is a 73 year old male admitted on 2/12/2023. 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     Changes Today:   - Combivent to Duo-Nebs QID   - Encourage Flutter Valve and Mucinex use for expectorant   - Wean O2 as able  - Continue Remdesivir for 5 day total course         # Confirmed COVID-19 infection    # Acute Hypoxic Respiratory Failure secondary to COVID-19 infection   # Suspected Community Acquired Pneumonia    He presented with cough, shortness of breath and was found to have COVID-19.  CT chest on admission was negative for pulmonary embolism, however showed right upper lobe consolidative opacities as well as consolidation in the lower lobes with bronchial wall thickening concerning for bronchitis.  He was recently treated for bronchitis with prednisone for 7 days around 1/25. Treated COVID-19 with remdesivir and dexamethasone given presence of hypoxia on 2 to 4 L of nasal cannula on admission.      Symptom Onset Around 2/6   Date of 1st Positive Test Use date of first positive test. 2/12/2023   Vaccination Status Declines Vaccine         - Imaging: chest CT with contrast ordered -negative for PE  - Antibiotics: Initially started on IV Ceftriaxone and Azithromycin for 2 days. Discontinued as low suspicion for bacterial superinfection.  - COVID-Focused Medications: Dexamethasone 6 mg x 10 days or until hospital discharge, started on 2/13/2023 and Remdesivir x 5 days or until hospital discharge, started on 2/12/2023     #Moderate COPD  He follows with a pulmonologist outpatient.  He is on Advair and Spiriva daily, and he uses albuterol and Combivent as needed for rescue.  CT scanning showed extensive centrilobular emphysematous changes. COPD inpatient team recommend changing daily inhaler to Trelegy.   - Start Trelegy   - Discontinue Wixela and Spiriva   - Follow up with Outpatient Pulmonologist      #Tobacco Use   - Encouraged Smoking Cessation      #Hyperlipidemia   - Continue prior to admission (PTA) atorvastatin       Consultations This Hospital Stay   PHARMACY LIAISON FOR MEDICATION COVERAGE CONSULT  IP RESPIRATORY CARE CHRONIC PULMONARY DISEASE SPECIALIST    Code Status   Prior       The patient was discussed with Dr. Ayden Dacosta MD  [place] [address] Medicine Service  [facility] [address] [address]  Phone:  [phone]  ______________________________________________________________________    Physical Exam   Vital Signs: Temp: 97.3  F (36.3  C) Temp src: Oral BP: 138/80 Pulse: 77   Resp: 20 SpO2: 92 % O2 Device: None (Room air) Oxygen Delivery: 1 LPM  Weight: 165 lbs 11.2 oz     General Appearance:  Alert, no acute distress, NC in place   Respiratory: CTAB, no tachypnea   Cardiovascular: regular rate and rhythm, no murmur   GI: soft, nontender, nondistended   Skin: no rashes or lesions   Other:  No pitting edema in LE       Primary Care Physician   Chastity Arguello    Discharge Orders      Primary Care - Care Coordination Referral      Reason for your hospital stay    You were hospitalized for COVID pneumonia. You completed a course of antiviral therapy. Your repeat COVID test was negative. You may start Trelegy inhaler in place of your Spiriva and Wixela inhaler on discharge. Your prescriptions were sent to the [facility] in the main lobby.     Activity    Your activity upon discharge: activity as tolerated     Adult [facility] Follow-up and recommended labs and tests    Follow up with primary care provider, Chastity Arguello, within 7 days for hospital follow- up.  The following labs/tests are recommended: CBC and CMP.    Follow up with Dr. Graciela Taylor (Pulmonology) , within 4-6 weeks  to evaluate medication change. No follow up labs or test are needed.    Appointments on [place] and/or [facility] (with [facility] or [facility] provider or service). Call [phone] if you haven't heard regarding these appointments within 7 days of discharge.     Diet    Follow this diet upon discharge: Orders Placed This Encounter      Combination Diet Regular Diet Adult       Significant Results and Procedures   Most Recent 3 CBC's:Recent Labs   Lab Test 02/17/23  0659 02/16/23  0541 02/15/23  0638   WBC 9.5 8.2 5.9   HGB 12.3* 11.9* 12.3*   MCV 95 93 93    328 298     Most Recent 3 BMP's:Recent Labs   Lab Test  02/17/23  0659 02/16/23  0541 02/15/23  0638    138 138   POTASSIUM 3.6 3.6 3.8   CHLORIDE 105 105 106   CO2 23 23 22   BUN 10.3 17.4 14.1   CR 0.63* 0.63* 0.71   ANIONGAP 11 10 10   ANTONIO 8.6* 7.9* 8.0*   * 147* 100*     Most Recent 2 LFT's:Recent Labs   Lab Test 02/17/23  0659 02/12/23 1940   AST 19 33   ALT 14 15   ALKPHOS 63 64   BILITOTAL 0.2 0.3       Discharge Medications   Discharge Medication List as of 2/17/2023 11:21 AM      START taking these medications    Details   Fluticasone-Umeclidin-Vilanterol (TRELEGY ELLIPTA) 200-62.5-25 MCG/ACT oral inhaler Inhale 1 puff into the lungs daily for 30 days, Disp-28 each, R-0, E-Prescribe         CONTINUE these medications which have CHANGED    Details   ipratropium-albuterol (COMBIVENT RESPIMAT)  MCG/ACT inhaler Inhale 1 puff into the lungs 4 times daily as needed for shortness of breath or wheezing .  Do not exceed 6 doses per day., Disp-4 g, R-0, E-Prescribe         CONTINUE these medications which have NOT CHANGED    Details   albuterol (PROAIR HFA) 108 (90 Base) MCG/ACT inhaler INHALE 1 TO 2 PUFFS BY MOUTH EVERY 4 HOURS AS NEEDED FOR SHORTNESS OF BREATH, Disp-8.5 g, R-3, E-PrescribePharmacy may dispense brand covered by insurance (Proair, or proventil or ventolin or generic albuterol inhaler)      atorvastatin (LIPITOR) 40 MG tablet Take 1 tablet (40 mg) by mouth daily, Disp-90 tablet, R-3, E-Prescribe      B Complex-C (VITAMIN B COMPLEX W/VITAMIN C) TABS tablet TAKE 1 TABLET BY MOUTH TWICE DAILY WITH FOLIC ACID, Disp-180 tablet, R-3, E-Prescribe      calcium carbonate 600 mg-vitamin D 400 units (CALTRATE) 600-400 MG-UNIT per tablet Take 1 tablet by mouth 2 times daily, Disp-180 tablet, R-3, E-Prescribe      fish oil-omega-3 fatty acids 1000 MG capsule Take 1 capsule (1 g) by mouth daily, Disp-90 capsule, R-3, E-Prescribe      Ginkgo Biloba 60 MG TABS Take 1 tablet by mouth daily, Disp-90 tablet, R-3, E-Prescribe       glucosamine-chondroitinoitin 750-600 MG TABS TAKE 2 TABLETS BY MOUTH TWICE DAILY, Disp-120 tablet, R-3, E-Prescribe      omeprazole (PRILOSEC) 20 MG DR capsule Take 1 capsule (20 mg) by mouth 2 times daily, Disp-180 capsule, R-0, E-Prescribe++VISIT NEEDED BEFORE FURTHER REFILLS++      Selenium (SELENIMIN-200) 200 MCG TABS tablet Take 1 tablet (200 mcg) by mouth daily, Disp-90 tablet, R-3, E-Prescribe      vitamin A 3 MG (96644 UNITS) capsule Take 1 capsule (10,000 Units) by mouth daily, Disp-90 capsule, R-3, E-Prescribe      vitamin C (ASCORBIC ACID) 1000 MG TABS TAKE 1 TABLET(1000 MG) BY MOUTH DAILY, Disp-90 tablet, R-0, E-Prescribe      vitamin E (VITAMIN E) 1000 units (450 mg) CAPS capsule Take 1 capsule (1,000 Units) by mouth daily, Disp-90 capsule, R-3, E-Prescribe         STOP taking these medications       tiotropium (SPIRIVA RESPIMAT) 2.5 MCG/ACT inhaler Comments:   Reason for Stopping:         WIXELA INHUB 500-50 MCG/ACT inhaler Comments:   Reason for Stopping:             Allergies   No Known Allergies

## 2023-02-17 NOTE — PROGRESS NOTES
Discharged to: home  Transportation: [facility] transportation  Time: 1230  Prescriptions: picked up from discharge pharmacy and sent with patient  Belongings: All items listed in summary of care note accounted for and sent with patient  PIV/Access: removed  Care Plan and Education discontinued: yes  Paperwork: AVS given to and signed by patient. Pt educated on medications, follow up appointments, and further care needed.

## 2023-02-17 NOTE — PLAN OF CARE
Cares from: 9440-7823     V/S & pain: VSS on 1L/RA, denies pain   Neuro: A/O x4, frustrated w/ poc  Respiratory: stable on RA and intermittently 1L NC, RODRIGUEZ, infrequent productive cough, expiratory wheezes   Skin: no new skin concerns present  GI/: up ad johny to bathroom, voiding spontaneously, no BM reported   Nutrition:regular  diet w/ good appetite and adequate po intake  Lines/drains: L PIV SL   Activity: up ad johny in room   Labs: covid swab collected and sent to lab, resulted negative      Events this shift: no acute events this shift, pt slept well b/t cares. Pt continues to be frustrated w/ POC. covid swab sent, resulted negative. Intermittently using 1L NC overnight. Up ad johny in room.  call light w/in reach and able to make needs known, will continue to monitor.     Plan: continue w/ poc     Goal Outcome Evaluation:      Plan of Care Reviewed With: patient    Overall Patient Progress: no changeOverall Patient Progress: no change    Outcome Evaluation: covid swab negative, potential d/c today

## 2023-02-26 DIAGNOSIS — Z71.89 ENCOUNTER FOR HERB AND VITAMIN SUPPLEMENT MANAGEMENT: ICD-10-CM

## 2023-02-26 DIAGNOSIS — Z78.9 TAKES DIETARY SUPPLEMENTS: ICD-10-CM

## 2023-02-26 DIAGNOSIS — Z71.89 ENCOUNTER FOR HERB AND VITAMIN SUPPLEMENT MANAGEMENT: Primary | ICD-10-CM

## 2023-02-27 RX ORDER — MULTIVIT WITH MINERALS/LUTEIN
TABLET ORAL
Qty: 90 TABLET | Refills: 0 | Status: SHIPPED | OUTPATIENT
Start: 2023-02-27 | End: 2023-04-03

## 2023-03-01 ENCOUNTER — TELEPHONE (OUTPATIENT)
Dept: FAMILY MEDICINE | Facility: CLINIC | Age: 73
End: 2023-03-01
Payer: COMMERCIAL

## 2023-03-01 DIAGNOSIS — Z71.89 ENCOUNTER FOR HERB AND VITAMIN SUPPLEMENT MANAGEMENT: ICD-10-CM

## 2023-03-01 RX ORDER — CALCIUM CARBONATE/VITAMIN D3 600 MG-10
TABLET ORAL
Qty: 180 TABLET | Refills: 0 | Status: SHIPPED | OUTPATIENT
Start: 2023-03-01 | End: 2024-01-05

## 2023-03-01 NOTE — TELEPHONE ENCOUNTER
Reason for Call:  Other appointment    Detailed comments: patient called and would like an E/R appointment with any provider at  [facility].    E/R on COVID pos.    Would like to est new care with primary and questions about medications.    Still cough; breathing issues.  FNA declined.    Provider approve for in person or virtual.    Please contact patient today.  Thank you.    Phone Number Patient can be reached at: Cell number on file:    Telephone Information:   Mobile [phone]       Best Time: any    Can we leave a detailed message on this number? YES    Call taken on 3/1/2023 at 8:35 AM by Emmy Caceres

## 2023-03-01 NOTE — TELEPHONE ENCOUNTER
,  --Please contact patient and ask to schedule an annual preventive/physical .        --Last visit:  2/8/2022     --Future Visit: none with family practice.      ---Prescription approved per [facility] Ref[facility].       Megan Trujillo RN BSN     [facility]          Medication is active in medication list =    Disp Refills Start End ARELY   calcium carbonate 600 mg-vitamin D 400 units (CALTRATE) 600-400 MG-UNIT per tablet 180 tablet 3 2/8/2022  No   Sig - Route: Take 1 tablet by mouth 2 times daily

## 2023-03-01 NOTE — TELEPHONE ENCOUNTER
Would you be agreeable to using a CTF slot tomorrow, or Thursday a virtual visit for this patient? Please advise-

## 2023-03-02 NOTE — TELEPHONE ENCOUNTER
I am not understanding the original message, is he wanting to establish with someone other than me?  I saw him a year ago and he is due for AWV.  First available with me is fine, does not require a hold slot.  Otherwise, can do first available with any other provider.

## 2023-03-03 NOTE — TELEPHONE ENCOUNTER
Patient was not aware he had est care last Feb 2022, and thought his UC visits counted in regards to medications/supplements. Had lengthy conversation discussing the differences and the need to be seen regularly by PCP/primary care. Patient is scheduled for next available w/ PCP for annual in April. Is hoping to have bridge of Selenium sent to [facility] on file-

## 2023-03-07 ENCOUNTER — OFFICE VISIT (OUTPATIENT)
Dept: URGENT CARE | Facility: URGENT CARE | Age: 73
End: 2023-03-07
Payer: COMMERCIAL

## 2023-03-07 VITALS
SYSTOLIC BLOOD PRESSURE: 129 MMHG | TEMPERATURE: 98.3 F | OXYGEN SATURATION: 95 % | HEART RATE: 82 BPM | DIASTOLIC BLOOD PRESSURE: 80 MMHG

## 2023-03-07 DIAGNOSIS — J44.1 CHRONIC OBSTRUCTIVE PULMONARY DISEASE WITH ACUTE EXACERBATION (H): ICD-10-CM

## 2023-03-07 DIAGNOSIS — F17.200 TOBACCO USE DISORDER: ICD-10-CM

## 2023-03-07 DIAGNOSIS — R09.89 CHEST CONGESTION: Primary | ICD-10-CM

## 2023-03-07 DIAGNOSIS — R05.9 COUGH, UNSPECIFIED TYPE: ICD-10-CM

## 2023-03-07 LAB
BASOPHILS # BLD AUTO: 0 10E3/UL (ref 0–0.2)
BASOPHILS NFR BLD AUTO: 0 %
EOSINOPHIL # BLD AUTO: 0.2 10E3/UL (ref 0–0.7)
EOSINOPHIL NFR BLD AUTO: 2 %
ERYTHROCYTE [DISTWIDTH] IN BLOOD BY AUTOMATED COUNT: 13.8 % (ref 10–15)
HCT VFR BLD AUTO: 43.3 % (ref 40–53)
HGB BLD-MCNC: 14.1 G/DL (ref 13.3–17.7)
IMM GRANULOCYTES # BLD: 0.1 10E3/UL
IMM GRANULOCYTES NFR BLD: 1 %
LYMPHOCYTES # BLD AUTO: 2.1 10E3/UL (ref 0.8–5.3)
LYMPHOCYTES NFR BLD AUTO: 23 %
MCH RBC QN AUTO: 30.4 PG (ref 26.5–33)
MCHC RBC AUTO-ENTMCNC: 32.6 G/DL (ref 31.5–36.5)
MCV RBC AUTO: 93 FL (ref 78–100)
MONOCYTES # BLD AUTO: 0.9 10E3/UL (ref 0–1.3)
MONOCYTES NFR BLD AUTO: 10 %
NEUTROPHILS # BLD AUTO: 5.9 10E3/UL (ref 1.6–8.3)
NEUTROPHILS NFR BLD AUTO: 64 %
PLATELET # BLD AUTO: 348 10E3/UL (ref 150–450)
RBC # BLD AUTO: 4.64 10E6/UL (ref 4.4–5.9)
WBC # BLD AUTO: 9.2 10E3/UL (ref 4–11)

## 2023-03-07 PROCEDURE — 85025 COMPLETE CBC W/AUTO DIFF WBC: CPT | Performed by: FAMILY MEDICINE

## 2023-03-07 PROCEDURE — 99214 OFFICE O/P EST MOD 30 MIN: CPT | Performed by: FAMILY MEDICINE

## 2023-03-07 PROCEDURE — 36415 COLL VENOUS BLD VENIPUNCTURE: CPT | Performed by: FAMILY MEDICINE

## 2023-03-07 RX ORDER — BENZONATATE 100 MG/1
100 CAPSULE ORAL 3 TIMES DAILY PRN
Qty: 30 CAPSULE | Refills: 0 | Status: SHIPPED | OUTPATIENT
Start: 2023-03-07 | End: 2024-01-05

## 2023-03-07 RX ORDER — PREDNISONE 20 MG/1
20 TABLET ORAL 2 TIMES DAILY
Qty: 10 TABLET | Refills: 0 | Status: SHIPPED | OUTPATIENT
Start: 2023-03-07 | End: 2023-03-12

## 2023-03-07 NOTE — PATIENT INSTRUCTIONS
Use your inhalers and nebulizer, if you have one, as you have been told to. When using a metered dose inhaler or nebulizer, it's very important to use the proper techniques

## 2023-03-07 NOTE — PROGRESS NOTES
Chief Complaint   Patient presents with     Urgent Care     Cough     Pt had bronchitis in January was treated with amoxicillin and steroids. Then Pt was hospital for  4 days in February for covid. Pt is still have chest congestion.       Ravin was seen today for urgent care and cough.    Diagnoses and all orders for this visit:    Chest congestion  -     CBC with platelets and differential; Future  -     CBC with platelets and differential    Chronic obstructive pulmonary disease with acute exacerbation (H)  -     CBC with platelets and differential; Future  -     CBC with platelets and differential  -     predniSONE (DELTASONE) 20 MG tablet; Take 1 tablet (20 mg) by mouth 2 times daily for 5 days  -     Pulmonary Rehab Referral; Future    Tobacco use disorder    Cough, unspecified type  -     benzonatate (TESSALON) 100 MG capsule; Take 1 capsule (100 mg) by mouth 3 times daily as needed for cough      Results for orders placed or performed in visit on 03/07/23   CBC with platelets and differential     Status: None   Result Value Ref Range    WBC Count 9.2 4.0 - 11.0 10e3/uL    RBC Count 4.64 4.40 - 5.90 10e6/uL    Hemoglobin 14.1 13.3 - 17.7 g/dL    Hematocrit 43.3 40.0 - 53.0 %    MCV 93 78 - 100 fL    MCH 30.4 26.5 - 33.0 pg    MCHC 32.6 31.5 - 36.5 g/dL    RDW 13.8 10.0 - 15.0 %    Platelet Count 348 150 - 450 10e3/uL    % Neutrophils 64 %    % Lymphocytes 23 %    % Monocytes 10 %    % Eosinophils 2 %    % Basophils 0 %    % Immature Granulocytes 1 %    Absolute Neutrophils 5.9 1.6 - 8.3 10e3/uL    Absolute Lymphocytes 2.1 0.8 - 5.3 10e3/uL    Absolute Monocytes 0.9 0.0 - 1.3 10e3/uL    Absolute Eosinophils 0.2 0.0 - 0.7 10e3/uL    Absolute Basophils 0.0 0.0 - 0.2 10e3/uL    Absolute Immature Granulocytes 0.1 <=0.4 10e3/uL   CBC with platelets and differential     Status: None    Narrative    The following orders were created for panel order CBC with platelets and differential.  Procedure                                Abnormality         Status                     ---------                               -----------         ------                     CBC with platelets and d...[[id number]]                      Final result                 Please view results for these tests on the individual orders.       D/d  Acute Bronchitis  COPD exacerbation  Cough  Pneumonia  Upper Respiratory Infection    PLAN:  See orders in Epic  Symptomatic measures encouraged, humidified air, plenty of fluids.  Ct scan reviewed from  A month back   As has continuing cough and with h/o smoking would check CBC  Robert B Behr is a 73 year old male with a history of COPD who presents for evaluation of shortness of breath.   Signs and symptoms are consistent with COPD exacerbation.  A broad differential was considered including, COPD, viral induced reactive airway disease, pneumothorax, cardiac equivalent, allergic phenomena, pneumonia, bronchitis, etc. No indication for hospitalization at this time including no hypoxia, no marked increase in respiratory rate, minimal to no retractions.   Supportive outpatient management is indicated, medications for discharge noted above.  Close followup with primary care physician.  Return if increased wheezing, progressive shortness of breath, develops fever greater than 102.     As CBC is normal I do not find any need for doing an antibiotic at this point.  Would consider treating with short course of prednisone to help with the symptoms patient was encouraged to continue doing the inhalers.  As there was no worsening breathing symptoms or fever or chest pain I do not find any need for repeating an x-ray today  There are no signs at this point of any other serious etiologies including those mentioned above, especially acute coronary syndrome Given no increased sputum production, no antibiotics indicated at this time    OR    Given increased sputum production, antibiotics indicated at this time.         SUBJECTIVE:  Robert B Behr is a 73 year old male with history of COPD, tobacco abuse history recently treated with amoxicillin and steroid more than a month back also had COVID a month back who presents to the clinic today with a chief complaint of cough  And chest congestion for 1 month(s).  His cough is described as productive tenacious and white.    The patient's symptoms are moderate and not changing over the course of time.  Associated symptoms include shortness of breath. The patient's symptoms are exacerbated by no particular triggers  Patient has been using combivent and trilegy Ellipta to improve symptoms.    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     Tobacco abuse        Current Outpatient Medications   Medication Sig Dispense Refill     benzonatate (TESSALON) 100 MG capsule Take 1 capsule (100 mg) by mouth 3 times daily as needed for cough 30 capsule 0     predniSONE (DELTASONE) 20 MG tablet Take 1 tablet (20 mg) by mouth 2 times daily for 5 days 10 tablet 0     albuterol (PROAIR HFA) 108 (90 Base) MCG/ACT inhaler INHALE 1 TO 2 PUFFS BY MOUTH EVERY 4 HOURS AS NEEDED FOR SHORTNESS OF BREATH 8.5 g 3     atorvastatin (LIPITOR) 40 MG tablet Take 1 tablet (40 mg) by mouth daily 90 tablet 3     B Complex-C (VITAMIN B COMPLEX W/VITAMIN C) TABS tablet TAKE 1 TABLET BY MOUTH TWICE DAILY WITH FOLIC ACID 180 tablet 3     calcium carbonate 600 mg-vitamin D 400 units (CALTRATE) 600-400 MG-UNIT per tablet Take 1 tablet by mouth 2 times daily 180 tablet 3     calcium carbonate-vitamin D (CALTRATE) 600-10 MG-MCG per tablet TAKE 1 TABLET BY MOUTH TWICE DAILY 180 tablet 0     fish oil-omega-3 fatty acids 1000 MG capsule Take 1 capsule (1 g) by mouth daily 90 capsule 3     Fluticasone-Umeclidin-Vilanterol (TRELEGY ELLIPTA) 200-62.5-25  MCG/ACT oral inhaler Inhale 1 puff into the lungs daily for 30 days 28 each 0     Ginkgo Biloba 60 MG TABS Take 1 tablet by mouth daily 90 tablet 3     glucosamine-chondroitinoitin 750-600 MG TABS TAKE 2 TABLETS BY MOUTH TWICE DAILY 120 tablet 3     ipratropium-albuterol (COMBIVENT RESPIMAT)  MCG/ACT inhaler Inhale 1 puff into the lungs 4 times daily as needed for shortness of breath or wheezing .  Do not exceed 6 doses per day. 4 g 0     omeprazole (PRILOSEC) 20 MG DR capsule Take 1 capsule (20 mg) by mouth 2 times daily 180 capsule 0     Selenium (SELENIMIN-200) 200 MCG TABS tablet Take 1 tablet (200 mcg) by mouth daily 90 tablet 0     vitamin A 3 MG (87119 UNITS) capsule Take 1 capsule (10,000 Units) by mouth daily 90 capsule 3     vitamin C (ASCORBIC ACID) 1000 MG TABS TAKE 1 TABLET(1000 MG) BY MOUTH DAILY 90 tablet 0     vitamin E (VITAMIN E) 1000 units (450 mg) CAPS capsule Take 1 capsule (1,000 Units) by mouth daily 90 capsule 3       Social History     Tobacco Use     Smoking status: Light Smoker     Packs/day: 2.00     Years: 45.00     Pack years: 90.00     Types: Cigarettes     Smokeless tobacco: Former     Tobacco comments:     5 cigs per day   Substance Use Topics     Alcohol use: Yes     Alcohol/week: 0.0 standard drinks     Comment: occ beer       ROS  10 point ROS of systems including Constitutional, Eyes, , Cardiovascular, Gastroenterology, Genitourinary, Integumentary, Muscularskeletal, Psychiatric were all negative except for pertinent positives noted in my HPI           OBJECTIVE:  /80   Pulse 82   Temp 98.3  F (36.8  C) (Tympanic)   SpO2 95%   GENERAL APPEARANCE: healthy, alert and no distress  EYES: EOMI,  PERRL, conjunctiva clear  HENT: ear canals and TM's normal.  Nose and mouth without ulcers, erythema or lesions  NECK: supple, nontender, no lymphadenopathy  RESP: lungs diminished breath sounds , some wheezing noted   CV: regular rates and rhythm, normal S1 S2, no murmur  noted  PSYCH: mentation appears normal

## 2023-03-19 ENCOUNTER — TELEPHONE (OUTPATIENT)
Dept: FAMILY MEDICINE | Facility: CLINIC | Age: 73
End: 2023-03-19
Payer: COMMERCIAL

## 2023-03-19 NOTE — TELEPHONE ENCOUNTER
Medication Question or Refill    Contacts       Type Contact Phone/Fax    03/19/2023 11:43 AM CDT Phone (Incoming) Behr, Robert B (Self) [phone] (M)          What medication are you calling about (include dose and sig)?:  marie    Preferred Pharmacy:   [id number] - [place], [place] - [address] AT [facility] [address]  [address]  [place] [address]  Phone: [phone] Fax: [phone]    [id number] - [facility] [address] AT [facility] & [address] [place] [address]  Phone: [phone] Fax: [phone]      Controlled Substance Agreement on file:   CSA -- Patient Level:    CSA: None found at the patient level.       Who prescribed the medication?: Chastity Arguello     Do you need a refill? Yes    When did you use the medication last? n/a    Patient offered an appointment? No    Do you have any questions or concerns?  No      Could we send this information to you in [employer] or would you prefer to receive a phone call?:   Patient would prefer a phone call   Okay to leave a detailed message?: Yes at Cell number on file:    Telephone Information:   Mobile [phone]

## 2023-03-23 DIAGNOSIS — Z71.89 ENCOUNTER FOR HERB AND VITAMIN SUPPLEMENT MANAGEMENT: ICD-10-CM

## 2023-03-23 RX ORDER — MULTIVIT WITH MINERALS/LUTEIN
TABLET ORAL
Qty: 90 CAPSULE | Refills: 0 | Status: SHIPPED | OUTPATIENT
Start: 2023-03-23 | End: 2023-04-03

## 2023-03-23 NOTE — TELEPHONE ENCOUNTER
Routing refill request to provider for review/approval because:  Drug not on the [facility] refill protocol   Last Written Prescription Date:  2/8/22  Last Fill Quantity: 90,  # refills: 3   Last office visit: 2/8/2022 with prescribing provider:  Jos   Future Office Visit:   Next 5 appointments (look out 90 days)    Apr 03, 2023  1:10 PM  (Arrive by 12:50 PM)  Annual Wellness Visit with DARIEL Caraballo CNP  [facility] ([facility] ) [address] [place] [address]  [phone]         Elly NORRIS RN  [facility]

## 2023-03-26 ENCOUNTER — HEALTH MAINTENANCE LETTER (OUTPATIENT)
Age: 73
End: 2023-03-26

## 2023-04-03 ENCOUNTER — OFFICE VISIT (OUTPATIENT)
Dept: FAMILY MEDICINE | Facility: CLINIC | Age: 73
End: 2023-04-03
Payer: COMMERCIAL

## 2023-04-03 VITALS
BODY MASS INDEX: 24.14 KG/M2 | SYSTOLIC BLOOD PRESSURE: 128 MMHG | HEART RATE: 78 BPM | HEIGHT: 69 IN | TEMPERATURE: 97.6 F | DIASTOLIC BLOOD PRESSURE: 82 MMHG | RESPIRATION RATE: 16 BRPM | WEIGHT: 163 LBS | OXYGEN SATURATION: 98 %

## 2023-04-03 DIAGNOSIS — K21.00 GASTROESOPHAGEAL REFLUX DISEASE WITH ESOPHAGITIS WITHOUT HEMORRHAGE: ICD-10-CM

## 2023-04-03 DIAGNOSIS — F17.200 NICOTINE DEPENDENCE, UNCOMPLICATED, UNSPECIFIED NICOTINE PRODUCT TYPE: ICD-10-CM

## 2023-04-03 DIAGNOSIS — E78.5 HYPERLIPIDEMIA LDL GOAL <130: ICD-10-CM

## 2023-04-03 DIAGNOSIS — M16.11 OSTEOARTHRITIS OF RIGHT HIP, UNSPECIFIED OSTEOARTHRITIS TYPE: ICD-10-CM

## 2023-04-03 DIAGNOSIS — Z00.00 ENCOUNTER FOR MEDICARE ANNUAL WELLNESS EXAM: Primary | ICD-10-CM

## 2023-04-03 DIAGNOSIS — J44.9 CHRONIC OBSTRUCTIVE PULMONARY DISEASE, UNSPECIFIED COPD TYPE (H): ICD-10-CM

## 2023-04-03 DIAGNOSIS — Z78.9 TAKES DIETARY SUPPLEMENTS: ICD-10-CM

## 2023-04-03 DIAGNOSIS — Z12.5 SCREENING FOR PROSTATE CANCER: ICD-10-CM

## 2023-04-03 DIAGNOSIS — Z71.89 ENCOUNTER FOR HERB AND VITAMIN SUPPLEMENT MANAGEMENT: ICD-10-CM

## 2023-04-03 LAB
CHOLEST SERPL-MCNC: 290 MG/DL
HDLC SERPL-MCNC: 69 MG/DL
LDLC SERPL CALC-MCNC: 191 MG/DL
NONHDLC SERPL-MCNC: 221 MG/DL
PSA SERPL DL<=0.01 NG/ML-MCNC: 1.06 NG/ML (ref 0–6.5)
TRIGL SERPL-MCNC: 151 MG/DL

## 2023-04-03 PROCEDURE — 99214 OFFICE O/P EST MOD 30 MIN: CPT | Mod: 25 | Performed by: NURSE PRACTITIONER

## 2023-04-03 PROCEDURE — 80061 LIPID PANEL: CPT | Performed by: NURSE PRACTITIONER

## 2023-04-03 PROCEDURE — 99397 PER PM REEVAL EST PAT 65+ YR: CPT | Performed by: NURSE PRACTITIONER

## 2023-04-03 PROCEDURE — G0103 PSA SCREENING: HCPCS | Performed by: NURSE PRACTITIONER

## 2023-04-03 PROCEDURE — 36415 COLL VENOUS BLD VENIPUNCTURE: CPT | Performed by: NURSE PRACTITIONER

## 2023-04-03 RX ORDER — FLUTICASONE PROPIONATE AND SALMETEROL 500; 50 UG/1; UG/1
1 POWDER RESPIRATORY (INHALATION) 2 TIMES DAILY
COMMUNITY
Start: 2023-03-18 | End: 2023-04-03

## 2023-04-03 RX ORDER — MULTIVIT WITH MINERALS/LUTEIN
1000 TABLET ORAL DAILY
Qty: 90 CAPSULE | Refills: 3 | Status: SHIPPED | OUTPATIENT
Start: 2023-04-03 | End: 2024-04-02

## 2023-04-03 RX ORDER — ATORVASTATIN CALCIUM 40 MG/1
40 TABLET, FILM COATED ORAL DAILY
Qty: 90 TABLET | Refills: 3 | Status: SHIPPED | OUTPATIENT
Start: 2023-04-03 | End: 2024-08-05

## 2023-04-03 RX ORDER — MAGNESIUM CARB/ALUMINUM HYDROX 105-160MG
2 TABLET,CHEWABLE ORAL 2 TIMES DAILY
Qty: 120 TABLET | Refills: 3 | Status: SHIPPED | OUTPATIENT
Start: 2023-04-03 | End: 2024-04-02

## 2023-04-03 RX ORDER — MULTIVIT WITH MINERALS/LUTEIN
1000 TABLET ORAL DAILY
Qty: 90 TABLET | Refills: 3 | Status: SHIPPED | OUTPATIENT
Start: 2023-04-03 | End: 2024-04-02

## 2023-04-03 RX ORDER — CHLORAL HYDRATE 500 MG
1 CAPSULE ORAL DAILY
Qty: 90 CAPSULE | Refills: 3 | Status: SHIPPED | OUTPATIENT
Start: 2023-04-03 | End: 2024-04-02

## 2023-04-03 RX ORDER — ALBUTEROL SULFATE 90 UG/1
AEROSOL, METERED RESPIRATORY (INHALATION)
Qty: 8.5 G | Refills: 3 | Status: SHIPPED | OUTPATIENT
Start: 2023-04-03 | End: 2024-08-05

## 2023-04-03 RX ORDER — CHOLECALCIFEROL (VITAMIN D3) 125 MCG
10000 CAPSULE ORAL DAILY
Qty: 90 CAPSULE | Refills: 3 | Status: SHIPPED | OUTPATIENT
Start: 2023-04-03 | End: 2024-06-28

## 2023-04-03 RX ORDER — DIAPER,BRIEF,INFANT-TODD,DISP
1 EACH MISCELLANEOUS 2 TIMES DAILY
Qty: 90 TABLET | Refills: 3 | Status: SHIPPED | OUTPATIENT
Start: 2023-04-03 | End: 2024-04-02

## 2023-04-03 RX ORDER — ACETAMINOPHEN 160 MG/5ML
1 SUSPENSION, ORAL (FINAL DOSE FORM) ORAL DAILY
Qty: 90 TABLET | Refills: 3 | Status: SHIPPED | OUTPATIENT
Start: 2023-04-03 | End: 2024-06-28

## 2023-04-03 RX ORDER — FLUTICASONE PROPIONATE AND SALMETEROL 500; 50 UG/1; UG/1
1 POWDER RESPIRATORY (INHALATION) 2 TIMES DAILY
Qty: 60 EACH | Refills: 11 | Status: SHIPPED | OUTPATIENT
Start: 2023-04-03 | End: 2024-04-25

## 2023-04-03 ASSESSMENT — ENCOUNTER SYMPTOMS
PARESTHESIAS: 0
EYE PAIN: 0
MYALGIAS: 0
FEVER: 0
FREQUENCY: 0
ABDOMINAL PAIN: 0
DIZZINESS: 0
ARTHRALGIAS: 0
DYSURIA: 0
CHILLS: 0
WEAKNESS: 0
NAUSEA: 0
DIARRHEA: 0
SORE THROAT: 0
HEADACHES: 0
SHORTNESS OF BREATH: 1
COUGH: 1
PALPITATIONS: 0
HEARTBURN: 0
HEMATURIA: 0
JOINT SWELLING: 0
NERVOUS/ANXIOUS: 0
HEMATOCHEZIA: 0

## 2023-04-03 ASSESSMENT — ACTIVITIES OF DAILY LIVING (ADL): CURRENT_FUNCTION: NO ASSISTANCE NEEDED

## 2023-04-03 NOTE — PROGRESS NOTES
"SUBJECTIVE:   Ravin is a 73 year old who presents for Preventive Visit.       View : No data to display.            Patient has been advised of split billing requirements and indicates understanding: Yes  Are you in the first 12 months of your Medicare coverage?  No    Healthy Habits:     In general, how would you rate your overall health?  Good    Frequency of exercise:  4-5 days/week    Duration of exercise:  15-30 minutes    Do you usually eat at least 4 servings of fruit and vegetables a day, include whole grains    & fiber and avoid regularly eating high fat or \"junk\" foods?  No    Taking medications regularly:  Yes    Medication side effects:  None    Ability to successfully perform activities of daily living:  No assistance needed    Home Safety:  No safety concerns identified    Hearing Impairment:  No hearing concerns    In the past 6 months, have you been bothered by leaking of urine?  No    In general, how would you rate your overall mental or emotional health?  Good      PHQ-2 Total Score: 0    Additional concerns today:  No      Have you ever done Advance Care Planning? (For example, a Health Directive, POLST, or a discussion with a medical provider or your loved ones about your wishes): No, advance care planning information given to patient to review.  Patient declined advance care planning discussion at this time.       Fall risk  Fallen 2 or more times in the past year?: No  Any fall with injury in the past year?: Yes  click delete button to remove this line now  Cognitive Screening   1) Repeat 3 items (Leader, Season, Table)    2) Clock draw: NORMAL  3) 3 item recall: Recalls 3 objects  Results: 3 items recalled: COGNITIVE IMPAIRMENT LESS LIKELY    Mini-CogTM Copyright LORENA Slater. Licensed by the author for use in [facility]; reprinted with permission (fitz@.[facility]). All rights reserved.      Do you have sleep apnea, excessive snoring or daytime drowsiness?: no    Reviewed and updated as " needed this visit by clinical staff                  Reviewed and updated as needed this visit by Provider                 Social History     Tobacco Use     Smoking status: Light Smoker     Packs/day: 2.00     Years: 45.00     Pack years: 90.00     Types: Cigarettes     Smokeless tobacco: Former     Tobacco comments:     5 cigs per day   Vaping Use     Vaping status: Not on file   Substance Use Topics     Alcohol use: Yes     Alcohol/week: 0.0 standard drinks of alcohol     Comment: occ beer             4/3/2023    12:45 PM   Alcohol Use   Prescreen: >3 drinks/day or >7 drinks/week? No     Do you have a current opioid prescription? No  Do you use any other controlled substances or medications that are not prescribed by a provider? None          PROBLEMS TO ADD ON...  Cut back on smoking, going back to the [facility]  Smoking about 5 cigarettes a day from 2 packs.    Certain triggers make it worse like coffee and dinner.            Current providers sharing in care for this patient include:   Patient Care Team:  Chastity Arguello APRN CNP as PCP - General (Nurse Practitioner Primary Care)  Leeanna Sigala RN as Lead Care Coordinator  Raquel Gandhi MD as MD (Dermatology)  Nayan Liu MD as MD (Dermatology)  Svitlana Taylor MD as Assigned Pulmonology Provider  Vero Franco MD as MD (Endocrinology, Diabetes, and Metabolism)  Vero Franco MD as Assigned Endocrinology Provider  Chastity Arguello APRN CNP as Assigned PCP    The following health maintenance items are reviewed in Epic and correct as of today:  Health Maintenance   Topic Date Due     ZOSTER IMMUNIZATION (2 of 3) 06/10/2015     ANNUAL REVIEW OF HM ORDERS  08/30/2022     NICOTINE/TOBACCO CESSATION COUNSELING Q 1 YR  02/08/2023     MEDICARE ANNUAL WELLNESS VISIT  02/08/2023     LUNG CANCER SCREENING  02/17/2024     FALL RISK ASSESSMENT  04/03/2024     COLORECTAL CANCER SCREENING  04/14/2024     DTAP/TDAP/TD IMMUNIZATION (2 - Td  "or Tdap) 02/24/2026     LIPID  08/30/2026     ADVANCE CARE PLANNING  02/08/2027     SPIROMETRY  Completed     HEPATITIS C SCREENING  Completed     COPD ACTION PLAN  Completed     PHQ-2 (once per calendar year)  Completed     INFLUENZA VACCINE  Completed     Pneumococcal Vaccine: 65+ Years  Completed     AORTIC ANEURYSM SCREENING (SYSTEM ASSIGNED)  Completed     IPV IMMUNIZATION  Aged Out     MENINGITIS IMMUNIZATION  Aged Out     COVID-19 Vaccine  Discontinued     Lab work is in process          Review of Systems   Constitutional: Negative for chills and fever.   HENT: Positive for congestion. Negative for ear pain, hearing loss and sore throat.    Eyes: Negative for pain and visual disturbance.   Respiratory: Positive for cough and shortness of breath.    Cardiovascular: Negative for chest pain, palpitations and peripheral edema.   Gastrointestinal: Negative for abdominal pain, diarrhea, heartburn, hematochezia and nausea.   Genitourinary: Negative for dysuria, frequency, genital sores, hematuria, impotence, penile discharge and urgency.   Musculoskeletal: Negative for arthralgias, joint swelling and myalgias.   Skin: Negative for rash.   Neurological: Negative for dizziness, weakness, headaches and paresthesias.   Psychiatric/Behavioral: Negative for mood changes. The patient is not nervous/anxious.          OBJECTIVE:   There were no vitals taken for this visit. Estimated body mass index is 23.78 kg/m  as calculated from the following:    Height as of 2/12/23: 1.778 m (5' 10\").    Weight as of 2/16/23: 75.2 kg (165 lb 11.2 oz).  Physical Exam  GENERAL: healthy, alert and no distress  EYES: Eyes grossly normal to inspection, PERRL and conjunctivae and sclerae normal  HENT: ear canals and TM's normal, nose and mouth without ulcers or lesions  NECK: no adenopathy, no asymmetry, masses, or scars and thyroid normal to palpation  RESP: lungs clear to auscultation - no rales, rhonchi or wheezes  CV: regular rate and " rhythm, normal S1 S2, no S3 or S4, no murmur, click or rub, no peripheral edema and peripheral pulses strong  ABDOMEN: soft, nontender, no hepatosplenomegaly, no masses and bowel sounds normal  MS: no gross musculoskeletal defects noted, no edema        ASSESSMENT / PLAN:   (Z00.00) Encounter for Medicare annual wellness exam  (primary encounter diagnosis)  Comment: Reviewed medical/social/family history and health maintenance  Plan:     (F17.200) Nicotine dependence, uncomplicated, unspecified nicotine product type  Comment: Recently hospitalized for COVID pneumonia.  Has cut back smoking significantly, knows he should quit.  Declined any additional assistance today.    Plan:     (J44.9) Chronic obstructive pulmonary disease, unspecified COPD type (H)  Comment: Inhaler switched while in hospital, would like to go back on Wixela due to cost and feels the other inhaler didn't work.    Plan: albuterol (PROAIR HFA) 108 (90 Base) MCG/ACT         inhaler, WIXELA INHUB 500-50 MCG/ACT inhaler            (K21.00) Gastroesophageal reflux disease with esophagitis without hemorrhage  Comment: Well controlled overall.  Plan: omeprazole (PRILOSEC) 20 MG DR capsule            (M16.11) Osteoarthritis of right hip, unspecified osteoarthritis type  Comment: Stable.  Plan: glucosamine-chondroitinoitin 750-600 MG TABS            (Z12.5) Screening for prostate cancer  Comment:   Plan: PSA, screen            (E78.5) Hyperlipidemia LDL goal <130  Comment:   Plan: Lipid panel reflex to direct LDL Non-fasting,         atorvastatin (LIPITOR) 40 MG tablet            (Z78.9) Takes dietary supplements  Comment:   Plan: B Complex-C (VITAMIN B COMPLEX W/VITAMIN C)         TABS tablet, Calcium Carbonate-Vitamin D         (CALCIUM 600 +D HIGH POTENCY) 600-10 MG-MCG         TABS, Ginkgo Biloba 60 MG TABS            (Z71.89) Encounter for herb and vitamin supplement management  Comment:   Plan: Calcium Carbonate-Vitamin D (CALCIUM 600 +D          HIGH POTENCY) 600-10 MG-MCG TABS, fish         oil-omega-3 fatty acids 1000 MG capsule, Ginkgo        Biloba 60 MG TABS, Selenium (SELENIMIN-200) 200        MCG TABS tablet, vitamin A 3 MG (15146 UNITS)         capsule, vitamin C (ASCORBIC ACID) 1000 MG         TABS, vitamin E (TOCOPHEROL) 1000 units (450         mg) CAPS capsule              Patient has been advised of split billing requirements and indicates understanding: Yes      COUNSELING:  Reviewed preventive health counseling, as reflected in patient instructions        He reports that he has been smoking cigarettes. He has a 90.00 pack-year smoking history. He has quit using smokeless tobacco.  Nicotine/Tobacco Cessation Plan:   Information offered: Patient not interested at this time      Appropriate preventive services were discussed with this patient, including applicable screening as appropriate for cardiovascular disease, diabetes, osteopenia/osteoporosis, and glaucoma.  As appropriate for age/gender, discussed screening for colorectal cancer, prostate cancer, breast cancer, and cervical cancer. Checklist reviewing preventive services available has been given to the patient.    Reviewed patients plan of care and provided an AVS. The Basic Care Plan (routine screening as documented in Health Maintenance) for Ravin meets the Care Plan requirement. This Care Plan has been established and reviewed with the Patient.          DARIEL [facility]    Identified Health Risks:

## 2023-04-03 NOTE — PATIENT INSTRUCTIONS
"  Patient Education   Personalized Prevention Plan  You are due for the preventive services outlined below.  Your care team is available to assist you in scheduling these services.  If you have already completed any of these items, please share that information with your care team to update in your medical record.  Health Maintenance Due   Topic Date Due     Zoster (Shingles) Vaccine (2 of 3) 06/10/2015     ANNUAL REVIEW OF HM ORDERS  08/30/2022     PHQ-2 (once per calendar year)  01/01/2023     Talk to your care team about options to quit tobacco use.  02/08/2023     Annual Wellness Visit  02/08/2023     FALL RISK ASSESSMENT  02/08/2023          How to Quit Smoking  Smoking is a hard habit to break. About half of all people who've ever smoked have been able to quit. Most people who still smoke want to quit. Here are some of the best ways to stop smoking.     Keep in mind the health benefits of quitting  The health benefits of quitting start right away. They keep improving the longer you go without smoking. Knowing this can help inspire you to stay on track. These benefits occur at any age. If you're 17 or 70, quitting is a good choice. Some of the health benefits after your last cigarette include:     After 20 minutes:  Your blood pressure and pulse return to normal.    After 8 hours: Your oxygen levels return to normal.    After 2 days: Your ability to smell and taste start to improve as damaged nerves regrow.    After 2 to 3 weeks: Your circulation and lung function improve.    After 1 to 9 months: Your coughing, congestion, and shortness of breath decrease. Your tiredness decreases.    After 1 year: Your risk of heart attack decreases by 50%.    After 5 years: Your risk of lung cancer decreases by 50%. Your risk of stroke becomes the same as a nonsmoker s.  What about going cold turkey?  You may have heard about quitting \"cold turkey.\" This means stopping all at once. Going cold turkey is an option. But it's not " the most successful way to quit smoking. Also, trying to cut back slowly often doesn't work as well. This may be because it continues the habit of smoking. You may also inhale more smoke while smoking fewer cigarettes. This leads to the same amount of nicotine in your body.   But quitting cold turkey or cutting back slowly aren't your only choices. Using tobacco cessation medicines with behavioral counseling may be a better option to help you succeed. Talk with your provider about your options for support while you quit smoking.   Get support  Support programs can be a big help, especially for heavy smokers. These groups offer information, ways to change behavior, and peer support. Ask your provider for some resources. Here are some other ways to find support:     Smokefree.gov at www.smokefree.gov  800-QUIT-NOW (224-445-1510)    American Lung Association at www.lung.org/quit-smoking  158.881.7737    American Cancer Society  at www.cancer.org/quitsmoking  173.995.3060  Support at home is important too. Family and friends can offer praise and reassurance. Ask your friends who smoke to support your decision. Try to stay away from situations that trigger the desire to smoke. This may include smoking with your morning coffee. Or smoking after a meal. Create new routines to help decrease your cravings.   If the smoker in your life finds it hard to quit, encourage them to keep trying. Remind them of all of the benefits to themselves and people they love.   Try over-the-counter nicotine replacements   Nicotine replacement therapy may make it easier to quit. You can buy some aids without a prescription. These include a nicotine patch, gum, and lozenges. But it's best to use these under the care of your healthcare provider. The skin patch gives a steady supply of nicotine. Nicotine gum and lozenges give short-time doses of low levels of nicotine. Both methods reduce the craving for cigarettes. If you have upset stomach  (nausea), vomiting, dizziness, weakness, or a fast heartbeat, stop using these products and see your provider.   Ask about prescription medicine  After reviewing your smoking patterns and past attempts to quit, your provider may offer a prescription medicine. These include bupropion, varenicline, a nicotine inhaler, or nasal spray. Each has advantages and side effects. Your provider can go over these with you.   Keep trying  Most smokers try to quit many times before they succeed. It s important not to give up.   Magdalena last reviewed this educational content on 12/1/2019 2000-2022 [employer]. All rights reserved. This information is not intended as a substitute for professional medical care. Always follow your healthcare professional's instructions.

## 2023-04-18 NOTE — NURSING NOTE
"Chief Complaint   Patient presents with     Urgent Care     COPD     weather is causing his COPD to flare up. Using Combivent inhaler more and needs another one as he also just got over viral infection.        Initial /70  Pulse 87  Temp 98  F (36.7  C) (Oral)  Resp 20  Ht 5' 10\" (1.778 m)  Wt 151 lb (68.5 kg)  SpO2 97%  BMI 21.67 kg/m2 Estimated body mass index is 21.67 kg/(m^2) as calculated from the following:    Height as of this encounter: 5' 10\" (1.778 m).    Weight as of this encounter: 151 lb (68.5 kg).  Medication Reconciliation: complete  " Pt ambulated to [facility], chart up for ERP. Pt changed into hospital gown, all clothes and belongings confiscated and placed in secure area awaiting security search.   Urine collected and sample sent to lab

## 2023-04-24 DIAGNOSIS — J44.9 CHRONIC OBSTRUCTIVE PULMONARY DISEASE, UNSPECIFIED COPD TYPE (H): ICD-10-CM

## 2023-04-25 NOTE — TELEPHONE ENCOUNTER
Routing refill request to provider for review/approval because:  Drug not active on patient's medication list    Last Written Prescription Date:  5/14/2021 - End date: 1/2/2022  Last Fill Quantity: 12 g,  # refills: 1   Last office visit: 2/8/2022 ; last virtual visit: Visit date not found with prescribing provider:  Chastity Arguello   Future Office Visit:  none    CHANEL Mason RN  [facility]

## 2023-04-27 ENCOUNTER — TELEPHONE (OUTPATIENT)
Dept: FAMILY MEDICINE | Facility: CLINIC | Age: 73
End: 2023-04-27
Payer: COMMERCIAL

## 2023-04-27 DIAGNOSIS — J44.9 CHRONIC OBSTRUCTIVE PULMONARY DISEASE, UNSPECIFIED COPD TYPE (H): ICD-10-CM

## 2023-04-27 RX ORDER — IPRATROPIUM BROMIDE AND ALBUTEROL 20; 100 UG/1; UG/1
SPRAY, METERED RESPIRATORY (INHALATION)
Qty: 12 G | Refills: 3 | OUTPATIENT
Start: 2023-04-27

## 2023-04-27 NOTE — TELEPHONE ENCOUNTER
"Patient came into the clinic very upset in regards to his Ipratropium Albuterol rx. States he has been waiting two weeks to have it sent to the pharmacy. Had his first appt w/ new provider 4/3/23 where  his medication list was discussed and gone over. Has been on above named medication for years. Was told by pharmacy he needs to have an appt to \"discuss medication list\". Please advise.   "

## 2023-04-28 ENCOUNTER — TELEPHONE (OUTPATIENT)
Dept: PULMONOLOGY | Facility: CLINIC | Age: 73
End: 2023-04-28
Payer: COMMERCIAL

## 2023-04-28 DIAGNOSIS — J44.9 COPD (CHRONIC OBSTRUCTIVE PULMONARY DISEASE) (H): Primary | ICD-10-CM

## 2023-04-28 RX ORDER — IPRATROPIUM BROMIDE AND ALBUTEROL 20; 100 UG/1; UG/1
1 SPRAY, METERED RESPIRATORY (INHALATION) 4 TIMES DAILY
Qty: 12 G | Refills: 3 | Status: CANCELLED | OUTPATIENT
Start: 2023-04-28

## 2023-04-28 NOTE — TELEPHONE ENCOUNTER
[facility] Call Center    Phone Message    May a detailed message be left on voicemail: yes     Reason for Call: Medication Question or concern regarding medication   Prescription Clarification    Name of Medication:   * Combibent Respimat Inhaler      Prescribing Provider: Claudia       Pharmacy: [id number] - [place], [place] - [address] [facility] [address]       What on the order needs clarification? Per Patient states he is almost out of medication and he has been trying to get the medication for the past week. Patient states the medication is not on his medication list. Patient is wanting to get a prescription refill on the medication ASAP as he is almost out. Please advise      Action Taken: Message routed to:  [facility] Lung    Travel Screening: Not Applicable

## 2023-04-28 NOTE — TELEPHONE ENCOUNTER
Please see my previous encounter and clarify if he is on Wixela as well.  He should not be on 2 long acting inhalers. I can send in combivent and we can discontinue Wixela if that is what he is using.

## 2023-04-28 NOTE — TELEPHONE ENCOUNTER
PT called back about this Combivent Respim  Pended me from discontinued list.    Please send to pended pharmacy.  We need to let pt know what happens with this rx.    OK to leave a detailed message.  AFIA Foster

## 2023-04-28 NOTE — TELEPHONE ENCOUNTER
"I reviewed this response with pt.  PT says he is taking both long acting meds-  combivent and Wixela.  He has been on both for years.  His long standing COPD doctor recommended those.    He won't use albuterol.    He voiced anger that his meds were altered.  He said he got the meds resolved.  He will be finding a new PCP.  He is \"done with Chastity Arguello.\"    I would recommend updating the PCP.  AFIA Foster    "

## 2023-04-28 NOTE — CONFIDENTIAL NOTE
Writer filled prescription for Combivent Respimat per prescription refill protocol guidelines.      Writer called patient to notify him the order had been placed.

## 2023-06-20 ENCOUNTER — PATIENT OUTREACH (OUTPATIENT)
Dept: GERIATRIC MEDICINE | Facility: CLINIC | Age: 73
End: 2023-06-20
Payer: COMMERCIAL

## 2023-06-20 NOTE — PROGRESS NOTES
[facility] Care Coordination Contact      [facility] Refusal Telephone Assessment    Member refused home visit HRA on 6/20/23 (reason: Doesn't feel he needs a home visit).    ER visits: No  Hospitalizations: Yes -  [facility]  Health concerns: None  Falls/Injuries: No  ADL/IADL Dependencies: None        Member currently receiving the following home care services:   None  Member currently receiving the following community resources:    Informal support(s):      Advanced Care Planning discussion, complete code section.    [facility] sponsored benefits: Shared information re: Silver Sneakers/gym memberships, ASA, Calcium +D.    Follow-Up Plan: Member informed of future contact, plan to f/u with member with a 6 month telephone assessment and offer a home visit.  Contact information shared with member and family, encouraged member to call with any questions or concerns at any time.    This CC note routed to PCP.    Leeanna Sigala RN, BSN, PHN  [facility] Care Coordinator  [phone]

## 2023-06-20 NOTE — LETTER
June 26, 2023    ROBERT B BEHR  [address]  [place] [address]        Dear Shirley:    As a member of [facility], we offer a health risk assessment at no cost to you. I know you don't want to have the assessment right now. If you change your mind, please call me at the number below.    Who performs the health risk assessment?  A [facility] Care Coordinator performs the assessment. Our Care Coordinators can also help you understand your benefits. They can tell you about services to aid you at home, such as managing your care with your doctors if your health worsens.    Our Care Coordinators are here for you if you need:  Support for activities you used to do by yourself (including making meals, bathing, and paying bills)  Equipment for bathroom or home safety  Help finding a new place to live  Information on staying healthy, preventing falls and immunizations    Questions?  If you have questions, or you would like to do the assessment, call me at [phone]. TTY users call [phone]. I'm here from 8am to 5pm. I may reach out to you again soon.      Sincerely,        Leeanna Sigala RN, BSN, PHN    E-mail: [email or web address]  Phone: [phone]      [employer]     <[id number] accepted    [id number] (12/2021)  [id number]>

## 2023-06-26 NOTE — PROGRESS NOTES
"[facility] Care Coordination Contact    Per CC, mailed client a \"Refusal of Home Visit\" letter.    Robyn Fink  Care Management Specialist   [facility]   [phone]    "

## 2023-08-03 ENCOUNTER — HOSPITAL ENCOUNTER (EMERGENCY)
Facility: CLINIC | Age: 73
Discharge: HOME | End: 2023-08-03
Attending: EMERGENCY MEDICINE | Admitting: EMERGENCY MEDICINE
Payer: COMMERCIAL

## 2023-08-03 ENCOUNTER — APPOINTMENT (OUTPATIENT)
Dept: MRI IMAGING | Facility: CLINIC | Age: 73
End: 2023-08-03
Attending: EMERGENCY MEDICINE
Payer: COMMERCIAL

## 2023-08-03 ENCOUNTER — NURSE TRIAGE (OUTPATIENT)
Dept: FAMILY MEDICINE | Facility: CLINIC | Age: 73
End: 2023-08-03
Payer: COMMERCIAL

## 2023-08-03 VITALS
TEMPERATURE: 97.9 F | SYSTOLIC BLOOD PRESSURE: 144 MMHG | DIASTOLIC BLOOD PRESSURE: 89 MMHG | OXYGEN SATURATION: 96 % | RESPIRATION RATE: 18 BRPM | HEART RATE: 73 BPM

## 2023-08-03 DIAGNOSIS — G58.9 MONONEUROPATHY: ICD-10-CM

## 2023-08-03 DIAGNOSIS — R20.2 PARESTHESIA: Primary | ICD-10-CM

## 2023-08-03 DIAGNOSIS — R26.89 IMBALANCE: ICD-10-CM

## 2023-08-03 LAB
ALBUMIN SERPL BCG-MCNC: 4.4 G/DL (ref 3.5–5.2)
ALP SERPL-CCNC: 70 U/L (ref 40–129)
ALT SERPL W P-5'-P-CCNC: 37 U/L (ref 0–70)
ANION GAP SERPL CALCULATED.3IONS-SCNC: 11 MMOL/L (ref 7–15)
AST SERPL W P-5'-P-CCNC: 26 U/L (ref 0–45)
BASOPHILS # BLD AUTO: 0.1 10E3/UL (ref 0–0.2)
BASOPHILS NFR BLD AUTO: 1 %
BILIRUB SERPL-MCNC: 0.3 MG/DL
BUN SERPL-MCNC: 12.5 MG/DL (ref 8–23)
CALCIUM SERPL-MCNC: 9.9 MG/DL (ref 8.8–10.2)
CHLORIDE SERPL-SCNC: 104 MMOL/L (ref 98–107)
CREAT SERPL-MCNC: 0.8 MG/DL (ref 0.67–1.17)
DEPRECATED HCO3 PLAS-SCNC: 26 MMOL/L (ref 22–29)
EOSINOPHIL # BLD AUTO: 0.1 10E3/UL (ref 0–0.7)
EOSINOPHIL NFR BLD AUTO: 1 %
ERYTHROCYTE [DISTWIDTH] IN BLOOD BY AUTOMATED COUNT: 15.2 % (ref 10–15)
GFR SERPL CREATININE-BSD FRML MDRD: >90 ML/MIN/1.73M2
GLUCOSE SERPL-MCNC: 92 MG/DL (ref 70–99)
HCT VFR BLD AUTO: 43.4 % (ref 40–53)
HGB BLD-MCNC: 14.4 G/DL (ref 13.3–17.7)
HOLD SPECIMEN: NORMAL
IMM GRANULOCYTES # BLD: 0.2 10E3/UL
IMM GRANULOCYTES NFR BLD: 2 %
LYMPHOCYTES # BLD AUTO: 1.7 10E3/UL (ref 0.8–5.3)
LYMPHOCYTES NFR BLD AUTO: 20 %
MCH RBC QN AUTO: 32.1 PG (ref 26.5–33)
MCHC RBC AUTO-ENTMCNC: 33.2 G/DL (ref 31.5–36.5)
MCV RBC AUTO: 97 FL (ref 78–100)
MONOCYTES # BLD AUTO: 0.9 10E3/UL (ref 0–1.3)
MONOCYTES NFR BLD AUTO: 11 %
NEUTROPHILS # BLD AUTO: 5.7 10E3/UL (ref 1.6–8.3)
NEUTROPHILS NFR BLD AUTO: 65 %
NRBC # BLD AUTO: 0 10E3/UL
NRBC BLD AUTO-RTO: 0 /100
PLATELET # BLD AUTO: 339 10E3/UL (ref 150–450)
POTASSIUM SERPL-SCNC: 4.4 MMOL/L (ref 3.4–5.3)
PROT SERPL-MCNC: 7.1 G/DL (ref 6.4–8.3)
RBC # BLD AUTO: 4.49 10E6/UL (ref 4.4–5.9)
SODIUM SERPL-SCNC: 141 MMOL/L (ref 136–145)
TSH SERPL DL<=0.005 MIU/L-ACNC: 1.64 UIU/ML (ref 0.3–4.2)
WBC # BLD AUTO: 8.5 10E3/UL (ref 4–11)

## 2023-08-03 PROCEDURE — 99284 EMERGENCY DEPT VISIT MOD MDM: CPT | Mod: 25

## 2023-08-03 PROCEDURE — 36415 COLL VENOUS BLD VENIPUNCTURE: CPT | Performed by: EMERGENCY MEDICINE

## 2023-08-03 PROCEDURE — 70551 MRI BRAIN STEM W/O DYE: CPT

## 2023-08-03 PROCEDURE — 85025 COMPLETE CBC W/AUTO DIFF WBC: CPT | Performed by: EMERGENCY MEDICINE

## 2023-08-03 PROCEDURE — 84443 ASSAY THYROID STIM HORMONE: CPT | Performed by: EMERGENCY MEDICINE

## 2023-08-03 PROCEDURE — 70551 MRI BRAIN STEM W/O DYE: CPT | Mod: 26 | Performed by: RADIOLOGY

## 2023-08-03 PROCEDURE — 99284 EMERGENCY DEPT VISIT MOD MDM: CPT | Performed by: EMERGENCY MEDICINE

## 2023-08-03 PROCEDURE — 80053 COMPREHEN METABOLIC PANEL: CPT | Performed by: EMERGENCY MEDICINE

## 2023-08-03 ASSESSMENT — ACTIVITIES OF DAILY LIVING (ADL)
ADLS_ACUITY_SCORE: 37
ADLS_ACUITY_SCORE: 35

## 2023-08-03 NOTE — ED PROVIDER NOTES
ED Provider Note  [facility]      History     Chief Complaint   Patient presents with    Numbness     HPI  Robert B Behr is a 73 year old male with PMH of COPD and hyperlipidemia who presents to the ED with complaints of numbness in the left hand. Pt states it is only in his pinky and ring finger, and the numbness began about a week ago. Pt states the fingers just felt numb one day then after a week of continued numbness he called his clinic ([facility]) and after describing issues he was instructed to go to ED to get imaging done.     Pt also states he has been feeling imbalanced, this started about 6 months ago. Pt describes struggling with balance when he tilts his head back to ues eye drops. He states this only happens intermittently. Pt states he feels no numbness or tingling in any other extremities besides his left hand. He states he does not regularly lean on elbows or wrist (ie putting weight on them for long periods of time). No fever or chills. No history of Afib or heart issues. Pt states he has moderate tobacco use, currently trying to quit. He states he drinks large amounts of coffee daily. Pt states he tries to remain active and exercises regularly, he is notably strong.   He denies any dizziness, nausea, vomiting, fever chills.        Past Medical History  Past Medical History:   Diagnosis Date    Cataract     COPD (chronic obstructive pulmonary disease) (H)     diagnosed with spirometry     Lung nodules     CT scan 2016    Osteoporosis     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    Tobacco abuse      Past Surgical History:   Procedure Laterality Date    CATARACT IOL, RT/LT Left 09/15/2017    s/p CE/IOL left eye    CATARACT IOL, RT/LT Right     PHACOEMULSIFICATION CLEAR CORNEA WITH STANDARD INTRAOCULAR LENS IMPLANT Right 9/30/2016    Procedure: PHACOEMULSIFICATION CLEAR CORNEA WITH STANDARD INTRAOCULAR LENS IMPLANT;  Surgeon: Elly Valencia MD;  Location: [facility]     PHACOEMULSIFICATION WITH STANDARD INTRAOCULAR LENS IMPLANT Left 9/15/2017    Procedure: PHACOEMULSIFICATION WITH STANDARD INTRAOCULAR LENS IMPLANT;  Left Eye Phacoemulsification with Standard Lens;  Surgeon: Elly Valencia MD;  Location: [facility]    WRIST SURGERY       albuterol (PROAIR HFA) 108 (90 Base) MCG/ACT inhaler  atorvastatin (LIPITOR) 40 MG tablet  B Complex-C (VITAMIN B COMPLEX W/VITAMIN C) TABS tablet  benzonatate (TESSALON) 100 MG capsule  Calcium Carbonate-Vitamin D (CALCIUM 600 +D HIGH POTENCY) 600-10 MG-MCG TABS  calcium carbonate-vitamin D (CALTRATE) 600-10 MG-MCG per tablet  fish oil-omega-3 fatty acids 1000 MG capsule  Ginkgo Biloba 60 MG TABS  glucosamine-chondroitinoitin 750-600 MG TABS  ipratropium-albuterol (COMBIVENT RESPIMAT)  MCG/ACT inhaler  omeprazole (PRILOSEC) 20 MG DR capsule  Selenium (SELENIMIN-200) 200 MCG TABS tablet  vitamin A 3 MG (59035 UNITS) capsule  vitamin C (ASCORBIC ACID) 1000 MG TABS  vitamin E (TOCOPHEROL) 1000 units (450 mg) CAPS capsule  WIXELA INHUB 500-50 MCG/ACT inhaler      No Known Allergies  Family History  Family History   Problem Relation Age of Onset    Diabetes Brother         living    Cancer Brother         throat    Macular Degeneration Mother     Colon Cancer No family hx of     Glaucoma No family hx of     Retinal detachment No family hx of     Amblyopia No family hx of     Skin Cancer No family hx of     Melanoma No family hx of      Social History   Social History     Tobacco Use    Smoking status: Light Smoker     Packs/day: 2.00     Years: 45.00     Pack years: 90.00     Types: Cigarettes    Smokeless tobacco: Former    Tobacco comments:     5 cigs per day   Substance Use Topics    Alcohol use: Yes     Alcohol/week: 0.0 standard drinks of alcohol     Comment: occ beer    Drug use: No      Past medical history, past surgical history, medications, allergies, family history, and social history were reviewed with the patient. No additional  pertinent items.      A medically appropriate review of systems was performed with pertinent positives and negatives noted in the HPI, and all other systems negative.    Physical Exam   BP: (!) 144/89  Pulse: 73  Temp: 97.9  F (36.6  C)  Resp: 18  SpO2: 96 %  Physical Exam  General: no acute distress.    HENT: Normocephalic and atraumatic. Trachea midline.  Eyes: EOMI, conjunctivae normal.  Normal voice.  PERRLA 4 mm.  Cardiovascular:  Normal rate and regular rhythm.   No murmur heard.  Radial pulses 2+ bilaterally.  Pulmonary:  No respiratory distress. Normal breath sounds bilaterally.  Abdominal: no distension.  Abdomen is soft. There is no mass. There is no abdominal tenderness.  Musculoskeletal:    Moving all extremities spontaneously.  No cyanosis, clubbing, or edema.  Skin: Warm, dry, and well perfused.  Left hand normal strength and sensation to radial, medial, ulnar nerve distributions.  Noted subjective tingling of ulnar distribution distal to the wrist.  Normal wrist range of motion.  Nontender, no deformities.  Good capillary refill.  Normal flexion and extension strength of all fingers.  Neurological:  No focal deficit present.  CN II through XII intact.  Grossly intact strength and sensation of bilateral upper and lower extremities.  Mild weakness of right lower extremity, which patient states is chronic due to having polio in the 50s.  Ataxia with finger-to-nose bilateral upper extremities.  Mild tremulousness.  Patient falls forward when he closes his eyes.  Unable to perform Romberg's test with his eyes open.  Psychiatric:   The patient is awake, alert.  Appropriate mood and affect.    ED Course, Procedures, & Data      Procedures          No results found for any visits on 08/03/23.  Medications - No data to display  Labs Ordered and Resulted from Time of ED Arrival to Time of ED Departure - No data to display  No orders to display          Critical care was not performed.     Medical Decision  Making  The patient's presentation was of moderate complexity (a chronic illness mild to moderate exacerbation, progression, or side effect of treatment).    The patient's evaluation involved:  review of external note(s) from 3+ sources (see separate area of note for details)  ordering and/or review of 3+ test(s) in this encounter (see separate area of note for details)  review of 3+ test result(s) ordered prior to this encounter (see separate area of note for details)    The patient's management necessitated only low risk treatment.    Assessment & Plan    Patient arrives to the emergency department for complaint of left hand tingling of his fourth and fifth digit and medial hand.  In addition he mentions imbalance over the past 6 months which have been worsening in nature.  There are no focal deficits on my exam.  He is unable to perform Romberg's at all, he is unable to even close his eyes to begin the exam bilaterally.  He has ataxia on finger-to-nose bilaterally, which she attributes to drinking a lot of coffee daily.  He falls forward when standing straight if his eyes are closed.  Patient has a slightly abnormal gait due to chronic right lower leg weakness.  Patient does not note any new instability with gait.    On exam he notes subjective tingling of left hand and fourth and fifth fingers, however has no sensory or strength deficits.  This all begins distal to the wrist.  He does not have any proximal symptoms.  Patient likely has ulnar nerve entrapment at the wrist.  It is mild, and he would benefit from physical therapy stretches and exercises, which I provided on discharge.  I discussed performing these prior to following up with his primary care clinic.  If there is no improvement he may require surgical intervention however not likely as the symptoms are so mild.  There was no trauma, pain or difficulty with range of motion, or signs of infection that would warrant advanced imaging.    TSH within  "normal limits.  CBC and CMP are unremarkable.  MRI performed to evaluate for posterior circulation stroke.  There was intracranial pathology such as evidence of previous stroke or vestibular lesion.  He has moderate leukokraurosis and mild to moderate diffuse cerebral volume loss.  I discussed with the patient that his symptoms are likely due to deficits in his proprioception.    Upon questioning, patient does note that he has had a history of heavy drinking.  2 weeks ago he had a Avinger and had multiple drinks of hard alcohol.  He does not drink that heavily every day.  His last drink was yesterday, he had 2 beers.  He denies any history of withdrawal.  I discussed alcohol cessation with the patient.   I discussed thiamine, folate, B12.  He states that he takes \"a ton\" of the vitamins at home, and those may be some of them.  I discussed following up with his primary care clinic as soon as possible to better evaluate his symptoms and to make sure he is taking the right vitamins and they can perform any additional work-up for his instability.    I have reviewed the nursing notes. I have reviewed the findings, diagnosis, plan and need for follow up with the patient.    New Prescriptions    No medications on file       Final diagnoses:   None   TRINA, Charles Bray, am serving as a trained medical scribe to document services personally performed by Zoila Guillen DO based on the provider's statements to me on August 3, 2023.  This document has been checked and approved by the attending provider.    I, Zoila Guillen DO, was physically present and have reviewed and verified the accuracy of this note documented by Charles Bray medical scribe.       Zoila Guillen MD  08/03/23 1754    "

## 2023-08-03 NOTE — ED TRIAGE NOTES
Pt comes in with left hand numbness that has been going on for about a week. Pt says its only in his pinky and ring finger. Stroke scale negative in triage.      Triage Assessment       Row Name 08/03/23 2422       Triage Assessment (Adult)    Airway WDL WDL       Respiratory WDL    Respiratory WDL WDL       Skin Circulation/Temperature WDL    Skin Circulation/Temperature WDL WDL       Cardiac WDL    Cardiac WDL WDL       Peripheral/Neurovascular WDL    Peripheral Neurovascular WDL WDL       Cognitive/Neuro/Behavioral WDL    Cognitive/Neuro/Behavioral WDL WDL

## 2023-08-03 NOTE — DISCHARGE INSTRUCTIONS
Your MRI brain did not see evidence of previous stroke.  follow up with your primary care doctor regarding any additional workup you may need for your imbalance.      I provided you exercises to perform for your ulnar neuropathy of your hand.    Return to the emergency department if you have any new or worsening symptoms or concerns.

## 2023-08-03 NOTE — TELEPHONE ENCOUNTER
Pt calling stating that he has had L hand numbness for about a week. Pt's hand became numb suddenly. There was no injury. Pt has not had this happen to them before. The hand stays numb. Feels some tingling. Not red or swollen. Pt is able to feel hot/cold sensations. The hand itself does not feel cold or hot to the touch. He does not feel any pain. Pt is concerned. Pt is concerned that it is a blood clot.     Pt was advised to go to the [facility]/ED.     Zainab Buckley RN  [facility]

## 2023-08-08 ENCOUNTER — PATIENT OUTREACH (OUTPATIENT)
Dept: GERIATRIC MEDICINE | Facility: CLINIC | Age: 73
End: 2023-08-08
Payer: COMMERCIAL

## 2023-08-08 NOTE — PROGRESS NOTES
[facility] Care Coordination Contact  CC received notification of Emergency Room visit.  ER visit occurred on 8/3/23 at [facility] with Dx of numbness in left hand.    CC contacted member and left a message requesting a return call.  Member has a follow-up appointment with PCP: No: Offered Assistance with setting up a follow up appointment  Member has had a change in condition: No  New referrals placed: No  Home Visit Needed: No  Care plan reviewed and updated.  PCP notified of ED visit via EMR.  Leeanna Sigala RN, BSN, PHN  [facility] Care Coordinator  [phone]

## 2023-08-16 ENCOUNTER — ANCILLARY PROCEDURE (OUTPATIENT)
Dept: CT IMAGING | Facility: CLINIC | Age: 73
End: 2023-08-16
Attending: INTERNAL MEDICINE
Payer: COMMERCIAL

## 2023-08-16 DIAGNOSIS — R91.8 ABNORMAL CT LUNG SCREENING: ICD-10-CM

## 2023-08-16 DIAGNOSIS — F17.210 SMOKING GREATER THAN 40 PACK YEARS: ICD-10-CM

## 2023-08-16 PROCEDURE — 71271 CT THORAX LUNG CANCER SCR C-: CPT | Mod: GC | Performed by: RADIOLOGY

## 2023-08-29 ENCOUNTER — MYC MEDICAL ADVICE (OUTPATIENT)
Dept: PULMONOLOGY | Facility: CLINIC | Age: 73
End: 2023-08-29
Payer: COMMERCIAL

## 2023-08-29 DIAGNOSIS — R91.8 ABNORMAL CT LUNG SCREENING: Primary | ICD-10-CM

## 2023-08-29 NOTE — RESULT ENCOUNTER NOTE
I discussed the results with the patient and he does still have a productive cough.  In light of his history of nontuberculous mycobacterial culture positivity I would like to send him 3 specimen cups and ask him to submit 3 sputum specimens.  He does not have any cups and is requesting that we mail cups to him we should provide instructions for specimen collection.  He should be instructed to produce a specimen early in the morning with hopefully opaque lower respiratory tract secretions and minimum saliva with clear bubbles.

## 2023-09-01 ENCOUNTER — TELEPHONE (OUTPATIENT)
Dept: PULMONOLOGY | Facility: CLINIC | Age: 73
End: 2023-09-01
Payer: COMMERCIAL

## 2023-09-01 NOTE — TELEPHONE ENCOUNTER
Left Voicemail (1st Attempt) for the patient to call back and schedule the following:    Appointment type: Chest CT   Provider: ZAY  Return date:01/02/24   Specialty phone number: [phone]  Additional appointment(s) needed: N/A   Additonal Notes: N/A

## 2023-09-01 NOTE — CONFIDENTIAL NOTE
CT chest shows new opacities, there is a history of AFB positive sputum (JAKY) x 1.   Ravin doesn't have consitutional symptoms but he does have a productive cough.  I'd like to repeat sputum and repeat CT in about 3 months

## 2023-09-06 NOTE — MR AVS SNAPSHOT
After Visit Summary   4/1/2017    Robert B Behr    MRN: [id number]           Patient Information     Date Of Birth          1950        Visit Information        Provider Department      4/1/2017 10:00 AM Rafael Adame PA-C [facility]        Today's Diagnoses     COPD exacerbation (H)    -  1      Care Instructions      COPD Flare    You have had a flare-up of your COPD.  COPD, or chronic obstructive pulmonary disease, is a common lung disease. It causes your airways to become irritated and narrower. This makes it harder for you to breathe. Emphysema and chronic bronchitis are both types of COPD. This is a chronic condition, which means you always have it. Sometimes it gets worse. When this happens, it is called a flare-up.  Symptoms of COPD  People with COPD may have symptoms most of the time. In a flare-up, your symptoms get worse. These symptoms may mean you are having a flare-up:    Shortness of breath, shallow or rapid breathing, or wheezing that gets worse    Lung infection    Cough that gets worse    More mucus, thicker mucus or mucus of a different color    Tiredness, decreased energy, or trouble doing your usual activities    Fever    Chest tightness    Your symptoms don t get better even when you use your usual medicines, inhalers, and nebulizer    Trouble talking    You feel confused  Causes of flare-ups  Unfortunately, a flare-up can happen even though you did everything right, and you followed your doctor s instructions. Some causes of flare-ups are:    Smoking or secondhand smoke    Colds, the flu, or respiratory infections    Air pollution    Sudden change in the weather    Dust, irritating chemicals, or strong fumes    Not taking your medicines as prescribed  Home care  Here are some things you can do at home to treat a flare-up:    Try not to panic. This makes it harder to breathe, and keeps you from doing the right things.    Don t smoke or be  around others who are smoking.    Try to drink more fluids than usual during a flare-up, unless your doctor has told you not to because of heart and kidney problems. More fluids can help loosen the mucus.    Use your inhalers and nebulizer, if you have one, as you have been told to.    If you were given antibiotics, take them until they are used up or your doctor tells you to stop. It s important to finish the antibiotics, even though you feel better. This will make sure the infection has cleared.    If you were given prednisone or another steroid, finish it even if you feel better.  Preventing a flare-up  Even though flare-ups happen, the best way to treat one is to prevent it before it starts. Here are some pointers:    Don t smoke or be around others who are smoking.    Take your medicines as you have been told.    Talk with your doctor about getting a flu shot every year. Also find out if you need a pneumonia shot.    If there is a weather advisory warning to stay indoors, try to stay inside when possible.    Try to eat healthy and get plenty of sleep.    Try to avoid things that usually set you off, like dust, chemical fumes, hairsprays, or strong perfumes.  Follow-up care  Follow up with your healthcare provider.  If a culture was done, you will be told if your treatment needs to be changed. You can call as directed for the results.  If X-rays were done, and a radiologist had not seen them while you were there, they will be reviewed. You will be told if there is a change in the reading, especially if it affects your treatment.  Call 911  Call 911 if any of these occur:    You have trouble breathing    You feel confused or it s difficult to wake you up    You faint or lose consciousness    You have a rapid heart rate    You have new pain in your chest, arm, shoulder, neck or upper back  When to seek medical advice  Call your healthcare provider right away if any of these occur:    Wheezing or shortness of  breath gets worse    You need to use your inhalers more often than usual without relief    Fever of 100.4 F (38 C) or higher, or as directed    Coughing up lots of dark-colored or bloody sputum (mucus)    Chest pain with each breath    You do not start to get better within 24 hours    Swelling or your ankles gets worse    Dizziness or weakness       7303-0389 [employer]. [address] [place] [address]. All rights reserved. This information is not intended as a substitute for professional medical care. Always follow your healthcare professional's instructions.              Follow-ups after your visit        Who to contact     If you have questions or need follow up information about today's clinic visit or your schedule please contact [facility] directly at [phone].  Normal or non-critical lab and imaging results will be communicated to you by MyChart, letter or phone within 4 business days after the clinic has received the results. If you do not hear from us within 7 days, please contact the clinic through MyChart or phone. If you have a critical or abnormal lab result, we will notify you by phone as soon as possible.  Submit refill requests through [employer] or call your pharmacy and they will forward the refill request to us. Please allow 3 business days for your refill to be completed.          Additional Information About Your Visit        [employer] Information     [employer] gives you secure access to your electronic health record. If you see a primary care provider, you can also send messages to your care team and make appointments. If you have questions, please call your primary care clinic.  If you do not have a primary care provider, please call [phone] and they will assist you.        Care EveryWhere ID     This is your Care EveryWhere ID. This could be used by other organizations to access your [facility] medical records  [id number]        Your Vitals  "Were     Pulse Temperature Height Pulse Oximetry BMI (Body Mass Index)       82 97.6  F (36.4  C) (Oral) 5' 10\" (1.778 m) 97% 22.24 kg/m2        Blood Pressure from Last 3 Encounters:   04/01/17 140/80   03/14/17 144/80   01/20/17 124/72    Weight from Last 3 Encounters:   04/01/17 155 lb (70.3 kg)   03/14/17 155 lb (70.3 kg)   01/20/17 150 lb (68 kg)              Today, you had the following     No orders found for display         Today's Medication Changes          These changes are accurate as of: 4/1/17 11:12 AM.  If you have any questions, ask your nurse or doctor.               Start taking these medicines.        Dose/Directions    predniSONE 20 MG tablet   Commonly known as:  DELTASONE   Used for:  COPD exacerbation (H)   Started by:  Rafael Adame PA-C        Dose:  40 mg   Take 2 tablets (40 mg) by mouth daily for 5 days   Quantity:  10 tablet   Refills:  0         These medicines have changed or have updated prescriptions.        Dose/Directions    azithromycin 250 MG tablet   Commonly known as:  ZITHROMAX   This may have changed:  Another medication with the same name was removed. Continue taking this medication, and follow the directions you see here.   Used for:  COPD exacerbation (H)   Changed by:  Rafael Adame PA-C        Two tablets first day, then one tablet daily for four days.   Quantity:  6 tablet   Refills:  0       nicotine 21 MG/24HR 24 hr patch   Commonly known as:  NICODERM CQ   This may have changed:  Another medication with the same name was removed. Continue taking this medication, and follow the directions you see here.   Used for:  Tobacco use disorder   Changed by:  Fina Frausto MD        Dose:  1 patch   Place 1 patch onto the skin every 24 hours   Quantity:  30 patch   Refills:  1         Stop taking these medicines if you haven't already. Please contact your care team if you have questions.     calcium 1200 0745-1875 MG-UNIT Chew   Stopped by:  " Rafael Adame PA-C           calcium-vitamin D 600-400 MG-UNIT per tablet   Commonly known as:  calcium 600 + D   Stopped by:  Rafael Adame PA-C           HYDROcodone-acetaminophen 5-325 MG per tablet   Commonly known as:  NORCO   Stopped by:  Rafael Adame PA-C                Where to get your medicines      These medications were sent to [facility] - [place], [place] - [address] [facility]  [address], [place] [address]     Phone:  [phone]     azithromycin 250 MG tablet         Call your pharmacy to confirm that your medication is ready for pickup. It may take up to 24 hours for them to receive the prescription. If the prescription is not ready within 3 business days, please contact your clinic or your provider.     We will let you know when these medications are ready. If you don't hear back within 3 business days, please contact us.     predniSONE 20 MG tablet                Primary Care Provider Office Phone # Fax #    Fina Frausto [phone]       [facility] [address]  [place] [address]        Thank you!     Thank you for choosing [facility]  for your care. Our goal is always to provide you with excellent care. Hearing back from our patients is one way we can continue to improve our services. Please take a few minutes to complete the written survey that you may receive in the mail after your visit with us. Thank you!             Your Updated Medication List - Protect others around you: Learn how to safely use, store and throw away your medicines at www.disposemymeds.org.          This list is accurate as of: 4/1/17 11:12 AM.  Always use your most recent med list.                   Brand Name Dispense Instructions for use    albuterol 108 (90 BASE) MCG/ACT Inhaler    PROAIR HFA/PROVENTIL HFA/VENTOLIN HFA    1 Inhaler    Inhale 2 puffs into the lungs every 4 hours as needed  for shortness of breath / dyspnea or wheezing       ascorbic acid 1000 MG Tabs    vitamin C    90 tablet    Take 1 tablet (1,000 mg) by mouth daily       azithromycin 250 MG tablet    ZITHROMAX    6 tablet    Two tablets first day, then one tablet daily for four days.       CALCIUM 1200+D3 600- MG-MG-UNIT Tb24   Generic drug:  Calcium-Magnesium-Vitamin D      Reported on 3/14/2017       * Ipratropium-Albuterol  MCG/ACT inhaler    COMBIVENT RESPIMAT    1 Inhaler    Inhale 1 puff into the lungs 4 times daily Not to exceed 6 doses per day.       * ipratropium - albuterol 0.5 mg/2.5 mg/3 mL 0.5-2.5 (3) MG/3ML neb solution    DUONEB    30 vial    Take 1 vial (3 mLs) by nebulization every 6 hours as needed for shortness of breath / dyspnea or wheezing       nicotine 21 MG/24HR 24 hr patch    NICODERM CQ    30 patch    Place 1 patch onto the skin every 24 hours       omega 3 1000 MG Caps     90 capsule    Take 1 g by mouth daily       order for DME     1 each    Equipment being ordered: nebulizer machine       predniSONE 20 MG tablet    DELTASONE    10 tablet    Take 2 tablets (40 mg) by mouth daily for 5 days       traMADol 50 MG tablet    ULTRAM    16 tablet    Take 1 tablet (50 mg) by mouth every 6 hours as needed for pain maximum 6 tablet(s) per day       UNABLE TO FIND      Reported on 3/14/2017       vitamin A 31672 UNIT capsule    [facility] VITAMIN A    180 capsule    Take 1 capsule (10,000 Units) by mouth daily       vitamin B complex with vitamin C Tabs tablet     180 tablet    Take 1 tablet by mouth 2 times daily With Folic acid       vitamin E 1000 UNIT capsule    [facility] vitamin E    90 capsule    Take 1 capsule (1,000 Units) by mouth daily       * Notice:  This list has 2 medication(s) that are the same as other medications prescribed for you. Read the directions carefully, and ask your doctor or other care provider to review them with you.       Ilumya Counseling: I discussed with the patient the risks of tildrakizumab including but not limited to immunosuppression, malignancy, posterior leukoencephalopathy syndrome, and serious infections.  The patient understands that monitoring is required including a PPD at baseline and must alert us or the primary physician if symptoms of infection or other concerning signs are noted.

## 2023-09-08 ENCOUNTER — TELEPHONE (OUTPATIENT)
Dept: PULMONOLOGY | Facility: CLINIC | Age: 73
End: 2023-09-08
Payer: COMMERCIAL

## 2023-09-08 NOTE — TELEPHONE ENCOUNTER
[facility] Call Center    Phone Message    May a detailed message be left on voicemail: yes     Reason for Call: Other: .     Per Patient states he has some mucus samples and he is wanting to know where he can drop off the samples. Patient is wanting to get a call back. Please advise.     Action Taken: Message routed to:  [facility] Lung    Travel Screening: Not Applicable

## 2023-09-08 NOTE — TELEPHONE ENCOUNTER
Called pt to instruct him to drop off sputum samples to any [facility] laboratory.  Left VM with this information.    Helene Kidd RN on 9/8/2023 at 11:05 AM

## 2023-09-09 ENCOUNTER — TELEPHONE (OUTPATIENT)
Dept: PULMONOLOGY | Facility: CLINIC | Age: 73
End: 2023-09-09

## 2023-09-09 ENCOUNTER — LAB (OUTPATIENT)
Dept: LAB | Facility: CLINIC | Age: 73
End: 2023-09-09
Payer: COMMERCIAL

## 2023-09-09 DIAGNOSIS — R91.8 ABNORMAL CT LUNG SCREENING: ICD-10-CM

## 2023-09-09 DIAGNOSIS — Z94.2 LUNG REPLACED BY TRANSPLANT (H): ICD-10-CM

## 2023-09-09 DIAGNOSIS — R91.8 ABNORMAL CT LUNG SCREENING: Primary | ICD-10-CM

## 2023-09-09 NOTE — TELEPHONE ENCOUNTER
Jon. My name is Radha from [facility]. Your pt Robert Behr [id number] dropped of 3 sputum samples and there are no orders. This is the only way I knew to contact you.  Radha Abreu/ MA

## 2023-09-13 PROCEDURE — 99000 SPECIMEN HANDLING OFFICE-LAB: CPT

## 2023-09-13 PROCEDURE — 87116 MYCOBACTERIA CULTURE: CPT | Mod: 90

## 2023-09-13 PROCEDURE — 87206 SMEAR FLUORESCENT/ACID STAI: CPT | Mod: 90

## 2023-09-14 LAB
BACTERIA SPT CULT: NORMAL
GRAM STAIN RESULT: NORMAL

## 2023-09-14 PROCEDURE — 87205 SMEAR GRAM STAIN: CPT

## 2023-09-15 ENCOUNTER — OFFICE VISIT (OUTPATIENT)
Dept: URGENT CARE | Facility: URGENT CARE | Age: 73
End: 2023-09-15
Payer: COMMERCIAL

## 2023-09-15 ENCOUNTER — APPOINTMENT (OUTPATIENT)
Dept: LAB | Facility: CLINIC | Age: 73
End: 2023-09-15
Payer: COMMERCIAL

## 2023-09-15 VITALS
TEMPERATURE: 97.9 F | RESPIRATION RATE: 18 BRPM | DIASTOLIC BLOOD PRESSURE: 72 MMHG | HEART RATE: 58 BPM | OXYGEN SATURATION: 97 % | SYSTOLIC BLOOD PRESSURE: 124 MMHG

## 2023-09-15 DIAGNOSIS — J44.1 COPD EXACERBATION (H): Primary | ICD-10-CM

## 2023-09-15 LAB
GRAM STAIN RESULT: NORMAL

## 2023-09-15 PROCEDURE — 87205 SMEAR GRAM STAIN: CPT

## 2023-09-15 PROCEDURE — 99214 OFFICE O/P EST MOD 30 MIN: CPT | Performed by: INTERNAL MEDICINE

## 2023-09-15 RX ORDER — AZITHROMYCIN 250 MG/1
TABLET, FILM COATED ORAL
Qty: 6 TABLET | Refills: 0 | Status: SHIPPED | OUTPATIENT
Start: 2023-09-15 | End: 2023-09-20

## 2023-09-15 RX ORDER — PREDNISONE 20 MG/1
40 TABLET ORAL DAILY
Qty: 10 TABLET | Refills: 0 | Status: SHIPPED | OUTPATIENT
Start: 2023-09-15 | End: 2023-09-20

## 2023-09-15 ASSESSMENT — ENCOUNTER SYMPTOMS
MYALGIAS: 0
DIZZINESS: 1
FEVER: 0
HEADACHES: 0
VOICE CHANGE: 1
CHILLS: 0
SORE THROAT: 1
DIAPHORESIS: 0

## 2023-09-15 NOTE — PROGRESS NOTES
ASSESSMENT AND PLAN:      ICD-10-CM    1. COPD exacerbation (H)  J44.1 predniSONE (DELTASONE) 20 MG tablet     azithromycin (ZITHROMAX) 250 MG tablet      Currently well-appearing.  Not hypoxic.    Patient continue COPD medications/inhalers  5-day prednisone course  Ninoska De La Torre MD  [facility]    Subjective     Robert B Behr is a 73 year old who presents for Patient presents with:  Cough: Cough x2days, chest congestion, no fever  Fatigue    an established patient of [facility].    URI Adult    Onset of symptoms was 2 day(s) ago.  Current and Associated symptoms: cough - non-productive, wheezing, and chest tightness  Treatment measures tried include Inhaler Predisposing factors include HX of COPD and gets bronchitis every year.    History of COPD, tobacco use, hypoxia  5 cigs day    Covid negative     Review of Systems   Constitutional:  Negative for chills, diaphoresis and fever.   HENT:  Positive for congestion, postnasal drip, sore throat and voice change.    Musculoskeletal:  Negative for myalgias.   Neurological:  Positive for dizziness (with standing). Negative for headaches.     Current Outpatient Medications   Medication Sig Dispense Refill    albuterol (PROAIR HFA) 108 (90 Base) MCG/ACT inhaler INHALE 1 TO 2 PUFFS BY MOUTH EVERY 4 HOURS AS NEEDED FOR SHORTNESS OF BREATH 8.5 g 3    atorvastatin (LIPITOR) 40 MG tablet Take 1 tablet (40 mg) by mouth daily 90 tablet 3    azithromycin (ZITHROMAX) 250 MG tablet Take 2 tablets (500 mg) by mouth daily for 1 day, THEN 1 tablet (250 mg) daily for 4 days. 6 tablet 0    B Complex-C (VITAMIN B COMPLEX W/VITAMIN C) TABS tablet TAKE 1 TABLET BY MOUTH TWICE DAILY WITH FOLIC ACID 180 tablet 3    Calcium Carbonate-Vitamin D (CALCIUM 600 +D HIGH POTENCY) 600-10 MG-MCG TABS Take 1 tablet by mouth 2 times daily 90 tablet 3    calcium carbonate-vitamin D (CALTRATE) 600-10 MG-MCG per tablet TAKE 1 TABLET BY MOUTH TWICE DAILY 180 tablet 0     fish oil-omega-3 fatty acids 1000 MG capsule Take 1 capsule (1 g) by mouth daily 90 capsule 3    Ginkgo Biloba 60 MG TABS Take 1 tablet by mouth daily 90 tablet 3    glucosamine-chondroitinoitin 750-600 MG TABS Take 2 tablets by mouth 2 times daily 120 tablet 3    ipratropium-albuterol (COMBIVENT RESPIMAT)  MCG/ACT inhaler Inhale 1 puff into the lungs 4 times daily as needed for shortness of breath, wheezing or cough (Inhale 1 puff into the lungs 4 times daily as needed. Do not exceed 6 doses per day.,) 4 g 3    omeprazole (PRILOSEC) 20 MG DR capsule Take 1 capsule (20 mg) by mouth 2 times daily 180 capsule 3    predniSONE (DELTASONE) 20 MG tablet Take 2 tablets (40 mg) by mouth daily for 5 days 10 tablet 0    Selenium (SELENIMIN-200) 200 MCG TABS tablet Take 1 tablet (200 mcg) by mouth daily 90 tablet 3    vitamin A 3 MG (64426 UNITS) capsule Take 1 capsule (10,000 Units) by mouth daily 90 capsule 3    vitamin C (ASCORBIC ACID) 1000 MG TABS Take 1 tablet (1,000 mg) by mouth daily 90 tablet 3    vitamin E (TOCOPHEROL) 1000 units (450 mg) CAPS capsule Take 1 capsule (1,000 Units) by mouth daily 90 capsule 3    WIXELA INHUB 500-50 MCG/ACT inhaler Inhale 1 puff into the lungs 2 times daily 60 each 11    benzonatate (TESSALON) 100 MG capsule Take 1 capsule (100 mg) by mouth 3 times daily as needed for cough (Patient not taking: Reported on 9/15/2023) 30 capsule 0         Patient Active Problem List   Diagnosis    Osteoporosis    COPD (chronic obstructive pulmonary disease) (H)    Tobacco use disorder    Hyperlipidemia LDL goal <130    Health Care Home    Hiatal hernia    Hypopigmentation    SOB (shortness of breath)    Hypoxia    COPD exacerbation (H)    Infection due to 2019 novel coronavirus             Objective    /72   Pulse 58   Temp 97.9  F (36.6  C) (Temporal)   Resp 18   SpO2 97%   Physical Exam  Vitals reviewed.   Constitutional:       Appearance: Normal appearance.   HENT:      Right Ear:  Tympanic membrane normal.      Left Ear: Tympanic membrane normal.      Mouth/Throat:      Mouth: Mucous membranes are moist.      Pharynx: Oropharynx is clear.      Comments: Postnasal drip  Cardiovascular:      Rate and Rhythm: Normal rate and regular rhythm.      Pulses: Normal pulses.      Heart sounds: Normal heart sounds.   Pulmonary:      Effort: Pulmonary effort is normal.      Breath sounds: Rales (At bases) present.      Comments: Decreased breath sounds  Neurological:      Mental Status: He is alert.

## 2023-09-18 ENCOUNTER — TELEPHONE (OUTPATIENT)
Dept: PULMONOLOGY | Facility: CLINIC | Age: 73
End: 2023-09-18

## 2023-09-18 DIAGNOSIS — J44.9 COPD (CHRONIC OBSTRUCTIVE PULMONARY DISEASE) (H): ICD-10-CM

## 2023-09-18 NOTE — TELEPHONE ENCOUNTER
ipratropium-albuterol (COMBIVENT RESPIMAT)  MCG/ACT inhaler 4 g 3 4/28/2023  --   Sig - Route: Inhale 1 puff into the lungs 4 times daily as needed for shortness of breath, wheezing or cough (Inhale 1 puff into the lungs 4 times daily as needed. Do not exceed 6 doses per day.,) - Inhalation           Last Office Visit: 4/5/22  Future Office visit:   1/2/24    Routing refill request to provider for review/approval because:  Did not pass protocol.

## 2023-09-26 ENCOUNTER — TELEPHONE (OUTPATIENT)
Dept: PULMONOLOGY | Facility: CLINIC | Age: 73
End: 2023-09-26
Payer: COMMERCIAL

## 2023-09-26 NOTE — TELEPHONE ENCOUNTER
Elly from [facility] lab called in a critical result, patient is positive for AFB, sputum.    RN notified Dr. Taylor, Jude RN, and Helene OREILLY    -Shahana, RN

## 2023-10-04 ENCOUNTER — TELEPHONE (OUTPATIENT)
Dept: PULMONOLOGY | Facility: CLINIC | Age: 73
End: 2023-10-04
Payer: COMMERCIAL

## 2023-10-04 NOTE — TELEPHONE ENCOUNTER
[facility] contacted pt per Dr Taylor with culture results.  Advised pt that Dr Taylor is recommending Nodify testing for further eval.  Pt is agreeable to plan.  Nodify forms completed and faxed to [facility].

## 2023-10-10 LAB
ACID FAST STAIN (ARUP): ABNORMAL
ORGANISM (ARUP): ABNORMAL

## 2023-10-17 ENCOUNTER — TELEPHONE (OUTPATIENT)
Dept: PULMONOLOGY | Facility: CLINIC | Age: 73
End: 2023-10-17
Payer: COMMERCIAL

## 2023-10-20 ENCOUNTER — APPOINTMENT (OUTPATIENT)
Dept: CT IMAGING | Facility: CLINIC | Age: 73
DRG: 380 | End: 2023-10-20
Attending: EMERGENCY MEDICINE
Payer: COMMERCIAL

## 2023-10-20 ENCOUNTER — APPOINTMENT (OUTPATIENT)
Dept: GENERAL RADIOLOGY | Facility: CLINIC | Age: 73
DRG: 380 | End: 2023-10-20
Attending: EMERGENCY MEDICINE
Payer: COMMERCIAL

## 2023-10-20 ENCOUNTER — HOSPITAL ENCOUNTER (INPATIENT)
Facility: CLINIC | Age: 73
LOS: 4 days | Discharge: HOME | DRG: 380 | End: 2023-10-24
Attending: INTERNAL MEDICINE | Admitting: INTERNAL MEDICINE
Payer: COMMERCIAL

## 2023-10-20 ENCOUNTER — PATIENT OUTREACH (OUTPATIENT)
Dept: GERIATRIC MEDICINE | Facility: CLINIC | Age: 73
End: 2023-10-20

## 2023-10-20 DIAGNOSIS — I42.9 CARDIOMYOPATHY, UNSPECIFIED TYPE (H): Primary | ICD-10-CM

## 2023-10-20 DIAGNOSIS — T68.XXXA HYPOTHERMIA, INITIAL ENCOUNTER: ICD-10-CM

## 2023-10-20 DIAGNOSIS — A41.9 SEPSIS WITHOUT ACUTE ORGAN DYSFUNCTION, DUE TO UNSPECIFIED ORGANISM (H): ICD-10-CM

## 2023-10-20 LAB
ABO/RH(D): NORMAL
ALBUMIN SERPL BCG-MCNC: 4.3 G/DL (ref 3.5–5.2)
ALBUMIN UR-MCNC: 50 MG/DL
ALP SERPL-CCNC: 102 U/L (ref 40–129)
ALT SERPL W P-5'-P-CCNC: 41 U/L (ref 0–70)
AMMONIA PLAS-SCNC: 35 UMOL/L (ref 16–60)
ANION GAP SERPL CALCULATED.3IONS-SCNC: 30 MMOL/L (ref 7–15)
ANION GAP SERPL CALCULATED.3IONS-SCNC: 39 MMOL/L (ref 7–15)
ANTIBODY SCREEN: NEGATIVE
APPEARANCE UR: ABNORMAL
APTT PPP: 26 SECONDS (ref 22–38)
AST SERPL W P-5'-P-CCNC: 59 U/L (ref 0–45)
BASE EXCESS BLDV CALC-SCNC: -14 MMOL/L (ref -7.7–1.9)
BASE EXCESS BLDV CALC-SCNC: -16.2 MMOL/L (ref -7.7–1.9)
BASO+EOS+MONOS # BLD AUTO: ABNORMAL 10*3/UL
BASO+EOS+MONOS NFR BLD AUTO: ABNORMAL %
BASOPHILS # BLD AUTO: 0.1 10E3/UL (ref 0–0.2)
BASOPHILS NFR BLD AUTO: 0 %
BILIRUB SERPL-MCNC: 0.2 MG/DL
BILIRUB UR QL STRIP: NEGATIVE
BUN SERPL-MCNC: 50.2 MG/DL (ref 8–23)
BUN SERPL-MCNC: 52.2 MG/DL (ref 8–23)
CA-I BLD-MCNC: 4 MG/DL (ref 4.4–5.2)
CALCIUM SERPL-MCNC: 7.4 MG/DL (ref 8.8–10.2)
CALCIUM SERPL-MCNC: 8.8 MG/DL (ref 8.8–10.2)
CHLORIDE SERPL-SCNC: 102 MMOL/L (ref 98–107)
CHLORIDE SERPL-SCNC: 92 MMOL/L (ref 98–107)
CK SERPL-CCNC: 234 U/L (ref 39–308)
COLOR UR AUTO: YELLOW
CPB POCT: NO
CREAT SERPL-MCNC: 2.15 MG/DL (ref 0.67–1.17)
CREAT SERPL-MCNC: 2.51 MG/DL (ref 0.67–1.17)
DEPRECATED HCO3 PLAS-SCNC: 10 MMOL/L (ref 22–29)
DEPRECATED HCO3 PLAS-SCNC: 12 MMOL/L (ref 22–29)
EGFRCR SERPLBLD CKD-EPI 2021: 26 ML/MIN/1.73M2
EGFRCR SERPLBLD CKD-EPI 2021: 32 ML/MIN/1.73M2
EOSINOPHIL # BLD AUTO: 0 10E3/UL (ref 0–0.7)
EOSINOPHIL NFR BLD AUTO: 0 %
ERYTHROCYTE [DISTWIDTH] IN BLOOD BY AUTOMATED COUNT: 13.9 % (ref 10–15)
ETHANOL SERPL-MCNC: 0.3 G/DL
FLUAV RNA SPEC QL NAA+PROBE: NEGATIVE
FLUBV RNA RESP QL NAA+PROBE: NEGATIVE
GLUCOSE BLD-MCNC: 120 MG/DL (ref 70–99)
GLUCOSE BLDC GLUCOMTR-MCNC: 125 MG/DL (ref 70–99)
GLUCOSE SERPL-MCNC: 127 MG/DL (ref 70–99)
GLUCOSE SERPL-MCNC: 143 MG/DL (ref 70–99)
GLUCOSE UR STRIP-MCNC: NEGATIVE MG/DL
HCO3 BLDV-SCNC: 13 MMOL/L (ref 21–28)
HCO3 BLDV-SCNC: 14 MMOL/L (ref 21–28)
HCO3 BLDV-SCNC: 15 MMOL/L (ref 21–28)
HCT VFR BLD AUTO: 50.2 % (ref 40–53)
HCT VFR BLD CALC: 49 % (ref 40–53)
HGB BLD-MCNC: 14.2 G/DL (ref 13.3–17.7)
HGB BLD-MCNC: 16 G/DL (ref 13.3–17.7)
HGB BLD-MCNC: 16.7 G/DL (ref 13.3–17.7)
HGB UR QL STRIP: ABNORMAL
HOLD SPECIMEN: NORMAL
HYALINE CASTS: 1 /LPF
IMM GRANULOCYTES # BLD: 0.4 10E3/UL
IMM GRANULOCYTES NFR BLD: 2 %
INR PPP: 1.07 (ref 0.85–1.15)
KETONES UR STRIP-MCNC: 10 MG/DL
LACTATE SERPL-SCNC: 11 MMOL/L (ref 0.7–2)
LACTATE SERPL-SCNC: 13 MMOL/L (ref 0.7–2)
LACTATE SERPL-SCNC: 7.3 MMOL/L (ref 0.7–2)
LACTATE SERPL-SCNC: 8.9 MMOL/L (ref 0.7–2)
LEUKOCYTE ESTERASE UR QL STRIP: NEGATIVE
LIPASE SERPL-CCNC: 25 U/L (ref 13–60)
LYMPHOCYTES # BLD AUTO: 2.4 10E3/UL (ref 0.8–5.3)
LYMPHOCYTES NFR BLD AUTO: 11 %
MAGNESIUM SERPL-MCNC: 2.8 MG/DL (ref 1.7–2.3)
MAGNESIUM SERPL-MCNC: 2.8 MG/DL (ref 1.7–2.3)
MCH RBC QN AUTO: 32.3 PG (ref 26.5–33)
MCHC RBC AUTO-ENTMCNC: 31.9 G/DL (ref 31.5–36.5)
MCV RBC AUTO: 101 FL (ref 78–100)
MONOCYTES # BLD AUTO: 2.1 10E3/UL (ref 0–1.3)
MONOCYTES NFR BLD AUTO: 10 %
MRSA DNA SPEC QL NAA+PROBE: NEGATIVE
MUCOUS THREADS #/AREA URNS LPF: PRESENT /LPF
NEUTROPHILS # BLD AUTO: 17.4 10E3/UL (ref 1.6–8.3)
NEUTROPHILS NFR BLD AUTO: 77 %
NITRATE UR QL: NEGATIVE
NRBC # BLD AUTO: 0 10E3/UL
NRBC BLD AUTO-RTO: 0 /100
O2/TOTAL GAS SETTING VFR VENT: 1 %
O2/TOTAL GAS SETTING VFR VENT: 28 %
OSMOLALITY SERPL: 360 MMOL/KG (ref 280–301)
PCO2 BLDV: 38 MM HG (ref 40–50)
PCO2 BLDV: 42 MM HG (ref 40–50)
PCO2 BLDV: 50 MM HG (ref 40–50)
PH BLDV: 7.07 [PH] (ref 7.32–7.43)
PH BLDV: 7.1 [PH] (ref 7.32–7.43)
PH BLDV: 7.17 [PH] (ref 7.32–7.43)
PH UR STRIP: 5 [PH] (ref 5–7)
PHOSPHATE SERPL-MCNC: 8.1 MG/DL (ref 2.5–4.5)
PLATELET # BLD AUTO: 385 10E3/UL (ref 150–450)
PO2 BLDV: 44 MM HG (ref 25–47)
PO2 BLDV: 48 MM HG (ref 25–47)
PO2 BLDV: 54 MM HG (ref 25–47)
POTASSIUM BLD-SCNC: 4 MMOL/L (ref 3.4–5.3)
POTASSIUM SERPL-SCNC: 4.7 MMOL/L (ref 3.4–5.3)
POTASSIUM SERPL-SCNC: 4.9 MMOL/L (ref 3.4–5.3)
PROT SERPL-MCNC: 7.3 G/DL (ref 6.4–8.3)
RBC # BLD AUTO: 4.95 10E6/UL (ref 4.4–5.9)
RBC URINE: <1 /HPF
RSV RNA SPEC NAA+PROBE: NEGATIVE
SA TARGET DNA: NEGATIVE
SAO2 % BLDV: 74 % (ref 94–100)
SARS-COV-2 RNA RESP QL NAA+PROBE: NEGATIVE
SODIUM BLD-SCNC: 138 MMOL/L (ref 133–144)
SODIUM SERPL-SCNC: 141 MMOL/L (ref 135–145)
SODIUM SERPL-SCNC: 144 MMOL/L (ref 135–145)
SP GR UR STRIP: 1.02 (ref 1–1.03)
SPECIMEN EXPIRATION DATE: NORMAL
SQUAMOUS EPITHELIAL: <1 /HPF
TROPONIN T BLD-MCNC: 0.02 UG/L
TROPONIN T SERPL HS-MCNC: 126 NG/L
TROPONIN T SERPL HS-MCNC: 392 NG/L
TROPONIN T SERPL HS-MCNC: 746 NG/L
UROBILINOGEN UR STRIP-MCNC: NORMAL MG/DL
WBC # BLD AUTO: 22.4 10E3/UL (ref 4–11)
WBC URINE: 2 /HPF

## 2023-10-20 PROCEDURE — 92950 HEART/LUNG RESUSCITATION CPR: CPT | Performed by: EMERGENCY MEDICINE

## 2023-10-20 PROCEDURE — 36556 INSERT NON-TUNNEL CV CATH: CPT | Mod: 59 | Performed by: EMERGENCY MEDICINE

## 2023-10-20 PROCEDURE — 82803 BLOOD GASES ANY COMBINATION: CPT

## 2023-10-20 PROCEDURE — 81001 URINALYSIS AUTO W/SCOPE: CPT | Performed by: EMERGENCY MEDICINE

## 2023-10-20 PROCEDURE — 82077 ASSAY SPEC XCP UR&BREATH IA: CPT | Performed by: EMERGENCY MEDICINE

## 2023-10-20 PROCEDURE — 87641 MR-STAPH DNA AMP PROBE: CPT | Performed by: STUDENT IN AN ORGANIZED HEALTH CARE EDUCATION/TRAINING PROGRAM

## 2023-10-20 PROCEDURE — 36415 COLL VENOUS BLD VENIPUNCTURE: CPT | Performed by: EMERGENCY MEDICINE

## 2023-10-20 PROCEDURE — 80320 DRUG SCREEN QUANTALCOHOLS: CPT | Performed by: STUDENT IN AN ORGANIZED HEALTH CARE EDUCATION/TRAINING PROGRAM

## 2023-10-20 PROCEDURE — 70450 CT HEAD/BRAIN W/O DYE: CPT | Mod: 26 | Performed by: RADIOLOGY

## 2023-10-20 PROCEDURE — 82947 ASSAY GLUCOSE BLOOD QUANT: CPT

## 2023-10-20 PROCEDURE — 83735 ASSAY OF MAGNESIUM: CPT | Performed by: EMERGENCY MEDICINE

## 2023-10-20 PROCEDURE — 99291 CRITICAL CARE FIRST HOUR: CPT | Mod: GC | Performed by: INTERNAL MEDICINE

## 2023-10-20 PROCEDURE — 83930 ASSAY OF BLOOD OSMOLALITY: CPT

## 2023-10-20 PROCEDURE — 71250 CT THORAX DX C-: CPT | Mod: 26 | Performed by: RADIOLOGY

## 2023-10-20 PROCEDURE — 999N000157 HC STATISTIC RCP TIME EA 10 MIN

## 2023-10-20 PROCEDURE — 83605 ASSAY OF LACTIC ACID: CPT | Performed by: EMERGENCY MEDICINE

## 2023-10-20 PROCEDURE — 94640 AIRWAY INHALATION TREATMENT: CPT | Mod: 76

## 2023-10-20 PROCEDURE — 70450 CT HEAD/BRAIN W/O DYE: CPT

## 2023-10-20 PROCEDURE — 83690 ASSAY OF LIPASE: CPT | Performed by: EMERGENCY MEDICINE

## 2023-10-20 PROCEDURE — 99222 1ST HOSP IP/OBS MODERATE 55: CPT | Performed by: INTERNAL MEDICINE

## 2023-10-20 PROCEDURE — 82310 ASSAY OF CALCIUM: CPT

## 2023-10-20 PROCEDURE — 250N000009 HC RX 250

## 2023-10-20 PROCEDURE — 74176 CT ABD & PELVIS W/O CONTRAST: CPT | Mod: 26 | Performed by: RADIOLOGY

## 2023-10-20 PROCEDURE — 86901 BLOOD TYPING SEROLOGIC RH(D): CPT | Performed by: EMERGENCY MEDICINE

## 2023-10-20 PROCEDURE — 84100 ASSAY OF PHOSPHORUS: CPT | Performed by: STUDENT IN AN ORGANIZED HEALTH CARE EDUCATION/TRAINING PROGRAM

## 2023-10-20 PROCEDURE — 258N000003 HC RX IP 258 OP 636: Performed by: EMERGENCY MEDICINE

## 2023-10-20 PROCEDURE — 84484 ASSAY OF TROPONIN QUANT: CPT | Performed by: STUDENT IN AN ORGANIZED HEALTH CARE EDUCATION/TRAINING PROGRAM

## 2023-10-20 PROCEDURE — 84484 ASSAY OF TROPONIN QUANT: CPT

## 2023-10-20 PROCEDURE — C9113 INJ PANTOPRAZOLE SODIUM, VIA: HCPCS | Mod: JZ | Performed by: EMERGENCY MEDICINE

## 2023-10-20 PROCEDURE — 71250 CT THORAX DX C-: CPT

## 2023-10-20 PROCEDURE — 86850 RBC ANTIBODY SCREEN: CPT | Performed by: EMERGENCY MEDICINE

## 2023-10-20 PROCEDURE — 85730 THROMBOPLASTIN TIME PARTIAL: CPT | Performed by: EMERGENCY MEDICINE

## 2023-10-20 PROCEDURE — 85018 HEMOGLOBIN: CPT | Performed by: STUDENT IN AN ORGANIZED HEALTH CARE EDUCATION/TRAINING PROGRAM

## 2023-10-20 PROCEDURE — 99292 CRITICAL CARE ADDL 30 MIN: CPT | Mod: 25 | Performed by: EMERGENCY MEDICINE

## 2023-10-20 PROCEDURE — 85025 COMPLETE CBC W/AUTO DIFF WBC: CPT | Performed by: EMERGENCY MEDICINE

## 2023-10-20 PROCEDURE — 200N000002 HC R&B ICU UMMC

## 2023-10-20 PROCEDURE — 82947 ASSAY GLUCOSE BLOOD QUANT: CPT | Performed by: EMERGENCY MEDICINE

## 2023-10-20 PROCEDURE — 83605 ASSAY OF LACTIC ACID: CPT

## 2023-10-20 PROCEDURE — 82140 ASSAY OF AMMONIA: CPT | Performed by: STUDENT IN AN ORGANIZED HEALTH CARE EDUCATION/TRAINING PROGRAM

## 2023-10-20 PROCEDURE — 3E043XZ INTRODUCTION OF VASOPRESSOR INTO CENTRAL VEIN, PERCUTANEOUS APPROACH: ICD-10-PCS | Performed by: EMERGENCY MEDICINE

## 2023-10-20 PROCEDURE — 83735 ASSAY OF MAGNESIUM: CPT | Performed by: STUDENT IN AN ORGANIZED HEALTH CARE EDUCATION/TRAINING PROGRAM

## 2023-10-20 PROCEDURE — 93010 ELECTROCARDIOGRAM REPORT: CPT | Mod: 59 | Performed by: EMERGENCY MEDICINE

## 2023-10-20 PROCEDURE — C9113 INJ PANTOPRAZOLE SODIUM, VIA: HCPCS | Mod: JZ | Performed by: STUDENT IN AN ORGANIZED HEALTH CARE EDUCATION/TRAINING PROGRAM

## 2023-10-20 PROCEDURE — 71045 X-RAY EXAM CHEST 1 VIEW: CPT | Mod: 26 | Performed by: RADIOLOGY

## 2023-10-20 PROCEDURE — 87040 BLOOD CULTURE FOR BACTERIA: CPT | Performed by: EMERGENCY MEDICINE

## 2023-10-20 PROCEDURE — 96365 THER/PROPH/DIAG IV INF INIT: CPT | Mod: 59 | Performed by: EMERGENCY MEDICINE

## 2023-10-20 PROCEDURE — 71045 X-RAY EXAM CHEST 1 VIEW: CPT

## 2023-10-20 PROCEDURE — 93005 ELECTROCARDIOGRAM TRACING: CPT | Mod: 59 | Performed by: EMERGENCY MEDICINE

## 2023-10-20 PROCEDURE — 93005 ELECTROCARDIOGRAM TRACING: CPT

## 2023-10-20 PROCEDURE — 250N000011 HC RX IP 250 OP 636

## 2023-10-20 PROCEDURE — 92950 HEART/LUNG RESUSCITATION CPR: CPT | Mod: 59 | Performed by: EMERGENCY MEDICINE

## 2023-10-20 PROCEDURE — 84484 ASSAY OF TROPONIN QUANT: CPT | Performed by: EMERGENCY MEDICINE

## 2023-10-20 PROCEDURE — 99291 CRITICAL CARE FIRST HOUR: CPT | Mod: 25 | Performed by: EMERGENCY MEDICINE

## 2023-10-20 PROCEDURE — 85610 PROTHROMBIN TIME: CPT | Performed by: EMERGENCY MEDICINE

## 2023-10-20 PROCEDURE — 82374 ASSAY BLOOD CARBON DIOXIDE: CPT | Performed by: EMERGENCY MEDICINE

## 2023-10-20 PROCEDURE — 250N000009 HC RX 250: Performed by: EMERGENCY MEDICINE

## 2023-10-20 PROCEDURE — HZ2ZZZZ DETOXIFICATION SERVICES FOR SUBSTANCE ABUSE TREATMENT: ICD-10-PCS | Performed by: INTERNAL MEDICINE

## 2023-10-20 PROCEDURE — 86803 HEPATITIS C AB TEST: CPT | Performed by: STUDENT IN AN ORGANIZED HEALTH CARE EDUCATION/TRAINING PROGRAM

## 2023-10-20 PROCEDURE — 999N000065 XR CHEST PORT 1 VIEW

## 2023-10-20 PROCEDURE — 87637 SARSCOV2&INF A&B&RSV AMP PRB: CPT | Performed by: STUDENT IN AN ORGANIZED HEALTH CARE EDUCATION/TRAINING PROGRAM

## 2023-10-20 PROCEDURE — 36415 COLL VENOUS BLD VENIPUNCTURE: CPT | Performed by: INTERNAL MEDICINE

## 2023-10-20 PROCEDURE — 87389 HIV-1 AG W/HIV-1&-2 AB AG IA: CPT | Performed by: STUDENT IN AN ORGANIZED HEALTH CARE EDUCATION/TRAINING PROGRAM

## 2023-10-20 PROCEDURE — 86706 HEP B SURFACE ANTIBODY: CPT | Performed by: STUDENT IN AN ORGANIZED HEALTH CARE EDUCATION/TRAINING PROGRAM

## 2023-10-20 PROCEDURE — 96375 TX/PRO/DX INJ NEW DRUG ADDON: CPT | Performed by: EMERGENCY MEDICINE

## 2023-10-20 PROCEDURE — 87340 HEPATITIS B SURFACE AG IA: CPT | Performed by: STUDENT IN AN ORGANIZED HEALTH CARE EDUCATION/TRAINING PROGRAM

## 2023-10-20 PROCEDURE — 02HV33Z INSERTION OF INFUSION DEVICE INTO SUPERIOR VENA CAVA, PERCUTANEOUS APPROACH: ICD-10-PCS | Performed by: EMERGENCY MEDICINE

## 2023-10-20 PROCEDURE — 80048 BASIC METABOLIC PNL TOTAL CA: CPT | Performed by: STUDENT IN AN ORGANIZED HEALTH CARE EDUCATION/TRAINING PROGRAM

## 2023-10-20 PROCEDURE — 258N000003 HC RX IP 258 OP 636

## 2023-10-20 PROCEDURE — 250N000011 HC RX IP 250 OP 636: Mod: JZ | Performed by: STUDENT IN AN ORGANIZED HEALTH CARE EDUCATION/TRAINING PROGRAM

## 2023-10-20 PROCEDURE — 82550 ASSAY OF CK (CPK): CPT | Performed by: EMERGENCY MEDICINE

## 2023-10-20 PROCEDURE — 250N000011 HC RX IP 250 OP 636: Mod: JZ | Performed by: EMERGENCY MEDICINE

## 2023-10-20 PROCEDURE — 96367 TX/PROPH/DG ADDL SEQ IV INF: CPT | Performed by: EMERGENCY MEDICINE

## 2023-10-20 RX ORDER — IPRATROPIUM BROMIDE AND ALBUTEROL SULFATE 2.5; .5 MG/3ML; MG/3ML
3 SOLUTION RESPIRATORY (INHALATION) ONCE
Status: DISCONTINUED | OUTPATIENT
Start: 2023-10-20 | End: 2023-10-20

## 2023-10-20 RX ORDER — FLUTICASONE FUROATE AND VILANTEROL 200; 25 UG/1; UG/1
1 POWDER RESPIRATORY (INHALATION) DAILY
Status: DISCONTINUED | OUTPATIENT
Start: 2023-10-21 | End: 2023-10-24 | Stop reason: HOSPADM

## 2023-10-20 RX ORDER — LORAZEPAM 2 MG/ML
1-2 INJECTION INTRAMUSCULAR EVERY 30 MIN PRN
Status: DISCONTINUED | OUTPATIENT
Start: 2023-10-20 | End: 2023-10-24 | Stop reason: HOSPADM

## 2023-10-20 RX ORDER — THIAMINE HYDROCHLORIDE 100 MG/ML
200 INJECTION, SOLUTION INTRAMUSCULAR; INTRAVENOUS 3 TIMES DAILY
Status: COMPLETED | OUTPATIENT
Start: 2023-10-20 | End: 2023-10-22

## 2023-10-20 RX ORDER — LORAZEPAM 0.5 MG/1
1-2 TABLET ORAL EVERY 30 MIN PRN
Status: DISCONTINUED | OUTPATIENT
Start: 2023-10-20 | End: 2023-10-24 | Stop reason: HOSPADM

## 2023-10-20 RX ORDER — PIPERACILLIN SODIUM, TAZOBACTAM SODIUM 4; .5 G/20ML; G/20ML
4.5 INJECTION, POWDER, LYOPHILIZED, FOR SOLUTION INTRAVENOUS EVERY 6 HOURS
Status: DISCONTINUED | OUTPATIENT
Start: 2023-10-20 | End: 2023-10-20

## 2023-10-20 RX ORDER — IPRATROPIUM BROMIDE AND ALBUTEROL SULFATE 2.5; .5 MG/3ML; MG/3ML
SOLUTION RESPIRATORY (INHALATION)
Status: COMPLETED
Start: 2023-10-20 | End: 2023-10-20

## 2023-10-20 RX ORDER — NICOTINE POLACRILEX 4 MG
15-30 LOZENGE BUCCAL
Status: DISCONTINUED | OUTPATIENT
Start: 2023-10-20 | End: 2023-10-24 | Stop reason: HOSPADM

## 2023-10-20 RX ORDER — FOLIC ACID 1 MG/1
1 TABLET ORAL DAILY
Status: DISCONTINUED | OUTPATIENT
Start: 2023-10-23 | End: 2023-10-24 | Stop reason: HOSPADM

## 2023-10-20 RX ORDER — NOREPINEPHRINE BITARTRATE 0.06 MG/ML
.01-.6 INJECTION, SOLUTION INTRAVENOUS CONTINUOUS
Status: DISCONTINUED | OUTPATIENT
Start: 2023-10-20 | End: 2023-10-21

## 2023-10-20 RX ORDER — POLYETHYLENE GLYCOL 3350 17 G/17G
17 POWDER, FOR SOLUTION ORAL DAILY PRN
Status: DISCONTINUED | OUTPATIENT
Start: 2023-10-20 | End: 2023-10-24 | Stop reason: HOSPADM

## 2023-10-20 RX ORDER — FOLIC ACID 5 MG/ML
1 INJECTION, SOLUTION INTRAMUSCULAR; INTRAVENOUS; SUBCUTANEOUS ONCE
Status: COMPLETED | OUTPATIENT
Start: 2023-10-20 | End: 2023-10-20

## 2023-10-20 RX ORDER — CEFTRIAXONE 2 G/1
2 INJECTION, POWDER, FOR SOLUTION INTRAMUSCULAR; INTRAVENOUS EVERY 24 HOURS
Status: DISCONTINUED | OUTPATIENT
Start: 2023-10-20 | End: 2023-10-23

## 2023-10-20 RX ORDER — AMOXICILLIN 250 MG
1 CAPSULE ORAL 2 TIMES DAILY PRN
Status: DISCONTINUED | OUTPATIENT
Start: 2023-10-20 | End: 2023-10-24 | Stop reason: HOSPADM

## 2023-10-20 RX ORDER — CALCIUM GLUCONATE 20 MG/ML
1 INJECTION, SOLUTION INTRAVENOUS ONCE
Status: COMPLETED | OUTPATIENT
Start: 2023-10-20 | End: 2023-10-20

## 2023-10-20 RX ORDER — PIPERACILLIN SODIUM, TAZOBACTAM SODIUM 4; .5 G/20ML; G/20ML
4.5 INJECTION, POWDER, LYOPHILIZED, FOR SOLUTION INTRAVENOUS ONCE
Status: COMPLETED | OUTPATIENT
Start: 2023-10-20 | End: 2023-10-20

## 2023-10-20 RX ORDER — MAGNESIUM SULFATE HEPTAHYDRATE 40 MG/ML
2 INJECTION, SOLUTION INTRAVENOUS ONCE
Status: DISCONTINUED | OUTPATIENT
Start: 2023-10-20 | End: 2023-10-20

## 2023-10-20 RX ORDER — SODIUM CHLORIDE, SODIUM LACTATE, POTASSIUM CHLORIDE, CALCIUM CHLORIDE 600; 310; 30; 20 MG/100ML; MG/100ML; MG/100ML; MG/100ML
INJECTION, SOLUTION INTRAVENOUS CONTINUOUS
Status: DISCONTINUED | OUTPATIENT
Start: 2023-10-20 | End: 2023-10-21

## 2023-10-20 RX ORDER — DEXTROSE MONOHYDRATE 25 G/50ML
25-50 INJECTION, SOLUTION INTRAVENOUS
Status: DISCONTINUED | OUTPATIENT
Start: 2023-10-20 | End: 2023-10-24 | Stop reason: HOSPADM

## 2023-10-20 RX ORDER — MAGNESIUM SULFATE HEPTAHYDRATE 40 MG/ML
2 INJECTION, SOLUTION INTRAVENOUS ONCE
Status: COMPLETED | OUTPATIENT
Start: 2023-10-20 | End: 2023-10-20

## 2023-10-20 RX ORDER — ALBUTEROL SULFATE 0.83 MG/ML
2.5 SOLUTION RESPIRATORY (INHALATION)
Status: DISCONTINUED | OUTPATIENT
Start: 2023-10-20 | End: 2023-10-21

## 2023-10-20 RX ORDER — AMOXICILLIN 250 MG
2 CAPSULE ORAL 2 TIMES DAILY PRN
Status: DISCONTINUED | OUTPATIENT
Start: 2023-10-20 | End: 2023-10-24 | Stop reason: HOSPADM

## 2023-10-20 RX ORDER — FLUMAZENIL 0.1 MG/ML
0.2 INJECTION, SOLUTION INTRAVENOUS
Status: DISCONTINUED | OUTPATIENT
Start: 2023-10-20 | End: 2023-10-24 | Stop reason: HOSPADM

## 2023-10-20 RX ORDER — MULTIPLE VITAMINS W/ MINERALS TAB 9MG-400MCG
1 TAB ORAL DAILY
Status: DISCONTINUED | OUTPATIENT
Start: 2023-10-20 | End: 2023-10-24 | Stop reason: HOSPADM

## 2023-10-20 RX ORDER — IPRATROPIUM BROMIDE AND ALBUTEROL SULFATE 2.5; .5 MG/3ML; MG/3ML
3 SOLUTION RESPIRATORY (INHALATION)
Status: DISCONTINUED | OUTPATIENT
Start: 2023-10-20 | End: 2023-10-21

## 2023-10-20 RX ORDER — FOLIC ACID 5 MG/ML
1 INJECTION, SOLUTION INTRAMUSCULAR; INTRAVENOUS; SUBCUTANEOUS DAILY
Status: COMPLETED | OUTPATIENT
Start: 2023-10-21 | End: 2023-10-22

## 2023-10-20 RX ORDER — ACETAMINOPHEN 325 MG/1
650 TABLET ORAL EVERY 8 HOURS PRN
Status: DISCONTINUED | OUTPATIENT
Start: 2023-10-20 | End: 2023-10-24 | Stop reason: HOSPADM

## 2023-10-20 RX ORDER — ACETAMINOPHEN 325 MG/10.15ML
650 LIQUID ORAL EVERY 8 HOURS PRN
Status: DISCONTINUED | OUTPATIENT
Start: 2023-10-20 | End: 2023-10-24 | Stop reason: HOSPADM

## 2023-10-20 RX ORDER — HALOPERIDOL 5 MG/ML
2.5-5 INJECTION INTRAMUSCULAR EVERY 4 HOURS PRN
Status: DISCONTINUED | OUTPATIENT
Start: 2023-10-20 | End: 2023-10-24 | Stop reason: HOSPADM

## 2023-10-20 RX ADMIN — MAGNESIUM SULFATE HEPTAHYDRATE 2 G: 2 INJECTION, SOLUTION INTRAVENOUS at 13:19

## 2023-10-20 RX ADMIN — SODIUM CHLORIDE, POTASSIUM CHLORIDE, SODIUM LACTATE AND CALCIUM CHLORIDE: 600; 310; 30; 20 INJECTION, SOLUTION INTRAVENOUS at 23:01

## 2023-10-20 RX ADMIN — IPRATROPIUM BROMIDE AND ALBUTEROL SULFATE 3 ML: .5; 3 SOLUTION RESPIRATORY (INHALATION) at 16:51

## 2023-10-20 RX ADMIN — NOREPINEPHRINE BITARTRATE 0.03 MCG/KG/MIN: 1 INJECTION, SOLUTION, CONCENTRATE INTRAVENOUS at 13:45

## 2023-10-20 RX ADMIN — CALCIUM GLUCONATE 1 G: 20 INJECTION, SOLUTION INTRAVENOUS at 22:04

## 2023-10-20 RX ADMIN — IPRATROPIUM BROMIDE AND ALBUTEROL SULFATE 3 ML: .5; 3 SOLUTION RESPIRATORY (INHALATION) at 21:29

## 2023-10-20 RX ADMIN — FOLIC ACID 1 MG: 5 INJECTION, SOLUTION INTRAMUSCULAR; INTRAVENOUS; SUBCUTANEOUS at 19:59

## 2023-10-20 RX ADMIN — PIPERACILLIN SODIUM AND TAZOBACTAM SODIUM 4.5 G: 4; .5 INJECTION, POWDER, LYOPHILIZED, FOR SOLUTION INTRAVENOUS at 13:12

## 2023-10-20 RX ADMIN — PANTOPRAZOLE SODIUM 40 MG: 40 INJECTION, POWDER, FOR SOLUTION INTRAVENOUS at 19:39

## 2023-10-20 RX ADMIN — PANTOPRAZOLE SODIUM 40 MG: 40 INJECTION, POWDER, FOR SOLUTION INTRAVENOUS at 12:54

## 2023-10-20 RX ADMIN — IPRATROPIUM BROMIDE AND ALBUTEROL SULFATE 3 ML: 2.5; .5 SOLUTION RESPIRATORY (INHALATION) at 16:51

## 2023-10-20 RX ADMIN — SODIUM CHLORIDE 1000 ML: 9 INJECTION, SOLUTION INTRAVENOUS at 12:44

## 2023-10-20 RX ADMIN — THIAMINE HYDROCHLORIDE 200 MG: 100 INJECTION, SOLUTION INTRAMUSCULAR; INTRAVENOUS at 19:39

## 2023-10-20 RX ADMIN — CEFTRIAXONE SODIUM 2 G: 2 INJECTION, POWDER, FOR SOLUTION INTRAMUSCULAR; INTRAVENOUS at 19:39

## 2023-10-20 RX ADMIN — VANCOMYCIN HYDROCHLORIDE 1750 MG: 10 INJECTION, POWDER, LYOPHILIZED, FOR SOLUTION INTRAVENOUS at 17:07

## 2023-10-20 RX ADMIN — SODIUM CHLORIDE 1000 ML: 9 INJECTION, SOLUTION INTRAVENOUS at 13:12

## 2023-10-20 RX ADMIN — SODIUM CHLORIDE 500 ML: 9 INJECTION, SOLUTION INTRAVENOUS at 14:11

## 2023-10-20 ASSESSMENT — ACTIVITIES OF DAILY LIVING (ADL)
ADLS_ACUITY_SCORE: 37
ADLS_ACUITY_SCORE: 37
ADLS_ACUITY_SCORE: 25
ADLS_ACUITY_SCORE: 25
ADLS_ACUITY_SCORE: 26
ADLS_ACUITY_SCORE: 20

## 2023-10-20 NOTE — CONSULTS
GASTROENTEROLOGY CONSULTATION      Date of Admission:  10/20/2023  Requesting physician: Karson Schaefer MD           Reason for Consultation:   Hematemesis           ASSESSMENT AND RECOMMENDATIONS:   Assessment:  Robert B Behr is a 73 year old male with a history of COPD,AUD disorder, tobacco abuse, admitted to ICU for AMS, EDEN and reported hematemesis x1 with shock (briefly on pressors). GI consulted for hematemesis evaluation.       #.Hematemesis  No risk factors identified. Patient required low dose pressors but normotensive that were stopped and was normotensive at the bed side (MAP 82) with mild tachycardia (low 100), Hb 16. Azotemia likely in the setting of EDEN vs. GIB. No RF for GIB could be identified, including NSAIDS or anticoagulation. In the differential dx, PUD, gastritis reflux esophagitis, erosive gastropathy. No evidence of portal HTN (normal spleen and platelets) or liver dysfunction (normal INR). Hb expected to decrease after fluid resuscitation.    Recommendations  - Keep patient NPO.   - Continue Pantoprazole 40mg IV BID.  - Plan to scope patient tomorrow in the morning unless he becomes HD unstable AND develops hematemesis (red blood). If that is the case, please intubate the patient and call GI.    -- Maintain 2 large bore IVs, 18G or larger.  -- Continue Supportive Cares  - Adequate volume resuscitation with IVF, pRBCs.  - Monitor Hemoglobin closely. Transfuse to keep Hgb > 7 g/dL from GI standpoint.   - Monitor Platelets closely. Keep PLT > 50 10e3/uL from GI standpoint.  - Monitor INR. Goal INR ~ 1.5 for GI endoscopy.   -- Avoid NSAIDs.  -- Analgesics/Antiemetics per primary team.    Thank you for involving us in this patient's care. Please do not hesitate to contact the GI service with any questions or concerns.     Pt care plan discussed with Dr. Post, GI staff physician.    Geoff Farah MD  PGY-4 Gastroenterology Fellow   [phone]    -------------------------------------------------------------------------------------------------------------------           History of Present Illness:   Robert B Behr is a 73 year old male who presents with altered mental status.  Patient was found down outside by neighbors with coffee-ground emesis around him.  He appeared confused.  EMS was called brought him to the ED.  Patient states he does not member what happened and denies active complaints at this time but history is limited due to confusion.             Past Medical History:   Reviewed and edited as appropriate  Past Medical History:   Diagnosis Date    Cataract     COPD (chronic obstructive pulmonary disease) (H)     diagnosed with spirometry     Lung nodules     CT scan 2016    Osteoporosis     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    Tobacco abuse             Past Surgical History:   Reviewed and edited as appropriate   Past Surgical History:   Procedure Laterality Date    CATARACT IOL, RT/LT Left 09/15/2017    s/p CE/IOL left eye    CATARACT IOL, RT/LT Right     PHACOEMULSIFICATION CLEAR CORNEA WITH STANDARD INTRAOCULAR LENS IMPLANT Right 9/30/2016    Procedure: PHACOEMULSIFICATION CLEAR CORNEA WITH STANDARD INTRAOCULAR LENS IMPLANT;  Surgeon: Elly Valencia MD;  [facility]: [facility]    PHACOEMULSIFICATION WITH STANDARD INTRAOCULAR LENS IMPLANT Left 9/15/2017    Procedure: PHACOEMULSIFICATION WITH STANDARD INTRAOCULAR LENS IMPLANT;  Left Eye Phacoemulsification with Standard Lens;  Surgeon: Elly Valencia MD;  Location: [facility] OR    WRIST SURGERY              Social History:   Reviewed and edited as appropriate  Social History     Socioeconomic History    Marital status: Single     Spouse name: Not on file    Number of children: Not on file    Years of education: Not on file    Highest education level: Not on file   Occupational History    Not on file   Tobacco Use    Smoking status: Light Smoker     Packs/day: 2.00     Years: 45.00     Additional pack years:  0.00     Total pack years: 90.00     Types: Cigarettes    Smokeless tobacco: Former    Tobacco comments:     5 cigs per day   Substance and Sexual Activity    Alcohol use: Yes     Alcohol/week: 0.0 standard drinks of alcohol     Comment: occ beer    Drug use: No    Sexual activity: Not Currently   Other Topics Concern    Parent/sibling w/ CABG, MI or angioplasty before 65F 55M? No   Social History Narrative    Single.  Retired.     No children    5 siblings:      No family history of bleeding, clotting disorders or complications with anesthesia.     Social Determinants of Health     Financial Resource Strain: Not on file   Food Insecurity: Not on file   Transportation Needs: Not on file   Physical Activity: Not on file   Stress: Not on file   Social Connections: Not on file   Interpersonal Safety: Not on file   Housing Stability: Not on file            Family History:   Reviewed and edited as appropriate  Family History   Problem Relation Age of Onset    Diabetes Brother         living    Cancer Brother         throat    Macular Degeneration Mother     Colon Cancer No family hx of     Glaucoma No family hx of     Retinal detachment No family hx of     Amblyopia No family hx of     Skin Cancer No family hx of     Melanoma No family hx of    Non-contributory         Allergies:   Reviewed and edited as appropriate   No Known Allergies         Medications:     Medications Prior to Admission   Medication Sig Dispense Refill Last Dose    albuterol (PROAIR HFA) 108 (90 Base) MCG/ACT inhaler INHALE 1 TO 2 PUFFS BY MOUTH EVERY 4 HOURS AS NEEDED FOR SHORTNESS OF BREATH 8.5 g 3     atorvastatin (LIPITOR) 40 MG tablet Take 1 tablet (40 mg) by mouth daily 90 tablet 3     B Complex-C (VITAMIN B COMPLEX W/VITAMIN C) TABS tablet TAKE 1 TABLET BY MOUTH TWICE DAILY WITH FOLIC ACID 180 tablet 3     benzonatate (TESSALON) 100 MG capsule Take 1 capsule (100 mg) by mouth 3 times daily as needed for cough (Patient not taking: Reported on  9/15/2023) 30 capsule 0     Calcium Carbonate-Vitamin D (CALCIUM 600 +D HIGH POTENCY) 600-10 MG-MCG TABS Take 1 tablet by mouth 2 times daily 90 tablet 3     calcium carbonate-vitamin D (CALTRATE) 600-10 MG-MCG per tablet TAKE 1 TABLET BY MOUTH TWICE DAILY 180 tablet 0     fish oil-omega-3 fatty acids 1000 MG capsule Take 1 capsule (1 g) by mouth daily 90 capsule 3     Ginkgo Biloba 60 MG TABS Take 1 tablet by mouth daily 90 tablet 3     glucosamine-chondroitinoitin 750-600 MG TABS Take 2 tablets by mouth 2 times daily 120 tablet 3     ipratropium-albuterol (COMBIVENT RESPIMAT)  MCG/ACT inhaler Inhale 1 puff into the lungs 4 times daily as needed for shortness of breath, wheezing or cough (Inhale 1 puff into the lungs 4 times daily as needed. Do not exceed 6 doses per day.,) 4 g 4     omeprazole (PRILOSEC) 20 MG DR capsule Take 1 capsule (20 mg) by mouth 2 times daily 180 capsule 3     Selenium (SELENIMIN-200) 200 MCG TABS tablet Take 1 tablet (200 mcg) by mouth daily 90 tablet 3     vitamin A 3 MG (96945 UNITS) capsule Take 1 capsule (10,000 Units) by mouth daily 90 capsule 3     vitamin C (ASCORBIC ACID) 1000 MG TABS Take 1 tablet (1,000 mg) by mouth daily 90 tablet 3     vitamin E (TOCOPHEROL) 1000 units (450 mg) CAPS capsule Take 1 capsule (1,000 Units) by mouth daily 90 capsule 3     WIXELA INHUB 500-50 MCG/ACT inhaler Inhale 1 puff into the lungs 2 times daily 60 each 11              Review of Systems:     A complete 10 point review of systems was performed and is negative except as noted in the HPI           Physical Exam:   /76   Pulse 100   Temp (!) 89.6  F (32  C) (Rectal)   Resp 13   Wt 73.6 kg (162 lb 4.1 oz)   SpO2 98%   BMI 24.03 kg/m    Wt:   Wt Readings from Last 2 Encounters:   10/20/23 73.6 kg (162 lb 4.1 oz)   04/03/23 73.9 kg (163 lb)      Constitutional: No acute distress, resting comfortably in bed  Eyes: Sclera anicteric  Ears/nose/mouth/throat: Moist mucus membranes, hearing  intact. Brushing teeth  Neck: supple  CV: No edema  Respiratory: Breathing comfortably on room air  Abd: Soft, nontender, nondistended, bowel sounds present  Skin: warm, perfused, no jaundice  Neuro: AAO x 3  Psych: Normal affect  MSK: No gross deformities         Data:   Labs and imaging below were independently reviewed and interpreted    BMP  Recent Labs   Lab 10/20/23  1623 10/20/23  1239 10/20/23  1233   NA  --  138 141   POTASSIUM  --  4.0 4.9   CHLORIDE  --   --  92*   ANTONIO  --   --  8.8   CO2  --   --  10*   BUN  --   --  50.2*   CR  --   --  2.51*   * 120* 127*     CBC  Recent Labs   Lab 10/20/23  1659 10/20/23  1239 10/20/23  1233   WBC  --   --  22.4*   RBC  --   --  4.95   HGB 14.2 16.7 16.0   HCT  --  49 50.2   MCV  --   --  101*   MCH  --   --  32.3   MCHC  --   --  31.9   RDW  --   --  13.9   PLT  --   --  385     INR  Recent Labs   Lab 10/20/23  1233   INR 1.07     LFTs  Recent Labs   Lab 10/20/23  1233   ALKPHOS 102   AST 59*   ALT 41   BILITOTAL 0.2   PROTTOTAL 7.3   ALBUMIN 4.3      PANC  Recent Labs   Lab 10/20/23  1233   LIPASE 25       Imaging: 10/20/2021 CT Abdomen  IMPRESSION:  1.  Larger appearance of right upper lobe pulmonary mass measuring 5.4  x 3.1 cm, previously 4.9 x 2.9 cm. Additional pulmonary nodules in the  right lung are stable to minimally increased in size.   2.  Severe emphysematous changes of the lung parenchyma.  3.  Large hiatal hernia versus paraesophageal herniation, stable in  appearance.  4.  Large colonic diverticulosis without evidence of diverticulitis.  5.  Hepatic steatosis, with atrophy of the spleen and kidneys, fatty  replacement of the pancreas.   This result has not been signed. Information might be incomplete.        Endoscopy: Nothing on records or care everywhere

## 2023-10-20 NOTE — Clinical Note
Ferdinand Haywood,I am the community care coordinator for Bob Behr through [facility]. You probably saw that he was in the hospital. He has a follow up appointment on Friday with KENYETTA Fish. He is going to speak to her about what they prescribed upon discharge. He didn't want to take the aspirin and said he takes a multi-vitamin so he doesn't need to take the Thiamine/folic acid. I just wanted you to know. Thank you, Leeanna Sigala RN, BSN, PHN [facility] Care Coordinator [phone]

## 2023-10-20 NOTE — PROGRESS NOTES
Critical Care Services Note:    I personally saw and examined the patient with the resident.    The patient s prognosis today is tentative.    I have evaluated all laboratory values and imaging studies from the past 24 hours.    Key findings and decisions made today included:    73 yr old male, history of COPD, RUL lung nodule/mass ( believed to be non-malignant based on biodesix-Dr Taylor following), EtOH abuse, tobacco abuse, admitted to ICU for AMS, shock, EDEN.       Acute hypoxic respiratory failure ( with history of COPD)  Toxometabolic encephalopathy  Shock-septic +/- hypovolemic. Rule out cardiogenic  EDEN  AGMA  Lactic acidosis    PLAN:    Neuro: Monitor mental status. Start CIWA protocol.  Pulm: Stable respiratory status. CT chest reviewed-previously known RUL mass with eccentric calcification-concern for NTM (sputum afb +).   CVS: Shock-target map > 65, trend lactic acid. Bedside POCUS with acceptable rv function. Wean off norepi as tolerated. Echo ordered. Trend troponin. EKG reviewed-no signs of cardiac ischemia.  GI: NPO for now. Concern for GI bleeding (upper)-would not insert ngt given appearance on CT. Consult GI in the morning. Continue PPI BID.   YOLIE: Trend gas, lactic acid. Check osmolar gap. Monitor UO.   ID: Abx-on rocephin now.    The patient Robert B Behr has a high probability of imminent or life-threatening deterioration in the patient s condition.    All treatments were placed at my direction.  I formulated today s plan with the house staff team or resident(s) and agree with the findings and plan in the associated note.       I spent a total of 60 minutes  personally providing and directing critical care services at the bedside for Robert B Behr .  This time is exclusive of time spent on any procedures or teaching.      Karson Schaefer MD [facility]  Associate Professor of Medicine  Division of Pulmonary, Allergy & Critical Care   [facility]

## 2023-10-20 NOTE — ED PROVIDER NOTES
ED Provider Note  [facility]      History     Chief Complaint   Patient presents with    Fall     HPI  Robert B Behr is a 73 year old male who presents with altered mental status.  Patient was found down outside by neighbors with coffee-ground emesis around him.  He appeared confused.  EMS was called brought him to the ED.  Patient states he does not member what happened and denies active complaints at this time but history is limited due to confusion.    Past Medical History  Past Medical History:   Diagnosis Date    Cataract     COPD (chronic obstructive pulmonary disease) (H)     diagnosed with spirometry     Lung nodules     CT scan 2016    Osteoporosis     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    Tobacco abuse      Past Surgical History:   Procedure Laterality Date    CATARACT IOL, RT/LT Left 09/15/2017    s/p CE/IOL left eye    CATARACT IOL, RT/LT Right     PHACOEMULSIFICATION CLEAR CORNEA WITH STANDARD INTRAOCULAR LENS IMPLANT Right 9/30/2016    Procedure: PHACOEMULSIFICATION CLEAR CORNEA WITH STANDARD INTRAOCULAR LENS IMPLANT;  Surgeon: Elly Valencia MD;  Location: [facility]    PHACOEMULSIFICATION WITH STANDARD INTRAOCULAR LENS IMPLANT Left 9/15/2017    Procedure: PHACOEMULSIFICATION WITH STANDARD INTRAOCULAR LENS IMPLANT;  Left Eye Phacoemulsification with Standard Lens;  Surgeon: Elly Valencia MD;  Location: [facility] OR    WRIST SURGERY       albuterol (PROAIR HFA) 108 (90 Base) MCG/ACT inhaler  atorvastatin (LIPITOR) 40 MG tablet  B Complex-C (VITAMIN B COMPLEX W/VITAMIN C) TABS tablet  benzonatate (TESSALON) 100 MG capsule  Calcium Carbonate-Vitamin D (CALCIUM 600 +D HIGH POTENCY) 600-10 MG-MCG TABS  calcium carbonate-vitamin D (CALTRATE) 600-10 MG-MCG per tablet  fish oil-omega-3 fatty acids 1000 MG capsule  Ginkgo Biloba 60 MG TABS  glucosamine-chondroitinoitin 750-600 MG TABS  ipratropium-albuterol (COMBIVENT RESPIMAT)  MCG/ACT inhaler  omeprazole (PRILOSEC) 20 MG   capsule  Selenium (SELENIMIN-200) 200 MCG TABS tablet  vitamin A 3 MG (08394 UNITS) capsule  vitamin C (ASCORBIC ACID) 1000 MG TABS  vitamin E (TOCOPHEROL) 1000 units (450 mg) CAPS capsule  WIXELA INHUB 500-50 MCG/ACT inhaler      No Known Allergies  Family History  Family History   Problem Relation Age of Onset    Diabetes Brother         living    Cancer Brother         throat    Macular Degeneration Mother     Colon Cancer No family hx of     Glaucoma No family hx of     Retinal detachment No family hx of     Amblyopia No family hx of     Skin Cancer No family hx of     Melanoma No family hx of      Social History   Social History     Tobacco Use    Smoking status: Light Smoker     Packs/day: 2.00     Years: 45.00     Additional pack years: 0.00     Total pack years: 90.00     Types: Cigarettes    Smokeless tobacco: Former    Tobacco comments:     5 cigs per day   Substance Use Topics    Alcohol use: Yes     Alcohol/week: 0.0 standard drinks of alcohol     Comment: occ beer    Drug use: No         A medically appropriate review of systems was performed with pertinent positives and negatives noted in the HPI, and all other systems negative.    Physical Exam   BP: (!) 89/70  Pulse: 120  Temp: (!) 89.6  F (32  C)  Resp: 18  SpO2: 96 %  Physical Exam  Constitutional:       General: He is not in acute distress.     Appearance: He is ill-appearing and toxic-appearing.   HENT:      Head: Normocephalic and atraumatic.      Mouth/Throat:      Mouth: Mucous membranes are dry.      Comments: Dried blood present  Eyes:      Extraocular Movements: Extraocular movements intact.      Pupils: Pupils are equal, round, and reactive to light.   Cardiovascular:      Rate and Rhythm: Regular rhythm. Tachycardia present.      Heart sounds: No murmur heard.     No gallop.   Pulmonary:      Effort: Pulmonary effort is normal. No respiratory distress.      Breath sounds: Rhonchi present. No rales.   Abdominal:      General: There is no  distension.      Palpations: Abdomen is soft.      Tenderness: There is no abdominal tenderness. There is no guarding.   Musculoskeletal:         General: No swelling. Normal range of motion.      Cervical back: Normal range of motion.   Skin:     General: Skin is dry.      Comments: Cool skin   Neurological:      Mental Status: He is disoriented.      Comments: Answers questions appropriately  Non focal           ED Course, Procedures, & Data      [facility]    -Central Line    Date/Time: 10/20/2023 3:04 PM    Performed by: Juan Lewis MD  Authorized by: Juan Lewis MD    Emergent condition/consent implied      PRE-PROCEDURE DETAILS:     Hand hygiene: Hand hygiene performed prior to insertion      Sterile barrier technique: All elements of maximal sterile technique followed      Skin preparation:  ChloraPrep    Skin preparation agent: Skin preparation agent completely dried prior to procedure         ANESTHESIA    Local Anesthetic:  Lidocaine 1% without epinephrine    PROCEDURE DETAILS:     Location:  R internal jugular    Site selection rationale:  Patient positioning    Patient position:  Flat    Procedural supplies:  Triple lumen    Catheter size:  7 Fr    Landmarks identified: yes      Ultrasound guidance: yes      Sterile ultrasound techniques: Sterile gel and sterile probe covers were used      Number of attempts:  1    Successful placement: yes      POST PROCEDURE DETAILS:      Post-procedure:  Dressing applied and line sutured    Assessment:  Blood return through all ports and free fluid flow    PROCEDURE    Patient Tolerance:  Patient tolerated the procedure well with no immediate complications                        Results for orders placed or performed during the hospital encounter of 10/20/23   [place] Draw     Status: None (In process)    Narrative    The following orders were created for panel order [place] Draw.  Procedure                                Abnormality         Status                     ---------                               -----------         ------                     Extra Blue Top Tube[[id number]]                              In process                 Extra Red Top Tube[[id number]]                               In process                 Extra Green Top (Lithium...[[id number]]                      In process                 Extra Purple Top Tube[[id number]]                            In process                   Please view results for these tests on the individual orders.   [place] Draw     Status: None ()    Narrative    The following orders were created for panel order [place] Draw.  Procedure                               Abnormality         Status                     ---------                               -----------         ------                     Extra Green Top (Lithium...[[id number]]                                                   Please view results for these tests on the individual orders.   iStat Gases Electrolytes ICA Glucose Venous, POCT     Status: Abnormal   Result Value Ref Range    CPB Applied No     Hematocrit POCT 49 40 - 53 %    Calcium, Ionized Whole Blood POCT 4.0 (L) 4.4 - 5.2 mg/dL    Glucose Whole Blood POCT 120 (H) 70 - 99 mg/dL    Bicarbonate Venous POCT 15 (L) 21 - 28 mmol/L    Hemoglobin POCT 16.7 13.3 - 17.7 g/dL    Potassium POCT 4.0 3.4 - 5.3 mmol/L    Sodium POCT 138 133 - 144 mmol/L    pCO2 Venous POCT 50 40 - 50 mm Hg    pO2 Venous POCT 54 (H) 25 - 47 mm Hg    pH Venous POCT 7.07 (LL) 7.32 - 7.43    O2 Sat, Venous POCT 74 (L) 94 - 100 %   iStat Troponin, POCT     Status: Normal   Result Value Ref Range    TROPPC POCT 0.02 <=0.12 ug/L   EKG 12 lead     Status: None (Preliminary result)   Result Value Ref Range    Systolic Blood Pressure  mmHg    Diastolic Blood Pressure  mmHg    Ventricular Rate 82 BPM    Atrial Rate 82 BPM    NC Interval 160 ms    QRS Duration 136 ms     ms    QTc 532 ms     P Axis 19 degrees    R AXIS 75 degrees    T Axis 64 degrees    Interpretation ECG       Sinus rhythm  Right bundle branch block  Septal infarct , age undetermined  Abnormal ECG       Medications   sodium chloride 0.9% BOLUS 1,000 mL (1,000 mLs Intravenous $New Bag 10/20/23 1247)   pantoprazole (PROTONIX) IV push injection 40 mg (40 mg Intravenous $Given 10/20/23 1255)     Labs Ordered and Resulted from Time of ED Arrival to Time of ED Departure   ISTAT GASES ELECTROLYTES ICA GLUCOSE VENOUS POCT - Abnormal       Result Value    CPB Applied No      Hematocrit POCT 49      Calcium, Ionized Whole Blood POCT 4.0 (*)     Glucose Whole Blood POCT 120 (*)     Bicarbonate Venous POCT 15 (*)     Hemoglobin POCT 16.7      Potassium POCT 4.0      Sodium POCT 138      pCO2 Venous POCT 50      pO2 Venous POCT 54 (*)     pH Venous POCT 7.07 (*)     O2 Sat, Venous POCT 74 (*)    ISTAT TROPONIN POCT - Normal    TROPPC POCT 0.02     PARTIAL THROMBOPLASTIN TIME   INR   COMPREHENSIVE METABOLIC PANEL   TROPONIN T, HIGH SENSITIVITY   LIPASE   LACTIC ACID WHOLE BLOOD   ROUTINE UA WITH MICROSCOPIC REFLEX TO CULTURE   MAGNESIUM   ETHYL ALCOHOL LEVEL   BLOOD CULTURE   BLOOD CULTURE   ABO/RH TYPE AND SCREEN     XR Chest Port 1 View    (Results Pending)          Critical care was performed.   Critical Care Addendum  My initial assessment, based on my review of prehospital provider report, review of nursing observations, review of vital signs, focused history, physical exam, review of cardiac rhythm monitor, and 12 lead ECG analysis, established a high suspicion that Robert B Behr has sepsis with indication for early goal-directed therapy, severe hypotension, and severe hypothermia, which requires immediate intervention, and therefore he is critically ill.     After the initial assessment, the care team initiated multiple lab tests, initiated IV fluid administration, initiated medication therapy with abx, and achieved central venous access to  provide stabilization care. Due to the critical nature of this patient, I reassessed nursing observations, vital signs, physical exam, 12 lead ECG analysis, mental status, neurologic status, and respiratory status multiple times prior to his disposition.     Time also spent performing documentation, reviewing test results, discussion with consultants, and coordination of care.     Critical care time (excluding teaching time and procedures): 45 minutes.     Assessment & Plan    73-year-old male presenting for altered mental status.  Patient was hypothermic, hypotensive, tachycardic on arrival.    I have reviewed the nursing notes. I have reviewed the findings, diagnosis, plan and need for follow up with the   Patient did have a short run of V. tach on arrival and did receive a very brief period of CPR with immediate ROSC.  Did not require any medications for this.  Review of the monitor shows V. tach versus torsades.  Subsequent EKG showed A-fib with a prolonged QT and then he then returned to sinus rhythm but still had a prolonged QT.  In the setting of this possible torsades empiric magnesium was given.    Point-of-care testing showed significant elevated lactic acid, acidosis.  Possibly septic though no focal source infection identified.  Empiric antibiotics, fluids were initiated.    Subsequent blood work showed leukocytosis of 22.4, elevated alcohol.  Chest x-ray redemonstrated known consolidation thought to be due to MAC.  Given prolonged QT will defer antibiotics for this.    Patient blood pressure did not improve with fluid administration and so a nor epi drip was started.    Patient admitted the ICU for further management.    New Prescriptions    No medications on file       Final diagnoses:   None       Juan Lewis  [facility] EMERGENCY DEPARTMENT  10/20/2023     Juan Lewis MD  10/20/23 9198

## 2023-10-20 NOTE — H&P
MEDICAL ICU H&P  10/20/2023    Date of Hospital Admission: October 20, 2023  Date of ICU Admission: October 20, 2023  Reason for Critical Care Admission: Shock, hemorrhagic vs septic  Date of Service (when I saw the patient): 10/20/2023    ASSESSMENT: Robert B Behr is a 73 year old male with PMH tobacco use disorder, COPD, GERD w/o known esophagitis, and chronic MAC who was admitted to the ICU on 10/20/2023 after being found down outside at home by neighbor with bloody emesis. Hypotensive in the ED concerning for hemorrhagic vs septic shock, likely multifactorial. He received appropriate fluid resuscitation and required initiation of vasopressors. Admitted to ICU for encephalopathy and shock.       PLAN:    Neuro:  #Toxic encephalopathy  Patient altered arrival to the ED.  EtOH level 0.3.  Suspect mixed alcohol induced metabolic encephalopathy in setting of sepsis.  Patient denies alcohol use today, will check serum osm gap.  Patient mental status clearing with treatments as documented below.  Head CT obtained as he was found down and without acute intracranial abnormality.  On arrival to the ICU patient is alert and oriented x3.   - Head CT w/o acute intracranial abnormality   - CIWA w/ ativan   - Thiamine @ Wernicke dosing   - Folic acid  - Check tess osm     # Pain and sedation  - Tylenol prn  - NO NSAIDs       Pulmonary:  # Acute hypoxemic respiratory failure   Suspect ISO aspiration w/ emesis & GI bleed on top of pre-existing COPD.  Patient requiring 3 L nasal cannula at time of arrival to unit.  Bilateral wheezing on exam but nothing concerning for focal consolidation. Does have known RUL pulmonary mass that appears larger today did have negative biodesix-Dr with primary pulmonologist, however given interval increase in size can consider biopsy when stable   - CT chest with stable COPD changes, larger appearance of RUL pulmonary mass    - oxygen prn, goal SpO2 > 92%     #COPD   -Schedule duo nebs   -PRN  albuterol   -Restart pta inhalers when patient able       Cardiovascular:  # Shock, septic vs hemorrhagic   Patient found down surrounded by hematemesis.  Hypothermic w/ Tmin 89.6 F.  Leukocytosis (WBC 22), lactic acid 13 on arrival.  Suspect mixed shock with sepsis and hypovolemic/hemorrhagic.  No clear source of infection at this time however strongly suspect aspiration pneumonia in setting of history and ongoing toxic encephalopathy.  Patient was appropriately fluid resuscitated in the ER and remained hypotensive.  Central line was placed in the right IJ and vasopressors were initiated.  -S/p 2.5 L LR fluid resuscitation in ED   - Repeat Lactic acid down trending to 8.9 on repeat check, will trend q4H overnight   - norepinephrine gtt, wean      #Tropinemia, likely Type 2 demand ischemia   High sensitivity troponin 126 on arrival to the ED.  EKG obtained and no evidence of dynamic ST changes.  Uptrending on initial check in a few troponin 392.  Patient is without chest pain.  Low suspicion for ACS at this time and will strongly suspect demand ischemia in setting of hypotension.    -Trend troponin to peak  -If patient develops ACS symptoms recommend repeat EKG    #VT   Patient with reported VT in ED with concern for NSVT vs brief VT arrest with rapid ROSC, given patient alert and conversational on arrival to unit favor NSVT.   -Telemetry   -Maintain Mg > 2, K > 4; high intensity protocols ordered     GI/Nutrition:  # Hematemesis   # Hx of GERD w/o known esophagitis   Patient found and started by hematemesis.  On arrival to ED noted to have coffee-ground residual in oropharynx and crusting on face.  No known history of GI bleed.  Hemoglobin 16 on initial arrival to ED stable on repeat check.  Patient does have a history of GERD but no documented evidence of esophagitis and chart review.  Denies ASA or NSAID use. Per patient report was not taking prescribed PPI at home.  No evidence of liver disease in patient chart,  on exam, on CT abdomen today.  Lower suspicion for variceal bleed.  Strongly suspect esophagitis, gastritis, or bleeding ulcer.  - PPI 40 mg IV BID   - GI consulted, will plan to scope patient in the morning or sooner if unstable  - Patient okay for clear liquid diet overnight  -Trend hemoglobin as per heme       Renal/Fluids/Electrolytes:  # Acute Kidney Injury   Cr. 2.51 from b/l 0.8. BUN elevated at 52.  Labs and is most consistent with prerenal EDEN in setting of shock.  Also consider toxic kidney injury.  Will check ethylene glycol level.  Not on any nephrotoxic medications.  -S/p fluid resuscitation as above  -Avoid nephrotoxic agents  -Trend BMP daily  - Goal K > 4.0, Mg > 2.0    # Acute metabolic acidosis   Patient with acute metabolic lactic acidosis with bicarb of 10 on arrival to the ED and anion gap > 30.  Lactic acid 13 on arrival to ED most likely metabolic acidosis in setting of lactic acidosis.   - Fluids and pressors as per CV   - Trend lactic acid q4H overnight       Endocrine:  # No active issues   - trend glucoses q4H per protocol      ID:  # Septic shock   History and presentation most concerning for aspiration pneumonia in setting of altered mental status and EtOH intoxication.  No other obvious source of infection however could consider GI translocation in setting of GI inflammation with bleed.  Blood cultures obtained and pending at time of admission.  Urine culture ordered patient yet to void.  Received IV vancomycin and Zosyn in the ED.  No known risk factors for pseudomonal infection at this time.  We will plan to narrow antibiotics on arrival to the unit as able.  - MRSA nares   - 10/19 Bclx: pending   - 10/19 Uclx: pending   - Ceftriaxone 1 mg Q24H   - Continue vancomycin, pharmacy to dose. If MRSA nares negative will narrow        Hematology:    # Hematemeses  # Hemorrhagic shock   Found down surrounding by bloody & coffee ground emesis. Hgb 16 on arrival, suspect will drop on repeat  check as likely hemo concentrated and not yet reflective of acute bleed. No emesis or evidence of continued bleeding on arrival to ICU.    -Stat hemoglobin   -CBC q12H   -Coags daily in AM   -Transfuse Hgb > 7, Plt > 20, Fibrinogen > 100        Musculoskeletal:  # At risk for ICU deconditioning   -PT/OT      Skin:  # At risk for pressure injury   - monitor clinically     General Cares/Prophylaxis:    DVT Prophylaxis: Pneumatic Compression Devices  GI Prophylaxis: PPI  Restraints: prn for lines/agitation   Family Communication: patient declined   Code Status: FULL, unable to confirm with par    Lines/tubes/drains:  - pIV x2   - R internal jugular CVC     Disposition:  - Admit to Medical ICU     Patient seen and findings/plan discussed with medical ICU staff, Dr. Schaefer .    Elva Posey MD   Internal Medicine-Pediatrics, PGY3  [facility]        Clinically Significant Risk Factors Present on Admission          # Hypocalcemia: Lowest iCa = 4 mg/dL in last 2 days, will monitor and replace as appropriate    # Anion Gap Metabolic Acidosis: Highest Anion Gap = 39 mmol/L in last 2 days, will monitor and treat as appropriate     # Acute Kidney Injury, unspecified: based on a >150% or 0.3 mg/dL increase in last creatinine compared to past 90 day average, will monitor renal function    # Circulatory Shock: currently requiring pressors for blood pressure support                      -----------------------------------------------------------------------    HISTORY PRESENTING ILLNESS:     Robert B Behr is a 73 year old male with PMH tobacco use disorder, COPD, GERD w/o known esophagitis, and chronic MAC who was admitted to the ICU on 10/20/2023 with altered mental status and shock in setting of GI bleed.     Per ED report patient was found down outside of home by neighbors surrounded by coffee-ground emesis.  EMS was called and brought him to the ED.  At time of arrival to the emergency department patient denied  "any concerns and stated he \"wanted to go home\". In the ED patient was volume resuscitated and started on broad spectrum antibiotics however despite interventions patient continued to be hypotensive right internal jugular central line was placed for IV pressors.     On arrival to the ICU patient was alert and oriented x3 able to tell provider that he was the hospital but he was not sure why.  He states he feels fine and would like to go home and have a drink of water.  Sergio reports that he remembers working on his car this morning and then fell and woke up in the ER.  He denies any alcohol use, illicit drug use, or other toxic ingestion.  Reports his only daily medications are inhalers for his COPD.  Not taking any pills.  He denies any ASA or NSAID use.  He denies headache, dizziness, chest pain, shortness of breath, abdominal pain, changes in bowel or bladder, or extremity swelling.  He denies any prior hematemesis or coffee-ground emesis.  He denies any prior BRBPR or melena.        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    Tobacco abuse      SURGICAL HISTORY:  Past Surgical History:   Procedure Laterality Date    CATARACT IOL, RT/LT Left 09/15/2017    s/p CE/IOL left eye    CATARACT IOL, RT/LT Right     PHACOEMULSIFICATION CLEAR CORNEA WITH STANDARD INTRAOCULAR LENS IMPLANT Right 9/30/2016    Procedure: PHACOEMULSIFICATION CLEAR CORNEA WITH STANDARD INTRAOCULAR LENS IMPLANT;  Surgeon: Elly Valencia MD;  [facility]: [facility]    PHACOEMULSIFICATION WITH STANDARD INTRAOCULAR LENS IMPLANT Left 9/15/2017    Procedure: PHACOEMULSIFICATION WITH STANDARD INTRAOCULAR LENS IMPLANT;  Left Eye Phacoemulsification with Standard Lens;  Surgeon: " Elly Valencia MD;  Location: [facility] OR    WRIST SURGERY       SOCIAL HISTORY:  Social History     Socioeconomic History    Marital status: Single   Tobacco Use    Smoking status: Light Smoker     Packs/day: 2.00     Years: 45.00     Additional pack years: 0.00     Total pack years: 90.00     Types: Cigarettes    Smokeless tobacco: Former    Tobacco comments:     5 cigs per day   Substance and Sexual Activity    Alcohol use: Yes     Alcohol/week: 0.0 standard drinks of alcohol     Comment: occ beer    Drug use: No    Sexual activity: Not Currently   Other Topics Concern    Parent/sibling w/ CABG, MI or angioplasty before 65F 55M? No   Social History Narrative    Single.  Retired.     No children    5 siblings:      No family history of bleeding, clotting disorders or complications with anesthesia.     FAMILY HISTORY:   Family History   Problem Relation Age of Onset    Diabetes Brother         living    Cancer Brother         throat    Macular Degeneration Mother     Colon Cancer No family hx of     Glaucoma No family hx of     Retinal detachment No family hx of     Amblyopia No family hx of     Skin Cancer No family hx of     Melanoma No family hx of      ALLERGIES:   No Known Allergies  MEDICATIONS:  No current facility-administered medications on file prior to encounter.  albuterol (PROAIR HFA) 108 (90 Base) MCG/ACT inhaler, INHALE 1 TO 2 PUFFS BY MOUTH EVERY 4 HOURS AS NEEDED FOR SHORTNESS OF BREATH  atorvastatin (LIPITOR) 40 MG tablet, Take 1 tablet (40 mg) by mouth daily  B Complex-C (VITAMIN B COMPLEX W/VITAMIN C) TABS tablet, TAKE 1 TABLET BY MOUTH TWICE DAILY WITH FOLIC ACID  benzonatate (TESSALON) 100 MG capsule, Take 1 capsule (100 mg) by mouth 3 times daily as needed for cough (Patient not taking: Reported on 9/15/2023)  Calcium Carbonate-Vitamin D (CALCIUM 600 +D HIGH POTENCY) 600-10 MG-MCG TABS, Take 1 tablet by mouth 2 times daily  calcium carbonate-vitamin D (CALTRATE) 600-10 MG-MCG per tablet, TAKE 1  TABLET BY MOUTH TWICE DAILY  fish oil-omega-3 fatty acids 1000 MG capsule, Take 1 capsule (1 g) by mouth daily  Ginkgo Biloba 60 MG TABS, Take 1 tablet by mouth daily  glucosamine-chondroitinoitin 750-600 MG TABS, Take 2 tablets by mouth 2 times daily  ipratropium-albuterol (COMBIVENT RESPIMAT)  MCG/ACT inhaler, Inhale 1 puff into the lungs 4 times daily as needed for shortness of breath, wheezing or cough (Inhale 1 puff into the lungs 4 times daily as needed. Do not exceed 6 doses per day.,)  omeprazole (PRILOSEC) 20 MG DR capsule, Take 1 capsule (20 mg) by mouth 2 times daily  Selenium (SELENIMIN-200) 200 MCG TABS tablet, Take 1 tablet (200 mcg) by mouth daily  vitamin A 3 MG (75520 UNITS) capsule, Take 1 capsule (10,000 Units) by mouth daily  vitamin C (ASCORBIC ACID) 1000 MG TABS, Take 1 tablet (1,000 mg) by mouth daily  vitamin E (TOCOPHEROL) 1000 units (450 mg) CAPS capsule, Take 1 capsule (1,000 Units) by mouth daily  WIXELA INHUB 500-50 MCG/ACT inhaler, Inhale 1 puff into the lungs 2 times daily        PHYSICAL EXAMINATION:  Temp:  [89.6  F (32  C)] 89.6  F (32  C)  Pulse:  [] 85  Resp:  [18] 18  BP: ()/(38-70) 101/50  SpO2:  [85 %-96 %] 93 %  General: Alert and oriented to location, month and self. Confused at times , appears stated age. Appears ill but in no acute distress  HEENT: Conjunctiva clear, PERRL, Dry mucous membraes with lips and oropharynx crusted with dried, coffee ground emesis. Posterior pharynx significantly swollen, no exudates. Edentulous    Neuro: A&Ox3, NAD, appears to be confused at times with tangential speech, easily suggestible   Pulm/Resp: Good air entry bilaterally with expiratory wheeze scattered throughout. No coarse breath sounds or crackles   CV: RRR, Nl S1, S2. WWP extremities   Abdomen: Distended but soft and non tender. No fluid wave.   Incisions/Skin: No obvious rashes or lesions. No petechiae or angiomas.     LABS: Reviewed.   Arterial Blood Gases   No  lab results found in last 7 days.  Complete Blood Count   Recent Labs   Lab 10/20/23  1239 10/20/23  1233   WBC  --  22.4*   HGB 16.7 16.0   PLT  --  385     Basic Metabolic Panel  Recent Labs   Lab 10/20/23  1239 10/20/23  1233    141   POTASSIUM 4.0 4.9   CHLORIDE  --  92*   CO2  --  10*   BUN  --  50.2*   CR  --  2.51*   * 127*     Liver Function Tests  Recent Labs   Lab 10/20/23  1233   AST 59*   ALT 41   ALKPHOS 102   BILITOTAL 0.2   ALBUMIN 4.3   INR 1.07     Coagulation Profile  Recent Labs   Lab 10/20/23  1233   INR 1.07   PTT 26       IMAGING:  Recent Results (from the past 24 hour(s))   XR Chest Port 1 View    Narrative    Portable chest    INDICATION: Hematemesis    COMPARISON: Cancer screening CT 8/16/2023    FINDINGS:  Heart size normal. Patchy densities in the upper lungs made to prior  fibrosis or consolidation. This area was quite dense in the right  upper lobe on the previous CT. No abnormal air collection.      Impression    IMPRESSION: Continued patchy densities in the right upper lung from  recent August the 80. CT also showed hyperinflation which is not as  appreciated on this exam.    MAGDALENA MARTINEZ MD         SYSTEM ID:  [id number]

## 2023-10-20 NOTE — PROGRESS NOTES
TRANSITIONS OF CARE (KEN) LOG    KEN tasks should be completed by the CC within one (1) business day of notification of each transition. Follow up contact with member is required after return to their usual care setting.  Note:  If CC finds out about the transitions fifteen (15) days or more after the member has returned to their usual care setting, no KEN log is needed. However, the CC should check in with the member to discuss the transition process, any changes needed to the care plan and document it in a case note.     Member Name:  Robert B Behr MCO Name:  [employer] Member ID#:  [id number]   Product: [facility] Care Coordinator Contact:  Leeanna Sigala RN, BSN, PHN Agency/County/Care System: [facility]   Transition Communication Actions from Care Management Contact   Transition #1   Notification Date: 10/20/23 Transition Date:   10/20/23 Transition From: Home     Is this the member s usual care setting?               yes Transition To: [facility]   Transition Type:  Unplanned    Documentation from conversation with the member/responsible party, provider, discharging and receiving facility:   Date: 10/20/23: Received notification of admission to hospital with dx of cardiomyopathy.  CC contacted Hospital /discharge planner, Alison Graves (Name) via the [employer] Transitional Care Hand-In Process, with community care plan included.  CC did not reached out to member regarding transition as he is currently in ICU  Reviewed and update care plan as needed.  Notified community service providers and placed services None on hold as needed.  Transition log initiated.   PCP, No primary care provider on file., notified of hospitalization via EMR.  Leeanna Sigala RN, BSN, PHN  [facility] Care Coordinator  [phone]       Transition #2   Notification Date: 10/25/23 Transition Date:   10/24/23 Transition From: [facility]  [facility]     Is this the member s usual care setting?               no Transition To: Home   Transition Type:  Planned    Documentation from conversation with the member/responsible party, provider, discharging and receiving facility:   Date: 10/25/23: Received notification of transition to home.  CC contacted member and reviewed discharge summary.  Member has a follow-up appointment with PCP in 7 days: Yes: scheduled on 10/27/23    Member has had a change in condition: No  Home visit needed: No  Care plan reviewed and updated.  The following home based services None were resumed.  New referrals placed: No  Transition log completed.   PCP, Chastity Arguello, notified of transition back to home via EMR.  Leeanna Sigala RN, BSN, PHN  [facility] Care Coordinator  [phone]         *RETURN TO USUAL CARE SETTING: *Complete tasks below when the member is discharging TO their usual care setting within one (1) business day of notification..      For situations where the Care Coordinator is notified of the discharge prior to the date of discharge, the Care Coordinator must follow up with the member or designated representative to confirm that discharge actually occurred and discuss required KEN tasks as outlined in the KEN Instructions.  (This includes situations where it may be a  new  usual care setting for the member. (i.e., a community member who decides upon permanent nursing home placement following hospitalization and rehab).    Discuss with Member/Responsible Party:    Check  Yes  - if the member, family member and/or SNF/facility staff manages the following:    If  No  provide explanation in the comments section.          Date completed: 10/25/23 Communicated with member or their designated representative about the following:  care transition process; about changes to the member s health status; plan of care updates; education about transitions and how to prevent unplanned  transitions/readmissions    Four Pillars for Optimal Transition:    Check  Yes  - if the member, family member and/or SNF/facility staff manages the following:    If  No  provide explanation in the comments section.          [x]  Yes     []  No Does the member have a follow-up appointment scheduled with primary care or specialist? (Mental health hospitalizations--the appt. should be w/in 7 days)              For mental health hospitalizations:  []  Yes     []  No     Does the member have a follow-up appointment scheduled with a mental health practitioner within 7 days of discharge?  [x]  Yes     []  No     Has a medication review been completed with member? If no, refer to PCP, home care nurse, MTM, pharmacist  [x]  Yes     []  No     Can the member manage their medications or is there a system in place to manage medications (e.g. home care set-up)?         [x]  Yes     []  No     Can the member verbalize warning signs and symptoms to watch for and how to respond?  [x]  Yes     []  No     Does the member have a copy of and understand their discharge instructions?  If no, assist to obtain copy of discharge instructions, review discharge instructions, and assist to contact PCP to discuss questions about their recent hospitalization.  [x]  Yes     []  No     Does the member have adequate food, housing and transportation?  If no, add goal and discuss additional supports available to the member                                                                                                                                                                                 [x]  Yes     []  No     Is the member safe in their home?  If no, document needs and support provided                                                                                                                                                                          []  Yes     [x]  No     Are there any concerns of vulnerability, abuse, or neglect?  If  yes, document concerns and actions taken by Care Coordinator as a mandated                                                                                                                                                                              [x]  Yes     []  No     Does the member use a Personal Health Care Record?  Check  Yes  if visit summary, discharge summary, and/or healthcare summary are being used as a PHR.                                                                                                                                                                                  [x]  Yes     []  No     Have you reviewed the discharge summary with the member? If  No  provide explanation in comments.  [x]  Yes     []  No     Have you updated the member s care plan/support plan? Add new diagnosis, medications, treatments, goals & interventions, as applicable. If No, provide explanation in comments.    Comments:           Notes from conversation with the member/responsible party, provider, discharging and receiving facility (as applicable): See above

## 2023-10-20 NOTE — ED TRIAGE NOTES
BIBA  Found down on the sidewalk by neighbors with coffee ground emesis   Unsure how long pt had been outside, very cold  In afib RVR  Intermittent confusion  +etoh       Triage Assessment (Adult)       Row Name 10/20/23 1231          Triage Assessment    Airway WDL WDL        Respiratory WDL    Respiratory WDL X;cough     Cough Type congested        Skin Circulation/Temperature WDL    Skin Circulation/Temperature WDL WDL        Cardiac WDL    Cardiac WDL X     Cardiac Rhythm Atrial fibrillation        Peripheral/Neurovascular WDL    Peripheral Neurovascular WDL WDL        Cognitive/Neuro/Behavioral WDL    Cognitive/Neuro/Behavioral WDL X     Level of Consciousness intermittent confusion        [place] Coma Scale    Best Eye Response 3-->(E3) to speech     Best Motor Response 6-->(M6) obeys commands     Best Verbal Response 4-->(V4) confused     [place] Coma Scale Score 13

## 2023-10-20 NOTE — PHARMACY-VANCOMYCIN DOSING SERVICE
Pharmacy Vancomycin Initial Note  Date of Service 2023  Patient's  1950  73 year old, male    Indication: Sepsis    Current estimated CrCl = CrCl cannot be calculated (Unknown ideal weight.).    Creatinine for last 3 days  10/20/2023: 12:33 PM Creatinine 2.51 mg/dL    Recent Vancomycin Level(s) for last 3 days  No results found for requested labs within last 3 days.      Vancomycin IV Administrations (past 72 hours)        No vancomycin orders with administrations in past 72 hours.                    Nephrotoxins and other renal medications (From now, onward)      Start     Dose/Rate Route Frequency Ordered Stop    10/20/23 1600  vancomycin (VANCOCIN) 1,750 mg in sodium chloride 0.9 % 500 mL intermittent infusion         1,750 mg  over 120 Minutes Intravenous ONCE 10/20/23 1516      10/20/23 1517  vancomycin place funk - receiving intermittent dosing         1 each Intravenous SEE ADMIN INSTRUCTIONS 10/20/23 1517      10/20/23 1340  norepinephrine (LEVOPHED) 4 mg/250 mL NS infusion ADULT (PERIPHERAL)         0.03-0.125 mcg/kg/min × 73.9 kg  8.3-34.6 mL/hr  Intravenous CONTINUOUS 10/20/23 1336              Contrast Orders - past 72 hours (72h ago, onward)      None                    Plan:  Give vancomycin 1750mg IV x1 now, will dose intermittently based on levels for now due to EDEN on admit  Vancomycin monitoring method: Trough (Method 2 = manual dose calculation)  Vancomycin therapeutic monitoring goal: 15-20 mg/L  Pharmacy will check vancomycin levels as appropriate in 1-3 Days.    Serum creatinine levels will be ordered daily for the first week of therapy and at least twice weekly for subsequent weeks.      Myles Fried, PharmD, BCPS

## 2023-10-20 NOTE — PROGRESS NOTES
[facility] Care Coordination Contact    [facility]  Ambulatory Care Coordination to Inpatient Care Management   Hand-In Communication    Date:  October 20, 2023  Name: Robert B Behr is enrolled in [facility] Care Coordination program and I am the Lead Care Coordinator.  CC Contact Information:. Leeanna Sigala, [facility], [phone] - cell  Payor Source: [facility] / Product Type: HMO /   Current services in place: No services    Please see the CC Snaphot and Care Management Flowsheets for specific  details of this Robert B Behr care plan.   Additional details/specific concerns r/t this admission:    No additional concerns at this time      I will follow this admission in Epic. Please feel free to contact me with questions or for further collaboration in discharge planning.    Leeanna Sigala, RN, BSN, PHN  [facility] Care Coordinator  [phone]

## 2023-10-20 NOTE — PLAN OF CARE
ICU End of Shift Summary. See flowsheets for vital signs and detailed assessment.    Changes this shift: Transferred from ED to ICU at 1600. CT of head/chest/abdomen pelvis completed. GI consulted for coffee ground emesis that occurred in ED. Came up on Levophed at 0.08, now weaned off since 1645. Oxymask weaned to 2L nasal cannula. Lactic down to 8.9 from 11.0. VBG with pH of 7.10 and Bicard 13. Troponin T up to 392, providers aware, EKG done, echo pending, plan to trend trops overnight. No complaints of pain or headache. Patient is Aox4, but forgetful, follows commands in all extremities. PERRLA. CIWA score of 6. Complains of shortness of breath, tachypnea and accessory muscle use, wheezes on expiration, duonebs ordered. Due to void. COVID/Flu negative. Unable to obtain temperature, bear hugger applied. 2 PIVs in place and triple lumen internal jugular. iCal 4.0, MICU paged for replacement. Patient updated on plan of care.     Plan: Holding off on NG placement for now per MICU 2. Straight cath if patient unable to void by 1900. Trend trops and VBGs overnight. NPO overnight, plan for EGD tomorrow morning per GI team.         Goal Outcome Evaluation:      Plan of Care Reviewed With: patient    Overall Patient Progress: no changeOverall Patient Progress: no change    Outcome Evaluation: Transferred to ICU from ED at 1600. Weaning down pressors and oxygen. CIWA protocol

## 2023-10-21 ENCOUNTER — APPOINTMENT (OUTPATIENT)
Dept: CARDIOLOGY | Facility: CLINIC | Age: 73
DRG: 380 | End: 2023-10-21
Attending: STUDENT IN AN ORGANIZED HEALTH CARE EDUCATION/TRAINING PROGRAM
Payer: COMMERCIAL

## 2023-10-21 ENCOUNTER — APPOINTMENT (OUTPATIENT)
Dept: OCCUPATIONAL THERAPY | Facility: CLINIC | Age: 73
DRG: 380 | End: 2023-10-21
Attending: STUDENT IN AN ORGANIZED HEALTH CARE EDUCATION/TRAINING PROGRAM
Payer: COMMERCIAL

## 2023-10-21 LAB
ALBUMIN SERPL BCG-MCNC: 3.7 G/DL (ref 3.5–5.2)
ALP SERPL-CCNC: 74 U/L (ref 40–129)
ALT SERPL W P-5'-P-CCNC: 44 U/L (ref 0–70)
ANION GAP SERPL CALCULATED.3IONS-SCNC: 21 MMOL/L (ref 7–15)
ANION GAP SERPL CALCULATED.3IONS-SCNC: 25 MMOL/L (ref 7–15)
APTT PPP: 30 SECONDS (ref 22–38)
AST SERPL W P-5'-P-CCNC: 109 U/L (ref 0–45)
ATRIAL RATE - MUSE: 113 BPM
ATRIAL RATE - MUSE: 82 BPM
BASE EXCESS BLDV CALC-SCNC: -12.8 MMOL/L (ref -7.7–1.9)
BASE EXCESS BLDV CALC-SCNC: -6 MMOL/L (ref -7.7–1.9)
BASE EXCESS BLDV CALC-SCNC: 0.3 MMOL/L (ref -7.7–1.9)
BASO+EOS+MONOS # BLD AUTO: ABNORMAL 10*3/UL
BASO+EOS+MONOS # BLD AUTO: ABNORMAL 10*3/UL
BASO+EOS+MONOS NFR BLD AUTO: ABNORMAL %
BASO+EOS+MONOS NFR BLD AUTO: ABNORMAL %
BASOPHILS # BLD AUTO: 0 10E3/UL (ref 0–0.2)
BASOPHILS # BLD AUTO: 0 10E3/UL (ref 0–0.2)
BASOPHILS NFR BLD AUTO: 0 %
BASOPHILS NFR BLD AUTO: 0 %
BILIRUB SERPL-MCNC: 0.2 MG/DL
BUN SERPL-MCNC: 44.1 MG/DL (ref 8–23)
BUN SERPL-MCNC: 50 MG/DL (ref 8–23)
CALCIUM SERPL-MCNC: 7.8 MG/DL (ref 8.8–10.2)
CALCIUM SERPL-MCNC: 8.1 MG/DL (ref 8.8–10.2)
CHLORIDE SERPL-SCNC: 105 MMOL/L (ref 98–107)
CHLORIDE SERPL-SCNC: 105 MMOL/L (ref 98–107)
CK SERPL-CCNC: 1238 U/L (ref 39–308)
CK SERPL-CCNC: 1287 U/L (ref 39–308)
CREAT SERPL-MCNC: 1.34 MG/DL (ref 0.67–1.17)
CREAT SERPL-MCNC: 1.58 MG/DL (ref 0.67–1.17)
DEPRECATED HCO3 PLAS-SCNC: 13 MMOL/L (ref 22–29)
DEPRECATED HCO3 PLAS-SCNC: 17 MMOL/L (ref 22–29)
DIASTOLIC BLOOD PRESSURE - MUSE: NORMAL MMHG
DIASTOLIC BLOOD PRESSURE - MUSE: NORMAL MMHG
EGFRCR SERPLBLD CKD-EPI 2021: 46 ML/MIN/1.73M2
EGFRCR SERPLBLD CKD-EPI 2021: 56 ML/MIN/1.73M2
EOSINOPHIL # BLD AUTO: 0 10E3/UL (ref 0–0.7)
EOSINOPHIL # BLD AUTO: 0 10E3/UL (ref 0–0.7)
EOSINOPHIL NFR BLD AUTO: 0 %
EOSINOPHIL NFR BLD AUTO: 0 %
ERYTHROCYTE [DISTWIDTH] IN BLOOD BY AUTOMATED COUNT: 13.9 % (ref 10–15)
ERYTHROCYTE [DISTWIDTH] IN BLOOD BY AUTOMATED COUNT: 14 % (ref 10–15)
ERYTHROCYTE [DISTWIDTH] IN BLOOD BY AUTOMATED COUNT: 14.1 % (ref 10–15)
FIBRINOGEN PPP-MCNC: 350 MG/DL (ref 170–490)
GLUCOSE BLDC GLUCOMTR-MCNC: 153 MG/DL (ref 70–99)
GLUCOSE BLDC GLUCOMTR-MCNC: 156 MG/DL (ref 70–99)
GLUCOSE BLDC GLUCOMTR-MCNC: 159 MG/DL (ref 70–99)
GLUCOSE BLDC GLUCOMTR-MCNC: 169 MG/DL (ref 70–99)
GLUCOSE BLDC GLUCOMTR-MCNC: 183 MG/DL (ref 70–99)
GLUCOSE SERPL-MCNC: 159 MG/DL (ref 70–99)
GLUCOSE SERPL-MCNC: 196 MG/DL (ref 70–99)
HBA1C MFR BLD: 5.9 %
HBV SURFACE AB SERPL IA-ACNC: 92.8 M[IU]/ML
HBV SURFACE AB SERPL IA-ACNC: REACTIVE M[IU]/ML
HBV SURFACE AG SERPL QL IA: NONREACTIVE
HCO3 BLDV-SCNC: 14 MMOL/L (ref 21–28)
HCO3 BLDV-SCNC: 19 MMOL/L (ref 21–28)
HCO3 BLDV-SCNC: 24 MMOL/L (ref 21–28)
HCT VFR BLD AUTO: 34.4 % (ref 40–53)
HCT VFR BLD AUTO: 37.2 % (ref 40–53)
HCT VFR BLD AUTO: 41.4 % (ref 40–53)
HCV AB SERPL QL IA: NONREACTIVE
HGB BLD-MCNC: 12.1 G/DL (ref 13.3–17.7)
HGB BLD-MCNC: 12.7 G/DL (ref 13.3–17.7)
HGB BLD-MCNC: 13.5 G/DL (ref 13.3–17.7)
HIV 1+2 AB+HIV1 P24 AG SERPL QL IA: NONREACTIVE
IMM GRANULOCYTES # BLD: 0.1 10E3/UL
IMM GRANULOCYTES # BLD: 0.1 10E3/UL
IMM GRANULOCYTES NFR BLD: 0 %
IMM GRANULOCYTES NFR BLD: 1 %
INR PPP: 1.26 (ref 0.85–1.15)
INTERPRETATION ECG - MUSE: NORMAL
INTERPRETATION ECG - MUSE: NORMAL
LACTATE SERPL-SCNC: 3.2 MMOL/L (ref 0.7–2)
LACTATE SERPL-SCNC: 3.2 MMOL/L (ref 0.7–2)
LACTATE SERPL-SCNC: 4.9 MMOL/L (ref 0.7–2)
LACTATE SERPL-SCNC: 6.6 MMOL/L (ref 0.7–2)
LVEF ECHO: NORMAL
LYMPHOCYTES # BLD AUTO: 0.5 10E3/UL (ref 0.8–5.3)
LYMPHOCYTES # BLD AUTO: 0.8 10E3/UL (ref 0.8–5.3)
LYMPHOCYTES NFR BLD AUTO: 3 %
LYMPHOCYTES NFR BLD AUTO: 4 %
MCH RBC QN AUTO: 32 PG (ref 26.5–33)
MCH RBC QN AUTO: 32 PG (ref 26.5–33)
MCH RBC QN AUTO: 32.4 PG (ref 26.5–33)
MCHC RBC AUTO-ENTMCNC: 32.6 G/DL (ref 31.5–36.5)
MCHC RBC AUTO-ENTMCNC: 34.1 G/DL (ref 31.5–36.5)
MCHC RBC AUTO-ENTMCNC: 35.2 G/DL (ref 31.5–36.5)
MCV RBC AUTO: 92 FL (ref 78–100)
MCV RBC AUTO: 94 FL (ref 78–100)
MCV RBC AUTO: 98 FL (ref 78–100)
MONOCYTES # BLD AUTO: 1.7 10E3/UL (ref 0–1.3)
MONOCYTES # BLD AUTO: 2.2 10E3/UL (ref 0–1.3)
MONOCYTES NFR BLD AUTO: 10 %
MONOCYTES NFR BLD AUTO: 13 %
NEUTROPHILS # BLD AUTO: 14.5 10E3/UL (ref 1.6–8.3)
NEUTROPHILS # BLD AUTO: 15.4 10E3/UL (ref 1.6–8.3)
NEUTROPHILS NFR BLD AUTO: 82 %
NEUTROPHILS NFR BLD AUTO: 87 %
NRBC # BLD AUTO: 0 10E3/UL
NRBC # BLD AUTO: 0 10E3/UL
NRBC BLD AUTO-RTO: 0 /100
NRBC BLD AUTO-RTO: 0 /100
O2/TOTAL GAS SETTING VFR VENT: 1 %
O2/TOTAL GAS SETTING VFR VENT: 24 %
O2/TOTAL GAS SETTING VFR VENT: 24 %
P AXIS - MUSE: 19 DEGREES
P AXIS - MUSE: NORMAL DEGREES
PCO2 BLDV: 32 MM HG (ref 40–50)
PCO2 BLDV: 36 MM HG (ref 40–50)
PCO2 BLDV: 36 MM HG (ref 40–50)
PH BLDV: 7.23 [PH] (ref 7.32–7.43)
PH BLDV: 7.33 [PH] (ref 7.32–7.43)
PH BLDV: 7.44 [PH] (ref 7.32–7.43)
PLATELET # BLD AUTO: 195 10E3/UL (ref 150–450)
PLATELET # BLD AUTO: 248 10E3/UL (ref 150–450)
PLATELET # BLD AUTO: 270 10E3/UL (ref 150–450)
PO2 BLDV: 36 MM HG (ref 25–47)
PO2 BLDV: 38 MM HG (ref 25–47)
PO2 BLDV: 44 MM HG (ref 25–47)
POTASSIUM SERPL-SCNC: 4.2 MMOL/L (ref 3.4–5.3)
POTASSIUM SERPL-SCNC: 4.3 MMOL/L (ref 3.4–5.3)
PR INTERVAL - MUSE: 160 MS
PR INTERVAL - MUSE: NORMAL MS
PROT SERPL-MCNC: 6 G/DL (ref 6.4–8.3)
QRS DURATION - MUSE: 136 MS
QRS DURATION - MUSE: 156 MS
QT - MUSE: 412 MS
QT - MUSE: 456 MS
QTC - MUSE: 532 MS
QTC - MUSE: 560 MS
R AXIS - MUSE: 24 DEGREES
R AXIS - MUSE: 75 DEGREES
RBC # BLD AUTO: 3.74 10E6/UL (ref 4.4–5.9)
RBC # BLD AUTO: 3.97 10E6/UL (ref 4.4–5.9)
RBC # BLD AUTO: 4.22 10E6/UL (ref 4.4–5.9)
SCANNED LAB RESULT: NORMAL
SODIUM SERPL-SCNC: 143 MMOL/L (ref 135–145)
SODIUM SERPL-SCNC: 143 MMOL/L (ref 135–145)
SYSTOLIC BLOOD PRESSURE - MUSE: NORMAL MMHG
SYSTOLIC BLOOD PRESSURE - MUSE: NORMAL MMHG
T AXIS - MUSE: 20 DEGREES
T AXIS - MUSE: 64 DEGREES
TROPONIN T SERPL HS-MCNC: 1046 NG/L
TROPONIN T SERPL HS-MCNC: 940 NG/L
TROPONIN T SERPL HS-MCNC: 950 NG/L
VENTRICULAR RATE- MUSE: 111 BPM
VENTRICULAR RATE- MUSE: 82 BPM
WBC # BLD AUTO: 12.8 10E3/UL (ref 4–11)
WBC # BLD AUTO: 17.5 10E3/UL (ref 4–11)
WBC # BLD AUTO: 17.7 10E3/UL (ref 4–11)

## 2023-10-21 PROCEDURE — C9113 INJ PANTOPRAZOLE SODIUM, VIA: HCPCS | Mod: JZ | Performed by: STUDENT IN AN ORGANIZED HEALTH CARE EDUCATION/TRAINING PROGRAM

## 2023-10-21 PROCEDURE — 83605 ASSAY OF LACTIC ACID: CPT

## 2023-10-21 PROCEDURE — 120N000002 HC R&B MED SURG/OB UMMC

## 2023-10-21 PROCEDURE — 250N000012 HC RX MED GY IP 250 OP 636 PS 637: Performed by: STUDENT IN AN ORGANIZED HEALTH CARE EDUCATION/TRAINING PROGRAM

## 2023-10-21 PROCEDURE — 93306 TTE W/DOPPLER COMPLETE: CPT | Mod: 26 | Performed by: INTERNAL MEDICINE

## 2023-10-21 PROCEDURE — 99233 SBSQ HOSP IP/OBS HIGH 50: CPT | Mod: GC | Performed by: INTERNAL MEDICINE

## 2023-10-21 PROCEDURE — 82550 ASSAY OF CK (CPK): CPT | Performed by: INTERNAL MEDICINE

## 2023-10-21 PROCEDURE — 99207 PR NO CHARGE LOS: CPT | Performed by: PEDIATRICS

## 2023-10-21 PROCEDURE — 94640 AIRWAY INHALATION TREATMENT: CPT

## 2023-10-21 PROCEDURE — 85730 THROMBOPLASTIN TIME PARTIAL: CPT | Performed by: STUDENT IN AN ORGANIZED HEALTH CARE EDUCATION/TRAINING PROGRAM

## 2023-10-21 PROCEDURE — 200N000002 HC R&B ICU UMMC

## 2023-10-21 PROCEDURE — 85610 PROTHROMBIN TIME: CPT | Performed by: STUDENT IN AN ORGANIZED HEALTH CARE EDUCATION/TRAINING PROGRAM

## 2023-10-21 PROCEDURE — 85025 COMPLETE CBC W/AUTO DIFF WBC: CPT | Performed by: STUDENT IN AN ORGANIZED HEALTH CARE EDUCATION/TRAINING PROGRAM

## 2023-10-21 PROCEDURE — 93005 ELECTROCARDIOGRAM TRACING: CPT

## 2023-10-21 PROCEDURE — 99232 SBSQ HOSP IP/OBS MODERATE 35: CPT | Performed by: INTERNAL MEDICINE

## 2023-10-21 PROCEDURE — 82550 ASSAY OF CK (CPK): CPT

## 2023-10-21 PROCEDURE — 258N000003 HC RX IP 258 OP 636

## 2023-10-21 PROCEDURE — 84484 ASSAY OF TROPONIN QUANT: CPT

## 2023-10-21 PROCEDURE — 85027 COMPLETE CBC AUTOMATED: CPT | Performed by: STUDENT IN AN ORGANIZED HEALTH CARE EDUCATION/TRAINING PROGRAM

## 2023-10-21 PROCEDURE — 82803 BLOOD GASES ANY COMBINATION: CPT

## 2023-10-21 PROCEDURE — 85384 FIBRINOGEN ACTIVITY: CPT | Performed by: STUDENT IN AN ORGANIZED HEALTH CARE EDUCATION/TRAINING PROGRAM

## 2023-10-21 PROCEDURE — 80053 COMPREHEN METABOLIC PANEL: CPT | Performed by: STUDENT IN AN ORGANIZED HEALTH CARE EDUCATION/TRAINING PROGRAM

## 2023-10-21 PROCEDURE — 250N000013 HC RX MED GY IP 250 OP 250 PS 637: Performed by: INTERNAL MEDICINE

## 2023-10-21 PROCEDURE — 999N000147 HC STATISTIC PT IP EVAL DEFER: Performed by: PHYSICAL THERAPIST

## 2023-10-21 PROCEDURE — 250N000011 HC RX IP 250 OP 636: Performed by: STUDENT IN AN ORGANIZED HEALTH CARE EDUCATION/TRAINING PROGRAM

## 2023-10-21 PROCEDURE — 83036 HEMOGLOBIN GLYCOSYLATED A1C: CPT | Performed by: STUDENT IN AN ORGANIZED HEALTH CARE EDUCATION/TRAINING PROGRAM

## 2023-10-21 PROCEDURE — 93010 ELECTROCARDIOGRAM REPORT: CPT | Performed by: INTERNAL MEDICINE

## 2023-10-21 PROCEDURE — 250N000009 HC RX 250

## 2023-10-21 PROCEDURE — 999N000208 ECHOCARDIOGRAM COMPLETE

## 2023-10-21 PROCEDURE — 94640 AIRWAY INHALATION TREATMENT: CPT | Mod: 76

## 2023-10-21 PROCEDURE — 999N000157 HC STATISTIC RCP TIME EA 10 MIN

## 2023-10-21 PROCEDURE — 97165 OT EVAL LOW COMPLEX 30 MIN: CPT | Mod: GO

## 2023-10-21 PROCEDURE — 255N000002 HC RX 255 OP 636: Performed by: INTERNAL MEDICINE

## 2023-10-21 PROCEDURE — 99222 1ST HOSP IP/OBS MODERATE 55: CPT | Mod: 25 | Performed by: INTERNAL MEDICINE

## 2023-10-21 PROCEDURE — 97530 THERAPEUTIC ACTIVITIES: CPT | Mod: GO

## 2023-10-21 PROCEDURE — 97535 SELF CARE MNGMENT TRAINING: CPT | Mod: GO

## 2023-10-21 RX ORDER — SODIUM CHLORIDE, SODIUM LACTATE, POTASSIUM CHLORIDE, CALCIUM CHLORIDE 600; 310; 30; 20 MG/100ML; MG/100ML; MG/100ML; MG/100ML
INJECTION, SOLUTION INTRAVENOUS CONTINUOUS
Status: DISCONTINUED | OUTPATIENT
Start: 2023-10-21 | End: 2023-10-22

## 2023-10-21 RX ADMIN — THIAMINE HYDROCHLORIDE 200 MG: 100 INJECTION, SOLUTION INTRAMUSCULAR; INTRAVENOUS at 19:39

## 2023-10-21 RX ADMIN — HUMAN ALBUMIN MICROSPHERES AND PERFLUTREN 6 ML: 10; .22 INJECTION, SOLUTION INTRAVENOUS at 12:26

## 2023-10-21 RX ADMIN — INSULIN ASPART 1 UNITS: 100 INJECTION, SOLUTION INTRAVENOUS; SUBCUTANEOUS at 11:52

## 2023-10-21 RX ADMIN — THIAMINE HYDROCHLORIDE 200 MG: 100 INJECTION, SOLUTION INTRAMUSCULAR; INTRAVENOUS at 13:44

## 2023-10-21 RX ADMIN — PANTOPRAZOLE SODIUM 40 MG: 40 INJECTION, POWDER, FOR SOLUTION INTRAVENOUS at 08:01

## 2023-10-21 RX ADMIN — IPRATROPIUM BROMIDE AND ALBUTEROL SULFATE 3 ML: .5; 3 SOLUTION RESPIRATORY (INHALATION) at 16:12

## 2023-10-21 RX ADMIN — INSULIN ASPART 1 UNITS: 100 INJECTION, SOLUTION INTRAVENOUS; SUBCUTANEOUS at 15:45

## 2023-10-21 RX ADMIN — SODIUM CHLORIDE, POTASSIUM CHLORIDE, SODIUM LACTATE AND CALCIUM CHLORIDE: 600; 310; 30; 20 INJECTION, SOLUTION INTRAVENOUS at 08:00

## 2023-10-21 RX ADMIN — THIAMINE HYDROCHLORIDE 200 MG: 100 INJECTION, SOLUTION INTRAMUSCULAR; INTRAVENOUS at 08:01

## 2023-10-21 RX ADMIN — CEFTRIAXONE SODIUM 2 G: 2 INJECTION, POWDER, FOR SOLUTION INTRAMUSCULAR; INTRAVENOUS at 18:18

## 2023-10-21 RX ADMIN — INSULIN ASPART 1 UNITS: 100 INJECTION, SOLUTION INTRAVENOUS; SUBCUTANEOUS at 19:49

## 2023-10-21 RX ADMIN — IPRATROPIUM BROMIDE AND ALBUTEROL SULFATE 3 ML: .5; 3 SOLUTION RESPIRATORY (INHALATION) at 13:04

## 2023-10-21 RX ADMIN — FOLIC ACID 1 MG: 5 INJECTION, SOLUTION INTRAMUSCULAR; INTRAVENOUS; SUBCUTANEOUS at 08:01

## 2023-10-21 RX ADMIN — IPRATROPIUM BROMIDE AND ALBUTEROL SULFATE 3 ML: .5; 3 SOLUTION RESPIRATORY (INHALATION) at 09:08

## 2023-10-21 RX ADMIN — PANTOPRAZOLE SODIUM 40 MG: 40 INJECTION, POWDER, FOR SOLUTION INTRAVENOUS at 19:39

## 2023-10-21 RX ADMIN — FLUTICASONE FUROATE AND VILANTEROL TRIFENATATE 1 PUFF: 200; 25 POWDER RESPIRATORY (INHALATION) at 08:05

## 2023-10-21 RX ADMIN — SODIUM CHLORIDE, POTASSIUM CHLORIDE, SODIUM LACTATE AND CALCIUM CHLORIDE: 600; 310; 30; 20 INJECTION, SOLUTION INTRAVENOUS at 18:19

## 2023-10-21 ASSESSMENT — ACTIVITIES OF DAILY LIVING (ADL)
IADL_COMMENTS: IND IN IADLS PRIOR TO ADMISSION
ADLS_ACUITY_SCORE: 27
ADLS_ACUITY_SCORE: 26
ADLS_ACUITY_SCORE: 25
ADLS_ACUITY_SCORE: 25
ADLS_ACUITY_SCORE: 27
DEPENDENT_IADLS:: INDEPENDENT
ADLS_ACUITY_SCORE: 25
ADLS_ACUITY_SCORE: 26
ADLS_ACUITY_SCORE: 25

## 2023-10-21 NOTE — PROGRESS NOTES
[facility]    Progress Note - Medicine Service, VAUGHN TEAM 2       Date of Admission:  10/20/2023    Assessment & Plan   Robert B Behr is a 73 year old male with PMH tobacco use disorder, COPD, GERD w/o known esophagitis, and chronic MAC who was admitted to the ICU on 10/20/2023 for AMS, shock, EDEN now transferred to Medical team 10/21/23 after fluids, abx, and weaning off pressors with improvement in mental status.       Today:  -transferred to medicine team  -consider steroids tomorrow pending resp status for possible COPD exacerbation  -consider pulm consult for optimization, RUL mass  -ECHO today  -NPO @ midnight for EGD tomorrow  -CK, CBC, lactic labs q12h    #Acute hypoxic respiratory failure   #COPD  #possible aspiration PNA  History of COPD. Now on minimal O2. Found down with emesis supports possible aspiration PNA as Pt had increased O2 requirements.  -Continue nebulizer, consider converting to PTA inhalers  -If continued thick sputum, can add teamed albuterol/3% NaCL nebs q6-8hs    -Scheduled duo nebs   -Breo inhaler  -PRN albuterol   -Restart pta inhalers post EGD if able   -Consider pulm consult for optimization, RUL mass may warrant additional investigation/monitoring; believed to be non-malignant based on [facility]odesix-Dr. Taylor from interventional pulmonary following, however continues to smoke (0.5 packs/day), and +MAC on 9/13/2023 culture without treatment. Needs likely repeat PFTs and outpt follow up.  -oxygen prn, goal SpO2 > 92%      #CK elevation  #c/f rhabdomyolysis   #EDEN -  improved  Pt was found down CK 1238 this AM and 1287 on PM recheck. Cr 1.34 this AM, improving with IVF. UOP good. Cr. 2.51 from b/l 0.8. BUN elevated at 52.  Labs and is most consistent with prerenal EDEN in setting of shock.  Also consider toxic kidney injury.  Will check ethylene glycol level.  Not on any nephrotoxic medications. S/p fluid resusctiation with appropriately  down trending renal markers. Adequate UOP   -Avoid nephrotoxic agents, no NSAIDs  -Trend BMP daily   -Goal K > 4.0, Mg > 2.0  -LR 100mL/hr for 16 hrs  -Recheck CK and BMP in AM  -PT/OT  -monitor for skin breakdown    #septic shock, possible hypovolemic component, multifactorial- resolved  #Toxometabolic encephalopathy- resolved  #anion gap metabolic acidosis  #elevated osmol gap  History and presentation most concerning for aspiration pneumonia in setting of altered mental status and EtOH intoxication.  No other obvious source of infection however could consider GI translocation in setting of GI inflammation with bleed.  Blood cultures obtained and pending at time of admission.  Urine culture ordered patient yet to void.  Received IV vancomycin and Zosyn in the ED.  No known risk factors for pseudomonal infection at this time.  We will plan to narrow antibiotics on arrival to the unit as able. Transferred from ICU off pressors. Head CT w/o acute intracranial abnormality. Pt found with reported coffee ground emesis in vicinity. MRSA nares negative    Lactic acid on presentation 13, improving, now 3.2.  -Recheck lactate q12  -Recheck CBC q 12, transfuse Hgb > 7, Plt 20  -osmolality recheck in AM, propylene and ethylene glycol negative   -ceftriaxone  -tylenol PRN    # LV EF 40-45% with apical akinesis and basal hyperkinesis  # Troponin elevation  # possible VT  Pt admitted critically ill with toxic-metabolic encephalopathy and possible aspiration pneumonia. Currently improved, hemodynamically stable and transferring to floor. Noted to have troponin elevation which peaked at 1024 today AM. ECG showed q waves in V1 and V2. Echo showed LVEF 40-45% and apical akinesis with basial hyperkinesis. Concern for AMI vs stress cardiomyopathy vs both (underlying CAD with stress cardiomyopathy). Pt is asymptomatic and hemodynamically stable leaning towards stress cardiomyopathy. However he does have risk factors for CAD including  dyslipidemia and smoking history. He does not require urgent cath but would benefit from formal CAD evaluation prior to evaluation once other medical issues have been addressed. Would also recommend obtaining repeat echo at that point to assess for any resolution of wall motion abnormalities. Patient with reported VT in ED with concern for NSVT vs brief VT arrest with rapid ROSC, given patient alert and conversational on arrival to unit favor NSVT.   -No immediate cardiac intervention required  -No indication for heparin drip at this point  -Coronary CTA  -Repeat echo prior to discharge once acute medical issues have been addressed  -If patient develops ACS symptoms recommend repeat EKG & resend troponin   -Telemetry   -Maintain Mg > 2, K > 4; high intensity protocols ordered     # Hematemesis/Reported coffee ground emesis   # Hx of GERD w/o known esophagitis   # esophageal thickening on CT  Patient reportedly found surrounded by hematemesis.  On arrival to ED noted to have coffee-ground residual in oropharynx and crusting on face.  No known history of GI bleed.  Hemoglobin 16 on initial arrival to ED stable on repeat check.  Patient does have a history of GERD but no documented evidence of esophagitis and chart review.  Denies ASA or NSAID use. Per patient report was not taking prescribed PPI at home.  No evidence of liver disease in patient chart, on exam, on CT abdomen today however esophagus appears thickened.  Lower suspicion for variceal bleed.  Possible esophagitis, gastritis, or bleeding ulcer.  Hgb 16 on arrival, suspect drop on repeat checks related to hemo concentration and not yet reflective of acute bleed. No emesis or evidence of continued bleeding since.     - PPI 40 mg IV BID   - INR in AM  - GI consulted, appreciate recs              - Plan for EGD post TTE and pulmonary optimization   - Patient okay for clear liquid diet today, ADAT   - NPO at midnight   - If recurrent hematemesis with bright red  blood call GI stat, IV PPI gtt      #Ethanol abuse    #Elevated LFTs   AST slightly elevated at 109 this AM, ALT & Alk phos normal. EtOH level 0.3 on arrival. Patient denies frequent use however unreliable historian with acute illness. No evidence of cirrhosis on CT abdomen   On CIWA, low scoring. Was positive on tox screen   - CIWA w/ ativan - precedex if issues   - Thiamine @ Wernicke dosing   - Folic acid @ Wernicke dosing     #MAC infection  - consider ID consult as outpt for treatment plan    #Hypertension   #HLD    -holding statin ISO elevated LFTs    # Hyperglycemia  Suspected stress vs undiagnosed DM -> A1c 5.9 10/21/2023   - trend glucoses q4H per protocol          Diet: NPO per Anesthesia Guidelines for Procedure/Surgery Except for: Meds  Advance Diet as Tolerated: Regular Diet Adult; Mechanical/Dental Soft Diet; Regular Diet Adult; 3 gm NA Diet    DVT Prophylaxis: Pneumatic Compression Devices  Mehta Catheter: Not present  Fluids: LR 100mL/hr  Lines: PRESENT      CVC Triple Lumen Right Internal jugular-Site Assessment: WDL      Cardiac Monitoring: ACTIVE order. Indication: ICU  Code Status: Full Code      Clinically Significant Risk Factors          # Hypocalcemia: Lowest Ca = 7.4 mg/dL in last 2 days, will monitor and replace as appropriate    # Anion Gap Metabolic Acidosis: Highest Anion Gap = 39 mmol/L in last 2 days, will monitor and treat as appropriate     # Coagulation Defect: INR = 1.26 (Ref range: 0.85 - 1.15) and/or PTT = 30 Seconds (Ref range: 22 - 38 Seconds), will monitor for bleeding                      Disposition Plan         The patient's care was discussed with the Attending Physician, Dr. You .    Evangelina Tanner MD  Medicine Service, [address]  Securely message with [email or web address] (more info)  Text page via [email or web address] Paging/Directory   See signed in provider for up to date coverage  information  ______________________________________________________________________    Interval History   Pt transferred to medicine team, feels well and is talkative, wants to leave hospital soon. Pt stated he has no pain and that he uses his rescue inhaler 15 times/week and that it works well. States he takes his maintenance inhaler daily. Pt reports heartburn 2x/month and that he uses baking soda and water to resolve this issue and that it works well. Denies taking PPI. States he expects to leave soon but is understanding of needing to investigate issues with heart and bleeding which require hospitalization. States he swims 4-5x/weekly at [facility]. States he lost his dentures. States he had Polio as a child resulting in L leg bowing and shortening.    Physical Exam   Vital Signs: Temp: 98.6  F (37  C) Temp src: Oral BP: 119/82 Pulse: 113   Resp: 26 SpO2: 96 % O2 Device: None (Room air) Oxygen Delivery: 1 LPM  Weight: 161 lbs 2.5 oz    Constitutional: awake, alert, cooperative, no apparent distress, and appears stated age  ENT: Normocephalic, without obvious abnormality, atraumatic  Respiratory: No increased work of breathing, clear to auscultation bilaterally, on RA at time of interview   Cardiovascular: Regular rate and rhythm, and no loud murmur noted  GI: normal bowel sounds, non-distended, non-tender  Neurologic: Awake, alert, oriented to name, place and time. Moves all extremities spontaneously. Gait not observed.     Medical Decision Making       Data     I have personally reviewed the following data over the past 24 hrs:    12.8 (H)  \   12.1 (L)   / 195     143 105 44.1 (H) /  153 (H)   4.3 17 (L) 1.34 (H) \     ALT: 44 AST: 109 (H) AP: 74 TBILI: 0.2   ALB: 3.7 TOT PROTEIN: 6.0 (L) LIPASE: N/A     Trop: 940 (HH) BNP: N/A     TSH: N/A T4: N/A A1C: 5.9 (H)     Procal: N/A CRP: N/A Lactic Acid: 3.2 (H)       INR:  1.26 (H) PTT:  30   D-dimer:  N/A Fibrinogen:  350       Imaging results reviewed over the past 24  hrs:   Recent Results (from the past 24 hour(s))   Echo Complete   Result Value    LVEF  40-45% (mildly reduced)    [facility]    [id number]  [id number]  [id number]  [id number]                                                                       Version 2     [facility]  [address] [place] [address]     Name: BEHR, ROBERT B  MRN: [id number]  : 1950  Study Date: 10/21/2023 12:00 PM  Age: 73 yrs  Gender: Male  Patient Location: [facility]  Reason For Study: Shock  Ordering Physician: YUDY BATES  Performed By: Hodan Mistry     BSA: 1.9 m2  Height: 68 in  Weight: 162 lb  HR: 106  BP: 120/81 mmHg  ______________________________________________________________________________  Procedure  Complete Portable Echo Adult. Contrast Optison. Optison (NDC #5662-5151-67)  given intravenously. Patient was given 6 ml mixture of 3 ml Optison and 6 ml  saline. 3 ml wasted.  ______________________________________________________________________________  Interpretation Summary  Left ventricular function is decreased. The ejection fraction is 40-45%  (mildly reduced).  [place] and distal apical LV segments are akinetic with hyperkinetic basal  segments. Cannot rule out LAD disease (ischemia). If ischemia is ruled out  then most likely cause is stress CMP.  Right ventricular function, chamber size, wall motion, and thickness are  normal.  No pericardial effusion is present.  No significant valvular abnormalities present.     This study was compared with the study from 6/10/2017 .The left ventricular  function has worsened. RWA described are new.     Reviewed admission ECGs. Noted ST segment elevation in V1-V5. Pathologic Q  waves (V1-V2) noted on today`s ECG     Recommend Cardiology consultation ASAP. Ischemia is likely for LV apical wall  motion abnormality.     Cardiology team updated about the findings 1:20 pm.      ______________________________________________________________________________  Left Ventricle  Left ventricular size is normal. Left ventricular wall thickness is normal.  Left ventricular diastolic function is normal. Left ventricular function is  decreased. The ejection fraction is 40-45% (mildly reduced). Apical wall  akinesis is present.     Right Ventricle  Right ventricular function, chamber size, wall motion, and thickness are  normal.     Atria  Both atria appear normal.     Mitral Valve  The mitral valve is normal. Trace mitral insufficiency is present.     Aortic Valve  The valve leaflets are not well visualized. On Doppler interrogation, there is  no significant stenosis or regurgitation.     Tricuspid Valve  The tricuspid valve is normal. Trace tricuspid insufficiency is present. The  peak velocity of the tricuspid regurgitant jet is not obtainable. Pulmonary  artery systolic pressure cannot be assessed.     Pulmonic Valve  On Doppler interrogation, there is no significant stenosis or regurgitation.     Vessels  The aorta root is normal. The inferior vena cava cannot be assessed.     Pericardium  No pericardial effusion is present.     Miscellaneous  No significant valvular abnormalities present.     Compared to Previous Study  This study was compared with the study from 6/10/2017 . The left ventricular  function has worsened.  ______________________________________________________________________________  MMode/2D Measurements & Calculations  LVOT diam: 2.1 cm  LVOT area: 3.5 cm2     Doppler Measurements & Calculations  MV E max víctor: 72.7 cm/sec  MV A max víctor: 64.4 cm/sec  MV E/A: 1.1  MV dec slope: 482.7 cm/sec2  MV dec time: 0.15 sec  PA acc time: 0.11 sec  E/E' av.2  Lateral E/e': 8.8  Medial E/e': 7.7  RV S Víctor: 6.9 cm/sec     ______________________________________________________________________________  Report approved by: Atiya BRIONES 10/21/ 01:21 PM

## 2023-10-21 NOTE — PROGRESS NOTES
"Brief Cross Cover Note  CC: Paged by bedside nurse for uptrending high-sensitivity troponin.  -Evaluated patient bedside, no chest pain, no history of MI in the past, patient laying on bed on intranasal oxygen.      EXAM  Vital signs:  Temp: (P) 97.9  F (36.6  C) Temp src: Oral BP: 120/54 Pulse: 111   Resp: 26 SpO2: 97 % O2 Device: Nasal cannula Oxygen Delivery: 1 LPM   Weight: 73.6 kg (162 lb 4.1 oz)  Estimated body mass index is 24.03 kg/m  as calculated from the following:    Height as of 4/3/23: 1.75 m (5' 8.9\").    Weight as of this encounter: 73.6 kg (162 lb 4.1 oz).    Constitutional: on intranasal oxygen,  HEENT: Normocephalic. No masses, lesions, tenderness or abnormalities, good dentition, throat normal without erythema or exudate, MMM  Cardiovascular: RRR. No murmurs, clicks gallops or rubs  Respiratory: Good diaphragmatic excursion. Lungs clear bilaterally, non-labored conversational breating  Gastrointestinal: Abdomen soft, non-tender. BS normal. No masses, organomegaly  : mcbride present draining yellow urine  Musculoskeletal: extremities normal- no gross deformities noted, gait normal and normal muscle tone  Skin: no rashes  Neurologic: AxO x3. Moving all extremities bilaterally. Strength globally wnl.  Sensation grossly WNL.      A/P   Robert B Behr is a 73 year old male with PMH tobacco use disorder, COPD, GERD w/o known esophagitis, and chronic MAC who was admitted to the ICU on 10/20/2023 after being found down outside at home by neighbor with bloody emesis. Hypotensive in the ED concerning for hemorrhagic vs septic shock, likely multifactorial. He received appropriate fluid resuscitation and required initiation of vasopressors. Admitted to ICU for encephalopathy and shock.      - EKG-sinus tachycardia with some PACs old septal infarct, no new ST segment changes no change compared to the EKG around 5 PM.  Evaluated EKG with cardiology fellow no concern for ST segment changes. Suspect type II " MI/demand mediated ischemia in the setting of GI bleed/septic versus hemorrhagic shock.    -Performed POCUS bedside together with pulmonology critical care fellow, grossly good squeeze of both right and left ventricles, no gross wall motion abnormality observed.  -Continue trending high-sensitivity troponin  If chest pain repeat EKG and getting formal echocardiogram in a.m.    Rosa Encarnacion MD  Internal Medicine Resident PGY3  Securely message with the Vocera Web Console (learn more here)  Please see sign in/sign out for up to date coverage information  Pager: [phone] text page

## 2023-10-21 NOTE — CONSULTS
Care Management Initial Consult    General Information  Assessment completed with: Ravin Hood  Type of CM/SW Visit: Initial Assessment    Primary Care Provider verified and updated as needed: Yes   Readmission within the last 30 days:     Reason for Consult: other (see comments) (elevated readmission risk score)  Advance Care Planning:         Communication Assessment  Patient's communication style: spoken language (English or Bilingual)    Hearing Difficulty or Deaf: no   Wear Glasses or Blind: no    Cognitive  Cognitive/Neuro/Behavioral: WDL  Level of Consciousness: alert  Arousal Level: opens eyes spontaneously  Orientation: oriented x 4  Mood/Behavior: calm, cooperative  Best Language: 0 - No aphasia  Speech: hoarse    Living Environment:   People in home: alone     Current living Arrangements: apartment      Able to return to prior arrangements: yes    Family/Social Support:  Care provided by: self  Provides care for: no one  Marital Status: Single  Other (specify) (neighbors, friends)          Description of Support System: Supportive, Other (see comments) (pt states he can reach out to neighbors if needing help)      Current Resources:   Patient receiving home care services: No     Community Resources: None  Equipment currently used at home: none  Supplies currently used at home: None    Employment/Financial:  Employment Status: retired, other (see comments) (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  Yes     Which insurance plan 3 day waiver is available? Alternative insurance waiver    Will the waiver be used for post-acute placement? No    Lifestyle & Psychosocial Needs:  Social Determinants of Health     Food Insecurity: Not on file   Depression: Not at risk (4/3/2023)    PHQ-2     PHQ-2 Score: 0   Housing Stability: Not on file   Tobacco Use: High Risk (9/15/2023)    Patient History      Smoking Tobacco Use: Light Smoker     Smokeless Tobacco Use: Former     Passive Exposure: Not on file   Financial Resource Strain: Not on file   Alcohol Use: Not on file   Transportation Needs: Not on file   Physical Activity: Not on file   Interpersonal Safety: Not on file   Stress: Not on file   Social Connections: Not on file     Functional Status:  Prior to admission patient needed assistance:   Dependent ADLs:: Independent  Dependent IADLs:: Independent     Mental Health Status:  Mental Health Status: No Current Concerns       Chemical Dependency Status:  Chemical Dependency Status: Other (see comment) (denies)           Values/Beliefs:  Spiritual, Cultural Beliefs, Synagogue Practices, Values that affect care: no             Additional Information:  Met with patient to complete initial care management assessment in response to elevated readmission risk score. Patient currently lives alone in an apartment in [facility]. He denies assistive devices at baseline, is independent with ADLs/IADLs. He denies current home or community services. States he has his neighbors he can reach out to if needing assistance. He anticipates using his [employer] benefits for a ride home from the hospital.    OT has recommended home with assist and PT tricia is pending.     CM will continue to follow.    Alison Graves RN, BSN  6A RN Care Coordinator  Ph: [phone]   Pager: [phone]

## 2023-10-21 NOTE — PLAN OF CARE
PT evaluation cx and deferred.  OT completed evaluation.  Patient demonstrating functional mobility at baseline with no additional acute PT intervention indicated to address balance/gait.  OT will continue to follow and address ambulation from endurance standpoint. Will complete PT order.

## 2023-10-21 NOTE — PROGRESS NOTES
GI follow up note:    S: No further bleeding overnight. No hematemesis. No coffee ground emesis. +brown BM.    Pt denies pain.     O:  /84   Pulse 107   Temp 98  F (36.7  C) (Oral)   Resp 24   Wt 73.1 kg (161 lb 2.5 oz)   SpO2 97%   BMI 23.87 kg/m    GEN: appears comfortable but is tachypneic  HEENT: +pursed lip breathing  CVS: tachycardic  ABD: distended but soft, nt    Labs: hgb trended from 16 to 12.7. Normal platelets  Trop up to 1046  EKG without changes    CT:    IMPRESSION:  1.  Stable to slightly decreased right upper lobe pulmonary masslike  opacity and decreased additional pulmonary nodules in the right lung  and left upper lobe, likely improving infection, though underlying  malignancy in the right upper lobe remains a consideration. No acute  airspace disease.  2.  Severe emphysematous changes of the lung parenchyma.  3.  Large hiatal hernia with increased circumferential distal  esophageal wall thickening, suggesting esophagitis.  4.  Large colonic diverticulosis without evidence of diverticulitis.  5.  Hepatic steatosis.    A/P:  72 YO with COPD and alcohol use disorder admitted after being found down with reports of hematemesis (coffee ground). Now being treated for aspiration PNA/shock in setting of COPD. Troponin elevation.    Coffee ground emesis - he will need an EGD but this is not urgent. Hgb trended down with fluids but is now stable and there is no further evidence of active GI bleeding. No stigmata or evidence of cirrhosis. Main ddx includes esophagitis/MWT, gastritis, PUD. Given his COPD exacerbation and possible aspiration PNA with tachypnea and elevated troponins (in setting of no further overt GI bleeding) we favor holding off on EGD for today. Continue PPI IV BID. Will re-evaluate EGD for Sunday or Monday.    -PPI IV BID  -ok to advance diet - keep NPO after MN and we will evaluate for EGD tomorrow vs Monday  -follow for evidence of active bleeding. If has recurrent  hematemesis will need to be intubated for more urgent EGD  -trend hgb q 8-12 hours    2. Elevated troponins - agree with trending and echocardiogram. Consider cardiology consult prior to EGD    3. COPD/aspiration PNA - continue to optimize respiratory status prior to EGD.    Plan discussed with the patient and MICU team.     David Post MD    [facility]  Division of Gastroenterology, Hepatology and Nutrition

## 2023-10-21 NOTE — PROGRESS NOTES
"   10/21/23 1309   Appointment Info   Signing Clinician's Name / Credentials (OT) Wanda Spicer OTR/L   Rehab Comments (OT) monitor HR and RR, baseline limp from polio   Living Environment   People in Home alone   Current Living Arrangements apartment   Home Accessibility stairs to enter home   Number of Stairs, Main Entrance 7   Stair Railings, Main Entrance railings on both sides of stairs   Transportation Anticipated car, drives self   Living Environment Comments Pt lives alone in apartment, ~7 SKY. ~15 stairs to laundry in basement. Has walk in shower, no chair or grab bars. No family assistance   Self-Care   Usual Activity Tolerance good   Current Activity Tolerance moderate   Regular Exercise Yes   Activity/Exercise Type swimming   Exercise Amount/Frequency 20 mins  (at [facility] 5x/week)   Equipment Currently Used at Home none   Fall history within last six months yes   Number of times patient has fallen within last six months 1   Activity/Exercise/Self-Care Comment IND in ADLs prior to admission   Instrumental Activities of Daily Living (IADL)   Previous Responsibilities meal prep;laundry;housekeeping;yardwork;medication management;driving   IADL Comments IND in IADLs prior to admission   General Information   Onset of Illness/Injury or Date of Surgery 10/20/23   Referring Physician Elva Posey MD   Patient/Family Therapy Goal Statement (OT) Return home   Additional Occupational Profile Info/Pertinent History of Current Problem Per EMR, \"73 year old male with PMH tobacco use disorder, COPD, GERD w/o known esophagitis, and chronic MAC who was admitted to the ICU on 10/20/2023 after being found down outside at home by neighbor with bloody emesis. Hypotensive in the ED concerning for hemorrhagic vs septic shock, likely multifactorial. He received appropriate fluid resuscitation and required initiation of vasopressors. Admitted to ICU for encephalopathy and shock.\"   Existing Precautions/Restrictions fall "   Limitations/Impairments safety/cognitive   Left Upper Extremity (Weight-bearing Status) full weight-bearing (FWB)   Right Upper Extremity (Weight-bearing Status) full weight-bearing (FWB)   Left Lower Extremity (Weight-bearing Status) full weight-bearing (FWB)   Right Lower Extremity (Weight-bearing Status) full weight-bearing (FWB)   General Observations and Info Activity: Adult ICU Early Mobility   Cognitive Status Examination   Orientation Status orientation to person, place and time   Cognitive Status Comments appears intact this date   Visual Perception   Visual Impairment/Limitations WFL   Sensory   Sensory Quick Adds sensation intact   Pain Assessment   Patient Currently in Pain No   Posture   Posture forward head position;protracted shoulders   Range of Motion Comprehensive   General Range of Motion no range of motion deficits identified   Strength Comprehensive (MMT)   General Manual Muscle Testing (MMT) Assessment no strength deficits identified   Coordination   Upper Extremity Coordination No deficits were identified   Bed Mobility   Bed Mobility supine-sit   Supine-Sit [place] (Bed Mobility) supervision   Transfers   Transfers sit-stand transfer   Sit-Stand Transfer   Sit-Stand [place] (Transfers) contact guard   Balance   Balance Assessment standing dynamic balance   Standing Balance: Dynamic contact guard   Activities of Daily Living   BADL Assessment/Intervention lower body dressing;grooming;toileting   Lower Body Dressing Assessment/Training   Comment, (Lower Body Dressing) donning socks in bed   [place] Level (Lower Body Dressing) supervision   Grooming Assessment/Training   [place] Level (Grooming) supervision;set up   Comment, (Grooming) standing at sink   Toileting   Comment, (Toileting) per clinical judgement   [place] Level (Toileting) supervision   Clinical Impression   Criteria for Skilled Therapeutic Interventions Met (OT) Yes, treatment indicated   OT Diagnosis  Dec activity tolerance for ADLs d/t high RR and HR   OT Problem List-Impairments impacting ADL problems related to;activity tolerance impaired   Assessment of Occupational Performance 1-3 Performance Deficits   Identified Performance Deficits activity tolerance for functional mobility and ADLs   Planned Therapy Interventions (OT) ADL retraining;IADL retraining;home program guidelines;progressive activity/exercise;risk factor education   Clinical Decision Making Complexity (OT) problem focused assessment/low complexity   Risk & Benefits of therapy have been explained evaluation/treatment results reviewed;care plan/treatment goals reviewed;risks/benefits reviewed;current/potential barriers reviewed;participants voiced agreement with care plan;participants included;patient   Clinical Impression Comments Pt appears near yet below functional baseline. Would benefit from continued therapy to promote safety, activity tolerance, and IND in ADLs and mobility   OT Total Evaluation Time   OT Eval, Low Complexity Minutes (77669) 8   OT Goals   Therapy Frequency (OT) 3 times/week   OT Predicted Duration/Target Date for Goal Attainment 11/04/23   OT Goals Toilet Transfer/Toileting;Home Management;Aerobic Activity;OT Goal 1;OT Goal 2;Cognition   OT: Toilet Transfer/Toileting Independent   OT: Home Management Independent;with moderate demand household tasks;ambulatory level   OT: Cognitive Patient/caregiver will verbalize understanding of cognitive assessment results/recommendations as needed for safe discharge planning  (as needed)   OT: Perform aerobic activity with stable cardiovascular response intermittent activity;10 minutes;ambulation   OT: Goal 1 Pt will demonstrate incorporation of EC techniques IND into ADLs and functional mobility to promote safety and independence in daily tasks.   OT: Goal 2 Pt will demonstrate ability to complete 7 stairs IND to promote activity tolerance and safety for return to home   OT Discharge  Planning   OT Plan Inc activity tolerance and functional mobility, EC technique education, stairs   OT Discharge Recommendation (DC Rec) home with assist   OT Rationale for DC Rec Pt appears near functional baseline. Limited by activity tolerance with high HR and RR with activity this date. Anticipate pt will be safe for return home with assistance as needed once medically ready.   OT Brief overview of current status CGA-SBA ADLs, CGA w gait belt mobility   Total Session Time   Timed Code Treatment Minutes 34   Total Session Time (sum of timed and untimed services) 42

## 2023-10-21 NOTE — CONSULTS
Cardiology Inpatient Consultation  October 21, 2023    Reason for Consult:  A cardiology consult was requested by Dr. You from the Medicine service to provide clinical guidance regarding wall motion abnormality on echo.    Assessment and Recommendation:  Robert B Behr is a 73 year old male with PMH tobacco use disorder, Dyslipidemia, COPD, GERD w/o known esophagitis, and chronic MAC who was admitted to the ICU on 10/20/2023 after being found down outside at home by neighbor with bloody emesis. Concern for toxic-metabolic encephalopathy and aspiration pneumonia which was improved. Cardiology consulted for wall motion abnormalities on echo.    #LV EF 40-45% with apical akinesis and basal hyperkinesis  #Troponin elevation  Pt admitted critically ill with toxic-metabolic encephalopathy and possible aspiration pneumonia. Currently improved, hemodynamically stable and transferring to floor. Noted to have troponin elevation which peaked at 1024 today AM. ECG showed q waves in V1 and V2. Echo showed LVEF 40-45% and apical akinesis with basial hyperkinesis. Concern for AMI vs stress cardiomyopathy vs both (underlying CAD with stress cardiomyopathy). Pt is asymptomatic and hemodynamically stable leaning towards stress cardiomyopathy. However he does have risk factors for CAD including dyslipidemia and smoking history. He does not require urgent cath but would benefit from formal CAD evaluation prior to evaluation once other medical issues have been addressed. Would also recommend obtaining repeat echo at that point to assess for any resolution of wall motion abnormalities.  -No immediate cardiac intervention required  -No indication for heparin drip at this point  -Recommend obtaining Coronary CTA and repeat echo prior to discharge once acute medical issues have been addressed    Plan of care discussed with Dr. Mack, who agrees with above plan.     Thank you for consulting the cardiovascular services at the  [facility]. Please do not hesitate to call us with any questions.     Keven Allen MD  Cardiology Fellow        HPI:   Robert B Behr is a 73 year old male with PMH tobacco use disorder, Dyslipidemia, COPD, GERD w/o known esophagitis, and chronic MAC who was admitted to the ICU on 10/20/2023 after being found down outside at home by neighbor with bloody emesis. He was hypotensive in the ED requiring fluid resuscitation and initiation of pressors. Lactic acid of 13 and troponin of 126. Etoh was 0.3. Subsequently admitted to ICU for encephalopathy and shock. Was weaned off pressors rapidly had hemoglobin remained stable. There was concern for aspiration pneumonia. Lactic acid improved and troponin peaked at 1024 at 4:30AM today. ECG showed q waves in V1 and V2. Echo showed LVEF 40-45% and apical akinesis with basial hyperkinesis.    At the time of interview, the patient denies chest pain, dyspnea at rest or with exertion, orthopnea, PND, palpitations, lightheadedness, or syncope. Has never seen a Cardiologist. He used to smoke 1-2 PPD for 40 years and 5-10 cigarettes daily for the past 10 years.    Review of Systems:    Complete review of systems was performed and negative except per HPI.    PMH:  Past Medical History:   Diagnosis Date    Cataract     COPD (chronic obstructive pulmonary disease) (H)     diagnosed with spirometry     Lung nodules     CT scan 2016    Osteoporosis     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    Tobacco abuse      Active Problems:  Patient Active Problem List    Diagnosis Date Noted    Hypothermia, initial encounter 10/20/2023     Priority: Medium    Sepsis without acute organ dysfunction, due to unspecified organism (H) 10/20/2023     Priority: Medium    SOB (shortness of breath) 02/12/2023     Priority: Medium    Hypoxia 02/12/2023     Priority: Medium    COPD exacerbation (H) 02/12/2023     Priority: Medium    Infection due to 2019 novel coronavirus 02/12/2023      "Priority: Medium    Hypopigmentation 2018     Priority: Medium    Hiatal hernia 2017     Priority: Medium    Health Care Home 2016     Priority: Medium     [facility] Case Management  Leeanna Sigala RN  [phone]    [facility] CARE PLAN SUMMARY    Client Name:  Robert B Behr  Address:  [address] [place], [place] [address] [place] [address] Phone: [phone] (home)    :  1950 Date of Assessment: 2020 Refusal    Health Plan:  [facility]  Health Plan Number: [id number] Medical Assistance Number: [id number]  Financial Worker:    Case #:     FVP :  Leeanna Sigala RN  Phone:  [phone]  CM Fax:  [phone]   P Enrollment Date: 16 Case Mix: L   Rate Cell:  A  Waiver Type:  None   Emergency Contact:   JESSEALYSHA JANG  [place] Phone: [phone]  Relation: Friend  Secondary Emergency Contact: BRIANNA BENTLEY  Mobile Phone: [phone]  Relation: Friend Language:  English  :  No   Health Care Agent/POA:   Advanced Directives/Living Will:     Primary Care Clinic/Phone/Fax:  [facility]/(p) [phone], (f) [phone] Primary Dx:  COPD J44.9  Secondary Dx:  Osteoporosis M81.0   Primary Physician:  Fina Frausto   Height:  5'10\"  Weight:  154 lbs    Specialty Physician:    Audiologist:     Eye Care Provider:   Dental Care Provider:    [facility]: [facility] [phone] or [phone]   Other:              Tobacco use disorder 2016     Priority: Medium    Hyperlipidemia LDL goal <130 2016     Priority: Medium    COPD (chronic obstructive pulmonary disease) (H)      Priority: Medium     diagnosed with spirometry       Osteoporosis      Priority: Medium     Social History:  Social History     Tobacco Use    Smoking status: Light Smoker     Packs/day: 2.00     Years: 45.00     Additional pack years: 0.00     Total pack years: 90.00     Types: Cigarettes    Smokeless tobacco: Former    Tobacco comments:     5 " cigs per day   Substance Use Topics    Alcohol use: Yes     Alcohol/week: 0.0 standard drinks of alcohol     Comment: occ beer    Drug use: No     Family History:  Family History   Problem Relation Age of Onset    Diabetes Brother         living    Cancer Brother         throat    Macular Degeneration Mother     Colon Cancer No family hx of     Glaucoma No family hx of     Retinal detachment No family hx of     Amblyopia No family hx of     Skin Cancer No family hx of     Melanoma No family hx of        Medications:   cefTRIAXone  2 g Intravenous Q24H    fluticasone-vilanterol  1 puff Inhalation Daily    [START ON 10/23/2023] folic acid  1 mg Oral Daily    folic acid  1 mg Intravenous Daily    insulin aspart  1-4 Units Subcutaneous Q4H    ipratropium - albuterol 0.5 mg/2.5 mg/3 mL  3 mL Nebulization 4x daily    multivitamin w/minerals  1 tablet Oral Daily    pantoprazole  40 mg Intravenous BID    thiamine  200 mg Intravenous TID    [START ON 10/22/2023] thiamine  100 mg Oral TID    [START ON 10/27/2023] thiamine  100 mg Oral Daily           Physical Exam:  Temp:  [97  F (36.1  C)-98.9  F (37.2  C)] 97  F (36.1  C)  Pulse:  [] 105  Resp:  [13-29] 21  BP: ()/() 120/81  SpO2:  [90 %-100 %] 97 %    Intake/Output Summary (Last 24 hours) at 10/21/2023 1438  Last data filed at 10/21/2023 1100  Gross per 24 hour   Intake 3156.92 ml   Output 2350 ml   Net 806.92 ml     GEN: pleasant, no acute distress  HEENT: No discharge  EYES: no icterus  CV: RRR, normal s1/s2, no murmurs/rubs/s3/s4, no heave. JVP not elevated.   CHEST: clear to ausculation bilaterally, no rales or wheezing  ABD: soft, non-tender, normal active bowel sounds  : no flank/suprapubic tenderness  EXTR: No clubbing, cyanosis or edema.   NEURO: alert oriented, speech fluent/appropriate, motor grossly nonfocal  PSYCH: cooperative, affect appropriate, pleasant      Diagnostics:  All labs and imaging were reviewed, of note:    CMP  Recent Labs    Lab 10/21/23  1151 10/21/23  0758 10/21/23  0424 10/20/23  2357 10/20/23  1659 10/20/23  1623 10/20/23  1239 10/20/23  1233   NA  --   --  143 143 144  --  138 141   POTASSIUM  --   --  4.3 4.2 4.7  --  4.0 4.9   CHLORIDE  --   --  105 105 102  --   --  92*   CO2  --   --  17* 13* 12*  --   --  10*   ANIONGAP  --   --  21* 25* 30*  --   --  39*   * 169* 183*  196* 159* 143*   < > 120* 127*   BUN  --   --  44.1* 50.0* 52.2*  --   --  50.2*   CR  --   --  1.34* 1.58* 2.15*  --   --  2.51*   GFRESTIMATED  --   --  56* 46* 32*  --   --  26*   ANTONIO  --   --  8.1* 7.8* 7.4*  --   --  8.8   MAG  --   --   --   --  2.8*  --   --  2.8*   PHOS  --   --   --   --  8.1*  --   --   --    PROTTOTAL  --   --  6.0*  --   --   --   --  7.3   ALBUMIN  --   --  3.7  --   --   --   --  4.3   BILITOTAL  --   --  0.2  --   --   --   --  0.2   ALKPHOS  --   --  74  --   --   --   --  102   AST  --   --  109*  --   --   --   --  59*   ALT  --   --  44  --   --   --   --  41    < > = values in this interval not displayed.     CBC  Recent Labs   Lab 10/21/23  0424 10/20/23  2357 10/20/23  1659 10/20/23  1239 10/20/23  1233   WBC 17.7* 17.5*  --   --  22.4*   RBC 3.97* 4.22*  --   --  4.95   HGB 12.7* 13.5 14.2 16.7 16.0   HCT 37.2* 41.4  --  49 50.2   MCV 94 98  --   --  101*   MCH 32.0 32.0  --   --  32.3   MCHC 34.1 32.6  --   --  31.9   RDW 14.0 13.9  --   --  13.9    270  --   --  385     INR  Recent Labs   Lab 10/21/23  0424 10/20/23  1233   INR 1.26* 1.07     Arterial Blood Gas  Recent Labs   Lab 10/21/23  0758 10/21/23  0424 10/20/23  2357 10/20/23  1954   O2PER 24 24 1 1       Lab Results   Component Value Date    TROPI  06/10/2017     <0.015  The 99th percentile for upper reference range is 0.045 ug/L.  Troponin values in   the range of 0.045 - 0.120 ug/L may be associated with risks of adverse   clinical events.      TROPI  06/10/2017     <0.015  The 99th percentile for upper reference range is 0.045 ug/L.  Troponin  values in   the range of 0.045 - 0.120 ug/L may be associated with risks of adverse   clinical events.

## 2023-10-21 NOTE — PLAN OF CARE
ICU End of Shift Summary. See flowsheets for vital signs and detailed assessment.    Changes this shift: Patient is vitally stable on room air. Troponin peaked. Echo done. Cardiology consulted for wall motion abnormality. Lactic trending down. Aox4, CIWA scores 3-4 for tremors. No complaints of pain. SOB and tachypnea on exertion. Up to chair with PT/OT today with standby assist. Urine output adequate. BM x2 today with no evidence of bleed. IV fluids discontinued. Regular dental soft diet ordered. Low dose sliding scale initiated. Patient updated on plan of care.     Plan: Transfer orders for medsurg w/ tele placed. NPO at midnight for possible EGD tomorrow.       Goal Outcome Evaluation:      Plan of Care Reviewed With: patient    Overall Patient Progress: improvingOverall Patient Progress: improving    Outcome Evaluation: Remains vitally stable on room air. CIWA scores 3-4.

## 2023-10-21 NOTE — PROGRESS NOTES
MEDICAL ICU PROGRESS/TRANSFER NOTE  10/21/2023      Date of Service (when I saw the patient): 10/21/2023    ASSESSMENT: Robert B Behr is a 73 year old male with PMH tobacco use disorder, COPD, GERD w/o known esophagitis, and chronic MAC who was admitted to the ICU on 10/20/2023 after being found down outside at home by neighbor with bloody emesis. Hypotensive in the ED concerning for hemorrhagic vs septic shock, likely multifactorial. He received appropriate fluid resuscitation and required initiation of vasopressors. Admitted to ICU for encephalopathy and shock. Now stable off pressors without continued bleeding, stable for transfer to medical floor     CHANGES and MAJOR THINGS TODAY:   -Space VBG, Lactate as improved overnight   -Stop trending troponin, peaked AM 10/21   -TTE today   -Discussed with GI, will defer EGD until after formal echocardiogram   -Ok for CLD, advance as tolerated   -NPO at midnight for possible EGD 10/22   -Stop vancomycin, MRSA nares negative   -Start sliding scale insulin for hyperglycemia       Neuro:  #Toxic encephalopathy, improved   Patient altered arrival to the ED.  EtOH level 0.3.  Suspect mixed alcohol induced metabolic encephalopathy in setting of sepsis.  Patient denies alcohol use today, will check serum osm gap.  Patient mental status clearing with treatments as documented below.  Head CT obtained as he was found down and without acute intracranial abnormality.  No osm gap, propylene and ethylene glycol negative. Alert and oriented this AM   - Head CT w/o acute intracranial abnormality   - CIWA w/ ativan - precedex if issues   - Thiamine @ Wernicke dosing   - Folic acid @ Wernicke dosing   - No osm gap, propylene and ethylene glycol negative      # Pain and sedation  - Tylenol prn  - NO NSAIDs         Pulmonary:  # Acute hypoxemic respiratory failure, improved   Suspect ISO aspiration w/ emesis & GI bleed on top of pre-existing COPD.  Patient requiring 3 L nasal cannula at  time of arrival to unit.  Bilateral wheezing on exam but nothing concerning for focal consolidation. Does have known RUL pulmonary mass that appears larger today did have negative biodesix-Dr with primary pulmonologist, however given interval increase in size can consider biopsy when stable   - CT chest with stable COPD changes, larger appearance of RUL pulmonary mass    - oxygen prn, goal SpO2 > 92%      #COPD   -Schedule duo nebs   -PRN albuterol   -Restart pta inhalers post EGD if able    -Consider pulmonary consult for optimization of COPD, eval of RUL lung mass         Cardiovascular:  # Shock, septic vs hemorrhagic, resolved   Patient found down surrounded by hematemesis.  Hypothermic w/ Tmin 89.6 F.  Leukocytosis (WBC 22), lactic acid 13 on arrival.  Suspect mixed shock with sepsis and hypovolemic/hemorrhagic.  No clear source of infection at this time however strongly suspect aspiration pneumonia in setting of history and ongoing toxic encephalopathy.  Patient was appropriately fluid resuscitated in the ER and remained hypotensive.  Central line was placed in the right IJ and vasopressors were initiated. Rapidly improved on arrival to ICU and was weaned from all vasopressors afternoon of 10/20.   -S/p 2.5 L LR fluid resuscitation in ED   - Lactic acid down trending 13 -> 4.9, space labs   - off pressors   - Abx as per ID      #Tropinemia, likely Type 2 demand ischemia   High sensitivity troponin 126 on arrival to the ED.  EKG obtained and no evidence of dynamic ST changes.  Uptrending on initial check in a few troponin 392.  Patient is without chest pain.  Low suspicion for ACS at this time as asymptomatic.   -High sensitivity troponin peaked AM 10/21, no need for continued monitoring   -If patient develops ACS symptoms recommend repeat EKG & resend troponin   -TTE today      #VT   Patient with reported VT in ED with concern for NSVT vs brief VT arrest with rapid ROSC, given patient alert and conversational  on arrival to unit favor NSVT.   -Telemetry   -Maintain Mg > 2, K > 4; high intensity protocols ordered     #Hypertension   #HLD   - no PTA medications   -consider labetalol for SBP > 160 if echo ok    -holding statin ISO elevated LFTs      GI/Nutrition:  # Hematemesis/Coffee ground emesis   # Hx of GERD w/o known esophagitis   Patient found and started by hematemesis.  On arrival to ED noted to have coffee-ground residual in oropharynx and crusting on face.  No known history of GI bleed.  Hemoglobin 16 on initial arrival to ED stable on repeat check.  Patient does have a history of GERD but no documented evidence of esophagitis and chart review.  Denies ASA or NSAID use. Per patient report was not taking prescribed PPI at home.  No evidence of liver disease in patient chart, on exam, on CT abdomen today however esophagus appears thickened.  Lower suspicion for variceal bleed.  Strongly suspect esophagitis, gastritis, or bleeding ulcer.  - PPI 40 mg IV BID   - GI consulted, appreciate recs   - Plan for EGD post TTE and pulmonary optimization   - Patient okay for clear liquid diet today, ADAT   - NPO at midnight   - If recurrent hematemesis with bright red blood call GI stat, IV PPI gtt     #Elevated LFTs   AST slightly elevated at 109 this AM, ALT & Alk phos normal. EtOH level 0.3 on arrival. Patient denies frequent use however unreliable historian with acute illness. No evidence of cirrhosis on CT abdomen   -CTM       Renal/Fluids/Electrolytes:  # Acute Kidney Injury   Cr. 2.51 from b/l 0.8. BUN elevated at 52.  Labs and is most consistent with prerenal EDEN in setting of shock.  Also consider toxic kidney injury.  Will check ethylene glycol level.  Not on any nephrotoxic medications. S/p fluid resusctiation with appropriately down trending renal markers. Adequate UOP   -Avoid nephrotoxic agents  -Trend BMP daily   - Goal K > 4.0, Mg > 2.0     # Acute metabolic acidosis   Patient with acute metabolic lactic acidosis  with bicarb of 10 on arrival to the ED and anion gap > 30.  Lactic acid 13 on arrival to ED most likely metabolic acidosis in setting of lactic acidosis.   - Trend lactate BID, check sooner if clinical change         Endocrine:  # Hyperglycemia  Stress vs undiagnosed DM, A1c added on   - trend glucoses q4H per protocol    - sliding scale insulin      ID:  # Septic shock   History and presentation most concerning for aspiration pneumonia in setting of altered mental status and EtOH intoxication.  No other obvious source of infection however could consider GI translocation in setting of GI inflammation with bleed.  Blood cultures obtained and pending at time of admission.  Urine culture ordered patient yet to void.  Received IV vancomycin and Zosyn in the ED.  No known risk factors for pseudomonal infection at this time.  We will plan to narrow antibiotics on arrival to the unit as able.  - MRSA nares   - 10/19 Bclx: pending   - 10/19 Uclx: pending   - Ceftriaxone 1 mg Q24H   - Discontinue vancomycin as MRSA nares negative         Hematology:    # Hematemeses  # Hemorrhagic shock   Found down surrounding by bloody & coffee ground emesis. Hgb 16 on arrival, suspect will drop on repeat check as likely hemo concentrated and not yet reflective of acute bleed. No emesis or evidence of continued bleeding on arrival to ICU.    -CBC q12H   -Coags daily in AM until stable   -Transfuse Hgb > 7, Plt > 20, Fibrinogen > 100         Musculoskeletal:  # At risk for ICU deconditioning   -PT/OT       Skin:  # At risk for pressure injury   - monitor clinically      General Cares/Prophylaxis:    DVT Prophylaxis: Pneumatic Compression Devices  GI Prophylaxis: PPI  Restraints: prn for lines/agitation   Family Communication: patient declined   Code Status: FULL, unable to confirm with par     Lines/tubes/drains:  - pIV x2 (18 arabella)   - R internal jugular CVC, Ok to pull RIJ CVC        Disposition:  - Transfer to med/surg floor with  telemetry     Patient seen and findings/plan discussed with medical ICU staff, Dr. Flores.    Elva Posey MD   Internal Medicine-Pediatrics, PGY3  [facility]      Clinically Significant Risk Factors Present on Admission          # Hypocalcemia: Lowest Ca = 7.4 mg/dL in last 2 days, will monitor and replace as appropriate    # Anion Gap Metabolic Acidosis: Highest Anion Gap = 39 mmol/L in last 2 days, will monitor and treat as appropriate   # Coagulation Defect: INR = 1.26 (Ref range: 0.85 - 1.15) and/or PTT = 30 Seconds (Ref range: 22 - 38 Seconds), will monitor for bleeding      # Circulatory Shock: currently requiring pressors for blood pressure support                      ====================================  INTERVAL HISTORY:   Overnight patient troponin continued to rise, MD called to bedside. EKG obtained and no ST changes when compared to prior. Bedside POCUS with grossly normal LVEF, no obvious wall motion abnormalities. Discussed with cardiology fellow and no additional recommendations provided, feel this is likely due to demand ischemia. Please see cross cover note for full details.     CIWA scores 4-7, some reports of hallucinations overnight. Did not receive ativan per protocol.     OBJECTIVE:   1. VITAL SIGNS:   Temp:  [89.6  F (32  C)-98.9  F (37.2  C)] 97.9  F (36.6  C)  Pulse:  [] 109  Resp:  [13-29] 22  BP: ()/() 144/76  SpO2:  [85 %-100 %] 98 %  Resp: 22    2. INTAKE/ OUTPUT:   I/O last 3 completed shifts:  In: 2456.92 [P.O.:650; I.V.:1806.92]  Out: 1575 [Urine:1575]    3. PHYSICAL EXAMINATION:  General: Alert and oriented to location, month and self. Conversational with provider. NAD  HEENT: Conjunctiva clear, PERRL, Dry mucous membraes with lips. Posterior pharynx significantly swollen, no exudates. Edentulous    Neuro: A&Ox3, NAD, No focal deficits   Pulm/Resp: Good air entry bilaterally with expiratory wheeze scattered throughout. No coarse breath sounds or  crackles   CV: RRR, Nl S1, S2. WWP extremities   Abdomen: Distended but soft and non tender. No fluid wave. No palpable masses   Incisions/Skin: No obvious rashes or lesions. No petechiae or angiomas.     4. LABS:   Arterial Blood Gases   No lab results found in last 7 days.  Complete Blood Count   Recent Labs   Lab 10/21/23  0424 10/20/23  2357 10/20/23  1659 10/20/23  1239 10/20/23  1233   WBC 17.7* 17.5*  --   --  22.4*   HGB 12.7* 13.5 14.2 16.7 16.0    270  --   --  385     Basic Metabolic Panel  Recent Labs   Lab 10/21/23  0424 10/20/23  2357 10/20/23  1659 10/20/23  1623 10/20/23  1239 10/20/23  1233    143 144  --  138 141   POTASSIUM 4.3 4.2 4.7  --  4.0 4.9   CHLORIDE 105 105 102  --   --  92*   CO2 17* 13* 12*  --   --  10*   BUN 44.1* 50.0* 52.2*  --   --  50.2*   CR 1.34* 1.58* 2.15*  --   --  2.51*   *  196* 159* 143*   < > 120* 127*    < > = values in this interval not displayed.     Liver Function Tests  Recent Labs   Lab 10/21/23  0424 10/20/23  1233   * 59*   ALT 44 41   ALKPHOS 74 102   BILITOTAL 0.2 0.2   ALBUMIN 3.7 4.3   INR 1.26* 1.07     Coagulation Profile  Recent Labs   Lab 10/21/23  0424 10/20/23  1233   INR 1.26* 1.07   PTT 30 26       5. RADIOLOGY:   Recent Results (from the past 24 hour(s))   XR Chest Port 1 View    Narrative    Portable chest    INDICATION: Hematemesis    COMPARISON: Cancer screening CT 8/16/2023    FINDINGS:  Heart size normal. Patchy densities in the upper lungs made to prior  fibrosis or consolidation. This area was quite dense in the right  upper lobe on the previous CT. No abnormal air collection.      Impression    IMPRESSION: Continued patchy densities in the right upper lung from  recent August the 80. CT also showed hyperinflation which is not as  appreciated on this exam.    MAGDALENA MARTINEZ MD         SYSTEM ID:  [id number]   XR Chest Port 1 View    Narrative    XR CHEST PORT 1 VIEW  10/20/2023 3:13 PM     HISTORY:  s/p central  line       COMPARISON:  Sustained a prior chest radiograph, CT chest 8/16/2023    TECHNIQUE: XR CHEST PORT 1 VIEW    FINDINGS:   Interval placement of a right IJ approach central venous catheter  which is terminating within the right atrium/at the region of the  inferior cavoatrial junction.    Low lung volumes with unchanged scattered reticular opacities  diffusely and right apical more focal area of consolidated lung. No  new  focal airspace disease. Incompletely visualized bilateral  costophrenic angles. No large pleural effusions. No pneumothorax.  Cardiac silhouette is normal.      Impression    IMPRESSION:  1.  Right IJ approach central venous catheter with tip terminating  near the inferior cavoatrial junction, possibly extending into the  IVC. Retraction of catheter tip by 4 to 6 cm is appropriate.  2.  The remaining cardiopulmonary exam is stable compared to same day  prior chest radiograph    I have personally reviewed the examination and initial interpretation  and I agree with the findings.    MAGDALENA MARTINEZ MD         SYSTEM ID:  [id number]   CT Head w/o Contrast    Narrative    CT HEAD W/O CONTRAST 10/20/2023 4:11 PM    History: AMS     Comparison: MRI 8/3/2023    Technique: Using multidetector thin collimation helical acquisition  technique, axial, coronal and sagittal CT images from the skull base  to the vertex were obtained without intravenous contrast.   (topogram) image(s) also obtained and reviewed.    Findings: Lateral generalized volume loss. Mild hypoattenuation of  periventricular white matter likely relates to chronic small vessel  ischemia. Bilateral pseudophakia. There is no intracranial hemorrhage,  mass effect, or midline shift. Gray/white matter differentiation in  both cerebral hemispheres is preserved. Ventricles are proportionate  to the cerebral sulci. The basal cisterns are clear.    The bony calvaria and the bones of the skull base are normal. The  visualized portions of  the paranasal sinuses and mastoid air cells are  clear.      Impression    Impression:  No acute intracranial pathology. Moderate cerebral volume loss and  mild leukoaraiosis.    I have personally reviewed the examination and initial interpretation  and I agree with the findings.    MANJULA LUCAS MD         SYSTEM ID:  [id number]   CT Chest Abdomen Pelvis w/o Contrast    Narrative    EXAMINATION: CT CHEST ABDOMEN PELVIS W/O CONTRAST, 10/20/2023 4:11 PM    TECHNIQUE:  Helical CT images from the thoracic inlet through the  symphysis pubis were obtained without IV contrast.    COMPARISON: Chest radiograph 1510 hours, chest CT 8/16/2023.    HISTORY: sepsis, acute renal failure, hematemesis    FINDINGS:  Lines and tubes: Right IJ CVC tip terminates in the SVC. Defibrillator  pads project over the left chest and back.    CHEST:  THYROID: Thyroid is unremarkable.    LUNGS/PLEURA: Severe emphysematous changes of the lung parenchyma.  Stable to slightly decreased size of pulmonary masslike opacity with  adjacent calcification in the right upper lobe now measuring 4.7 x 2.5  cm (series 5, image 86), previously 4.9 x 2.9 cm when measured in a  similar fashion. Decreased focal peribronchovascular nodularity more  inferiorly in the right upper lobe (series 5 image 126), in the right  lower lobe (series 5 image 196), and in the left upper lobe (series 5  image 92). Calcified granuloma in the posterior lateral right lower  lobe. No pleural effusion or pneumothorax. Diffuse bronchial wall  thickening. No new focal consolidation or groundglass opacity.    MEDIASTINUM: Stable size and appearance of large hiatal hernia.  Increased distal esophageal wall thickening. Heart size is within  normal limits. No pericardial effusion. Calcific atherosclerosis of  the aortic arch extending in the proximal great vessels. Mild coronary  vessel atherosclerotic calcifications. Prominent mediastinal lymph  nodes do not meet size criteria for  enlargement and are slightly  decreased in size since prior.    CHEST WALL: No suspicious axillary lymphadenopathy.    ABDOMEN/PELVIS:  LIVER: Diffuse hypoattenuation of the hepatic parenchyma. No focal  masses are evident.    BILIARY: No gallbladder wall thickening or pericholecystic fluid. No  obvious intra or extrahepatic biliary dilation.    PANCREAS: Moderate fatty atrophy of the pancreas without focal mass or  ductal dilation.    SPLEEN: Atrophied spleen without focal mass.    ADRENAL GLANDS: Within normal limits.    URINARY TRACT: Atrophy of the renal cortices bilaterally. 0.9 cm renal  calculus in the left superior pole collecting system (as measured on  series 7, image 168). No hydronephrosis. Bladder is significantly  distended with no wall thickening.    REPRODUCTIVE ORGANS: Within normal limits.    STOMACH: Paraesophageal versus hiatal hernia as described above. No  gastric wall thickening.    BOWEL: Sigmoid, descending, and transverse colonic diverticulosis  without evidence of diverticulitis. Normal caliber of the small and  large bowel.  Appendix is within normal limits with tiny,  nonobstructing appendicolith.    PERITONEUM/FLUID: No free fluid.    VESSELS: No aneurysmal dilatation of the abdominal aorta. Diffuse  large vessel calcific atherosclerotic plaques.    LYMPH NODES: No lymphadenopathy.    BONES/SOFT TISSUES: No aggressive osseous lesions. Stable compression  deformity of L1 with trace retropulsion of posterior, superior  endplate fragment. Modic changes at L5-S1 with grade 1  anterolisthesis. Chronic right rib deformities.      Impression    IMPRESSION:  1.  Stable to slightly decreased right upper lobe pulmonary masslike  opacity and decreased additional pulmonary nodules in the right lung  and left upper lobe, likely improving infection, though underlying  malignancy in the right upper lobe remains a consideration. No acute  airspace disease.  2.  Severe emphysematous changes of the lung  parenchyma.  3.  Large hiatal hernia with increased circumferential distal  esophageal wall thickening, suggesting esophagitis.  4.  Large colonic diverticulosis without evidence of diverticulitis.  5.  Hepatic steatosis.    I have personally reviewed the examination and initial interpretation  and I agree with the findings.    SANIA CHICAS,          SYSTEM ID:  [id number]

## 2023-10-21 NOTE — PLAN OF CARE
Goal Outcome Evaluation:       ICU End of Shift Summary. See flowsheets for vital signs and detailed assessment.    Changes this shift: Patient was anxious much of the night.  He claims that he is so thirsty.  Was given ice chips often.  Patient gets very SOB with activity.  RR up to 42rpm.  HR has been in the 115s.    CIWA has been between 4-7 with some minor hallucinations and tremors.    Patient urinating into urinal.  Patient is also a little confused at times not remembering that he has an EGD today or why he can't have anything to drink.  No signs of bleeding. Trops are continuing to increase, and lactates are decreasing.  No change in Hbg.    Plan:    Plan for NPO for EGD today.  Monitor for ETOH withdrawal. Monitor for bleeding.  Continue to monitor.

## 2023-10-22 ENCOUNTER — APPOINTMENT (OUTPATIENT)
Dept: OCCUPATIONAL THERAPY | Facility: CLINIC | Age: 73
DRG: 380 | End: 2023-10-22
Payer: COMMERCIAL

## 2023-10-22 ENCOUNTER — CARE COORDINATION (OUTPATIENT)
Dept: GASTROENTEROLOGY | Facility: CLINIC | Age: 73
End: 2023-10-22

## 2023-10-22 ENCOUNTER — APPOINTMENT (OUTPATIENT)
Dept: CARDIOLOGY | Facility: CLINIC | Age: 73
DRG: 380 | End: 2023-10-22
Payer: COMMERCIAL

## 2023-10-22 DIAGNOSIS — K20.80 ESOPHAGITIS, LOS ANGELES GRADE D: Primary | ICD-10-CM

## 2023-10-22 LAB
ALBUMIN SERPL BCG-MCNC: 3.6 G/DL (ref 3.5–5.2)
ALP SERPL-CCNC: 66 U/L (ref 40–129)
ALT SERPL W P-5'-P-CCNC: 40 U/L (ref 0–70)
ANION GAP SERPL CALCULATED.3IONS-SCNC: 11 MMOL/L (ref 7–15)
ANION GAP SERPL CALCULATED.3IONS-SCNC: 11 MMOL/L (ref 7–15)
AST SERPL W P-5'-P-CCNC: 78 U/L (ref 0–45)
BASE EXCESS BLDV CALC-SCNC: 6.7 MMOL/L (ref -7.7–1.9)
BASO+EOS+MONOS # BLD AUTO: ABNORMAL 10*3/UL
BASO+EOS+MONOS NFR BLD AUTO: ABNORMAL %
BASOPHILS # BLD AUTO: 0 10E3/UL (ref 0–0.2)
BASOPHILS NFR BLD AUTO: 0 %
BILIRUB SERPL-MCNC: 0.3 MG/DL
BUN SERPL-MCNC: 11.9 MG/DL (ref 8–23)
BUN SERPL-MCNC: 11.9 MG/DL (ref 8–23)
CALCIUM SERPL-MCNC: 8.5 MG/DL (ref 8.8–10.2)
CALCIUM SERPL-MCNC: 8.5 MG/DL (ref 8.8–10.2)
CHLORIDE SERPL-SCNC: 103 MMOL/L (ref 98–107)
CHLORIDE SERPL-SCNC: 103 MMOL/L (ref 98–107)
CK SERPL-CCNC: 793 U/L (ref 39–308)
CREAT SERPL-MCNC: 0.69 MG/DL (ref 0.67–1.17)
CREAT SERPL-MCNC: 0.69 MG/DL (ref 0.67–1.17)
DEPRECATED HCO3 PLAS-SCNC: 27 MMOL/L (ref 22–29)
DEPRECATED HCO3 PLAS-SCNC: 27 MMOL/L (ref 22–29)
EGFRCR SERPLBLD CKD-EPI 2021: >90 ML/MIN/1.73M2
EGFRCR SERPLBLD CKD-EPI 2021: >90 ML/MIN/1.73M2
EOSINOPHIL # BLD AUTO: 0 10E3/UL (ref 0–0.7)
EOSINOPHIL NFR BLD AUTO: 0 %
ERYTHROCYTE [DISTWIDTH] IN BLOOD BY AUTOMATED COUNT: 13.9 % (ref 10–15)
ERYTHROCYTE [DISTWIDTH] IN BLOOD BY AUTOMATED COUNT: 14.2 % (ref 10–15)
GLUCOSE BLDC GLUCOMTR-MCNC: 101 MG/DL (ref 70–99)
GLUCOSE BLDC GLUCOMTR-MCNC: 103 MG/DL (ref 70–99)
GLUCOSE BLDC GLUCOMTR-MCNC: 117 MG/DL (ref 70–99)
GLUCOSE BLDC GLUCOMTR-MCNC: 126 MG/DL (ref 70–99)
GLUCOSE BLDC GLUCOMTR-MCNC: 99 MG/DL (ref 70–99)
GLUCOSE SERPL-MCNC: 112 MG/DL (ref 70–99)
GLUCOSE SERPL-MCNC: 112 MG/DL (ref 70–99)
HCO3 BLDV-SCNC: 30 MMOL/L (ref 21–28)
HCT VFR BLD AUTO: 34.3 % (ref 40–53)
HCT VFR BLD AUTO: 36.3 % (ref 40–53)
HGB BLD-MCNC: 11.6 G/DL (ref 13.3–17.7)
HGB BLD-MCNC: 12.2 G/DL (ref 13.3–17.7)
IMM GRANULOCYTES # BLD: 0 10E3/UL
IMM GRANULOCYTES NFR BLD: 0 %
INR PPP: 1.38 (ref 0.85–1.15)
LACTATE SERPL-SCNC: 1.3 MMOL/L (ref 0.7–2)
LVEF ECHO: NORMAL
LYMPHOCYTES # BLD AUTO: 2.1 10E3/UL (ref 0.8–5.3)
LYMPHOCYTES NFR BLD AUTO: 21 %
MAGNESIUM SERPL-MCNC: 2.1 MG/DL (ref 1.7–2.3)
MCH RBC QN AUTO: 31.9 PG (ref 26.5–33)
MCH RBC QN AUTO: 32 PG (ref 26.5–33)
MCHC RBC AUTO-ENTMCNC: 33.6 G/DL (ref 31.5–36.5)
MCHC RBC AUTO-ENTMCNC: 33.8 G/DL (ref 31.5–36.5)
MCV RBC AUTO: 95 FL (ref 78–100)
MCV RBC AUTO: 95 FL (ref 78–100)
MONOCYTES # BLD AUTO: 0.8 10E3/UL (ref 0–1.3)
MONOCYTES NFR BLD AUTO: 8 %
NEUTROPHILS # BLD AUTO: 6.7 10E3/UL (ref 1.6–8.3)
NEUTROPHILS NFR BLD AUTO: 71 %
NRBC # BLD AUTO: 0 10E3/UL
NRBC BLD AUTO-RTO: 0 /100
O2/TOTAL GAS SETTING VFR VENT: 0 %
OSMOLALITY SERPL: 295 MMOL/KG (ref 280–301)
PCO2 BLDV: 36 MM HG (ref 40–50)
PH BLDV: 7.53 [PH] (ref 7.32–7.43)
PHOSPHATE SERPL-MCNC: 1.3 MG/DL (ref 2.5–4.5)
PHOSPHATE SERPL-MCNC: 1.5 MG/DL (ref 2.5–4.5)
PLATELET # BLD AUTO: 142 10E3/UL (ref 150–450)
PLATELET # BLD AUTO: 142 10E3/UL (ref 150–450)
PO2 BLDV: 48 MM HG (ref 25–47)
POTASSIUM SERPL-SCNC: 3.4 MMOL/L (ref 3.4–5.3)
POTASSIUM SERPL-SCNC: 3.4 MMOL/L (ref 3.4–5.3)
POTASSIUM SERPL-SCNC: 3.7 MMOL/L (ref 3.4–5.3)
PROT SERPL-MCNC: 5.7 G/DL (ref 6.4–8.3)
RBC # BLD AUTO: 3.62 10E6/UL (ref 4.4–5.9)
RBC # BLD AUTO: 3.83 10E6/UL (ref 4.4–5.9)
SODIUM SERPL-SCNC: 141 MMOL/L (ref 135–145)
SODIUM SERPL-SCNC: 141 MMOL/L (ref 135–145)
WBC # BLD AUTO: 9.3 10E3/UL (ref 4–11)
WBC # BLD AUTO: 9.6 10E3/UL (ref 4–11)

## 2023-10-22 PROCEDURE — 84132 ASSAY OF SERUM POTASSIUM: CPT | Performed by: PEDIATRICS

## 2023-10-22 PROCEDURE — 36415 COLL VENOUS BLD VENIPUNCTURE: CPT

## 2023-10-22 PROCEDURE — 36415 COLL VENOUS BLD VENIPUNCTURE: CPT | Performed by: PEDIATRICS

## 2023-10-22 PROCEDURE — 0DJ08ZZ INSPECTION OF UPPER INTESTINAL TRACT, VIA NATURAL OR ARTIFICIAL OPENING ENDOSCOPIC: ICD-10-PCS | Performed by: INTERNAL MEDICINE

## 2023-10-22 PROCEDURE — 120N000002 HC R&B MED SURG/OB UMMC

## 2023-10-22 PROCEDURE — 999N000208 ECHOCARDIOGRAM COMPLETE

## 2023-10-22 PROCEDURE — 82550 ASSAY OF CK (CPK): CPT

## 2023-10-22 PROCEDURE — 82803 BLOOD GASES ANY COMBINATION: CPT | Performed by: STUDENT IN AN ORGANIZED HEALTH CARE EDUCATION/TRAINING PROGRAM

## 2023-10-22 PROCEDURE — 84100 ASSAY OF PHOSPHORUS: CPT | Performed by: PEDIATRICS

## 2023-10-22 PROCEDURE — 83735 ASSAY OF MAGNESIUM: CPT | Performed by: PEDIATRICS

## 2023-10-22 PROCEDURE — 85610 PROTHROMBIN TIME: CPT

## 2023-10-22 PROCEDURE — G0500 MOD SEDAT ENDO SERVICE >5YRS: HCPCS | Performed by: INTERNAL MEDICINE

## 2023-10-22 PROCEDURE — 258N000003 HC RX IP 258 OP 636

## 2023-10-22 PROCEDURE — 82310 ASSAY OF CALCIUM: CPT | Performed by: PEDIATRICS

## 2023-10-22 PROCEDURE — 43235 EGD DIAGNOSTIC BRUSH WASH: CPT | Performed by: INTERNAL MEDICINE

## 2023-10-22 PROCEDURE — 99232 SBSQ HOSP IP/OBS MODERATE 35: CPT | Mod: GC | Performed by: PEDIATRICS

## 2023-10-22 PROCEDURE — 83605 ASSAY OF LACTIC ACID: CPT

## 2023-10-22 PROCEDURE — 97535 SELF CARE MNGMENT TRAINING: CPT | Mod: GO

## 2023-10-22 PROCEDURE — 258N000003 HC RX IP 258 OP 636: Performed by: PEDIATRICS

## 2023-10-22 PROCEDURE — 85027 COMPLETE CBC AUTOMATED: CPT | Performed by: STUDENT IN AN ORGANIZED HEALTH CARE EDUCATION/TRAINING PROGRAM

## 2023-10-22 PROCEDURE — 97530 THERAPEUTIC ACTIVITIES: CPT | Mod: GO

## 2023-10-22 PROCEDURE — 250N000011 HC RX IP 250 OP 636: Performed by: INTERNAL MEDICINE

## 2023-10-22 PROCEDURE — 250N000013 HC RX MED GY IP 250 OP 250 PS 637

## 2023-10-22 PROCEDURE — 250N000009 HC RX 250: Performed by: PEDIATRICS

## 2023-10-22 PROCEDURE — 93306 TTE W/DOPPLER COMPLETE: CPT | Mod: 26 | Performed by: INTERNAL MEDICINE

## 2023-10-22 PROCEDURE — 80053 COMPREHEN METABOLIC PANEL: CPT | Performed by: STUDENT IN AN ORGANIZED HEALTH CARE EDUCATION/TRAINING PROGRAM

## 2023-10-22 PROCEDURE — 250N000013 HC RX MED GY IP 250 OP 250 PS 637: Performed by: PEDIATRICS

## 2023-10-22 PROCEDURE — 250N000011 HC RX IP 250 OP 636: Mod: JZ | Performed by: STUDENT IN AN ORGANIZED HEALTH CARE EDUCATION/TRAINING PROGRAM

## 2023-10-22 PROCEDURE — 85027 COMPLETE CBC AUTOMATED: CPT

## 2023-10-22 PROCEDURE — 250N000013 HC RX MED GY IP 250 OP 250 PS 637: Performed by: STUDENT IN AN ORGANIZED HEALTH CARE EDUCATION/TRAINING PROGRAM

## 2023-10-22 PROCEDURE — C9113 INJ PANTOPRAZOLE SODIUM, VIA: HCPCS | Mod: JZ | Performed by: STUDENT IN AN ORGANIZED HEALTH CARE EDUCATION/TRAINING PROGRAM

## 2023-10-22 PROCEDURE — 250N000009 HC RX 250: Performed by: INTERNAL MEDICINE

## 2023-10-22 PROCEDURE — 255N000002 HC RX 255 OP 636: Performed by: INTERNAL MEDICINE

## 2023-10-22 PROCEDURE — 83930 ASSAY OF BLOOD OSMOLALITY: CPT

## 2023-10-22 RX ORDER — POTASSIUM CHLORIDE 750 MG/1
20 TABLET, EXTENDED RELEASE ORAL ONCE
Status: COMPLETED | OUTPATIENT
Start: 2023-10-22 | End: 2023-10-22

## 2023-10-22 RX ORDER — POTASSIUM PHOS IN 0.9 % NACL 15MMOL/250
15 PLASTIC BAG, INJECTION (ML) INTRAVENOUS ONCE
Status: COMPLETED | OUTPATIENT
Start: 2023-10-22 | End: 2023-10-22

## 2023-10-22 RX ORDER — FENTANYL CITRATE 50 UG/ML
INJECTION, SOLUTION INTRAMUSCULAR; INTRAVENOUS PRN
Status: DISCONTINUED | OUTPATIENT
Start: 2023-10-22 | End: 2023-10-23

## 2023-10-22 RX ORDER — POTASSIUM CHLORIDE 750 MG/1
40 TABLET, EXTENDED RELEASE ORAL ONCE
Status: COMPLETED | OUTPATIENT
Start: 2023-10-22 | End: 2023-10-22

## 2023-10-22 RX ORDER — PANTOPRAZOLE SODIUM 40 MG/1
40 TABLET, DELAYED RELEASE ORAL
Status: DISCONTINUED | OUTPATIENT
Start: 2023-10-22 | End: 2023-10-24 | Stop reason: HOSPADM

## 2023-10-22 RX ADMIN — FOLIC ACID 1 MG: 5 INJECTION, SOLUTION INTRAMUSCULAR; INTRAVENOUS; SUBCUTANEOUS at 07:35

## 2023-10-22 RX ADMIN — CEFTRIAXONE SODIUM 2 G: 2 INJECTION, POWDER, FOR SOLUTION INTRAMUSCULAR; INTRAVENOUS at 18:06

## 2023-10-22 RX ADMIN — Medication 5 MG: at 21:23

## 2023-10-22 RX ADMIN — POTASSIUM PHOSPHATE, MONOBASIC POTASSIUM PHOSPHATE, DIBASIC 15 MMOL: 224; 236 INJECTION, SOLUTION, CONCENTRATE INTRAVENOUS at 18:49

## 2023-10-22 RX ADMIN — THIAMINE HCL TAB 100 MG 100 MG: 100 TAB at 16:00

## 2023-10-22 RX ADMIN — HUMAN ALBUMIN MICROSPHERES AND PERFLUTREN 6 ML: 10; .22 INJECTION, SOLUTION INTRAVENOUS at 08:13

## 2023-10-22 RX ADMIN — FLUTICASONE FUROATE AND VILANTEROL TRIFENATATE 1 PUFF: 200; 25 POWDER RESPIRATORY (INHALATION) at 15:41

## 2023-10-22 RX ADMIN — POTASSIUM CHLORIDE 40 MEQ: 750 TABLET, EXTENDED RELEASE ORAL at 11:43

## 2023-10-22 RX ADMIN — PANTOPRAZOLE SODIUM 40 MG: 40 TABLET, DELAYED RELEASE ORAL at 15:59

## 2023-10-22 RX ADMIN — THIAMINE HYDROCHLORIDE 200 MG: 100 INJECTION, SOLUTION INTRAMUSCULAR; INTRAVENOUS at 13:47

## 2023-10-22 RX ADMIN — POTASSIUM PHOSPHATE, MONOBASIC POTASSIUM PHOSPHATE, DIBASIC 15 MMOL: 224; 236 INJECTION, SOLUTION, CONCENTRATE INTRAVENOUS at 11:45

## 2023-10-22 RX ADMIN — PANTOPRAZOLE SODIUM 40 MG: 40 INJECTION, POWDER, FOR SOLUTION INTRAVENOUS at 07:35

## 2023-10-22 RX ADMIN — SODIUM CHLORIDE, POTASSIUM CHLORIDE, SODIUM LACTATE AND CALCIUM CHLORIDE: 600; 310; 30; 20 INJECTION, SOLUTION INTRAVENOUS at 04:47

## 2023-10-22 RX ADMIN — POTASSIUM CHLORIDE 20 MEQ: 750 TABLET, EXTENDED RELEASE ORAL at 18:06

## 2023-10-22 RX ADMIN — THIAMINE HYDROCHLORIDE 200 MG: 100 INJECTION, SOLUTION INTRAMUSCULAR; INTRAVENOUS at 07:35

## 2023-10-22 RX ADMIN — THIAMINE HCL TAB 100 MG 100 MG: 100 TAB at 21:23

## 2023-10-22 ASSESSMENT — ACTIVITIES OF DAILY LIVING (ADL)
ADLS_ACUITY_SCORE: 26

## 2023-10-22 NOTE — PROGRESS NOTES
Brief Cardiology Progress Note   Due to concern for possible VT on arrival to ER with brief CPR (per ICU team suspect NSVT) will recommend coronary angiogram instead of Coronary CTA and EP consult.  -Coronary angiogram and repeat echo prior to discharge once other medical issues resolved (please reach out to Cardiology when coronary angiogram order required)  -EP consult    Patient seen and discussed with Dr. Mack, who agrees with above plan.    Thank you for consulting the cardiovascular services at the [facility]. Please do not hesitate to call us with any questions.     Keven Allen MD  Cardiology Fellow

## 2023-10-22 NOTE — PLAN OF CARE
ICU End of Shift Summary. See flowsheets for vital signs and detailed assessment.    Changes this shift: Down for EGD today. Aox4, CIWA scores 0-2. No complaints of pain or shortness of breath. VSS on room air. Tolerating regular diet. Up with standby assist. Urine output adequate. Potassium and phos replaced per protocol.     Plan: Transfer to general floor when bed available. Down to cath lab tomorrow for angiogram.         Goal Outcome Evaluation:      Plan of Care Reviewed With: patient    Overall Patient Progress: improvingOverall Patient Progress: improving    Outcome Evaluation: VSS on room air.

## 2023-10-22 NOTE — PLAN OF CARE
ICU End of Shift Summary. See flowsheets for vital signs and detailed assessment.    Changes this shift:     No acute changes this shift, pt denies pain. CIWA scores 0-2, tremors not present. Afebrile, Sinus tach - SR. Good uop, maintenance fluids running.    Plan: Continue with POC, transfer to floor     Goal Outcome Evaluation:      Plan of Care Reviewed With: patient    Overall Patient Progress: improvingOverall Patient Progress: improving    Outcome Evaluation: CIWA scores 0-2, on room air

## 2023-10-22 NOTE — OR NURSING
EGD with no interventions performed under moderate sedation, patient tolerated well. Transported back to  with nurse assist and report called to AFIA Lawson.

## 2023-10-22 NOTE — PROGRESS NOTES
[facility]    Progress Note - Medicine Service, MARO[facility] TEAM 2       Date of Admission:  10/20/2023    Assessment & Plan   Robert B Behr is a 73 year old male with PMH tobacco use disorder, COPD, GERD w/o known esophagitis, and chronic MAC who was admitted to the ICU on 10/20/2023 for AMS, shock, EDEN now transferred to Medical team 10/21/23 after fluids, abx, and weaning off pressors with improvement in mental status.       Today:  - EGD shows grade D esophagitis, likely cause for coffee ground emesis  - phosphorus protocol  - Stop IVF  - TTE and CTA tomorrow per cardiology recs.   - PT per OT request  - Switch from IV to PO BID PPI    #Acute hypoxic respiratory failure   #COPD  #possible aspiration PNA  History of COPD. Required minimal O2 initially, now not needing O2. Currently smokes 5-7 cigarettes per day. Was previously on advair BID, Spiriva as maintenance therapy with combivent and albuterol as of 2022 pulm visit. At that time had been using spiriva as prn only. Now according to med list seems to be just using combivent and albuterol.   - Breo inhlaer (sub for PTA advair)  - prn albuterol, prn combivent  - Needs likely repeat PFTs and outpt follow up.  -oxygen prn, goal SpO2 > 92%      #Lung Nodule  #History of MAC  Pt had CT C/A/P on admission, demonstrating pulmonary masslike opacity with adjacent calcification in the right upper lobe now measuring 4.7 x 2.5  cm, previously 4.9 x 2.9 cm when measured in a similar fashion. Lesion is believed to be non-malignant based on [facility]odesix-Dr. Taylor from interventional pulmonary following, however continues to smoke (0.5 packs/day), and +MAC on 9/13/2023 culture without treatment.   - Scheduled for repeat CT chest 12/7/23 for surveillance of MAC vs pulmonary nodule.   - could consider OP ID for treatment options as well.         #septic shock, possible hypovolemic component, multifactorial- resolved  #Toxometabolic  encephalopathy- resolved  #anion gap metabolic acidosis  #elevated osmol gap  History and presentation most concerning for aspiration pneumonia in setting of altered mental status and EtOH intoxication.  No other obvious source of infection however could consider GI translocation in setting of GI inflammation with bleed.  Blood cultures obtained and pending at time of admission.  Urine culture ordered patient yet to void.  Received IV vancomycin and Zosyn in the ED.  No known risk factors for pseudomonal infection at this time.  We will plan to narrow antibiotics on arrival to the unit as able. Transferred from ICU off pressors. Head CT w/o acute intracranial abnormality. Pt found with reported coffee ground emesis in vicinity. MRSA nares negative    Lactic acid on presentation 13, improving, now 3.2.  -Recheck lactate q12  -Recheck CBC q 12, transfuse Hgb > 7, Plt 20  -osmolality recheck in AM, propylene and ethylene glycol negative   -ceftriaxone  -tylenol PRN    # LV EF 40-45% with apical akinesis and basal hyperkinesis  # Troponin elevation  # possible VT  Pt admitted critically ill with toxic-metabolic encephalopathy and possible aspiration pneumonia. Currently improved, hemodynamically stable and transferring to floor. Noted to have troponin elevation which peaked at 1024 today AM. ECG showed q waves in V1 and V2. Echo showed LVEF 40-45% and apical akinesis with basial hyperkinesis. Concern for AMI vs stress cardiomyopathy vs both (underlying CAD with stress cardiomyopathy). Pt is asymptomatic and hemodynamically stable leaning towards stress cardiomyopathy. However he does have risk factors for CAD including dyslipidemia and smoking history. He does not require urgent cath but would benefit from formal CAD evaluation prior to evaluation once other medical issues have been addressed. Would also recommend obtaining repeat echo at that point to assess for any resolution of wall motion abnormalities. Patient with  reported VT in ED with concern for NSVT vs brief VT arrest with rapid ROSC, given patient alert and conversational on arrival to unit favor NSVT.   -No immediate cardiac intervention required  -No indication for heparin drip at this point  -Coronary CTA ordered for 10/23/23  -Repeat echo ordered for 10/23/23  -If patient develops ACS symptoms recommend repeat EKG & resend troponin   -Telemetry   -Maintain Mg > 2, K > 4; high intensity protocols ordered     # Hematemesis/Reported coffee ground emesis   # Hx of GERD w/o known esophagitis   # esophageal thickening on CT  Patient reportedly found surrounded by hematemesis.  On arrival to ED noted to have coffee-ground residual in oropharynx and crusting on face.  No known history of GI bleed.  Hemoglobin 16 on initial arrival to ED stable on repeat check.  Patient does have a history of GERD. Denies ASA or NSAID use. Per patient report was not taking prescribed PPI at home.  No evidence of liver disease in patient chart, on exam, on CT abdomen today however esophagus appears thickened. EGD done 10/22 demonstrating grade D esophagitis, likely cause for coffee ground emesis.   - PPI 40 mg PO BID  - Follow up EGD in 3 months to check healing  - Avoid NSAIDS  - ETOH cessation  - Reduce coffee intake (drinks only coffee and no water most days)     #Ethanol abuse    #Elevated LFTs   AST slightly elevated at 109 this AM, ALT & Alk phos normal. EtOH level 0.3 on arrival. Patient denies frequent use however unreliable historian with acute illness. No evidence of cirrhosis on CT abdomen   On CIWA, low scoring. Was positive on tox screen   - CIWA w/ ativan - precedex if issues   - Thiamine @ Wernicke dosing   - Folic acid @ Wernicke dosing     #Hypertension   #HLD    -holding statin ISO elevated LFTs    # Hyperglycemia  Suspected stress vs undiagnosed DM -> A1c 5.9 10/21/2023   - trend glucoses q4H per protocol      #CK elevation- improving  #EDEN -  resolved  Pt was found down CK  1238 this AM and 1287 on PM recheck. Cr 1.34 this AM, improving with IVF. UOP good. Cr. 2.51 from b/l 0.8. BUN elevated at 52.  Labs and is most consistent with prerenal EDEN in setting of shock, improved with IVF.  Also consider toxic kidney injury.  Will check ethylene glycol level.  Not on any nephrotoxic medications. S/p fluid resusctiation with appropriately down trending renal markers. Adequate UOP   -Avoid nephrotoxic agents, no NSAIDs  -Trend BMP daily   -Goal K > 4.0, Mg > 2.0  -PT/OT  -monitor for skin breakdown    DISPO PLANNING:     NEW MEDS    MEDS TO STOP    FOLLOWUP NEEDED  - OP pulm with likely repeat PFTs  - CT chest December  - ID OP for MAC?        Diet: NPO per Anesthesia Guidelines for Procedure/Surgery Except for: Meds    DVT Prophylaxis: Pneumatic Compression Devices  Mehta Catheter: Not present  Fluids: LR 100mL/hr  Lines: None       Cardiac Monitoring: ACTIVE order. Indication: ICU  Code Status: Full Code      Clinically Significant Risk Factors          # Hypocalcemia: Lowest Ca = 7.4 mg/dL in last 2 days, will monitor and replace as appropriate    # Anion Gap Metabolic Acidosis: Highest Anion Gap = 39 mmol/L in last 2 days, will monitor and treat as appropriate     # Coagulation Defect: INR = 1.26 (Ref range: 0.85 - 1.15) and/or PTT = 30 Seconds (Ref range: 22 - 38 Seconds), will monitor for bleeding                      Disposition Plan         The patient's care was discussed with the Attending Physician, Dr. You .    Carmel Guzman MD  Medicine Service, [address]  Securely message with [employer] (more info)  Text page via [facility] Paging/Directory   See signed in provider for up to date coverage information  ______________________________________________________________________    Interval History   No acute overnight events. States that he feels well. Does note that at home pt has felt some orthostatic hypotension he  wonders if drinking very little water could be related, stating he really only drinks coffee most days. Also denies CP or angina when swimming, which he does regularly.     Physical Exam   Vital Signs: Temp: 98.5  F (36.9  C) Temp src: Oral BP: (!) 129/91 Pulse: 85   Resp: 19 SpO2: 92 % O2 Device: None (Room air)    Weight: 161 lbs 2.5 oz    Constitutional: awake, alert, cooperative, no apparent distress, and appears stated age  ENT: Normocephalic, without obvious abnormality, atraumatic  Respiratory: No increased work of breathing, clear to auscultation bilaterally, on RA at time of interview   Cardiovascular: Regular rate and rhythm, and no loud murmur noted  GI: normal bowel sounds, non-distended, non-tender  Neurologic: Awake, alert, oriented to name, place and time. Moves all extremities spontaneously. Gait not observed.       Data     I have personally reviewed the following data over the past 24 hrs:    12.8 (H)  \   12.1 (L)   / 195     N/A N/A N/A /  126 (H)   N/A N/A N/A \     Trop: 940 (HH) BNP: N/A     TSH: N/A T4: N/A A1C: N/A     Procal: N/A CRP: N/A Lactic Acid: 3.2 (H)         Imaging results reviewed over the past 24 hrs:   Recent Results (from the past 24 hour(s))   Echo Complete   Result Value    LVEF  40-45% (mildly reduced)    Narrative    [id number]  [id number]  [id number]  [id number]                                                                       Version 2     [facility],[place]  Echocardiography Laboratory  [address] [place] [id number]     Name: BEHR, ROBERT B  MRN: [id number]  : 1950  Study Date: 10/21/2023 12:00 PM  Age: 73 yrs  Gender: Male  [place] [facility] [place]: [place]  Ordering Physician: YUDY BATES  Performed By: Hodan Mistry     BSA: 1.9 m2  Height: 68 in  Weight: 162 lb  HR: 106  BP: 120/81 mmHg  ______________________________________________________________________________  Procedure  Complete  Portable Echo Adult. Contrast Optison. Optison (NDC #8618-0824-10)  given intravenously. Patient was given 6 ml mixture of 3 ml Optison and 6 ml  saline. 3 ml wasted.  ______________________________________________________________________________  Interpretation Summary  Left ventricular function is decreased. The ejection fraction is 40-45%  (mildly reduced).  Girard and distal apical LV segments are akinetic with hyperkinetic basal  segments. Cannot rule out LAD disease (ischemia). If ischemia is ruled out  then most likely cause is stress CMP.  Right ventricular function, chamber size, wall motion, and thickness are  normal.  No pericardial effusion is present.  No significant valvular abnormalities present.     This study was compared with the study from 6/10/2017 .The left ventricular  function has worsened. RWA described are new.     Reviewed admission ECGs. Noted ST segment elevation in V1-V5. Pathologic Q  waves (V1-V2) noted on today`s ECG     Recommend Cardiology consultation ASAP. Ischemia is likely for LV apical wall  motion abnormality.     Cardiology team updated about the findings 1:20 pm.     ______________________________________________________________________________  Left Ventricle  Left ventricular size is normal. Left ventricular wall thickness is normal.  Left ventricular diastolic function is normal. Left ventricular function is  decreased. The ejection fraction is 40-45% (mildly reduced). Apical wall  akinesis is present.     Right Ventricle  Right ventricular function, chamber size, wall motion, and thickness are  normal.     Atria  Both atria appear normal.     Mitral Valve  The mitral valve is normal. Trace mitral insufficiency is present.     Aortic Valve  The valve leaflets are not well visualized. On Doppler interrogation, there is  no significant stenosis or regurgitation.     Tricuspid Valve  The tricuspid valve is normal. Trace tricuspid insufficiency is present. The  peak velocity of  the tricuspid regurgitant jet is not obtainable. Pulmonary  artery systolic pressure cannot be assessed.     Pulmonic Valve  On Doppler interrogation, there is no significant stenosis or regurgitation.     Vessels  The aorta root is normal. The inferior vena cava cannot be assessed.     Pericardium  No pericardial effusion is present.     Miscellaneous  No significant valvular abnormalities present.     Compared to Previous Study  This study was compared with the study from 6/10/2017 . The left ventricular  function has worsened.  ______________________________________________________________________________  MMode/2D Measurements & Calculations  LVOT diam: 2.1 cm  LVOT area: 3.5 cm2     Doppler Measurements & Calculations  MV E max víctor: 72.7 cm/sec  MV A max víctor: 64.4 cm/sec  MV E/A: 1.1  MV dec slope: 482.7 cm/sec2  MV dec time: 0.15 sec  PA acc time: 0.11 sec  E/E' av.2  Lateral E/e': 8.8  Medial E/e': 7.7  RV S Víctor: 6.9 cm/sec     ______________________________________________________________________________  Report approved by: Atiya BRIONES 10/21/2023 01:21 PM

## 2023-10-23 ENCOUNTER — APPOINTMENT (OUTPATIENT)
Dept: PHYSICAL THERAPY | Facility: CLINIC | Age: 73
DRG: 380 | End: 2023-10-23
Payer: COMMERCIAL

## 2023-10-23 ENCOUNTER — SURGERY (OUTPATIENT)
Age: 73
End: 2023-10-23
Payer: COMMERCIAL

## 2023-10-23 ENCOUNTER — APPOINTMENT (OUTPATIENT)
Dept: OCCUPATIONAL THERAPY | Facility: CLINIC | Age: 73
DRG: 380 | End: 2023-10-23
Payer: COMMERCIAL

## 2023-10-23 LAB
ALBUMIN SERPL BCG-MCNC: 3.6 G/DL (ref 3.5–5.2)
ALP SERPL-CCNC: 69 U/L (ref 40–129)
ALT SERPL W P-5'-P-CCNC: 36 U/L (ref 0–70)
ANION GAP SERPL CALCULATED.3IONS-SCNC: 9 MMOL/L (ref 7–15)
AST SERPL W P-5'-P-CCNC: 48 U/L (ref 0–45)
ATRIAL RATE - MUSE: 108 BPM
ATRIAL RATE - MUSE: 99 BPM
BASE EXCESS BLDV CALC-SCNC: 5.6 MMOL/L (ref -7.7–1.9)
BILIRUB SERPL-MCNC: 0.4 MG/DL
BUN SERPL-MCNC: 10.5 MG/DL (ref 8–23)
CALCIUM SERPL-MCNC: 8.7 MG/DL (ref 8.8–10.2)
CHLORIDE SERPL-SCNC: 105 MMOL/L (ref 98–107)
CREAT SERPL-MCNC: 0.65 MG/DL (ref 0.67–1.17)
DEPRECATED HCO3 PLAS-SCNC: 27 MMOL/L (ref 22–29)
DIASTOLIC BLOOD PRESSURE - MUSE: NORMAL MMHG
DIASTOLIC BLOOD PRESSURE - MUSE: NORMAL MMHG
EGFRCR SERPLBLD CKD-EPI 2021: >90 ML/MIN/1.73M2
ERYTHROCYTE [DISTWIDTH] IN BLOOD BY AUTOMATED COUNT: 14.2 % (ref 10–15)
GLUCOSE BLDC GLUCOMTR-MCNC: 179 MG/DL (ref 70–99)
GLUCOSE BLDC GLUCOMTR-MCNC: 84 MG/DL (ref 70–99)
GLUCOSE BLDC GLUCOMTR-MCNC: 99 MG/DL (ref 70–99)
GLUCOSE SERPL-MCNC: 108 MG/DL (ref 70–99)
HCO3 BLDV-SCNC: 30 MMOL/L (ref 21–28)
HCT VFR BLD AUTO: 36.8 % (ref 40–53)
HGB BLD-MCNC: 12.2 G/DL (ref 13.3–17.7)
HGB BLD-MCNC: 12.8 G/DL (ref 13.3–17.7)
INTERPRETATION ECG - MUSE: NORMAL
INTERPRETATION ECG - MUSE: NORMAL
MCH RBC QN AUTO: 31.9 PG (ref 26.5–33)
MCHC RBC AUTO-ENTMCNC: 33.2 G/DL (ref 31.5–36.5)
MCV RBC AUTO: 96 FL (ref 78–100)
O2/TOTAL GAS SETTING VFR VENT: 2 %
P AXIS - MUSE: 52 DEGREES
P AXIS - MUSE: 64 DEGREES
PCO2 BLDV: 41 MM HG (ref 40–50)
PH BLDV: 7.47 [PH] (ref 7.32–7.43)
PHOSPHATE SERPL-MCNC: 2.3 MG/DL (ref 2.5–4.5)
PLATELET # BLD AUTO: 147 10E3/UL (ref 150–450)
PO2 BLDV: 49 MM HG (ref 25–47)
POTASSIUM SERPL-SCNC: 3.8 MMOL/L (ref 3.4–5.3)
PR INTERVAL - MUSE: 144 MS
PR INTERVAL - MUSE: 158 MS
PROT SERPL-MCNC: 5.7 G/DL (ref 6.4–8.3)
QRS DURATION - MUSE: 108 MS
QRS DURATION - MUSE: 122 MS
QT - MUSE: 352 MS
QT - MUSE: 392 MS
QTC - MUSE: 471 MS
QTC - MUSE: 503 MS
R AXIS - MUSE: 67 DEGREES
R AXIS - MUSE: 91 DEGREES
RBC # BLD AUTO: 3.83 10E6/UL (ref 4.4–5.9)
SODIUM SERPL-SCNC: 141 MMOL/L (ref 135–145)
SYSTOLIC BLOOD PRESSURE - MUSE: NORMAL MMHG
SYSTOLIC BLOOD PRESSURE - MUSE: NORMAL MMHG
T AXIS - MUSE: 35 DEGREES
T AXIS - MUSE: 58 DEGREES
UPPER GI ENDOSCOPY: NORMAL
VENTRICULAR RATE- MUSE: 108 BPM
VENTRICULAR RATE- MUSE: 99 BPM
WBC # BLD AUTO: 8.7 10E3/UL (ref 4–11)

## 2023-10-23 PROCEDURE — 999N000133 HC STATISTIC PP CARE STAGE 2

## 2023-10-23 PROCEDURE — 272N000001 HC OR GENERAL SUPPLY STERILE: Performed by: INTERNAL MEDICINE

## 2023-10-23 PROCEDURE — 36415 COLL VENOUS BLD VENIPUNCTURE: CPT | Performed by: STUDENT IN AN ORGANIZED HEALTH CARE EDUCATION/TRAINING PROGRAM

## 2023-10-23 PROCEDURE — 250N000009 HC RX 250: Performed by: INTERNAL MEDICINE

## 2023-10-23 PROCEDURE — C1769 GUIDE WIRE: HCPCS | Performed by: INTERNAL MEDICINE

## 2023-10-23 PROCEDURE — 82803 BLOOD GASES ANY COMBINATION: CPT | Performed by: STUDENT IN AN ORGANIZED HEALTH CARE EDUCATION/TRAINING PROGRAM

## 2023-10-23 PROCEDURE — 93458 L HRT ARTERY/VENTRICLE ANGIO: CPT | Mod: 26 | Performed by: INTERNAL MEDICINE

## 2023-10-23 PROCEDURE — 250N000011 HC RX IP 250 OP 636: Mod: JZ | Performed by: INTERNAL MEDICINE

## 2023-10-23 PROCEDURE — 97530 THERAPEUTIC ACTIVITIES: CPT | Mod: GO

## 2023-10-23 PROCEDURE — 250N000013 HC RX MED GY IP 250 OP 250 PS 637: Performed by: STUDENT IN AN ORGANIZED HEALTH CARE EDUCATION/TRAINING PROGRAM

## 2023-10-23 PROCEDURE — 250N000011 HC RX IP 250 OP 636: Performed by: INTERNAL MEDICINE

## 2023-10-23 PROCEDURE — 84100 ASSAY OF PHOSPHORUS: CPT | Performed by: PEDIATRICS

## 2023-10-23 PROCEDURE — 250N000013 HC RX MED GY IP 250 OP 250 PS 637: Performed by: PEDIATRICS

## 2023-10-23 PROCEDURE — 250N000013 HC RX MED GY IP 250 OP 250 PS 637

## 2023-10-23 PROCEDURE — 99222 1ST HOSP IP/OBS MODERATE 55: CPT | Mod: 25 | Performed by: NURSE PRACTITIONER

## 2023-10-23 PROCEDURE — 36415 COLL VENOUS BLD VENIPUNCTURE: CPT

## 2023-10-23 PROCEDURE — 99152 MOD SED SAME PHYS/QHP 5/>YRS: CPT | Performed by: INTERNAL MEDICINE

## 2023-10-23 PROCEDURE — 80053 COMPREHEN METABOLIC PANEL: CPT | Performed by: STUDENT IN AN ORGANIZED HEALTH CARE EDUCATION/TRAINING PROGRAM

## 2023-10-23 PROCEDURE — 85027 COMPLETE CBC AUTOMATED: CPT

## 2023-10-23 PROCEDURE — 4A023N7 MEASUREMENT OF CARDIAC SAMPLING AND PRESSURE, LEFT HEART, PERCUTANEOUS APPROACH: ICD-10-PCS | Performed by: INTERNAL MEDICINE

## 2023-10-23 PROCEDURE — 99233 SBSQ HOSP IP/OBS HIGH 50: CPT | Mod: GC | Performed by: PEDIATRICS

## 2023-10-23 PROCEDURE — 120N000011 HC R&B TRANSPLANT UMMC

## 2023-10-23 PROCEDURE — B2111ZZ FLUOROSCOPY OF MULTIPLE CORONARY ARTERIES USING LOW OSMOLAR CONTRAST: ICD-10-PCS | Performed by: INTERNAL MEDICINE

## 2023-10-23 PROCEDURE — 97535 SELF CARE MNGMENT TRAINING: CPT | Mod: GO

## 2023-10-23 PROCEDURE — C1894 INTRO/SHEATH, NON-LASER: HCPCS | Performed by: INTERNAL MEDICINE

## 2023-10-23 PROCEDURE — 85018 HEMOGLOBIN: CPT

## 2023-10-23 PROCEDURE — 99152 MOD SED SAME PHYS/QHP 5/>YRS: CPT | Mod: GC | Performed by: INTERNAL MEDICINE

## 2023-10-23 PROCEDURE — 97116 GAIT TRAINING THERAPY: CPT | Mod: GP

## 2023-10-23 PROCEDURE — 97161 PT EVAL LOW COMPLEX 20 MIN: CPT | Mod: GP

## 2023-10-23 PROCEDURE — 93458 L HRT ARTERY/VENTRICLE ANGIO: CPT | Performed by: INTERNAL MEDICINE

## 2023-10-23 RX ORDER — NALOXONE HYDROCHLORIDE 0.4 MG/ML
0.4 INJECTION, SOLUTION INTRAMUSCULAR; INTRAVENOUS; SUBCUTANEOUS
Status: DISCONTINUED | OUTPATIENT
Start: 2023-10-23 | End: 2023-10-24

## 2023-10-23 RX ORDER — IOPAMIDOL 755 MG/ML
INJECTION, SOLUTION INTRAVASCULAR
Status: DISCONTINUED | OUTPATIENT
Start: 2023-10-23 | End: 2023-10-23 | Stop reason: HOSPADM

## 2023-10-23 RX ORDER — ATROPINE SULFATE 0.1 MG/ML
0.5 INJECTION INTRAVENOUS
Status: DISCONTINUED | OUTPATIENT
Start: 2023-10-23 | End: 2023-10-24

## 2023-10-23 RX ORDER — NALOXONE HYDROCHLORIDE 0.4 MG/ML
0.2 INJECTION, SOLUTION INTRAMUSCULAR; INTRAVENOUS; SUBCUTANEOUS
Status: DISCONTINUED | OUTPATIENT
Start: 2023-10-23 | End: 2023-10-24

## 2023-10-23 RX ORDER — SODIUM CHLORIDE 9 MG/ML
75 INJECTION, SOLUTION INTRAVENOUS CONTINUOUS
Status: ACTIVE | OUTPATIENT
Start: 2023-10-23 | End: 2023-10-23

## 2023-10-23 RX ORDER — POTASSIUM CHLORIDE 750 MG/1
20 TABLET, EXTENDED RELEASE ORAL ONCE
Status: COMPLETED | OUTPATIENT
Start: 2023-10-23 | End: 2023-10-23

## 2023-10-23 RX ORDER — FLUMAZENIL 0.1 MG/ML
0.2 INJECTION, SOLUTION INTRAVENOUS
Status: DISCONTINUED | OUTPATIENT
Start: 2023-10-23 | End: 2023-10-24

## 2023-10-23 RX ORDER — FENTANYL CITRATE 50 UG/ML
25 INJECTION, SOLUTION INTRAMUSCULAR; INTRAVENOUS
Status: DISCONTINUED | OUTPATIENT
Start: 2023-10-23 | End: 2023-10-24

## 2023-10-23 RX ORDER — ASPIRIN 325 MG
325 TABLET ORAL ONCE
Status: COMPLETED | OUTPATIENT
Start: 2023-10-23 | End: 2023-10-23

## 2023-10-23 RX ORDER — FENTANYL CITRATE 50 UG/ML
INJECTION, SOLUTION INTRAMUSCULAR; INTRAVENOUS
Status: DISCONTINUED | OUTPATIENT
Start: 2023-10-23 | End: 2023-10-23 | Stop reason: HOSPADM

## 2023-10-23 RX ADMIN — PANTOPRAZOLE SODIUM 40 MG: 40 TABLET, DELAYED RELEASE ORAL at 19:24

## 2023-10-23 RX ADMIN — INSULIN ASPART 1 UNITS: 100 INJECTION, SOLUTION INTRAVENOUS; SUBCUTANEOUS at 19:56

## 2023-10-23 RX ADMIN — MIDAZOLAM 0.5 MG: 1 INJECTION INTRAMUSCULAR; INTRAVENOUS at 16:10

## 2023-10-23 RX ADMIN — IOPAMIDOL 40 ML: 755 INJECTION, SOLUTION INTRAVENOUS at 16:11

## 2023-10-23 RX ADMIN — Medication 1 TABLET: at 08:22

## 2023-10-23 RX ADMIN — FOLIC ACID 1 MG: 1 TABLET ORAL at 08:22

## 2023-10-23 RX ADMIN — POTASSIUM & SODIUM PHOSPHATES POWDER PACK 280-160-250 MG 1 PACKET: 280-160-250 PACK at 06:18

## 2023-10-23 RX ADMIN — FENTANYL CITRATE 25 MCG: 50 INJECTION INTRAMUSCULAR; INTRAVENOUS at 16:10

## 2023-10-23 RX ADMIN — MIDAZOLAM 1 MG: 1 INJECTION INTRAMUSCULAR; INTRAVENOUS at 15:47

## 2023-10-23 RX ADMIN — PANTOPRAZOLE SODIUM 40 MG: 40 TABLET, DELAYED RELEASE ORAL at 08:22

## 2023-10-23 RX ADMIN — ASPIRIN 325 MG ORAL TABLET 325 MG: 325 PILL ORAL at 10:03

## 2023-10-23 RX ADMIN — FENTANYL CITRATE 50 MCG: 50 INJECTION INTRAMUSCULAR; INTRAVENOUS at 15:47

## 2023-10-23 RX ADMIN — LIDOCAINE HYDROCHLORIDE 6 ML: 10 INJECTION, SOLUTION EPIDURAL; INFILTRATION; INTRACAUDAL; PERINEURAL at 16:05

## 2023-10-23 RX ADMIN — THIAMINE HCL TAB 100 MG 100 MG: 100 TAB at 19:53

## 2023-10-23 RX ADMIN — MIDAZOLAM 0.5 MG: 1 INJECTION INTRAMUSCULAR; INTRAVENOUS at 16:00

## 2023-10-23 RX ADMIN — IPRATROPIUM BROMIDE AND ALBUTEROL 1 PUFF: 20; 100 SPRAY, METERED RESPIRATORY (INHALATION) at 06:19

## 2023-10-23 RX ADMIN — THIAMINE HCL TAB 100 MG 100 MG: 100 TAB at 08:22

## 2023-10-23 RX ADMIN — POTASSIUM CHLORIDE 20 MEQ: 750 TABLET, EXTENDED RELEASE ORAL at 06:18

## 2023-10-23 RX ADMIN — FLUTICASONE FUROATE AND VILANTEROL TRIFENATATE 1 PUFF: 200; 25 POWDER RESPIRATORY (INHALATION) at 08:22

## 2023-10-23 RX ADMIN — LIDOCAINE HYDROCHLORIDE 3 ML: 10 INJECTION, SOLUTION EPIDURAL; INFILTRATION; INTRACAUDAL; PERINEURAL at 15:47

## 2023-10-23 RX ADMIN — THIAMINE HCL TAB 100 MG 100 MG: 100 TAB at 14:02

## 2023-10-23 RX ADMIN — POTASSIUM & SODIUM PHOSPHATES POWDER PACK 280-160-250 MG 1 PACKET: 280-160-250 PACK at 14:03

## 2023-10-23 RX ADMIN — FENTANYL CITRATE 25 MCG: 50 INJECTION INTRAMUSCULAR; INTRAVENOUS at 16:02

## 2023-10-23 ASSESSMENT — ACTIVITIES OF DAILY LIVING (ADL)
ADLS_ACUITY_SCORE: 26
ADLS_ACUITY_SCORE: 25
ADLS_ACUITY_SCORE: 26
ADLS_ACUITY_SCORE: 25
ADLS_ACUITY_SCORE: 26

## 2023-10-23 NOTE — CONSULTS
"  Electrophysiology Consultation Note   EP Attending: .   Reason for consultation: possible VT.   Provider requesting consultation: Jane Team 2, Internal Medicine Service.  Date of Service: 10/23/2023      HPI:   Mr. Behr is a 73 year old male who has a medical history significant for HLD, GERD, chronic MAC, COPD, and tobacco use.  He was brought to ER via EMS on 10/20/2023 after he was found on outside his home by neighbors surrounded by coffee-ground like emesis.  EMS was called and brought him into the ER.  At the time of arrival to ER he was alert and oriented and \"wanted to go home\".  He was noted to be hypotensive requiring fluid resuscitation and initiation of pressors.  Initial lactic acid was 13 and troponin 126.  EtOH was 0.3.  He was subsequently admitted to ICU for encephalopathy and shock.  He was able to be weaned off pressors rapidly and hemoglobin remained stable.  Lactic acid improved and troponin peaked at 1024.  He states he remembers he was working on his car and then fell and woke up in the ER.  Upon arrival to the ER he was noted to be in AF with  to 128 bpm.  He spontaneously converted to sinus.  Echo showed LVEF 40 to 45% with apical akinesis and basal hyperkinesis.  He was planned to get coronary angiogram today.  There was some reported concern for NSVT in ER.  No tracings available of this available to us.  EGD done on 10/22/23 showed grade D esophagitis.  He had melena overnight on 10/2/2023.  Not reliable historian.  SCR 0.65, GFR >90 electrolytes stable.  Hgb 12.2, .  No current medications.     Past Medical History:   Past Medical History:   Diagnosis Date    Cataract     COPD (chronic obstructive pulmonary disease) (H)     diagnosed with spirometry     Lung nodules     CT scan 2016    Osteoporosis     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    Tobacco abuse      Past Surgical History:   Past Surgical History:   Procedure Laterality Date    CATARACT IOL, RT/LT Left " 09/15/2017    s/p CE/IOL left eye    CATARACT IOL, RT/LT Right     ESOPHAGOSCOPY, GASTROSCOPY, DUODENOSCOPY (EGD), COMBINED N/A 10/22/2023    Procedure: Esophagoscopy, gastroscopy, duodenoscopy (EGD), combined;  Surgeon: David Post MD;  Location: [facility]    PHACOEMULSIFICATION CLEAR CORNEA WITH STANDARD INTRAOCULAR LENS IMPLANT Right 9/30/2016    Procedure: PHACOEMULSIFICATION CLEAR CORNEA WITH STANDARD INTRAOCULAR LENS IMPLANT;  Surgeon: Elly Valencia MD;  Location: [facility]    PHACOEMULSIFICATION WITH STANDARD INTRAOCULAR LENS IMPLANT Left 9/15/2017    Procedure: PHACOEMULSIFICATION WITH STANDARD INTRAOCULAR LENS IMPLANT;  Left Eye Phacoemulsification with Standard Lens;  Surgeon: Elly Valencia MD;  Location: [facility] OR    WRIST SURGERY       Allergies: Per MAR   No Known Allergies  Medications:   Per MAR current outpatient cardiovascular medications include:   Medications Prior to Admission   Medication Sig Dispense Refill Last Dose    albuterol (PROAIR HFA) 108 (90 Base) MCG/ACT inhaler INHALE 1 TO 2 PUFFS BY MOUTH EVERY 4 HOURS AS NEEDED FOR SHORTNESS OF BREATH 8.5 g 3     atorvastatin (LIPITOR) 40 MG tablet Take 1 tablet (40 mg) by mouth daily 90 tablet 3     B Complex-C (VITAMIN B COMPLEX W/VITAMIN C) TABS tablet TAKE 1 TABLET BY MOUTH TWICE DAILY WITH FOLIC ACID 180 tablet 3     benzonatate (TESSALON) 100 MG capsule Take 1 capsule (100 mg) by mouth 3 times daily as needed for cough (Patient not taking: Reported on 9/15/2023) 30 capsule 0     Calcium Carbonate-Vitamin D (CALCIUM 600 +D HIGH POTENCY) 600-10 MG-MCG TABS Take 1 tablet by mouth 2 times daily 90 tablet 3     calcium carbonate-vitamin D (CALTRATE) 600-10 MG-MCG per tablet TAKE 1 TABLET BY MOUTH TWICE DAILY 180 tablet 0     fish oil-omega-3 fatty acids 1000 MG capsule Take 1 capsule (1 g) by mouth daily 90 capsule 3     Ginkgo Biloba 60 MG TABS Take 1 tablet by mouth daily 90 tablet 3     glucosamine-chondroitinoitin 750-600 MG TABS  Take 2 tablets by mouth 2 times daily 120 tablet 3     ipratropium-albuterol (COMBIVENT RESPIMAT)  MCG/ACT inhaler Inhale 1 puff into the lungs 4 times daily as needed for shortness of breath, wheezing or cough (Inhale 1 puff into the lungs 4 times daily as needed. Do not exceed 6 doses per day.,) 4 g 4     omeprazole (PRILOSEC) 20 MG DR capsule Take 1 capsule (20 mg) by mouth 2 times daily 180 capsule 3     Selenium (SELENIMIN-200) 200 MCG TABS tablet Take 1 tablet (200 mcg) by mouth daily 90 tablet 3     vitamin A 3 MG (31844 UNITS) capsule Take 1 capsule (10,000 Units) by mouth daily 90 capsule 3     vitamin C (ASCORBIC ACID) 1000 MG TABS Take 1 tablet (1,000 mg) by mouth daily 90 tablet 3     vitamin E (TOCOPHEROL) 1000 units (450 mg) CAPS capsule Take 1 capsule (1,000 Units) by mouth daily 90 capsule 3     WIXELA INHUB 500-50 MCG/ACT inhaler Inhale 1 puff into the lungs 2 times daily 60 each 11      No current outpatient medications on file.     Current Facility-Administered Medications   Medication Dose Route Frequency    cefTRIAXone  2 g Intravenous Q24H    fluticasone-vilanterol  1 puff Inhalation Daily    folic acid  1 mg Oral Daily    insulin aspart  1-4 Units Subcutaneous Q4H    multivitamin w/minerals  1 tablet Oral Daily    pantoprazole  40 mg Oral BID AC    potassium & sodium phosphates  1 packet Oral or Feeding Tube Q4H    thiamine  100 mg Oral TID    [START ON 10/27/2023] thiamine  100 mg Oral Daily     Family History:   Family History   Problem Relation Age of Onset    Diabetes Brother         living    Cancer Brother         throat    Macular Degeneration Mother     Colon Cancer No family hx of     Glaucoma No family hx of     Retinal detachment No family hx of     Amblyopia No family hx of     Skin Cancer No family hx of     Melanoma No family hx of      Social History:   Social History     Tobacco Use    Smoking status: Light Smoker     Packs/day: 2.00     Years: 45.00     Additional pack  "years: 0.00     Total pack years: 90.00     Types: Cigarettes    Smokeless tobacco: Former    Tobacco comments:     5 cigs per day   Substance Use Topics    Alcohol use: Yes     Alcohol/week: 0.0 standard drinks of alcohol     Comment: occ beer       ROS:   A comprehensive 10 point ROS was negative other than as mentioned in HPI.    Physical Examination:   VITALS: /83   Pulse 74   Temp 98.6  F (37  C) (Oral)   Resp 24   Wt 72.9 kg (160 lb 11.5 oz)   SpO2 95%   BMI 23.80 kg/m    GENERAL APPEARANCE: AxO, NAD   HEENT: PERRLA. MMM.   NECK: Supple. No JVD or bruit. Good carotid upstroke.   CHEST: CTAB   CARDIOVASCULAR: S1S2, Reg.  ABDOMEN: BS+, soft, NT,  ND.   EXTREMITIES: No pedal edema.   NEURO: Grossly nonfocal.   PSYCH: Normal affect.  Data:   Labs:  BMP  Recent Labs   Lab 10/23/23  0824 10/23/23  0336 10/22/23  1545 10/22/23  1313 10/22/23  1142 10/22/23  0732 10/22/23  0704 10/21/23  0758 10/21/23  0424 10/20/23  2357   NA  --  141  --   --   --   --  141  141  --  143 143   POTASSIUM  --  3.8  --  3.7  --   --  3.4  3.4  --  4.3 4.2   CHLORIDE  --  105  --   --   --   --  103  103  --  105 105   ANTONIO  --  8.7*  --   --   --   --  8.5*  8.5*  --  8.1* 7.8*   CO2  --  27  --   --   --   --  27  27  --  17* 13*   BUN  --  10.5  --   --   --   --  11.9  11.9  --  44.1* 50.0*   CR  --  0.65*  --   --   --   --  0.69  0.69  --  1.34* 1.58*   GLC 99 108* 103*  --  99   < > 112*  112*   < > 183*  196* 159*    < > = values in this interval not displayed.     CBC  Recent Labs   Lab 10/23/23  0336 10/22/23  2215 10/22/23  0704 10/21/23  1539   WBC 8.7 9.6 9.3 12.8*   RBC 3.83* 3.83* 3.62* 3.74*   HGB 12.2* 12.2* 11.6* 12.1*   HCT 36.8* 36.3* 34.3* 34.4*   MCV 96 95 95 92   MCH 31.9 31.9 32.0 32.4   MCHC 33.2 33.6 33.8 35.2   RDW 14.2 14.2 13.9 14.1   * 142* 142* 195     INR  Recent Labs   Lab 10/22/23  0704 10/21/23  0424 10/20/23  1233   INR 1.38* 1.26* 1.07     No results found for: \"CKTOTAL\", " "\"CKMB\", \"TROPN\"  Cholesterol (mg/dL)   Date Value   04/03/2023 290 (H)   08/30/2021 229 (H)   04/07/2021 260 (H)   05/02/2020 220 (H)   05/14/2019 271 (H)   03/09/2018 234 (H)     HDL Cholesterol (mg/dL)   Date Value   04/07/2021 67   05/02/2020 77   05/14/2019 76   03/09/2018 70     Direct Measure HDL (mg/dL)   Date Value   04/03/2023 69   08/30/2021 69     LDL Cholesterol Calculated (mg/dL)   Date Value   04/03/2023 191 (H)   08/30/2021 128 (H)   04/07/2021 151 (H)   05/02/2020 110 (H)   05/14/2019 157 (H)   03/09/2018 128 (H)     EKG:        ECHO:   Interpretation Summary  Left ventricular function is decreased. The ejection fraction is 40-45%  (mildly reduced).  Apical wall akinesis is present.  Mid to anteroseptum is akinetic. Likely ischemic cardiomyopathy. LAD disease  suspected.  The right ventricle is normal size.  Global right ventricular function is normal.     This study was compared with the study from 10/21/2023 .  No significant changes noted.    Assessment:   Mr. Behr is a 73 year old male who has a medical history significant for HLD, GERD, chronic MAC, COPD, and tobacco use.  He was brought to ER via EMS on 10/20/2023 after he was found on outside his home by neighbors surrounded by coffee-ground like emesis.  EMS was called and brought him into the ER.  At the time of arrival to ER he was alert and oriented and \"wanted to go home\".  He was noted to be hypotensive requiring fluid resuscitation and initiation of pressors.  Initial lactic acid was 13 and troponin 126.  EtOH was 0.3.  He was subsequently admitted to ICU for encephalopathy and shock.  He was able to be weaned off pressors rapidly and hemoglobin remained stable.  Lactic acid improved and troponin peaked at 1024.  He states he remembers he was working on his car and then fell and woke up in the ER.  Upon arrival to the ER he was noted to be in AF with  to 128 bpm.  He spontaneously converted to sinus.  Echo showed LVEF 40 to 45% " with apical akinesis and basal hyperkinesis.  He was planned to get coronary angiogram today.  There was some reported concern for NSVT in ER.  No tracings available of this available to us.  EGD done on 10/22/23 showed grade D esophagitis.  He had melena overnight on 10/2/2023.  Not reliable historian.  SCR 0.65, GFR >90 electrolytes stable.  Hgb 12.2, .  No current medications.  EP Recommendations:  No evidence of any concerning sustained arrhyhmias. Does have RBBB at baseline. No EP intervention needed at this time.   The patient states understanding and is agreeable with plan.   Thank you for allowing us to participate in the care of this patient.     The patient was discussed w/ Dr. Bueno.  The above note reflects our joint plan.    DARIEL Monteiro CNP  Electrophysiology Consult Service  Pager: Text Page     [facility] Total time spent on patient visit, reviewing notes, imaging, labs, orders, and completing necessary documentation: 30 minutes.  >50% of visit spent on counseling patient and/or coordination of care.    EP STAFF NOTE  I have seen and examined the patient as part of a shared visit with TONIA Monteiro NP.  I agree with the note above. I reviewed today's vital signs and medications. I have reviewed and discussed with the advanced practice provider their physical examination, assessment, and plan   Briefly, possible VT  My key history/exam findings are: RRR.   The key management decisions made by me: no clear Vt on telemetry or EKG, no intervention needed.  Total time spent on patient visit, reviewing notes, imaging, labs, orders, and completing necessary documentation: 30 minutes.  >50% of visit spent on counseling patient and/or coordination of care.     Jasen Bueno MD [facility]  Cardiology - Electrophysiology

## 2023-10-23 NOTE — PLAN OF CARE
Goal Outcome Evaluation:         ICU End of Shift Summary. See flowsheets for vital signs and detailed assessment.    Changes this shift: Patient sat on commode early in my shift and had a black tarry stool. MD notified.  They came to bedside and saida labs.  Patient was very agitated with MD about the procedures he was having.  I was able to talk to him and explain why the procedures were important.  MD needs to explain what they are the implications again to patient before angio today.    Patient was then able to sleep much of the night.  Patient has been very pleasant with floor staff.    Plan:  Angiogram today.  Continue to monitor.

## 2023-10-23 NOTE — PROGRESS NOTES
10/23/23 1300   Appointment Info   Signing Clinician's Name / Credentials (PT) Will Bland, SPT and SUE Carvajal   Student Supervision Direct Patient Contact Provided   Living Environment   People in Home alone   Current Living Arrangements apartment   Home Accessibility stairs to enter home;other (see comments)  (stairs down to laundry)   Number of Stairs, Main Entrance 7;other (see comments)  (15 down to laundry)   Stair Railings, Main Entrance railing on right side (ascending)   Living Environment Comments Pt lives alone in apartment, 7 stairs to apt and 15 stairs to laundry. No family support but does know other occupants in building. Walk-in shower but no grab bars.   Self-Care   Usual Activity Tolerance good   Current Activity Tolerance moderate   Regular Exercise Yes   Activity/Exercise Type swimming;other (see comments)  (elliptical)   Exercise Amount/Frequency 3-5 times/wk;30 mins   Equipment Currently Used at Home none   Fall history within last six months yes   Number of times patient has fallen within last six months 1   Activity/Exercise/Self-Care Comment Ind in ADLs prior to admission - no AD, no equipment.   General Information   Onset of Illness/Injury or Date of Surgery 10/20/23   Referring Physician Carmel Guzman MD   Patient/Family Therapy Goals Statement (PT) return home   Pertinent History of Current Problem (include personal factors and/or comorbidities that impact the POC) Robert B Behr is a 73 year old male with PMH tobacco use disorder, COPD, GERD w/o known esophagitis, and chronic MAC who was admitted to the ICU on 10/20/2023 after being found down outside at home by neighbor with bloody emesis. Hypotensive in the ED concerning for hemorrhagic vs septic shock, likely multifactorial. He received appropriate fluid resuscitation and required initiation of vasopressors. Admitted to ICU for encephalopathy and shock.   Existing Precautions/Restrictions fall   Heart Disease  Risk Factors Smoking;Age;Gender;Dislipidemia   Cognition   Affect/Mental Status (Cognition) WFL   Orientation Status (Cognition) oriented x 4   Follows Commands (Cognition) over 90% accuracy;follows multi-step commands   Posture    Posture Forward head position;Protracted shoulders;Kyphosis   Range of Motion (ROM)   Range of Motion ROM is WFL   ROM Comment UE and LE WFL   Strength (Manual Muscle Testing)   Strength (Manual Muscle Testing) Deficits observed during functional mobility   Strength Comments Generalized deconditioning throughout with LLE weakness which is his baseline due to hx of polio   Bed Mobility   Bed Mobility no deficits identified   Transfers   Comment, (Transfers) Sit <> stand SBA   Gait/Stairs (Locomotion)   Assistive Device (Gait) other (see comments)  (no AD)   Pattern (Gait) step-through   Deviations/Abnormal Patterns (Gait) left sided deviations   Comment, (Gait/Stairs) Pt amb 10' no AD, unsteady with wide FRANSISCO, uneven step pattern, and high guard stance   Balance   Balance Comments Sitting balance good. Static standing good. Dynamic balance impaired.   Clinical Impression   Criteria for Skilled Therapeutic Intervention Yes, treatment indicated   PT Diagnosis (PT) functional mobility deficits   Influenced by the following impairments balance, activity tolerance, strength   Functional limitations due to impairments gait, stairs, functional endurance   Clinical Presentation (PT Evaluation Complexity) stable   Clinical Presentation Rationale Clinical reasoning   Clinical Decision Making (Complexity) low complexity   Planned Therapy Interventions (PT) balance training;gait training;home exercise program;motor coordination training;neuromuscular re-education;patient/family education;stair training;progressive activity/exercise;home program guidelines;strengthening;risk factor education   Risk & Benefits of therapy have been explained evaluation/treatment results reviewed;care plan/treatment goals  reviewed;risks/benefits reviewed;participants voiced agreement with care plan;patient   PT Total Evaluation Time   PT Eval, Low Complexity Minutes (59603) 7   Physical Therapy Goals   PT Frequency 3x/week   PT Predicted Duration/Target Date for Goal Attainment 10/27/23   PT Goals Gait;Stairs;Transfers   PT: Transfers Sit to/from stand;Independent   PT: Gait Straight cane;Greater than 200 feet;Modified independent   PT: Stairs 7 stairs;Greater than 10 stairs;Rail on right;Modified independent   PT Discharge Planning   PT Plan Increase gait distances and stairs training, add in DGI-like gait activities (head turns, obstacles, change speed, etc.)   PT Discharge Recommendation (DC Rec) home   PT Rationale for DC Rec almost to PLOF, able to complete functional tasks with modified independence   PT Brief overview of current status SBA-CGA x1   PT Equipment Needed at Discharge cane, straight   Total Session Time   Timed Code Treatment Minutes 20   Total Session Time (sum of timed and untimed services) 27

## 2023-10-23 NOTE — PROGRESS NOTES
Transferred to: 7A at 1335  Status at time of transfer: A/O x 4, denies pain. VSS on RA.  Belongings: Dentures, shoes, $10 bill & some change placed inside patient belonging bag.  Mehta removed? (if no, why?): NA  Chart and medications: Sent with patient to new floor.  Family notified:  No.

## 2023-10-23 NOTE — PROGRESS NOTES
Patient arrived via cart from CCL with RN s/p coronary angiogram. VSS. RFA site CDI, no hematoma. Pulses per doppler at baseline. R TR BAND to 15 ml air. Pulses/CMS at baseline. Patient denies pain, tolerating regular diet. Flat bedrest until 1800 per MD orders.

## 2023-10-23 NOTE — PLAN OF CARE
BP (!) 138/93 (BP Location: Right arm)   Pulse 75   Temp 98.2  F (36.8  C) (Oral)   Resp 20   Wt 72.9 kg (160 lb 11.5 oz)   SpO2 100%   BMI 23.80 kg/m      Shift: 1890-9108  Isolation Status: None  VS: VSS on RA, afebrile. Con't pulse ox in place  Neuro: Aox4  Behaviors: Calm, pleasant. Pt likes if cares are explained to him  BG: Q4H. Next check at 1600  Labs: RN managed M, K, Phos  Respiratory: n/a  Cardiac: n/a  Pain/Nausea: Denies  PRN: None  Diet: 3g Sodium; currently NPO for angiogram  IV Access: L and R PIV SL  Infusion(s): n/a  Lines/Drains: N/a  GI/: Voids in urinal. No BM reported this shift  Skin: WDL. Skin assessment still needs to be done.  Mobility: UAL  Events/Education: Angiogram at 1530.  Plan: Continue with plan of care

## 2023-10-23 NOTE — PROGRESS NOTES
[facility]    Progress Note - Medicine Service, [facility] TEAM 2       Date of Admission:  10/20/2023    Assessment & Plan   Robert B Behr is a 73 year old male with PMH tobacco use disorder, COPD, GERD w/o known esophagitis, and chronic MAC who was admitted to the ICU on 10/20/2023 for AMS, shock, EDEN now transferred to Medical team 10/21/23 after fluids, abx, and weaning off pressors with improvement in mental status.       Today:  - melena overnight, repeat HGB this PM unchanged (EGD shows grade D esophagitis, likely cause for coffee ground emesis)  - repeat TTE unchanged  - CTA today  - Electrophysiology consult  - stop ceftriaxone    #Acute hypoxic respiratory failure   #COPD  #possible aspiration PNA  History of COPD. Required minimal O2 initially, now not needing O2. Currently smokes 5-7 cigarettes per day. Was previously on advair BID, Spiriva as maintenance therapy with combivent and albuterol as of 2022 pulm visit. At that time had been using spiriva as prn only. Now according to med list seems to be just using combivent and albuterol.   - Breo inhlaer (sub for PTA advair)  - prn albuterol, prn combivent  - Needs likely repeat PFTs and outpt follow up.  - oxygen prn, goal SpO2 > 92%      #Lung Nodule  #History of MAC  Pt had CT C/A/P on admission, demonstrating pulmonary masslike opacity with adjacent calcification in the right upper lobe now measuring 4.7 x 2.5  cm, previously 4.9 x 2.9 cm when measured in a similar fashion. Lesion is believed to be non-malignant based on biodesix-Dr. Taylor from interventional pulmonary following, however continues to smoke (0.5 packs/day), and +MAC on 9/13/2023 culture without treatment.   - Scheduled for repeat CT chest 12/7/23 for surveillance of MAC vs pulmonary nodule.   - could consider OP ID for treatment options as well.     #septic shock, possible hypovolemic component, multifactorial- resolved  #Toxometabolic  encephalopathy- resolved  #anion gap metabolic acidosis  #elevated osmol gap  History and presentation most concerning for aspiration pneumonia in setting of altered mental status and EtOH intoxication.  No other obvious source of infection however could consider GI translocation in setting of GI inflammation with bleed.  Blood cultures obtained and pending at time of admission.  Urine culture ordered patient yet to void.  Received IV vancomycin and Zosyn in the ED.  No known risk factors for pseudomonal infection at this time.  We will plan to narrow antibiotics on arrival to the unit as able. Transferred from ICU off pressors. Head CT w/o acute intracranial abnormality. Pt found with reported coffee ground emesis in vicinity. MRSA nares negative    Lactic acid on presentation 13, improving, now 3.2.  -Recheck lactate q12  -Recheck CBC q 12, transfuse Hgb > 7, Plt 20  -osmolality recheck in AM, propylene and ethylene glycol negative   -stop ceftriaxone, no current signs of PNA  -tylenol PRN    # LV EF 40-45% with apical akinesis and basal hyperkinesis  # Troponin elevation  # possible VT  Pt admitted critically ill with toxic-metabolic encephalopathy and possible aspiration pneumonia. Currently improved, hemodynamically stable and transferring to floor. Noted to have troponin elevation which peaked at 1024 today AM. ECG showed q waves in V1 and V2. Echo showed LVEF 40-45% and apical akinesis with basial hyperkinesis. Concern for AMI vs stress cardiomyopathy vs both (underlying CAD with stress cardiomyopathy). Pt is asymptomatic and hemodynamically stable leaning towards stress cardiomyopathy. However he does have risk factors for CAD including dyslipidemia and smoking history. He does not require urgent cath but would benefit from formal CAD evaluation prior to evaluation once other medical issues have been addressed. Would also recommend obtaining repeat echo at that point to assess for any resolution of wall  motion abnormalities. Patient with reported VT in ED with concern for NSVT vs brief VT arrest with rapid ROSC, given patient alert and conversational on arrival to unit favor NSVT.   -No immediate cardiac intervention required  -No indication for heparin drip at this point  -Coronary CTA ordered for 10/23/23  -Repeat echo unchanged  -If patient develops ACS symptoms recommend repeat EKG & resend troponin   -Telemetry   -Maintain Mg > 2, K > 4; high intensity protocols ordered   -Electrophysiology consult    # Hematemesis/Reported coffee ground emesis   # Hx of GERD w/o known esophagitis   # esophageal thickening on CT  Patient reportedly found surrounded by hematemesis.  On arrival to ED noted to have coffee-ground residual in oropharynx and crusting on face.  No known history of GI bleed.  Hemoglobin 16 on initial arrival to ED stable on repeat check.  Patient does have a history of GERD. Denies ASA or NSAID use. Per patient report was not taking prescribed PPI at home.  No evidence of liver disease in patient chart, on exam, on CT abdomen today however esophagus appears thickened. EGD done 10/22 demonstrating grade D esophagitis, likely cause for coffee ground emesis.   - PPI 40 mg PO BID  - Follow up EGD in 3 months to check healing  - Avoid NSAIDS  - ETOH cessation  - Reduce coffee intake (drinks only coffee and no water most days)  - melena overnight 10/22-23    #Ethanol abuse    #Elevated LFTs   AST slightly elevated at 109 this AM, ALT & Alk phos normal. EtOH level 0.3 on arrival. Patient denies frequent use however unreliable historian with acute illness. No evidence of cirrhosis on CT abdomen   On CIWA, low scoring. Was positive on tox screen   - CIWA w/ ativan - precedex if issues   - Thiamine @ Wernicke dosing   - Folic acid @ Wernicke dosing     #Hypertension   #HLD    -holding statin ISO elevated LFTs    # Hyperglycemia  Suspected stress vs undiagnosed DM -> A1c 5.9 10/21/2023   - trend glucoses q4H per  protocol      #CK elevation- improving  #EDEN -  resolved  Pt was found down CK 1238 this AM and 1287 on PM recheck. Cr 1.34 this AM, improving with IVF. UOP good. Cr. 2.51 from b/l 0.8. BUN elevated at 52.  Labs and is most consistent with prerenal EDEN in setting of shock, improved with IVF.  Also consider toxic kidney injury.  Will check ethylene glycol level.  Not on any nephrotoxic medications. S/p fluid resusctiation with appropriately down trending renal markers. Adequate UOP   -Avoid nephrotoxic agents, no NSAIDs  -Trend BMP daily   -Goal K > 4.0, Mg > 2.0  -PT/OT  -monitor for skin breakdown    DISPO PLANNING:     NEW MEDS    MEDS TO STOP    FOLLOWUP NEEDED  - OP pulm with likely repeat PFTs  - CT chest December  - ID OP for MAC?  - Follow up EGD in 3 months to check healing        Diet: No Added Salt 3 Gram Sodium    DVT Prophylaxis: Pneumatic Compression Devices  Mehta Catheter: Not present  Fluids: LR 100mL/hr  Lines: None       Cardiac Monitoring: ACTIVE order. Indication: ICU  Code Status: Full Code      Clinically Significant Risk Factors                 # Coagulation Defect: INR = 1.38 (Ref range: 0.85 - 1.15) and/or PTT = 30 Seconds (Ref range: 22 - 38 Seconds), will monitor for bleeding                      Disposition Plan      Expected Discharge Date: 10/24/2023,  3:00 PM    Destination: home  Discharge Comments: pending CTA read tomorrow.      The patient's care was discussed with the Attending Physician, Dr. You .    Evangelina Tanner MD  Medicine Service, [facility] TEAM 2  [facility]  Securely message with [employer] (more info)  Text page via [facility] Paging/Directory   See signed in provider for up to date coverage information  ______________________________________________________________________    Interval History   No acute overnight events. Sitting up in chair. States that he feels well. Did have episode of melena overnight. Also denies CP or  abdominal pain.     Physical Exam   Vital Signs: Temp: 98.2  F (36.8  C) Temp src: Oral BP: (!) 138/93 Pulse: 75   Resp: 20 SpO2: 100 % O2 Device: None (Room air)    Weight: 160 lbs 11.45 oz    Constitutional: awake, alert, cooperative, no apparent distress, and appears stated age  ENT: Normocephalic, without obvious abnormality, atraumatic  Respiratory: No increased work of breathing, clear to auscultation bilaterally, on RA at time of interview   Cardiovascular: Regular rate and rhythm, and no loud murmur noted  GI: normal bowel sounds, non-distended, non-tender  Neurologic: Awake, alert, oriented to name, place and time. Moves all extremities spontaneously. Gait not observed.     Data     I have personally reviewed the following data over the past 24 hrs:    8.7  \   12.8 (L)   / 147 (L)     141 105 10.5 /  84   3.8 27 0.65 (L) \     ALT: 36 AST: 48 (H) AP: 69 TBILI: 0.4   ALB: 3.6 TOT PROTEIN: 5.7 (L) LIPASE: N/A       Imaging results reviewed over the past 24 hrs:   No results found for this or any previous visit (from the past 24 hour(s)).

## 2023-10-23 NOTE — PROGRESS NOTES
Post coronary angiogram. Patient is tolerating liquids and foods, ambulating, urinating, puncture sites are stable (no bleeding and no hematoma).  A&Ox4 and making needs known. CCL access sites: R groin site with primapore and R radial wrist with priampore C/D/I; no bleeding or hematoma; CMS intact. VSSA. Sinus tach on monitor. Report given to AFIA Youngblood at 1825, patient transferred to  at 1835 with RN via wheelchair.   No

## 2023-10-24 ENCOUNTER — APPOINTMENT (OUTPATIENT)
Dept: PHYSICAL THERAPY | Facility: CLINIC | Age: 73
DRG: 380 | End: 2023-10-24
Payer: COMMERCIAL

## 2023-10-24 ENCOUNTER — APPOINTMENT (OUTPATIENT)
Dept: OCCUPATIONAL THERAPY | Facility: CLINIC | Age: 73
DRG: 380 | End: 2023-10-24
Payer: COMMERCIAL

## 2023-10-24 VITALS
RESPIRATION RATE: 16 BRPM | SYSTOLIC BLOOD PRESSURE: 136 MMHG | BODY MASS INDEX: 23.8 KG/M2 | OXYGEN SATURATION: 97 % | DIASTOLIC BLOOD PRESSURE: 82 MMHG | WEIGHT: 160.72 LBS | HEART RATE: 85 BPM | TEMPERATURE: 98.2 F

## 2023-10-24 LAB
ALBUMIN SERPL BCG-MCNC: 3.6 G/DL (ref 3.5–5.2)
ALP SERPL-CCNC: 74 U/L (ref 40–129)
ALT SERPL W P-5'-P-CCNC: 33 U/L (ref 0–70)
ANION GAP SERPL CALCULATED.3IONS-SCNC: 10 MMOL/L (ref 7–15)
AST SERPL W P-5'-P-CCNC: 31 U/L (ref 0–45)
BILIRUB SERPL-MCNC: 0.5 MG/DL
BUN SERPL-MCNC: 11.6 MG/DL (ref 8–23)
CALCIUM SERPL-MCNC: 9.2 MG/DL (ref 8.8–10.2)
CHLORIDE SERPL-SCNC: 103 MMOL/L (ref 98–107)
CREAT SERPL-MCNC: 0.72 MG/DL (ref 0.67–1.17)
DEPRECATED HCO3 PLAS-SCNC: 26 MMOL/L (ref 22–29)
EGFRCR SERPLBLD CKD-EPI 2021: >90 ML/MIN/1.73M2
ERYTHROCYTE [DISTWIDTH] IN BLOOD BY AUTOMATED COUNT: 13.7 % (ref 10–15)
GLUCOSE BLDC GLUCOMTR-MCNC: 104 MG/DL (ref 70–99)
GLUCOSE BLDC GLUCOMTR-MCNC: 155 MG/DL (ref 70–99)
GLUCOSE BLDC GLUCOMTR-MCNC: 185 MG/DL (ref 70–99)
GLUCOSE SERPL-MCNC: 91 MG/DL (ref 70–99)
HCT VFR BLD AUTO: 37.9 % (ref 40–53)
HGB BLD-MCNC: 12.5 G/DL (ref 13.3–17.7)
MAGNESIUM SERPL-MCNC: 1.8 MG/DL (ref 1.7–2.3)
MCH RBC QN AUTO: 32.1 PG (ref 26.5–33)
MCHC RBC AUTO-ENTMCNC: 33 G/DL (ref 31.5–36.5)
MCV RBC AUTO: 97 FL (ref 78–100)
PHOSPHATE SERPL-MCNC: 3 MG/DL (ref 2.5–4.5)
PLATELET # BLD AUTO: 156 10E3/UL (ref 150–450)
POTASSIUM SERPL-SCNC: 4.1 MMOL/L (ref 3.4–5.3)
PROT SERPL-MCNC: 5.9 G/DL (ref 6.4–8.3)
RBC # BLD AUTO: 3.9 10E6/UL (ref 4.4–5.9)
SODIUM SERPL-SCNC: 139 MMOL/L (ref 135–145)
WBC # BLD AUTO: 9.3 10E3/UL (ref 4–11)

## 2023-10-24 PROCEDURE — 99238 HOSP IP/OBS DSCHRG MGMT 30/<: CPT | Mod: GC | Performed by: STUDENT IN AN ORGANIZED HEALTH CARE EDUCATION/TRAINING PROGRAM

## 2023-10-24 PROCEDURE — 97535 SELF CARE MNGMENT TRAINING: CPT | Mod: GO

## 2023-10-24 PROCEDURE — 80053 COMPREHEN METABOLIC PANEL: CPT | Performed by: STUDENT IN AN ORGANIZED HEALTH CARE EDUCATION/TRAINING PROGRAM

## 2023-10-24 PROCEDURE — 250N000013 HC RX MED GY IP 250 OP 250 PS 637: Performed by: STUDENT IN AN ORGANIZED HEALTH CARE EDUCATION/TRAINING PROGRAM

## 2023-10-24 PROCEDURE — 250N000013 HC RX MED GY IP 250 OP 250 PS 637

## 2023-10-24 PROCEDURE — 36415 COLL VENOUS BLD VENIPUNCTURE: CPT | Performed by: STUDENT IN AN ORGANIZED HEALTH CARE EDUCATION/TRAINING PROGRAM

## 2023-10-24 PROCEDURE — 85027 COMPLETE CBC AUTOMATED: CPT

## 2023-10-24 PROCEDURE — 97112 NEUROMUSCULAR REEDUCATION: CPT | Mod: GP

## 2023-10-24 PROCEDURE — 84100 ASSAY OF PHOSPHORUS: CPT | Performed by: PEDIATRICS

## 2023-10-24 PROCEDURE — 97116 GAIT TRAINING THERAPY: CPT | Mod: GP

## 2023-10-24 PROCEDURE — 97530 THERAPEUTIC ACTIVITIES: CPT | Mod: GO

## 2023-10-24 PROCEDURE — 83735 ASSAY OF MAGNESIUM: CPT | Performed by: PEDIATRICS

## 2023-10-24 RX ORDER — LANOLIN ALCOHOL/MO/W.PET/CERES
100 CREAM (GRAM) TOPICAL DAILY
Qty: 7 TABLET | Refills: 0 | Status: SHIPPED | OUTPATIENT
Start: 2023-10-27 | End: 2023-11-03

## 2023-10-24 RX ORDER — ASPIRIN 81 MG/1
81 TABLET ORAL DAILY
Qty: 30 TABLET | Refills: 0 | Status: SHIPPED | OUTPATIENT
Start: 2023-10-24 | End: 2023-11-23

## 2023-10-24 RX ORDER — FOLIC ACID 1 MG/1
1 TABLET ORAL DAILY
Qty: 7 TABLET | Refills: 0 | Status: SHIPPED | OUTPATIENT
Start: 2023-10-25 | End: 2023-11-01

## 2023-10-24 RX ORDER — PANTOPRAZOLE SODIUM 40 MG/1
40 TABLET, DELAYED RELEASE ORAL
Qty: 180 TABLET | Refills: 0 | Status: SHIPPED | OUTPATIENT
Start: 2023-10-24 | End: 2024-01-22

## 2023-10-24 RX ADMIN — Medication 1 TABLET: at 07:54

## 2023-10-24 RX ADMIN — THIAMINE HCL TAB 100 MG 100 MG: 100 TAB at 07:54

## 2023-10-24 RX ADMIN — FOLIC ACID 1 MG: 1 TABLET ORAL at 07:54

## 2023-10-24 RX ADMIN — FLUTICASONE FUROATE AND VILANTEROL TRIFENATATE 1 PUFF: 200; 25 POWDER RESPIRATORY (INHALATION) at 07:54

## 2023-10-24 RX ADMIN — PANTOPRAZOLE SODIUM 40 MG: 40 TABLET, DELAYED RELEASE ORAL at 07:54

## 2023-10-24 RX ADMIN — THIAMINE HCL TAB 100 MG 100 MG: 100 TAB at 14:27

## 2023-10-24 RX ADMIN — INSULIN ASPART 1 UNITS: 100 INJECTION, SOLUTION INTRAVENOUS; SUBCUTANEOUS at 01:17

## 2023-10-24 ASSESSMENT — ACTIVITIES OF DAILY LIVING (ADL)
ADLS_ACUITY_SCORE: 25

## 2023-10-24 NOTE — PROGRESS NOTES
Brief Progress Note     Paged by primary service for discharge recs:     Cath from 10/23/23:       Mid LAD lesion is 20% stenosed.     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 No hemodynamically significant coronary artery stenoses.      Echo on 10/22/23 reviewed. EF by biplane is 50%.     Recommendations:   --> Please asa 81 mg daily for CAD   --> recommend outpatient CMR for further work-up of apical akinesis     Cardiology will sign off at this time. Please reach out if you have further questions.       Maurilio Tinoco   Cardiology Fellow  This note was created using Dragon dictation software, so please excuse any mistakes and incorrect syntax and semantics.

## 2023-10-24 NOTE — PLAN OF CARE
Nursing Care Plan Note:    Assumed care 0700 to discharge 2:50pm     /82 (BP Location: Left arm)   Pulse 85   Temp 98.2  F (36.8  C) (Oral)   Resp 16   Wt 72.9 kg (160 lb 11.5 oz)   SpO2 97%   BMI 23.80 kg/m      Active/Admitting Problems:  found on the ground with bloody emesis   Pt rounded on hourly  Minh:  alert and oriented   Pain:  denies  GI/:  Denies nausea. Bowel sounds present. Good urine output clear yellow urine pt reports 1 BM this shift   How Pt Takes Meds:  oral  Cardiac:  WDL  Respiratory:  denies SOB lung sounds clear, diminished bilaterally   Skin:  clean dry and intact  Lines:  PIV removed  Labs (Electrolyte protocol/DM):  Electrolyte protocol WNL Rechecks ordered for tomorrow morning   Activity/mobility:  pt SBA with cane  Events:  discharge to home  Plan:  pt to discharge home, taxi ride set up for pt    Discharge to: home  Transportation: taxi transportation plus   Time: 2:50pm  Prescriptions: pt will  from home pharmacy   Belongings: sent with pt. Belongings from security brought up to pt   Access: PIV removed  Care plan and education discontinued: yes  Paperwork: reviewed and sent with pt

## 2023-10-24 NOTE — DISCHARGE INSTRUCTIONS
"  Going home after a Coronary Angiogram    PROCEDURE SITE:   Femoral (Groin)  It is normal to have soreness, mild bruising or a small lump at the puncture site. You may shower but please do not use a hot tub, bath tub or pool for 2 days. Do not apply any lotion or powder near the site for 2 days. For 2 days when you cough, sneeze or push with a bowel movement place your hand over the puncture site and apply gentle pressure. For the first 2 days avoid squatting. Avoid lifting more than 10 pounds for at least 5 days. Further activity restrictions as below. If you feel a \"pop\" with pain and/or notice significant increase in pain, swelling or bleeding from the site- immediately lie down, press firmly on the site and proceed to the nearest medical facility.    Radial (Wrist)  It is normal to have soreness and small bruising at the puncture site as well as mild tingling in your hand for up to 3 days. You may shower but please do not use a hot tub, bath tub or pool for 2 days. Do not apply any lotion or powder near the site for 3 days. For 3 days do not use your affected hand/arm to support your weight (like rising up out of a chair) or lifting >5 pounds.    MEDICATIONS:   1. If you are on Metformin (Glucophage) or any medications that contain this, you should not take it for at least 48 hours (2 days) from the time of your procedure. If you have baseline kidney problems, you may be instructed to also have a lab test drawn in 2-3 days before getting the okay to restart it.  2. If you have not been continued on other medications you were previously taking at home it is probably for reason though please discuss your concerns with any of your doctors.     DIET:  We recommend a diet low in saturated fat, trans fat and cholesterol. In addition it will be helpful to be cautious of sodium intake and carbohydrates. Try to increase the amount of lean meats you eat like fish and chicken, but avoid frying; and reduce the amount of " red meat you eat. Eat more fresh fruits and vegetables and try to avoid canned and processed food. Please reference the handouts you received for more specific information.      OTHER INFORMATION:  1. If you are a smoker, quitting smoking will be one of the most important things you can do for yourself. There are nicotine replacement options or medications they might be able to be prescribed. Please discuss this with your doctors. Consider calling the [employer]uit[employer] at [phone] ([phone]) as they can offer ongoing support after discharge.    CALL YOUR DOCTOR IF:  -You have a large or growing lump/bump around the procedure site  -The site is red, swollen, hot, tender or has drainage  -You have hives, a rash or unusual itching  -You have increasing or worsening shortness of breath or chest pain

## 2023-10-24 NOTE — PROGRESS NOTES
Care Management Follow Up       CHW spoke with bedside nurse and she stated that 3:00 P.M. would work best. The CHW called the pt insurance [employer]: [phone]  to order ride at 3:00 P.M through [employer] confirmation number is the following: [id number]. CHW alerted Nurse  and pt of discharge ride along with stating the pt should be downstairs 10 minutes prior to discharge ride.     Pedro Chowdhury   Inpatient Community Health Worker and [facility]   [facility] 7C & 7D

## 2023-10-24 NOTE — PLAN OF CARE
/82 (BP Location: Left arm)   Pulse 85   Temp 98.2  F (36.8  C) (Oral)   Resp 16   Wt 72.9 kg (160 lb 11.5 oz)   SpO2 97%   BMI 23.80 kg/m      SHIFT: 7287-0210  ISOLATION: NA  VITALS: AVSS on RA.  BG: Q4H  Recent Labs   Lab 10/24/23  0453 10/24/23  0106 10/23/23  1955 10/23/23  1223 10/23/23  0824 10/23/23  0336   GLC 91 155* 179* 84 99 108*   NEURO: A&O x4.  DIET: Regular diet.  PAIN: No reported pain or nausea  RESPIRATORY: Frequent, congested cough. Barrel chest  CARDIAC: WDL  : Voiding without difficulty.  GI: LBM: 10/22  TUBES: PIVs SL  MIVF/GTT/ABX: NA  ASSIST: SBA.  SAFETY: NA  SKIN: R wrist immobilized to prevent injury to original angiogram site on wrist. R groin site dressed and CDI.  LABS:   Recent Labs   Lab Test 10/24/23  0453 10/23/23  1427 10/23/23  0336 10/22/23  2215 10/22/23  1727 10/22/23  1313 10/22/23  0704 10/21/23  1539 10/21/23  0758 10/20/23  1954 10/20/23  1659   POTASSIUM 4.1  --  3.8  --   --  3.7 3.4  3.4  --   --    < > 4.7   PHOS 3.0  --  2.3*  --  1.5*  --  1.3*  --   --   --  8.1*   MAG 1.8  --   --   --   --   --  2.1  --   --   --  2.8*   HGB 12.5* 12.8* 12.2*   < >  --   --  11.6*   < >  --    < > 14.2   PHV  --   --  7.47*  --   --   --  7.53*  --  7.44*   < > 7.10*   PO2V  --   --  49*  --   --   --  48*  --  36   < > 48*   PCO2V  --   --  41  --   --   --  36*  --  36*   < > 42   HCO3V  --   --  30*  --   --   --  30*  --  24   < > 13*    < > = values in this interval not displayed.   PLAN: Per Evangelina Tanner MD:    - melena overnight, repeat HGB this PM unchanged (EGD shows grade D esophagitis, likely cause for coffee ground emesis)  - repeat TTE unchanged  - CTA today  - Electrophysiology consult  - stop ceftriaxone

## 2023-10-24 NOTE — PLAN OF CARE
Physical Therapy Discharge Summary    Reason for therapy discharge:    Discharged to home.    Progress towards therapy goal(s). See goals on Care Plan in [facility] electronic health record for goal details.  Goals partially met.  Barriers to achieving goals:   discharge from facility.    Therapy recommendation(s):    Continued therapy is recommended.  Rationale/Recommendations:  Recommended outpatient PT, but pt declined.

## 2023-10-24 NOTE — DISCHARGE SUMMARY
[facility]  Discharge Summary - Medicine & Pediatrics       Date of Admission:  10/20/2023  Date of Discharge:  10/24/2023  2:50 PM  Discharging Provider: Dr. 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%    Clinically Significant Risk Factors          Follow-ups Needed After Discharge   GI:   - Protonix 40 BID until after repeat EGD  - Repeat EGD 3 months    History of MAC:   - ID consult    COPD:   - Follow up with primary pulmonologist     HFmEF 4-45%:   - Cardiac MRI per cardiology recommendation  - Cardiology follow up to discuss MRI    PCP:   - Check in on stressors and alcohol use (sound to have been related to acute financial stress and not regular)    Discharge Disposition   Discharged to home  Condition at discharge: Stable    Hospital Course   Robert B Behr was admitted on 10/20/2023.  PMH tobacco use disorder, COPD, GERD w/o known esophagitis, and chronic MAC who was admitted to the ICU on 10/20/2023 for AMS, shock, EDEN found down with coffee ground emesis. Was transferred to medicine the following day. Found to have grade D esophagitis on EGD.  Was treated briefly for potential aspiration pneumonia.  Additionally echocardiogram day after ICU admission showed new wall motion abnormality, had cardiac catheterization which was largely unremarkable.  Was discharged 10/24 with follow-up as above.    #Acute hypoxic respiratory failure   #COPD  #possible aspiration PNA  History of COPD. Required minimal O2 initially, now not needing O2. Currently smokes 5-7 cigarettes per day. Was previously on advair BID, Spiriva as maintenance therapy with combivent and albuterol as of 2022 pulm visit. At that time had been using spiriva as prn only. Now according to med list seems to be  just using combivent and albuterol.   - Resume PTA Breo inhlaer  - prn albuterol, prn combivent  - Needs likely repeat PFTs and outpt follow up.  - Follow up with primary pulmonologist    #Lung Nodule  #History of MAC  Pt had CT C/A/P on admission, demonstrating pulmonary masslike opacity with adjacent calcification in the right upper lobe now measuring 4.7 x 2.5  cm, previously 4.9 x 2.9 cm when measured in a similar fashion. Lesion is believed to be non-malignant based on [facility]-Dr. Taylor from interventional pulmonary following, however continues to smoke (0.5 packs/day), and +MAC on 9/13/2023 culture without treatment.   - Scheduled for repeat CT chest 12/7/23 for surveillance of MAC vs pulmonary nodule.   - OP ID referral placed at discharge.      # LV EF 40-45% with apical akinesis and basal hyperkinesis  # Troponin elevation  # possible VT  Noted to have troponin elevation which peaked at 1024 today AM. ECG showed q waves in V1 and V2. Echo showed LVEF 40-45% and apical akinesis with basial hyperkinesis. Concern for AMI vs stress cardiomyopathy vs both (underlying CAD with stress cardiomyopathy). Pt was asymptomatic and hemodynamically stable leaning towards stress cardiomyopathy. However he does have risk factors for CAD including dyslipidemia and smoking history. Patient with reported VT in ED with concern for NSVT vs brief VT arrest with rapid ROSC, given patient alert and conversational on arrival to unit favor NSVT. Was evaluated with cardiac catheterization (see separate report, largely unremarkable and no intervention). Additionally evaluated by EP without recommendations. Did recommend cardiac MRI as OP, will follow up with cardiology on results.   - Cardiac MRI per cardiology recommendation  - Cardiology follow up to discuss MRI     # Hematemesis/Reported coffee ground emesis   # Hx of GERD w/o known esophagitis   # esophageal thickening on CT  Patient reportedly found surrounded by hematemesis.   On arrival to ED noted to have coffee-ground residual in oropharynx and crusting on face.  No known history of GI bleed.  Hemoglobin 16 on initial arrival to ED stable on repeat check.  Patient does have a history of GERD. Denied ASA or NSAID use. Per patient report was not taking prescribed PPI at home.  No evidence of liver disease in patient chart, on exam, on CT abdomen, however esophagus appeared thickened. EGD done 10/22 demonstrating grade D esophagitis, likely cause for coffee ground emesis.   - PPI 40 mg PO BID  - Follow up EGD in 3 months to check healing  - Avoid NSAIDS  - ETOH cessation  - Reduce coffee intake (drinks only coffee and no water most days)       Consultations This Hospital Stay   PHARMACY TO DOSE VANCO  PHYSICAL THERAPY ADULT IP CONSULT  OCCUPATIONAL THERAPY ADULT IP CONSULT  PHARMACY TO DOSE VANCO  GI LUMINAL ADULT IP CONSULT  CARDIOLOGY GENERAL ADULT IP CONSULT  CARE MANAGEMENT / SOCIAL WORK IP CONSULT  PHYSICAL THERAPY ADULT IP CONSULT  CARDIOLOGY ELECTROPHYSIOLOGY (EP) IP CONSULT    Code Status   Full Code       The patient was discussed with Dr. Deann Guzman MD  [address] [address]  Phone: [phone]  ______________________________________________________________________    Physical Exam   Vital Signs: Temp: 98.2  F (36.8  C) Temp src: Oral BP: 136/82 Pulse: 85   Resp: 16 SpO2: 97 % O2 Device: None (Room air) Oxygen Delivery: 2 LPM  Weight: 160 lbs 11.45 oz  Constitutional: awake, alert, cooperative, no apparent distress, and appears stated age  ENT: Normocephalic, without obvious abnormality, atraumatic  Respiratory: No increased work of breathing, clear to auscultation bilaterally, on RA  Cardiovascular: Regular rate and rhythm, and no loud murmur noted  GI: normal bowel sounds, non-distended, non-tender  Neurologic: Awake, alert, oriented to name, place and time. Moves all  extremities spontaneously. Gait not observed.         Primary Care Physician   Chastity Arguello    Discharge Orders   No discharge procedures on file.    Significant Results and Procedures     Results for orders placed or performed during the hospital encounter of 10/20/23   XR Chest Port 1 View    Narrative    Portable chest    INDICATION: Hematemesis    COMPARISON: Cancer screening CT 8/16/2023    FINDINGS:  Heart size normal. Patchy densities in the upper lungs made to prior  fibrosis or consolidation. This area was quite dense in the right  upper lobe on the previous CT. No abnormal air collection.      Impression    IMPRESSION: Continued patchy densities in the right upper lung from  recent August the 80. CT also showed hyperinflation which is not as  appreciated on this exam.    MAGDALENA MARTINEZ MD         SYSTEM ID:  [id number]   CT Head w/o Contrast    Narrative    CT HEAD W/O CONTRAST 10/20/2023 4:11 PM    History: AMS     Comparison: MRI 8/3/2023    Technique: Using multidetector thin collimation helical acquisition  technique, axial, coronal and sagittal CT images from the skull base  to the vertex were obtained without intravenous contrast.   (topogram) image(s) also obtained and reviewed.    Findings: Lateral generalized volume loss. Mild hypoattenuation of  periventricular white matter likely relates to chronic small vessel  ischemia. Bilateral pseudophakia. There is no intracranial hemorrhage,  mass effect, or midline shift. Gray/white matter differentiation in  both cerebral hemispheres is preserved. Ventricles are proportionate  to the cerebral sulci. The basal cisterns are clear.    The bony calvaria and the bones of the skull base are normal. The  visualized portions of the paranasal sinuses and mastoid air cells are  clear.      Impression    Impression:  No acute intracranial pathology. Moderate cerebral volume loss and  mild leukoaraiosis.    I have personally reviewed the examination and  initial interpretation  and I agree with the findings.    MANJULA LUCAS MD         SYSTEM ID:  [id number]   CT Chest Abdomen Pelvis w/o Contrast    Narrative    EXAMINATION: CT CHEST ABDOMEN PELVIS W/O CONTRAST, 10/20/2023 4:11 PM    TECHNIQUE:  Helical CT images from the thoracic inlet through the  symphysis pubis were obtained without IV contrast.    COMPARISON: Chest radiograph 1510 hours, chest CT 8/16/2023.    HISTORY: sepsis, acute renal failure, hematemesis    FINDINGS:  Lines and tubes: Right IJ CVC tip terminates in the SVC. Defibrillator  pads project over the left chest and back.    CHEST:  THYROID: Thyroid is unremarkable.    LUNGS/PLEURA: Severe emphysematous changes of the lung parenchyma.  Stable to slightly decreased size of pulmonary masslike opacity with  adjacent calcification in the right upper lobe now measuring 4.7 x 2.5  cm (series 5, image 86), previously 4.9 x 2.9 cm when measured in a  similar fashion. Decreased focal peribronchovascular nodularity more  inferiorly in the right upper lobe (series 5 image 126), in the right  lower lobe (series 5 image 196), and in the left upper lobe (series 5  image 92). Calcified granuloma in the posterior lateral right lower  lobe. No pleural effusion or pneumothorax. Diffuse bronchial wall  thickening. No new focal consolidation or groundglass opacity.    MEDIASTINUM: Stable size and appearance of large hiatal hernia.  Increased distal esophageal wall thickening. Heart size is within  normal limits. No pericardial effusion. Calcific atherosclerosis of  the aortic arch extending in the proximal great vessels. Mild coronary  vessel atherosclerotic calcifications. Prominent mediastinal lymph  nodes do not meet size criteria for enlargement and are slightly  decreased in size since prior.    CHEST WALL: No suspicious axillary lymphadenopathy.    ABDOMEN/PELVIS:  LIVER: Diffuse hypoattenuation of the hepatic parenchyma. No focal  masses are  evident.    BILIARY: No gallbladder wall thickening or pericholecystic fluid. No  obvious intra or extrahepatic biliary dilation.    PANCREAS: Moderate fatty atrophy of the pancreas without focal mass or  ductal dilation.    SPLEEN: Atrophied spleen without focal mass.    ADRENAL GLANDS: Within normal limits.    URINARY TRACT: Atrophy of the renal cortices bilaterally. 0.9 cm renal  calculus in the left superior pole collecting system (as measured on  series 7, image 168). No hydronephrosis. Bladder is significantly  distended with no wall thickening.    REPRODUCTIVE ORGANS: Within normal limits.    STOMACH: Paraesophageal versus hiatal hernia as described above. No  gastric wall thickening.    BOWEL: Sigmoid, descending, and transverse colonic diverticulosis  without evidence of diverticulitis. Normal caliber of the small and  large bowel.  Appendix is within normal limits with tiny,  nonobstructing appendicolith.    PERITONEUM/FLUID: No free fluid.    VESSELS: No aneurysmal dilatation of the abdominal aorta. Diffuse  large vessel calcific atherosclerotic plaques.    LYMPH NODES: No lymphadenopathy.    BONES/SOFT TISSUES: No aggressive osseous lesions. Stable compression  deformity of L1 with trace retropulsion of posterior, superior  endplate fragment. Modic changes at L5-S1 with grade 1  anterolisthesis. Chronic right rib deformities.      Impression    IMPRESSION:  1.  Stable to slightly decreased right upper lobe pulmonary masslike  opacity and decreased additional pulmonary nodules in the right lung  and left upper lobe, likely improving infection, though underlying  malignancy in the right upper lobe remains a consideration. No acute  airspace disease.  2.  Severe emphysematous changes of the lung parenchyma.  3.  Large hiatal hernia with increased circumferential distal  esophageal wall thickening, suggesting esophagitis.  4.  Large colonic diverticulosis without evidence of diverticulitis.  5.  Hepatic  steatosis.    I have personally reviewed the examination and initial interpretation  and I agree with the findings.    SANIA CHICAS DO         SYSTEM ID:  [id number]   XR Chest Port 1 View    Narrative    XR CHEST PORT 1 VIEW  10/20/2023 3:13 PM     HISTORY:  s/p central line       COMPARISON:  Sustained a prior chest radiograph, CT chest 2023    TECHNIQUE: XR CHEST PORT 1 VIEW    FINDINGS:   Interval placement of a right IJ approach central venous catheter  which is terminating within the right atrium/at the region of the  inferior cavoatrial junction.    Low lung volumes with unchanged scattered reticular opacities  diffusely and right apical more focal area of consolidated lung. No  new  focal airspace disease. Incompletely visualized bilateral  costophrenic angles. No large pleural effusions. No pneumothorax.  Cardiac silhouette is normal.      Impression    IMPRESSION:  1.  Right IJ approach central venous catheter with tip terminating  near the inferior cavoatrial junction, possibly extending into the  IVC. Retraction of catheter tip by 4 to 6 cm is appropriate.  2.  The remaining cardiopulmonary exam is stable compared to same day  prior chest radiograph    I have personally reviewed the examination and initial interpretation  and I agree with the findings.    MAGDALENA MARTINEZ MD         SYSTEM ID:  [id number]   Echo Complete     Value    LVEF  40-45% (mildly reduced)    Narrative    [id number]  [id number]  [id number]  [id number]^PAULO^YUDY                                                                       Version 2     [facility],[place]  Echocardiography Laboratory  [address] [place] [id number]     Name: BEHR, ROBERT B  MRN: [id number]  : 1950  Study Date: 10/21/2023 12:00 PM  Age: 73 yrs  Gender: Male  Patient Location: [facility] [place]  Reason For Study: Shock  Ordering Physician: YUDY BATES  Performed By: Hodan Mistry     BSA: 1.9 m2  Height: 68  in  Weight: 162 lb  HR: 106  BP: 120/81 mmHg  ______________________________________________________________________________  Procedure  Complete Portable Echo Adult. Contrast Optison. Optison (NDC #2782-5826-23)  given intravenously. Patient was given 6 ml mixture of 3 ml Optison and 6 ml  saline. 3 ml wasted.  ______________________________________________________________________________  Interpretation Summary  Left ventricular function is decreased. The ejection fraction is 40-45%  (mildly reduced).  [place] and distal apical LV segments are akinetic with hyperkinetic basal  segments. Cannot rule out LAD disease (ischemia). If ischemia is ruled out  then most likely cause is stress CMP.  Right ventricular function, chamber size, wall motion, and thickness are  normal.  No pericardial effusion is present.  No significant valvular abnormalities present.     This study was compared with the study from 6/10/2017 .The left ventricular  function has worsened. RWA described are new.     Reviewed admission ECGs. Noted ST segment elevation in V1-V5. Pathologic Q  waves (V1-V2) noted on today`s ECG     Recommend Cardiology consultation ASAP. Ischemia is likely for LV apical wall  motion abnormality.     Cardiology team updated about the findings 1:20 pm.     ______________________________________________________________________________  Left Ventricle  Left ventricular size is normal. Left ventricular wall thickness is normal.  Left ventricular diastolic function is normal. Left ventricular function is  decreased. The ejection fraction is 40-45% (mildly reduced). Apical wall  akinesis is present.     Right Ventricle  Right ventricular function, chamber size, wall motion, and thickness are  normal.     Atria  Both atria appear normal.     Mitral Valve  The mitral valve is normal. Trace mitral insufficiency is present.     Aortic Valve  The valve leaflets are not well visualized. On Doppler interrogation, there is  no  significant stenosis or regurgitation.     Tricuspid Valve  The tricuspid valve is normal. Trace tricuspid insufficiency is present. The  peak velocity of the tricuspid regurgitant jet is not obtainable. Pulmonary  artery systolic pressure cannot be assessed.     Pulmonic Valve  On Doppler interrogation, there is no significant stenosis or regurgitation.     Vessels  The aorta root is normal. The inferior vena cava cannot be assessed.     Pericardium  No pericardial effusion is present.     Miscellaneous  No significant valvular abnormalities present.     Compared to Previous Study  This study was compared with the study from 6/10/2017 . The left ventricular  function has worsened.  ______________________________________________________________________________  MMode/2D Measurements & Calculations  LVOT diam: 2.1 cm  LVOT area: 3.5 cm2     Doppler Measurements & Calculations  MV E max víctor: 72.7 cm/sec  MV A max víctor: 64.4 cm/sec  MV E/A: 1.1  MV dec slope: 482.7 cm/sec2  MV dec time: 0.15 sec  PA acc time: 0.11 sec  E/E' av.2  Lateral E/e': 8.8  Medial E/e': 7.7  RV S Víctor: 6.9 cm/sec     ______________________________________________________________________________  Report approved by: Atiya BRIONES 10/21/2023 01:21 PM         Echo Complete     Value    LVEF  40-45% (mildly reduced)    [facility]    [id number]  [id number]  [id number]  [id number]^ELDA^CELINA^CECILIA     [facility],[place]  Echocardiography Laboratory  [address] [place] [id number]     Name: BEHR, ROBERT B  MRN: [id number]  : 1950  Study Date: 10/22/2023 08:06 AM  Age: 73 yrs  Gender: Male  Patient Location: [facility] [place]  Reason For Study: Cardiomyopathy  Ordering Physician: CELINA SCHROEDER  Performed By: Hodan Mistry     BSA: 1.9 m2  Height: 69 in  Weight: 161 lb  BP: 130/90 mmHg  ______________________________________________________________________________  Procedure  Complete Portable Echo Adult. Contrast  Optison. Optison (NDC #3737-8552-67)  given intravenously. Patient was given 6 ml mixture of 3 ml Optison and 6 ml  saline. 3 ml wasted.  ______________________________________________________________________________  Interpretation Summary  Left ventricular function is decreased. The ejection fraction is 40-45%  (mildly reduced).  Apical wall akinesis is present.  Mid to anteroseptum is akinetic. Likely ischemic cardiomyopathy. LAD disease  suspected.  The right ventricle is normal size.  Global right ventricular function is normal.     This study was compared with the study from 10/21/2023 .  No significant changes noted.  ______________________________________________________________________________  Left Ventricle  Left ventricular size is normal. Left ventricular wall thickness is normal.  Left ventricular function is decreased. The ejection fraction is 40-45%  (mildly reduced). Left ventricular diastolic function is not assessable.  Apical wall akinesis is present. Mid to anteroseptum is akinetic.     Right Ventricle  The right ventricle is normal size. Global right ventricular function is  normal.     Atria  Both atria appear normal.     Mitral Valve  The mitral valve is normal.     Aortic Valve  Aortic valve is normal in structure and function.     Tricuspid Valve  The tricuspid valve is normal.     Pulmonic Valve  The pulmonic valve is normal.     Vessels  The aorta root is normal. The inferior vena cava was normal in size with  preserved respiratory variability.     Pericardium  No pericardial effusion is present.     Compared to Previous Study  This study was compared with the study from 10/21/2023 . No significant  changes noted.  ______________________________________________________________________________  Doppler Measurements & Calculations  TR max jon: 191.2 cm/sec  TR max P.6 mmHg     ______________________________________________________________________________  Report approved by: ILKNUR  Rony SANDSvictorina 10/22/2023 10:32 AM         Cardiac Catheterization    Narrative      Mid LAD lesion is 20% stenosed.    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 No hemodynamically significant coronary   artery stenoses.          Discharge Medications   Current Discharge Medication List        CONTINUE these medications which have NOT CHANGED    Details   albuterol (PROAIR HFA) 108 (90 Base) MCG/ACT inhaler INHALE 1 TO 2 PUFFS BY MOUTH EVERY 4 HOURS AS NEEDED FOR SHORTNESS OF BREATH  Qty: 8.5 g, Refills: 3    Comments: Pharmacy may dispense brand covered by insurance (Proair, or proventil or ventolin or generic albuterol inhaler)  Associated Diagnoses: Chronic obstructive pulmonary disease, unspecified COPD type (H)      atorvastatin (LIPITOR) 40 MG tablet Take 1 tablet (40 mg) by mouth daily  Qty: 90 tablet, Refills: 3    Associated Diagnoses: Hyperlipidemia LDL goal <130      B Complex-C (VITAMIN B COMPLEX W/VITAMIN C) TABS tablet TAKE 1 TABLET BY MOUTH TWICE DAILY WITH FOLIC ACID  Qty: 180 tablet, Refills: 3    Associated Diagnoses: Takes dietary supplements      benzonatate (TESSALON) 100 MG capsule Take 1 capsule (100 mg) by mouth 3 times daily as needed for cough  Qty: 30 capsule, Refills: 0    Associated Diagnoses: Cough, unspecified type      Calcium Carbonate-Vitamin D (CALCIUM 600 +D HIGH POTENCY) 600-10 MG-MCG TABS Take 1 tablet by mouth 2 times daily  Qty: 90 tablet, Refills: 3    Associated Diagnoses: Takes dietary supplements; Encounter for herb and vitamin supplement management      calcium carbonate-vitamin D (CALTRATE) 600-10 MG-MCG per tablet TAKE 1 TABLET BY MOUTH TWICE DAILY  Qty: 180 tablet, Refills: 0    Comments: Medication is being filled for 1 time refill only due to: PLEASE REMIND PATIENT HE IS  DUE FOR ANNUAL VISIT AT [facility].  Associated Diagnoses: Encounter for herb and vitamin supplement management; Takes dietary supplements      fish oil-omega-3 fatty acids 1000 MG capsule  Take 1 capsule (1 g) by mouth daily  Qty: 90 capsule, Refills: 3    Associated Diagnoses: Encounter for herb and vitamin supplement management      Ginkgo Biloba 60 MG TABS Take 1 tablet by mouth daily  Qty: 90 tablet, Refills: 3    Associated Diagnoses: Takes dietary supplements; Encounter for herb and vitamin supplement management      glucosamine-chondroitinoitin 750-600 MG TABS Take 2 tablets by mouth 2 times daily  Qty: 120 tablet, Refills: 3    Associated Diagnoses: Osteoarthritis of right hip, unspecified osteoarthritis type      ipratropium-albuterol (COMBIVENT RESPIMAT)  MCG/ACT inhaler Inhale 1 puff into the lungs 4 times daily as needed for shortness of breath, wheezing or cough (Inhale 1 puff into the lungs 4 times daily as needed. Do not exceed 6 doses per day.,)  Qty: 4 g, Refills: 4    Associated Diagnoses: COPD (chronic obstructive pulmonary disease) (H)      omeprazole (PRILOSEC) 20 MG DR capsule Take 1 capsule (20 mg) by mouth 2 times daily  Qty: 180 capsule, Refills: 3    Associated Diagnoses: Gastroesophageal reflux disease with esophagitis without hemorrhage      Selenium (SELENIMIN-200) 200 MCG TABS tablet Take 1 tablet (200 mcg) by mouth daily  Qty: 90 tablet, Refills: 3    Associated Diagnoses: Encounter for herb and vitamin supplement management      vitamin A 3 MG (20053 UNITS) capsule Take 1 capsule (10,000 Units) by mouth daily  Qty: 90 capsule, Refills: 3    Associated Diagnoses: Encounter for herb and vitamin supplement management      vitamin C (ASCORBIC ACID) 1000 MG TABS Take 1 tablet (1,000 mg) by mouth daily  Qty: 90 tablet, Refills: 3    Associated Diagnoses: Encounter for herb and vitamin supplement management      vitamin E (TOCOPHEROL) 1000 units (450 mg) CAPS capsule Take 1 capsule (1,000 Units) by mouth daily  Qty: 90 capsule, Refills: 3    Associated Diagnoses: Encounter for herb and vitamin supplement management      WIXELA INHUB 500-50 MCG/ACT inhaler Inhale 1 puff  into the lungs 2 times daily  Qty: 60 each, Refills: 11    Associated Diagnoses: Chronic obstructive pulmonary disease, unspecified COPD type (H)           Allergies   No Known Allergies

## 2023-10-24 NOTE — PLAN OF CARE
Occupational Therapy Discharge Summary    Reason for therapy discharge:    Discharged to home.    Progress towards therapy goal(s). See goals on Care Plan in [facility] electronic health record for goal details.  Goals partially met.  Barriers to achieving goals:   discharge from facility.    Therapy recommendation(s):    No further therapy is recommended.

## 2023-10-24 NOTE — PLAN OF CARE
Patient returned from IR at 1845 is alert and oriented X4. Has been educated to call when he wants to get up. Has arm board on the right arm X1 hour to prevent movement. Groin site is CDI. VS was stable and is on RA and Tele will be placed. Is sitting up and eating his meal.

## 2023-10-24 NOTE — PROGRESS NOTES
Care Management Discharge Note    Discharge Date: 10/24/2023       Discharge Disposition: Home    Discharge Services: None    Discharge DME: None    Discharge Transportation: health plan transportation (insurance benefits)    Private pay costs discussed: Not applicable    Does the patient's insurance plan have a 3 day qualifying hospital stay waiver?  Yes     Which insurance plan 3 day waiver is available? Alternative insurance waiver    Will the waiver be used for post-acute placement? No    PAS Confirmation Code:    Patient/family educated on Medicare website which has current facility and service quality ratings: no    Education Provided on the Discharge Plan: Yes  Persons Notified of Discharge Plans: Patient  Patient/Family in Agreement with the Plan: yes    Handoff Referral Completed: Yes    Additional Information:  Per care team rounds anticipate discharge today.  Pt requiring discharge IMM.  Met with pt.  Discharge IMM reviewed/signed.  Pt notes he will need a discharge ride arranged.   AFIA Josue at bedside and agreed to update writer once final discharge orders have been placed so ride can be arranged..    [facility] [employer] Ride: [phone]     Maxine Robledo RN BSN, PHN, ACM-RN  7A RN Care Coordinator  Phone: [phone]  Pager [phone]    To contact the weekend RNCC  [place] (0800 [phone]) Saturday and Sunday    Units: 5A,5B,5C [phone]    Units: 6A, [phone]    Units 6B, 6C, 6D [phone]    Units: 7A, 7B, 7C, 7D, [phone]    [facility] ([phone]) Saturday and Sunday  Units: 5 Ortho, 5MS & WB ED Pager: [phone]  Units: 6MS, 8A & 10 ICU  Pager [phone]    10/24/2023 11:16 AM

## 2023-10-25 ENCOUNTER — TELEPHONE (OUTPATIENT)
Dept: CARDIOLOGY | Facility: CLINIC | Age: 73
End: 2023-10-25
Payer: COMMERCIAL

## 2023-10-25 ENCOUNTER — TELEPHONE (OUTPATIENT)
Dept: INFECTIOUS DISEASES | Facility: CLINIC | Age: 73
End: 2023-10-25
Payer: COMMERCIAL

## 2023-10-25 LAB
BACTERIA BLD CULT: NO GROWTH
BACTERIA BLD CULT: NO GROWTH

## 2023-10-25 NOTE — TELEPHONE ENCOUNTER
DIAGNOSIS: Hospital Follow up -Mycobacterium (including Tuberculosis)-Cardiomyopathy, unspecified type (H) [I42.9]     DATE RECEIVED: 10.27.23   NOTES (Gather within 2 years) STATUS DETAILS   OFFICE NOTE from referring provider       OFFICE NOTE from other specialist     DISCHARGE SUMMARY from hospital Internal 10.20-10.24.23  [facility]   DISCHARGE REPORT from the ER     LABS (any labs) Internal    MEDICATION LIST Internal    IMAGING  (NEED IMAGES AND REPORTS)     Osteomyelitis: Foot imaging      Liver Abscess: Abdominal imaging     Other (anything related to diagnoses Internal 10.20.23  CT CAP    10.20.23  XR Chest

## 2023-10-25 NOTE — TELEPHONE ENCOUNTER
S-(situation): patient discharged from hospital with following diagnosis;  COPD  Lung nodule  History of MAC  Hypovolemic shock secondary to GI bleed  Grade D esophagitis  Heart failure mildly reduced ejection fraction 40-45%    While in the hospital he had a coronary angiogram that revealed;       Mid LAD lesion is 20% stenosed.   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 No hemodynamically significant coronary artery stenoses.     He states that he still has the band aid on but the right radial site and right femoral artery sites are flat and dry.  His ECHO showed an EF of 40-45%.  He was discharged on Protonix, ASA, Folic acid and thiamine.    B-(background):  73M with ICU admission for septic shock secondary to possibly pneumonia vs EtOH c/b VT requiring brief CPR who presents for invasive coronary angiogram to workup ischemic etiology     A-(assessment): Cardiomyopathy    R-(recommendations): follow up with cardiology at which time a Cmr can be scheduled. Follow up with ID, pulmonary, and GI.

## 2023-10-27 ENCOUNTER — PRE VISIT (OUTPATIENT)
Dept: INFECTIOUS DISEASES | Facility: CLINIC | Age: 73
End: 2023-10-27
Payer: COMMERCIAL

## 2023-10-27 ENCOUNTER — OFFICE VISIT (OUTPATIENT)
Dept: INFECTIOUS DISEASES | Facility: CLINIC | Age: 73
End: 2023-10-27
Attending: REGISTERED NURSE
Payer: COMMERCIAL

## 2023-10-27 ENCOUNTER — APPOINTMENT (OUTPATIENT)
Dept: LAB | Facility: CLINIC | Age: 73
End: 2023-10-27
Payer: COMMERCIAL

## 2023-10-27 ENCOUNTER — TELEPHONE (OUTPATIENT)
Dept: CARDIOLOGY | Facility: CLINIC | Age: 73
End: 2023-10-27

## 2023-10-27 VITALS
WEIGHT: 161.8 LBS | OXYGEN SATURATION: 97 % | HEART RATE: 89 BPM | SYSTOLIC BLOOD PRESSURE: 159 MMHG | DIASTOLIC BLOOD PRESSURE: 78 MMHG | BODY MASS INDEX: 23.96 KG/M2

## 2023-10-27 DIAGNOSIS — R06.02 SOB (SHORTNESS OF BREATH): Primary | ICD-10-CM

## 2023-10-27 DIAGNOSIS — A31.0 MYCOBACTERIUM AVIUM COMPLEX (H): ICD-10-CM

## 2023-10-27 DIAGNOSIS — I42.9 CARDIOMYOPATHY, UNSPECIFIED TYPE (H): ICD-10-CM

## 2023-10-27 PROCEDURE — 99215 OFFICE O/P EST HI 40 MIN: CPT | Performed by: REGISTERED NURSE

## 2023-10-27 PROCEDURE — G0463 HOSPITAL OUTPT CLINIC VISIT: HCPCS | Performed by: REGISTERED NURSE

## 2023-10-27 PROCEDURE — 99417 PROLNG OP E/M EACH 15 MIN: CPT | Performed by: REGISTERED NURSE

## 2023-10-27 NOTE — TELEPHONE ENCOUNTER
[facility] Call Center    Phone Message    May a detailed message be left on voicemail: yes     Reason for Call: Appointment Intake    Referring Provider Name: Dr Carmel Guzman  Diagnosis and/or Symptoms:     Cardiomyopathy, unspecified type       Action Taken: Other: Cardiology    Travel Screening: Not Applicable    Thank you!  [facility]

## 2023-10-27 NOTE — LETTER
10/27/2023       RE: Robert B Behr  [address]  [place] [address]     Dear Colleague,    Thank you for referring your patient, Robert B Behr, to the [facility] INFECTIOUS DISEASE CLINIC [place] at [facility]. Please see a copy of my visit note below.    [facility]  Infectious Disease Clinic Note:  New Patient     Patient:  Robert B Behr, Date of birth 1950, Medical record number [id number]  Date of Visit:  10/27/2023    Consult requested by Dr. Guzman for evaluation of History of MAC, wondering if going to treat. Follows with pulm as well.         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  Lung nodule  COPD  Heart failure mildly reduced ejection fraction 40-45%   GERD with grade D esophagitis    Reach out to cardiology team to see if follow up needed, mentioned in discharge note  AFB sputum cultures and gram stain today for susceptibility testing  Reach out to Dr. Taylor to discuss concerns for MAC as cause of nodularity  Follow up with Dr. 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  Susceptibility testing was not done on these.  I have sent him home with sputum culture kit and with recommendation to broaden susceptibility testing on any Mycobacterium avium complex that is grown on any of these cultures and the opportunity that we wish to treat this.  He does also have nodularity noted on serial yearly CT CAP's along with noted bronchial wall thickening and bronchiectasis.  These nodules noted as decreasing slightly over the past couple imaging reports.  He does also have COPD and continues to smoke daily.  He is followed closely by Dr. Taylor of pulmonology.  Aside from slightly increased fatigue over the past year he does not have progressive symptoms of back disease.    IDSA Guidelines for Non tubercular mycobacterial infections. MAC-Specific Treatment of Nontuberculous Mycobacterial Pulmonary Disease: An Official ATS/ERS/ESCMID/IDSA Clinical Practice Guideline (idsociety.org)   Because NTM can be isolated from respiratory specimens due to environmental contamination and because some patients who have an NTM isolated from their respiratory tract do not show evidence of progressive disease, >1 positive sputum culture is recommended for diagnostic purposes, and the same NTM species (or subspecies in the case of M. abscessus) should be isolated in ?2 sputum cultures. Clinically significant MAC pulmonary disease is unlikely in patients who have a single positive sputum culture during the initial evaluation [5-7] but can be as high as 98% in those with ?2 positive cultures     In patients with MAC pulmonary disease, we suggest susceptibility-based treatment for macrolides and amikacin over empiric therapy    For patients with cavitary or advanced/severe bronchiectatic or macrolide-resistant MAC pulmonary disease, we suggest that parenteral amikacin or streptomycin be included in the initial treatment regimen. Benefits  outweighed risks in those patients with cavitary or advanced/severe bronchiectatic or macrolide-resistant MAC pulmonary disease and that administration of at least 2-3 months of an aminoglycoside was the best balance between risks and benefits    In patients with macrolide-susceptible MAC pulmonary disease, we suggest a treatment regimen with at least 3 drugs (including a macrolide and ethambutol) over a regimen with 2 drugs (a macrolide and ethambutol alone) (conditional recommendation, very low certainty in estimates of effect)    In patients with cavitary or severe/advanced nondular bronchiectatic macrolide-susceptible MAC pulmonary disease we suggest a daily macrolide-based regimen rather than 3 times per week macrolide-based regimen    We suggest that patients with macrolide-susceptible MAC pulmonary disease receive treatment for at least 12 months after culture conversion. The optimal duration of therapy for pulmonary MAC disease is not currently known    Selected patients with failure of medical management, cavitary disease, drug resistant isolates, or complications such as hemoptysis or severe bronchiectasis may undergo surgical resection of the diseased lung    Importantly, just because a patient meets diagnostic criteria for NTM pulmonary disease does not necessarily mean antibiotic treatment is required. A careful assessment of the pathogenicity of the organism, patient s symptoms, risks and benefits of therapy, the patient s wish and ability to receive treatment as well as the goals of therapy should be discussed with patients prior to initiating treatment. In some instances,  watchful waiting  may be the preferred course of action    Because the duration of therapy is based on the time of culture conversion, frequent collection of sputum specimens is required in order to determine the recommended treatment duration. The expert panel would consider obtaining sputum specimens for culture every 1-2 months  in order to document when sputum cultures become negative. Sputum should be induced with hypertonic saline if spontaneous sputum specimens cannot be collected    Chest radiographs or chest CT imaging may be beneficial for defining a radiographic response to therapy, although there can be wide variability in findings given the common occurrence of underlying lung disease    The drugs used to treat NTM pulmonary disease are frequently associated with adverse reactions. A  recent randomized clinical trial reported that >90% of subjects in each arm reported a treatment emergent adverse reaction    Monitoring frequency should be individualized based on age, comorbidities, concurrent drugs, overlapping drug toxicities, and resources    Recommended regimen for cavitary drug-susceptible MAC   3 or more agents - Daily  Azithromycin (clarithromycin)  (3 times weekly may be used with aminoglycosides)  Rifampicin (rifabutin)    Ethambutol  Amikacin IV (streptomycin) - recommended for at least 2-3 months initially      I spent 158 minutes as part of a visit on the date of the encounter doing chart review, history and exam, documentation, and care coordination.    DARIEL Dejesus, CNP  Infectious Diseases  Pager# [phone]           History of the Infectious Disease lllness:     Robert B Behr is a 73 year old male with PMH tobacco use disorder, COPD, GERD w/o known grade D esophagitis, and chronic MAC who was admitted to the ICU on 10/20/2023 hypovolemic shock secondary to GI bleed requiring vasopressors and admission to the ICU.  On ED work-up, sputum culture 1 of 2 positive for Mycobacterium avium complex.  Has had previous AFB sputum cultures in 2021, 1 out of 3 positive with Mycobacterium avium complex.  Repeat CT chest abdomen pelvis 10/20/2023 without contrast -  Severe emphysematous changes of the lung parenchyma. Stable to slightly decreased size of pulmonary masslike opacity with adjacent calcification in the right  "upper lobe now measuring 4.7 x 2.5 cm (series 5, image 86), previously 4.9 x 2.9 cm when measured in a similar fashion. Decreased focal peribronchovascular nodularity more inferiorly in the right upper lobe (series 5 image 126), in the right lower lobe (series 5 image 196), and in the left upper lobe (series 5 image 92). Calcified granuloma in the posterior lateral right lower lobe. No pleural effusion or pneumothorax. Diffuse bronchial wall thickening. No new focal consolidation or groundglass opacity    Additionally, echocardiogram day after ICU admission showed new wall motion abnormality, had cardiac catheterization which was largely unremarkable.     Follows with Dr. Taylor for his COPD and monitoring for signs pulmonary nodularity and mass.  Has yearly CTs    Scheduled for repeat CT chest 12/7/23 for surveillance of MAC vs pulmonary nodule.     MAC symptoms: Has been more tired recently [facility] Increased fatigue started about a year ago, going up the stairs has been more difficult too. Cough in the last two weeks, dry. Denies fevers, nightsweats, no unintended weight loss (weight has been stable), no chest pain.    After discussion Mycobacterium avium complex and whether a has active disease versus colonization, patient is visibly frustrated and states that he has \"been misdiagnosed on multiple occasions\" and would like to for sure whether or not he this infection before starting a long treatment with multiple antibiotics    40-pack-year history, continues to smoke      Review of Systems:  Full 12-point HPI obtained, pertinent positives and negative per above    Past Medical History:   Diagnosis Date    Cataract     COPD (chronic obstructive pulmonary disease) (H)     diagnosed with spirometry     Lung nodules     CT scan 2016    Osteoporosis     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    Tobacco abuse        Past Surgical History:   Procedure Laterality Date    CATARACT IOL, RT/LT Left " 09/15/2017    s/p CE/IOL left eye    CATARACT IOL, RT/LT Right     ESOPHAGOSCOPY, GASTROSCOPY, DUODENOSCOPY (EGD), COMBINED N/A 10/22/2023    Procedure: Esophagoscopy, gastroscopy, duodenoscopy (EGD), combined;  Surgeon: David Post MD;  Location:  GI    PHACOEMULSIFICATION CLEAR CORNEA WITH STANDARD INTRAOCULAR LENS IMPLANT Right 9/30/2016    Procedure: PHACOEMULSIFICATION CLEAR CORNEA WITH STANDARD INTRAOCULAR LENS IMPLANT;  Surgeon: Elly Valencia MD;  Location:  [facility]    PHACOEMULSIFICATION WITH STANDARD INTRAOCULAR LENS IMPLANT Left 9/15/2017    Procedure: PHACOEMULSIFICATION WITH STANDARD INTRAOCULAR LENS IMPLANT;  Left Eye Phacoemulsification with Standard Lens;  Surgeon: Elly Valencia MD;  Location: [facility] OR    WRIST SURGERY         Family History   Problem Relation Age of Onset    Diabetes Brother         living    Cancer Brother         throat    Macular Degeneration Mother     Colon Cancer No family hx of     Glaucoma No family hx of     Retinal detachment No family hx of     Amblyopia No family hx of     Skin Cancer No family hx of     Melanoma No family hx of        Social History     Social History Narrative    Single.  Retired.     No children    5 siblings:      No family history of bleeding, clotting disorders or complications with anesthesia.     Social History     Tobacco Use    Smoking status: Light Smoker     Packs/day: 2.00     Years: 45.00     Additional pack years: 0.00     Total pack years: 90.00     Types: Cigarettes    Smokeless tobacco: Former    Tobacco comments:     5 cigs per day   Substance Use Topics    Alcohol use: Yes     Alcohol/week: 0.0 standard drinks of alcohol     Comment: occ beer    Drug use: No       Immunization History   Administered Date(s) Administered    Flu 65+ Years 09/23/2019    Influenza (High Dose) 3 valent vaccine 10/13/2017    Influenza (IIV3) PF 10/01/2013    Influenza Vaccine 65+ (Fluzone HD) 10/12/2022    Mantoux Tuberculin Skin Test  02/12/2013    Pneumo Conj 13-V (2010&after) 02/24/2016    Pneumococcal 23 valent 04/15/2015, 08/28/2017    TDAP Vaccine (Adacel) 02/24/2016    Td (Adult), Adsorbed 06/30/1999    Zoster vaccine, live 04/15/2015       Patient Active Problem List   Diagnosis    Osteoporosis    COPD (chronic obstructive pulmonary disease) (H)    Tobacco use disorder    Hyperlipidemia LDL goal <130    Health Care Home    Hiatal hernia    Hypopigmentation    SOB (shortness of breath)    Hypoxia    COPD exacerbation (H)    Infection due to 2019 novel coronavirus    Hypothermia, initial encounter    Sepsis without acute organ dysfunction, due to unspecified organism (H)       Outpatient Medications Marked as Taking for the 10/27/23 encounter (Office Visit) with Clary Fish APRN CNP   Medication Sig    albuterol (PROAIR HFA) 108 (90 Base) MCG/ACT inhaler INHALE 1 TO 2 PUFFS BY MOUTH EVERY 4 HOURS AS NEEDED FOR SHORTNESS OF BREATH    aspirin 81 MG EC tablet Take 1 tablet (81 mg) by mouth daily for 30 days    atorvastatin (LIPITOR) 40 MG tablet Take 1 tablet (40 mg) by mouth daily    B Complex-C (VITAMIN B COMPLEX W/VITAMIN C) TABS tablet TAKE 1 TABLET BY MOUTH TWICE DAILY WITH FOLIC ACID    benzonatate (TESSALON) 100 MG capsule Take 1 capsule (100 mg) by mouth 3 times daily as needed for cough    Calcium Carbonate-Vitamin D (CALCIUM 600 +D HIGH POTENCY) 600-10 MG-MCG TABS Take 1 tablet by mouth 2 times daily    calcium carbonate-vitamin D (CALTRATE) 600-10 MG-MCG per tablet TAKE 1 TABLET BY MOUTH TWICE DAILY    fish oil-omega-3 fatty acids 1000 MG capsule Take 1 capsule (1 g) by mouth daily    folic acid (FOLVITE) 1 MG tablet Take 1 tablet (1 mg) by mouth daily for 7 days    Ginkgo Biloba 60 MG TABS Take 1 tablet by mouth daily    glucosamine-chondroitinoitin 750-600 MG TABS Take 2 tablets by mouth 2 times daily    ipratropium-albuterol (COMBIVENT RESPIMAT)  MCG/ACT inhaler Inhale 1 puff into the lungs 4 times daily as needed for  "shortness of breath, wheezing or cough (Inhale 1 puff into the lungs 4 times daily as needed. Do not exceed 6 doses per day.,)    pantoprazole (PROTONIX) 40 MG EC tablet Take 1 tablet (40 mg) by mouth 2 times daily (before meals) for 90 days    Selenium (SELENIMIN-200) 200 MCG TABS tablet Take 1 tablet (200 mcg) by mouth daily    thiamine (B-1) 100 MG tablet Take 1 tablet (100 mg) by mouth daily for 7 days    vitamin A 3 MG (99370 UNITS) capsule Take 1 capsule (10,000 Units) by mouth daily    vitamin C (ASCORBIC ACID) 1000 MG TABS Take 1 tablet (1,000 mg) by mouth daily    vitamin E (TOCOPHEROL) 1000 units (450 mg) CAPS capsule Take 1 capsule (1,000 Units) by mouth daily    WIXELA INHUB 500-50 MCG/ACT inhaler Inhale 1 puff into the lungs 2 times daily       No Known Allergies           Physical Exam:     BP (!) 159/78   Pulse 89   Wt 73.4 kg (161 lb 12.8 oz)   SpO2 97%   BMI 23.96 kg/m      Wt Readings from Last 4 Encounters:   10/27/23 73.4 kg (161 lb 12.8 oz)   10/23/23 72.9 kg (160 lb 11.5 oz)   04/03/23 73.9 kg (163 lb)   02/16/23 75.2 kg (165 lb 11.2 oz)       Exam:  GENERAL: well-developed, well-nourished, alert, oriented, in no acute distress.  HEAD: Head is normocephalic, atraumatic   EYES: Eyes have anicteric sclerae.    ENT: Oropharynx is moist without exudates or ulcers.  NECK: Supple. No cervical lymphadenopathy  LUNGS: Breathing comfortably on room air, no signs of respiratory distress  SKIN: No acute rashes.   NEUROLOGIC: Grossly nonfocal         Laboratory Data:     No results found for: \"ACD4\"    Inflammatory Markers  No lab results found.    Invalid input(s): \"RATE\", \"AUTO\", \"ESR\", \"WESR\"    Metabolic Studies    Recent Labs   Lab Test 10/24/23  1431 10/24/23  1102 10/24/23  0453 10/23/23  0824 10/23/23  0336 10/22/23  0732 10/22/23  0704   NA  --   --  139  --  141  --  141  141   POTASSIUM  --   --  4.1  --  3.8   < > 3.4  3.4   CHLORIDE  --   --  103  --  105  --  103  103   CO2  --   --  " 26  --  27  --  27  27   ANIONGAP  --   --  10  --  9  --  11  11   BUN  --   --  11.6  --  10.5  --  11.9  11.9   CR  --   --  0.72  --  0.65*  --  0.69  0.69   GFRESTIMATED  --   --  >90  --  >90  --  >90  >90   *   < > 91   < > 108*   < > 112*  112*   ANTONIO  --   --  9.2  --  8.7*  --  8.5*  8.5*   PHOS  --   --  3.0  --  2.3*   < > 1.3*   MAG  --   --  1.8  --   --   --  2.1   LACT  --   --   --   --   --   --  1.3   CKT  --   --   --   --   --   --  793*    < > = values in this interval not displayed.       Hepatic Studies    Recent Labs   Lab Test 10/24/23  0453 10/23/23  0336 10/22/23  0704   BILITOTAL 0.5 0.4 0.3   ALKPHOS 74 69 66   PROTTOTAL 5.9* 5.7* 5.7*   ALBUMIN 3.6 3.6 3.6   AST 31 48* 78*   ALT 33 36 40         Lipid testing    Recent Labs   Lab Test 04/03/23  1327 08/30/21  1106 04/07/21  1754   CHOL 290* 229* 260*   HDL 69 69 67   * 128* 151*   TRIG 151* 162* 208*       Gout Labs    No lab results found.    Hematology Studies   Recent Labs   Lab Test 10/24/23  0453 10/23/23  1427 10/23/23  0336 10/22/23  2215 10/22/23  0704 10/21/23  1539 10/21/23  0424 08/30/21  1106 10/03/19  1752 06/10/17  0054   WBC 9.3  --  8.7 9.6 9.3   < > 17.7*   < > 9.7 12.3*   ANEU  --   --   --   --   --   --   --   --  6.6 9.3*   ANEUTAUTO  --   --   --  6.7  --   --  15.4*   < >  --   --    ALYM  --   --   --   --   --   --   --   --  1.8 1.8   ALYMPAUTO  --   --   --  2.1  --   --  0.5*   < >  --   --    AZ  --   --   --   --   --   --   --   --  1.2 0.8   AMONOAUTO  --   --   --  0.8  --   --  1.7*   < >  --   --    AEOS  --   --   --   --   --   --   --   --  0.2 0.3   AEOSAUTO  --   --   --  0.0  --   --  0.0   < >  --   --    ABSBASO  --   --   --  0.0  --   --  0.0   < >  --   --    HGB 12.5* 12.8* 12.2* 12.2* 11.6*   < > 12.7*   < >  --  13.5   HCT 37.9*  --  36.8* 36.3* 34.3*   < > 37.2*   < >  --  40.8     --  147* 142* 142*   < > 248   < >  --  187    < > = values in this interval  "not displayed.         Urine Studies     Recent Labs   Lab Test 10/20/23  1859   URINEPH 5.0   NITRITE Negative   LEUKEST Negative   WBCU 2       Microbiology:  Fungal testing  No lab results found.    Invalid input(s): \"HIFUN\", \"FUNGL\"    Beta D Glucan levels (Fungitell assay)    No results found for: \"FGTL\", \"FGTLI\"     Last Culture results   Rapid Strep A Screen   Date Value Ref Range Status   07/30/2018   Final    NEGATIVE: No Group A streptococcal antigen detected by immunoassay, await culture report.     Culture   Date Value Ref Range Status   10/20/2023 No Growth  Final   10/20/2023 No Growth  Final   09/14/2023   Final    >10 Squamous epithelial cells/low power field indicates oral contamination. Please recollect.   02/15/2023   Final    >10 Squamous epithelial cells/low power field indicates oral contamination. Please recollect.   02/12/2023 No Growth  Final   02/12/2023 No Growth  Final   07/23/2021   Final    >10 Squamous epithelial cells/low power field indicates oral contamination. Please recollect.   07/23/2021   Final    >10 Squamous epithelial cells/low power field indicates oral contamination. Please recollect.     Culture Micro   Date Value Ref Range Status   07/30/2018 No beta hemolytic Streptococcus Group A isolated  Final         Last check of C difficile  No results found for: \"CDBPCT\"    No components found for: \"AFBSTN\"    Syphilis Testing  Invalid input(s): \"[id number]\"    Tick Testing  No lab results found.    Invalid input(s): \"APHAGM\"    Quantiferon testing   Recent Labs   Lab Test 10/22/23  2215 10/21/23  0424   LYMPH 21 3       Infection Studies to assess Diarrhea  No lab results found.    Invalid input(s): \"NNDMRESULT\"    Virology:  Coronavirus-19 testing    Recent Labs   Lab Test 10/20/23  1647 02/17/23  0124 02/12/23  1236   [id number] Negative Negative Positive*       Respiratory virus (non-coronavirus-19) testing    No lab results found.      Last Pathology Report   Clinical " Information   Date Value Ref Range Status   07/23/2021   Final    COUGH       Final Diagnosis   Date Value Ref Range Status   07/23/2021   Final    Specimen A     Interpretation: Normal     Negative for malignancy     Adequacy:     Satisfactory for evaluation             Imaging:  Results for orders placed or performed during the hospital encounter of 10/20/23   XR Chest Port 1 View    Narrative    Portable chest    INDICATION: Hematemesis    COMPARISON: Cancer screening CT 8/16/2023    FINDINGS:  Heart size normal. Patchy densities in the upper lungs made to prior  fibrosis or consolidation. This area was quite dense in the right  upper lobe on the previous CT. No abnormal air collection.      Impression    IMPRESSION: Continued patchy densities in the right upper lung from  recent August the 80. CT also showed hyperinflation which is not as  appreciated on this exam.    MAGDALENA MARTINEZ MD         SYSTEM ID:  [id number]   CT Head w/o Contrast    Narrative    CT HEAD W/O CONTRAST 10/20/2023 4:11 PM    History: AMS     Comparison: MRI 8/3/2023    Technique: Using multidetector thin collimation helical acquisition  technique, axial, coronal and sagittal CT images from the skull base  to the vertex were obtained without intravenous contrast.   (topogram) image(s) also obtained and reviewed.    Findings: Lateral generalized volume loss. Mild hypoattenuation of  periventricular white matter likely relates to chronic small vessel  ischemia. Bilateral pseudophakia. There is no intracranial hemorrhage,  mass effect, or midline shift. Gray/white matter differentiation in  both cerebral hemispheres is preserved. Ventricles are proportionate  to the cerebral sulci. The basal cisterns are clear.    The bony calvaria and the bones of the skull base are normal. The  visualized portions of the paranasal sinuses and mastoid air cells are  clear.      Impression    Impression:  No acute intracranial pathology. Moderate  cerebral volume loss and  mild leukoaraiosis.    I have personally reviewed the examination and initial interpretation  and I agree with the findings.    MANJULA LUCAS MD         SYSTEM ID:  [id number]   CT Chest Abdomen Pelvis w/o Contrast    Narrative    EXAMINATION: CT CHEST ABDOMEN PELVIS W/O CONTRAST, 10/20/2023 4:11 PM    TECHNIQUE:  Helical CT images from the thoracic inlet through the  symphysis pubis were obtained without IV contrast.    COMPARISON: Chest radiograph 1510 hours, chest CT 8/16/2023.    HISTORY: sepsis, acute renal failure, hematemesis    FINDINGS:  Lines and tubes: Right IJ CVC tip terminates in the SVC. Defibrillator  pads project over the left chest and back.    CHEST:  THYROID: Thyroid is unremarkable.    LUNGS/PLEURA: Severe emphysematous changes of the lung parenchyma.  Stable to slightly decreased size of pulmonary masslike opacity with  adjacent calcification in the right upper lobe now measuring 4.7 x 2.5  cm (series 5, image 86), previously 4.9 x 2.9 cm when measured in a  similar fashion. Decreased focal peribronchovascular nodularity more  inferiorly in the right upper lobe (series 5 image 126), in the right  lower lobe (series 5 image 196), and in the left upper lobe (series 5  image 92). Calcified granuloma in the posterior lateral right lower  lobe. No pleural effusion or pneumothorax. Diffuse bronchial wall  thickening. No new focal consolidation or groundglass opacity.    MEDIASTINUM: Stable size and appearance of large hiatal hernia.  Increased distal esophageal wall thickening. Heart size is within  normal limits. No pericardial effusion. Calcific atherosclerosis of  the aortic arch extending in the proximal great vessels. Mild coronary  vessel atherosclerotic calcifications. Prominent mediastinal lymph  nodes do not meet size criteria for enlargement and are slightly  decreased in size since prior.    CHEST WALL: No suspicious axillary  lymphadenopathy.    ABDOMEN/PELVIS:  LIVER: Diffuse hypoattenuation of the hepatic parenchyma. No focal  masses are evident.    BILIARY: No gallbladder wall thickening or pericholecystic fluid. No  obvious intra or extrahepatic biliary dilation.    PANCREAS: Moderate fatty atrophy of the pancreas without focal mass or  ductal dilation.    SPLEEN: Atrophied spleen without focal mass.    ADRENAL GLANDS: Within normal limits.    URINARY TRACT: Atrophy of the renal cortices bilaterally. 0.9 cm renal  calculus in the left superior pole collecting system (as measured on  series 7, image 168). No hydronephrosis. Bladder is significantly  distended with no wall thickening.    REPRODUCTIVE ORGANS: Within normal limits.    STOMACH: Paraesophageal versus hiatal hernia as described above. No  gastric wall thickening.    BOWEL: Sigmoid, descending, and transverse colonic diverticulosis  without evidence of diverticulitis. Normal caliber of the small and  large bowel.  Appendix is within normal limits with tiny,  nonobstructing appendicolith.    PERITONEUM/FLUID: No free fluid.    VESSELS: No aneurysmal dilatation of the abdominal aorta. Diffuse  large vessel calcific atherosclerotic plaques.    LYMPH NODES: No lymphadenopathy.    BONES/SOFT TISSUES: No aggressive osseous lesions. Stable compression  deformity of L1 with trace retropulsion of posterior, superior  endplate fragment. Modic changes at L5-S1 with grade 1  anterolisthesis. Chronic right rib deformities.      Impression    IMPRESSION:  1.  Stable to slightly decreased right upper lobe pulmonary masslike  opacity and decreased additional pulmonary nodules in the right lung  and left upper lobe, likely improving infection, though underlying  malignancy in the right upper lobe remains a consideration. No acute  airspace disease.  2.  Severe emphysematous changes of the lung parenchyma.  3.  Large hiatal hernia with increased circumferential distal  esophageal wall  thickening, suggesting esophagitis.  4.  Large colonic diverticulosis without evidence of diverticulitis.  5.  Hepatic steatosis.    I have personally reviewed the examination and initial interpretation  and I agree with the findings.    SANIA CHICAS DO         SYSTEM ID:  [id number]   XR Chest Port 1 View    Narrative    XR CHEST PORT 1 VIEW  10/20/2023 3:13 PM     HISTORY:  s/p central line       COMPARISON:  Sustained a prior chest radiograph, CT chest 2023    TECHNIQUE: XR CHEST PORT 1 VIEW    FINDINGS:   Interval placement of a right IJ approach central venous catheter  which is terminating within the right atrium/at the region of the  inferior cavoatrial junction.    Low lung volumes with unchanged scattered reticular opacities  diffusely and right apical more focal area of consolidated lung. No  new  focal airspace disease. Incompletely visualized bilateral  costophrenic angles. No large pleural effusions. No pneumothorax.  Cardiac silhouette is normal.      Impression    IMPRESSION:  1.  Right IJ approach central venous catheter with tip terminating  near the inferior cavoatrial junction, possibly extending into the  IVC. Retraction of catheter tip by 4 to 6 cm is appropriate.  2.  The remaining cardiopulmonary exam is stable compared to same day  prior chest radiograph    I have personally reviewed the examination and initial interpretation  and I agree with the findings.    MAGDALENA MARTINEZ MD         SYSTEM ID:  [id number]   Echo Complete     Value    LVEF  40-45% (mildly reduced)    Narrative    [id number]  [id number]  [id number]  [id number]^PAULO^YUDY                                                                       Version 2     [facility],[place]  Echocardiography Laboratory  [address] [place] [id number]     Name: BEHR, ROBERT B  MRN: [id number]  : 1950  Study Date: 10/21/2023 12:00 PM  Age: 73 yrs  Gender: Male  Patient Location: [facility] [place]  Reason  For Study: Shock  Ordering Physician: YUDY BATES  Performed By: Hodan Mistry     BSA: 1.9 m2  Height: 68 in  Weight: 162 lb  HR: 106  BP: 120/81 mmHg  ______________________________________________________________________________  Procedure  Complete Portable Echo Adult. Contrast Optison. Optison (NDC #1071-1945-13)  given intravenously. Patient was given 6 ml mixture of 3 ml Optison and 6 ml  saline. 3 ml wasted.  ______________________________________________________________________________  Interpretation Summary  Left ventricular function is decreased. The ejection fraction is 40-45%  (mildly reduced).  Liberty and distal apical LV segments are akinetic with hyperkinetic basal  segments. Cannot rule out LAD disease (ischemia). If ischemia is ruled out  then most likely cause is stress CMP.  Right ventricular function, chamber size, wall motion, and thickness are  normal.  No pericardial effusion is present.  No significant valvular abnormalities present.     This study was compared with the study from 6/10/2017 .The left ventricular  function has worsened. RWA described are new.     Reviewed admission ECGs. Noted ST segment elevation in V1-V5. Pathologic Q  waves (V1-V2) noted on today`s ECG     Recommend Cardiology consultation ASAP. Ischemia is likely for LV apical wall  motion abnormality.     Cardiology team updated about the findings 1:20 pm.     ______________________________________________________________________________  Left Ventricle  Left ventricular size is normal. Left ventricular wall thickness is normal.  Left ventricular diastolic function is normal. Left ventricular function is  decreased. The ejection fraction is 40-45% (mildly reduced). Apical wall  akinesis is present.     Right Ventricle  Right ventricular function, chamber size, wall motion, and thickness are  normal.     Atria  Both atria appear normal.     Mitral Valve  The mitral valve is normal. Trace mitral insufficiency is  present.     Aortic Valve  The valve leaflets are not well visualized. On Doppler interrogation, there is  no significant stenosis or regurgitation.     Tricuspid Valve  The tricuspid valve is normal. Trace tricuspid insufficiency is present. The  peak velocity of the tricuspid regurgitant jet is not obtainable. Pulmonary  artery systolic pressure cannot be assessed.     Pulmonic Valve  On Doppler interrogation, there is no significant stenosis or regurgitation.     Vessels  The aorta root is normal. The inferior vena cava cannot be assessed.     Pericardium  No pericardial effusion is present.     Miscellaneous  No significant valvular abnormalities present.     Compared to Previous Study  This study was compared with the study from 6/10/2017 . The left ventricular  function has worsened.  ______________________________________________________________________________  MMode/2D Measurements & Calculations  LVOT diam: 2.1 cm  LVOT area: 3.5 cm2     Doppler Measurements & Calculations  MV E max víctor: 72.7 cm/sec  MV A max víctor: 64.4 cm/sec  MV E/A: 1.1  MV dec slope: 482.7 cm/sec2  MV dec time: 0.15 sec  PA acc time: 0.11 sec  E/E' av.2  Lateral E/e': 8.8  Medial E/e': 7.7  RV S Víctor: 6.9 cm/sec     ______________________________________________________________________________  Report approved by: Atiya BRINOES 10/21/2023 01:21 PM         Echo Complete     Value    LVEF  40-45% (mildly reduced)    [facility]    [id number]  [id number]  [id number]  [id number]^ELDA^CELINA^CECILIA     [facility]  Echocardiography Laboratory  [address] [place] [id number]     Name: BEHR, ROBERT B  MRN: [id number]  : 1950  Study Date: 10/22/2023 08:06 AM  Age: 73 yrs  Gender: Male  Patient Location: [facility] [place]  Reason For Study: Cardiomyopathy  Ordering Physician: CELINA SCHROEDER  Performed By: Hodan Mistry     BSA: 1.9 m2  Height: 69 in  Weight: 161 lb  BP: 130/90  mmHg  ______________________________________________________________________________  Procedure  Complete Portable Echo Adult. Contrast Optison. Optison (NDC #2108-8496-13)  given intravenously. Patient was given 6 ml mixture of 3 ml Optison and 6 ml  saline. 3 ml wasted.  ______________________________________________________________________________  Interpretation Summary  Left ventricular function is decreased. The ejection fraction is 40-45%  (mildly reduced).  Apical wall akinesis is present.  Mid to anteroseptum is akinetic. Likely ischemic cardiomyopathy. LAD disease  suspected.  The right ventricle is normal size.  Global right ventricular function is normal.     This study was compared with the study from 10/21/2023 .  No significant changes noted.  ______________________________________________________________________________  Left Ventricle  Left ventricular size is normal. Left ventricular wall thickness is normal.  Left ventricular function is decreased. The ejection fraction is 40-45%  (mildly reduced). Left ventricular diastolic function is not assessable.  Apical wall akinesis is present. Mid to anteroseptum is akinetic.     Right Ventricle  The right ventricle is normal size. Global right ventricular function is  normal.     Atria  Both atria appear normal.     Mitral Valve  The mitral valve is normal.     Aortic Valve  Aortic valve is normal in structure and function.     Tricuspid Valve  The tricuspid valve is normal.     Pulmonic Valve  The pulmonic valve is normal.     Vessels  The aorta root is normal. The inferior vena cava was normal in size with  preserved respiratory variability.     Pericardium  No pericardial effusion is present.     Compared to Previous Study  This study was compared with the study from 10/21/2023 . No significant  changes noted.  ______________________________________________________________________________  Doppler Measurements & Calculations  TR max jon: 191.2  cm/sec  TR max P.6 mmHg     ______________________________________________________________________________  Report approved by: Atiya BRIONES 10/22/2023 10:32 AM         Cardiac Catheterization    Narrative      Mid LAD lesion is 20% stenosed.    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 No hemodynamically significant coronary   artery stenoses.        DARIEL Dejesus CNP

## 2023-10-27 NOTE — PROGRESS NOTES
[facility]  Infectious Disease Clinic Note:  New Patient     Patient:  Robert B Behr, Date of birth 1950, Medical record number [id number]  Date of Visit:  10/27/2023    Consult requested by Dr. Guzman for evaluation of History of MAC, wondering if going to treat. Follows with pulm as well.         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  Lung nodule  COPD  Heart failure mildly reduced ejection fraction 40-45%   GERD with grade D esophagitis    Reach out to cardiology team to see if follow up needed, mentioned in discharge note  AFB sputum cultures and gram stain today for susceptibility testing  Reach out to Dr. Taylor to discuss concerns for MAC as cause of nodularity  Follow up with Dr. 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  Susceptibility testing was not done on these.  I have sent him home with sputum culture kit and with recommendation to broaden susceptibility testing on any Mycobacterium avium complex that is grown on any of these cultures and the opportunity that we wish to  treat this.  He does also have nodularity noted on serial yearly CT CAP's along with noted bronchial wall thickening and bronchiectasis.  These nodules noted as decreasing slightly over the past couple imaging reports.  He does also have COPD and continues to smoke daily.  He is followed closely by Dr. Taylor of pulmonology.  Aside from slightly increased fatigue over the past year he does not have progressive symptoms of back disease.    IDSA Guidelines for Non tubercular mycobacterial infections. MAC-Specific Treatment of Nontuberculous Mycobacterial Pulmonary Disease: An Official ATS/ERS/ESCMID/IDSA Clinical Practice Guideline (idsociety.org)   Because NTM can be isolated from respiratory specimens due to environmental contamination and because some patients who have an NTM isolated from their respiratory tract do not show evidence of progressive disease, >1 positive sputum culture is recommended for diagnostic purposes, and the same NTM species (or subspecies in the case of M. abscessus) should be isolated in ?2 sputum cultures. Clinically significant MAC pulmonary disease is unlikely in patients who have a single positive sputum culture during the initial evaluation [5-7] but can be as high as 98% in those with ?2 positive cultures     In patients with MAC pulmonary disease, we suggest susceptibility-based treatment for macrolides and amikacin over empiric therapy    For patients with cavitary or advanced/severe bronchiectatic or macrolide-resistant MAC pulmonary disease, we suggest that parenteral amikacin or streptomycin be included in the initial treatment regimen. Benefits outweighed risks in those patients with cavitary or advanced/severe bronchiectatic or macrolide-resistant MAC pulmonary disease and that administration of at least 2-3 months of an aminoglycoside was the best balance between risks and benefits    In patients with macrolide-susceptible MAC pulmonary disease, we suggest a treatment regimen  with at least 3 drugs (including a macrolide and ethambutol) over a regimen with 2 drugs (a macrolide and ethambutol alone) (conditional recommendation, very low certainty in estimates of effect)    In patients with cavitary or severe/advanced nondular bronchiectatic macrolide-susceptible MAC pulmonary disease we suggest a daily macrolide-based regimen rather than 3 times per week macrolide-based regimen    We suggest that patients with macrolide-susceptible MAC pulmonary disease receive treatment for at least 12 months after culture conversion. The optimal duration of therapy for pulmonary MAC disease is not currently known    Selected patients with failure of medical management, cavitary disease, drug resistant isolates, or complications such as hemoptysis or severe bronchiectasis may undergo surgical resection of the diseased lung    Importantly, just because a patient meets diagnostic criteria for NTM pulmonary disease does not necessarily mean antibiotic treatment is required. A careful assessment of the pathogenicity of the organism, patient s symptoms, risks and benefits of therapy, the patient s wish and ability to receive treatment as well as the goals of therapy should be discussed with patients prior to initiating treatment. In some instances,  watchful waiting  may be the preferred course of action    Because the duration of therapy is based on the time of culture conversion, frequent collection of sputum specimens is required in order to determine the recommended treatment duration. The expert panel would consider obtaining sputum specimens for culture every 1-2 months in order to document when sputum cultures become negative. Sputum should be induced with hypertonic saline if spontaneous sputum specimens cannot be collected    Chest radiographs or chest CT imaging may be beneficial for defining a radiographic response to therapy, although there can be wide variability in findings given the common  occurrence of underlying lung disease    The drugs used to treat NTM pulmonary disease are frequently associated with adverse reactions. A  recent randomized clinical trial reported that >90% of subjects in each arm reported a treatment emergent adverse reaction    Monitoring frequency should be individualized based on age, comorbidities, concurrent drugs, overlapping drug toxicities, and resources    Recommended regimen for cavitary drug-susceptible MAC   3 or more agents - Daily  Azithromycin (clarithromycin)  (3 times weekly may be used with aminoglycosides)  Rifampicin (rifabutin)    Ethambutol  Amikacin IV (streptomycin) - recommended for at least 2-3 months initially      I spent 158 minutes as part of a visit on the date of the encounter doing chart review, history and exam, documentation, and care coordination.    DARIEL Dejesus, CNP  Infectious Diseases  Pager# [phone]           History of the Infectious Disease lllness:     Robert B Behr is a 73 year old male with PMH tobacco use disorder, COPD, GERD w/o known grade D esophagitis, and chronic MAC who was admitted to the ICU on 10/20/2023 hypovolemic shock secondary to GI bleed requiring vasopressors and admission to the ICU.  On ED work-up, sputum culture 1 of 2 positive for Mycobacterium avium complex.  Has had previous AFB sputum cultures in 2021, 1 out of 3 positive with Mycobacterium avium complex.  Repeat CT chest abdomen pelvis 10/20/2023 without contrast -  Severe emphysematous changes of the lung parenchyma. Stable to slightly decreased size of pulmonary masslike opacity with adjacent calcification in the right upper lobe now measuring 4.7 x 2.5 cm (series 5, image 86), previously 4.9 x 2.9 cm when measured in a similar fashion. Decreased focal peribronchovascular nodularity more inferiorly in the right upper lobe (series 5 image 126), in the right lower lobe (series 5 image 196), and in the left upper lobe (series 5 image 92). Calcified  "granuloma in the posterior lateral right lower lobe. No pleural effusion or pneumothorax. Diffuse bronchial wall thickening. No new focal consolidation or groundglass opacity    Additionally, echocardiogram day after ICU admission showed new wall motion abnormality, had cardiac catheterization which was largely unremarkable.     Follows with Dr. Taylor for his COPD and monitoring for signs pulmonary nodularity and mass.  Has yearly CTs    Scheduled for repeat CT chest 12/7/23 for surveillance of MAC vs pulmonary nodule.     MAC symptoms: Has been more tired recently [facility] Increased fatigue started about a year ago, going up the stairs has been more difficult too. Cough in the last two weeks, dry. Denies fevers, nightsweats, no unintended weight loss (weight has been stable), no chest pain.    After discussion Mycobacterium avium complex and whether a has active disease versus colonization, patient is visibly frustrated and states that he has \"been misdiagnosed on multiple occasions\" and would like to for sure whether or not he this infection before starting a long treatment with multiple antibiotics    40-pack-year history, continues to smoke      Review of Systems:  Full 12-point HPI obtained, pertinent positives and negative per above    Past Medical History:   Diagnosis Date    Cataract     COPD (chronic obstructive pulmonary disease) (H)     diagnosed with spirometry     Lung nodules     CT scan 2016    Osteoporosis     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    Tobacco abuse        Past Surgical History:   Procedure Laterality Date    CATARACT IOL, RT/LT Left 09/15/2017    s/p CE/IOL left eye    CATARACT IOL, RT/LT Right     ESOPHAGOSCOPY, GASTROSCOPY, DUODENOSCOPY (EGD), COMBINED N/A 10/22/2023    Procedure: Esophagoscopy, gastroscopy, duodenoscopy (EGD), combined;  Surgeon: David Post MD;  Location:  GI    PHACOEMULSIFICATION CLEAR CORNEA WITH STANDARD INTRAOCULAR LENS " IMPLANT Right 9/30/2016    Procedure: PHACOEMULSIFICATION CLEAR CORNEA WITH STANDARD INTRAOCULAR LENS IMPLANT;  Surgeon: Elly Valencia MD;  Location:  [facility]    PHACOEMULSIFICATION WITH STANDARD INTRAOCULAR LENS IMPLANT Left 9/15/2017    Procedure: PHACOEMULSIFICATION WITH STANDARD INTRAOCULAR LENS IMPLANT;  Left Eye Phacoemulsification with Standard Lens;  Surgeon: Elly Valencia MD;  Location: [facility] OR    WRIST SURGERY         Family History   Problem Relation Age of Onset    Diabetes Brother         living    Cancer Brother         throat    Macular Degeneration Mother     Colon Cancer No family hx of     Glaucoma No family hx of     Retinal detachment No family hx of     Amblyopia No family hx of     Skin Cancer No family hx of     Melanoma No family hx of        Social History     Social History Narrative    Single.  Retired.     No children    5 siblings:      No family history of bleeding, clotting disorders or complications with anesthesia.     Social History     Tobacco Use    Smoking status: Light Smoker     Packs/day: 2.00     Years: 45.00     Additional pack years: 0.00     Total pack years: 90.00     Types: Cigarettes    Smokeless tobacco: Former    Tobacco comments:     5 cigs per day   Substance Use Topics    Alcohol use: Yes     Alcohol/week: 0.0 standard drinks of alcohol     Comment: occ beer    Drug use: No       Immunization History   Administered Date(s) Administered    Flu 65+ Years 09/23/2019    Influenza (High Dose) 3 valent vaccine 10/13/2017    Influenza (IIV3) PF 10/01/2013    Influenza Vaccine 65+ (Fluzone HD) 10/12/2022    Mantoux Tuberculin Skin Test 02/12/2013    Pneumo Conj 13-V (2010&after) 02/24/2016    Pneumococcal 23 valent 04/15/2015, 08/28/2017    TDAP Vaccine (Adacel) 02/24/2016    Td (Adult), Adsorbed 06/30/1999    Zoster vaccine, live 04/15/2015       Patient Active Problem List   Diagnosis    Osteoporosis    COPD (chronic obstructive pulmonary disease) (H)    Tobacco use  disorder    Hyperlipidemia LDL goal <130    Health Care Home    Hiatal hernia    Hypopigmentation    SOB (shortness of breath)    Hypoxia    COPD exacerbation (H)    Infection due to 2019 novel coronavirus    Hypothermia, initial encounter    Sepsis without acute organ dysfunction, due to unspecified organism (H)       Outpatient Medications Marked as Taking for the 10/27/23 encounter (Office Visit) with Clary Fish APRN CNP   Medication Sig    albuterol (PROAIR HFA) 108 (90 Base) MCG/ACT inhaler INHALE 1 TO 2 PUFFS BY MOUTH EVERY 4 HOURS AS NEEDED FOR SHORTNESS OF BREATH    aspirin 81 MG EC tablet Take 1 tablet (81 mg) by mouth daily for 30 days    atorvastatin (LIPITOR) 40 MG tablet Take 1 tablet (40 mg) by mouth daily    B Complex-C (VITAMIN B COMPLEX W/VITAMIN C) TABS tablet TAKE 1 TABLET BY MOUTH TWICE DAILY WITH FOLIC ACID    benzonatate (TESSALON) 100 MG capsule Take 1 capsule (100 mg) by mouth 3 times daily as needed for cough    Calcium Carbonate-Vitamin D (CALCIUM 600 +D HIGH POTENCY) 600-10 MG-MCG TABS Take 1 tablet by mouth 2 times daily    calcium carbonate-vitamin D (CALTRATE) 600-10 MG-MCG per tablet TAKE 1 TABLET BY MOUTH TWICE DAILY    fish oil-omega-3 fatty acids 1000 MG capsule Take 1 capsule (1 g) by mouth daily    folic acid (FOLVITE) 1 MG tablet Take 1 tablet (1 mg) by mouth daily for 7 days    Ginkgo Biloba 60 MG TABS Take 1 tablet by mouth daily    glucosamine-chondroitinoitin 750-600 MG TABS Take 2 tablets by mouth 2 times daily    ipratropium-albuterol (COMBIVENT RESPIMAT)  MCG/ACT inhaler Inhale 1 puff into the lungs 4 times daily as needed for shortness of breath, wheezing or cough (Inhale 1 puff into the lungs 4 times daily as needed. Do not exceed 6 doses per day.,)    pantoprazole (PROTONIX) 40 MG EC tablet Take 1 tablet (40 mg) by mouth 2 times daily (before meals) for 90 days    Selenium (SELENIMIN-200) 200 MCG TABS tablet Take 1 tablet (200 mcg) by mouth daily     "thiamine (B-1) 100 MG tablet Take 1 tablet (100 mg) by mouth daily for 7 days    vitamin A 3 MG (80189 UNITS) capsule Take 1 capsule (10,000 Units) by mouth daily    vitamin C (ASCORBIC ACID) 1000 MG TABS Take 1 tablet (1,000 mg) by mouth daily    vitamin E (TOCOPHEROL) 1000 units (450 mg) CAPS capsule Take 1 capsule (1,000 Units) by mouth daily    WIXELA INHUB 500-50 MCG/ACT inhaler Inhale 1 puff into the lungs 2 times daily       No Known Allergies           Physical Exam:     BP (!) 159/78   Pulse 89   Wt 73.4 kg (161 lb 12.8 oz)   SpO2 97%   BMI 23.96 kg/m      Wt Readings from Last 4 Encounters:   10/27/23 73.4 kg (161 lb 12.8 oz)   10/23/23 72.9 kg (160 lb 11.5 oz)   04/03/23 73.9 kg (163 lb)   02/16/23 75.2 kg (165 lb 11.2 oz)       Exam:  GENERAL: well-developed, well-nourished, alert, oriented, in no acute distress.  HEAD: Head is normocephalic, atraumatic   EYES: Eyes have anicteric sclerae.    ENT: Oropharynx is moist without exudates or ulcers.  NECK: Supple. No cervical lymphadenopathy  LUNGS: Breathing comfortably on room air, no signs of respiratory distress  SKIN: No acute rashes.   NEUROLOGIC: Grossly nonfocal         Laboratory Data:     No results found for: \"ACD4\"    Inflammatory Markers  No lab results found.    Invalid input(s): \"RATE\", \"AUTO\", \"ESR\", \"WESR\"    Metabolic Studies    Recent Labs   Lab Test 10/24/23  1431 10/24/23  1102 10/24/23  0453 10/23/23  0824 10/23/23  0336 10/22/23  0732 10/22/23  0704   NA  --   --  139  --  141  --  141  141   POTASSIUM  --   --  4.1  --  3.8   < > 3.4  3.4   CHLORIDE  --   --  103  --  105  --  103  103   CO2  --   --  26  --  27  --  27  27   ANIONGAP  --   --  10  --  9  --  11  11   BUN  --   --  11.6  --  10.5  --  11.9  11.9   CR  --   --  0.72  --  0.65*  --  0.69  0.69   GFRESTIMATED  --   --  >90  --  >90  --  >90  >90   *   < > 91   < > 108*   < > 112*  112*   ANTONIO  --   --  9.2  --  8.7*  --  8.5*  8.5*   PHOS  --   --  3.0 " " --  2.3*   < > 1.3*   MAG  --   --  1.8  --   --   --  2.1   LACT  --   --   --   --   --   --  1.3   CKT  --   --   --   --   --   --  793*    < > = values in this interval not displayed.       Hepatic Studies    Recent Labs   Lab Test 10/24/23  0453 10/23/23  0336 10/22/23  0704   BILITOTAL 0.5 0.4 0.3   ALKPHOS 74 69 66   PROTTOTAL 5.9* 5.7* 5.7*   ALBUMIN 3.6 3.6 3.6   AST 31 48* 78*   ALT 33 36 40         Lipid testing    Recent Labs   Lab Test 04/03/23  1327 08/30/21  1106 04/07/21  1754   CHOL 290* 229* 260*   HDL 69 69 67   * 128* 151*   TRIG 151* 162* 208*       Gout Labs    No lab results found.    Hematology Studies   Recent Labs   Lab Test 10/24/23  0453 10/23/23  1427 10/23/23  0336 10/22/23  2215 10/22/23  0704 10/21/23  1539 10/21/23  0424 08/30/21  1106 10/03/19  1752 06/10/17  0054   WBC 9.3  --  8.7 9.6 9.3   < > 17.7*   < > 9.7 12.3*   ANEU  --   --   --   --   --   --   --   --  6.6 9.3*   ANEUTAUTO  --   --   --  6.7  --   --  15.4*   < >  --   --    ALYM  --   --   --   --   --   --   --   --  1.8 1.8   ALYMPAUTO  --   --   --  2.1  --   --  0.5*   < >  --   --    AZ  --   --   --   --   --   --   --   --  1.2 0.8   AMONOAUTO  --   --   --  0.8  --   --  1.7*   < >  --   --    AEOS  --   --   --   --   --   --   --   --  0.2 0.3   AEOSAUTO  --   --   --  0.0  --   --  0.0   < >  --   --    ABSBASO  --   --   --  0.0  --   --  0.0   < >  --   --    HGB 12.5* 12.8* 12.2* 12.2* 11.6*   < > 12.7*   < >  --  13.5   HCT 37.9*  --  36.8* 36.3* 34.3*   < > 37.2*   < >  --  40.8     --  147* 142* 142*   < > 248   < >  --  187    < > = values in this interval not displayed.         Urine Studies     Recent Labs   Lab Test 10/20/23  1859   URINEPH 5.0   NITRITE Negative   LEUKEST Negative   WBCU 2       Microbiology:  Fungal testing  No lab results found.    Invalid input(s): \"HIFUN\", \"FUNGL\"    Beta D Glucan levels (Fungitell assay)    No results found for: \"FGTL\", \"FGTLI\"     Last " "Culture results   Rapid Strep A Screen   Date Value Ref Range Status   07/30/2018   Final    NEGATIVE: No Group A streptococcal antigen detected by immunoassay, await culture report.     Culture   Date Value Ref Range Status   10/20/2023 No Growth  Final   10/20/2023 No Growth  Final   09/14/2023   Final    >10 Squamous epithelial cells/low power field indicates oral contamination. Please recollect.   02/15/2023   Final    >10 Squamous epithelial cells/low power field indicates oral contamination. Please recollect.   02/12/2023 No Growth  Final   02/12/2023 No Growth  Final   07/23/2021   Final    >10 Squamous epithelial cells/low power field indicates oral contamination. Please recollect.   07/23/2021   Final    >10 Squamous epithelial cells/low power field indicates oral contamination. Please recollect.     Culture Micro   Date Value Ref Range Status   07/30/2018 No beta hemolytic Streptococcus Group A isolated  Final         Last check of C difficile  No results found for: \"CDBPCT\"    No components found for: \"AFBSTN\"    Syphilis Testing  Invalid input(s): \"[id number]\"    Tick Testing  No lab results found.    Invalid input(s): \"APHAGM\"    Quantiferon testing   Recent Labs   Lab Test 10/22/23  2215 10/21/23  0424   LYMPH 21 3       Infection Studies to assess Diarrhea  No lab results found.    Invalid input(s): \"NNDMRESULT\"    Virology:  Coronavirus-19 testing    Recent Labs   Lab Test 10/20/23  1647 02/17/23  0124 02/12/23  1236   [id number] Negative Negative Positive*       Respiratory virus (non-coronavirus-19) testing    No lab results found.      Last Pathology Report   Clinical Information   Date Value Ref Range Status   07/23/2021   Final    COUGH       Final Diagnosis   Date Value Ref Range Status   07/23/2021   Final    Specimen A     Interpretation: Normal     Negative for malignancy     Adequacy:     Satisfactory for evaluation             Imaging:  Results for orders placed or performed during the " hospital encounter of 10/20/23   XR Chest Port 1 View    Narrative    Portable chest    INDICATION: Hematemesis    COMPARISON: Cancer screening CT 8/16/2023    FINDINGS:  Heart size normal. Patchy densities in the upper lungs made to prior  fibrosis or consolidation. This area was quite dense in the right  upper lobe on the previous CT. No abnormal air collection.      Impression    IMPRESSION: Continued patchy densities in the right upper lung from  recent August the 80. CT also showed hyperinflation which is not as  appreciated on this exam.    MAGDALENA MARTINEZ MD         SYSTEM ID:  [id number]   CT Head w/o Contrast    Narrative    CT HEAD W/O CONTRAST 10/20/2023 4:11 PM    History: AMS     Comparison: MRI 8/3/2023    Technique: Using multidetector thin collimation helical acquisition  technique, axial, coronal and sagittal CT images from the skull base  to the vertex were obtained without intravenous contrast.   (topogram) image(s) also obtained and reviewed.    Findings: Lateral generalized volume loss. Mild hypoattenuation of  periventricular white matter likely relates to chronic small vessel  ischemia. Bilateral pseudophakia. There is no intracranial hemorrhage,  mass effect, or midline shift. Gray/white matter differentiation in  both cerebral hemispheres is preserved. Ventricles are proportionate  to the cerebral sulci. The basal cisterns are clear.    The bony calvaria and the bones of the skull base are normal. The  visualized portions of the paranasal sinuses and mastoid air cells are  clear.      Impression    Impression:  No acute intracranial pathology. Moderate cerebral volume loss and  mild leukoaraiosis.    I have personally reviewed the examination and initial interpretation  and I agree with the findings.    MANJULA LUCAS MD         SYSTEM ID:  [id number]   CT Chest Abdomen Pelvis w/o Contrast    Narrative    EXAMINATION: CT CHEST ABDOMEN PELVIS W/O CONTRAST, 10/20/2023 4:11  PM    TECHNIQUE:  Helical CT images from the thoracic inlet through the  symphysis pubis were obtained without IV contrast.    COMPARISON: Chest radiograph 1510 hours, chest CT 8/16/2023.    HISTORY: sepsis, acute renal failure, hematemesis    FINDINGS:  Lines and tubes: Right IJ CVC tip terminates in the SVC. Defibrillator  pads project over the left chest and back.    CHEST:  THYROID: Thyroid is unremarkable.    LUNGS/PLEURA: Severe emphysematous changes of the lung parenchyma.  Stable to slightly decreased size of pulmonary masslike opacity with  adjacent calcification in the right upper lobe now measuring 4.7 x 2.5  cm (series 5, image 86), previously 4.9 x 2.9 cm when measured in a  similar fashion. Decreased focal peribronchovascular nodularity more  inferiorly in the right upper lobe (series 5 image 126), in the right  lower lobe (series 5 image 196), and in the left upper lobe (series 5  image 92). Calcified granuloma in the posterior lateral right lower  lobe. No pleural effusion or pneumothorax. Diffuse bronchial wall  thickening. No new focal consolidation or groundglass opacity.    MEDIASTINUM: Stable size and appearance of large hiatal hernia.  Increased distal esophageal wall thickening. Heart size is within  normal limits. No pericardial effusion. Calcific atherosclerosis of  the aortic arch extending in the proximal great vessels. Mild coronary  vessel atherosclerotic calcifications. Prominent mediastinal lymph  nodes do not meet size criteria for enlargement and are slightly  decreased in size since prior.    CHEST WALL: No suspicious axillary lymphadenopathy.    ABDOMEN/PELVIS:  LIVER: Diffuse hypoattenuation of the hepatic parenchyma. No focal  masses are evident.    BILIARY: No gallbladder wall thickening or pericholecystic fluid. No  obvious intra or extrahepatic biliary dilation.    PANCREAS: Moderate fatty atrophy of the pancreas without focal mass or  ductal dilation.    SPLEEN: Atrophied  spleen without focal mass.    ADRENAL GLANDS: Within normal limits.    URINARY TRACT: Atrophy of the renal cortices bilaterally. 0.9 cm renal  calculus in the left superior pole collecting system (as measured on  series 7, image 168). No hydronephrosis. Bladder is significantly  distended with no wall thickening.    REPRODUCTIVE ORGANS: Within normal limits.    STOMACH: Paraesophageal versus hiatal hernia as described above. No  gastric wall thickening.    BOWEL: Sigmoid, descending, and transverse colonic diverticulosis  without evidence of diverticulitis. Normal caliber of the small and  large bowel.  Appendix is within normal limits with tiny,  nonobstructing appendicolith.    PERITONEUM/FLUID: No free fluid.    VESSELS: No aneurysmal dilatation of the abdominal aorta. Diffuse  large vessel calcific atherosclerotic plaques.    LYMPH NODES: No lymphadenopathy.    BONES/SOFT TISSUES: No aggressive osseous lesions. Stable compression  deformity of L1 with trace retropulsion of posterior, superior  endplate fragment. Modic changes at L5-S1 with grade 1  anterolisthesis. Chronic right rib deformities.      Impression    IMPRESSION:  1.  Stable to slightly decreased right upper lobe pulmonary masslike  opacity and decreased additional pulmonary nodules in the right lung  and left upper lobe, likely improving infection, though underlying  malignancy in the right upper lobe remains a consideration. No acute  airspace disease.  2.  Severe emphysematous changes of the lung parenchyma.  3.  Large hiatal hernia with increased circumferential distal  esophageal wall thickening, suggesting esophagitis.  4.  Large colonic diverticulosis without evidence of diverticulitis.  5.  Hepatic steatosis.    I have personally reviewed the examination and initial interpretation  and I agree with the findings.    SANIA CHICAS DO         SYSTEM ID:  [id number]   XR Chest Port 1 View    Narrative    XR CHEST PORT 1 VIEW  10/20/2023 3:13 PM      HISTORY:  s/p central line       COMPARISON:  Sustained a prior chest radiograph, CT chest 2023    TECHNIQUE: XR CHEST PORT 1 VIEW    FINDINGS:   Interval placement of a right IJ approach central venous catheter  which is terminating within the right atrium/at the region of the  inferior cavoatrial junction.    Low lung volumes with unchanged scattered reticular opacities  diffusely and right apical more focal area of consolidated lung. No  new  focal airspace disease. Incompletely visualized bilateral  costophrenic angles. No large pleural effusions. No pneumothorax.  Cardiac silhouette is normal.      Impression    IMPRESSION:  1.  Right IJ approach central venous catheter with tip terminating  near the inferior cavoatrial junction, possibly extending into the  IVC. Retraction of catheter tip by 4 to 6 cm is appropriate.  2.  The remaining cardiopulmonary exam is stable compared to same day  prior chest radiograph    I have personally reviewed the examination and initial interpretation  and I agree with the findings.    MAGDALENA MARTINEZ MD         SYSTEM ID:  [id number]   Echo Complete     Value    LVEF  40-45% (mildly reduced)    [facility]    [id number]  [id number]  [id number]  [id number]^PAULO^YUDY                                                                       Version 2     [facility],[place]  Echocardiography Laboratory  [address] [place] [address]     Name: BEHR, ROBERT B  MRN: [id number]  : 1950  Study Date: 10/21/2023 12:00 PM  Age: 73 yrs  Gender: Male  Patient Location: [facility] [place]  Reason For Study: Shock  Ordering Physician: YUDY BATES  Performed By: Hodan Mistry     BSA: 1.9 m2  Height: 68 in  Weight: 162 lb  HR: 106  BP: 120/81 mmHg  ______________________________________________________________________________  Procedure  Complete Portable Echo Adult. Contrast Optison. Optison (NDC #8922-8950-24)  given intravenously. Patient was given 6  ml mixture of 3 ml Optison and 6 ml  saline. 3 ml wasted.  ______________________________________________________________________________  Interpretation Summary  Left ventricular function is decreased. The ejection fraction is 40-45%  (mildly reduced).  Taswell and distal apical LV segments are akinetic with hyperkinetic basal  segments. Cannot rule out LAD disease (ischemia). If ischemia is ruled out  then most likely cause is stress CMP.  Right ventricular function, chamber size, wall motion, and thickness are  normal.  No pericardial effusion is present.  No significant valvular abnormalities present.     This study was compared with the study from 6/10/2017 .The left ventricular  function has worsened. RWA described are new.     Reviewed admission ECGs. Noted ST segment elevation in V1-V5. Pathologic Q  waves (V1-V2) noted on today`s ECG     Recommend Cardiology consultation ASAP. Ischemia is likely for LV apical wall  motion abnormality.     Cardiology team updated about the findings 1:20 pm.     ______________________________________________________________________________  Left Ventricle  Left ventricular size is normal. Left ventricular wall thickness is normal.  Left ventricular diastolic function is normal. Left ventricular function is  decreased. The ejection fraction is 40-45% (mildly reduced). Apical wall  akinesis is present.     Right Ventricle  Right ventricular function, chamber size, wall motion, and thickness are  normal.     Atria  Both atria appear normal.     Mitral Valve  The mitral valve is normal. Trace mitral insufficiency is present.     Aortic Valve  The valve leaflets are not well visualized. On Doppler interrogation, there is  no significant stenosis or regurgitation.     Tricuspid Valve  The tricuspid valve is normal. Trace tricuspid insufficiency is present. The  peak velocity of the tricuspid regurgitant jet is not obtainable. Pulmonary  artery systolic pressure cannot be assessed.      Pulmonic Valve  On Doppler interrogation, there is no significant stenosis or regurgitation.     Vessels  The aorta root is normal. The inferior vena cava cannot be assessed.     Pericardium  No pericardial effusion is present.     Miscellaneous  No significant valvular abnormalities present.     Compared to Previous Study  This study was compared with the study from 6/10/2017 . The left ventricular  function has worsened.  ______________________________________________________________________________  MMode/2D Measurements & Calculations  LVOT diam: 2.1 cm  LVOT area: 3.5 cm2     Doppler Measurements & Calculations  MV E max víctor: 72.7 cm/sec  MV A max víctor: 64.4 cm/sec  MV E/A: 1.1  MV dec slope: 482.7 cm/sec2  MV dec time: 0.15 sec  PA acc time: 0.11 sec  E/E' av.2  Lateral E/e': 8.8  Medial E/e': 7.7  RV S Víctor: 6.9 cm/sec     ______________________________________________________________________________  Report approved by: Atiya BRIONES 10/21/2023 01:21 PM         Echo Complete     Value    LVEF  40-45% (mildly reduced)    Narrative    [id number]  [id number]  [id number]  [id number]^ELDA^CELINA^CECILIA     [facility],[place]  Echocardiography Laboratory  [address] [place] [address]     Name: BEHR, ROBERT B  MRN: [id number]  : 1950  Study Date: 10/22/2023 08:06 AM  Age: 73 yrs  Gender: Male  [place] [facility] [place]  Reason For Study: Cardiomyopathy  Ordering Physician: CELINA SCHROEDER  Performed By: Hodan Mistry     BSA: 1.9 m2  Height: 69 in  Weight: 161 lb  BP: 130/90 mmHg  ______________________________________________________________________________  Procedure  Complete Portable Echo Adult. Contrast Optison. Optison (NDC #1833-5296-88)  given intravenously. Patient was given 6 ml mixture of 3 ml Optison and 6 ml  saline. 3 ml wasted.  ______________________________________________________________________________  Interpretation Summary  Left ventricular  function is decreased. The ejection fraction is 40-45%  (mildly reduced).  Apical wall akinesis is present.  Mid to anteroseptum is akinetic. Likely ischemic cardiomyopathy. LAD disease  suspected.  The right ventricle is normal size.  Global right ventricular function is normal.     This study was compared with the study from 10/21/2023 .  No significant changes noted.  ______________________________________________________________________________  Left Ventricle  Left ventricular size is normal. Left ventricular wall thickness is normal.  Left ventricular function is decreased. The ejection fraction is 40-45%  (mildly reduced). Left ventricular diastolic function is not assessable.  Apical wall akinesis is present. Mid to anteroseptum is akinetic.     Right Ventricle  The right ventricle is normal size. Global right ventricular function is  normal.     Atria  Both atria appear normal.     Mitral Valve  The mitral valve is normal.     Aortic Valve  Aortic valve is normal in structure and function.     Tricuspid Valve  The tricuspid valve is normal.     Pulmonic Valve  The pulmonic valve is normal.     Vessels  The aorta root is normal. The inferior vena cava was normal in size with  preserved respiratory variability.     Pericardium  No pericardial effusion is present.     Compared to Previous Study  This study was compared with the study from 10/21/2023 . No significant  changes noted.  ______________________________________________________________________________  Doppler Measurements & Calculations  TR max jon: 191.2 cm/sec  TR max P.6 mmHg     ______________________________________________________________________________  Report approved by: Atiya BRIONES 10/22/2023 10:32 AM         Cardiac Catheterization    Narrative      Mid LAD lesion is 20% stenosed.    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 No hemodynamically significant coronary   artery stenoses.

## 2023-10-29 NOTE — TELEPHONE ENCOUNTER
[facility] Call Center    Phone Message    May a detailed message be left on voicemail: yes     Reason for Call: Other: PT called in regards to his rescue inhaler. Pharmacy is stating there's no refills and pt is needing the inhaler. Please send over new script to pharmacy     Action Taken: Other: [facility] CTR LUNG    Travel Screening: Not Applicable                                                                   
Nurse pended refill encounter and routed to Dr Taylor on 9/18/23.    Jasper Cruz RN    
no

## 2023-10-30 ENCOUNTER — TELEPHONE (OUTPATIENT)
Dept: PULMONOLOGY | Facility: CLINIC | Age: 73
End: 2023-10-30
Payer: COMMERCIAL

## 2023-10-30 ENCOUNTER — TELEPHONE (OUTPATIENT)
Dept: CARDIOLOGY | Facility: CLINIC | Age: 73
End: 2023-10-30
Payer: COMMERCIAL

## 2023-10-30 NOTE — TELEPHONE ENCOUNTER
Pt called requesting to schedule an appt with Dr. Taylor.     Routed msg to general pulm team to help pt schedule.

## 2023-10-31 NOTE — TELEPHONE ENCOUNTER
Writer called patient to follow up and see if patient has additional questions or needs. Left voice message to return call to clinic if he has any questions. Confirmed patient has appointment with Dr. Taylor in January.

## 2023-11-01 ENCOUNTER — LAB (OUTPATIENT)
Dept: LAB | Facility: CLINIC | Age: 73
End: 2023-11-01
Payer: COMMERCIAL

## 2023-11-01 DIAGNOSIS — R06.02 SOB (SHORTNESS OF BREATH): ICD-10-CM

## 2023-11-01 DIAGNOSIS — I42.9 CARDIOMYOPATHY, UNSPECIFIED TYPE (H): ICD-10-CM

## 2023-11-01 DIAGNOSIS — A31.0 MYCOBACTERIUM AVIUM COMPLEX (H): ICD-10-CM

## 2023-11-01 LAB
GRAM STAIN RESULT: NORMAL

## 2023-11-01 PROCEDURE — 87206 SMEAR FLUORESCENT/ACID STAI: CPT | Mod: 90 | Performed by: PATHOLOGY

## 2023-11-01 PROCEDURE — 99000 SPECIMEN HANDLING OFFICE-LAB: CPT | Performed by: PATHOLOGY

## 2023-11-01 PROCEDURE — 87116 MYCOBACTERIA CULTURE: CPT | Mod: 90 | Performed by: PATHOLOGY

## 2023-11-01 PROCEDURE — 87205 SMEAR GRAM STAIN: CPT | Performed by: REGISTERED NURSE

## 2023-11-03 ENCOUNTER — TELEPHONE (OUTPATIENT)
Dept: PULMONOLOGY | Facility: CLINIC | Age: 73
End: 2023-11-03

## 2023-11-03 ENCOUNTER — EXTERNAL ORDER RESULTS (OUTPATIENT)
Dept: PULMONOLOGY | Facility: CLINIC | Age: 73
End: 2023-11-03
Payer: COMMERCIAL

## 2023-11-03 DIAGNOSIS — R91.8 MASS OF UPPER LOBE OF RIGHT LUNG: Primary | ICD-10-CM

## 2023-11-03 NOTE — CONFIDENTIAL NOTE
"  The blood test we ordered has resulted with \"reduced risk of malignancy.\" The reason we did the blood test was to look for proteins in the blood that could tell us if it's more (or less) likely to be cancer. The blood test didn't detect anything suggestive of cancer so we can pursue treatment of JAKY, consider lung biopsy I will present at nodule conference.        Step1: Nodify CDT - looking for features suggestive of malignancy    Autoantibody assay, 7 different AAB s  Results: Negative, Moderate Positive, and High Positive (positive in about 10% of patients tested)  Results within 24 hours of receipt of test  Covered by Medicaid/Medicare with $0 out of pocket (we bill patient s insurance and handle all appeals, financial assistance)  Allows you to move patients into diagnostic or resective procedure in a more timely manner       Step 2: Nodify XL2 - if CDT doesn't show features suggesting malignancy, this test looks for features that are reassuring for benign nodule    Proteomic test, looking for large ratio between two proteins  LG3BP and C163A  Results: Indeterminate, Reduced Risk, Likely Benign (roughly 80% of patients tested have a risk reduction)  Results within 5 business days (working towards 3 days)  Covered by Medicaid/Medicare with $0 out of pocket (we bill patient s insurance and handle all appeals, financial assistance)  Allows you to move patients out of unnecessary procedures, give them peace of mind, and improves patient compliance for follow ups     "

## 2023-11-03 NOTE — CONFIDENTIAL NOTE
I called to discuss recent events and results.   I have reviewed the CT findings, slight decrease in size of the consolidative mass in the right upper lobe.   Nodify blood test showed reduced risk.     He reiterated interest in stem cell therapy and would be enthusiastic to participate in stem cell research if offered    Cancel CT chest and order PET in December or January.

## 2023-11-06 ENCOUNTER — TELEPHONE (OUTPATIENT)
Dept: PULMONOLOGY | Facility: CLINIC | Age: 73
End: 2023-11-06
Payer: COMMERCIAL

## 2023-11-06 NOTE — TELEPHONE ENCOUNTER
Patient Contacted for the patient to call back and schedule the following:    Appointment type: PET  Provider: ZAY  Return date: 12/18/2023  Specialty phone number: [phone]  Additional appointment(s) needed: N/A  Additonal Notes: N/A

## 2023-11-06 NOTE — TELEPHONE ENCOUNTER
Patient was recently hospitalized for hypovolemic shock due to GI bleed.  While inpatient had an echo which showed EF 40%.  Underwent coronary angiogram which showed minimal CAD.  Referred for further evaluation of decreased EF.  Patient had labs 10 days prior to moises which were essentially normal.

## 2023-11-08 DIAGNOSIS — A31.0 MYCOBACTERIUM AVIUM COMPLEX (H): Primary | ICD-10-CM

## 2023-11-10 ENCOUNTER — TELEPHONE (OUTPATIENT)
Dept: PULMONOLOGY | Facility: CLINIC | Age: 73
End: 2023-11-10
Payer: COMMERCIAL

## 2023-11-10 NOTE — TELEPHONE ENCOUNTER
Patient called wanting to reschedule an a cancelled appointment with Dr. Rapp, Cardiologist. I gave him the number for [phone].    Lulu Torres RN

## 2023-11-29 DIAGNOSIS — I50.22 CHRONIC SYSTOLIC HEART FAILURE (H): Primary | ICD-10-CM

## 2023-12-08 ENCOUNTER — OFFICE VISIT (OUTPATIENT)
Dept: URGENT CARE | Facility: URGENT CARE | Age: 73
End: 2023-12-08
Payer: COMMERCIAL

## 2023-12-08 VITALS
WEIGHT: 155 LBS | BODY MASS INDEX: 22.96 KG/M2 | SYSTOLIC BLOOD PRESSURE: 139 MMHG | HEART RATE: 80 BPM | HEIGHT: 69 IN | DIASTOLIC BLOOD PRESSURE: 75 MMHG | OXYGEN SATURATION: 98 % | TEMPERATURE: 97.9 F

## 2023-12-08 DIAGNOSIS — J44.1 COPD EXACERBATION (H): Primary | ICD-10-CM

## 2023-12-08 DIAGNOSIS — F17.200 TOBACCO USE DISORDER: ICD-10-CM

## 2023-12-08 PROCEDURE — 99214 OFFICE O/P EST MOD 30 MIN: CPT | Performed by: PHYSICIAN ASSISTANT

## 2023-12-08 RX ORDER — AZITHROMYCIN 250 MG/1
TABLET, FILM COATED ORAL
Qty: 6 TABLET | Refills: 0 | Status: SHIPPED | OUTPATIENT
Start: 2023-12-08 | End: 2023-12-13

## 2023-12-08 RX ORDER — PREDNISONE 20 MG/1
40 TABLET ORAL DAILY
Qty: 10 TABLET | Refills: 0 | Status: SHIPPED | OUTPATIENT
Start: 2023-12-08 | End: 2023-12-13

## 2023-12-08 NOTE — PROGRESS NOTES
SUBJECTIVE:  Robert B Behr is a 73 year old male who presents to the clinic today with a chief complaint of productive cough and shortness of breath for three days.  Denies fever.  Patient declines imaging, despite new symptoms.  Patient continues to smoke and swim despite symptoms.        Past Medical History:   Diagnosis Date    Cataract     COPD (chronic obstructive pulmonary disease) (H)     diagnosed with spirometry     Lung nodules     CT scan 2016    Osteoporosis     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    Tobacco abuse        Current Outpatient Medications   Medication Sig Dispense Refill    albuterol (PROAIR HFA) 108 (90 Base) MCG/ACT inhaler INHALE 1 TO 2 PUFFS BY MOUTH EVERY 4 HOURS AS NEEDED FOR SHORTNESS OF BREATH 8.5 g 3    atorvastatin (LIPITOR) 40 MG tablet Take 1 tablet (40 mg) by mouth daily 90 tablet 3    azithromycin (ZITHROMAX) 250 MG tablet Take 2 tablets (500 mg) by mouth daily for 1 day, THEN 1 tablet (250 mg) daily for 4 days. 6 tablet 0    B Complex-C (VITAMIN B COMPLEX W/VITAMIN C) TABS tablet TAKE 1 TABLET BY MOUTH TWICE DAILY WITH FOLIC ACID 180 tablet 3    benzonatate (TESSALON) 100 MG capsule Take 1 capsule (100 mg) by mouth 3 times daily as needed for cough 30 capsule 0    Calcium Carbonate-Vitamin D (CALCIUM 600 +D HIGH POTENCY) 600-10 MG-MCG TABS Take 1 tablet by mouth 2 times daily 90 tablet 3    calcium carbonate-vitamin D (CALTRATE) 600-10 MG-MCG per tablet TAKE 1 TABLET BY MOUTH TWICE DAILY 180 tablet 0    fish oil-omega-3 fatty acids 1000 MG capsule Take 1 capsule (1 g) by mouth daily 90 capsule 3    Ginkgo Biloba 60 MG TABS Take 1 tablet by mouth daily 90 tablet 3    glucosamine-chondroitinoitin 750-600 MG TABS Take 2 tablets by mouth 2 times daily 120 tablet 3    ipratropium-albuterol (COMBIVENT RESPIMAT)  MCG/ACT inhaler Inhale 1 puff into the lungs 4 times daily as needed for shortness of breath, wheezing or cough (Inhale 1 puff into the lungs 4 times daily as needed. Do not  "exceed 6 doses per day.,) 4 g 4    pantoprazole (PROTONIX) 40 MG EC tablet Take 1 tablet (40 mg) by mouth 2 times daily (before meals) for 90 days 180 tablet 0    predniSONE (DELTASONE) 20 MG tablet Take 2 tablets (40 mg) by mouth daily for 5 days 10 tablet 0    Selenium (SELENIMIN-200) 200 MCG TABS tablet Take 1 tablet (200 mcg) by mouth daily 90 tablet 3    vitamin A 3 MG (70068 UNITS) capsule Take 1 capsule (10,000 Units) by mouth daily 90 capsule 3    vitamin C (ASCORBIC ACID) 1000 MG TABS Take 1 tablet (1,000 mg) by mouth daily 90 tablet 3    vitamin E (TOCOPHEROL) 1000 units (450 mg) CAPS capsule Take 1 capsule (1,000 Units) by mouth daily 90 capsule 3    WIXELA INHUB 500-50 MCG/ACT inhaler Inhale 1 puff into the lungs 2 times daily 60 each 11       Social History     Tobacco Use    Smoking status: Light Smoker     Packs/day: 2.00     Years: 45.00     Additional pack years: 0.00     Total pack years: 90.00     Types: Cigarettes    Smokeless tobacco: Former    Tobacco comments:     5 cigs per day   Substance Use Topics    Alcohol use: Yes     Alcohol/week: 0.0 standard drinks of alcohol     Comment: occ beer       ROS  Review of systems negative except as stated above.    OBJECTIVE:  /75   Pulse 80   Temp 97.9  F (36.6  C) (Temporal)   Ht 1.753 m (5' 9\")   Wt 70.3 kg (155 lb)   SpO2 98%   BMI 22.89 kg/m    GENERAL APPEARANCE: healthy, alert and no distress  RESP: lungs clear to auscultation - no rales, rhonchi or wheezes  CV: regular rates and rhythm, normal S1 S2, no murmur noted  NEURO: Normal strength and tone, sensory exam grossly normal,  normal speech and mentation      CXR: declined by patient AMA    ASSESSMENT:    (J44.1) COPD exacerbation (H)  (primary encounter diagnosis)  Comment:patient requests treatment that has been successful in the past.  Declines further assessment to explore new symptoms.  Patient agrees to follow up right away if symptoms worsen.  Discussed discontinuing tobacco " minimally while symptomatic.   Plan: azithromycin (ZITHROMAX) 250 MG tablet,         predniSONE (DELTASONE) 20 MG tablet

## 2023-12-18 ENCOUNTER — HOSPITAL ENCOUNTER (OUTPATIENT)
Dept: PET IMAGING | Facility: CLINIC | Age: 73
Discharge: HOME | End: 2023-12-18
Attending: INTERNAL MEDICINE | Admitting: INTERNAL MEDICINE
Payer: COMMERCIAL

## 2023-12-18 DIAGNOSIS — R91.8 MASS OF UPPER LOBE OF RIGHT LUNG: ICD-10-CM

## 2023-12-18 PROCEDURE — A9552 F18 FDG: HCPCS | Performed by: INTERNAL MEDICINE

## 2023-12-18 PROCEDURE — 343N000001 HC RX 343: Performed by: INTERNAL MEDICINE

## 2023-12-18 PROCEDURE — 74177 CT ABD & PELVIS W/CONTRAST: CPT

## 2023-12-18 PROCEDURE — 74177 CT ABD & PELVIS W/CONTRAST: CPT | Mod: 26 | Performed by: RADIOLOGY

## 2023-12-18 PROCEDURE — 78815 PET IMAGE W/CT SKULL-THIGH: CPT | Mod: 26 | Performed by: RADIOLOGY

## 2023-12-18 PROCEDURE — 250N000011 HC RX IP 250 OP 636: Performed by: INTERNAL MEDICINE

## 2023-12-18 PROCEDURE — 71260 CT THORAX DX C+: CPT | Mod: 26 | Performed by: RADIOLOGY

## 2023-12-18 PROCEDURE — 78815 PET IMAGE W/CT SKULL-THIGH: CPT | Mod: PI

## 2023-12-18 RX ORDER — IOPAMIDOL 755 MG/ML
50-135 INJECTION, SOLUTION INTRAVASCULAR ONCE
Status: COMPLETED | OUTPATIENT
Start: 2023-12-18 | End: 2023-12-18

## 2023-12-18 RX ADMIN — FLUDEOXYGLUCOSE F-18 10.14 MILLICURIE: 500 INJECTION, SOLUTION INTRAVENOUS at 13:32

## 2023-12-18 RX ADMIN — IOPAMIDOL 95 ML: 755 INJECTION, SOLUTION INTRAVENOUS at 14:33

## 2023-12-27 LAB
ACID FAST STAIN (ARUP): NORMAL

## 2024-01-02 ENCOUNTER — OFFICE VISIT (OUTPATIENT)
Dept: INFECTIOUS DISEASES | Facility: CLINIC | Age: 74
End: 2024-01-02
Attending: STUDENT IN AN ORGANIZED HEALTH CARE EDUCATION/TRAINING PROGRAM
Payer: COMMERCIAL

## 2024-01-02 ENCOUNTER — OFFICE VISIT (OUTPATIENT)
Dept: PULMONOLOGY | Facility: CLINIC | Age: 74
End: 2024-01-02
Attending: INTERNAL MEDICINE
Payer: COMMERCIAL

## 2024-01-02 VITALS
HEART RATE: 93 BPM | WEIGHT: 163.1 LBS | SYSTOLIC BLOOD PRESSURE: 154 MMHG | BODY MASS INDEX: 24.09 KG/M2 | OXYGEN SATURATION: 96 % | DIASTOLIC BLOOD PRESSURE: 95 MMHG

## 2024-01-02 VITALS
BODY MASS INDEX: 24.14 KG/M2 | OXYGEN SATURATION: 96 % | RESPIRATION RATE: 16 BRPM | WEIGHT: 163 LBS | DIASTOLIC BLOOD PRESSURE: 95 MMHG | HEIGHT: 69 IN | SYSTOLIC BLOOD PRESSURE: 154 MMHG | HEART RATE: 93 BPM

## 2024-01-02 DIAGNOSIS — R91.1 LUNG NODULE: ICD-10-CM

## 2024-01-02 DIAGNOSIS — F17.200 TOBACCO USE DISORDER: ICD-10-CM

## 2024-01-02 DIAGNOSIS — I42.9 CARDIOMYOPATHY, UNSPECIFIED TYPE (H): ICD-10-CM

## 2024-01-02 DIAGNOSIS — J42 CHRONIC BRONCHITIS, UNSPECIFIED CHRONIC BRONCHITIS TYPE (H): ICD-10-CM

## 2024-01-02 DIAGNOSIS — A31.0 MYCOBACTERIUM AVIUM COMPLEX (H): Primary | ICD-10-CM

## 2024-01-02 DIAGNOSIS — R91.8 MASS OF UPPER LOBE OF RIGHT LUNG: Primary | ICD-10-CM

## 2024-01-02 PROCEDURE — 99214 OFFICE O/P EST MOD 30 MIN: CPT | Performed by: STUDENT IN AN ORGANIZED HEALTH CARE EDUCATION/TRAINING PROGRAM

## 2024-01-02 PROCEDURE — 99214 OFFICE O/P EST MOD 30 MIN: CPT | Performed by: INTERNAL MEDICINE

## 2024-01-02 PROCEDURE — G0463 HOSPITAL OUTPT CLINIC VISIT: HCPCS | Performed by: STUDENT IN AN ORGANIZED HEALTH CARE EDUCATION/TRAINING PROGRAM

## 2024-01-02 PROCEDURE — G0463 HOSPITAL OUTPT CLINIC VISIT: HCPCS | Mod: 27 | Performed by: INTERNAL MEDICINE

## 2024-01-02 ASSESSMENT — PAIN SCALES - GENERAL: PAINLEVEL: NO PAIN (0)

## 2024-01-02 NOTE — PATIENT INSTRUCTIONS
For the lung spot, I will review at the nodule conference on Friday morning to figure out the best approach  I think we reset your password today.   We will start the process of valve evaluation by doing lung function testing. If the lung function testing looks like valves will be a good option for you, we can take the next step.     Dr Taylor Clinic Information    Procedure scheduling  Rubén Baker  [phone]

## 2024-01-02 NOTE — LETTER
1/2/2024       RE: Robert B Behr  [address]  [place] [address]     Dear Colleague,    Thank you for referring your patient, Robert B Behr, to the [facility] INFECTIOUS DISEASE CLINIC [place] at [facility]. Please see a copy of my visit note below.     [facility]    Division of Infectious Diseases and International Medicine    CLINICAL INFECTIOUS DISEASE OUTPATIENT CONSULTATION NOTE   Patient:  Robert B Behr, Date of birth 1950, Medical record number [id number]  Referred by: Referred Self   Reason for Visit: JAKY follow up       Assessment and Plan     Mycobacterium avium complex (H)  Chronic bronchitis, unspecified chronic bronchitis type (H)  Tobacco use disorder  Lung nodule     Impression: Mycobacterial infection is one possible explanation for the progressive nodule although cancer presumably remains in differential.    Plan:  Side effect profile of expected treatment likely outweighs the benefits of treatment at this time  Patient still does not have confirmatory microbiologic diagnosis and would require either multiple future sputum samples or a bronchoscopy/biopsy to be positive  If bronchoscopy is done would send for AFB culture  Patient has exacerbation of COPD or other progressive disease requiring sooner follow-up I can facilitate this.    Follow up in one year or sooner if evidence of need for treatment    Discussion:  Patient is a 73-year-old man with longstanding history of tobacco use who has had a lung nodule since 2016.    Imaging Data:  Reviewed all CT scans: CT 2016 right upper lobe nodule 1.9X 1.4 cm.  Most recent scan 12/13/2023 was PET scan which is right upper lobe FDG avid lesion 4.2 X2.9.  Multiple other scans with chronic insidious progression of this right upper lobe nodule and he has had other transient nodules in other lobes of the lung.    Micro Data:- Does not meet criteria for  "consistent NTM growth  7/22/2021-grew JAKY  7/24/2021-no growth  9/13/2023-JAKY  11/1/2023-no growth    Symptoms:  He is mostly asymptomatic at this point.  He does have limited exercise capacity and \"smoker's cough\".  He is physically active at this time. There is no indication that he has acute symptoms of NTM disease.    Patient does not fulfill criteria needed for treatment: While he does have a positive imaging finding he does not have consistent growth of the same organism in multiple samples or specific symptoms attributable to this disease.    The treatment of JAKY would likely be nearly a year of multidrug therapy which may carry high side effect profile.    While the nodule is concerning as is its FDG avidity it is yet to be determined if this needs to be treated.  If malignancy is ruled out and if there is concern for other structural issues [impending bronchial obstruction, bleeding etc. then we could also consider treatment even in the absence of symptoms.    A bronchoscopy would be helpful to determine this.    Patient is following up with his pulmonologist today and I will see the patient again in a year or sooner if there is any need based on other indication for treatment such as exacerbation, rapid progression, other structural issues.       History of Present Illness:     Robert B Behr is a wonderful 73 year old y.o who presented today    Very nice 73-year-old gentleman whom I am seeing in follow-up for the first time today.  Previously seen by our nurse practitioner a few months ago.  He was referred to our clinic for positive culture for Mycobacterium Avium.  This was not the first time it was positive.  He has had a positive culture in July 2021 as well.    He does have a right upper lobe nodule which has been progressive since 2016.  He follows closely with his pulmonologist for this and is going to see his pulmonologist immediately after this visit.    He states that he is overall feeling well.  " He uses the sauna at the [facility].  He does go swimming.  He does have some shortness of breath that limits his activities compared to when he was younger but it does not bother him much.  He does have chronic cough that he relates to his ongoing smoking.  He does not have any fevers, chills, sweats, weight loss.    Patient is very invested in the supplements that he takes.  He feels that antioxidants may be the cure and preventative measures for many medical interventions.    We had an extended discussion about how mycobacterial infection is one possible/even probable cause of his lung nodule [provided cancers ruled out].  I discussed that without treatment of the nodule may progress and he may become symptomatic in the future.  I discussed that treatment would likely be a multidrug regimen which could last over a year.  Overall weighing the risks and benefits it seemed like treatment was not worth it at this point particularly if he does not have symptoms.  I discussed that if there is some other structural concern such as bleeding or obstruction from the mass then treatment may be indicated even in the absence of symptoms.  I will reach out to his pulmonologist to discuss this with them.    It still important for him to follow with them since he is at high risk for lung cancer and I am not sure if bronchoscopy is necessary.  Patient understood the discussion and actively participated in the decision making.    Given that he is asymptomatic he would like to minimize treatments and will see me back in 1 year but knows he can see me sooner if he runs into issues.         Review of Systems:     The following systems were reviewed with the patient as they pertain to the case and are negative unless noted here or above in the HPI. The patient was  able to participate in the following review of systems    REVIEW OF SYSTEMS:     Constitutional: No fevers, no chills, no sweats  Cardiac: No history of chest pain, No  palpitations  Respiratory: has shortness of breath, has wheeze, chronic cough  Gastro Intestinal: No history of abdominal pain, no vomiting, no diarrhea  Neurological: No headaches, no new or changing weakness, no new or changing numbness  Musculoskeletal: No joint pain or swelling HEENT: No congestion No stridor  Psychiatric: No reported hallucinations, No obvious delusions  Allergic: No Hives No Rash  Hematologic: No Easy Bruising, No Nosebleeds,   Genitourinary: No Dysuria, No Frequency  Endocrine: No Polyuria, No Polydipsia  Skin/Integumentary: No Rash, No Ulcer  Opthalmologic: No Diplopia, No Flashes        Other Medical History:     Attempt was made to collect past, family and social history during this encounter,  this information was reviewed with the patient and updated    Allergies Patient has no known allergies.    Past Medical History  Past Medical History:   Diagnosis Date    Cataract     COPD (chronic obstructive pulmonary disease) (H)     diagnosed with spirometry     Lung nodules     CT scan 2016    Osteoporosis     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    Tobacco abuse     PastSurgical History   has a past surgical history that includes Wrist surgery; Phacoemulsification clear cornea with standard intraocular lens implant (Right, 9/30/2016); Phacoemulsification with standard intraocular lens implant (Left, 9/15/2017); cataract iol, rt/lt (Left, 09/15/2017); cataract iol, rt/lt (Right); Esophagoscopy, gastroscopy, duodenoscopy (EGD), combined (N/A, 10/22/2023); and Coronary Angiogram (10/23/2023).   Family History  Family History   Problem Relation Age of Onset    Diabetes Brother         living    Cancer Brother         throat    Macular Degeneration Mother     Colon Cancer No family hx of     Glaucoma No family hx of     Retinal detachment No family hx of     Amblyopia No family hx of     Skin Cancer No family hx of     Melanoma No family hx of     Social History  He reports that he has been smoking  cigarettes. He has a 90 pack-year smoking history. He has quit using smokeless tobacco. He reports current alcohol use. He reports that he does not use drugs.   Notable Exposures Listed below if pertinent   Lifelong [place] resident excpet for 12 years where he lived in [facility] [place] [facility]. Inventor, has patent on a multisaw.  Vaccination History:  Immunization History   Administered Date(s) Administered    Flu 65+ Years 09/23/2019    Influenza (High Dose) 3 valent vaccine 10/13/2017    Influenza (IIV3) PF 10/01/2013    Influenza Vaccine 65+ (Fluzone HD) 10/12/2022    Mantoux Tuberculin Skin Test 02/12/2013    Pneumo Conj 13-V (2010&after) 02/24/2016    Pneumococcal 23 valent 04/15/2015, 08/28/2017    TDAP Vaccine (Adacel) 02/24/2016    Td (Adult), Adsorbed 06/30/1999    Zoster vaccine, live 04/15/2015             Physical Exam:     VITAL SIGNS:  Blood pressure (!) 154/95, pulse 93, weight 74 kg (163 lb 1.6 oz), SpO2 96%.     GENERAL APPEARANCE: Not in acute distress    PHYSICAL EXAM:   Eyes:     no ptosis, no discharge, no scleral icterus  Mouth, Throat:     mucous membranes moist  Cardiovascular:    Inspection: No Cyanosis, JVD not elevated   Auscultation:  S1, S2 normal, regular rate and rhythm  Respiratory:     Inspection: Not in respiratory distress, Chest expansion symmetrical   Auscultation: coarse  Gastrointestinal:      soft, non-tender; bowel sounds normal; no masses,  no organomegaly  Musculoskeletal:     no elbow wrist knee or ankle tenderness, deformity or swelling, no quadriceps calf or upper arm muscular tenderness noted  Skin:     Dry and intact  Neurologic:     Higher Mental Function: Conversant, AOx4   Facial asymmetry grossly absent   is ambulatory  Psychiatric:     appropriate      Signature:     Terrance Howard MD , 01/02/2024 Pager [phone]  Clinical Instructor, General Infectious Disease &  Transplant Infectious Disease Fellow PGY VI   If no response/after hours see  Amcom->INFECTIOUS DISEASE MEDICINE ADULT/[facility]/    This dictation was prepared in part using Dragon recognition software.  As a result errors may occur. When identified these transcription errors have been corrected.  While every attempt is made to correct errors during dictation, errors may still exist          Medical Decision Making  I saw and evaluated this patient on todays date as part of an E&M Encounter  I spent a total of 37 minutes of time on the date of service face-to-face or coordinating care of Robert B Behr. Over 50% of my time was spent with the patient evaluating and managing issues described above in my note

## 2024-01-02 NOTE — NURSING NOTE
Chief Complaint   Patient presents with    RECHECK     Return Pulmonary    Medications reviewed and vital signs taken.   Saloni Diamond, RUSSELL

## 2024-01-02 NOTE — PATIENT INSTRUCTIONS
No treatment for the infection right now, I think the side effects might be more than the symptoms.    To prove the spot on the lungs is from this bacteria we will probably need a bronchoscopy.    I will discuss this with your pulmonologist.    See me back in a year to see how things are doing or sooner if issues or if we wind up getting a bronchoscopy.

## 2024-01-02 NOTE — NURSING NOTE
Chief Complaint   Patient presents with    RECHECK     Recheck     BP (!) 154/95   Pulse 93   Wt 74 kg (163 lb 1.6 oz)   SpO2 96%   BMI 24.09 kg/m    Nu German on 1/2/2024 at 2:24 PM

## 2024-01-02 NOTE — PROGRESS NOTES
Reason for Visit  Robert B Behr is a 73 year old year old male who is being seen for RECHECK (Return Pulmonary)  Pulmonary HPI  Robert B Behr is a 73 year old male with a history of COPD, lung nodules, and tobacco use who presents for follow-up.    He rolls his own cigarettes and smokes 3-5 every day. He still has daily clear sputum production in the morning. He uses Wixela once or twice a day. He has been regularly swimming (35 minutes per day), and is using elliptical 4-5 days a week      He is holding out hope for stem cell therapies for COPD.     Current Outpatient Medications   Medication    albuterol (PROAIR HFA) 108 (90 Base) MCG/ACT inhaler    atorvastatin (LIPITOR) 40 MG tablet    B Complex-C (VITAMIN B COMPLEX W/VITAMIN C) TABS tablet    Calcium Carbonate-Vitamin D (CALCIUM 600 +D HIGH POTENCY) 600-10 MG-MCG TABS    calcium carbonate-vitamin D (CALTRATE) 600-10 MG-MCG per tablet    fish oil-omega-3 fatty acids 1000 MG capsule    Ginkgo Biloba 60 MG TABS    glucosamine-chondroitinoitin 750-600 MG TABS    ipratropium-albuterol (COMBIVENT RESPIMAT)  MCG/ACT inhaler    pantoprazole (PROTONIX) 40 MG EC tablet    Selenium (SELENIMIN-200) 200 MCG TABS tablet    vitamin A 3 MG (93287 UNITS) capsule    vitamin C (ASCORBIC ACID) 1000 MG TABS    vitamin E (TOCOPHEROL) 1000 units (450 mg) CAPS capsule    WIXELA INHUB 500-50 MCG/ACT inhaler    benzonatate (TESSALON) 100 MG capsule     No current facility-administered medications for this visit.   No Known Allergies  Past Medical History:   Diagnosis Date    Cataract     COPD (chronic obstructive pulmonary disease) (H)     diagnosed with spirometry     Lung nodules     CT scan 2016    Osteoporosis     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    Tobacco abuse        Past Surgical History:   Procedure Laterality Date    CATARACT IOL, RT/LT Left 09/15/2017    s/p CE/IOL left eye    CATARACT IOL, RT/LT Right     CV CORONARY ANGIOGRAM  10/23/2023    Procedure: CV CORONARY  ANGIOGRAM;  Surgeon: Torin Parker MD;  Location:  HEART CARDIAC CATH LAB    ESOPHAGOSCOPY, GASTROSCOPY, DUODENOSCOPY (EGD), COMBINED N/A 10/22/2023    Procedure: Esophagoscopy, gastroscopy, duodenoscopy (EGD), combined;  Surgeon: David Post MD;  Location:  GI    PHACOEMULSIFICATION CLEAR CORNEA WITH STANDARD INTRAOCULAR LENS IMPLANT Right 9/30/2016    Procedure: PHACOEMULSIFICATION CLEAR CORNEA WITH STANDARD INTRAOCULAR LENS IMPLANT;  Surgeon: Elly Valencia MD;  Location:  EC    PHACOEMULSIFICATION WITH STANDARD INTRAOCULAR LENS IMPLANT Left 9/15/2017    Procedure: PHACOEMULSIFICATION WITH STANDARD INTRAOCULAR LENS IMPLANT;  Left Eye Phacoemulsification with Standard Lens;  Surgeon: Elly Valencia MD;  Location: [facility] OR    WRIST SURGERY         Social History     Socioeconomic History    Marital status: Single     Spouse name: Not on file    Number of children: Not on file    Years of education: Not on file    Highest education level: Not on file   Occupational History    Not on file   Tobacco Use    Smoking status: Every Day     Packs/day: 2.00     Years: 45.00     Additional pack years: 0.00     Total pack years: 90.00     Types: Cigarettes    Smokeless tobacco: Former    Tobacco comments:     5 cigs per day   Substance and Sexual Activity    Alcohol use: Yes     Alcohol/week: 0.0 standard drinks of alcohol     Comment: occ beer    Drug use: No    Sexual activity: Not Currently   Other Topics Concern    Parent/sibling w/ CABG, MI or angioplasty before 65F 55M? No   Social History Narrative    Single.  Retired.     No children    5 siblings:      No family history of bleeding, clotting disorders or complications with anesthesia.     Social Determinants of Health     Financial Resource Strain: Not on file   Food Insecurity: Not on file   Transportation Needs: Not on file   Physical Activity: Not on file   Stress: Not on file   Social Connections: Not on file   Interpersonal Safety:  "Not on file   Housing Stability: Not on file     A complete ROS was otherwise negative except as noted in the HPI.  BP (!) 154/95   Pulse 93   Resp 16   Ht 1.753 m (5' 9\")   Wt 73.9 kg (163 lb)   SpO2 96%   BMI 24.07 kg/m    Exam:   GENERAL APPEARANCE: Well developed, well nourished, alert, and in no apparent distress.  EYES: PERRL, EOMI  HENT: Nasal mucosa with no edema and no hyperemia. No nasal polyps.  EARS: Canals clear, TMs normal  MOUTH: Oral mucosa is moist, without any lesions, no tonsillar enlargement, no oropharyngeal exudate.  NECK: supple, no masses, no thyromegaly.  LYMPHATICS: No significant axillary, cervical, or supraclavicular nodes.  RESP: normal percussion, good air flow throughout.  No crackles. No rhonchi. No wheezes.  CV: Normal S1, S2, regular rhythm, normal rate. No murmur.  No rub. No gallop. No LE edema.   ABDOMEN:  Bowel sounds normal, soft, nontender, no HSM or masses.   MS: extremities normal. No clubbing. No cyanosis.  SKIN: no rash on limited exam  NEURO: Mentation intact, speech normal, normal strength and tone, normal gait and stance  PSYCH: mentation appears normal. and affect normal/bright  Results:  Spirometry March 2020 at [place] [facility] moderate obstruction normal diffusion    Assessment and plan: Robert B Behr is a 73 year old male with a history of COPD, lung nodules, and tobacco use who presents for follow-up.    # COPD:  Currently on inhaled corticosteroid / long-acting beta agonist he just never really used long-acting muscarinic agent despite having the medication.  - Maintenance therapy: Wixela   - Rescue: Combivent, Albuterol    # Tobacco use: continues to roll his own cigarettes and not interested in quitting. He smokes 3-5 cig/day  # lung mass - persistent for several years, there is some FDG uptake, he had two prior JAKY on sputum culture, minimally symptomatic, at high risk for cancer, he is willing to undergo biopsy.    Does not have someone to stay with him after " general anesthesia. Discuss at nodule conference this week. Would be nice to get cultures but might be better to do percutaneous approach given that he lives alone.     # Vaccinations: he has declined COVID vaccinations in the past

## 2024-01-02 NOTE — LETTER
1/2/2024         RE: Robert B Behr  [address]  [place] [address]        Dear Colleague,    Thank you for referring your patient, Robert B Behr, to the [facility] FOR LUNG SCIENCE AND HEALTH CLINIC [place]. Please see a copy of my visit note below.    Reason for Visit  Robert B Behr is a 73 year old year old male who is being seen for RECHECK (Return Pulmonary)  Pulmonary HPI  Robert B Behr is a 73 year old male with a history of COPD, lung nodules, and tobacco use who presents for follow-up.    He rolls his own cigarettes and smokes 3-5 every day. He still has daily clear sputum production in the morning. He uses Wixela once or twice a day. He has been regularly swimming (35 minutes per day), and is using elliptical 4-5 days a week      He is holding out hope for stem cell therapies for COPD.     Current Outpatient Medications   Medication    albuterol (PROAIR HFA) 108 (90 Base) MCG/ACT inhaler    atorvastatin (LIPITOR) 40 MG tablet    B Complex-C (VITAMIN B COMPLEX W/VITAMIN C) TABS tablet    Calcium Carbonate-Vitamin D (CALCIUM 600 +D HIGH POTENCY) 600-10 MG-MCG TABS    calcium carbonate-vitamin D (CALTRATE) 600-10 MG-MCG per tablet    fish oil-omega-3 fatty acids 1000 MG capsule    Ginkgo Biloba 60 MG TABS    glucosamine-chondroitinoitin 750-600 MG TABS    ipratropium-albuterol (COMBIVENT RESPIMAT)  MCG/ACT inhaler    pantoprazole (PROTONIX) 40 MG EC tablet    Selenium (SELENIMIN-200) 200 MCG TABS tablet    vitamin A 3 MG (81288 UNITS) capsule    vitamin C (ASCORBIC ACID) 1000 MG TABS    vitamin E (TOCOPHEROL) 1000 units (450 mg) CAPS capsule    WIXELA INHUB 500-50 MCG/ACT inhaler    benzonatate (TESSALON) 100 MG capsule     No current facility-administered medications for this visit.   No Known Allergies  Past Medical History:   Diagnosis Date    Cataract     COPD (chronic obstructive pulmonary disease) (H)     diagnosed with spirometry     Lung nodules     CT scan  2016    Osteoporosis     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    Tobacco abuse        Past Surgical History:   Procedure Laterality Date    CATARACT IOL, RT/LT Left 09/15/2017    s/p CE/IOL left eye    CATARACT IOL, RT/LT Right     CV CORONARY ANGIOGRAM  10/23/2023    Procedure: CV CORONARY ANGIOGRAM;  Surgeon: Torin Parker MD;  Location:  HEART CARDIAC CATH LAB    ESOPHAGOSCOPY, GASTROSCOPY, DUODENOSCOPY (EGD), COMBINED N/A 10/22/2023    Procedure: Esophagoscopy, gastroscopy, duodenoscopy (EGD), combined;  Surgeon: David Post MD;  Location:  GI    PHACOEMULSIFICATION CLEAR CORNEA WITH STANDARD INTRAOCULAR LENS IMPLANT Right 9/30/2016    Procedure: PHACOEMULSIFICATION CLEAR CORNEA WITH STANDARD INTRAOCULAR LENS IMPLANT;  Surgeon: Elly Valencia MD;  Location:  [facility]    PHACOEMULSIFICATION WITH STANDARD INTRAOCULAR LENS IMPLANT Left 9/15/2017    Procedure: PHACOEMULSIFICATION WITH STANDARD INTRAOCULAR LENS IMPLANT;  Left Eye Phacoemulsification with Standard Lens;  Surgeon: Elly Valencia MD;  [facility]: [facility]    WRIST SURGERY         Social History     Socioeconomic History    Marital status: Single     Spouse name: Not on file    Number of children: Not on file    Years of education: Not on file    Highest education level: Not on file   Occupational History    Not on file   Tobacco Use    Smoking status: Every Day     Packs/day: 2.00     Years: 45.00     Additional pack years: 0.00     Total pack years: 90.00     Types: Cigarettes    Smokeless tobacco: Former    Tobacco comments:     5 cigs per day   Substance and Sexual Activity    Alcohol use: Yes     Alcohol/week: 0.0 standard drinks of alcohol     Comment: occ beer    Drug use: No    Sexual activity: Not Currently   Other Topics Concern    Parent/sibling w/ CABG, MI or angioplasty before 65F 55M? No   Social History Narrative    Single.  Retired.     No children    5 siblings:      No family history of bleeding, clotting disorders or  "complications with anesthesia.     Social Determinants of Health     Financial Resource Strain: Not on file   Food Insecurity: Not on file   Transportation Needs: Not on file   Physical Activity: Not on file   Stress: Not on file   Social Connections: Not on file   Interpersonal Safety: Not on file   Housing Stability: Not on file     A complete ROS was otherwise negative except as noted in the HPI.  BP (!) 154/95   Pulse 93   Resp 16   Ht 1.753 m (5' 9\")   Wt 73.9 kg (163 lb)   SpO2 96%   BMI 24.07 kg/m    Exam:   GENERAL APPEARANCE: Well developed, well nourished, alert, and in no apparent distress.  EYES: PERRL, EOMI  HENT: Nasal mucosa with no edema and no hyperemia. No nasal polyps.  EARS: Canals clear, TMs normal  MOUTH: Oral mucosa is moist, without any lesions, no tonsillar enlargement, no oropharyngeal exudate.  NECK: supple, no masses, no thyromegaly.  LYMPHATICS: No significant axillary, cervical, or supraclavicular nodes.  RESP: normal percussion, good air flow throughout.  No crackles. No rhonchi. No wheezes.  CV: Normal S1, S2, regular rhythm, normal rate. No murmur.  No rub. No gallop. No LE edema.   ABDOMEN:  Bowel sounds normal, soft, nontender, no HSM or masses.   MS: extremities normal. No clubbing. No cyanosis.  SKIN: no rash on limited exam  NEURO: Mentation intact, speech normal, normal strength and tone, normal gait and stance  PSYCH: mentation appears normal. and affect normal/bright  Results:  Spirometry March 2020 at [facility] moderate obstruction normal diffusion    Assessment and plan: Robert B Behr is a 73 year old male with a history of COPD, lung nodules, and tobacco use who presents for follow-up.    # COPD:  Currently on inhaled corticosteroid / long-acting beta agonist he just never really used long-acting muscarinic agent despite having the medication.  - Maintenance therapy: Wixela   - Rescue: Combivent, Albuterol    # Tobacco use: continues to roll his own cigarettes and not " interested in quitting. He smokes 3-5 cig/day  # lung mass - persistent for several years, there is some FDG uptake, he had two prior JAKY on sputum culture, minimally symptomatic, at high risk for cancer, he is willing to undergo biopsy.    Does not have someone to stay with him after general anesthesia. Discuss at nodule conference this week. Would be nice to get cultures but might be better to do percutaneous approach given that he lives alone.     # Vaccinations: he has declined COVID vaccinations in the past      Sincerely,        Svitlana Taylor MD

## 2024-01-02 NOTE — PROGRESS NOTES
" [facility]    Division of Infectious Diseases and International Medicine    CLINICAL INFECTIOUS DISEASE OUTPATIENT CONSULTATION NOTE   Patient:  Robert B Behr, Date of birth 1950, Medical record number [id number]  Referred by: Referred Self   Reason for Visit: JAKY follow up       Assessment and Plan     Mycobacterium avium complex (H)  Chronic bronchitis, unspecified chronic bronchitis type (H)  Tobacco use disorder  Lung nodule     Impression: Mycobacterial infection is one possible explanation for the progressive nodule although cancer presumably remains in differential.    Plan:  Side effect profile of expected treatment likely outweighs the benefits of treatment at this time  Patient still does not have confirmatory microbiologic diagnosis and would require either multiple future sputum samples or a bronchoscopy/biopsy to be positive  If bronchoscopy is done would send for AFB culture  Patient has exacerbation of COPD or other progressive disease requiring sooner follow-up I can facilitate this.    Follow up in one year or sooner if evidence of need for treatment    Discussion:  Patient is a 73-year-old man with longstanding history of tobacco use who has had a lung nodule since 2016.    Imaging Data:  Reviewed all CT scans: CT 2016 right upper lobe nodule 1.9X 1.4 cm.  Most recent scan 12/13/2023 was PET scan which is right upper lobe FDG avid lesion 4.2 X2.9.  Multiple other scans with chronic insidious progression of this right upper lobe nodule and he has had other transient nodules in other lobes of the lung.    Micro Data:- Does not meet criteria for consistent NTM growth  7/22/2021-grew JAKY  7/24/2021-no growth  9/13/2023-JAKY  11/1/2023-no growth    Symptoms:  He is mostly asymptomatic at this point.  He does have limited exercise capacity and \"smoker's cough\".  He is physically active at this time. There is no indication that he has acute symptoms of NTM " disease.    Patient does not fulfill criteria needed for treatment: While he does have a positive imaging finding he does not have consistent growth of the same organism in multiple samples or specific symptoms attributable to this disease.    The treatment of JAKY would likely be nearly a year of multidrug therapy which may carry high side effect profile.    While the nodule is concerning as is its FDG avidity it is yet to be determined if this needs to be treated.  If malignancy is ruled out and if there is concern for other structural issues [impending bronchial obstruction, bleeding etc. then we could also consider treatment even in the absence of symptoms.    A bronchoscopy would be helpful to determine this.    Patient is following up with his pulmonologist today and I will see the patient again in a year or sooner if there is any need based on other indication for treatment such as exacerbation, rapid progression, other structural issues.       History of Present Illness:     Robert B Behr is a wonderful 73 year old y.o who presented today    Very nice 73-year-old gentleman whom I am seeing in follow-up for the first time today.  Previously seen by our nurse practitioner a few months ago.  He was referred to our clinic for positive culture for Mycobacterium Avium.  This was not the first time it was positive.  He has had a positive culture in July 2021 as well.    He does have a right upper lobe nodule which has been progressive since 2016.  He follows closely with his pulmonologist for this and is going to see his pulmonologist immediately after this visit.    He states that he is overall feeling well.  He uses the sauna at the [facility].  He does go swimming.  He does have some shortness of breath that limits his activities compared to when he was younger but it does not bother him much.  He does have chronic cough that he relates to his ongoing smoking.  He does not have any fevers, chills, sweats, weight  loss.    Patient is very invested in the supplements that he takes.  He feels that antioxidants may be the cure and preventative measures for many medical interventions.    We had an extended discussion about how mycobacterial infection is one possible/even probable cause of his lung nodule [provided cancers ruled out].  I discussed that without treatment of the nodule may progress and he may become symptomatic in the future.  I discussed that treatment would likely be a multidrug regimen which could last over a year.  Overall weighing the risks and benefits it seemed like treatment was not worth it at this point particularly if he does not have symptoms.  I discussed that if there is some other structural concern such as bleeding or obstruction from the mass then treatment may be indicated even in the absence of symptoms.  I will reach out to his pulmonologist to discuss this with them.    It still important for him to follow with them since he is at high risk for lung cancer and I am not sure if bronchoscopy is necessary.  Patient understood the discussion and actively participated in the decision making.    Given that he is asymptomatic he would like to minimize treatments and will see me back in 1 year but knows he can see me sooner if he runs into issues.         Review of Systems:     The following systems were reviewed with the patient as they pertain to the case and are negative unless noted here or above in the HPI. The patient was  able to participate in the following review of systems    REVIEW OF SYSTEMS:     Constitutional: No fevers, no chills, no sweats  Cardiac: No history of chest pain, No palpitations  Respiratory: has shortness of breath, has wheeze, chronic cough  Gastro Intestinal: No history of abdominal pain, no vomiting, no diarrhea  Neurological: No headaches, no new or changing weakness, no new or changing numbness  Musculoskeletal: No joint pain or swelling HEENT: No congestion No  stridor  Psychiatric: No reported hallucinations, No obvious delusions  Allergic: No Hives No Rash  Hematologic: No Easy Bruising, No Nosebleeds,   Genitourinary: No Dysuria, No Frequency  Endocrine: No Polyuria, No Polydipsia  Skin/Integumentary: No Rash, No Ulcer  Opthalmologic: No Diplopia, No Flashes        Other Medical History:     Attempt was made to collect past, family and social history during this encounter,  this information was reviewed with the patient and updated    Allergies Patient has no known allergies.    Past Medical History  Past Medical History:   Diagnosis Date    Cataract     COPD (chronic obstructive pulmonary disease) (H)     diagnosed with spirometry     Lung nodules     CT scan 2016    Osteoporosis     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    Tobacco abuse     PastSurgical History   has a past surgical history that includes Wrist surgery; Phacoemulsification clear cornea with standard intraocular lens implant (Right, 9/30/2016); Phacoemulsification with standard intraocular lens implant (Left, 9/15/2017); cataract iol, rt/lt (Left, 09/15/2017); cataract iol, rt/lt (Right); Esophagoscopy, gastroscopy, duodenoscopy (EGD), combined (N/A, 10/22/2023); and Coronary Angiogram (10/23/2023).   Family History  Family History   Problem Relation Age of Onset    Diabetes Brother         living    Cancer Brother         throat    Macular Degeneration Mother     Colon Cancer No family hx of     Glaucoma No family hx of     Retinal detachment No family hx of     Amblyopia No family hx of     Skin Cancer No family hx of     Melanoma No family hx of     Social History  He reports that he has been smoking cigarettes. He has a 90 pack-year smoking history. He has quit using smokeless tobacco. He reports current alcohol use. He reports that he does not use drugs.   Notable Exposures Listed below if pertinent   Lifelong [place] resident excpet for 12 years where he lived in [facility] near [facility].  Inventor, has patent on a multisaw.  Vaccination History:  Immunization History   Administered Date(s) Administered    Flu 65+ Years 09/23/2019    Influenza (High Dose) 3 valent vaccine 10/13/2017    Influenza (IIV3) PF 10/01/2013    Influenza Vaccine 65+ (Fluzone HD) 10/12/2022    Mantoux Tuberculin Skin Test 02/12/2013    Pneumo Conj 13-V (2010&after) 02/24/2016    Pneumococcal 23 valent 04/15/2015, 08/28/2017    TDAP Vaccine (Adacel) 02/24/2016    Td (Adult), Adsorbed 06/30/1999    Zoster vaccine, live 04/15/2015             Physical Exam:     VITAL SIGNS:  Blood pressure (!) 154/95, pulse 93, weight 74 kg (163 lb 1.6 oz), SpO2 96%.     GENERAL APPEARANCE: Not in acute distress    PHYSICAL EXAM:   Eyes:     no ptosis, no discharge, no scleral icterus  Mouth, Throat:     mucous membranes moist  Cardiovascular:    Inspection: No Cyanosis, JVD not elevated   Auscultation:  S1, S2 normal, regular rate and rhythm  Respiratory:     Inspection: Not in respiratory distress, Chest expansion symmetrical   Auscultation: coarse  Gastrointestinal:      soft, non-tender; bowel sounds normal; no masses,  no organomegaly  Musculoskeletal:     no elbow wrist knee or ankle tenderness, deformity or swelling, no quadriceps calf or upper arm muscular tenderness noted  Skin:     Dry and intact  Neurologic:     Higher Mental Function: Conversant, AOx4   Facial asymmetry grossly absent   is ambulatory  Psychiatric:     appropriate      Signature:     Terrance Howard MD , 01/02/2024 Pager [phone]  Clinical Instructor, General Infectious Disease &  Transplant Infectious Disease Fellow PGY VI   If no response/after hours see [employer]->INFECTIOUS DISEASE MEDICINE ADULT/[facility]/    This dictation was prepared in part using Dragon recognition software.  As a result errors may occur. When identified these transcription errors have been corrected.  While every attempt is made to correct errors during dictation, errors may still exist          Medical  Decision Making  I saw and evaluated this patient on todays date as part of an E&M Encounter  I spent a total of 37 minutes of time on the date of service face-to-face or coordinating care of Robert B Behr. Over 50% of my time was spent with the patient evaluating and managing issues described above in my note

## 2024-01-04 ENCOUNTER — TELEPHONE (OUTPATIENT)
Dept: PULMONOLOGY | Facility: CLINIC | Age: 74
End: 2024-01-04
Payer: COMMERCIAL

## 2024-01-04 NOTE — TELEPHONE ENCOUNTER
Patient Contacted for the patient to call back and schedule the following:    Appointment type: [facility]  Provider: ZAY  Return date: 05/21/2024  Specialty phone number: [phone]  Additional appointment(s) needed: N/A  Additonal Notes: N/A

## 2024-01-05 DIAGNOSIS — R91.8 MASS OF UPPER LOBE OF RIGHT LUNG: Primary | ICD-10-CM

## 2024-01-05 RX ORDER — VITAMIN B COMPLEX
1 CAPSULE ORAL 2 TIMES DAILY WITH MEALS
COMMUNITY
Start: 2023-11-13 | End: 2024-01-05

## 2024-01-08 ENCOUNTER — TELEPHONE (OUTPATIENT)
Dept: PULMONOLOGY | Facility: CLINIC | Age: 74
End: 2024-01-08
Payer: COMMERCIAL

## 2024-01-08 NOTE — TELEPHONE ENCOUNTER
Left Voicemail (2nd Attempt) for the patient to call back and schedule the following:    Appointment type: MOSHE  Provider: ZAY  Return date: 05/21/24  Specialty phone number: [phone]  Additional appointment(s) needed: N/A   Additonal Notes: N/A

## 2024-01-09 DIAGNOSIS — R91.8 MASS OF UPPER LOBE OF RIGHT LUNG: Primary | ICD-10-CM

## 2024-02-01 ENCOUNTER — PATIENT OUTREACH (OUTPATIENT)
Dept: GERIATRIC MEDICINE | Facility: CLINIC | Age: 74
End: 2024-02-01
Payer: COMMERCIAL

## 2024-02-01 NOTE — PROGRESS NOTES
[facility] Care Coordination Contact      [facility] Six-Month Telephone Assessment    6 month telephone assessment completed on 2/1/24.    ER visits: No  Hospitalizations: Yes -  [facility]  TCU stays: No  Significant health status changes: None  Falls/Injuries: No  ADL/IADL changes: No  Changes in services: No    Caregiver Assessment follow up:  NA    Goals: See POC in chart for goal progress documentation.      [facility] Doesn't get a flu shot but takes antioxidants. Feels he is doing good and doesn't need a visit.    Will see member in 6 months for an annual health risk assessment.   Encouraged member to call CC with any questions or concerns in the meantime.     Leeanna Sigala, RN, BSN, PHN  [facility] Care Coordinator  [phone]

## 2024-02-11 ENCOUNTER — ANESTHESIA EVENT (OUTPATIENT)
Dept: SURGERY | Facility: CLINIC | Age: 74
End: 2024-02-11
Payer: COMMERCIAL

## 2024-02-12 ENCOUNTER — APPOINTMENT (OUTPATIENT)
Dept: GENERAL RADIOLOGY | Facility: CLINIC | Age: 74
End: 2024-02-12
Attending: INTERNAL MEDICINE
Payer: COMMERCIAL

## 2024-02-12 ENCOUNTER — APPOINTMENT (OUTPATIENT)
Dept: CT IMAGING | Facility: CLINIC | Age: 74
End: 2024-02-12
Attending: INTERNAL MEDICINE
Payer: COMMERCIAL

## 2024-02-12 ENCOUNTER — ANESTHESIA (OUTPATIENT)
Dept: SURGERY | Facility: CLINIC | Age: 74
End: 2024-02-12
Payer: COMMERCIAL

## 2024-02-12 ENCOUNTER — PATIENT OUTREACH (OUTPATIENT)
Dept: GERIATRIC MEDICINE | Facility: CLINIC | Age: 74
End: 2024-02-12

## 2024-02-12 ENCOUNTER — HOSPITAL ENCOUNTER (OUTPATIENT)
Facility: CLINIC | Age: 74
Setting detail: OBSERVATION
Discharge: HOME | End: 2024-02-13
Attending: INTERNAL MEDICINE | Admitting: STUDENT IN AN ORGANIZED HEALTH CARE EDUCATION/TRAINING PROGRAM
Payer: COMMERCIAL

## 2024-02-12 DIAGNOSIS — J42 CHRONIC BRONCHITIS, UNSPECIFIED CHRONIC BRONCHITIS TYPE (H): Primary | ICD-10-CM

## 2024-02-12 DIAGNOSIS — J44.1 COPD EXACERBATION (H): ICD-10-CM

## 2024-02-12 DIAGNOSIS — R91.8 MASS OF UPPER LOBE OF RIGHT LUNG: ICD-10-CM

## 2024-02-12 PROBLEM — J96.21 ACUTE ON CHRONIC HYPOXIC RESPIRATORY FAILURE (H): Status: ACTIVE | Noted: 2024-02-12

## 2024-02-12 LAB
ABO/RH(D): NORMAL
ANION GAP SERPL CALCULATED.3IONS-SCNC: 12 MMOL/L (ref 7–15)
ANTIBODY SCREEN: NEGATIVE
BUN SERPL-MCNC: 11.5 MG/DL (ref 8–23)
CALCIUM SERPL-MCNC: 9.3 MG/DL (ref 8.8–10.2)
CHLORIDE SERPL-SCNC: 98 MMOL/L (ref 98–107)
CREAT SERPL-MCNC: 0.83 MG/DL (ref 0.67–1.17)
DEPRECATED HCO3 PLAS-SCNC: 25 MMOL/L (ref 22–29)
EGFRCR SERPLBLD CKD-EPI 2021: >90 ML/MIN/1.73M2
ERYTHROCYTE [DISTWIDTH] IN BLOOD BY AUTOMATED COUNT: 15.3 % (ref 10–15)
GLUCOSE SERPL-MCNC: 199 MG/DL (ref 70–99)
HCT VFR BLD AUTO: 41.9 % (ref 40–53)
HGB BLD-MCNC: 13.7 G/DL (ref 13.3–17.7)
KOH PREPARATION: NORMAL
KOH PREPARATION: NORMAL
MCH RBC QN AUTO: 29.5 PG (ref 26.5–33)
MCHC RBC AUTO-ENTMCNC: 32.7 G/DL (ref 31.5–36.5)
MCV RBC AUTO: 90 FL (ref 78–100)
PLATELET # BLD AUTO: 317 10E3/UL (ref 150–450)
POTASSIUM SERPL-SCNC: 4.4 MMOL/L (ref 3.4–5.3)
RBC # BLD AUTO: 4.64 10E6/UL (ref 4.4–5.9)
SODIUM SERPL-SCNC: 135 MMOL/L (ref 135–145)
SPECIMEN EXPIRATION DATE: NORMAL
WBC # BLD AUTO: 6.4 10E3/UL (ref 4–11)

## 2024-02-12 PROCEDURE — 99222 1ST HOSP IP/OBS MODERATE 55: CPT | Mod: AI | Performed by: PHYSICIAN ASSISTANT

## 2024-02-12 PROCEDURE — 87116 MYCOBACTERIA CULTURE: CPT | Performed by: INTERNAL MEDICINE

## 2024-02-12 PROCEDURE — 85027 COMPLETE CBC AUTOMATED: CPT | Performed by: PHYSICIAN ASSISTANT

## 2024-02-12 PROCEDURE — 31629 BRONCHOSCOPY/NEEDLE BX EACH: CPT | Mod: GC | Performed by: INTERNAL MEDICINE

## 2024-02-12 PROCEDURE — 88305 TISSUE EXAM BY PATHOLOGIST: CPT | Mod: 26 | Performed by: PATHOLOGY

## 2024-02-12 PROCEDURE — 370N000017 HC ANESTHESIA TECHNICAL FEE, PER MIN: Performed by: INTERNAL MEDICINE

## 2024-02-12 PROCEDURE — 250N000011 HC RX IP 250 OP 636: Performed by: INTERNAL MEDICINE

## 2024-02-12 PROCEDURE — 88172 CYTP DX EVAL FNA 1ST EA SITE: CPT | Mod: 26 | Performed by: PATHOLOGY

## 2024-02-12 PROCEDURE — 87206 SMEAR FLUORESCENT/ACID STAI: CPT | Performed by: INTERNAL MEDICINE

## 2024-02-12 PROCEDURE — 710N000010 HC RECOVERY PHASE 1, LEVEL 2, PER MIN: Performed by: INTERNAL MEDICINE

## 2024-02-12 PROCEDURE — 86900 BLOOD TYPING SEROLOGIC ABO: CPT | Performed by: INTERNAL MEDICINE

## 2024-02-12 PROCEDURE — 999N000179 XR SURGERY CARM FLUORO LESS THAN 5 MIN W STILLS: Mod: TC

## 2024-02-12 PROCEDURE — 258N000003 HC RX IP 258 OP 636: Performed by: PHYSICIAN ASSISTANT

## 2024-02-12 PROCEDURE — 250N000011 HC RX IP 250 OP 636: Performed by: NURSE ANESTHETIST, CERTIFIED REGISTERED

## 2024-02-12 PROCEDURE — 360N000087 HC SURGERY LEVEL 7 W/ FLUORO, PER MIN: Performed by: INTERNAL MEDICINE

## 2024-02-12 PROCEDURE — 258N000003 HC RX IP 258 OP 636: Performed by: INTERNAL MEDICINE

## 2024-02-12 PROCEDURE — 31654 BRONCH EBUS IVNTJ PERPH LES: CPT | Mod: GC | Performed by: INTERNAL MEDICINE

## 2024-02-12 PROCEDURE — 87210 SMEAR WET MOUNT SALINE/INK: CPT | Performed by: INTERNAL MEDICINE

## 2024-02-12 PROCEDURE — C1757 CATH, THROMBECTOMY/EMBOLECT: HCPCS | Performed by: INTERNAL MEDICINE

## 2024-02-12 PROCEDURE — 250N000013 HC RX MED GY IP 250 OP 250 PS 637: Performed by: PHYSICIAN ASSISTANT

## 2024-02-12 PROCEDURE — 96361 HYDRATE IV INFUSION ADD-ON: CPT

## 2024-02-12 PROCEDURE — 71250 CT THORAX DX C-: CPT | Mod: 26 | Performed by: RADIOLOGY

## 2024-02-12 PROCEDURE — 120N000002 HC R&B MED SURG/OB UMMC

## 2024-02-12 PROCEDURE — 88305 TISSUE EXAM BY PATHOLOGIST: CPT | Mod: TC | Performed by: INTERNAL MEDICINE

## 2024-02-12 PROCEDURE — 31625 BRONCHOSCOPY W/BIOPSY(S): CPT | Mod: GC | Performed by: INTERNAL MEDICINE

## 2024-02-12 PROCEDURE — 999N000141 HC STATISTIC PRE-PROCEDURE NURSING ASSESSMENT: Performed by: INTERNAL MEDICINE

## 2024-02-12 PROCEDURE — 250N000009 HC RX 250: Performed by: NURSE ANESTHETIST, CERTIFIED REGISTERED

## 2024-02-12 PROCEDURE — 71250 CT THORAX DX C-: CPT

## 2024-02-12 PROCEDURE — 250N000013 HC RX MED GY IP 250 OP 250 PS 637: Performed by: ANESTHESIOLOGY

## 2024-02-12 PROCEDURE — 96360 HYDRATION IV INFUSION INIT: CPT

## 2024-02-12 PROCEDURE — 87102 FUNGUS ISOLATION CULTURE: CPT | Performed by: INTERNAL MEDICINE

## 2024-02-12 PROCEDURE — 258N000003 HC RX IP 258 OP 636: Performed by: NURSE ANESTHETIST, CERTIFIED REGISTERED

## 2024-02-12 PROCEDURE — 31653 BRONCH EBUS SAMPLNG 3/> NODE: CPT | Mod: GC | Performed by: INTERNAL MEDICINE

## 2024-02-12 PROCEDURE — 36415 COLL VENOUS BLD VENIPUNCTURE: CPT | Performed by: PHYSICIAN ASSISTANT

## 2024-02-12 PROCEDURE — 88173 CYTOPATH EVAL FNA REPORT: CPT | Mod: 26 | Performed by: PATHOLOGY

## 2024-02-12 PROCEDURE — 999N000065 XR CHEST PORT 1 VIEW

## 2024-02-12 PROCEDURE — 71045 X-RAY EXAM CHEST 1 VIEW: CPT | Mod: 26 | Performed by: RADIOLOGY

## 2024-02-12 PROCEDURE — 80048 BASIC METABOLIC PNL TOTAL CA: CPT | Performed by: PHYSICIAN ASSISTANT

## 2024-02-12 PROCEDURE — 31627 NAVIGATIONAL BRONCHOSCOPY: CPT | Mod: GC | Performed by: INTERNAL MEDICINE

## 2024-02-12 PROCEDURE — 272N000001 HC OR GENERAL SUPPLY STERILE: Performed by: INTERNAL MEDICINE

## 2024-02-12 RX ORDER — FENTANYL CITRATE 50 UG/ML
INJECTION, SOLUTION INTRAMUSCULAR; INTRAVENOUS PRN
Status: DISCONTINUED | OUTPATIENT
Start: 2024-02-12 | End: 2024-02-12

## 2024-02-12 RX ORDER — HYDROMORPHONE HCL IN WATER/PF 6 MG/30 ML
0.4 PATIENT CONTROLLED ANALGESIA SYRINGE INTRAVENOUS
Status: DISCONTINUED | OUTPATIENT
Start: 2024-02-12 | End: 2024-02-13 | Stop reason: HOSPADM

## 2024-02-12 RX ORDER — AMOXICILLIN 250 MG
1 CAPSULE ORAL 2 TIMES DAILY PRN
Status: DISCONTINUED | OUTPATIENT
Start: 2024-02-12 | End: 2024-02-13 | Stop reason: HOSPADM

## 2024-02-12 RX ORDER — PROPOFOL 10 MG/ML
INJECTION, EMULSION INTRAVENOUS PRN
Status: DISCONTINUED | OUTPATIENT
Start: 2024-02-12 | End: 2024-02-12

## 2024-02-12 RX ORDER — MULTIVITAMIN WITH IRON
1 TABLET ORAL DAILY
Status: DISCONTINUED | OUTPATIENT
Start: 2024-02-12 | End: 2024-02-13 | Stop reason: HOSPADM

## 2024-02-12 RX ORDER — DEXAMETHASONE SODIUM PHOSPHATE 4 MG/ML
INJECTION, SOLUTION INTRA-ARTICULAR; INTRALESIONAL; INTRAMUSCULAR; INTRAVENOUS; SOFT TISSUE PRN
Status: DISCONTINUED | OUTPATIENT
Start: 2024-02-12 | End: 2024-02-12

## 2024-02-12 RX ORDER — ASCORBIC ACID 500 MG
1000 TABLET ORAL DAILY
Status: DISCONTINUED | OUTPATIENT
Start: 2024-02-12 | End: 2024-02-13 | Stop reason: HOSPADM

## 2024-02-12 RX ORDER — ONDANSETRON 2 MG/ML
INJECTION INTRAMUSCULAR; INTRAVENOUS PRN
Status: DISCONTINUED | OUTPATIENT
Start: 2024-02-12 | End: 2024-02-12

## 2024-02-12 RX ORDER — HALOPERIDOL 5 MG/ML
1 INJECTION INTRAMUSCULAR
Status: DISCONTINUED | OUTPATIENT
Start: 2024-02-12 | End: 2024-02-12

## 2024-02-12 RX ORDER — AMOXICILLIN 250 MG
2 CAPSULE ORAL 2 TIMES DAILY PRN
Status: DISCONTINUED | OUTPATIENT
Start: 2024-02-12 | End: 2024-02-13 | Stop reason: HOSPADM

## 2024-02-12 RX ORDER — HYDROMORPHONE HCL IN WATER/PF 6 MG/30 ML
0.4 PATIENT CONTROLLED ANALGESIA SYRINGE INTRAVENOUS EVERY 5 MIN PRN
Status: DISCONTINUED | OUTPATIENT
Start: 2024-02-12 | End: 2024-02-12

## 2024-02-12 RX ORDER — SODIUM CHLORIDE, SODIUM LACTATE, POTASSIUM CHLORIDE, CALCIUM CHLORIDE 600; 310; 30; 20 MG/100ML; MG/100ML; MG/100ML; MG/100ML
INJECTION, SOLUTION INTRAVENOUS CONTINUOUS
Status: DISCONTINUED | OUTPATIENT
Start: 2024-02-12 | End: 2024-02-13

## 2024-02-12 RX ORDER — CALCIUM CARBONATE/VITAMIN D3 600 MG-10
1 TABLET ORAL 2 TIMES DAILY
Status: DISCONTINUED | OUTPATIENT
Start: 2024-02-12 | End: 2024-02-13 | Stop reason: HOSPADM

## 2024-02-12 RX ORDER — LIDOCAINE HYDROCHLORIDE 20 MG/ML
INJECTION, SOLUTION INFILTRATION; PERINEURAL PRN
Status: DISCONTINUED | OUTPATIENT
Start: 2024-02-12 | End: 2024-02-12

## 2024-02-12 RX ORDER — FENTANYL CITRATE 50 UG/ML
25 INJECTION, SOLUTION INTRAMUSCULAR; INTRAVENOUS EVERY 5 MIN PRN
Status: DISCONTINUED | OUTPATIENT
Start: 2024-02-12 | End: 2024-02-12

## 2024-02-12 RX ORDER — IPRATROPIUM BROMIDE AND ALBUTEROL SULFATE 2.5; .5 MG/3ML; MG/3ML
3 SOLUTION RESPIRATORY (INHALATION)
Status: DISCONTINUED | OUTPATIENT
Start: 2024-02-12 | End: 2024-02-13 | Stop reason: HOSPADM

## 2024-02-12 RX ORDER — ONDANSETRON 2 MG/ML
4 INJECTION INTRAMUSCULAR; INTRAVENOUS EVERY 30 MIN PRN
Status: CANCELLED | OUTPATIENT
Start: 2024-02-12

## 2024-02-12 RX ORDER — PROPOFOL 10 MG/ML
INJECTION, EMULSION INTRAVENOUS CONTINUOUS PRN
Status: DISCONTINUED | OUTPATIENT
Start: 2024-02-12 | End: 2024-02-12

## 2024-02-12 RX ORDER — SODIUM CHLORIDE, SODIUM LACTATE, POTASSIUM CHLORIDE, CALCIUM CHLORIDE 600; 310; 30; 20 MG/100ML; MG/100ML; MG/100ML; MG/100ML
INJECTION, SOLUTION INTRAVENOUS CONTINUOUS PRN
Status: DISCONTINUED | OUTPATIENT
Start: 2024-02-12 | End: 2024-02-12

## 2024-02-12 RX ORDER — CHOLECALCIFEROL (VITAMIN D3) 125 MCG
10000 CAPSULE ORAL DAILY
Status: DISCONTINUED | OUTPATIENT
Start: 2024-02-12 | End: 2024-02-13 | Stop reason: HOSPADM

## 2024-02-12 RX ORDER — ONDANSETRON 4 MG/1
4 TABLET, ORALLY DISINTEGRATING ORAL EVERY 6 HOURS PRN
Status: DISCONTINUED | OUTPATIENT
Start: 2024-02-12 | End: 2024-02-13 | Stop reason: HOSPADM

## 2024-02-12 RX ORDER — SODIUM CHLORIDE, SODIUM GLUCONATE, SODIUM ACETATE, POTASSIUM CHLORIDE AND MAGNESIUM CHLORIDE 526; 502; 368; 37; 30 MG/100ML; MG/100ML; MG/100ML; MG/100ML; MG/100ML
INJECTION, SOLUTION INTRAVENOUS CONTINUOUS PRN
Status: DISCONTINUED | OUTPATIENT
Start: 2024-02-12 | End: 2024-02-12

## 2024-02-12 RX ORDER — ACETAMINOPHEN 325 MG/1
975 TABLET ORAL ONCE
Status: COMPLETED | OUTPATIENT
Start: 2024-02-12 | End: 2024-02-12

## 2024-02-12 RX ORDER — FENTANYL CITRATE 50 UG/ML
50 INJECTION, SOLUTION INTRAMUSCULAR; INTRAVENOUS EVERY 5 MIN PRN
Status: DISCONTINUED | OUTPATIENT
Start: 2024-02-12 | End: 2024-02-12

## 2024-02-12 RX ORDER — HYDROMORPHONE HCL IN WATER/PF 6 MG/30 ML
0.2 PATIENT CONTROLLED ANALGESIA SYRINGE INTRAVENOUS EVERY 5 MIN PRN
Status: DISCONTINUED | OUTPATIENT
Start: 2024-02-12 | End: 2024-02-12

## 2024-02-12 RX ORDER — HYDRALAZINE HYDROCHLORIDE 20 MG/ML
2.5-5 INJECTION INTRAMUSCULAR; INTRAVENOUS EVERY 10 MIN PRN
Status: DISCONTINUED | OUTPATIENT
Start: 2024-02-12 | End: 2024-02-12

## 2024-02-12 RX ORDER — ATORVASTATIN CALCIUM 40 MG/1
40 TABLET, FILM COATED ORAL DAILY
Status: DISCONTINUED | OUTPATIENT
Start: 2024-02-12 | End: 2024-02-13 | Stop reason: HOSPADM

## 2024-02-12 RX ORDER — ONDANSETRON 4 MG/1
4 TABLET, ORALLY DISINTEGRATING ORAL EVERY 30 MIN PRN
Status: CANCELLED | OUTPATIENT
Start: 2024-02-12

## 2024-02-12 RX ORDER — MULTIVIT WITH MINERALS/LUTEIN
1000 TABLET ORAL DAILY
Status: DISCONTINUED | OUTPATIENT
Start: 2024-02-12 | End: 2024-02-13 | Stop reason: HOSPADM

## 2024-02-12 RX ORDER — ONDANSETRON 2 MG/ML
4 INJECTION INTRAMUSCULAR; INTRAVENOUS EVERY 6 HOURS PRN
Status: DISCONTINUED | OUTPATIENT
Start: 2024-02-12 | End: 2024-02-13 | Stop reason: HOSPADM

## 2024-02-12 RX ORDER — OXYCODONE HYDROCHLORIDE 5 MG/1
5 TABLET ORAL
Status: CANCELLED | OUTPATIENT
Start: 2024-02-12

## 2024-02-12 RX ORDER — METOPROLOL TARTRATE 1 MG/ML
1-2 INJECTION, SOLUTION INTRAVENOUS EVERY 5 MIN PRN
Status: DISCONTINUED | OUTPATIENT
Start: 2024-02-12 | End: 2024-02-12

## 2024-02-12 RX ADMIN — Medication 20 MG: at 11:28

## 2024-02-12 RX ADMIN — FENTANYL CITRATE 50 MCG: 50 INJECTION INTRAMUSCULAR; INTRAVENOUS at 10:24

## 2024-02-12 RX ADMIN — SODIUM CHLORIDE, POTASSIUM CHLORIDE, SODIUM LACTATE AND CALCIUM CHLORIDE: 600; 310; 30; 20 INJECTION, SOLUTION INTRAVENOUS at 10:17

## 2024-02-12 RX ADMIN — PHENYLEPHRINE HYDROCHLORIDE 100 MCG: 10 INJECTION INTRAVENOUS at 11:04

## 2024-02-12 RX ADMIN — PROPOFOL 30 MG: 10 INJECTION, EMULSION INTRAVENOUS at 11:28

## 2024-02-12 RX ADMIN — PHENYLEPHRINE HYDROCHLORIDE 100 MCG: 10 INJECTION INTRAVENOUS at 11:01

## 2024-02-12 RX ADMIN — LIDOCAINE HYDROCHLORIDE 80 MG: 20 INJECTION, SOLUTION INFILTRATION; PERINEURAL at 10:24

## 2024-02-12 RX ADMIN — SODIUM CHLORIDE, SODIUM GLUCONATE, SODIUM ACETATE, POTASSIUM CHLORIDE AND MAGNESIUM CHLORIDE: 526; 502; 368; 37; 30 INJECTION, SOLUTION INTRAVENOUS at 11:45

## 2024-02-12 RX ADMIN — Medication 20 MG: at 10:57

## 2024-02-12 RX ADMIN — FENTANYL CITRATE 50 MCG: 50 INJECTION INTRAMUSCULAR; INTRAVENOUS at 10:21

## 2024-02-12 RX ADMIN — PROPOFOL 170 MG: 10 INJECTION, EMULSION INTRAVENOUS at 10:25

## 2024-02-12 RX ADMIN — Medication 50 MG: at 10:26

## 2024-02-12 RX ADMIN — PHENYLEPHRINE HYDROCHLORIDE 200 MCG: 10 INJECTION INTRAVENOUS at 11:36

## 2024-02-12 RX ADMIN — Medication 1000 UNITS: at 16:06

## 2024-02-12 RX ADMIN — Medication 10000 UNITS: at 16:06

## 2024-02-12 RX ADMIN — SODIUM CHLORIDE, POTASSIUM CHLORIDE, SODIUM LACTATE AND CALCIUM CHLORIDE: 600; 310; 30; 20 INJECTION, SOLUTION INTRAVENOUS at 16:04

## 2024-02-12 RX ADMIN — ONDANSETRON 4 MG: 2 INJECTION INTRAMUSCULAR; INTRAVENOUS at 10:30

## 2024-02-12 RX ADMIN — ATORVASTATIN CALCIUM 40 MG: 40 TABLET, FILM COATED ORAL at 16:05

## 2024-02-12 RX ADMIN — CALCIUM CARBONATE 600 MG (1,500 MG)-VITAMIN D3 400 UNIT TABLET 1 TABLET: at 20:37

## 2024-02-12 RX ADMIN — PHENYLEPHRINE HYDROCHLORIDE 200 MCG: 10 INJECTION INTRAVENOUS at 10:26

## 2024-02-12 RX ADMIN — PHENYLEPHRINE HYDROCHLORIDE 150 MCG: 10 INJECTION INTRAVENOUS at 10:39

## 2024-02-12 RX ADMIN — ACETAMINOPHEN 975 MG: 325 TABLET, FILM COATED ORAL at 08:51

## 2024-02-12 RX ADMIN — PHENYLEPHRINE HYDROCHLORIDE 100 MCG: 10 INJECTION INTRAVENOUS at 10:48

## 2024-02-12 RX ADMIN — PHENYLEPHRINE HYDROCHLORIDE 150 MCG: 10 INJECTION INTRAVENOUS at 10:28

## 2024-02-12 RX ADMIN — PROPOFOL 150 MCG/KG/MIN: 10 INJECTION, EMULSION INTRAVENOUS at 10:25

## 2024-02-12 RX ADMIN — OXYCODONE HYDROCHLORIDE AND ACETAMINOPHEN 1000 MG: 500 TABLET ORAL at 16:06

## 2024-02-12 RX ADMIN — Medication 1 TABLET: at 16:07

## 2024-02-12 RX ADMIN — DEXAMETHASONE SODIUM PHOSPHATE 10 MG: 4 INJECTION, SOLUTION INTRA-ARTICULAR; INTRALESIONAL; INTRAMUSCULAR; INTRAVENOUS; SOFT TISSUE at 10:30

## 2024-02-12 RX ADMIN — SUGAMMADEX 200 MG: 100 INJECTION, SOLUTION INTRAVENOUS at 11:45

## 2024-02-12 ASSESSMENT — LIFESTYLE VARIABLES: TOBACCO_USE: 1

## 2024-02-12 ASSESSMENT — COPD QUESTIONNAIRES: COPD: 1

## 2024-02-12 ASSESSMENT — ACTIVITIES OF DAILY LIVING (ADL)
ADLS_ACUITY_SCORE: 34
ADLS_ACUITY_SCORE: 34
ADLS_ACUITY_SCORE: 23
ADLS_ACUITY_SCORE: 34

## 2024-02-12 ASSESSMENT — ENCOUNTER SYMPTOMS: SEIZURES: 0

## 2024-02-12 NOTE — ANESTHESIA CARE TRANSFER NOTE
Patient: Robert B Behr    Procedure: Procedure(s):  BRONCHOSCOPY, ROBOT-ASSISTED, WITH BIOPSY ION  Endobronchial Ultrasound Guided       Diagnosis: Mass of upper lobe of right lung [R91.8]  Diagnosis Additional Information: No value filed.    Anesthesia Type:   General     Note:    Oropharynx: oropharynx clear of all foreign objects and spontaneously breathing  Level of Consciousness: awake  Oxygen Supplementation: nasal cannula  Level of Supplemental Oxygen (L/min / FiO2): 4  Independent Airway: airway patency satisfactory and stable  Dentition: dentition unchanged  Vital Signs Stable: post-procedure vital signs reviewed and stable  Report to RN Given: handoff report given  Patient transferred to: PACU    Handoff Report: Identifed the Patient, Identified the Reponsible Provider, Reviewed the pertinent medical history, Discussed the surgical course, Reviewed Intra-OP anesthesia mangement and issues during anesthesia, Set expectations for post-procedure period and Allowed opportunity for questions and acknowledgement of understanding    Vitals:  Vitals Value Taken Time   /77 02/12/24 1202   Temp     Pulse 87 02/12/24 1206   Resp     SpO2 97 % 02/12/24 1206   Vitals shown include unfiled device data.    Electronically Signed By: DARIEL Miramontes CRNA  February 12, 2024  12:07 PM

## 2024-02-12 NOTE — OR NURSING
Dr. Bryan pageben, patient doesn't have anyone to stay with him for 24 hours after surgery. Patient told surgical team this earlier and was told he would still be able to go home.

## 2024-02-12 NOTE — PROGRESS NOTES
"PRIOR TO DISCHARGE        -diagnostic tests and consults completed and resulted- Not met  -vital signs normal or at patient baseline- Met BP (!) 149/94 (BP Location: Right arm)   Pulse 96   Temp 98.2  F (36.8  C) (Oral)   Resp 18   Ht 1.753 m (5' 9\")   Wt 72.8 kg (160 lb 7.9 oz)   SpO2 94%   BMI 23.70 kg/m      -no bleeding overnight- Met  Nurse to notify provider when observation goals have been met and patient is ready for discharge.     "

## 2024-02-12 NOTE — PROCEDURES
INTERVENTIONAL PULMONOLOGY       Procedure(s):    A flexible bronchoscopy  Airway exam  EBUS-TBNA (3 sites)  Endobronchial biopsies (1 site)  Therapeutic suctioning (2 sites)  ION Robotic Bronchoscopy     Indication:  RUL lesion, lymphadenopathy    Attending of Record:  Mirella Bryan MD    Interventional Pulmonary Fellow   Marzena Moore    Trainees Present:   None     Medications:    General Anesthesia - See anesthesia flowsheet for details    Sedation Time:   Per Anesthesia Care Provider    Time Out:  Performed    The patient's medical record has been reviewed.  The indication for the procedure was reviewed.  The necessary history and physical examination was performed and reviewed.  The risks, benefits and alternatives of the procedure were discussed with the the patient in detail and he had the opportunity to ask questions.  I discussed in particular the potential complications including risks of minor or life-threatening bleeding and/or infection, respiratory failure, vocal cord trauma / paralysis, pneumothorax, and discomfort. Sedation risks were also discussed including abnormal heart rhythms, low blood pressure, and respiratory failure. All questions were answered to the best of my ability.  Verbal and written informed consent was obtained.  The proposed procedure and the patient's identification were verified prior to the procedure by the physician and the nurse.    The patient was assessed for the adequacy for the procedure and to receive medications.   Mental Status:  Alert and oriented x 3  Airway examination:  Class I (Complete visualization of soft palate)  Pulmonary:  Non labored respirations  CV:  Regular rate  ASA Grade:  (II)  Mild systemic disease    After clinical evaluation and reviewing the indication, risks, alternatives and benefits of the procedure the patient was deemed to be in satisfactory condition to undergo the procedure.           A Tuberculosis risk assessment was performed:   The patient has no known RISK of Tuberculosis    The procedure was performed in a negative airflow room: The patient could not be moved to a negative airflow room because of needed OR for the procedure    Maneuvers / Procedure:      A Flexible  bronchoscope was used for the procedure. The patient was orally intubated with a # 8.5 ETT and the flexible scope was passed through.    Airway Examination: A complete airway examination was performed from the distal trachea to the subsegmental level in each lobe of both lungs.  Pertinent findings include CHRISTIANO take off mucosal abnormality, clear secretions in the lower lobes.           EBUS-TBNA (Lymph Node) The EBUS scope was inserted and evaluation included:                                                                                                                                                                                                                                                                         Station 2R: not evaluated                                                                                                                                                                                                                                     Station 2L: not evaluated                                                                                                                                                                                                                                       Station 4R: visualized and biopsied. A total of 4 biopsies were performed using 22G FNA Needle.                                                                                                                                                                                                                                        Station 4L: visualized and not biopsied. Reason: LN diameter was <5mm                                                                                                                                                                                                                                             Station 7: visualized and biopsied. A total of 4 biopsies were performed using 22G FNA Needle.                                                                                                                                                                                                                                         Station 10R: not evaluated                                                                                                                                                                                                                                   Station 10L: not evaluated                                                                                                                                                                                                                                    Station 11R: visualized and biopsied. A total of 4 biopsies were performed using 22G FNA Needle.                                                                                                                                                                                                                                  Station 11L: visualized and not biopsied. Reason: LN diameter was <5mm                                                                                                                                                                                                                                      Station 12R: not evaluated                                                                                                                                                                                                                                   Station 12L: not evaluated                                                                                                                                                                                                                                            Rakesh was Absent       ION Robotic Bronchoscopy: Planning was performed on the laptop prior to the procedure. The ION successfully completed its pre-procedural check phase. The ION arm was oriented towards the ETT and docked successfully. The robotic catheter was inserted into the ETT and the guide was clamped down. Registration was then completed successfully. The catheter was advanced towards the RUL Apical lesion/nodule using the vision probe. Local confirmation of the lesion utilized with ENB-Targeting, Radial EBUS, and Fluoroscopy .. The lesion was biopsied using 21G FNA Needle. A total of 6 passes were performed. RAKESH was present throughout the procedure. EBL was minimal. Guide catheter and vision probe was removed successfully. The ION platform was undocked without issues.     Endobronchial biopsies: One Site - The bronchoscope was inserted.  Topical epinephrine was administered.  The Forceps were introduced and endobronchial biopsies were obtained from LC1.  A total of 3 biopsies were obtained. Right after biopsy there was a significant bleeding from bronchial artery. We used total of 15 ml epinephrine and also Iced cold saline . Bronchoscopy tip was used to apply pressure and also we inflated size 5 Fr Sachin balloon to tamponade the site of bleeding. Pressure applied 3 min and then deflated. There was no bleeding after that.     Therapeutic suctioning: 15-20min of operative time was spent clearing out the airway of debris, blood and mucous prior to the intervention.     Any disposable equipment was visually inspected and deemed to be intact immediately post procedure.      Relevant Pictures  rEBUS view RUL lesion        CHRISTIANO lesion        NBI         Post biopsy CHRISTIANO lesion        Assessment and Recommendations:     Successful  completion of FB with ION robotic bronchoscopy with FNA RUL lesion, EBUS TBNA LN stations 7, 4R and 11R, EBBX CHRISTIANO LC2 carinal abnormal looking mucosa complicated by bleeding  We will admit pt for observation unit for 23 hours  No heparin or lovenox DVT ppx  Monitor hemoptysis  Patient can get discharged home tomorrow AM if doing ok      Marzena Moore  Interventional pulmonary fellow  Pager: [phone]     I was present with the patient, Robert B Behr, for the entire viewing portion of the bronchscopy procedure (including scope insertion and withdrawal) and agree with the interpretation and report as documented by Dr. Marzena Moore.   Mirella Bryan MD

## 2024-02-12 NOTE — ANESTHESIA PREPROCEDURE EVALUATION
Anesthesia Pre-Procedure Evaluation    Patient: Robert B Behr   MRN: [id number] : 1950        Procedure : Procedure(s):  BRONCHOSCOPY, ROBOT-ASSISTED, WITH BIOPSY ION  Endobronchial Ultrasound Guided          Past Medical History:   Diagnosis Date    Cataract     COPD (chronic obstructive pulmonary disease) (H)     diagnosed with spirometry     Lung nodules     CT scan     Osteoporosis     Polio     left leg weak    Tobacco abuse       Past Surgical History:   Procedure Laterality Date    CATARACT IOL, RT/LT Left 09/15/2017    s/p CE/IOL left eye    CATARACT IOL, RT/LT Right     CV CORONARY ANGIOGRAM  10/23/2023    Procedure: CV CORONARY ANGIOGRAM;  Surgeon: Torin Parker MD;  Location:  [facility]    ESOPHAGOSCOPY, GASTROSCOPY, DUODENOSCOPY (EGD), COMBINED N/A 10/22/2023    Procedure: Esophagoscopy, gastroscopy, duodenoscopy (EGD), combined;  Surgeon: David Post MD;  Location:  GI    PHACOEMULSIFICATION CLEAR CORNEA WITH STANDARD INTRAOCULAR LENS IMPLANT Right 2016    Procedure: PHACOEMULSIFICATION CLEAR CORNEA WITH STANDARD INTRAOCULAR LENS IMPLANT;  Surgeon: Elly Valencia MD;  Location:  [facility]    PHACOEMULSIFICATION WITH STANDARD INTRAOCULAR LENS IMPLANT Left 9/15/2017    Procedure: PHACOEMULSIFICATION WITH STANDARD INTRAOCULAR LENS IMPLANT;  Left Eye Phacoemulsification with Standard Lens;  Surgeon: Elly Valencia MD;  [facility]    WRIST SURGERY        No Known Allergies   Social History     Tobacco Use    Smoking status: Every Day     Packs/day: 2.00     Years: 45.00     Additional pack years: 0.00     Total pack years: 90.00     Types: Cigarettes    Smokeless tobacco: Former    Tobacco comments:     5 cigs per day   Substance Use Topics    Alcohol use: Yes     Alcohol/week: 0.0 standard drinks of alcohol     Comment: occ beer      Wt Readings from Last 1 Encounters:   24 73.9 kg (163 lb)        Anesthesia Evaluation   Pt has had prior  anesthetic. Type: General.    No history of anesthetic complications       ROS/MED HX  ENT/Pulmonary: Comment: Lung nodules     (+)                tobacco use, Current use,         COPD,              Neurologic:    (-) no seizures and no CVA   Cardiovascular:     (+) Dyslipidemia - -  CAD -  - -                                 Previous cardiac testing   Echo: Date: 10/2023 Results:  Interpretation Summary  Left ventricular function is decreased. The ejection fraction is 40-45%  (mildly reduced).  Apical wall akinesis is present.  Mid to anteroseptum is akinetic. Likely ischemic cardiomyopathy. LAD disease  suspected.  The right ventricle is normal size.  Global right ventricular function is normal.     This study was compared with the study from 10/21/2023 .  No significant changes noted.  _____________________________    Stress Test:  Date: Results:    ECG Reviewed:  Date: Results:    Cath:  Date: 10/2023 Results:     Mid LAD lesion is 20% stenosed.     Prox Cx lesion is 20% stenosed.     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 No hemodynamically significant coronary artery stenoses.    (-) angina and angina   METS/Exercise Tolerance: >4 METS    Hematologic:     (+)      anemia,          Musculoskeletal:       GI/Hepatic:     (+)      hiatal hernia,           (-) GERD   Renal/Genitourinary:       Endo:    (-) Type II DM   Psychiatric/Substance Use:       Infectious Disease:       Malignancy:       Other:            Physical Exam    Airway        Mallampati: II   TM distance: > 3 FB   Neck ROM: full   Mouth opening: > 3 cm    Respiratory Devices and Support         Dental       (+) Edentulous      Cardiovascular          Rhythm and rate: regular and normal     Pulmonary           (+) decreased breath sounds           OUTSIDE LABS:  CBC:   Lab Results   Component Value Date    WBC 9.3 10/24/2023    WBC 8.7 10/23/2023    HGB 12.5 (L) 10/24/2023    HGB 12.8 (L) 10/23/2023    HCT 37.9 (L) 10/24/2023    HCT 36.8 (L)  "10/23/2023     10/24/2023     (L) 10/23/2023     BMP:   Lab Results   Component Value Date     10/24/2023     10/23/2023    POTASSIUM 4.1 10/24/2023    POTASSIUM 3.8 10/23/2023    CHLORIDE 103 10/24/2023    CHLORIDE 105 10/23/2023    CO2 26 10/24/2023    CO2 27 10/23/2023    BUN 11.6 10/24/2023    BUN 10.5 10/23/2023    CR 0.72 10/24/2023    CR 0.65 (L) 10/23/2023     (H) 10/24/2023     (H) 10/24/2023     COAGS:   Lab Results   Component Value Date    PTT 30 10/21/2023    INR 1.38 (H) 10/22/2023    FIBR 350 10/21/2023     POC: No results found for: \"BGM\", \"HCG\", \"HCGS\"  HEPATIC:   Lab Results   Component Value Date    ALBUMIN 3.6 10/24/2023    PROTTOTAL 5.9 (L) 10/24/2023    ALT 33 10/24/2023    AST 31 10/24/2023    ALKPHOS 74 10/24/2023    BILITOTAL 0.5 10/24/2023    BART 35 10/20/2023     OTHER:   Lab Results   Component Value Date    LACT 1.3 10/22/2023    A1C 5.9 (H) 10/21/2023    ANTONIO 9.2 10/24/2023    PHOS 3.0 10/24/2023    MAG 1.8 10/24/2023    LIPASE 25 10/20/2023    TSH 1.64 08/03/2023       Anesthesia Plan    ASA Status:  3    NPO Status:  NPO Appropriate    Anesthesia Type: General.     - Airway: ETT   Induction: Intravenous.   Maintenance: TIVA.   Techniques and Equipment:     - Lines/Monitors: BIS, 2nd IV     Consents    Anesthesia Plan(s) and associated risks, benefits, and realistic alternatives discussed. Questions answered and patient/representative(s) expressed understanding.     - Discussed:     - Discussed with:  Patient      - Extended Intubation/Ventilatory Support Discussed: No.      - Patient is DNR/DNI Status: No     Use of blood products discussed: Yes.     - Discussed with: Patient.     - Consented: consented to blood products     Postoperative Care    Pain management: IV analgesics, Oral pain medications, Multi-modal analgesia.   PONV prophylaxis: Ondansetron (or other 5HT-3), Background Propofol Infusion, Dexamethasone or Solumedrol "     Comments:               ANA LILIA ROLDAN MD    I have reviewed the pertinent notes and labs in the chart from the past 30 days and (re)examined the patient.  Any updates or changes from those notes are reflected in this note.

## 2024-02-12 NOTE — PROGRESS NOTES
[facility] Care Coordination Contact    [facility]  Ambulatory Care Coordination to Inpatient Care Management   Hand-In Communication    Date:  February 12, 2024  Name: Robert B Behr is enrolled in [facility] Care Coordination program and I am the Lead Care Coordinator.  CC Contact Information: Leeanna Sigala [phone] (Cell)  Payor Source: Payor: [facility] / Plan: [employer] DUAL / Product Type: HMO /   Current services in place: No formal services in place    Please see the CC Snaphot and Care Management Flowsheets for specific  details of this Robert B Behr care plan.   Additional details/specific concerns r/t this admission:    No additional concerns at this time      I will follow this admission in Epic. Please feel free to contact me with questions or for further collaboration in discharge planning.    Leeanna Sigala RN, BSN, PHN  [facility] Care Coordinator  [phone]

## 2024-02-12 NOTE — ANESTHESIA POSTPROCEDURE EVALUATION
Patient: Robert B Behr    Procedure: Procedure(s):  BRONCHOSCOPY, ROBOT-ASSISTED, WITH BIOPSY ION  Endobronchial Ultrasound Guided       Anesthesia Type:  General    Note:  Disposition: Outpatient   Postop Pain Control: Uneventful            Sign Out: Well controlled pain   PONV: No   Neuro/Psych: Uneventful            Sign Out: Acceptable/Baseline neuro status   Airway/Respiratory: Uneventful            Sign Out: Acceptable/Baseline resp. status   CV/Hemodynamics: Uneventful            Sign Out: Acceptable CV status; No obvious hypovolemia; No obvious fluid overload   Other NRE: NONE   DID A NON-ROUTINE EVENT OCCUR?            Last vitals:  Vitals Value Taken Time   /80 02/12/24 1230   Temp     Pulse 82 02/12/24 1235   Resp 18 02/12/24 1230   SpO2 94 % 02/12/24 1235   Vitals shown include unfiled device data.    Electronically Signed By: Vinay Ramirez MD  February 12, 2024  12:36 PM

## 2024-02-12 NOTE — H&P
[facility]    History and Physical - Hospitalist Service       Date of Admission:  2/12/2024    Assessment & Plan   Robert B Behr is a 74 year old male admitted on 2/12/2024. 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    # Post bronchoscopy monitoring for bleeding  # COPD   # Active tobacco use  # Lung mass  # AFB sputum culture positive for JAKY complex (positive on 1/3 AFB cultures 7/20/2021, 1/2 AFB cultures on 9/13/23)   Note that his lung mass has been present for several years and there is FDG uptake, and is minimally symptomatic.  Postbiopsy today during his bronchoscopy he had some evidence of bleeding that has quickly stopped he is hemodynamically stable, afebrile.   -Check CBC  -Start DuoNebs q2h prn for any shortness of breath or wheezing  -Start home medications including Wixela inhaler 1 puff twice daily (patient has home supply)    # GERD with grade D esophagitis  -Continue home regimen of Protonix 40mg twice daily   -Most recent EGD was Octoboer 2023. He was recommended to have a repeat EGD in 3 months (due January 2024)  -Plan for follow up with GI for EGD as an outpatient     # HFpEF  -He follows with Cardiology as an outpatient  -Most recent TTE from October 2023 reveals EF 40-45%, apical wall akinesis present, with no significant valve disease  -He underwent a Cardiac Cath October 2023 which revealed minimal disease throughout.    # Hyperlipidemia  -Continue home regimen of Lipitor 40mg by mouth once daily     # Supplements  -Continue home regimen of B complex, Calcium-Vitamin D, Gingo Biloba,  glucosamine chondroitinotin, selenium, Vitamin A, Vitamin C, Vitamin E supplements         Diet:  Soft food  DVT Prophylaxis: Pneumatic Compression Devices  Mehta Catheter: Not present  Lines: None     Cardiac Monitoring: ACTIVE order. Indication: Procedural area  Code Status:  FULL CODE      Disposition Plan  From home, will return to home likely tomorrow if no further signs or symptoms of bleeding     Expected Discharge Date: 02/13/2024                  GAUTAM Quintero  Hospitalist Service  [facility]  Securely message with [employer] (more info)  Text page via [facility] Paging/Directory     ______________________________________________________________________    Chief Complaint   Post bronchoscopy monitoring    History of Present Illness   Robert B Behr is a 74 year old male admitted on 2/12/2024. He has a past medical history of COPD, active tobacco use, grade D esophagitis with GERD, HFpEF and lung nodules that are JAKY positive but with recent growth who was evaluated by pulmonary medicine as an outpatient and felt he needed a bronchoscopy with biopsy.  Today, he  underwent a robotic bronchoscopy with an FNA of a right upper lobe lesion, EBUS and is being admitted to the observation unit for 24 hours for observation postprocedure given a small amount of bleeding during the procedure. Post procedure chest x-ray and CT chest revealed no signs of bleeding with ongoing right upper lobe nodule. He has no symptoms or signs of bleeding. He denies any chest pain, fevers, chills, coughing, abdominal pain, vomiting/nausea.  Vital signs currently include temperature 98.2  F, heart rate 76 and regular, /85 and he is 92% on 2 L nasal cannula.  Lab work has not been done yet.. He is very hungry and asking when he can eat. He reports being active at baseline swimming for 20min most days of the week, doing the stairstepper and sitting in the sauna on a regular basis. He does  still smoke, about 2-3 cigaretters per day. He is being admitted to the Observation Unit for further care.       Past Medical History    Past Medical History:   Diagnosis Date    Cataract     COPD (chronic obstructive pulmonary disease) (H)     diagnosed with spirometry     Lung nodules     CT scan 2016    Osteoporosis     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    Tobacco abuse        Past Surgical History   Past Surgical History:   Procedure Laterality Date    CATARACT IOL, RT/LT Left 09/15/2017    s/p CE/IOL left eye    CATARACT IOL, RT/LT Right     CV CORONARY ANGIOGRAM  10/23/2023    Procedure: CV CORONARY ANGIOGRAM;  Surgeon: Torin Parker MD;  Location:  [facility]    ESOPHAGOSCOPY, GASTROSCOPY, DUODENOSCOPY (EGD), COMBINED N/A 10/22/2023    Procedure: Esophagoscopy, gastroscopy, duodenoscopy (EGD), combined;  Surgeon: David Post MD;  Location:  GI    PHACOEMULSIFICATION CLEAR CORNEA WITH STANDARD INTRAOCULAR LENS IMPLANT Right 9/30/2016    Procedure: PHACOEMULSIFICATION CLEAR CORNEA WITH STANDARD INTRAOCULAR LENS IMPLANT;  Surgeon: Elly Valencia MD;  Location:  [facility]    PHACOEMULSIFICATION WITH STANDARD INTRAOCULAR LENS IMPLANT Left 9/15/2017    Procedure: PHACOEMULSIFICATION WITH STANDARD INTRAOCULAR LENS IMPLANT;  Left Eye Phacoemulsification with Standard Lens;  Surgeon: Elly Valencia MD;  [facility]    WRIST SURGERY         Prior to Admission Medications   Prior to Admission Medications   Prescriptions Last Dose Informant Patient Reported? Taking?   B Complex-C (VITAMIN B COMPLEX W/VITAMIN C) TABS tablet 2/11/2024 at 0900  No Yes   Sig: TAKE 1 TABLET BY MOUTH TWICE DAILY WITH FOLIC ACID   Calcium Carbonate-Vitamin D (CALCIUM 600 +D HIGH POTENCY) 600-10 MG-MCG TABS 2/11/2024 at 0900  No Yes   Sig: Take 1 tablet by mouth 2 times daily   Ginkgo Biloba 60 MG TABS 2/11/2024 at 0900  No Yes   Sig: Take 1 tablet by mouth daily   Selenium (SELENIMIN-200) 200 MCG TABS tablet  2/11/2024 at 0900  No Yes   Sig: Take 1 tablet (200 mcg) by mouth daily   WIXELA INHUB 500-50 MCG/ACT inhaler Unknown  No Yes   Sig: Inhale 1 puff into the lungs 2 times daily   albuterol (PROAIR HFA) 108 (90 Base) MCG/ACT inhaler 2/11/2024 at 2100  No Yes   Sig: INHALE 1 TO 2 PUFFS BY MOUTH EVERY 4 HOURS AS NEEDED FOR SHORTNESS OF BREATH   atorvastatin (LIPITOR) 40 MG tablet 2/11/2024 at 0900  No Yes   Sig: Take 1 tablet (40 mg) by mouth daily   fish oil-omega-3 fatty acids 1000 MG capsule 2/11/2024 at 0900  No Yes   Sig: Take 1 capsule (1 g) by mouth daily   glucosamine-chondroitinoitin 750-600 MG TABS 2/11/2024 at 0900  No Yes   Sig: Take 2 tablets by mouth 2 times daily   ipratropium-albuterol (COMBIVENT RESPIMAT)  MCG/ACT inhaler 2/12/2024 at 0700  No Yes   Sig: Inhale 1 puff into the lungs 4 times daily as needed for shortness of breath, wheezing or cough (Inhale 1 puff into the lungs 4 times daily as needed. Do not exceed 6 doses per day.,)   vitamin A 3 MG (04174 UNITS) capsule 2/11/2024 at 0900  No Yes   Sig: Take 1 capsule (10,000 Units) by mouth daily   vitamin C (ASCORBIC ACID) 1000 MG TABS 2/11/2024 at 0900  No Yes   Sig: Take 1 tablet (1,000 mg) by mouth daily   vitamin E (TOCOPHEROL) 1000 units (450 mg) CAPS capsule 2/11/2024 at 0900  No Yes   Sig: Take 1 capsule (1,000 Units) by mouth daily      Facility-Administered Medications: None        Review of Systems    The 10 point Review of Systems is negative other than noted in the HPI or here.      Physical Exam   Vital Signs: Temp: 98.2  F (36.8  C) Temp src: Oral BP: 134/84 Pulse: 80   Resp: 18 SpO2: 93 % O2 Device: None (Room air) Oxygen Delivery: 2 LPM  Weight: 160 lbs 7.92 oz    General Appearance: 74 year old gentleman, bald, sitting up in bed, no acute distress, trying to drink juice, denies pain  Respiratory: breathing comfortably on room air, mild expiratory wheezing present on bilateral auscultation  Cardiovascular: regular rate and  rhythm, no appreciable murmurs, rubs or gallops, no pedal edema  GI: bowel sounds present, soft, non-tender to palpation throughout, no rebound or guarding  Skin: warm, dry, no open sores, lesions or ulcerations  Psych: alert, oriented to name, date, hospital and recent events, denies any depressed mood or hallucinations    Medical Decision Making       50 MINUTES SPENT BY ME on the date of service doing chart review, history, exam, documentation & further activities per the note.      Data

## 2024-02-12 NOTE — PROGRESS NOTES
[facility] Care Coordination Contact      TRANSITIONS OF CARE (KEN) LOG    KEN tasks should be completed by the CC within one (1) business day of notification of each transition. Follow up contact with member is required after return to their usual care setting.  Note:  If CC finds out about the transitions fifteen (15) days or more after the member has returned to their usual care setting, no KEN log is needed. However, the CC should check in with the member to discuss the transition process, any changes needed to the care plan and document it in a case note.     Member Name:  Robert B Behr O Name:  [employer] O/Health Plan Member ID#:  [id number]   Product: [facility] Care Coordinator Contact:  Leeanna Sigala RN, BSN, PHN Agency/County/Care System: [facility]   Transition Communication Actions from Care Management Contact   Transition #1   Notification Date: 2/12/24 Transition Date:   2/12/24 Transition From: Home     Is this the member s usual care setting?               yes Transition To: [facility]   Transition Type:  Planned    Documentation from conversation with the member/responsible party, provider, discharging and receiving facility:   Date: 2/12/24: Received notification of admission to hospital with dx of Scheduled bronchoscopy, Observation  CC contacted Hospital /discharge plannerDavid [phone] (Name) via the [employer] Transitional Care Hand-In Process, with community care plan included.  CC reached out to member regarding transition and left a message requesting a return call.  Reviewed and update care plan as needed.  Notified community service providers and placed services None on hold as needed.  Transition log initiated.   PCP, Chastity Arguello, notified of hospitalization via EMR.  Leeanna Sigala RN, BSN, PHN  [facility] Care Coordinator  [phone]       Transition #2   Notification Date: 2/14/24  Transition Date:   2/13/24 Transition From: [facility]     Is this the member s usual care setting?               no Transition To: Home   Transition Type:  Planned    Documentation from conversation with the member/responsible party, provider, discharging and receiving facility:   Date: 2/15/24: Received notification of transition to home.  CC contacted member and reviewed discharge summary.  Member has a follow-up appointment with PCP in 7 days: No: Offered Assistance with setting up a follow up appointment   Member has had a change in condition: No  Home visit needed: No  Care plan reviewed and updated.  The following home based services None were resumed.  New referrals placed: No  Transition log completed.   PCP, Chastity Arguello, notified of transition back to home via EMR.  Leeanna Sigala RN, BSN, PHN  [facility] Care Coordinator  [phone]         *RETURN TO USUAL CARE SETTING: *Complete tasks below when the member is discharging TO their usual care setting within one (1) business day of notification..      For situations where the Care Coordinator is notified of the discharge prior to the date of discharge, the Care Coordinator must follow up with the member or designated representative to confirm that discharge actually occurred and discuss required KEN tasks as outlined in the KEN Instructions.  (This includes situations where it may be a  new  usual care setting for the member. (i.e., a community member who decides upon permanent nursing home placement following hospitalization and rehab).    Discuss with Member/Responsible Party:    Check  Yes  - if the member, family member and/or SNF/facility staff manages the following:    If  No  provide explanation in the comments section.          Date completed: 2/15/24 Communicated with member or their designated representative about the following:  care transition process; about changes to the  member s health status; plan of care updates; education about transitions and how to prevent unplanned transitions/readmissions    Four Pillars for Optimal Transition:    Check  Yes  - if the member, family member and/or SNF/facility staff manages the following:    If  No  provide explanation in the comments section.          []  Yes     [x]  No Does the member have a follow-up appointment scheduled with primary care or specialist? (Mental health hospitalizations--the appt. should be w/in 7 days)              For mental health hospitalizations:  []  Yes     []  No     Does the member have a follow-up appointment scheduled with a mental health practitioner within 7 days of discharge?  [x]  Yes     []  No     Has a medication review been completed with member? If no, refer to PCP, home care nurse, MTM, pharmacist  [x]  Yes     []  No     Can the member manage their medications or is there a system in place to manage medications (e.g. home care set-up)?         [x]  Yes     []  No     Can the member verbalize warning signs and symptoms to watch for and how to respond?  [x]  Yes     []  No     Does the member have a copy of and understand their discharge instructions?  If no, assist to obtain copy of discharge instructions, review discharge instructions, and assist to contact PCP to discuss questions about their recent hospitalization.  [x]  Yes     []  No     Does the member have adequate food, housing and transportation?  If no, add goal and discuss additional supports available to the member                                                                                                                                                                                 [x]  Yes     []  No     Is the member safe in their home?  If no, document needs and support provided                                                                                                                                                                           []  Yes     [x]  No     Are there any concerns of vulnerability, abuse, or neglect?  If yes, document concerns and actions taken by Care Coordinator as a mandated                                                                                                                                                                              [x]  Yes     []  No     Does the member use a Personal Health Care Record?  Check  Yes  if visit summary, discharge summary, and/or healthcare summary are being used as a PHR.                                                                                                                                                                                  [x]  Yes     []  No     Have you reviewed the discharge summary with the member? If  No  provide explanation in comments.  []  Yes     [x]  No     Have you updated the member s care plan/support plan? Add new diagnosis, medications, treatments, goals & interventions, as applicable. If No, provide explanation in comments.    Comments:           Notes from conversation with the member/responsible party, provider, discharging and receiving facility (as applicable): Member refused visit has no POC. See notes above.

## 2024-02-13 VITALS
RESPIRATION RATE: 18 BRPM | BODY MASS INDEX: 23.77 KG/M2 | TEMPERATURE: 98.2 F | HEIGHT: 69 IN | WEIGHT: 160.5 LBS | OXYGEN SATURATION: 93 % | HEART RATE: 74 BPM | SYSTOLIC BLOOD PRESSURE: 120 MMHG | DIASTOLIC BLOOD PRESSURE: 72 MMHG

## 2024-02-13 PROBLEM — R91.8 MASS OF UPPER LOBE OF RIGHT LUNG: Status: ACTIVE | Noted: 2024-02-13

## 2024-02-13 PROCEDURE — 96361 HYDRATE IV INFUSION ADD-ON: CPT

## 2024-02-13 PROCEDURE — 99207 PR APP CREDIT; MD BILLING SHARED VISIT: CPT | Mod: FS | Performed by: STUDENT IN AN ORGANIZED HEALTH CARE EDUCATION/TRAINING PROGRAM

## 2024-02-13 PROCEDURE — G0378 HOSPITAL OBSERVATION PER HR: HCPCS

## 2024-02-13 PROCEDURE — 99239 HOSP IP/OBS DSCHRG MGMT >30: CPT | Mod: FS | Performed by: PHYSICIAN ASSISTANT

## 2024-02-13 ASSESSMENT — ACTIVITIES OF DAILY LIVING (ADL)
ADLS_ACUITY_SCORE: 23
ADLS_ACUITY_SCORE: 22
ADLS_ACUITY_SCORE: 23

## 2024-02-13 NOTE — PROGRESS NOTES
All goals met for discharge. Discharge instructions given to patient and all questions answered. Patient able to verbalize understanding of instructions. PIV discontinue. All belongings with patient. Patient discharged to home.

## 2024-02-13 NOTE — PLAN OF CARE
"Observation Goals:  -diagnostic tests and consults completed and resulted - Met  -vital signs normal or at patient baseline - Met /88 (BP Location: Right arm)   Pulse 99   Temp 98.2  F (36.8  C) (Oral)   Resp 18   Ht 1.753 m (5' 9\")   Wt 72.8 kg (160 lb 7.9 oz)   SpO2 95%   BMI 23.70 kg/m     -no bleeding overnight - Met  "

## 2024-02-13 NOTE — PROGRESS NOTES
CC note  Paged about patient requesting IV line and fluid to be discontinued.     Discussed with RN, currently patient is stable, no more concern for bleeding episode, eating and drinking good, patient is informed why IV line is needed, better if kept until decision is made to discharge but can be discontinued if patient insists.     Gloria Mckeon PA-C on 2/13/2024 at 12:35 AM    Paged later that patient wants to be discharged as soon as possible.   Per chart, admitted to observation due to post procedure minor bleeding. No active bleeding since admission and vitals stable. Will update day team.   Appears in good progress for early morning discharge, check CBC scheduled for AM labs.

## 2024-02-13 NOTE — PROGRESS NOTES
Care Management Discharge Note    Discharge Date: 02/13/2024       Discharge Disposition:  Home    Discharge Services:  Not assessed    Discharge DME:  Not assessed    Discharge Transportation:  Not assessed    Private pay costs discussed: Not applicable    Does the patient's insurance plan have a 3 day qualifying hospital stay waiver?  Yes     Which insurance plan 3 day waiver is available? Alternative insurance waiver    Will the waiver be used for post-acute placement? No    PAS Confirmation Code:  N/A  Patient/family educated on Medicare website which has current facility and service quality ratings:  N/A    Education Provided on the Discharge Plan:  N/A  Persons Notified of Discharge Plans: Pt; RN  Patient/Family in Agreement with the Plan:      Handoff Referral Completed: Yes    Additional Information:  Writer met with patient to discuss and complete IMM paperwork. Writer answered any of patient's questions regarding the IMM. Patient signed IMM form and the IMM was placed on the patient's chart. A copy of the signed IMM was offered to patient, which was declined.    ________________    IFRAH Rausch, [facility]  ED/Observation   [facility]  Phone: [phone]  Pager: [phone]  Fax: [phone]    On-call pager, [phone], 4:00pm to midnight    [place] Obs Vocera    After hours Vocera [employer] and After Hours Vocera IPM [place]

## 2024-02-13 NOTE — DISCHARGE SUMMARY
[facility]  Hospitalist Discharge Summary      Date of Admission:  2/12/2024  Date of Discharge:  2/13/2024 10:45 AM  Discharging Provider: Radha Hardy PA-C  Discharge Service: Hospitalist Service    Discharge Diagnoses   Post bronchoscopy monitor for bleeding  RUL lung lesion, status post biopsy on 2/12/2024     See below for chronic/stable medical conditions.    Clinically Significant Risk Factors          Follow-ups Needed After Discharge   Follow up with primary care provider, Chastity Arguello, as previously scheduled, and PRN.     Appointments on [place] and/or [facility] (with [facility] or [facility]   provider or service). Call [phone] if you haven't heard regarding   these appointments within 7 days of discharge.        Unresulted Labs Ordered in the Past 30 Days of this Admission       Date and Time Order Name Status Description    2/12/2024 11:08 AM Acid-Fast Bacilli Culture and Stain In process     2/12/2024 11:08 AM Surgical Pathology Exam In process     2/12/2024 11:08 AM Fungal or Yeast Culture Routine In process     2/12/2024 11:08 AM Acid-Fast Bacilli Culture and Stain In process     2/12/2024 11:03 AM Fine Needle Aspirate Lung, Upper Lobe, Right In process         These results will be followed up by PCP / pulmonology.     Discharge Disposition   Discharged to home  Condition at discharge: Stable    Hospital Course   Robert B Behr is a 74 year old male admitted on 2/12/2024. 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 He had no evidence of bleeding without hemoptysis, vital instability, or any  drop in hgb during the course of the night and next morning. He was deemed medically appropriate to discharge and was discharged home.      # Post bronchoscopy monitoring for bleeding, stable and without evidence of bleeding  # COPD   # Active tobacco use  # Lung mass  # AFB sputum culture positive for JAKY complex (positive on 1/3 AFB cultures 7/20/2021, 1/2 AFB cultures on 9/13/23)   Note that his lung mass has been present for several years and there is FDG uptake, and is minimally symptomatic.  Post biopsy during his bronchoscopy he had some evidence of bleeding that was noted to have stopped on its own.    -Hgb 13.7, no drop from previous in October  -remained hemodynamically stable  -DuoNebs q2h prn for any shortness of breath or wheezing  -no changes to home medications     # GERD with grade D esophagitis  -Continue home regimen of Protonix 40mg twice daily   -Most recent EGD was Octoboer 2023. He was recommended to have a repeat EGD in 3 months (due January 2024)  -Plan for follow up with GI for EGD as an outpatient      # HFpEF  -He follows with Cardiology as an outpatient  -Most recent TTE from October 2023 reveals EF 40-45%, apical wall akinesis present, with no significant valve disease  -He underwent a Cardiac Cath October 2023 which revealed minimal disease throughout.     # Hyperlipidemia  -Continue home regimen of Lipitor 40mg by mouth once daily      # Supplements  -Continue home regimen of B complex, Calcium-Vitamin D, Gingo Biloba, glucosamine chondroitinotin, selenium, Vitamin A, Vitamin C, Vitamin E supplements     Consultations This Hospital Stay   None    Code Status   Full Code    Time Spent on this Encounter   I, Radha Hardy PA-C, personally saw the patient today and spent greater than 30 minutes discharging this patient.       Radha Hardy PA-C  [facility] ED UN[address] [facility]ERV[facility]  [address] [address]  Phone: [phone]  Fax:  [phone]  ______________________________________________________________________    Physical Exam   Vital Signs: Temp: 98.2  F (36.8  C) Temp src: Oral BP: 120/72 Pulse: 74   Resp: 18 SpO2: 93 % O2 Device: None (Room air) Oxygen Delivery: 1 LPM  Weight: 160 lbs 7.92 oz    GENERAL: adult male seen sitting comfortably in chair. NAD.   NEURO / PSYCH: Alert, converses appropriately. No focal deficits. Moves all extremities.   CV: RRR. S1, S2. No murmurs appreciated.  2+ right radial pulse.  RESPIRATORY: Effort normal. Lungs CTAB with mild expiratory wheezing, but no rales or rhonchi.   MSK: no gross deformities.   EXTREMITIES: nonedematous  SKIN: No jaundice. No rashes or lesions to exposed areas.          Primary Care Physician   Chastity Arguello    Discharge Orders      Primary Care - Care Coordination Referral      Reason for your hospital stay    Bronchoscopy with concerns for bleeding     Activity    Your activity upon discharge: activity as tolerated     Adult [facility] Follow-up and recommended labs and tests    Follow up with primary care provider, Chastity Arguello, as previously scheduled.     Appointments on [place] and/or [facility] (with [facility] or [facility] provider or service). Call [phone] if you haven't heard regarding these appointments within 7 days of discharge.     Diet    Follow this diet upon discharge: Orders Placed This Encounter      Regular Diet Adult       Significant Results and Procedures   Most Recent 3 CBC's:  Recent Labs   Lab Test 02/12/24  1451 10/24/23  0453 10/23/23  1427 10/23/23  0336   WBC 6.4 9.3  --  8.7   HGB 13.7 12.5* 12.8* 12.2*   MCV 90 97  --  96    156  --  147*     Most Recent 3 BMP's:  Recent Labs   Lab Test 02/12/24  1451 10/24/23  1431 10/24/23  1102 10/24/23  0453 10/23/23  0824 10/23/23  0336     --   --  139  --  141   POTASSIUM 4.4  --   --  4.1  --  3.8   CHLORIDE 98  --   --  103  --  105   CO2 25  --   --  26  --  27   BUN 11.5  --   --  11.6   --  10.5   CR 0.83  --   --  0.72  --  0.65*   ANIONGAP 12  --   --  10  --  9   ANTONIO 9.3  --   --  9.2  --  8.7*   * 185* 104* 91   < > 108*    < > = values in this interval not displayed.   ,   Results for orders placed or performed during the hospital encounter of 02/12/24   CT Chest w/o Contrast    Narrative    CT of the chest without contrast    HISTORY: Ion robotic bronchoscopy protocol.    COMPARISON STUDY: 12/18/2023.    FINDINGS: Moderate to large hiatal hernia. Advanced destructive  emphysematous changes in the lungs again noted. Consolidative  partially calcified mass in the right upper lobe unchanged measuring  6.4 cm long axis with more proximal satellite nodular component  measuring 1.7 cm image 147 series 3. Calcified granuloma right lower  lobe. Punctate granuloma in the lingula with fibronodular opacity  unchanged.    Scattered mediastinal nodes not enlarged by size criteria. Mild  coronary calcification.    Evaluation of the upper abdomen is limited and is without contrast.  Left nephrolithiasis without evidence of hydronephrosis.    Bones: Degenerative changes of the spine.      Impression    IMPRESSION:  1. No change in fibrotic mass in the right upper lobe with satellite  nodularity.  2. Advanced destructive emphysematous changes.    BERNARD KAMARA MD         SYSTEM ID:  [id number]   XR Surgery AYDEN L/T 5 Min Fluoro w Stills    Narrative    This exam was marked as non-reportable because it will not be read by a   radiologist or a [place] non-radiologist provider.         XR Chest Port 1 View    Narrative    Portable chest    INDICATION: 1 biopsy    COMPARISON: CT earlier today    FINDINGS: Calcified right lung nodule present. Right upper lung vague  opacity consistent with partially calcified consolidative masslike  opacity on the CT again noted. No evidence of pneumothorax. Heart size  normal. Atherosclerotic calcifications of the aortic knob.      Impression    IMPRESSION: No  pneumothorax. Right lung opacification consistent with  site of irregular opacity on recent CT.    MAGDALENA MARTINEZ MD         SYSTEM ID:  [id number]       Discharge Medications   Discharge Medication List as of 2/13/2024  9:09 AM        CONTINUE these medications which have NOT CHANGED    Details   albuterol (PROAIR HFA) 108 (90 Base) MCG/ACT inhaler INHALE 1 TO 2 PUFFS BY MOUTH EVERY 4 HOURS AS NEEDED FOR SHORTNESS OF BREATH, Disp-8.5 g, R-3, E-PrescribePharmacy may dispense brand covered by insurance (Proair, or proventil or ventolin or generic albuterol inhaler)      atorvastatin (LIPITOR) 40 MG tablet Take 1 tablet (40 mg) by mouth daily, Disp-90 tablet, R-3, E-Prescribe      B Complex-C (VITAMIN B COMPLEX W/VITAMIN C) TABS tablet TAKE 1 TABLET BY MOUTH TWICE DAILY WITH FOLIC ACID, Disp-180 tablet, R-3, E-Prescribe      Calcium Carbonate-Vitamin D (CALCIUM 600 +D HIGH POTENCY) 600-10 MG-MCG TABS Take 1 tablet by mouth 2 times daily, Disp-90 tablet, R-3, E-Prescribe      fish oil-omega-3 fatty acids 1000 MG capsule Take 1 capsule (1 g) by mouth daily, Disp-90 capsule, R-3, E-Prescribe      Ginkgo Biloba 60 MG TABS Take 1 tablet by mouth daily, Disp-90 tablet, R-3, E-Prescribe      glucosamine-chondroitinoitin 750-600 MG TABS Take 2 tablets by mouth 2 times daily, Disp-120 tablet, R-3, E-Prescribe      ipratropium-albuterol (COMBIVENT RESPIMAT)  MCG/ACT inhaler Inhale 1 puff into the lungs 4 times daily as needed for shortness of breath, wheezing or cough (Inhale 1 puff into the lungs 4 times daily as needed. Do not exceed 6 doses per day.,), Disp-4 g, R-4, E-Prescribe      Selenium (SELENIMIN-200) 200 MCG TABS tablet Take 1 tablet (200 mcg) by mouth daily, Disp-90 tablet, R-3, E-Prescribe      vitamin A 3 MG (87518 UNITS) capsule Take 1 capsule (10,000 Units) by mouth daily, Disp-90 capsule, R-3, E-Prescribe      vitamin C (ASCORBIC ACID) 1000 MG TABS Take 1 tablet (1,000 mg) by mouth daily, Disp-90  tablet, R-3, E-Prescribe      vitamin E (TOCOPHEROL) 1000 units (450 mg) CAPS capsule Take 1 capsule (1,000 Units) by mouth daily, Disp-90 capsule, R-3, E-Prescribe      WIXELA INHUB 500-50 MCG/ACT inhaler Inhale 1 puff into the lungs 2 times daily, Disp-60 each, R-11, ARELY, E-Prescribe           Allergies   No Known Allergies

## 2024-02-13 NOTE — PLAN OF CARE
"Observation Goals:  -diagnostic tests and consults completed and resulted - Met  -vital signs normal or at patient baseline - Met /76 (BP Location: Right arm)   Pulse 92   Temp 98.9  F (37.2  C)   Resp 19   Ht 1.753 m (5' 9\")   Wt 72.8 kg (160 lb 7.9 oz)   SpO2 95%   BMI 23.70 kg/m     -no bleeding overnight - Met  "

## 2024-02-13 NOTE — PROVIDER NOTIFICATION
"Messaged provider via [employer]. \"FYI Pt is very adamant about discharging this morning and wants to leave as early as possible.\"  "

## 2024-02-13 NOTE — PLAN OF CARE
"Observation Goals:  -diagnostic tests and consults completed and resulted - Not met  -vital signs normal or at patient baseline - Met BP (!) 149/94 (BP Location: Right arm)   Pulse 96   Temp 98.2  F (36.8  C) (Oral)   Resp 18   Ht 1.753 m (5' 9\")   Wt 72.8 kg (160 lb 7.9 oz)   SpO2 94%   BMI 23.70 kg/m     -no bleeding overnight - Met  "

## 2024-02-13 NOTE — UTILIZATION REVIEW
"  Admission Status; Secondary Review Determination         Under the authority of the Utilization Management Committee, the utilization review process indicated a secondary review on the above patient.  The review outcome is based on review of the medical records, discussions with staff, and applying clinical experience noted on the date of the review.          (x) Observation Status Appropriate - This patient does not meet hospital inpatient criteria and is placed in observation status. If this patient's primary payer is Medicare and was admitted as an inpatient, Condition Code 44 should be used and patient status changed to \"observation\".     RATIONALE FOR DETERMINATION   74-year-old male with a history of COPD, active tobacco use, grade D esophagitis with GERD, HFpEF, and JAKY-positive lung nodules presented for observation following a bronchoscopy with biopsy. Notably, the patient experienced a small amount of bleeding post-procedure, which promptly resolved. Hemodynamically stable and afebrile, he requires monitoring for potential complications.   The severity of illness, intensity of service provided, expected LOS and risk for adverse outcome make the care appropriate for further observation; however, doesn't meet criteria for hospital inpatient admission. GAUTAM Hardy   notified of this determination.    This document was produced using voice recognition software.      The information on this document is developed by the utilization review team in order for the business office to ensure compliance.  This only denotes the appropriateness of proper admission status and does not reflect the quality of care rendered.         The definitions of Inpatient Status and Observation Status used in making the determination above are those provided in the CMS Coverage Manual, Chapter 1 and Chapter 6, section 70.4.      Sincerely,     NELLY ARRIETA MD    System Medical Director  Utilization Management  [facility] " Services.

## 2024-02-14 LAB
PATH REPORT.COMMENTS IMP SPEC: NORMAL
PATH REPORT.FINAL DX SPEC: NORMAL
PATH REPORT.GROSS SPEC: NORMAL
PATH REPORT.MICROSCOPIC SPEC OTHER STN: NORMAL
PATH REPORT.RELEVANT HX SPEC: NORMAL
PHOTO IMAGE: NORMAL

## 2024-02-15 ENCOUNTER — PATIENT OUTREACH (OUTPATIENT)
Dept: GERIATRIC MEDICINE | Facility: CLINIC | Age: 74
End: 2024-02-15
Payer: COMMERCIAL

## 2024-02-15 LAB
PATH REPORT.COMMENTS IMP SPEC: ABNORMAL
PATH REPORT.COMMENTS IMP SPEC: YES
PATH REPORT.FINAL DX SPEC: ABNORMAL
PATH REPORT.GROSS SPEC: ABNORMAL
PATH REPORT.MICROSCOPIC SPEC OTHER STN: ABNORMAL
PATH REPORT.RELEVANT HX SPEC: ABNORMAL

## 2024-02-15 NOTE — PROGRESS NOTES
[facility] Care Coordination Contact      [facility] Six-Month Telephone Assessment    6 month telephone assessment completed on 2/15/24.    ER visits: Yes -  [facility]  Hospitalizations: Yes -  [facility]  TCU stays: No  Significant health status changes: None  Falls/Injuries: No  ADL/IADL changes: No  Changes in services: No    Caregiver Assessment follow up:  NA    Goals: See POC in chart for goal progress documentation.      Member doesn't want a visit. States he is doing fine. Goes to the [employer] and swims and takes a sauna. Encouraged regular PCP visits.    Will see member in 6 months for an annual health risk assessment.   Encouraged member to call CC with any questions or concerns in the meantime.     Leeanna Sigala RN, BSN, PHN  [facility] Care Coordinator  [phone]

## 2024-02-16 ENCOUNTER — CARE COORDINATION (OUTPATIENT)
Dept: PULMONOLOGY | Facility: CLINIC | Age: 74
End: 2024-02-16
Payer: COMMERCIAL

## 2024-02-16 ENCOUNTER — TELEPHONE (OUTPATIENT)
Dept: PULMONOLOGY | Facility: CLINIC | Age: 74
End: 2024-02-16

## 2024-02-16 ENCOUNTER — TELEPHONE (OUTPATIENT)
Dept: PULMONOLOGY | Facility: CLINIC | Age: 74
End: 2024-02-16
Payer: COMMERCIAL

## 2024-02-16 DIAGNOSIS — R91.8 MASS OF UPPER LOBE OF RIGHT LUNG: Primary | ICD-10-CM

## 2024-02-16 NOTE — CONFIDENTIAL NOTE
I called to relay the results of the biopsy. He remains unconcerned and considers these biopsy results sufficient investigation for now. I will arrange a CT for same day as the follow up visit with me in [place]

## 2024-02-16 NOTE — TELEPHONE ENCOUNTER
Patient Contacted for the patient to call back and schedule the following:    Appointment type: CT CHEST  Provider: ZAY  Return date: 06/18/2024  Specialty phone number: [phone]  Additional appointment(s) needed: N/A  Additonal Notes: N/A

## 2024-02-16 NOTE — PROGRESS NOTES
Surg path results from 2/12 biopsy forwarded to Dr. Taylor. Requesting plan for next steps.    Final Diagnosis   A.  LUNG, RIGHT UPPER LOBE NODULE, BIOPSY:  -Rare small atypical glands embedded in pulmonary scar, see comment.     B.  LUNG, LEFT UPPER LOBE ENDOBRONCHIAL LESION, BIOPSY:  -Benign respiratory mucosa with submucosal scarring and focal smooth muscle hyperplasia.

## 2024-03-11 LAB — BACTERIA TISS BX CULT: NO GROWTH

## 2024-03-12 ENCOUNTER — PATIENT OUTREACH (OUTPATIENT)
Dept: CARE COORDINATION | Facility: CLINIC | Age: 74
End: 2024-03-12
Payer: COMMERCIAL

## 2024-03-15 ENCOUNTER — OFFICE VISIT (OUTPATIENT)
Dept: FAMILY MEDICINE | Facility: CLINIC | Age: 74
End: 2024-03-15
Payer: COMMERCIAL

## 2024-03-15 VITALS
RESPIRATION RATE: 18 BRPM | HEART RATE: 78 BPM | WEIGHT: 161 LBS | BODY MASS INDEX: 23.85 KG/M2 | TEMPERATURE: 97.7 F | SYSTOLIC BLOOD PRESSURE: 132 MMHG | HEIGHT: 69 IN | OXYGEN SATURATION: 99 % | DIASTOLIC BLOOD PRESSURE: 87 MMHG

## 2024-03-15 DIAGNOSIS — E78.5 HYPERLIPIDEMIA LDL GOAL <130: ICD-10-CM

## 2024-03-15 DIAGNOSIS — Z78.9 ALCOHOL USE: Primary | ICD-10-CM

## 2024-03-15 DIAGNOSIS — Z29.11 NEED FOR VACCINATION AGAINST RESPIRATORY SYNCYTIAL VIRUS: ICD-10-CM

## 2024-03-15 PROBLEM — A41.9 SEPSIS WITHOUT ACUTE ORGAN DYSFUNCTION, DUE TO UNSPECIFIED ORGANISM (H): Status: RESOLVED | Noted: 2023-10-20 | Resolved: 2024-03-15

## 2024-03-15 PROCEDURE — 36415 COLL VENOUS BLD VENIPUNCTURE: CPT | Performed by: FAMILY MEDICINE

## 2024-03-15 PROCEDURE — 99214 OFFICE O/P EST MOD 30 MIN: CPT | Performed by: FAMILY MEDICINE

## 2024-03-15 PROCEDURE — 80061 LIPID PANEL: CPT | Performed by: FAMILY MEDICINE

## 2024-03-15 NOTE — PROGRESS NOTES
"Office Visit  [facility] - Family Medicine  Date of Service: Mar 15, 2024      Subjective   Robert B Behr is a 74 year old male who presents for   Chief Complaint   Patient presents with    Annual Comprehensive Exam Assisted Living     Comprehensive evaluation      DWI  Using breathilizer 4 times a day  Drinks a six pack per week - when watching sports on TV at home.   Was sitting in his car in the drive, listening to a CD with a six pack, because it was a nice day, keys were on the roof of the car.  They towed the car and put him in senior living for 2 days. The car is on a hold.   Had a DWI 2013. [address] [place] [address]    Smoking  Actively cutting back. Was 2ppd, now 5 cig/day.  Enjoys smoking with Coffee, after dinner    Swims 4 days per week. Takes antioxidants.  Declines flu shot.     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  Osteoporosis  Cataracts  Fractured ankle - lateral malleolus    Past surgical history:  Cataracts  EGD  Coronary angiogram  Bronchoscopy  Wrist surgery    Current medications:   Wixela inhaler  Albuterol inhaler  Vitamins/supplements    Allergies:  None    Family History:  No drug/alcohol abuse noted    Social History:  Single, no kids  Retired musician/    Alcohol use:  -six pack of beer per week, with watching sports on TV  -prior DWI 2013  -CAGE screening negative    Drug use:  -marijuana in youth (didn't really like it)  -no other drugs in the past    Tobacco  -cutting back - 45 pack year history    Caffeine  -drinks coffee    Objective   /87 (BP Location: Left arm, Patient Position: Sitting, Cuff Size: Adult Regular)   Pulse 78   Temp 97.7  F (36.5  C) (Temporal)   Resp 18   Ht 1.753 m (5' 9\")   Wt 73 kg (161 lb)   SpO2 99%   BMI 23.78 kg/m   He reports that he has been smoking cigarettes. He " has a 90 pack-year smoking history. He has quit using smokeless tobacco.    Gen: Alert, no apparent distress.    No results found for any visits on 03/15/24.  Assessment & Plan     DWI - 74 year old man with DWI for drinking alcohol in parked car. History of DWI in 2013. In this initial evaluation,  no other findings suggestive of alcohol use disorder or other substance use disorder. CAGE screening negative. Request comprehensive evaluation, as requested by courts. Court date 3/28/24.  Health maintenance - due for lipid check (done today). Declines shots.     Order Summary                                                      Hyperlipidemia LDL goal <130  -     Lipid panel reflex to direct LDL Non-fasting; Future  -     Lipid panel reflex to direct LDL Non-fasting    Need for vaccination against respiratory syncytial virus    Other orders  -     INFLUENZA VACCINE 65+ (FLUZONE HD)  -     REVIEW OF HEALTH MAINTENANCE PROTOCOL ORDERS        Immunizations Administered       Name Date Dose VIS Date Route    Influenza Vaccine 65+ (Fluzone HD)  Deferred  -- -- --            Future Appointments   Date Time Provider Department Center   3/15/2024  9:20 AM Enedelia Smith MD [facility]   4/11/2024  2:35 PM Chastity Arguello APRN CNP [facility]    6/18/2024  1:40 PM [id number]   6/18/2024  2:30 PM Svitlana Taylor MD [facility]       Completed by: Enedelia Smith M.D., [facility]. 3/15/2024 9:10 AM.  This transcription uses voice recognition software, which may contain typographical errors.  MDM: [facility] neighborhood: [place] [address], language: English,    Screening Questionnaire for Adult Immunization    Are you sick today?   No   Do you have allergies to medications, food, a vaccine component or latex?   No   Have you ever had a serious reaction after receiving a vaccination?   No   Do you have a long-term health problem with heart, lung, kidney, or metabolic disease (e.g., diabetes),  asthma, a blood disorder, no spleen, complement component deficiency, a cochlear implant, or a spinal fluid leak?  Are you on long-term aspirin therapy?   Yes   Do you have cancer, leukemia, HIV/AIDS, or any other immune system problem?   No   Do you have a parent, brother, or sister with an immune system problem?   No   In the past 3 months, have you taken medications that affect  your immune system, such as prednisone, other steroids, or anticancer drugs; drugs for the treatment of rheumatoid arthritis, Crohn s disease, or psoriasis; or have you had radiation treatments?   Yes   Have you had a seizure, or a brain or other nervous system problem?   No   During the past year, have you received a transfusion of blood or blood    products, or been given immune (gamma) globulin or antiviral drug?   No   For women: Are you pregnant or is there a chance you could become       pregnant during the next month?   No   Have you received any vaccinations in the past 4 weeks?   No     Immunization questionnaire was positive for at least one answer.  Notified provider.      Patient instructed to remain in clinic for 15 minutes afterwards, and to report any adverse reactions.     Screening performed by Lam Amin MA on 3/15/2024 at 8:52 AM.       Answers submitted by the patient for this visit:  General Questionnaire (Submitted on 3/15/2024)  Chief Complaint: Chronic problems general questions HPI Form  What is the reason for your visit today? : dui  How many servings of fruits and vegetables do you eat daily?: 0-1  On average, how many sweetened beverages do you drink each day (Examples: soda, juice, sweet tea, etc.  Do NOT count diet or artificially sweetened beverages)?: 1  How many minutes a day do you exercise enough to make your heart beat faster?: 30 to 60  How many days a week do you exercise enough to make your heart beat faster?: 4  How many days per week do you miss taking your medication?: 0

## 2024-03-16 LAB
CHOLEST SERPL-MCNC: 170 MG/DL
FASTING STATUS PATIENT QL REPORTED: YES
HDLC SERPL-MCNC: 82 MG/DL
LDLC SERPL CALC-MCNC: 77 MG/DL
NONHDLC SERPL-MCNC: 88 MG/DL
TRIGL SERPL-MCNC: 55 MG/DL

## 2024-03-21 ENCOUNTER — MYC MEDICAL ADVICE (OUTPATIENT)
Dept: FAMILY MEDICINE | Facility: CLINIC | Age: 74
End: 2024-03-21
Payer: COMMERCIAL

## 2024-03-21 DIAGNOSIS — Z71.89 ENCOUNTER FOR HERB AND VITAMIN SUPPLEMENT MANAGEMENT: ICD-10-CM

## 2024-03-21 NOTE — TELEPHONE ENCOUNTER
Patient is already on your calendar for 4/11/24 for his annual-should we advise medication can be discussed at that time? Please advise-thanks!

## 2024-03-21 NOTE — TELEPHONE ENCOUNTER
"Please have this patient reschedule with a different provider- I was informed he was \"done with me\" and will be finding a new PCP after trying to clarify medications.  See 4/27 encounter.    "

## 2024-03-21 NOTE — TELEPHONE ENCOUNTER
Medication Question or Refill        What medication are you calling about (include dose and sig)?: Selenium (SELENIMIN-200) 200 MCG TABS tablet     Preferred Pharmacy:  [id number] - [place], [place] - [address] [facility] [address]  [address]  [place] [address]  Phone: [phone] Fax: [phone]          Controlled Substance Agreement on file:   CSA -- Patient Level:    CSA: None found at the patient level.       Who prescribed the medication?: PCP    Do you need a refill? Yes, pt called in to request a refill on medication. Please send to pharmacy if applicable. Pt would like a call to let him know medications have been sent.  Thanks!    When did you use the medication last? N/A    Patient offered an appointment? No    Do you have any questions or concerns?  No      Could we send this information to you in [employer] or would you prefer to receive a phone call?:   Patient would prefer a phone call   Okay to leave a detailed message?: Yes at Home number on file [phone] (home)

## 2024-03-21 NOTE — TELEPHONE ENCOUNTER
"Connected with patient. Advised reason for call.   \"I would like to stay with her\"  Discussed that it would not be possible. ROCIO Arguello has excused herself from further care.  Tried to schedule w/ A. Celeste to get patient in soon, but patient requested a provider with a higher degree.  Discussed those available and he selected JR Spicer, was advised of her access currently and did not mind.      Requesting bridge. Please advise-thanks!  "

## 2024-03-21 NOTE — TELEPHONE ENCOUNTER
I apologize but I cannot fill a med for someone I have never seen before especially all these vitamins that I am not familiar with. This is not my area of expertise   He may want to establish with someone who does functional medicine

## 2024-03-22 ENCOUNTER — TELEPHONE (OUTPATIENT)
Dept: FAMILY MEDICINE | Facility: CLINIC | Age: 74
End: 2024-03-22
Payer: COMMERCIAL

## 2024-03-22 NOTE — TELEPHONE ENCOUNTER
Reason for Call:   call back    Detailed comments: I am establishing care with you at the end of July and I need a Rx for my supplements    Phone Number Patient can be reached at: Cell number on file:    Telephone Information:   Mobile [phone]       Best Time: anytime    Can we leave a detailed message on this number? YES    Call taken on 3/22/2024 at 6:11 PM by CHRIS PUTNAM

## 2024-03-25 ENCOUNTER — TELEPHONE (OUTPATIENT)
Dept: FAMILY MEDICINE | Facility: CLINIC | Age: 74
End: 2024-03-25

## 2024-03-25 NOTE — TELEPHONE ENCOUNTER
Pt states was seen march 15th would like to discuss results and faxed to [phone] @ [place] to kenneth sanches  please advise

## 2024-03-25 NOTE — TELEPHONE ENCOUNTER
"Writer called patient and reviewed 3/15/24 result note:    \"    Your cholesterol looks MUCH better than it did last April.  All the numbers are very good.  Nice work.   Written by Enedelia Smith MD on 3/18/2024 12:31 PM CDT\"    Patient verbalized understanding and in agreement with plan.    CHANEL Ware, RN-BC  [facility]        "

## 2024-03-25 NOTE — TELEPHONE ENCOUNTER
Patient is scheduled on 3/26/24 with Dr. Talley to address supplement orders request.    BRAYDON WareN, RN-BC  [facility]

## 2024-03-28 ENCOUNTER — PATIENT OUTREACH (OUTPATIENT)
Dept: CARE COORDINATION | Facility: CLINIC | Age: 74
End: 2024-03-28

## 2024-03-28 ENCOUNTER — PATIENT OUTREACH (OUTPATIENT)
Dept: GERIATRIC MEDICINE | Facility: CLINIC | Age: 74
End: 2024-03-28

## 2024-03-28 ENCOUNTER — TELEPHONE (OUTPATIENT)
Dept: NURSING | Facility: CLINIC | Age: 74
End: 2024-03-28

## 2024-03-28 NOTE — PROGRESS NOTES
Clinical Product Navigator RN reviewed chart; patient on payer product coverage.  Review results:   CPN Initial Information Gathering  Referral Source: [employer] Outreach     Patient identified on internal report; at risk not enrolled in care coordination with no PCP. Upon chart review, patient established care with [facility] PCP 3/15/24. Careteam updated. No further action will be taken at this time.     Gela Peter RN   Clinical Product Navigator   [email or web address]   Office: [phone]

## 2024-03-28 NOTE — TELEPHONE ENCOUNTER
Pt states did not receive office notes, please fax to [phone] asap as he states he has court at 1pm today.  Thanks.

## 2024-03-28 NOTE — PROGRESS NOTES
[facility] Care Coordination Contact    Arranged transportation thru [employer] [phone] for the below appt:  Appt Date & Time: 4/2/2024 at noon  Clinic Name & Address:  [address]   Transportation Provider: [employer] [phone]   time:  11-11:30 am  Return ride  time: will call    Notified member of  time.    Tennille Jensen  Care Management Specialist  [facility]  [phone]

## 2024-03-28 NOTE — TELEPHONE ENCOUNTER
Patient was seen on 3/15/2024 for a comprehensive visit. Patient is needing visit notes sent to Abby Felder at [place]. Requesting fax to [phone].  Patient is needing this paperwork for a court appearance today.  Connected with Bartolo at the [facility] to assist.  Gertrude Ortiz RN   03/28/24 8:26 AM  [facility] Nurse Advisor

## 2024-03-28 NOTE — TELEPHONE ENCOUNTER
Left voicemail for patient requesting return call to clinic.    If patient calls back: We cannot send office note as no subpoena or ANNIE on file. Patient needs to complete ANNIE, or he can request/access records via [employer].

## 2024-04-01 NOTE — PROGRESS NOTES
Called [facility] and set up for member to stop at [facility] [address] [place] [address] after his appointment and then a ride home.    Tennille Jensen  Care Management Specialist  [facility]  [phone]

## 2024-04-02 ENCOUNTER — VIRTUAL VISIT (OUTPATIENT)
Dept: FAMILY MEDICINE | Facility: CLINIC | Age: 74
End: 2024-04-02
Payer: COMMERCIAL

## 2024-04-02 DIAGNOSIS — Z78.9 TAKES DIETARY SUPPLEMENTS: ICD-10-CM

## 2024-04-02 DIAGNOSIS — Z71.89 ENCOUNTER FOR HERB AND VITAMIN SUPPLEMENT MANAGEMENT: ICD-10-CM

## 2024-04-02 DIAGNOSIS — M16.11 OSTEOARTHRITIS OF RIGHT HIP, UNSPECIFIED OSTEOARTHRITIS TYPE: ICD-10-CM

## 2024-04-02 PROCEDURE — 99442 PR PHYSICIAN TELEPHONE EVALUATION 11-20 MIN: CPT | Mod: 93 | Performed by: FAMILY MEDICINE

## 2024-04-02 RX ORDER — MULTIVIT WITH MINERALS/LUTEIN
1000 TABLET ORAL DAILY
Qty: 90 CAPSULE | Refills: 3 | Status: SHIPPED | OUTPATIENT
Start: 2024-04-02

## 2024-04-02 RX ORDER — CHLORAL HYDRATE 500 MG
1 CAPSULE ORAL DAILY
Qty: 90 CAPSULE | Refills: 3 | Status: SHIPPED | OUTPATIENT
Start: 2024-04-02

## 2024-04-02 RX ORDER — MAGNESIUM CARB/ALUMINUM HYDROX 105-160MG
2 TABLET,CHEWABLE ORAL 2 TIMES DAILY
Qty: 120 TABLET | Refills: 3 | Status: SHIPPED | OUTPATIENT
Start: 2024-04-02 | End: 2024-05-07

## 2024-04-02 RX ORDER — MULTIVIT WITH MINERALS/LUTEIN
1000 TABLET ORAL DAILY
Qty: 90 TABLET | Refills: 3 | Status: SHIPPED | OUTPATIENT
Start: 2024-04-02

## 2024-04-02 RX ORDER — DIAPER,BRIEF,INFANT-TODD,DISP
1 EACH MISCELLANEOUS 2 TIMES DAILY
Qty: 90 TABLET | Refills: 3 | Status: SHIPPED | OUTPATIENT
Start: 2024-04-02

## 2024-04-02 NOTE — PROGRESS NOTES
Ravin is a 74 year old who is being evaluated via a billable telephone visit.    What phone number would you like to be contacted at? [phone]   How would you like to obtain your AVS? Ritesh  Originating Location (pt. Location): Home    Distant Location (provider location):  Off-site  I reviewed chart. He was seen at our clinic [facility] one year ago 4/3/2023 for a Medicare Wellness visit. He had a Medicare Wellness visit scheduled next week with same provider 4/11/24 that has been cancelled. He was not clear why he cancelled it.    He does see a pulmonologist regularly.    He prevously saw Dr. Frausto at [facility] who he says prescribed a high daily dose of Vitamin A.  He's been taking this for years. He says this is for his lung health. Current order is for 3 mg (10,000) units capsule. This is the upper limit of the daily recommended allowance.    He doesn't believe anyone gets a balanced diet and that all of these supplements are needed, and probably most people should be taking these supplements.    I reviewed his chart and he has had some legal issues related to alcohol use, so certainly B12/folate supplements are a good idea for him. See clinic note 3/15/24.    He would like B12, calcium fish oil,etc refilled, see orders.      Assessment & Plan     I refilled supplements as noted below, and reminded him to schedule a Medicare Wellness exam.  I did not refill Vitamin A and I did discuss risk of excess Vitamin A, irene liver damage, given what appears to be ongoing heavy alcohol use, encouraged healthy diet instead.    (Z78.9) Takes dietary supplements  Plan: B Complex-C (VITAMIN B COMPLEX W/VITAMIN C)         TABS tablet, Calcium Carbonate-Vitamin D         (CALCIUM 600 +D HIGH POTENCY) 600-10 MG-MCG         TABS    (Z71.89) Encounter for herb and vitamin supplement management  Plan: Calcium Carbonate-Vitamin D (CALCIUM 600 +D         HIGH POTENCY) 600-10 MG-MCG TABS, fish          oil-omega-3 fatty acids 1000 MG capsule,         Selenium (SELENIMIN-200) 200 MCG TABS tablet,         vitamin C (ASCORBIC ACID) 1000 MG TABS, vitamin        E (TOCOPHEROL) 1000 units (450 mg) CAPS capsule          (M16.11) Osteoarthritis of right hip, unspecified osteoarthritis type  Patient reports he takes this twice daily and it helps considerably.  Plan: glucosamine-chondroitinoitin 750-600 MG TABS        Renewed today.    Nicotine/Tobacco Cessation  He reports that he has been smoking cigarettes. He started smoking about 60 years ago. He has a 45 pack-year smoking history. He has quit using smokeless tobacco.  Nicotine/Tobacco Cessation Plan  Information offered: Patient not interested at this time      I reviewed chart, recent lipids 3/15/24 normal  LFTS 10/24/23 normal    From Up To Date risk of elevated Vitamin A:   nephrosis, diabetes mellitus, anorexia nervosa, liver disease, and hypothyroidism.    Acute toxicity -- Acute toxicity occurs in adults when a single dose of >660,000 international units (>200,000 micrograms) of vitamin A is ingested. Symptoms include nausea, vomiting, vertigo, and blurry vision [40]. In very high doses, drowsiness, malaise, and recurrent vomiting can follow the initial symptoms listed above. In infants under six months of age, as little as 20,000 international units (6000 micrograms) daily, given briefly (eg, for one month or less), may produce toxic effects [45].  Chronic toxicity -- Chronic toxicity occurs with long-term ingestion of vitamin A doses in amounts higher than 10 times the RDA (ie, toxicity may occur with chronic ingestion of approximately 33,000 international units [10,000 micrograms] of retinol in adults)  Ataxia, alopecia, hyperlipidemia, hepatotoxicity, bone and muscle pain; teratogenic       Subjective   Ravin is a 74 year old, presenting for the following health issues:  Recheck Medication and Establish Care      4/2/2024     8:20 AM   Additional Questions    Roomed by Cherie VINES     History of Present Illness       Reason for visit:  Reorder supplements    He eats 0-1 servings of fruits and vegetables daily.He consumes 1 sweetened beverage(s) daily.He exercises with enough effort to increase his heart rate 20 to 29 minutes per day.  He exercises with enough effort to increase his heart rate 4 days per week.   He is taking medications regularly.     Review of Systems  Constitutional, HEENT, cardiovascular, pulmonary, gi and gu systems are negative, except as otherwise noted.      Objective           Vitals:  No vitals were obtained today due to virtual visit.    Physical Exam   General: Alert and no distress //Respiratory: No audible wheeze, cough, or shortness of breath // Psychiatric:  Appropriate affect, tone, and pace of words        Phone call duration: 15 minutes  Signed Electronically by: Jazzy Almeida MD

## 2024-04-03 NOTE — ANESTHESIA PROCEDURE NOTES
Airway       Patient location during procedure: OR       Procedure Start/Stop Times: 2/12/2024 10:28 AM  Staff -        Anesthesiologist:  Zoila White MD       CRNA: Cristina Wick APRN CRNA       Performed By: CRNA  Consent for Airway        Urgency: elective  Indications and Patient Condition       Indications for airway management: jessica-procedural       Induction type:intravenous       Mask difficulty assessment: 2 - vent by mask + OA or adjuvant +/- NMBA    Final Airway Details       Final airway type: endotracheal airway       Successful airway: ETT - single and Oral  Endotracheal Airway Details        ETT size (mm): 8.5       Cuffed: yes       Successful intubation technique: direct laryngoscopy       DL Blade Type: Guerrero 2       Grade View of Cords: 1 (clearmand open)       Adjucts: stylet       Position: Right       Measured from: lips       Secured at (cm): 23       Bite block used: None    Post intubation assessment        Placement verified by: capnometry, equal breath sounds and chest rise        Number of attempts at approach: 1       Number of other approaches attempted: 0       Secured with: tape       Ease of procedure: easy       Dentition: Intact and Unchanged    Medication(s) Administered   Medication Administration Time: 2/12/2024 10:28 AM         I spoke with pt, he understood and will stop both meds.

## 2024-04-08 LAB
ACID FAST STAIN (ARUP): NORMAL
ACID FAST STAIN (ARUP): NORMAL

## 2024-04-11 ENCOUNTER — OFFICE VISIT (OUTPATIENT)
Dept: FAMILY MEDICINE | Facility: CLINIC | Age: 74
End: 2024-04-11
Payer: COMMERCIAL

## 2024-04-11 ENCOUNTER — TELEPHONE (OUTPATIENT)
Dept: ORTHOPEDICS | Facility: CLINIC | Age: 74
End: 2024-04-11

## 2024-04-11 ENCOUNTER — ANCILLARY PROCEDURE (OUTPATIENT)
Dept: GENERAL RADIOLOGY | Facility: CLINIC | Age: 74
End: 2024-04-11
Attending: STUDENT IN AN ORGANIZED HEALTH CARE EDUCATION/TRAINING PROGRAM
Payer: COMMERCIAL

## 2024-04-11 VITALS
HEART RATE: 73 BPM | SYSTOLIC BLOOD PRESSURE: 128 MMHG | TEMPERATURE: 97.5 F | RESPIRATION RATE: 22 BRPM | DIASTOLIC BLOOD PRESSURE: 66 MMHG | OXYGEN SATURATION: 95 % | WEIGHT: 159.1 LBS | BODY MASS INDEX: 23.57 KG/M2 | HEIGHT: 69 IN

## 2024-04-11 DIAGNOSIS — Z87.81 HISTORY OF BROKEN FINGER: ICD-10-CM

## 2024-04-11 DIAGNOSIS — R73.09 ELEVATED GLUCOSE: ICD-10-CM

## 2024-04-11 DIAGNOSIS — J42 CHRONIC BRONCHITIS, UNSPECIFIED CHRONIC BRONCHITIS TYPE (H): ICD-10-CM

## 2024-04-11 DIAGNOSIS — Z01.818 PREOP GENERAL PHYSICAL EXAM: Primary | ICD-10-CM

## 2024-04-11 DIAGNOSIS — F17.200 TOBACCO USE DISORDER: ICD-10-CM

## 2024-04-11 LAB
HBA1C MFR BLD: 6.1 % (ref 0–5.6)
HGB BLD-MCNC: 13.2 G/DL (ref 13.3–17.7)

## 2024-04-11 PROCEDURE — 80048 BASIC METABOLIC PNL TOTAL CA: CPT | Performed by: STUDENT IN AN ORGANIZED HEALTH CARE EDUCATION/TRAINING PROGRAM

## 2024-04-11 PROCEDURE — 73130 X-RAY EXAM OF HAND: CPT | Mod: TC | Performed by: RADIOLOGY

## 2024-04-11 PROCEDURE — 83036 HEMOGLOBIN GLYCOSYLATED A1C: CPT | Performed by: STUDENT IN AN ORGANIZED HEALTH CARE EDUCATION/TRAINING PROGRAM

## 2024-04-11 PROCEDURE — 85018 HEMOGLOBIN: CPT | Performed by: STUDENT IN AN ORGANIZED HEALTH CARE EDUCATION/TRAINING PROGRAM

## 2024-04-11 PROCEDURE — 99214 OFFICE O/P EST MOD 30 MIN: CPT | Performed by: STUDENT IN AN ORGANIZED HEALTH CARE EDUCATION/TRAINING PROGRAM

## 2024-04-11 PROCEDURE — 36415 COLL VENOUS BLD VENIPUNCTURE: CPT | Performed by: STUDENT IN AN ORGANIZED HEALTH CARE EDUCATION/TRAINING PROGRAM

## 2024-04-11 ASSESSMENT — PAIN SCALES - GENERAL: PAINLEVEL: NO PAIN (0)

## 2024-04-11 NOTE — PATIENT INSTRUCTIONS
Referral to orthopedics  Reduce/cut back smoking before surgery. Smoking can cause impaired or prolonged healing.  If you surgery is more than 30 days from today, you will need a new pre-op.    Hold all supplements 14 days prior  Avoid all NSAIDs like ibuprofen for 7 days prior    Continue  -lipitor  -albuteral  -combivent  -wixela    Dr. Pickens    Preparing for Your Surgery  Getting started  A nurse will call you to review your health history and instructions. They will give you an arrival time based on your scheduled surgery time. Please be ready to share:  Your doctor's clinic name and phone number  Your medical, surgical, and anesthesia history  A list of allergies and sensitivities  A list of medicines, including herbal treatments and over-the-counter drugs  Whether the patient has a legal guardian (ask how to send us the papers in advance)  Please tell us if you're pregnant--or if there's any chance you might be pregnant. Some surgeries may injure a fetus (unborn baby), so they require a pregnancy test. Surgeries that are safe for a fetus don't always need a test, and you can choose whether to have one.   If you have a child who's having surgery, please ask for a copy of Preparing for Your Child's Surgery.    Preparing for surgery  Within 10 to 30 days of surgery: Have a pre-op exam (sometimes called an H&P, or History and Physical). This can be done at a clinic or pre-operative center.  If you're having a , you may not need this exam. Talk to your care team.  At your pre-op exam, talk to your care team about all medicines you take. If you need to stop any medicines before surgery, ask when to start taking them again.  We do this for your safety. Many medicines can make you bleed too much during surgery. Some change how well surgery (anesthesia) drugs work.  Call your insurance company to let them know you're having surgery. (If you don't have insurance, call [phone].)  Call your clinic if  there's any change in your health. This includes signs of a cold or flu (sore throat, runny nose, cough, rash, fever). It also includes a scrape or scratch near the surgery site.  If you have questions on the day of surgery, call your hospital or surgery center.  Eating and drinking guidelines  For your safety: Unless your surgeon tells you otherwise, follow the guidelines below.  Eat and drink as usual until 8 hours before you arrive for surgery. After that, no food or milk.  Drink clear liquids until 2 hours before you arrive. These are liquids you can see through, like water, Gatorade, and Propel Water. They also include plain black coffee and tea (no cream or milk), candy, and breath mints. You can spit out gum when you arrive.  If you drink alcohol: Stop drinking it the night before surgery.  If your care team tells you to take medicine on the morning of surgery, it's okay to take it with a sip of water.  Preventing infection  Shower or bathe the night before and morning of your surgery. Follow the instructions your clinic gave you. (If no instructions, use regular soap.)  Don't shave or clip hair near your surgery site. We'll remove the hair if needed.  Don't smoke or vape the morning of surgery. You may chew nicotine gum up to 2 hours before surgery. A nicotine patch is okay.  Note: Some surgeries require you to completely quit smoking and nicotine. Check with your surgeon.  Your care team will make every effort to keep you safe from infection. We will:  Clean our hands often with soap and water (or an alcohol-based hand rub).  Clean the skin at your surgery site with a special soap that kills germs.  Give you a special gown to keep you warm. (Cold raises the risk of infection.)  Wear special hair covers, masks, gowns and gloves during surgery.  Give antibiotic medicine, if prescribed. Not all surgeries need antibiotics.  What to bring on the day of surgery  Photo ID and insurance card  Copy of your health  care directive, if you have one  Glasses and hearing aids (bring cases)  You can't wear contacts during surgery  Inhaler and eye drops, if you use them (tell us about these when you arrive)  CPAP machine or breathing device, if you use them  A few personal items, if spending the night  If you have . . .  A pacemaker, ICD (cardiac defibrillator) or other implant: Bring the ID card.  An implanted stimulator: Bring the remote control.  A legal guardian: Bring a copy of the certified (court-stamped) guardianship papers.  Please remove any jewelry, including body piercings. Leave jewelry and other valuables at home.  If you're going home the day of surgery  You must have a responsible adult drive you home. They should stay with you overnight as well.  If you don't have someone to stay with you, and you aren't safe to go home alone, we may keep you overnight. Insurance often won't pay for this.  After surgery  If it's hard to control your pain or you need more pain medicine, please call your surgeon's office.  Questions?   If you have any questions for your care team, list them here: _________________________________________________________________________________________________________________________________________________________________________ ____________________________________ ____________________________________ ____________________________________  For informational purposes only. Not to replace the advice of your health care provider. Copyright   2003, 2019 [employer] [facility]. All rights reserved. Clinically reviewed by Lacy Emanuel MD. [employer] [id number] - REV 12/22.

## 2024-04-11 NOTE — PROGRESS NOTES
Preoperative Evaluation  [address]  [address]  [place] [address]  Phone: [phone]  Fax: [phone]  Primary Provider: Enedelia Smith  Pre-op Performing Provider: ALEXIS LOPEZ  Apr 11, 2024     Ravin is a 74 year old, presenting for the following:  Pre-Op Exam (Broken finger )        4/11/2024     1:37 PM   Additional Questions   Roomed by Nae MAN   Accompanied by self     Surgical Information  Surgery/Procedure: Broken finger  Surgery Location: TBD  Surgeon: TBD  Surgery Date: TBD  Time of Surgery: TBD  Where patient plans to recover: At home alone  Fax number for surgical facility: TBD    Assessment & Plan     The proposed surgical procedure is considered INTERMEDIATE risk.    Preop general physical exam  History of broken finger  74yr male with PMH COPD, tobacco use, HLD, hiatal hernia, RUL fibrotic stable mass, here for pre-op for broken finger. He states he broke his R ring finger 10+yrs ago, and recently re-injured it in a car door. He has limited ROM of this finger. He has not seen a provider for this in the past. Unfortunately he has not met with the hand surgeon, nor does he have a surgery scheduled. I advised him he may need a new pre-op if his surgery  is >30 days from today. He elected to continue with pre-op visit today.    Plan:  -referral to ortho/hand  -pre-op labs ordered (hgb, bmp and A1C due to recently elevated glucose)  -XR R hand     - Hemoglobin  - Basic metabolic panel  - Orthopedic  Referral  - XR Hand Right G/E 3 Views    Elevated glucose  - Hemoglobin A1c    COPD  Tobacco use  Lungs with diffuse wheezing on exam, at baseline per pt. Takes combivent and wixela inhalers; albuterol PRN. Advised to cut back/quit tobacco prior to surgery.      Risks and Recommendations  The patient has the following additional risks and recommendations for perioperative complications:  Pulmonary:    - Incentive spirometry  post-op    Antiplatelet or Anticoagulation Medication Instructions   - Patient is on no antiplatelet or anticoagulation medications.    Additional Medication Instructions  Patient is to take all scheduled medications on the day of surgery EXCEPT for modifications listed below:   - Herbal medications and vitamins: HOLD 14 days prior to surgery.    Recommendation  APPROVAL GIVEN to proceed with proposed procedure pending review of diagnostic evaluation.      Subjective     HPI related to upcoming procedure:     74yr male with PMH COPD, tobacco use, HLD, hiatal hernia, RUL mass here for pre-op for broken finger    Broken finger    Right hand  4th finger  Not painful  Restricted ROM at DIP  Re-injured it in car door  Jammed it with basketball years ago (10 yrs ago) did not see provider  Swollen        4/11/2024     1:24 PM   Preop Questions   1. Have you ever had a heart attack or stroke? No   2. Have you ever had surgery on your heart or blood vessels, such as a stent placement, a coronary artery bypass, or surgery on an artery in your head, neck, heart, or legs? No   3. Do you have chest pain with activity? No   4. Do you have a history of  heart failure? No   5. Do you currently have a cold, bronchitis or symptoms of other infection? No   6. Do you have a cough, shortness of breath, or wheezing? YES - due to COPD   7. Do you or anyone in your family have previous history of blood clots? No   8. Do you or does anyone in your family have a serious bleeding problem such as prolonged bleeding following surgeries or cuts? No   9. Have you ever had problems with anemia or been told to take iron pills? No   10. Have you had any abnormal blood loss such as black, tarry or bloody stools? No   11. Have you ever had a blood transfusion? No   12. Are you willing to have a blood transfusion if it is medically needed before, during, or after your surgery? Yes   13. Have you or any of your relatives ever had problems with  "anesthesia? No   14. Do you have sleep apnea, excessive snoring or daytime drowsiness? No   15. Do you have any artifical heart valves or other implanted medical devices like a pacemaker, defibrillator, or continuous glucose monitor? No   16. Do you have artificial joints? No   17. Are you allergic to latex? No     Health Care Directive  Patient does not have a Health Care Directive or Living Will: Discussed advance care planning with patient; however, patient declined at this time.    Preoperative Review of    reviewed - no record of controlled substances prescribed.    Patient Active Problem List    Diagnosis Date Noted    Mass of upper lobe of right lung 2024     Priority: Medium    Hiatal hernia 2017     Priority: Medium    Health Care Home 2016     Priority: Medium     [facility] Case Management  Leeanna Sigala RN  [phone]    [facility] CARE PLAN SUMMARY    Client Name:  Robert B Behr  Address:  [address] [place] [address] Phone: [phone] (home)    :  1950 Date of Assessment: 2020 Refusal    Health Plan:  [facility]  Health Plan Number: [id number] Medical Assistance Number: [id number]  Financial Worker:    Case #:     FVP :  Leeanna Sigala RN  Phone:  [phone]   Fax:  [phone]   P Enrollment Date: 16 Case Mix: L   Rate Cell:  A  Waiver Type:  None   Emergency Contact:   JESSEALYSHA  [place] Phone: [phone]  Relation: Friend  Secondary Emergency Contact: BRIANNA BENTLEY  [employer] Phone: [phone]  Relation: Friend Language:  English  :  No   Health Care Agent/POA:   Advanced Directives/Living Will:     Primary Care Clinic/Phone/Fax:  [facility]/(p) [phone], (f) [phone] Primary Dx:  COPD J44.9  Secondary Dx:  Osteoporosis M81.0   Primary Physician:  Fina Frausto   Height:  5'10\"  Weight:  154 lbs    Specialty Physician:    Audiologist:     Eye Care " Provider:   Dental Care Provider:    Gutierrez: [facility] [phone] or [phone]   Other:              Tobacco use disorder 04/13/2016     Priority: Medium    Hyperlipidemia LDL goal <130 04/13/2016     Priority: Medium    COPD (chronic obstructive pulmonary disease) (H)      Priority: Medium     diagnosed with spirometry       Ankle pain, chronic 06/14/2015     Priority: Medium      Past Medical History:   Diagnosis Date    Cataract     COPD (chronic obstructive pulmonary disease) (H)     diagnosed with spirometry     Fractured lateral malleolus     Lung nodules     CT scan 2016    Osteoporosis     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    Sepsis without acute organ dysfunction, due to unspecified organism (H)     Tobacco abuse      Past Surgical History:   Procedure Laterality Date    BRONCHOSCOPY RIGID OR FLEXIBLE W/TRANSENDOSCOPIC ENDOBRONCHIAL ULTRASOUND GUIDED N/A 2/12/2024    Procedure: Endobronchial Ultrasound Guided;  Surgeon: Paulo Bryan MD;  Location: [facility] OR    BRONCHOSCOPY, WITH BIOPSY, ROBOT ASSISTED N/A 2/12/2024    Procedure: BRONCHOSCOPY, ROBOT-ASSISTED, WITH BIOPSY ION;  Surgeon: Paulo Bryan MD;  Location: [facility] OR    CATARACT IOL, RT/LT Left 09/15/2017    s/p CE/IOL left eye    CATARACT IOL, RT/LT Right     CV CORONARY ANGIOGRAM  10/23/2023    Procedure: CV CORONARY ANGIOGRAM;  Surgeon: Torin Parker MD;  Location:  HEART CARDIAC CATH LAB    ESOPHAGOSCOPY, GASTROSCOPY, DUODENOSCOPY (EGD), COMBINED N/A 10/22/2023    Procedure: Esophagoscopy, gastroscopy, duodenoscopy (EGD), combined;  Surgeon: David Post MD;  Location:  GI    PHACOEMULSIFICATION CLEAR CORNEA WITH STANDARD INTRAOCULAR LENS IMPLANT Right 9/30/2016    Procedure: PHACOEMULSIFICATION CLEAR CORNEA WITH STANDARD INTRAOCULAR LENS IMPLANT;  Surgeon: Elly Valencia MD;  Location:  [facility]    PHACOEMULSIFICATION WITH STANDARD INTRAOCULAR LENS IMPLANT Left 9/15/2017    Procedure:  PHACOEMULSIFICATION WITH STANDARD INTRAOCULAR LENS IMPLANT;  Left Eye Phacoemulsification with Standard Lens;  Surgeon: Elly Valencia MD;  Location: [facility] OR    WRIST SURGERY       Current Outpatient Medications   Medication Sig Dispense Refill    albuterol (PROAIR HFA) 108 (90 Base) MCG/ACT inhaler INHALE 1 TO 2 PUFFS BY MOUTH EVERY 4 HOURS AS NEEDED FOR SHORTNESS OF BREATH 8.5 g 3    atorvastatin (LIPITOR) 40 MG tablet Take 1 tablet (40 mg) by mouth daily 90 tablet 3    B Complex-C (VITAMIN B COMPLEX W/VITAMIN C) TABS tablet TAKE 1 TABLET BY MOUTH TWICE DAILY WITH FOLIC ACID 180 tablet 3    Calcium Carbonate-Vitamin D (CALCIUM 600 +D HIGH POTENCY) 600-10 MG-MCG TABS Take 1 tablet by mouth 2 times daily 90 tablet 3    fish oil-omega-3 fatty acids 1000 MG capsule Take 1 capsule (1 g) by mouth daily 90 capsule 3    Ginkgo Biloba 60 MG TABS Take 1 tablet by mouth daily 90 tablet 3    glucosamine-chondroitinoitin 750-600 MG TABS Take 2 tablets by mouth 2 times daily 120 tablet 3    ipratropium-albuterol (COMBIVENT RESPIMAT)  MCG/ACT inhaler Inhale 1 puff into the lungs 4 times daily as needed for shortness of breath, wheezing or cough (Inhale 1 puff into the lungs 4 times daily as needed. Do not exceed 6 doses per day.,) 4 g 4    Selenium (SELENIMIN-200) 200 MCG TABS tablet Take 1 tablet (200 mcg) by mouth daily 90 tablet 3    vitamin A 3 MG (75207 UNITS) capsule Take 1 capsule (10,000 Units) by mouth daily 90 capsule 3    vitamin C (ASCORBIC ACID) 1000 MG TABS Take 1 tablet (1,000 mg) by mouth daily 90 tablet 3    vitamin E (TOCOPHEROL) 1000 units (450 mg) CAPS capsule Take 1 capsule (1,000 Units) by mouth daily 90 capsule 3    WIXELA INHUB 500-50 MCG/ACT inhaler Inhale 1 puff into the lungs 2 times daily 60 each 11       No Known Allergies     Social History     Tobacco Use    Smoking status: Every Day     Current packs/day: 1.00     Average packs/day: 1 pack/day for 60.3 years (60.3 ttl pk-yrs)     Types:  "Cigarettes     Start date: 1964    Smokeless tobacco: Former    Tobacco comments:     *2ppd for 10 years, 1ppd for 25 years, now 5 cigs per day. Actively working on quitting.   Substance Use Topics    Alcohol use: Yes     Alcohol/week: 6.0 standard drinks of alcohol     Types: 6 Cans of beer per week     Comment: Drinks for sporting events.     History   Drug Use Unknown     Comment: Marijuana - as a youth         Review of Systems    Review of Systems  Constitutional, neuro, ENT, endocrine, pulmonary, cardiac, gastrointestinal, genitourinary, musculoskeletal, integument and psychiatric systems are negative, except as otherwise noted.    Objective    /66 (BP Location: Right arm, Patient Position: Sitting, Cuff Size: Adult Regular)   Pulse 73   Temp 97.5  F (36.4  C) (Temporal)   Resp 22   Ht 1.753 m (5' 9\")   Wt 72.2 kg (159 lb 1.6 oz)   SpO2 95%   BMI 23.49 kg/m     Estimated body mass index is 23.49 kg/m  as calculated from the following:    Height as of this encounter: 1.753 m (5' 9\").    Weight as of this encounter: 72.2 kg (159 lb 1.6 oz).    Physical Exam  GENERAL: alert and no distress  EYES: Eyes grossly normal to inspection, PERRL and conjunctivae and sclerae normal  HENT: ear canals and TM's normal, nose and mouth without ulcers or lesions  NECK: no adenopathy, no asymmetry, masses, or scars  RESP: diffuse inspiratory & expiratory wheezing   CV: regular rate and rhythm, normal S1 S2, no S3 or S4, no murmur, click or rub, no peripheral edema  ABDOMEN: soft, nontender, no hepatosplenomegaly, no masses and bowel sounds normal  MS: right 4th digit is diffusely swollen with limited ROM at the PIP and DIP joint; normal flexion at the MCP joint; non-tender. No erythema.  SKIN: no suspicious lesions or rashes  NEURO: Normal strength and tone, mentation intact and speech normal  PSYCH: mentation appears normal, affect normal/bright    Recent Labs   Lab Test 02/12/24  1451 10/24/23  0453 10/22/23  1313 " 10/22/23  0704 10/21/23  1539 10/21/23  0424   HGB 13.7 12.5*   < > 11.6*   < > 12.7*    156   < > 142*   < > 248   INR  --   --   --  1.38*  --  1.26*    139   < > 141  141  --  143   POTASSIUM 4.4 4.1   < > 3.4  3.4  --  4.3   CR 0.83 0.72   < > 0.69  0.69  --  1.34*   A1C  --   --   --   --   --  5.9*    < > = values in this interval not displayed.        Diagnostics  Labs pending at this time.  Results will be reviewed when available.   No EKG required, no history of coronary heart disease, significant arrhythmia, peripheral arterial disease or other structural heart disease.    Revised Cardiac Risk Index (RCRI)  The patient has the following serious cardiovascular risks for perioperative complications:   - No serious cardiac risks = 0 points     RCRI Interpretation: 0 points: Class I (very low risk - 0.4% complication rate)      Signed Electronically by: Timoteo Pickens DO  Copy of this evaluation report is provided to requesting physician.

## 2024-04-11 NOTE — TELEPHONE ENCOUNTER
Please see pt's ORTHO referral ON FILE.    OLD fracture    Imaging done TODAY    Please review referral and imaging.    What surgeon is best for this pt?    Please CALL pt to discuss and schedule.

## 2024-04-12 ENCOUNTER — DOCUMENTATION ONLY (OUTPATIENT)
Dept: FAMILY MEDICINE | Facility: CLINIC | Age: 74
End: 2024-04-12
Payer: COMMERCIAL

## 2024-04-12 LAB
ANION GAP SERPL CALCULATED.3IONS-SCNC: 11 MMOL/L (ref 7–15)
BUN SERPL-MCNC: 13.8 MG/DL (ref 8–23)
CALCIUM SERPL-MCNC: 10 MG/DL (ref 8.8–10.2)
CHLORIDE SERPL-SCNC: 103 MMOL/L (ref 98–107)
CREAT SERPL-MCNC: 0.86 MG/DL (ref 0.67–1.17)
DEPRECATED HCO3 PLAS-SCNC: 28 MMOL/L (ref 22–29)
EGFRCR SERPLBLD CKD-EPI 2021: 90 ML/MIN/1.73M2
GLUCOSE SERPL-MCNC: 72 MG/DL (ref 70–99)
POTASSIUM SERPL-SCNC: 4.7 MMOL/L (ref 3.4–5.3)
SODIUM SERPL-SCNC: 142 MMOL/L (ref 135–145)

## 2024-04-12 NOTE — PROGRESS NOTES
"Team, could you call ortho and ask their opinion regarding his hand XR? He injured his finger 10+ years ago, never saw a provider. Re-injured his finger by jamming it in a car door recently.    Report says \"comminuted displaced intra-articular fracture of 4th middle phalanx, with developing callus at fracture margins suggesting early healing\".    Can you inquire if he needs to be seen urgently due to comminuted/displaced fracture? Or if routine ortho appt is ok, as the report suggests this is an old injury.    Thanks!    Dr. Pickens  "

## 2024-04-12 NOTE — TELEPHONE ENCOUNTER
Patient Contacted for the patient to call back and schedule the following:    Appointment type: New hand/wrist  Provider: Dr Martin  Return date: 4/16/24  Specialty phone number: [phone]    Additonal Notes: Spoke with pt regarding scheduling appt per referral. Pt agreed toappt date and time

## 2024-04-12 NOTE — TELEPHONE ENCOUNTER
Orthopedic/Sports Medicine Fracture Triage    Incoming call/or message from call center member.    Fracture type: Finger.    The patient is in a   na .    Date of injury chronic.    Triaged by: BELKIS Null .    Determined to be managed by hand team    Needs to be seen  next week .    Additional Comments/information: encounter forwarded clinic coordinators by BRANDEN Vargas.      Dada Joel ATC

## 2024-04-12 NOTE — PROGRESS NOTES
Dr. Pickens --    Writer spoke with  who stated that the referral was sent in yesterday. Waiting for feedback. Chart has not been reviewed as of yet.     Process:   Chart/x-rays will be reviewed  Assigned to a provider is related to what is needed (I.e.surgery, PT, etc)  Patient contacted and scheduled as needed/recommended    BRAYDON MasonN AFIA  [facility]

## 2024-04-13 NOTE — TELEPHONE ENCOUNTER
DIAGNOSIS: Right hand Comminuted displaced intra-articular fracture of the   fourth middle phalanx with developing callus at the fracture margins    APPOINTMENT DATE: 4/16/24   NOTES STATUS DETAILS   OFFICE NOTE from referring provider Internal 4/11/24 - Timotoe Pickens DO - [facility]   MEDICATION LIST Internal    XRAYS (IMAGES & REPORTS) Internal 4/11/24 - XR hand right

## 2024-04-15 PROBLEM — R73.03 PREDIABETES: Status: ACTIVE | Noted: 2024-04-15

## 2024-04-15 PROBLEM — D64.9 LOW HEMOGLOBIN: Status: ACTIVE | Noted: 2024-04-15

## 2024-04-16 ENCOUNTER — OFFICE VISIT (OUTPATIENT)
Dept: ORTHOPEDICS | Facility: CLINIC | Age: 74
End: 2024-04-16
Payer: COMMERCIAL

## 2024-04-16 ENCOUNTER — PRE VISIT (OUTPATIENT)
Dept: ORTHOPEDICS | Facility: CLINIC | Age: 74
End: 2024-04-16

## 2024-04-16 DIAGNOSIS — M79.644 FINGER PAIN, RIGHT: Primary | ICD-10-CM

## 2024-04-16 DIAGNOSIS — Z87.81 HISTORY OF BROKEN FINGER: ICD-10-CM

## 2024-04-16 PROCEDURE — 2894A VOIDCORRECT: CPT | Mod: F8 | Performed by: STUDENT IN AN ORGANIZED HEALTH CARE EDUCATION/TRAINING PROGRAM

## 2024-04-16 PROCEDURE — 99204 OFFICE O/P NEW MOD 45 MIN: CPT | Performed by: STUDENT IN AN ORGANIZED HEALTH CARE EDUCATION/TRAINING PROGRAM

## 2024-04-16 NOTE — LETTER
4/16/2024         RE: Robert B Behr  [address]  [place] [address]        Dear Colleague,    Thank you for referring your patient, Robert B Behr, to the [facility] [place]. Please see a copy of my visit note below.    Ortho Hand    HPI: 74-year-old right-hand-dominant smoker presenting with right ring finger injury.  Approximately 3 weeks ago, patient slammed his right ring finger in a car door.  No other injuries.  Of note, he had right ring finger flexor tendon surgery 20 years ago that did not heal properly.  As a result, his right ring finger at baseline is essentially stiff with very little motion.  He would like to avoid surgery.  He is very active, and swims daily as well as rides a bike.    ROS: Negative, see HPI  Past medical history: Hyperlipidemia, COPD  Past surgical history: Left distal radius fracture surgery when he was 7 years old, right ring finger flexor tendon repair 20 years ago  Medications: No blood thinners  Allergies: None  Social history: Smokes 5 cigarettes/day, but would be willing to quit  Family history: No bleeding or clotting problems, issues with anesthesia    Examination:  Nonlabored breathing  Not distressed  There is a 15 degree ulnar deviated coronal plane deformity with slight malrotation involving the right ring finger  The ring finger does not bend much at the DIP and PIP joints  When applying pressure over the fracture, he has no tenderness.  Well-healed but dense Jony scar along the volar aspect of the right ring finger    XR: Right ring finger PIP pilon type fracture dislocation with displacement and 1.8 mm articular gap.  Right ring finger PIP baseline osteoarthritis.    A/P: 74-year-old male right-hand-dominant smoker presenting with a right closed ring finger PIP pilon type fracture dislocation at 3 weeks postinjury    -Discussed options for management.  Explained that if he were a 20-year-old with no prior surgery to  the ring finger and preinjury normal motion, this fracture would have to be fixed in my opinion.  However, patient is 74-year-old smoker presenting 3 weeks after injury with a preinjury very stiff finger in the setting of PIP osteoarthritis.  In this setting, it would actually not be unreasonable to simply proceed with nonoperative treatment and deal with the consequences thereafter.  Explained that without surgery, his coronal plane and rotation deformity to the finger cannot be corrected.  In addition, if we were to do surgery, we may need to use a dynamic external fixator.  Since his presentation is 3 weeks after injury, with the delay to the OR, it may be difficult to get a very good reduction without opening.  After all this discussion, patient elects to not do surgery.  We encouraged the patient to shamika strap the ring to the middle finger at all times.  We also did discuss and recommend OT for [employer] hand-based splint fabrication to protect the finger, but patient states that this will get in the way of his activities and he does not want that.  Return to clinic in 3 weeks for reevaluation.  -A total of 45 minutes was devoted to review of chart, direct face-to-face patient counseling and documentation during this encounter, exclusive of any procedure performed.    Jarocho Sanders MD, PhD

## 2024-04-16 NOTE — NURSING NOTE
Reason For Visit:   Chief Complaint   Patient presents with    Consult     Consult for intra-articular fracture of right fourth middle phalanx       Primary MD: Enedelia Smith  Ref. MD: self    Age: 74 year old    ?  No      There were no vitals taken for this visit.      Pain Assessment  Patient Currently in Pain: No (finger slammed in car door several weeks ago, no pain, no numbness or tingling, never spinted)    Hand Dominance Evaluation  Hand Dominance: Right          QuickDASH Assessment      4/16/2024    10:07 AM   QuickDASH Main   1. Open a tight or new jar No difficulty   2. Do heavy household chores (e.g., wash walls, floors) No difficulty   3. Carry a shopping bag or briefcase No difficulty   4. Wash your back No difficulty   5. Use a knife to cut food No difficulty   6. Recreational activities in which you take some force or impact through your arm, shoulder or hand (e.g., golf, hammering, tennis, etc.) No difficulty   7. During the past week, to what extent has your arm, shoulder or hand problem interfered with your normal social activities with family, friends, neighbours or groups Not at all   8. During the past week, were you limited in your work or other regular daily activities as a result of your arm, shoulder or hand problem Slightly limited   9. Arm, shoulder or hand pain None   10.Tingling (pins and needles) in your arm,shoulder or hand None   11. During the past week, how much difficulty have you had sleeping because of the pain in your arm, shoulder or hand No difficulty   Quickdash Ability Score 2.27          Current Outpatient Medications   Medication Sig Dispense Refill    albuterol (PROAIR HFA) 108 (90 Base) MCG/ACT inhaler INHALE 1 TO 2 PUFFS BY MOUTH EVERY 4 HOURS AS NEEDED FOR SHORTNESS OF BREATH 8.5 g 3    atorvastatin (LIPITOR) 40 MG tablet Take 1 tablet (40 mg) by mouth daily 90 tablet 3    B Complex-C (VITAMIN B COMPLEX W/VITAMIN C) TABS tablet TAKE 1 TABLET BY MOUTH  TWICE DAILY WITH FOLIC ACID 180 tablet 3    Calcium Carbonate-Vitamin D (CALCIUM 600 +D HIGH POTENCY) 600-10 MG-MCG TABS Take 1 tablet by mouth 2 times daily 90 tablet 3    fish oil-omega-3 fatty acids 1000 MG capsule Take 1 capsule (1 g) by mouth daily 90 capsule 3    Ginkgo Biloba 60 MG TABS Take 1 tablet by mouth daily 90 tablet 3    glucosamine-chondroitinoitin 750-600 MG TABS Take 2 tablets by mouth 2 times daily 120 tablet 3    ipratropium-albuterol (COMBIVENT RESPIMAT)  MCG/ACT inhaler Inhale 1 puff into the lungs 4 times daily as needed for shortness of breath, wheezing or cough (Inhale 1 puff into the lungs 4 times daily as needed. Do not exceed 6 doses per day.,) 4 g 4    Selenium (SELENIMIN-200) 200 MCG TABS tablet Take 1 tablet (200 mcg) by mouth daily 90 tablet 3    vitamin A 3 MG (77344 UNITS) capsule Take 1 capsule (10,000 Units) by mouth daily 90 capsule 3    vitamin C (ASCORBIC ACID) 1000 MG TABS Take 1 tablet (1,000 mg) by mouth daily 90 tablet 3    vitamin E (TOCOPHEROL) 1000 units (450 mg) CAPS capsule Take 1 capsule (1,000 Units) by mouth daily 90 capsule 3    WIXELA INHUB 500-50 MCG/ACT inhaler Inhale 1 puff into the lungs 2 times daily 60 each 11       No Known Allergies    Silvia Lawrence, ATC

## 2024-04-17 NOTE — PROGRESS NOTES
Ortho Hand    HPI: 74-year-old right-hand-dominant smoker presenting with right ring finger injury.  Approximately 3 weeks ago, patient slammed his right ring finger in a car door.  No other injuries.  Of note, he had right ring finger flexor tendon surgery 20 years ago that did not heal properly.  As a result, his right ring finger at baseline is essentially stiff with very little motion.  He would like to avoid surgery.  He is very active, and swims daily as well as rides a bike.    ROS: Negative, see HPI  Past medical history: Hyperlipidemia, COPD  Past surgical history: Left distal radius fracture surgery when he was 7 years old, right ring finger flexor tendon repair 20 years ago  Medications: No blood thinners  Allergies: None  Social history: Smokes 5 cigarettes/day, but would be willing to quit  Family history: No bleeding or clotting problems, issues with anesthesia    Examination:  Nonlabored breathing  Not distressed  There is a 15 degree ulnar deviated coronal plane deformity with slight malrotation involving the right ring finger  The ring finger does not bend much at the DIP and PIP joints  When applying pressure over the fracture, he has no tenderness.  Well-healed but dense Jony scar along the volar aspect of the right ring finger    XR: Right ring finger PIP pilon type fracture dislocation with displacement and 1.8 mm articular gap.  Right ring finger PIP baseline osteoarthritis.    A/P: 74-year-old male right-hand-dominant smoker presenting with a right closed ring finger PIP pilon type fracture dislocation at 3 weeks postinjury    -Discussed options for management.  Explained that if he were a 20-year-old with no prior surgery to the ring finger and preinjury normal motion, this fracture would have to be fixed in my opinion.  However, patient is 74-year-old smoker presenting 3 weeks after injury with a preinjury very stiff finger in the setting of PIP osteoarthritis.  In this setting, it would  actually not be unreasonable to simply proceed with nonoperative treatment and deal with the consequences thereafter.  Explained that without surgery, his coronal plane and rotation deformity to the finger cannot be corrected.  In addition, if we were to do surgery, we may need to use a dynamic external fixator.  Since his presentation is 3 weeks after injury, with the delay to the OR, it may be difficult to get a very good reduction without opening.  After all this discussion, patient elects to not do surgery.  We encouraged the patient to shamika strap the ring to the middle finger at all times.  We also did discuss and recommend OT for Orthoplast hand-based splint fabrication to protect the finger, but patient states that this will get in the way of his activities and he does not want that.  Return to clinic in 3 weeks for reevaluation.  -A total of 45 minutes was devoted to review of chart, direct face-to-face patient counseling and documentation during this encounter, exclusive of any procedure performed.    Jarocho Sanders MD, PhD

## 2024-04-24 DIAGNOSIS — M79.641 HAND PAIN, RIGHT: Primary | ICD-10-CM

## 2024-04-25 DIAGNOSIS — J44.9 COPD (CHRONIC OBSTRUCTIVE PULMONARY DISEASE) (H): ICD-10-CM

## 2024-04-25 DIAGNOSIS — J44.9 CHRONIC OBSTRUCTIVE PULMONARY DISEASE, UNSPECIFIED COPD TYPE (H): ICD-10-CM

## 2024-04-25 NOTE — TELEPHONE ENCOUNTER
Medication:COMBIVENT and WIXELA  Last prescribing provider:Dr. Taylor  Last clinic visit date: procedure on 2/12/2024  Recommendations for requested medication:for shortness of breath, wheezing   Any other pertinent information:routed to Dr. Taylor

## 2024-04-26 RX ORDER — FLUTICASONE PROPIONATE AND SALMETEROL 500; 50 UG/1; UG/1
1 POWDER RESPIRATORY (INHALATION) 2 TIMES DAILY
Qty: 60 EACH | Refills: 11 | Status: SHIPPED | OUTPATIENT
Start: 2024-04-26 | End: 2024-10-07

## 2024-04-26 NOTE — TELEPHONE ENCOUNTER
Pt calling to check in on status of refill request. Pt reports he is out of medication and needs them filled today.    Paged

## 2024-04-26 NOTE — TELEPHONE ENCOUNTER
Call to pt to inform him prescriptions have been sent in by . Pt verbalized understanding and grateful for help.

## 2024-05-06 ENCOUNTER — TELEPHONE (OUTPATIENT)
Dept: FAMILY MEDICINE | Facility: CLINIC | Age: 74
End: 2024-05-06
Payer: COMMERCIAL

## 2024-05-06 NOTE — TELEPHONE ENCOUNTER
Patient called and stated they called last week and wanted all of the medications refilled. Stated that Almeida sent in plenty of refills on 4/2 to [facility] and patient states they never received it.  This writer then calls [facility] and they state they received all the scripts from 4/2 and was picked up on 4/26 from patient.    Writer then leaves patient a voicemail to call PCP if there are other medications that they need refills.

## 2024-05-07 DIAGNOSIS — M16.11 OSTEOARTHRITIS OF RIGHT HIP, UNSPECIFIED OSTEOARTHRITIS TYPE: ICD-10-CM

## 2024-05-07 RX ORDER — MAGNESIUM CARB/ALUMINUM HYDROX 105-160MG
TABLET,CHEWABLE ORAL
Qty: 120 TABLET | Refills: 3 | Status: SHIPPED | OUTPATIENT
Start: 2024-05-07

## 2024-05-07 NOTE — TELEPHONE ENCOUNTER
Patient now calls and said they do not want Enedelia Smith as their PCP since they do not want to go to [facility] since they have been comign to [place] for the past 10 years. Made an appointment with Timoteo Pickens to have patient establish care since they do not want Chastity Arguello as their PCP either

## 2024-05-13 ENCOUNTER — PATIENT OUTREACH (OUTPATIENT)
Dept: GERIATRIC MEDICINE | Facility: CLINIC | Age: 74
End: 2024-05-13
Payer: COMMERCIAL

## 2024-05-13 NOTE — Clinical Note
Jon, Dr. Pickens, I am the community care coordinator for Bob Behr. He doesn't have a PCP listed, but it looks as though you did his last physical. I wanted to let you know that he declined a home visit this year. Please let me know if you need further information. Thank you, Leeanna Sigala RN, BSN, PHN [facility] Coordinator [phone]

## 2024-05-13 NOTE — LETTER
May 30, 2024    ROBERT B BEHR  [address]  [place] [address]        Dear Shirley:    As a member of [facility], we offer a health risk assessment at no cost to you. I know you don't want to have the assessment right now. If you change your mind, please call me at the number below.    Who performs the health risk assessment?  A [facility] Care Coordinator performs the assessment. Our Care Coordinators can also help you understand your benefits. They can tell you about services to aid you at home, such as managing your care with your doctors if your health worsens.    Our Care Coordinators are here for you if you need:  Support for activities you used to do by yourself (including making meals, bathing, and paying bills)  Equipment for bathroom or home safety  Help finding a new place to live  Information on staying healthy, preventing falls and immunizations    Questions?  If you have questions, or you would like to do the assessment, call me at [phone]. TTY users call [phone]. I'm here from 8am to 5pm. I may reach out to you again soon.      Sincerely,        Leeanna Sigala RN, BSN, PHN    E-mail: [email or web address]  Phone: [phone]      [facility]     <[id number] accepted    [id number] (12/2021)  [id number]>

## 2024-05-13 NOTE — PROGRESS NOTES
[facility] Care Coordination Contact      [facility] Refusal Telephone Assessment    Member refused home visit HRA on 5/13/24 (reason: Doesn't feel he needs an assessment at this time.).    ER visits: No  Hospitalizations: Yes -  [facility]  Health concerns: None  Falls/Injuries: No  ADL/IADL Dependencies: None reported      Member currently receiving the following home care services:  None   Member currently receiving the following community resources:    Informal support(s):  None reported    Advanced Care Planning discussion, complete code section.    [facility] sponsored benefits: Shared information re: Silver Sneakers/gym memberships, ASA, Calcium +D.    Follow-Up Plan: Member informed of future contact, plan to f/u with member with a 6 month telephone assessment and offer a home visit.  Contact information shared with member and family, encouraged member to call with any questions or concerns at any time.    Member has had a sore throat and has been a little horse. He saw a doctor and they did testing but did not find anything wrong.    This CC note routed to PCP, No primary care provider on file. Sent to last doctor who assessed member.    Leeanna Sigala, RN, BSN, PHN  [facility] Care Coordinator  [phone]

## 2024-05-26 ENCOUNTER — HEALTH MAINTENANCE LETTER (OUTPATIENT)
Age: 74
End: 2024-05-26

## 2024-05-30 NOTE — PROGRESS NOTES
"Per CC, mailed client a \"Refusal of Home Visit\" letter.    Tennille Jensen  Care Management Specialist  [facility]  [phone]    "

## 2024-06-18 ENCOUNTER — ANCILLARY PROCEDURE (OUTPATIENT)
Dept: CT IMAGING | Facility: CLINIC | Age: 74
End: 2024-06-18
Attending: INTERNAL MEDICINE
Payer: COMMERCIAL

## 2024-06-18 ENCOUNTER — OFFICE VISIT (OUTPATIENT)
Dept: PULMONOLOGY | Facility: CLINIC | Age: 74
End: 2024-06-18
Attending: INTERNAL MEDICINE
Payer: COMMERCIAL

## 2024-06-18 VITALS
HEIGHT: 69 IN | SYSTOLIC BLOOD PRESSURE: 142 MMHG | WEIGHT: 159.1 LBS | DIASTOLIC BLOOD PRESSURE: 75 MMHG | OXYGEN SATURATION: 96 % | BODY MASS INDEX: 23.57 KG/M2 | HEART RATE: 67 BPM

## 2024-06-18 DIAGNOSIS — R91.8 MASS OF UPPER LOBE OF RIGHT LUNG: ICD-10-CM

## 2024-06-18 DIAGNOSIS — R91.8 MASS OF UPPER LOBE OF RIGHT LUNG: Primary | ICD-10-CM

## 2024-06-18 PROCEDURE — 99214 OFFICE O/P EST MOD 30 MIN: CPT | Performed by: INTERNAL MEDICINE

## 2024-06-18 PROCEDURE — 71250 CT THORAX DX C-: CPT | Mod: GC | Performed by: RADIOLOGY

## 2024-06-18 PROCEDURE — G0463 HOSPITAL OUTPT CLINIC VISIT: HCPCS | Performed by: INTERNAL MEDICINE

## 2024-06-18 ASSESSMENT — PAIN SCALES - GENERAL: PAINLEVEL: NO PAIN (0)

## 2024-06-18 NOTE — PATIENT INSTRUCTIONS
The spot in your upper right lung remains uncertain, I am concerned that another biopsy would be risky and possibly cause complications. It could be a cancer but it doesn't seem to be growing or changing rapidly.     We will keep watching with another scan this fall

## 2024-06-18 NOTE — LETTER
6/18/2024      Robert B Behr  [address]  [place] [address]      Dear Colleague,    Thank you for referring your patient, Robert B Behr, to the [facility] [place]. Please see a copy of my visit note below.    Reason for Visit  Robert B Behr is a 74 year old year old male who is being seen for RECHECK (Return Pulmonary )  Pulmonary HPI  Robert B Behr is a 74 year old male with a history of COPD, lung nodules, and tobacco use who presents for follow-up.     He rolls his own cigarettes and smokes 3-5 every day. He still has daily clear sputum production in the morning. He uses Wixela twice a day. He has been regularly swimming (20 minutes per day), and is using elliptical 4-5 days a week, riding the bike. He is having to slow down the pace with exercise. He also uses Combivent to combat humidity.       He is holding out hope for stem cell therapies for COPD.     Current Outpatient Medications   Medication Sig Dispense Refill    albuterol (PROAIR HFA) 108 (90 Base) MCG/ACT inhaler INHALE 1 TO 2 PUFFS BY MOUTH EVERY 4 HOURS AS NEEDED FOR SHORTNESS OF BREATH 8.5 g 3    atorvastatin (LIPITOR) 40 MG tablet Take 1 tablet (40 mg) by mouth daily 90 tablet 3    B Complex-C (VITAMIN B COMPLEX W/VITAMIN C) TABS tablet TAKE 1 TABLET BY MOUTH TWICE DAILY WITH FOLIC ACID 180 tablet 3    CALCIUM 600/VITAMIN D3 600-20 MG-MCG TABS Take 1 tablet by mouth 2 times daily      Calcium Carbonate-Vitamin D (CALCIUM 600 +D HIGH POTENCY) 600-10 MG-MCG TABS Take 1 tablet by mouth 2 times daily 90 tablet 3    fish oil-omega-3 fatty acids 1000 MG capsule Take 1 capsule (1 g) by mouth daily 90 capsule 3    Ginkgo Biloba 60 MG TABS Take 1 tablet by mouth daily 90 tablet 3    glucosamine-chondroitinoitin 750-600 MG TABS TAKE 1 TABLETS BY MOUTH TWICE DAILY 120 tablet 3    ipratropium-albuterol (COMBIVENT RESPIMAT)  MCG/ACT inhaler Inhale 1 puff into the lungs 4 times daily  as needed for shortness of breath, wheezing or cough (Inhale 1 puff into the lungs 4 times daily as needed. Do not exceed 6 doses per day.,) 4 g 4    Selenium (SELENIMIN-200) 200 MCG TABS tablet Take 1 tablet (200 mcg) by mouth daily 90 tablet 3    vitamin A 3 MG (59963 UNITS) capsule Take 1 capsule (10,000 Units) by mouth daily 90 capsule 3    vitamin C (ASCORBIC ACID) 1000 MG TABS Take 1 tablet (1,000 mg) by mouth daily 90 tablet 3    vitamin E (TOCOPHEROL) 1000 units (450 mg) CAPS capsule Take 1 capsule (1,000 Units) by mouth daily 90 capsule 3    WIXELA INHUB 500-50 MCG/ACT inhaler Inhale 1 puff into the lungs 2 times daily 60 each 11     No current facility-administered medications for this visit.   No Known Allergies  Past Medical History:   Diagnosis Date    Cataract     COPD (chronic obstructive pulmonary disease) (H)     diagnosed with spirometry     Fractured lateral malleolus     Lung nodules     CT scan 2016    Osteoporosis     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    Sepsis without acute organ dysfunction, due to unspecified organism (H)     Tobacco abuse        Past Surgical History:   Procedure Laterality Date    BRONCHOSCOPY RIGID OR FLEXIBLE W/TRANSENDOSCOPIC ENDOBRONCHIAL ULTRASOUND GUIDED N/A 2/12/2024    Procedure: Endobronchial Ultrasound Guided;  Surgeon: Paulo Bryan MD;  Location:  OR    BRONCHOSCOPY, WITH BIOPSY, ROBOT ASSISTED N/A 2/12/2024    Procedure: BRONCHOSCOPY, ROBOT-ASSISTED, WITH BIOPSY ION;  Surgeon: Paulo Bryan MD;  Location:  OR    CATARACT IOL, RT/LT Left 09/15/2017    s/p CE/IOL left eye    CATARACT IOL, RT/LT Right     CV CORONARY ANGIOGRAM  10/23/2023    Procedure: CV CORONARY ANGIOGRAM;  Surgeon: Torin Parker MD;  Location:  HEART CARDIAC CATH LAB    ESOPHAGOSCOPY, GASTROSCOPY, DUODENOSCOPY (EGD), COMBINED N/A 10/22/2023    Procedure: Esophagoscopy, gastroscopy, duodenoscopy (EGD), combined;  Surgeon: David Post MD;  Location:  GI     PHACOEMULSIFICATION CLEAR CORNEA WITH STANDARD INTRAOCULAR LENS IMPLANT Right 9/30/2016    Procedure: PHACOEMULSIFICATION CLEAR CORNEA WITH STANDARD INTRAOCULAR LENS IMPLANT;  Surgeon: Elly Valencia MD;  Location: [facility]    PHACOEMULSIFICATION WITH STANDARD INTRAOCULAR LENS IMPLANT Left 9/15/2017    Procedure: PHACOEMULSIFICATION WITH STANDARD INTRAOCULAR LENS IMPLANT;  Left Eye Phacoemulsification with Standard Lens;  Surgeon: Elly Valencia MD;  Location: [facility] OR    WRIST SURGERY         Social History     Socioeconomic History    Marital status: Single     Spouse name: Not on file    Number of children: 0    Years of education: Not on file    Highest education level: Not on file   Occupational History    Occupation: Musician/ (retired)     Comment: taught audio engineering, played drums   Tobacco Use    Smoking status: Every Day     Current packs/day: 1.00     Average packs/day: 1 pack/day for 60.5 years (60.5 ttl pk-yrs)     Types: Cigarettes     Start date: 1964    Smokeless tobacco: Former    Tobacco comments:     *2ppd for 10 years, 1ppd for 25 years, now 5 cigs per day. Actively working on quitting.   Vaping Use    Vaping status: Never Used   Substance and Sexual Activity    Alcohol use: Yes     Alcohol/week: 6.0 standard drinks of alcohol     Types: 6 Cans of beer per week     Comment: Drinks for sporting events.    Drug use: Not Currently     Types: Marijuana     Comment: Marijuana - as a youth    Sexual activity: Not Currently   Other Topics Concern    Parent/sibling w/ CABG, MI or angioplasty before 65F 55M? No   Social History Narrative    Single.  Retired.     No children    5 siblings:      No family history of bleeding, clotting disorders or complications with anesthesia.     Social Determinants of Health     Financial Resource Strain: Not on file   Food Insecurity: Not on file   Transportation Needs: Not on file   Physical Activity: Not on file   Stress: Not on file   Social  "Connections: Not on file   Interpersonal Safety: Low Risk  (3/15/2024)    Interpersonal Safety     Do you feel physically and emotionally safe where you currently live?: Yes     Within the past 12 months, have you been hit, slapped, kicked or otherwise physically hurt by someone?: No     Within the past 12 months, have you been humiliated or emotionally abused in other ways by your partner or ex-partner?: No   Housing Stability: Not on file     A complete ROS was otherwise negative except as noted in the HPI.  BP (!) 142/75   Pulse 67   Ht 1.753 m (5' 9\")   Wt 72.2 kg (159 lb 1.6 oz)   SpO2 96%   BMI 23.49 kg/m    Exam:   GENERAL APPEARANCE: Well developed, well nourished, alert, and in no apparent distress.  RESP: normal percussion, good air flow throughout.  No crackles. No rhonchi. No wheezes.  NEURO: Mentation intact, speech normal, normal strength and tone, normal gait and stance  PSYCH: mentation appears normal. and affect normal/bright  Results:  Spirometry March 2020 at [facility] moderate obstruction normal diffusion    Assessment and plan: Robert B Behr is a 74 year old male with a history of COPD, lung nodules, and tobacco use who presents for follow-up.  # COPD:  Currently on inhaled corticosteroid / long-acting beta agonist he just never really used long-acting muscarinic agent despite having the medication.  - Maintenance therapy: Wixela   - Rescue: Combivent, Albuterol    # Tobacco use: continues to roll his own cigarettes and not interested in quitting. He smokes 3-5 cig/day    # lung mass - persistent for several years, there is some FDG uptake, he had two prior JAKY on sputum culture, minimally symptomatic, at high risk for cancer, the bronchoscopy biopsy showed atypical cells. Today we discussed the uncertainty of this lesion, potential risk of repeat biopsy (percutaneous biopsy and risk of hospitalization for persistent air leak and pneumothorax), potential treatments for cancer (likely no " surgery, so radiation or systemic therapy)     # Vaccinations: he has declined COVID vaccinations in the past        Svitlana Taylor MD

## 2024-06-18 NOTE — PROGRESS NOTES
Reason for Visit  Robert B Behr is a 74 year old year old male who is being seen for RECHECK (Return Pulmonary )  Pulmonary HPI  Robert B Behr is a 74 year old male with a history of COPD, lung nodules, and tobacco use who presents for follow-up.     He rolls his own cigarettes and smokes 3-5 every day. He still has daily clear sputum production in the morning. He uses Wixela twice a day. He has been regularly swimming (20 minutes per day), and is using elliptical 4-5 days a week, riding the bike. He is having to slow down the pace with exercise. He also uses Combivent to combat humidity.       He is holding out hope for stem cell therapies for COPD.     Current Outpatient Medications   Medication Sig Dispense Refill    albuterol (PROAIR HFA) 108 (90 Base) MCG/ACT inhaler INHALE 1 TO 2 PUFFS BY MOUTH EVERY 4 HOURS AS NEEDED FOR SHORTNESS OF BREATH 8.5 g 3    atorvastatin (LIPITOR) 40 MG tablet Take 1 tablet (40 mg) by mouth daily 90 tablet 3    B Complex-C (VITAMIN B COMPLEX W/VITAMIN C) TABS tablet TAKE 1 TABLET BY MOUTH TWICE DAILY WITH FOLIC ACID 180 tablet 3    CALCIUM 600/VITAMIN D3 600-20 MG-MCG TABS Take 1 tablet by mouth 2 times daily      Calcium Carbonate-Vitamin D (CALCIUM 600 +D HIGH POTENCY) 600-10 MG-MCG TABS Take 1 tablet by mouth 2 times daily 90 tablet 3    fish oil-omega-3 fatty acids 1000 MG capsule Take 1 capsule (1 g) by mouth daily 90 capsule 3    Ginkgo Biloba 60 MG TABS Take 1 tablet by mouth daily 90 tablet 3    glucosamine-chondroitinoitin 750-600 MG TABS TAKE 1 TABLETS BY MOUTH TWICE DAILY 120 tablet 3    ipratropium-albuterol (COMBIVENT RESPIMAT)  MCG/ACT inhaler Inhale 1 puff into the lungs 4 times daily as needed for shortness of breath, wheezing or cough (Inhale 1 puff into the lungs 4 times daily as needed. Do not exceed 6 doses per day.,) 4 g 4    Selenium (SELENIMIN-200) 200 MCG TABS tablet Take 1 tablet (200 mcg) by mouth daily 90 tablet 3    vitamin A 3 MG (12118 UNITS)  capsule Take 1 capsule (10,000 Units) by mouth daily 90 capsule 3    vitamin C (ASCORBIC ACID) 1000 MG TABS Take 1 tablet (1,000 mg) by mouth daily 90 tablet 3    vitamin E (TOCOPHEROL) 1000 units (450 mg) CAPS capsule Take 1 capsule (1,000 Units) by mouth daily 90 capsule 3    WIXELA INHUB 500-50 MCG/ACT inhaler Inhale 1 puff into the lungs 2 times daily 60 each 11     No current facility-administered medications for this visit.   No Known Allergies  Past Medical History:   Diagnosis Date    Cataract     COPD (chronic obstructive pulmonary disease) (H)     diagnosed with spirometry     Fractured lateral malleolus     Lung nodules     CT scan 2016    Osteoporosis     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    Sepsis without acute organ dysfunction, due to unspecified organism (H)     Tobacco abuse        Past Surgical History:   Procedure Laterality Date    BRONCHOSCOPY RIGID OR FLEXIBLE W/TRANSENDOSCOPIC ENDOBRONCHIAL ULTRASOUND GUIDED N/A 2/12/2024    Procedure: Endobronchial Ultrasound Guided;  Surgeon: Paulo Bryan MD;  Location: [facility] OR    BRONCHOSCOPY, WITH BIOPSY, ROBOT ASSISTED N/A 2/12/2024    Procedure: BRONCHOSCOPY, ROBOT-ASSISTED, WITH BIOPSY ION;  Surgeon: Paulo Bryan MD;  Location: [facility] OR    CATARACT IOL, RT/LT Left 09/15/2017    s/p CE/IOL left eye    CATARACT IOL, RT/LT Right     CV CORONARY ANGIOGRAM  10/23/2023    Procedure: CV CORONARY ANGIOGRAM;  Surgeon: Torin Parker MD;  Location:  HEART CARDIAC CATH LAB    ESOPHAGOSCOPY, GASTROSCOPY, DUODENOSCOPY (EGD), COMBINED N/A 10/22/2023    Procedure: Esophagoscopy, gastroscopy, duodenoscopy (EGD), combined;  Surgeon: David Post MD;  Location:  GI    PHACOEMULSIFICATION CLEAR CORNEA WITH STANDARD INTRAOCULAR LENS IMPLANT Right 9/30/2016    Procedure: PHACOEMULSIFICATION CLEAR CORNEA WITH STANDARD INTRAOCULAR LENS IMPLANT;  Surgeon: Elly Valencia MD;  Location:  [facility]    PHACOEMULSIFICATION WITH STANDARD INTRAOCULAR LENS  IMPLANT Left 9/15/2017    Procedure: PHACOEMULSIFICATION WITH STANDARD INTRAOCULAR LENS IMPLANT;  Left Eye Phacoemulsification with Standard Lens;  Surgeon: Elly Valencia MD;  Location: [facility] OR    WRIST SURGERY         Social History     Socioeconomic History    Marital status: Single     Spouse name: Not on file    Number of children: 0    Years of education: Not on file    Highest education level: Not on file   Occupational History    Occupation: Musician/ (retired)     Comment: taught audio engineering, played drums   Tobacco Use    Smoking status: Every Day     Current packs/day: 1.00     Average packs/day: 1 pack/day for 60.5 years (60.5 ttl pk-yrs)     Types: Cigarettes     Start date: 1964    Smokeless tobacco: Former    Tobacco comments:     *2ppd for 10 years, 1ppd for 25 years, now 5 cigs per day. Actively working on quitting.   Vaping Use    Vaping status: Never Used   Substance and Sexual Activity    Alcohol use: Yes     Alcohol/week: 6.0 standard drinks of alcohol     Types: 6 Cans of beer per week     Comment: Drinks for sporting events.    Drug use: Not Currently     Types: Marijuana     Comment: Marijuana - as a youth    Sexual activity: Not Currently   Other Topics Concern    Parent/sibling w/ CABG, MI or angioplasty before 65F 55M? No   Social History Narrative    Single.  Retired.     No children    5 siblings:      No family history of bleeding, clotting disorders or complications with anesthesia.     Social Determinants of Health     Financial Resource Strain: Not on file   Food Insecurity: Not on file   Transportation Needs: Not on file   Physical Activity: Not on file   Stress: Not on file   Social Connections: Not on file   Interpersonal Safety: Low Risk  (3/15/2024)    Interpersonal Safety     Do you feel physically and emotionally safe where you currently live?: Yes     Within the past 12 months, have you been hit, slapped, kicked or otherwise physically hurt by someone?: No  "    Within the past 12 months, have you been humiliated or emotionally abused in other ways by your partner or ex-partner?: No   Housing Stability: Not on file     A complete ROS was otherwise negative except as noted in the HPI.  BP (!) 142/75   Pulse 67   Ht 1.753 m (5' 9\")   Wt 72.2 kg (159 lb 1.6 oz)   SpO2 96%   BMI 23.49 kg/m    Exam:   GENERAL APPEARANCE: Well developed, well nourished, alert, and in no apparent distress.  RESP: normal percussion, good air flow throughout.  No crackles. No rhonchi. No wheezes.  NEURO: Mentation intact, speech normal, normal strength and tone, normal gait and stance  PSYCH: mentation appears normal. and affect normal/bright  Results:  Spirometry March 2020 at [facility] moderate obstruction normal diffusion    Assessment and plan: Robert B Behr is a 74 year old male with a history of COPD, lung nodules, and tobacco use who presents for follow-up.  # COPD:  Currently on inhaled corticosteroid / long-acting beta agonist he just never really used long-acting muscarinic agent despite having the medication.  - Maintenance therapy: Wixela   - Rescue: Combivent, Albuterol    # Tobacco use: continues to roll his own cigarettes and not interested in quitting. He smokes 3-5 cig/day    # lung mass - persistent for several years, there is some FDG uptake, he had two prior JAKY on sputum culture, minimally symptomatic, at high risk for cancer, the bronchoscopy biopsy showed atypical cells. Today we discussed the uncertainty of this lesion, potential risk of repeat biopsy (percutaneous biopsy and risk of hospitalization for persistent air leak and pneumothorax), potential treatments for cancer (likely no surgery, so radiation or systemic therapy)     # Vaccinations: he has declined COVID vaccinations in the past    "

## 2024-06-28 ENCOUNTER — ANCILLARY PROCEDURE (OUTPATIENT)
Dept: GENERAL RADIOLOGY | Facility: CLINIC | Age: 74
End: 2024-06-28
Attending: PHYSICIAN ASSISTANT
Payer: COMMERCIAL

## 2024-06-28 ENCOUNTER — OFFICE VISIT (OUTPATIENT)
Dept: URGENT CARE | Facility: URGENT CARE | Age: 74
End: 2024-06-28
Payer: COMMERCIAL

## 2024-06-28 VITALS
DIASTOLIC BLOOD PRESSURE: 80 MMHG | OXYGEN SATURATION: 95 % | RESPIRATION RATE: 21 BRPM | TEMPERATURE: 98.6 F | HEIGHT: 70 IN | BODY MASS INDEX: 22.15 KG/M2 | WEIGHT: 154.7 LBS | HEART RATE: 95 BPM | SYSTOLIC BLOOD PRESSURE: 138 MMHG

## 2024-06-28 DIAGNOSIS — J44.1 COPD EXACERBATION (H): ICD-10-CM

## 2024-06-28 DIAGNOSIS — J42 CHRONIC BRONCHITIS, UNSPECIFIED CHRONIC BRONCHITIS TYPE (H): ICD-10-CM

## 2024-06-28 DIAGNOSIS — R05.1 ACUTE COUGH: ICD-10-CM

## 2024-06-28 DIAGNOSIS — R05.9 COUGH: ICD-10-CM

## 2024-06-28 DIAGNOSIS — J44.1 COPD EXACERBATION (H): Primary | ICD-10-CM

## 2024-06-28 PROCEDURE — 99214 OFFICE O/P EST MOD 30 MIN: CPT | Mod: 25 | Performed by: PHYSICIAN ASSISTANT

## 2024-06-28 PROCEDURE — 71046 X-RAY EXAM CHEST 2 VIEWS: CPT | Mod: TC | Performed by: RADIOLOGY

## 2024-06-28 PROCEDURE — 94640 AIRWAY INHALATION TREATMENT: CPT | Performed by: PHYSICIAN ASSISTANT

## 2024-06-28 RX ORDER — IPRATROPIUM BROMIDE AND ALBUTEROL SULFATE 2.5; .5 MG/3ML; MG/3ML
3 SOLUTION RESPIRATORY (INHALATION) ONCE
Status: COMPLETED | OUTPATIENT
Start: 2024-06-28 | End: 2024-06-28

## 2024-06-28 RX ORDER — PREDNISONE 20 MG/1
40 TABLET ORAL DAILY
Qty: 10 TABLET | Refills: 0 | Status: SHIPPED | OUTPATIENT
Start: 2024-06-28 | End: 2024-07-03

## 2024-06-28 RX ORDER — AZITHROMYCIN 250 MG/1
TABLET, FILM COATED ORAL
Qty: 6 TABLET | Refills: 0 | Status: SHIPPED | OUTPATIENT
Start: 2024-06-28 | End: 2024-07-03

## 2024-06-28 RX ORDER — IPRATROPIUM BROMIDE AND ALBUTEROL SULFATE 2.5; .5 MG/3ML; MG/3ML
1 SOLUTION RESPIRATORY (INHALATION) EVERY 6 HOURS PRN
Qty: 90 ML | Status: CANCELLED | OUTPATIENT
Start: 2024-06-28

## 2024-06-28 RX ADMIN — IPRATROPIUM BROMIDE AND ALBUTEROL SULFATE 3 ML: 2.5; .5 SOLUTION RESPIRATORY (INHALATION) at 15:20

## 2024-06-28 ASSESSMENT — ENCOUNTER SYMPTOMS
RHINORRHEA: 1
WHEEZING: 1
COUGH: 1
SHORTNESS OF BREATH: 1
FEVER: 0

## 2024-06-28 NOTE — PROGRESS NOTES
Assessment & Plan:        ICD-10-CM    1. COPD exacerbation (H)  J44.1 ipratropium - albuterol 0.5 mg/2.5 mg/3 mL (DUONEB) neb solution 3 mL     predniSONE (DELTASONE) 20 MG tablet     azithromycin (ZITHROMAX) 250 MG tablet      2. Cough  R05.9 XR Chest 2 Views      3. Chronic bronchitis, unspecified chronic bronchitis type (H)  J42             Plan/Clinical Decision Making:    Patient presents with acute wheezing, cough nasal congestion. Hx of COPD/chronic bronchitis. Afebrile. Smoker. Acute wheezing on exam.   I independently visualized the xray:  no area of opacity or infiltrate. Has mass right upper lobe, has upcoming CT scan. Followed by pulmonology.   Will start course of Zithromax and Prednisone. Continue with nebs and inhaler.   Duo neb done in clinic and had improvement of symptoms.     Return if symptoms worsen or fail to improve, for in 3-5 days.     At the end of the encounter, I discussed results, diagnosis, medications. Discussed red flags for immediate return to clinic/ER, as well as indications for follow up if no improvement. Patient understood and agreed to plan. Patient was stable for discharge.        Alison Encarnacion PA-C on 6/28/2024 at 3:32 PM          Subjective:     HPI:    Ravin is a 74 year old male who presents to clinic today for the following health issues:  Chief Complaint   Patient presents with    Urgent Care     Complain of congestion, notices symptoms about 2 days ago with chest tightness, coughing up mucus and hard time breathing. Is using inhaler.      HPI    Patient complains of bronchitis. Developed cough, wheezing. Did neb today at home. Using inhaler. Has hx of COPD. Has increased dyspnea and sputum.   Followed by pulmonology. On twice a day fluticasone/salmeterol.   Active tobacco use.       Review of Systems   Constitutional:  Negative for fever.   HENT:  Positive for congestion and rhinorrhea.    Respiratory:  Positive for cough, shortness of breath and wheezing.   "        Patient Active Problem List   Diagnosis    COPD (chronic obstructive pulmonary disease) (H)    Tobacco use disorder    Hyperlipidemia LDL goal <130    Hiatal hernia    Ankle pain, chronic    Mass of upper lobe of right lung    Prediabetes    Low hemoglobin        Past Medical History:   Diagnosis Date    Cataract     COPD (chronic obstructive pulmonary disease) (H)     diagnosed with spirometry     Fractured lateral malleolus     Lung nodules     CT scan 2016    Osteoporosis     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    Sepsis without acute organ dysfunction, due to unspecified organism (H)     Tobacco abuse        Social History     Tobacco Use    Smoking status: Every Day     Current packs/day: 1.00     Average packs/day: 1 pack/day for 60.5 years (60.5 ttl pk-yrs)     Types: Cigarettes     Start date: 1964    Smokeless tobacco: Former    Tobacco comments:     *2ppd for 10 years, 1ppd for 25 years, now 5 cigs per day. Actively working on quitting.   Substance Use Topics    Alcohol use: Yes     Alcohol/week: 6.0 standard drinks of alcohol     Types: 6 Cans of beer per week     Comment: Drinks for sporting events.             Objective:     Vitals:    06/28/24 1457   BP: 138/80   BP Location: Right arm   Patient Position: Sitting   Cuff Size: Adult Regular   Pulse: 95   Resp: 21   Temp: 98.6  F (37  C)   TempSrc: Temporal   SpO2: 95%   Weight: 70.2 kg (154 lb 11.2 oz)   Height: 1.778 m (5' 10\")         Physical Exam   EXAM:   Pleasant, alert, appropriate appearance. NAD. Mild dyspnea.   Head Exam: Normocephalic, atraumatic.  Eye Exam:  dilated eyes, non icteric/injection.    Nose Exam: Normal external nose.    OroPharynx Exam:  Moist mucous membranes.   Neck/Thyroid Exam:  supple  Chest/Respiratory Exam: bilateral wheezing.  Cardiovascular Exam: RRR.       Results:  No results found for any visits on 06/28/24.    "

## 2024-07-01 RX ORDER — PREDNISONE 20 MG/1
TABLET ORAL
Qty: 10 TABLET | Refills: 0 | OUTPATIENT
Start: 2024-07-01

## 2024-07-01 NOTE — TELEPHONE ENCOUNTER
Prednisone refill declined, as last filled 3 days ago. Should not be out. Needs visit for additional refills.    Dr. Pickens

## 2024-07-02 ENCOUNTER — OFFICE VISIT (OUTPATIENT)
Dept: URGENT CARE | Facility: URGENT CARE | Age: 74
End: 2024-07-02
Payer: COMMERCIAL

## 2024-07-02 VITALS
TEMPERATURE: 97.7 F | OXYGEN SATURATION: 96 % | RESPIRATION RATE: 24 BRPM | SYSTOLIC BLOOD PRESSURE: 130 MMHG | HEART RATE: 79 BPM | DIASTOLIC BLOOD PRESSURE: 73 MMHG

## 2024-07-02 DIAGNOSIS — J44.0 CHRONIC OBSTRUCTIVE PULMONARY DISEASE WITH ACUTE LOWER RESPIRATORY INFECTION (H): Primary | ICD-10-CM

## 2024-07-02 DIAGNOSIS — F17.200 TOBACCO USE DISORDER: ICD-10-CM

## 2024-07-02 DIAGNOSIS — R06.2 WHEEZING: ICD-10-CM

## 2024-07-02 PROCEDURE — 99214 OFFICE O/P EST MOD 30 MIN: CPT | Mod: 25 | Performed by: FAMILY MEDICINE

## 2024-07-02 PROCEDURE — 94640 AIRWAY INHALATION TREATMENT: CPT | Performed by: FAMILY MEDICINE

## 2024-07-02 RX ORDER — IPRATROPIUM BROMIDE AND ALBUTEROL SULFATE 2.5; .5 MG/3ML; MG/3ML
1 SOLUTION RESPIRATORY (INHALATION) EVERY 6 HOURS PRN
Qty: 90 ML | Refills: 0 | Status: SHIPPED | OUTPATIENT
Start: 2024-07-02

## 2024-07-02 RX ORDER — IPRATROPIUM BROMIDE AND ALBUTEROL SULFATE 2.5; .5 MG/3ML; MG/3ML
3 SOLUTION RESPIRATORY (INHALATION) ONCE
Status: COMPLETED | OUTPATIENT
Start: 2024-07-02 | End: 2024-07-02

## 2024-07-02 RX ADMIN — IPRATROPIUM BROMIDE AND ALBUTEROL SULFATE 3 ML: 2.5; .5 SOLUTION RESPIRATORY (INHALATION) at 15:35

## 2024-07-02 NOTE — PROGRESS NOTES
Chief Complaint   Patient presents with    Urgent Care    Asthma    Shortness of Breath     Patient presents with wheezing and shortness of breath. Patient was seen 3x days ago but is having no relief from any of the medication that has been given.      Ravin was seen today for urgent care, asthma and shortness of breath.    Diagnoses and all orders for this visit:    Chronic obstructive pulmonary disease with acute lower respiratory infection (H)  -     ipratropium - albuterol 0.5 mg/2.5 mg/3 mL (DUONEB) neb solution 3 mL  -     INHALATION/NEBULIZER TREATMENT, INITIAL    Wheezing    Tobacco use disorder        Reviewed with patient of the clinical findings recently had an x-ray did not show any acute infiltrate.  Patient was assured and suggested to continue doing the neb treatment to help with the wheezing and the shortness of breath symptoms.  Also advised to continue on the Wixela inhaler and Combivent for COPD.  After the neb treatment he did feel better   Discussed with patient to continue on above treatment       did spent>35 minutes with patient and > 50% of the time was for answering questions, discussing findings, counseling and coordination of care         SUBJECTIVE:  Robert B Behr is a 74 year old male with h/o prediabetes , copd , with a chief complaint of coughing and sob and wheezing was seen 3 days ago and treated with azithromycin and also prednisone Course of illness: gradual onset.  Severity moderate  Current and Associated symptoms: cough - non-productive, cough - productive, wheezing, and shortness of breath  Treatment measures tried include Inhaler (name: ).  Predisposing factors include tobacco use and HX of COPD.    Past Medical History:   Diagnosis Date    Cataract     COPD (chronic obstructive pulmonary disease) (H)     diagnosed with spirometry     Fractured lateral malleolus     Lung nodules     CT scan 2016    Osteoporosis     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    Sepsis without acute organ  dysfunction, due to unspecified organism (H)     Tobacco abuse      Current Outpatient Medications   Medication Sig Dispense Refill    albuterol (PROAIR HFA) 108 (90 Base) MCG/ACT inhaler INHALE 1 TO 2 PUFFS BY MOUTH EVERY 4 HOURS AS NEEDED FOR SHORTNESS OF BREATH 8.5 g 3    atorvastatin (LIPITOR) 40 MG tablet Take 1 tablet (40 mg) by mouth daily 90 tablet 3    azithromycin (ZITHROMAX) 250 MG tablet Take 2 tablets (500 mg) by mouth daily for 1 day, THEN 1 tablet (250 mg) daily for 4 days. 6 tablet 0    B Complex-C (VITAMIN B COMPLEX W/VITAMIN C) TABS tablet TAKE 1 TABLET BY MOUTH TWICE DAILY WITH FOLIC ACID 180 tablet 3    CALCIUM 600/VITAMIN D3 600-20 MG-MCG TABS Take 1 tablet by mouth 2 times daily      Calcium Carbonate-Vitamin D (CALCIUM 600 +D HIGH POTENCY) 600-10 MG-MCG TABS Take 1 tablet by mouth 2 times daily 90 tablet 3    fish oil-omega-3 fatty acids 1000 MG capsule Take 1 capsule (1 g) by mouth daily 90 capsule 3    glucosamine-chondroitinoitin 750-600 MG TABS TAKE 1 TABLETS BY MOUTH TWICE DAILY 120 tablet 3    ipratropium-albuterol (COMBIVENT RESPIMAT)  MCG/ACT inhaler Inhale 1 puff into the lungs 4 times daily as needed for shortness of breath, wheezing or cough (Inhale 1 puff into the lungs 4 times daily as needed. Do not exceed 6 doses per day.,) 4 g 4    predniSONE (DELTASONE) 20 MG tablet Take 2 tablets (40 mg) by mouth daily for 5 days 10 tablet 0    Selenium (SELENIMIN-200) 200 MCG TABS tablet Take 1 tablet (200 mcg) by mouth daily 90 tablet 3    vitamin C (ASCORBIC ACID) 1000 MG TABS Take 1 tablet (1,000 mg) by mouth daily 90 tablet 3    vitamin E (TOCOPHEROL) 1000 units (450 mg) CAPS capsule Take 1 capsule (1,000 Units) by mouth daily 90 capsule 3    WIXELA INHUB 500-50 MCG/ACT inhaler Inhale 1 puff into the lungs 2 times daily 60 each 11     Social History     Tobacco Use    Smoking status: Every Day     Current packs/day: 1.00     Average packs/day: 1 pack/day for 60.5 years (60.5 ttl  pk-yrs)     Types: Cigarettes     Start date: 1964    Smokeless tobacco: Former    Tobacco comments:     *2ppd for 10 years, 1ppd for 25 years, now 5 cigs per day. Actively working on quitting.   Substance Use Topics    Alcohol use: Yes     Alcohol/week: 6.0 standard drinks of alcohol     Types: 6 Cans of beer per week     Comment: Drinks for sporting events.       ROS:  Review of systems negative except as stated above.    OBJECTIVE:   /73 (BP Location: Right arm)   Pulse 79   Temp 97.7  F (36.5  C) (Axillary)   Resp 24   SpO2 96%   GENERAL APPEARANCE: healthy, alert and no distress  EYES: EOMI,  PERRL, conjunctiva clear  HENT: ear canals and TM's normal.  Nose normal.  Pharynx no erythema noted.  NECK: supple, non-tender to palpation, no adenopathy noted  RESP: lungs distant breath sounds and positive for diffuse wheezes  CV: regular rates and rhythm, normal S1 S2, no murmur noted  PSYCH: mentation appears normal    Estela Leslie MD

## 2024-07-02 NOTE — PATIENT INSTRUCTIONS
Continue on duo neb till wheezing gets better   Continue on wixela inhalor     Estela Leslie MD

## 2024-07-06 ENCOUNTER — OFFICE VISIT (OUTPATIENT)
Dept: URGENT CARE | Facility: URGENT CARE | Age: 74
End: 2024-07-06
Payer: COMMERCIAL

## 2024-07-06 VITALS
RESPIRATION RATE: 22 BRPM | BODY MASS INDEX: 23.11 KG/M2 | OXYGEN SATURATION: 95 % | DIASTOLIC BLOOD PRESSURE: 76 MMHG | HEART RATE: 92 BPM | HEIGHT: 69 IN | SYSTOLIC BLOOD PRESSURE: 135 MMHG | WEIGHT: 156 LBS | TEMPERATURE: 98.1 F

## 2024-07-06 DIAGNOSIS — J22 LRTI (LOWER RESPIRATORY TRACT INFECTION): ICD-10-CM

## 2024-07-06 DIAGNOSIS — R93.89 ABNORMAL X-RAY: Primary | ICD-10-CM

## 2024-07-06 PROCEDURE — 96372 THER/PROPH/DIAG INJ SC/IM: CPT | Performed by: PHYSICIAN ASSISTANT

## 2024-07-06 PROCEDURE — 99213 OFFICE O/P EST LOW 20 MIN: CPT | Mod: 25 | Performed by: PHYSICIAN ASSISTANT

## 2024-07-06 RX ORDER — PREDNISONE 20 MG/1
20 TABLET ORAL DAILY
Qty: 5 TABLET | Refills: 0 | Status: SHIPPED | OUTPATIENT
Start: 2024-07-06 | End: 2024-07-11

## 2024-07-06 RX ORDER — CEFTRIAXONE SODIUM 1 G
1 VIAL (EA) INJECTION ONCE
Status: COMPLETED | OUTPATIENT
Start: 2024-07-06 | End: 2024-07-06

## 2024-07-06 RX ORDER — DOXYCYCLINE 100 MG/1
100 CAPSULE ORAL 2 TIMES DAILY
Qty: 20 CAPSULE | Refills: 0 | Status: SHIPPED | OUTPATIENT
Start: 2024-07-06 | End: 2024-07-16

## 2024-07-06 RX ORDER — SACCHAROMYCES BOULARDII 250 MG
250 CAPSULE ORAL 2 TIMES DAILY
Qty: 28 CAPSULE | Refills: 0 | Status: SHIPPED | OUTPATIENT
Start: 2024-07-06 | End: 2024-07-20

## 2024-07-06 RX ADMIN — Medication 1 G: at 11:33

## 2024-07-06 ASSESSMENT — PAIN SCALES - GENERAL: PAINLEVEL: NO PAIN (0)

## 2024-07-06 NOTE — PROGRESS NOTES
SUBJECTIVE:   Robert B Behr is a 74 year old male presenting with a chief complaint of cough - productive, wheezing, and shortness of breath.  Onset of symptoms was 2 week(s) ago.  Course of illness is worsening.    Severity moderate  Current and Associated symptoms: sweats, cough - non-productive, wheezing, and shortness of breath  Treatment measures tried include zpack.  Predisposing factors include COPD.    Patient requesting ZOSYN, advised not available outpatient    Past Medical History:   Diagnosis Date    Cataract     COPD (chronic obstructive pulmonary disease) (H)     diagnosed with spirometry     Fractured lateral malleolus     Lung nodules     CT scan 2016    Osteoporosis     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    Sepsis without acute organ dysfunction, due to unspecified organism (H)     Tobacco abuse      Current Outpatient Medications   Medication Sig Dispense Refill    albuterol (PROAIR HFA) 108 (90 Base) MCG/ACT inhaler INHALE 1 TO 2 PUFFS BY MOUTH EVERY 4 HOURS AS NEEDED FOR SHORTNESS OF BREATH 8.5 g 3    amoxicillin-clavulanate (AUGMENTIN) 875-125 MG tablet Take 1 tablet by mouth 2 times daily for 7 days 14 tablet 0    atorvastatin (LIPITOR) 40 MG tablet Take 1 tablet (40 mg) by mouth daily 90 tablet 3    B Complex-C (VITAMIN B COMPLEX W/VITAMIN C) TABS tablet TAKE 1 TABLET BY MOUTH TWICE DAILY WITH FOLIC ACID 180 tablet 3    CALCIUM 600/VITAMIN D3 600-20 MG-MCG TABS Take 1 tablet by mouth 2 times daily      Calcium Carbonate-Vitamin D (CALCIUM 600 +D HIGH POTENCY) 600-10 MG-MCG TABS Take 1 tablet by mouth 2 times daily 90 tablet 3    doxycycline hyclate (VIBRAMYCIN) 100 MG capsule Take 1 capsule (100 mg) by mouth 2 times daily for 10 days 20 capsule 0    fish oil-omega-3 fatty acids 1000 MG capsule Take 1 capsule (1 g) by mouth daily 90 capsule 3    glucosamine-chondroitinoitin 750-600 MG TABS TAKE 1 TABLETS BY MOUTH TWICE DAILY 120 tablet 3    ipratropium - albuterol 0.5 mg/2.5 mg/3 mL (DUONEB) 0.5-2.5  "(3) MG/3ML neb solution Take 1 vial (3 mLs) by nebulization every 6 hours as needed for shortness of breath, wheezing or cough 90 mL 0    ipratropium-albuterol (COMBIVENT RESPIMAT)  MCG/ACT inhaler Inhale 1 puff into the lungs 4 times daily as needed for shortness of breath, wheezing or cough (Inhale 1 puff into the lungs 4 times daily as needed. Do not exceed 6 doses per day.,) 4 g 4    predniSONE (DELTASONE) 20 MG tablet Take 1 tablet (20 mg) by mouth daily for 5 days 5 tablet 0    saccharomyces boulardii (FLORASTOR) 250 MG capsule Take 1 capsule (250 mg) by mouth 2 times daily for 14 days Take 2 hours after each Augmentin dose 28 capsule 0    Selenium (SELENIMIN-200) 200 MCG TABS tablet Take 1 tablet (200 mcg) by mouth daily 90 tablet 3    vitamin C (ASCORBIC ACID) 1000 MG TABS Take 1 tablet (1,000 mg) by mouth daily 90 tablet 3    vitamin E (TOCOPHEROL) 1000 units (450 mg) CAPS capsule Take 1 capsule (1,000 Units) by mouth daily 90 capsule 3    WIXELA INHUB 500-50 MCG/ACT inhaler Inhale 1 puff into the lungs 2 times daily 60 each 11     Social History     Tobacco Use    Smoking status: Every Day     Current packs/day: 1.00     Average packs/day: 1 pack/day for 60.5 years (60.5 ttl pk-yrs)     Types: Cigarettes     Start date: 1964    Smokeless tobacco: Former    Tobacco comments:     *2ppd for 10 years, 1ppd for 25 years, now 5 cigs per day. Actively working on quitting.   Substance Use Topics    Alcohol use: Yes     Alcohol/week: 6.0 standard drinks of alcohol     Types: 6 Cans of beer per week     Comment: Drinks for sporting events.       ROS:  Review of systems negative except as stated above.    OBJECTIVE:  /76   Pulse 92   Temp 98.1  F (36.7  C) (Temporal)   Resp 22   Ht 1.76 m (5' 9.29\")   Wt 70.8 kg (156 lb)   SpO2 95%   BMI 22.84 kg/m    GENERAL APPEARANCE: healthy, alert and no distress  HENT: ear canals and TM's normal.  Nose and mouth without ulcers, erythema or lesions  NECK: " supple, nontender, no lymphadenopathy  RESP: wheezing and rhonchi throuhout  CV: regular rates and rhythm, normal S1 S2, no murmur noted  NEURO: Normal strength and tone, sensory exam grossly normal,  normal speech and mentation  SKIN: no suspicious lesions or rashes    Patient declines XRAY    ASSESSMENT:  (R93.89) Abnormal x-ray  (primary encounter diagnosis)  Plan: cefTRIAXone (ROCEPHIN) in lidocaine 1% (PF) for        IM administration 1 g, predniSONE (DELTASONE)         20 MG tablet, amoxicillin-clavulanate         (AUGMENTIN) 875-125 MG tablet, doxycycline         hyclate (VIBRAMYCIN) 100 MG capsule,         saccharomyces boulardii (FLORASTOR) 250 MG         capsule       (J22) LRTI (lower respiratory tract infection)  Plan: cefTRIAXone (ROCEPHIN) in lidocaine 1% (PF) for        IM administration 1 g, predniSONE (DELTASONE)         20 MG tablet, amoxicillin-clavulanate         (AUGMENTIN) 875-125 MG tablet, doxycycline         hyclate (VIBRAMYCIN) 100 MG capsule,         saccharomyces boulardii (FLORASTOR) 250 MG         capsule      Follow up with PCP if symptoms worsen or fail to improve    Patient Instructions   Home for rest 48 hours  Call pulm regarding xray changes  ER with worsening

## 2024-08-05 ENCOUNTER — OFFICE VISIT (OUTPATIENT)
Dept: FAMILY MEDICINE | Facility: CLINIC | Age: 74
End: 2024-08-05
Payer: COMMERCIAL

## 2024-08-05 ENCOUNTER — ORDERS ONLY (AUTO-RELEASED) (OUTPATIENT)
Dept: FAMILY MEDICINE | Facility: CLINIC | Age: 74
End: 2024-08-05

## 2024-08-05 VITALS
BODY MASS INDEX: 23 KG/M2 | HEART RATE: 71 BPM | DIASTOLIC BLOOD PRESSURE: 68 MMHG | WEIGHT: 155.3 LBS | TEMPERATURE: 98.1 F | HEIGHT: 69 IN | OXYGEN SATURATION: 97 % | RESPIRATION RATE: 21 BRPM | SYSTOLIC BLOOD PRESSURE: 130 MMHG

## 2024-08-05 DIAGNOSIS — Z12.11 SCREEN FOR COLON CANCER: ICD-10-CM

## 2024-08-05 DIAGNOSIS — E78.5 HYPERLIPIDEMIA LDL GOAL <130: ICD-10-CM

## 2024-08-05 DIAGNOSIS — J44.9 CHRONIC OBSTRUCTIVE PULMONARY DISEASE, UNSPECIFIED COPD TYPE (H): ICD-10-CM

## 2024-08-05 DIAGNOSIS — M85.80 OSTEOPENIA, UNSPECIFIED LOCATION: ICD-10-CM

## 2024-08-05 DIAGNOSIS — D64.9 LOW HEMOGLOBIN: ICD-10-CM

## 2024-08-05 DIAGNOSIS — Z13.1 SCREENING FOR DIABETES MELLITUS: ICD-10-CM

## 2024-08-05 DIAGNOSIS — Z00.00 ENCOUNTER FOR MEDICARE ANNUAL WELLNESS EXAM: Primary | ICD-10-CM

## 2024-08-05 DIAGNOSIS — M85.89 OTHER SPECIFIED DISORDERS OF BONE DENSITY AND STRUCTURE, MULTIPLE SITES: ICD-10-CM

## 2024-08-05 LAB
ERYTHROCYTE [DISTWIDTH] IN BLOOD BY AUTOMATED COUNT: 15.8 % (ref 10–15)
HBA1C MFR BLD: 5.7 % (ref 0–5.6)
HCT VFR BLD AUTO: 43.3 % (ref 40–53)
HGB BLD-MCNC: 13.7 G/DL (ref 13.3–17.7)
MCH RBC QN AUTO: 29.1 PG (ref 26.5–33)
MCHC RBC AUTO-ENTMCNC: 31.6 G/DL (ref 31.5–36.5)
MCV RBC AUTO: 92 FL (ref 78–100)
PLATELET # BLD AUTO: 321 10E3/UL (ref 150–450)
RBC # BLD AUTO: 4.71 10E6/UL (ref 4.4–5.9)
WBC # BLD AUTO: 7 10E3/UL (ref 4–11)

## 2024-08-05 PROCEDURE — 80053 COMPREHEN METABOLIC PANEL: CPT | Performed by: STUDENT IN AN ORGANIZED HEALTH CARE EDUCATION/TRAINING PROGRAM

## 2024-08-05 PROCEDURE — 85027 COMPLETE CBC AUTOMATED: CPT | Performed by: STUDENT IN AN ORGANIZED HEALTH CARE EDUCATION/TRAINING PROGRAM

## 2024-08-05 PROCEDURE — 83036 HEMOGLOBIN GLYCOSYLATED A1C: CPT | Performed by: STUDENT IN AN ORGANIZED HEALTH CARE EDUCATION/TRAINING PROGRAM

## 2024-08-05 PROCEDURE — 82607 VITAMIN B-12: CPT | Performed by: STUDENT IN AN ORGANIZED HEALTH CARE EDUCATION/TRAINING PROGRAM

## 2024-08-05 PROCEDURE — 83550 IRON BINDING TEST: CPT | Performed by: STUDENT IN AN ORGANIZED HEALTH CARE EDUCATION/TRAINING PROGRAM

## 2024-08-05 PROCEDURE — 82746 ASSAY OF FOLIC ACID SERUM: CPT | Performed by: STUDENT IN AN ORGANIZED HEALTH CARE EDUCATION/TRAINING PROGRAM

## 2024-08-05 PROCEDURE — 99214 OFFICE O/P EST MOD 30 MIN: CPT | Mod: 25 | Performed by: STUDENT IN AN ORGANIZED HEALTH CARE EDUCATION/TRAINING PROGRAM

## 2024-08-05 PROCEDURE — 83540 ASSAY OF IRON: CPT | Performed by: STUDENT IN AN ORGANIZED HEALTH CARE EDUCATION/TRAINING PROGRAM

## 2024-08-05 PROCEDURE — G0439 PPPS, SUBSEQ VISIT: HCPCS | Performed by: STUDENT IN AN ORGANIZED HEALTH CARE EDUCATION/TRAINING PROGRAM

## 2024-08-05 PROCEDURE — 36415 COLL VENOUS BLD VENIPUNCTURE: CPT | Performed by: STUDENT IN AN ORGANIZED HEALTH CARE EDUCATION/TRAINING PROGRAM

## 2024-08-05 PROCEDURE — 82728 ASSAY OF FERRITIN: CPT | Performed by: STUDENT IN AN ORGANIZED HEALTH CARE EDUCATION/TRAINING PROGRAM

## 2024-08-05 RX ORDER — ALBUTEROL SULFATE 90 UG/1
AEROSOL, METERED RESPIRATORY (INHALATION)
Qty: 8.5 G | Refills: 3 | Status: SHIPPED | OUTPATIENT
Start: 2024-08-05

## 2024-08-05 RX ORDER — ATORVASTATIN CALCIUM 40 MG/1
40 TABLET, FILM COATED ORAL DAILY
Qty: 90 TABLET | Refills: 3 | Status: SHIPPED | OUTPATIENT
Start: 2024-08-05

## 2024-08-05 SDOH — HEALTH STABILITY: PHYSICAL HEALTH: ON AVERAGE, HOW MANY DAYS PER WEEK DO YOU ENGAGE IN MODERATE TO STRENUOUS EXERCISE (LIKE A BRISK WALK)?: 4 DAYS

## 2024-08-05 ASSESSMENT — SOCIAL DETERMINANTS OF HEALTH (SDOH): HOW OFTEN DO YOU GET TOGETHER WITH FRIENDS OR RELATIVES?: THREE TIMES A WEEK

## 2024-08-05 ASSESSMENT — PAIN SCALES - GENERAL: PAINLEVEL: NO PAIN (0)

## 2024-08-05 NOTE — PATIENT INSTRUCTIONS
Thank you for coming to see me today! Here are a couple of pieces of information about my schedule and communication practices.     I am not in the clinic on Tuesdays. Non-urgent calls and messages received on Tuesdays will be addressed as soon as I am able when I am back in the office on the next business day. Urgent calls will be addressed by a covering clinician.       If lab work was done today as part of your evaluation you will generally be contacted via [employer], mail, or phone with the results within 7-10 days.  If there is an alarming/concerning result we will contact you by phone. Lab results come back at varying times, I generally wait until all labs are resulted before making comments on results. Please note, labs are automatically released to [employer] once available, but it may take a couple of days for my interpretation note to appear.      I try very hard to respond to medical messages with 2 business days of receiving them. Occasionally it takes me longer if I am trying to figure out the best way to respond and need to seek guidance, do some research or dig deeper into your medical history to come up with a helpful response.      If you need refills please contact your pharmacist. They will send a refill request to me to review. Please allow 3-5 business days for us to respond to all refill requests.      Please call or send a medical message with any questions or concerns. Thank you for trusting me to be part of your healthcare team!        Dr. Timoteo Pickens      Patient Education   Preventive Care Advice   This is general advice given by our system to help you stay healthy. However, your care team may have specific advice just for you. Please talk to your care team about your preventive care needs.  Nutrition  Eat 5 or more servings of fruits and vegetables each day.  Try wheat bread, brown rice and whole grain pasta (instead of white bread, rice, and pasta).  Get enough calcium and vitamin D.  Check the label on foods and aim for 100% of the RDA (recommended daily allowance).  Lifestyle  Exercise at least 150 minutes each week  (30 minutes a day, 5 days a week).  Do muscle strengthening activities 2 days a week. These help control your weight and prevent disease.  No smoking.  Wear sunscreen to prevent skin cancer.  Have a dental exam and cleaning every 6 months.  Yearly exams  See your health care team every year to talk about:  Any changes in your health.  Any medicines your care team has prescribed.  Preventive care, family planning, and ways to prevent chronic diseases.  Shots (vaccines)   HPV shots (up to age 26), if you've never had them before.  Hepatitis B shots (up to age 59), if you've never had them before.  COVID-19 shot: Get this shot when it's due.  Flu shot: Get a flu shot every year.  Tetanus shot: Get a tetanus shot every 10 years.  Pneumococcal, hepatitis A, and RSV shots: Ask your care team if you need these based on your risk.  Shingles shot (for age 50 and up)  General health tests  Diabetes screening:  Starting at age 35, Get screened for diabetes at least every 3 years.  If you are younger than age 35, ask your care team if you should be screened for diabetes.  Cholesterol test: At age 39, start having a cholesterol test every 5 years, or more often if advised.  Bone density scan (DEXA): At age 50, ask your care team if you should have this scan for osteoporosis (brittle bones).  Hepatitis C: Get tested at least once in your life.  STIs (sexually transmitted infections)  Before age 24: Ask your care team if you should be screened for STIs.  After age 24: Get screened for STIs if you're at risk. You are at risk for STIs (including HIV) if:  You are sexually active with more than one person.  You don't use condoms every time.  You or a partner was diagnosed with a sexually transmitted infection.  If you are at risk for HIV, ask about PrEP medicine to prevent HIV.  Get tested for HIV  at least once in your life, whether you are at risk for HIV or not.  Cancer screening tests  Cervical cancer screening: If you have a cervix, begin getting regular cervical cancer screening tests starting at age 21.  Breast cancer scan (mammogram): If you've ever had breasts, begin having regular mammograms starting at age 40. This is a scan to check for breast cancer.  Colon cancer screening: It is important to start screening for colon cancer at age 45.  Have a colonoscopy test every 10 years (or more often if you're at risk) Or, ask your provider about stool tests like a FIT test every year or Cologuard test every 3 years.  To learn more about your testing options, visit:   .  For help making a decision, visit:   https://bit.ly/ra57215.  Prostate cancer screening test: If you have a prostate, ask your care team if a prostate cancer screening test (PSA) at age 55 is right for you.  Lung cancer screening: If you are a current or former smoker ages 50 to 80, ask your care team if ongoing lung cancer screenings are right for you.  For informational purposes only. Not to replace the advice of your health care provider. Copyright   2023 [employer]. All rights reserved. Clinically reviewed by the [facility] Transitions Program. [employer] [id number] - REV 01/24.

## 2024-08-05 NOTE — PROGRESS NOTES
Preventive Care Visit  [facility]  Timoteo Pickens DO, Family Medicine  Aug 5, 2024      Assessment & Plan     Encounter for Medicare annual wellness exam  -Vitals: WNL  -Labs: iron studies, lA1C; lipids UTD and normal  -Immunizations: declines  -Colon cancer screening: cologuard due, ordered  -Prostate cancer screening: declines  -Exercise: active with biking, swimming, walking  -Diet: good, counseled to reduce sugars/candy    Low hemoglobin  Chronic, mild. Suspect due to COPD, however important he gets screened for colon cancer. Labs below.  - CBC with platelets  - Iron and iron binding capacity  - Ferritin  - Vitamin B12  - Folate    Hyperlipidemia LDL goal <130  Stable, lipids UTD and normal.  - atorvastatin (LIPITOR) 40 MG tablet  Dispense: 90 tablet; Refill: 3    Chronic obstructive pulmonary disease, unspecified COPD type (H)  Managed by pulm.  - Comprehensive metabolic panel  - albuterol (PROAIR HFA) 108 (90 Base) MCG/ACT inhaler  Dispense: 8.5 g; Refill: 3    Screen for colon cancer  - COLOGUARD([employer])    Screening for diabetes mellitus  Has pre-diabetes. Will recheck today. Very active. Counseled to reduce candy.  - Hemoglobin A1c    Osteopenia, unspecified location  Last dexa in 2021 showed osteopenia. Due to smoking history, qualifies for screening. DEXA ordered.  - DX Bone Density      Counseling  Appropriate preventive services were addressed with this patient via screening, questionnaire, or discussion as appropriate for fall prevention, nutrition, physical activity, Tobacco-use cessation, weight loss and cognition.  Checklist reviewing preventive services available has been given to the patient.  Reviewed patient's diet, addressing concerns and/or questions.   The patient was instructed to see the dentist every 6 months.       Ty Alicia is a 74 year old, presenting for the following:  Medicare Visit, Recheck Medication (/Vitamins.), and Establish  Care        6/28/2024     2:55 PM   Additional Questions   Roomed by ANNE Bustos   Accompanied by self        Health Care Directive  Patient does not have a Health Care Directive or Living Will: Discussed advance care planning with patient; information given to patient to review.    HPI    Establish care    Prostate cancer screening-DUE  Colon cancer screening-DUE  Lung cancer screening-UTD  DEXA-consider    COPD  Tobacco use  Mass in upper R lobe  -sees pulm  -fibrotic mass, having CT 6/18/24    HLD  -lipitor    Hiatal hernia    Prediabetes  -6.1%    Low hgb  -stable, at baseline              8/5/2024   General Health   How would you rate your overall physical health? Good   Feel stress (tense, anxious, or unable to sleep) Not at all            8/5/2024   Nutrition   Diet: Regular (no restrictions)            8/5/2024   Exercise   Days per week of moderate/strenous exercise 4 days            8/5/2024   Social Factors   Frequency of gathering with friends or relatives Three times a week   Worry food won't last until get money to buy more No   Food not last or not have enough money for food? No   Do you have housing? (Housing is defined as stable permanent housing and does not include staying ouside in a car, in a tent, in an abandoned building, in an overnight shelter, or couch-surfing.) Yes   Are you worried about losing your housing? No   Lack of transportation? No   Unable to get utilities (heat,electricity)? No            8/5/2024   Fall Risk   Fallen 2 or more times in the past year? No   Trouble with walking or balance? No             8/5/2024   Activities of Daily Living- Home Safety   Needs help with the following daily activites None of the above   Safety concerns in the home None of the above            8/5/2024   Dental   Dentist two times every year? (!) NO            8/5/2024   Hearing Screening   Hearing concerns? None of the above            8/5/2024   Driving Risk Screening   Patient/family members  have concerns about driving No            8/5/2024   General Alertness/Fatigue Screening   Have you been more tired than usual lately? No            8/5/2024   Urinary Incontinence Screening   Bothered by leaking urine in past 6 months No            8/5/2024   TB Screening   Were you born outside of the [place]? No        Today's PHQ-2 Score:       8/5/2024     3:37 PM   PHQ-2 ( 1999 [employer])   Q1: Little interest or pleasure in doing things 0   Q2: Feeling down, depressed or hopeless 0   PHQ-2 Score 0   Q1: Little interest or pleasure in doing things Not at all   Q2: Feeling down, depressed or hopeless Not at all   PHQ-2 Score 0           8/5/2024   Substance Use   If I could quit smoking, I would Somewhat agree   I want to quit somking, worry about health affects Somewhat agree   Willing to make a plan to quit smoking Somewhat agree   Willing to cut down before quitting Completely agree   Alcohol more than 3/day or more than 7/wk No   Do you have a current opioid prescription? No   How severe/bad is pain from 1 to 10? 0/10 (No Pain)   Do you use any other substances recreationally? No        Social History     Tobacco Use    Smoking status: Every Day     Current packs/day: 1.00     Average packs/day: 1 pack/day for 60.6 years (60.6 ttl pk-yrs)     Types: Cigarettes     Start date: 1964    Smokeless tobacco: Former    Tobacco comments:     *2ppd for 10 years, 1ppd for 25 years, now 5 cigs per day. Actively working on quitting.   Vaping Use    Vaping status: Never Used   Substance Use Topics    Alcohol use: Yes     Alcohol/week: 6.0 standard drinks of alcohol     Types: 6 Cans of beer per week     Comment: Drinks for sporting events.    Drug use: Not Currently     Types: Marijuana     Comment: Marijuana - as a youth       ASCVD Risk   The 10-year ASCVD risk score (Renaldo THEODORE, et al., 2019) is: 22.4%    Values used to calculate the score:      Age: 74 years      Sex: Male      Is Non- : No       Diabetic: No      Tobacco smoker: Yes      Systolic Blood Pressure: 130 mmHg      Is BP treated: No      HDL Cholesterol: 82 mg/dL      Total Cholesterol: 170 mg/dL    Reviewed and updated as needed this visit by Provider   Tobacco   Meds   Med Hx  Surg Hx  Fam Hx  Soc Hx Sexual Activity          Current providers sharing in care for this patient include:  Patient Care Team:  Timoteo Pickens DO as PCP - General (Family Medicine)  Leeanna Sigala, RN as Lead Care Coordinator  Raquel Gandhi MD as MD (Dermatology)  Nayan Liu MD as MD (Dermatology)  Vero Franco MD as MD (Endocrinology, Diabetes, and Metabolism)  Chastity Arguello DNP as Assigned PCP  Svitlana Taylor MD as Assigned Pulmonology Provider  Terrance Howard MD as Assigned Infectious Disease Provider  Jarocho Sanders MD as Assigned Surgical Provider    The following health maintenance items are reviewed in Epic and correct as of today:  Health Maintenance   Topic Date Due    HF ACTION PLAN  Never done    RSV VACCINE (Pregnancy & 60+) (1 - 1-dose 60+ series) Never done    COLORECTAL CANCER SCREENING  04/14/2024    INFLUENZA VACCINE (1) 09/01/2024    BMP  10/11/2024    ALT  10/24/2024    CMP  10/24/2024    LIPID  03/15/2025    ANNUAL REVIEW OF HM ORDERS  03/15/2025    NICOTINE/TOBACCO CESSATION COUNSELING Q 1 YR  04/02/2025    LUNG CANCER SCREENING  06/18/2025    MEDICARE ANNUAL WELLNESS VISIT  08/05/2025    FALL RISK ASSESSMENT  08/05/2025    CBC  08/05/2025    DTAP/TDAP/TD IMMUNIZATION (2 - Td or Tdap) 02/24/2026    GLUCOSE  04/11/2027    ADVANCE CARE PLANNING  08/05/2029    TSH W/FREE T4 REFLEX  Completed    SPIROMETRY  Completed    HEPATITIS C SCREENING  Completed    COPD ACTION PLAN  Completed    PHQ-2 (once per calendar year)  Completed    Pneumococcal Vaccine: 65+ Years  Completed    AORTIC ANEURYSM SCREENING (SYSTEM ASSIGNED)  Completed    IPV IMMUNIZATION  Aged Out    HPV IMMUNIZATION  Aged Out     "MENINGITIS IMMUNIZATION  Aged Out    RSV MONOCLONAL ANTIBODY  Aged Out    ZOSTER IMMUNIZATION  Discontinued    COVID-19 Vaccine  Discontinued        Objective    Exam  /68 (BP Location: Right arm, Patient Position: Sitting, Cuff Size: Adult Regular)   Pulse 71   Temp 98.1  F (36.7  C) (Temporal)   Resp 21   Ht 1.76 m (5' 9.29\")   Wt 70.4 kg (155 lb 4.8 oz)   SpO2 97%   BMI 22.74 kg/m     Estimated body mass index is 22.74 kg/m  as calculated from the following:    Height as of this encounter: 1.76 m (5' 9.29\").    Weight as of this encounter: 70.4 kg (155 lb 4.8 oz).    Physical Exam  Constitutional:       General: He is not in acute distress.     Appearance: He is well-developed.   HENT:      Head: Normocephalic and atraumatic.      Right Ear: Tympanic membrane, ear canal and external ear normal.      Left Ear: Tympanic membrane, ear canal and external ear normal.      Nose: Nose normal.      Mouth/Throat:      Mouth: Mucous membranes are moist.      Pharynx: No oropharyngeal exudate.   Eyes:      Extraocular Movements: Extraocular movements intact.      Conjunctiva/sclera: Conjunctivae normal.      Pupils: Pupils are equal, round, and reactive to light.   Cardiovascular:      Rate and Rhythm: Normal rate and regular rhythm.      Heart sounds: Normal heart sounds. No murmur heard.  Pulmonary:      Breath sounds: Wheezing (diffuse expiratory wheezing) present. No rales.      Comments: Decreased air movement diffusely  Abdominal:      General: Bowel sounds are normal.      Palpations: Abdomen is soft.      Tenderness: There is no abdominal tenderness.   Musculoskeletal:      Cervical back: Normal range of motion and neck supple. No rigidity.   Lymphadenopathy:      Cervical: No cervical adenopathy.   Skin:     General: Skin is warm and dry.      Findings: No rash.   Neurological:      General: No focal deficit present.      Mental Status: He is alert and oriented to person, place, and time.      Gait: " Gait abnormal (antalgic gait).   Psychiatric:         Mood and Affect: Mood normal.                8/5/2024   Mini Cog   Clock Draw Score 2 Normal   3 Item Recall 3 objects recalled   Mini Cog Total Score 5        Vision Screen  Patient wears corrective lenses (select all that apply): (!) NOT worn during vision screen;Does NOT wear regularly  Vision Screen Results: Pass      Signed Electronically by: Timoeto Pickens,

## 2024-08-06 LAB
ALBUMIN SERPL BCG-MCNC: 4.2 G/DL (ref 3.5–5.2)
ALP SERPL-CCNC: 72 U/L (ref 40–150)
ALT SERPL W P-5'-P-CCNC: 22 U/L (ref 0–70)
ANION GAP SERPL CALCULATED.3IONS-SCNC: 10 MMOL/L (ref 7–15)
AST SERPL W P-5'-P-CCNC: 24 U/L (ref 0–45)
BILIRUB SERPL-MCNC: <0.2 MG/DL
BUN SERPL-MCNC: 14.6 MG/DL (ref 8–23)
CALCIUM SERPL-MCNC: 9.6 MG/DL (ref 8.8–10.4)
CHLORIDE SERPL-SCNC: 104 MMOL/L (ref 98–107)
CREAT SERPL-MCNC: 0.88 MG/DL (ref 0.67–1.17)
EGFRCR SERPLBLD CKD-EPI 2021: 90 ML/MIN/1.73M2
FERRITIN SERPL-MCNC: 25 NG/ML (ref 31–409)
FOLATE SERPL-MCNC: 32.9 NG/ML (ref 4.6–34.8)
GLUCOSE SERPL-MCNC: 100 MG/DL (ref 70–99)
HCO3 SERPL-SCNC: 27 MMOL/L (ref 22–29)
IRON BINDING CAPACITY (ROCHE): 404 UG/DL (ref 240–430)
IRON SATN MFR SERPL: 11 % (ref 15–46)
IRON SERPL-MCNC: 45 UG/DL (ref 61–157)
POTASSIUM SERPL-SCNC: 4.9 MMOL/L (ref 3.4–5.3)
PROT SERPL-MCNC: 7 G/DL (ref 6.4–8.3)
SODIUM SERPL-SCNC: 141 MMOL/L (ref 135–145)
VIT B12 SERPL-MCNC: 668 PG/ML (ref 232–1245)

## 2024-08-18 LAB — NONINV COLON CA DNA+OCC BLD SCRN STL QL: NEGATIVE

## 2024-09-09 ENCOUNTER — OFFICE VISIT (OUTPATIENT)
Dept: URGENT CARE | Facility: URGENT CARE | Age: 74
End: 2024-09-09
Payer: COMMERCIAL

## 2024-09-09 VITALS
OXYGEN SATURATION: 96 % | TEMPERATURE: 97.9 F | SYSTOLIC BLOOD PRESSURE: 131 MMHG | RESPIRATION RATE: 20 BRPM | DIASTOLIC BLOOD PRESSURE: 81 MMHG | WEIGHT: 156 LBS | HEIGHT: 69 IN | HEART RATE: 61 BPM | BODY MASS INDEX: 23.11 KG/M2

## 2024-09-09 DIAGNOSIS — J44.9 CHRONIC OBSTRUCTIVE PULMONARY DISEASE, UNSPECIFIED COPD TYPE (H): Primary | ICD-10-CM

## 2024-09-09 PROCEDURE — 99214 OFFICE O/P EST MOD 30 MIN: CPT | Performed by: PHYSICIAN ASSISTANT

## 2024-09-09 RX ORDER — SACCHAROMYCES BOULARDII 250 MG
250 CAPSULE ORAL 2 TIMES DAILY
Qty: 28 CAPSULE | Refills: 0 | Status: SHIPPED | OUTPATIENT
Start: 2024-09-09 | End: 2024-09-23

## 2024-09-09 RX ORDER — DOXYCYCLINE 100 MG/1
100 CAPSULE ORAL 2 TIMES DAILY
Qty: 20 CAPSULE | Refills: 0 | Status: SHIPPED | OUTPATIENT
Start: 2024-09-09 | End: 2024-09-19

## 2024-09-09 RX ORDER — PREDNISONE 20 MG/1
40 TABLET ORAL DAILY
Qty: 10 TABLET | Refills: 0 | Status: SHIPPED | OUTPATIENT
Start: 2024-09-09 | End: 2024-09-14

## 2024-09-09 NOTE — PROGRESS NOTES
Seeing pulm in October      SUBJECTIVE:  Robert B Behr is a 74 year old male who presents to the clinic today with a chief complaint of wheezing. and productive cough for 1 week(s).  His cough is described as persistent.    The patient's symptoms are moderate and stable.  Associated symptoms include none. The patient's symptoms are exacerbated by no particular triggers      Past Medical History:   Diagnosis Date    Cataract     COPD (chronic obstructive pulmonary disease) (H)     diagnosed with spirometry     Fractured lateral malleolus     Lung nodules     CT scan 2016    Osteoporosis     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    Sepsis without acute organ dysfunction, due to unspecified organism (H)     Tobacco abuse        Current Outpatient Medications   Medication Sig Dispense Refill    albuterol (PROAIR HFA) 108 (90 Base) MCG/ACT inhaler INHALE 1 TO 2 PUFFS BY MOUTH EVERY 4 HOURS AS NEEDED FOR SHORTNESS OF BREATH 8.5 g 3    amoxicillin-clavulanate (AUGMENTIN) 875-125 MG tablet Take 1 tablet by mouth 2 times daily for 10 days. 20 tablet 0    atorvastatin (LIPITOR) 40 MG tablet Take 1 tablet (40 mg) by mouth daily 90 tablet 3    B Complex-C (VITAMIN B COMPLEX W/VITAMIN C) TABS tablet TAKE 1 TABLET BY MOUTH TWICE DAILY WITH FOLIC ACID 180 tablet 3    Calcium Carbonate-Vitamin D (CALCIUM 600 +D HIGH POTENCY) 600-10 MG-MCG TABS Take 1 tablet by mouth 2 times daily 90 tablet 3    doxycycline hyclate (VIBRAMYCIN) 100 MG capsule Take 1 capsule (100 mg) by mouth 2 times daily for 10 days. 20 capsule 0    fish oil-omega-3 fatty acids 1000 MG capsule Take 1 capsule (1 g) by mouth daily 90 capsule 3    glucosamine-chondroitinoitin 750-600 MG TABS TAKE 1 TABLETS BY MOUTH TWICE DAILY 120 tablet 3    ipratropium - albuterol 0.5 mg/2.5 mg/3 mL (DUONEB) 0.5-2.5 (3) MG/3ML neb solution Take 1 vial (3 mLs) by nebulization every 6 hours as needed for shortness of breath, wheezing or cough 90 mL 0    ipratropium-albuterol (COMBIVENT  "RESPIMAT)  MCG/ACT inhaler Inhale 1 puff into the lungs 4 times daily as needed for shortness of breath, wheezing or cough (Inhale 1 puff into the lungs 4 times daily as needed. Do not exceed 6 doses per day.,) 4 g 4    predniSONE (DELTASONE) 20 MG tablet Take 2 tablets (40 mg) by mouth daily for 5 days. 10 tablet 0    saccharomyces boulardii (FLORASTOR) 250 MG capsule Take 1 capsule (250 mg) by mouth 2 times daily for 14 days. 28 capsule 0    Selenium (SELENIMIN-200) 200 MCG TABS tablet Take 1 tablet (200 mcg) by mouth daily 90 tablet 3    vitamin C (ASCORBIC ACID) 1000 MG TABS Take 1 tablet (1,000 mg) by mouth daily 90 tablet 3    vitamin E (TOCOPHEROL) 1000 units (450 mg) CAPS capsule Take 1 capsule (1,000 Units) by mouth daily 90 capsule 3    WIXELA INHUB 500-50 MCG/ACT inhaler Inhale 1 puff into the lungs 2 times daily 60 each 11       Social History     Tobacco Use    Smoking status: Every Day     Current packs/day: 1.00     Average packs/day: 1 pack/day for 60.7 years (60.7 ttl pk-yrs)     Types: Cigarettes     Start date: 1964    Smokeless tobacco: Former    Tobacco comments:     *2ppd for 10 years, 1ppd for 25 years, now 5 cigs per day. Actively working on quitting.   Substance Use Topics    Alcohol use: Yes     Alcohol/week: 6.0 standard drinks of alcohol     Types: 6 Cans of beer per week     Comment: Drinks for sporting events.       ROS  Review of systems negative except as stated above.    OBJECTIVE:  /81   Pulse 61   Temp 97.9  F (36.6  C) (Temporal)   Resp 20   Ht 1.753 m (5' 9\")   Wt 70.8 kg (156 lb)   SpO2 96%   BMI 23.04 kg/m    GENERAL APPEARANCE: healthy, alert and no distress  RESP: lungs clear to auscultation - no rales, rhonchi or wheezes  CV: regular rates and rhythm, normal S1 S2, no murmur noted  NEURO: Normal strength and tone, sensory exam grossly normal,  normal speech and mentation  SKIN: no suspicious lesions or rashes    ASSESSMENT:    (J44.9) Chronic obstructive " pulmonary disease, unspecified COPD type (H)  (primary encounter diagnosis)  Comment: patient requesting his standard treatment, discussed should be managed by pcp/pulm given frequency of episodes rather than over treating with ABs  Plan: amoxicillin-clavulanate (AUGMENTIN) 875-125 MG         tablet, doxycycline hyclate (VIBRAMYCIN) 100 MG        capsule, predniSONE (DELTASONE) 20 MG tablet,         saccharomyces boulardii (FLORASTOR) 250 MG         capsule        Return if worsening  Follow with pulm as scheduled

## 2024-09-18 ENCOUNTER — NURSE TRIAGE (OUTPATIENT)
Dept: FAMILY MEDICINE | Facility: CLINIC | Age: 74
End: 2024-09-18
Payer: COMMERCIAL

## 2024-09-18 NOTE — TELEPHONE ENCOUNTER
"SEE IN OFFICE WITHIN 3 DAYS:   * You need to be examined.   * Let me give you an appointment.  * Schedule an appointment on 9/19/24 at 1420    Routed to Aaron Carreno PA-C for review    Reason for Disposition   MILD weakness (i.e., does not interfere with ability to work, go to school, normal activities) and persists > 1 week    Additional Information   Negative: SEVERE difficulty breathing (e.g., struggling for each breath, speaks in single words)   Negative: Shock suspected (e.g., cold/pale/clammy skin, too weak to stand, low BP, rapid pulse)   Negative: Difficult to awaken or acting confused (e.g., disoriented, slurred speech)   Negative: Fainted > 15 minutes ago and still feels too weak or dizzy to stand   Negative: SEVERE weakness (i.e., unable to walk or barely able to walk, requires support) and new-onset or worsening   Negative: Sounds like a life-threatening emergency to the triager   Negative: Weakness of the face, arm or leg on one side of the body   Negative: Has diabetes mellitus and weakness from low blood sugar (i.e., < 60 mg/dL or 3.5 mmol/L)   Negative: Recent heat exposure, suspected cause of weakness   Negative: Vomiting is main symptom   Negative: Diarrhea is main symptom   Negative: Difficulty breathing   Negative: Heart beating < 50 beats per minute OR > 140 beats per minute   Negative: Extra heartbeats, irregular heart beating, or heart is beating very fast (i.e., 'palpitations')   Negative: Follows large amount of bleeding (e.g., from vomiting, rectum, vagina) (Exception: Small transient weakness from sight of a small amount blood.)   Negative: Black or tarry bowel movements    Answer Assessment - Initial Assessment Questions  1. DESCRIPTION: \"Describe how you are feeling.\"      Tired and weak    2. SEVERITY: \"How bad is it?\"  \"Can you stand and walk?\"    Mild to moderate    3. ONSET: \"When did these symptoms begin?\" (e.g., hours, days, weeks, months)      A couple weeks ago    4. CAUSE: " "\"What do you think is causing the weakness or fatigue?\" (e.g., not drinking enough fluids, medical problem, trouble sleeping)      I don't know    5. NEW MEDICINES:  \"Have you started on any new medicines recently?\" (e.g., opioid pain medicines, benzodiazepines, muscle relaxants, antidepressants, antihistamines, neuroleptics, beta blockers)      Antibiotics for bronchitis about 8 days ago    6. OTHER SYMPTOMS: \"Do you have any other symptoms?\" (e.g., chest pain, fever, cough, SOB, vomiting, diarrhea, bleeding, other areas of pain)      Coughing up mucus and some SOB, I think its from my COPD. Hoarse sore throat    7. PREGNANCY: \"Is there any chance you are pregnant?\" \"When was your last menstrual period?\"      N/A    Protocols used: Weakness (Generalized) and Fatigue-A-AFIA [facility]     "

## 2024-09-19 ENCOUNTER — OFFICE VISIT (OUTPATIENT)
Dept: FAMILY MEDICINE | Facility: CLINIC | Age: 74
End: 2024-09-19
Payer: COMMERCIAL

## 2024-09-19 VITALS
BODY MASS INDEX: 23.08 KG/M2 | HEIGHT: 69 IN | TEMPERATURE: 98.1 F | SYSTOLIC BLOOD PRESSURE: 120 MMHG | WEIGHT: 155.8 LBS | HEART RATE: 86 BPM | OXYGEN SATURATION: 95 % | DIASTOLIC BLOOD PRESSURE: 68 MMHG | RESPIRATION RATE: 21 BRPM

## 2024-09-19 DIAGNOSIS — J42 CHRONIC BRONCHITIS, UNSPECIFIED CHRONIC BRONCHITIS TYPE (H): ICD-10-CM

## 2024-09-19 DIAGNOSIS — J06.9 UPPER RESPIRATORY TRACT INFECTION, UNSPECIFIED TYPE: Primary | ICD-10-CM

## 2024-09-19 LAB
FLUAV RNA SPEC QL NAA+PROBE: NEGATIVE
FLUBV RNA RESP QL NAA+PROBE: NEGATIVE
RSV RNA SPEC NAA+PROBE: NEGATIVE
SARS-COV-2 RNA RESP QL NAA+PROBE: NEGATIVE

## 2024-09-19 PROCEDURE — 99214 OFFICE O/P EST MOD 30 MIN: CPT | Performed by: STUDENT IN AN ORGANIZED HEALTH CARE EDUCATION/TRAINING PROGRAM

## 2024-09-19 PROCEDURE — 87637 SARSCOV2&INF A&B&RSV AMP PRB: CPT | Performed by: STUDENT IN AN ORGANIZED HEALTH CARE EDUCATION/TRAINING PROGRAM

## 2024-09-19 ASSESSMENT — PAIN SCALES - GENERAL: PAINLEVEL: NO PAIN (0)

## 2024-09-19 NOTE — PROGRESS NOTES
Assessment & Plan     Upper respiratory tract infection likely viral  COPD  Vital signs stable, exam unremarkable including clear lungs. Patient has mild URI symptoms with fatigue being his primary concern. Cancer screenings including prostate, lung, and colon cancer all UTD. Plan to screen for Flu/RSV/COVID. Even if positive, will treat with conservative measures given 2-3 weeks of symptoms with no antibiotic indicated with no dypsnea or sputum from his baseline. Discussed conservative cares - see patient instructions.  Instructed him that if he has any severe worsening of symptoms or onset of shortness of breath, wheezing, fever, or other concerning symptoms, he should follow-up in clinic immediately for further evaluation.  If fatigue is persistent, recommend follow-up in primary care for further evaluation and workup but for now we will hold off on further testing given that fatigue is likely related to URI.    - Symptomatic Influenza A/B, RSV, & SARS-CoV2 PCR (COVID-19) Nasopharyngeal; Future        Subjective   Ravin is a 74 year old, presenting for the following health issues:  Fatigue        9/19/2024     2:16 PM   Additional Questions   Roomed by Alyssa HIGHTOWER MA   Accompanied by Self     History of Present Illness       Reason for visit:  Rsv He is missing 2 dose(s) of medications per week.     He reports being fatigued, nasal drainage/congestion, sore throat, and dry cough with very little phlegm production. No sinus pressure/pain or ear pain or drainage. Symptoms started 2-3 weeks ago. Fatigue is primary concern. He does have COPD and was seen by Rafael Adame on 9/9/24 where he was treated for presumed COPD pneumonia with course augmentin/doxy and prednisone. No new/worsening shortness of breath, wheezing, lower extremity edema, chest pain. No fever, chills, or night sweats. No abdominal pain, diarrhea, or nausea/vomiting. No rash. Only having the ENT symptoms and cough. He is pushing fluids, gargling  "salt water, and getting rest. He saw a commercial for RSV, and he thought his symptoms were in line with that diagnosis - would like to get tested.                   Objective    /68 (BP Location: Right arm, Patient Position: Sitting, Cuff Size: Adult Regular)   Pulse 86   Temp 98.1  F (36.7  C) (Temporal)   Resp 21   Ht 1.753 m (5' 9\")   Wt 70.7 kg (155 lb 12.8 oz)   SpO2 95%   BMI 23.01 kg/m    Body mass index is 23.01 kg/m .  Physical Exam   GENERAL: alert and no distress  EYES: Eyes grossly normal to inspection, PERRL and conjunctivae and sclerae normal  HENT: ear canals and TM's normal, nose and mouth without ulcers or lesions  NECK: no adenopathy, no asymmetry, masses, or scars  RESP: lungs clear to auscultation - no rales, rhonchi or wheezes  CV: regular rate and rhythm, normal S1 S2, no S3 or S4, no murmur, click or rub, no peripheral edema  ABDOMEN: soft, nontender, no hepatosplenomegaly, no masses and bowel sounds normal  MS: no gross musculoskeletal defects noted, no edema  SKIN: no suspicious lesions or rashes  NEURO: Normal strength and tone, mentation intact and speech normal  PSYCH: mentation appears normal, affect normal/bright            Signed Electronically by: Aaron Carreno PA-C    "

## 2024-09-19 NOTE — PATIENT INSTRUCTIONS
Evidence-based Symptom Management of Upper Respiratory Infections (URI) in Adults    You can expect the symptoms of your cold or upper respiratory infection (URI) to last 14 to 21 days. A dry, hacking cough may continue up to 3 or 4 weeks.    Drink more fluids.    Get enough rest.    Use a humidifier or increase time in a steamy shower.    For pain control, you can take either tylenol or ibuprofen as shown below. Of note, ibuprofen will be more helpful for pain and swelling.  - Max Dose: Acetaminophen (Tylenol ) 500 mg, 2 tablets every 8 hours as needed for the first 5 to 7 days of infection.    Maximum dose: 4,000 mg of acetaminophen in 24 hours.    Avoid combination products that contain acetaminophen (read the label) while taking scheduled acetaminophen.    Use the lowest effective dose for the shortest possible duration to reduce the risk of serious adverse effects.   - Max Dose: Ibuprofen ( Advil , Motrin ) 200 mg, 3 tablets every 6 to 8 hours. Take with food to prevent getting an upset stomach. Avoid ibuprofen if you have kidney disease, coronary heart disease, heart failure, or history of a gastric ulcer or gastric surgery.    Maximum dose: 2,400 mg of ibuprofen in 24 hours. Do not use longer than 7 days unless directed by your health care provider.    Use the lowest needed dose for the shortest possible time frame to reduce the risk of serious side effects.  If you have ongoing pain, do not wait until it gets out of control. You may try taking these over-the-counter pain medications on a schedule. Do not take more than the maximum dose of the medication in 24 hours. You may try alternating acetaminophen and ibuprofen every 3 hours around the clock.    Example schedule: 8 am acetaminophen 11 am ibuprofen 2 pm acetaminophen 5 pm ibuprofen 8 pm acetaminophen 11 pm ibuprofen 2 am acetaminophen 5 am ibuprofen    For sore throat   -Take acetaminophen and/or ibuprofen as above.   -Use throat lozenges (eg, Cepacol ,  Chloraseptic ) with benzocaine which help numb your sore throat.   -Gargle with saltwater several times a day to help relieve throat pain. Mix 1/4 teaspoon (1.4 grams) of table salt in 8 ounces (237 milliliters) of warm water. Gargle the solution and then spit it out.    For sinus drainage, sinus/nose/ear congestion (nose drainage, drainage in the back of the throat, sinus pressure, facial pain, nose stuffiness, ear pressure)  It is common to have nasal drainage of various colors with a viral cold or upper respiratory infection. These usually get better with time and do not require antibiotics.   -Saline sinus rinse (eg, NeilMed , XClear ) -- mix and use according to directions on the product.   -Nasal spray (eg, Flonase , Nasacort ) -- 2 sprays per nostril once a day after a saline sinus irrigation.   -Oxymetazoline nasal spray (eg, Afrin , Sinex ) -- take two or three times a day for 3 days. Do not use longer than 5 days. If you need it past 5 days, use saline nasal spray or the saline sinus rinse.   -Pseudoephedrine capsules (Sudafed ) -- take every 4 to 6 hours per package instructions for sinus congestion. Available behind the pharmacy counter. Do not exceed 240 mg per day.    Avoid if you have high blood pressure, heart disease, or if you take beta blockers (eg, atenolol, metoprolol).    Longer-acting medications may have more side effects, such as restlessness and insomnia.    For cough  Avoid coughing too hard or too often to  get the phlegm up . Excessive coughing causes bronchial (tubes going to the lungs) irritation which could cause a cough-irritate-cough cycle that can prolong the cough for weeks.   -Honey -- 1 to 2 teaspoons every 4 to 6 hours as needed.   -Cough drops -- every 4 to 6 hours as needed.   -Guaifenesin/dextromethorphan syrup (eg, Robitussin  DM, Mucinex  DM) -- per package instructions.    Do not take if you are taking an antidepressant, opioid pain medication, sleeping medication, or  antipsychotic medication.

## 2024-10-02 DIAGNOSIS — J44.9 COPD (CHRONIC OBSTRUCTIVE PULMONARY DISEASE) (H): ICD-10-CM

## 2024-10-02 RX ORDER — IPRATROPIUM BROMIDE AND ALBUTEROL 20; 100 UG/1; UG/1
SPRAY, METERED RESPIRATORY (INHALATION)
Qty: 4 G | Refills: 4 | Status: SHIPPED | OUTPATIENT
Start: 2024-10-02

## 2024-10-07 DIAGNOSIS — J44.9 CHRONIC OBSTRUCTIVE PULMONARY DISEASE, UNSPECIFIED COPD TYPE (H): ICD-10-CM

## 2024-10-07 RX ORDER — FLUTICASONE PROPIONATE AND SALMETEROL 500; 50 UG/1; UG/1
1 POWDER RESPIRATORY (INHALATION) 2 TIMES DAILY
Qty: 60 EACH | Refills: 3 | Status: SHIPPED | OUTPATIENT
Start: 2024-10-07

## 2024-11-04 ENCOUNTER — TELEPHONE (OUTPATIENT)
Dept: FAMILY MEDICINE | Facility: CLINIC | Age: 74
End: 2024-11-04
Payer: COMMERCIAL

## 2024-11-04 NOTE — TELEPHONE ENCOUNTER
"Patient calling as he wants to take some ibuprofen for lower back pain as previously recommended when this occurs by his pcp  Patient declined triage for the back pain  \"It's nothing new\"  Advised patient to take as described on the bottle   Do not take more than 3000 mg in a 24 hour period  \"Well exactly how much can I take\"  Reviewed with patient that the standard OTC dosing is 200 to 400 mg every 6 hours   \"That's all?\"  Once again advised patient to take the ibuprofen as directed on the bottle or a message can be sent to his PCP for further advisement. Educated patient that writer cannot and will not advise that he take more than the directions on the bottle direct.   Patient did not want a message sent to  he will continue with current plan and call us if he wants triage for the back pain or if he develops any new or worsening symptoms.     Mar Lugo RN  [facility]    "

## 2024-11-05 ENCOUNTER — OFFICE VISIT (OUTPATIENT)
Dept: PULMONOLOGY | Facility: CLINIC | Age: 74
End: 2024-11-05
Attending: INTERNAL MEDICINE
Payer: COMMERCIAL

## 2024-11-05 ENCOUNTER — ANCILLARY PROCEDURE (OUTPATIENT)
Dept: CT IMAGING | Facility: CLINIC | Age: 74
End: 2024-11-05
Attending: INTERNAL MEDICINE
Payer: COMMERCIAL

## 2024-11-05 VITALS
BODY MASS INDEX: 23.08 KG/M2 | OXYGEN SATURATION: 96 % | HEART RATE: 76 BPM | WEIGHT: 155.8 LBS | SYSTOLIC BLOOD PRESSURE: 121 MMHG | HEIGHT: 69 IN | DIASTOLIC BLOOD PRESSURE: 82 MMHG

## 2024-11-05 DIAGNOSIS — R91.8 MASS OF UPPER LOBE OF RIGHT LUNG: ICD-10-CM

## 2024-11-05 PROCEDURE — G0463 HOSPITAL OUTPT CLINIC VISIT: HCPCS | Performed by: INTERNAL MEDICINE

## 2024-11-05 PROCEDURE — 71250 CT THORAX DX C-: CPT | Performed by: RADIOLOGY

## 2024-11-05 PROCEDURE — 99214 OFFICE O/P EST MOD 30 MIN: CPT | Performed by: INTERNAL MEDICINE

## 2024-11-05 RX ORDER — ACETAMINOPHEN 500 MG
500-1000 TABLET ORAL EVERY 6 HOURS PRN
COMMUNITY

## 2024-11-05 ASSESSMENT — PAIN SCALES - GENERAL: PAINLEVEL_OUTOF10: NO PAIN (0)

## 2024-11-05 NOTE — PROGRESS NOTES
[facility] [address] [address]  Phone: [phone]  Fax: [phone]    Patient:  Robert B Behr, Date of birth 1950  Date of Visit:  11/05/2024  Referring Provider Svitlana Taylor      Assessment & Plan      Robert B Behr is a 74 year old male with a history of COPD, lung nodules, and tobacco use who presents for follow-up.    # COPD:  Currently on inhaled corticosteroid / long-acting beta agonist he just never really used long-acting muscarinic agent despite having the medication.  - Maintenance therapy: Wixela   - Rescue: Combivent, Albuterol    # Tobacco use: continues to roll his own cigarettes and not interested in quitting. He smokes 3-5 cig/day    # lung mass - persistent for several years, there is some FDG uptake, he had two prior JAKY on sputum culture, minimally symptomatic, at high risk for cancer, the bronchoscopy biopsy showed atypical cells. Today we discussed the uncertainty of this lesion, potential risk of repeat biopsy (percutaneous biopsy and risk of hospitalization for persistent air leak and pneumothorax), potential treatments for cancer (likely no surgery, so radiation or systemic therapy). He opted for continued clinical monitoring, will also repeat CT scans at interval.    # Vaccinations: he has declined vaccination    Follow up in 6 months      Svitlana Taylor MD           Reason for Visit  Robert B Behr is a 74 year old year old male who is being seen for RECHECK (Return Pulmonary )  Pulmonary HPI  Robert B Behr is a 74 year old male with a history of COPD, lung nodules, and tobacco use who presents for follow-up.     He rolls his own cigarettes and smokes 3-5 every day, makes sure to always use filters. He still has daily clear sputum production in the morning. He has been regularly swimming (20 minutes per day), and is using stair climber 4-5 days a week, put the bike away because it  was hard on his hips. He is having to slow down the pace with exercise. He uses Wixela twice a day. He also uses Combivent a couple times per day. Slightly increased sputum in the morning as compared to prior, though still just clear. No illnesses in the interim, just a hamstring strain.      He is holding out hope for stem cell therapies for COPD.     Current Outpatient Medications   Medication Sig Dispense Refill    acetaminophen (TYLENOL) 500 MG tablet Take 500-1,000 mg by mouth every 6 hours as needed for mild pain.      albuterol (PROAIR HFA) 108 (90 Base) MCG/ACT inhaler INHALE 1 TO 2 PUFFS BY MOUTH EVERY 4 HOURS AS NEEDED FOR SHORTNESS OF BREATH 8.5 g 3    atorvastatin (LIPITOR) 40 MG tablet Take 1 tablet (40 mg) by mouth daily 90 tablet 3    B Complex-C (VITAMIN B COMPLEX W/VITAMIN C) TABS tablet TAKE 1 TABLET BY MOUTH TWICE DAILY WITH FOLIC ACID 180 tablet 3    Calcium Carbonate-Vitamin D (CALCIUM 600 +D HIGH POTENCY) 600-10 MG-MCG TABS Take 1 tablet by mouth 2 times daily 90 tablet 3    COMBIVENT RESPIMAT  MCG/ACT inhaler INHALE 1 PUFF INTO THE LUNGS FOUR TIMES DAILY AS NEEDED FOR SHORTNESS OF BREATH OR WHEEZING OR COUGH. DO NOT EXCEED 6 DOSES PER DAY 4 g 4    fish oil-omega-3 fatty acids 1000 MG capsule Take 1 capsule (1 g) by mouth daily 90 capsule 3    fluticasone-salmeterol (WIXELA INHUB) 500-50 MCG/ACT inhaler INHALE 1 PUFF INTO THE LUNGS TWICE DAILY 60 each 3    glucosamine-chondroitinoitin 750-600 MG TABS TAKE 1 TABLETS BY MOUTH TWICE DAILY 120 tablet 3    ipratropium - albuterol 0.5 mg/2.5 mg/3 mL (DUONEB) 0.5-2.5 (3) MG/3ML neb solution Take 1 vial (3 mLs) by nebulization every 6 hours as needed for shortness of breath, wheezing or cough 90 mL 0    Selenium (SELENIMIN-200) 200 MCG TABS tablet Take 1 tablet (200 mcg) by mouth daily 90 tablet 3    vitamin C (ASCORBIC ACID) 1000 MG TABS Take 1 tablet (1,000 mg) by mouth daily 90 tablet 3    vitamin E (TOCOPHEROL) 1000 units (450 mg) CAPS  capsule Take 1 capsule (1,000 Units) by mouth daily 90 capsule 3     No current facility-administered medications for this visit.   No Known Allergies  Past Medical History:   Diagnosis Date    Cataract     COPD (chronic obstructive pulmonary disease) (H)     diagnosed with spirometry     Fractured lateral malleolus     Lung nodules     CT scan 2016    Osteoporosis     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    Sepsis without acute organ dysfunction, due to unspecified organism (H)     Tobacco abuse        Past Surgical History:   Procedure Laterality Date    BRONCHOSCOPY RIGID OR FLEXIBLE W/TRANSENDOSCOPIC ENDOBRONCHIAL ULTRASOUND GUIDED N/A 2/12/2024    Procedure: Endobronchial Ultrasound Guided;  Surgeon: Paulo Bryan MD;  Location: [facility] OR    BRONCHOSCOPY, WITH BIOPSY, ROBOT ASSISTED N/A 2/12/2024    Procedure: BRONCHOSCOPY, ROBOT-ASSISTED, WITH BIOPSY ION;  Surgeon: Paulo Bryan MD;  Location: [facility] OR    CATARACT IOL, RT/LT Left 09/15/2017    s/p CE/IOL left eye    CATARACT IOL, RT/LT Right     CV CORONARY ANGIOGRAM  10/23/2023    Procedure: CV CORONARY ANGIOGRAM;  Surgeon: Torin Parker MD;  Location:  [facility]    ESOPHAGOSCOPY, GASTROSCOPY, DUODENOSCOPY (EGD), COMBINED N/A 10/22/2023    Procedure: Esophagoscopy, gastroscopy, duodenoscopy (EGD), combined;  Surgeon: David Post MD;  Location:  GI    PHACOEMULSIFICATION CLEAR CORNEA WITH STANDARD INTRAOCULAR LENS IMPLANT Right 9/30/2016    Procedure: PHACOEMULSIFICATION CLEAR CORNEA WITH STANDARD INTRAOCULAR LENS IMPLANT;  Surgeon: Elly Valencia MD;  Location:  [facility]    PHACOEMULSIFICATION WITH STANDARD INTRAOCULAR LENS IMPLANT Left 9/15/2017    Procedure: PHACOEMULSIFICATION WITH STANDARD INTRAOCULAR LENS IMPLANT;  Left Eye Phacoemulsification with Standard Lens;  Surgeon: Elly Valencia MD;  Location: [facility] OR    WRIST SURGERY         Social History     Socioeconomic History    Marital status: Single     Spouse  name: Not on file    Number of children: 0    Years of education: Not on file    Highest education level: Not on file   Occupational History    Occupation: Musician/ (retired)     Comment: taught audio engineering, played drums   Tobacco Use    Smoking status: Every Day     Current packs/day: 1.00     Average packs/day: 1 pack/day for 60.8 years (60.8 ttl pk-yrs)     Types: Cigarettes     Start date: 1964    Smokeless tobacco: Former    Tobacco comments:     *2ppd for 10 years, 1ppd for 25 years, now 5 cigs per day. Actively working on quitting.   Vaping Use    Vaping status: Never Used   Substance and Sexual Activity    Alcohol use: Yes     Alcohol/week: 6.0 standard drinks of alcohol     Types: 6 Cans of beer per week     Comment: Drinks for sporting events.    Drug use: Not Currently     Types: Marijuana     Comment: Marijuana - as a youth    Sexual activity: Not Currently   Other Topics Concern    Parent/sibling w/ CABG, MI or angioplasty before 65F 55M? No   Social History Narrative    Single.  Retired.     No children    5 siblings:      No family history of bleeding, clotting disorders or complications with anesthesia.     Social Drivers of Health     Financial Resource Strain: Low Risk  (8/5/2024)    Financial Resource Strain     Within the past 12 months, have you or your family members you live with been unable to get utilities (heat, electricity) when it was really needed?: No   Food Insecurity: Low Risk  (8/5/2024)    Food Insecurity     Within the past 12 months, did you worry that your food would run out before you got money to buy more?: No     Within the past 12 months, did the food you bought just not last and you didn t have money to get more?: No   Transportation Needs: Low Risk  (8/5/2024)    Transportation Needs     Within the past 12 months, has lack of transportation kept you from medical appointments, getting your medicines, non-medical meetings or appointments, work, or from  "getting things that you need?: No   Physical Activity: Unknown (8/5/2024)    Exercise Vital Sign     Days of Exercise per Week: 4 days     Minutes of Exercise per Session: Not on file   Stress: No Stress Concern Present (8/5/2024)    [employer] - Occupational Stress Questionnaire     Feeling of Stress : Not at all   Social Connections: Unknown (8/5/2024)    Social Connection and Isolation Panel [NHANES]     Frequency of Communication with Friends and Family: Not on file     Frequency of Social Gatherings with Friends and Family: Three times a week     Attends [employer] Services: Not on file     Active Member of Clubs or Organizations: Not on file     Attends Club or Organization Meetings: Not on file     Marital Status: Not on file   Interpersonal Safety: Low Risk  (8/5/2024)    Interpersonal Safety     Do you feel physically and emotionally safe where you currently live?: Yes     Within the past 12 months, have you been hit, slapped, kicked or otherwise physically hurt by someone?: No     Within the past 12 months, have you been humiliated or emotionally abused in other ways by your partner or ex-partner?: No   Housing Stability: Low Risk  (8/5/2024)    Housing Stability     Do you have housing? : Yes     Are you worried about losing your housing?: No     A complete ROS was otherwise negative except as noted in the HPI.  /82   Pulse 76   Ht 1.753 m (5' 9\")   Wt 70.7 kg (155 lb 12.8 oz)   SpO2 96%   BMI 23.01 kg/m    Exam:   GENERAL APPEARANCE: Well developed, well nourished, alert, and in no apparent distress.  RESP: normal percussion, good air flow throughout.  No crackles. No rhonchi. No wheezes.  NEURO: Mentation intact, speech normal, normal strength and tone, normal gait and stance  PSYCH: mentation appears normal. and affect normal/bright  Results:  Spirometry March 2020 at [facility] moderate obstruction normal diffusion    CT chest 11/2024  IMPRESSION:   1. Stable " masslike consolidation with coarse internal calcification in  the posterolateral right upper lobe, favored infectious/inflammatory,  though malignancy remains a consideration.  2. Resolved focal nodular consolidation in the anterior right upper  lobe and new focal nodular opacities in the left upper lobe, likely  waning and waxing infectious processes.  3. Advanced obstructive emphysematous change.  4. Moderate hiatal hernia.    FNA 2/2024  Final Diagnosis   A. LUNG, UPPER LOBE, RIGHT, RIGHT UPPER LOBE NODULE, FINE NEEDLE ASPIRATE:  - Atypical cells present (see comment)  Adequacy: Satisfactory for evaluation but limited by scant cellularity on the cellblock     B. LYMPH NODE(S), STATION 7, FINE NEEDLE ASPIRATE:  - Polymorphous population of lymphocytes  - No evidence of epithelial malignancy  Adequacy: Satisfactory for evaluation     C. LYMPH NODE(S), STATION 4R, FINE NEEDLE ASPIRATE:  - Atypical cells present  in background of polymorphous population of lymphocytes (see comment)  Adequacy: Satisfactory for evaluation but limited by scant cellularity     D. LYMPH NODE(S), STATION 11R, FINE NEEDLE ASPIRATE:  - Nondiagnostic specimen  Adequacy: Unsatisfactory for evaluation due to absence of lymphoid elements   Comment    Cytological preparations from specimen A show few clusters of atypical epithelial cells in a background of histiocytic inflammation and giant cells.  These cells are somewhat concerning but do not exhibit unequivocal features of malignancy.  Please correlate with concurrent surgical pathology report [id number].  If the clinical suspicion remains high, repeat sampling is recommended.     Cell block from specimen C shows few atypical cell groups in a background of polymorphous lymphoid cells.  These atypical cells are similar in morphology to those seen in specimen A.  However, unequivocal features of malignancy are not seen.      This case was seen in consultation with Dr Andre and Dr. Perkins.          Robot assisted bronchoscopy biopsy 2/2024  Final Diagnosis   A.  LUNG, RIGHT UPPER LOBE NODULE, BIOPSY:  -Rare small atypical glands embedded in pulmonary scar, see comment.     B.  LUNG, LEFT UPPER LOBE ENDOBRONCHIAL LESION, BIOPSY:  -Benign respiratory mucosa with submucosal scarring and focal smooth muscle hyperplasia.     Comment  [facility]   In part A, rare small atypical glands are seen in a background of scarring (additional levels were examined).  Rebiopsy is recommended if there is clinical/radiologic suspicion for malignancy.

## 2024-11-05 NOTE — LETTER
11/5/2024      Robert B Behr  [address]  [place] [address]      Dear Colleague,    Thank you for referring your patient, Robert B Behr, to the [facility] FOR LUNG SCIENCE AND [facility]. Please see a copy of my visit note below.      [facility] L[facility]ENCE [facility]  [address] [address]  Phone: [phone]  Fax: [phone]    Patient:  Robert B Behr, Date of birth 1950  Date of Visit:  11/05/2024  Referring Provider Svitlana Taylor      Assessment & Plan     Robert B Behr is a 74 year old male with a history of COPD, lung nodules, and tobacco use who presents for follow-up.    # COPD:  Currently on inhaled corticosteroid / long-acting beta agonist he just never really used long-acting muscarinic agent despite having the medication.  - Maintenance therapy: Wixela   - Rescue: Combivent, Albuterol    # Tobacco use: continues to roll his own cigarettes and not interested in quitting. He smokes 3-5 cig/day    # lung mass - persistent for several years, there is some FDG uptake, he had two prior JAKY on sputum culture, minimally symptomatic, at high risk for cancer, the bronchoscopy biopsy showed atypical cells. Today we discussed the uncertainty of this lesion, potential risk of repeat biopsy (percutaneous biopsy and risk of hospitalization for persistent air leak and pneumothorax), potential treatments for cancer (likely no surgery, so radiation or systemic therapy). He opted for continued clinical monitoring, will also repeat CT scans at interval.    # Vaccinations: he has declined vaccination    Follow up in 6 months      Svitlana Taylor MD           Reason for Visit  Robert B Behr is a 74 year old year old male who is being seen for RECHECK (Return Pulmonary )  Pulmonary HPI  Robert B Behr is a 74 year old male with a history of COPD, lung nodules, and tobacco use who presents for  follow-up.     He rolls his own cigarettes and smokes 3-5 every day, makes sure to always use filters. He still has daily clear sputum production in the morning. He has been regularly swimming (20 minutes per day), and is using stair climber 4-5 days a week, put the bike away because it was hard on his hips. He is having to slow down the pace with exercise. He uses Wixela twice a day. He also uses Combivent a couple times per day. Slightly increased sputum in the morning as compared to prior, though still just clear. No illnesses in the interim, just a hamstring strain.      He is holding out hope for stem cell therapies for COPD.     Current Outpatient Medications   Medication Sig Dispense Refill     acetaminophen (TYLENOL) 500 MG tablet Take 500-1,000 mg by mouth every 6 hours as needed for mild pain.       albuterol (PROAIR HFA) 108 (90 Base) MCG/ACT inhaler INHALE 1 TO 2 PUFFS BY MOUTH EVERY 4 HOURS AS NEEDED FOR SHORTNESS OF BREATH 8.5 g 3     atorvastatin (LIPITOR) 40 MG tablet Take 1 tablet (40 mg) by mouth daily 90 tablet 3     B Complex-C (VITAMIN B COMPLEX W/VITAMIN C) TABS tablet TAKE 1 TABLET BY MOUTH TWICE DAILY WITH FOLIC ACID 180 tablet 3     Calcium Carbonate-Vitamin D (CALCIUM 600 +D HIGH POTENCY) 600-10 MG-MCG TABS Take 1 tablet by mouth 2 times daily 90 tablet 3     COMBIVENT RESPIMAT  MCG/ACT inhaler INHALE 1 PUFF INTO THE LUNGS FOUR TIMES DAILY AS NEEDED FOR SHORTNESS OF BREATH OR WHEEZING OR COUGH. DO NOT EXCEED 6 DOSES PER DAY 4 g 4     fish oil-omega-3 fatty acids 1000 MG capsule Take 1 capsule (1 g) by mouth daily 90 capsule 3     fluticasone-salmeterol (WIXELA INHUB) 500-50 MCG/ACT inhaler INHALE 1 PUFF INTO THE LUNGS TWICE DAILY 60 each 3     glucosamine-chondroitinoitin 750-600 MG TABS TAKE 1 TABLETS BY MOUTH TWICE DAILY 120 tablet 3     ipratropium - albuterol 0.5 mg/2.5 mg/3 mL (DUONEB) 0.5-2.5 (3) MG/3ML neb solution Take 1 vial (3 mLs) by nebulization every 6 hours as needed for  shortness of breath, wheezing or cough 90 mL 0     Selenium (SELENIMIN-200) 200 MCG TABS tablet Take 1 tablet (200 mcg) by mouth daily 90 tablet 3     vitamin C (ASCORBIC ACID) 1000 MG TABS Take 1 tablet (1,000 mg) by mouth daily 90 tablet 3     vitamin E (TOCOPHEROL) 1000 units (450 mg) CAPS capsule Take 1 capsule (1,000 Units) by mouth daily 90 capsule 3     No current facility-administered medications for this visit.   No Known Allergies  Past Medical History:   Diagnosis Date     Cataract      COPD (chronic obstructive pulmonary disease) (H)     diagnosed with spirometry      Fractured lateral malleolus      Lung nodules     CT scan 2016     Osteoporosis      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     Sepsis without acute organ dysfunction, due to unspecified organism (H)      Tobacco abuse        Past Surgical History:   Procedure Laterality Date     BRONCHOSCOPY RIGID OR FLEXIBLE W/TRANSENDOSCOPIC ENDOBRONCHIAL ULTRASOUND GUIDED N/A 2/12/2024    Procedure: Endobronchial Ultrasound Guided;  Surgeon: Paulo Bryan MD;  Location: [facility] OR     BRONCHOSCOPY, WITH BIOPSY, ROBOT ASSISTED N/A 2/12/2024    Procedure: BRONCHOSCOPY, ROBOT-ASSISTED, WITH BIOPSY ION;  Surgeon: Paulo Bryan MD;  Location:  OR     CATARACT IOL, RT/LT Left 09/15/2017    s/p CE/IOL left eye     CATARACT IOL, RT/LT Right      CV CORONARY ANGIOGRAM  10/23/2023    Procedure: CV CORONARY ANGIOGRAM;  Surgeon: Torin Parker MD;  Location:  HEART CARDIAC CATH LAB     ESOPHAGOSCOPY, GASTROSCOPY, DUODENOSCOPY (EGD), COMBINED N/A 10/22/2023    Procedure: Esophagoscopy, gastroscopy, duodenoscopy (EGD), combined;  Surgeon: David Post MD;  Location:  GI     PHACOEMULSIFICATION CLEAR CORNEA WITH STANDARD INTRAOCULAR LENS IMPLANT Right 9/30/2016    Procedure: PHACOEMULSIFICATION CLEAR CORNEA WITH STANDARD INTRAOCULAR LENS IMPLANT;  Surgeon: Elly Valencia MD;  Location:  [facility]     PHACOEMULSIFICATION WITH STANDARD  INTRAOCULAR LENS IMPLANT Left 9/15/2017    Procedure: PHACOEMULSIFICATION WITH STANDARD INTRAOCULAR LENS IMPLANT;  Left Eye Phacoemulsification with Standard Lens;  Surgeon: Elly Valencia MD;  Location: [facility] OR     WRIST SURGERY         Social History     Socioeconomic History     Marital status: Single     Spouse name: Not on file     Number of children: 0     Years of education: Not on file     Highest education level: Not on file   Occupational History     Occupation: Musician/ (retired)     Comment: taught audio engineering, played drums   Tobacco Use     Smoking status: Every Day     Current packs/day: 1.00     Average packs/day: 1 pack/day for 60.8 years (60.8 ttl pk-yrs)     Types: Cigarettes     Start date: 1964     Smokeless tobacco: Former     Tobacco comments:     *2ppd for 10 years, 1ppd for 25 years, now 5 cigs per day. Actively working on quitting.   Vaping Use     Vaping status: Never Used   Substance and Sexual Activity     Alcohol use: Yes     Alcohol/week: 6.0 standard drinks of alcohol     Types: 6 Cans of beer per week     Comment: Drinks for sporting events.     Drug use: Not Currently     Types: Marijuana     Comment: Marijuana - as a youth     Sexual activity: Not Currently   Other Topics Concern     Parent/sibling w/ CABG, MI or angioplasty before 65F 55M? No   Social History Narrative    Single.  Retired.     No children    5 siblings:      No family history of bleeding, clotting disorders or complications with anesthesia.     Social Drivers of Health     Financial Resource Strain: Low Risk  (8/5/2024)    Financial Resource Strain      Within the past 12 months, have you or your family members you live with been unable to get utilities (heat, electricity) when it was really needed?: No   Food Insecurity: Low Risk  (8/5/2024)    Food Insecurity      Within the past 12 months, did you worry that your food would run out before you got money to buy more?: No      Within the past  "12 months, did the food you bought just not last and you didn t have money to get more?: No   Transportation Needs: Low Risk  (8/5/2024)    Transportation Needs      Within the past 12 months, has lack of transportation kept you from medical appointments, getting your medicines, non-medical meetings or appointments, work, or from getting things that you need?: No   Physical Activity: Unknown (8/5/2024)    Exercise Vital Sign      Days of Exercise per Week: 4 days      Minutes of Exercise per Session: Not on file   Stress: No Stress Concern Present (8/5/2024)    [employer] - Occupational Stress Questionnaire      Feeling of Stress : Not at all   Social Connections: Unknown (8/5/2024)    Social Connection and Isolation Panel [NHANES]      Frequency of Communication with Friends and Family: Not on file      Frequency of Social Gatherings with Friends and Family: Three times a week      Attends [employer] Services: Not on file      Active Member of Clubs or Organizations: Not on file      Attends Club or Organization Meetings: Not on file      Marital Status: Not on file   Interpersonal Safety: Low Risk  (8/5/2024)    Interpersonal Safety      Do you feel physically and emotionally safe where you currently live?: Yes      Within the past 12 months, have you been hit, slapped, kicked or otherwise physically hurt by someone?: No      Within the past 12 months, have you been humiliated or emotionally abused in other ways by your partner or ex-partner?: No   Housing Stability: Low Risk  (8/5/2024)    Housing Stability      Do you have housing? : Yes      Are you worried about losing your housing?: No     A complete ROS was otherwise negative except as noted in the HPI.  /82   Pulse 76   Ht 1.753 m (5' 9\")   Wt 70.7 kg (155 lb 12.8 oz)   SpO2 96%   BMI 23.01 kg/m    Exam:   GENERAL APPEARANCE: Well developed, well nourished, alert, and in no apparent distress.  RESP: normal percussion, " good air flow throughout.  No crackles. No rhonchi. No wheezes.  NEURO: Mentation intact, speech normal, normal strength and tone, normal gait and stance  PSYCH: mentation appears normal. and affect normal/bright  Results:  Spirometry March 2020 at [facility] moderate obstruction normal diffusion    CT chest 11/2024  IMPRESSION:   1. Stable masslike consolidation with coarse internal calcification in  the posterolateral right upper lobe, favored infectious/inflammatory,  though malignancy remains a consideration.  2. Resolved focal nodular consolidation in the anterior right upper  lobe and new focal nodular opacities in the left upper lobe, likely  waning and waxing infectious processes.  3. Advanced obstructive emphysematous change.  4. Moderate hiatal hernia.    FNA 2/2024  Final Diagnosis   A. LUNG, UPPER LOBE, RIGHT, RIGHT UPPER LOBE NODULE, FINE NEEDLE ASPIRATE:  - Atypical cells present (see comment)  Adequacy: Satisfactory for evaluation but limited by scant cellularity on the cellblock     B. LYMPH NODE(S), STATION 7, FINE NEEDLE ASPIRATE:  - Polymorphous population of lymphocytes  - No evidence of epithelial malignancy  Adequacy: Satisfactory for evaluation     C. LYMPH NODE(S), STATION 4R, FINE NEEDLE ASPIRATE:  - Atypical cells present  in background of polymorphous population of lymphocytes (see comment)  Adequacy: Satisfactory for evaluation but limited by scant cellularity     D. LYMPH NODE(S), STATION 11R, FINE NEEDLE ASPIRATE:  - Nondiagnostic specimen  Adequacy: Unsatisfactory for evaluation due to absence of lymphoid elements   Comment    Cytological preparations from specimen A show few clusters of atypical epithelial cells in a background of histiocytic inflammation and giant cells.  These cells are somewhat concerning but do not exhibit unequivocal features of malignancy.  Please correlate with concurrent surgical pathology report [id number].  If the clinical suspicion remains high, repeat  sampling is recommended.     Cell block from specimen C shows few atypical cell groups in a background of polymorphous lymphoid cells.  These atypical cells are similar in morphology to those seen in specimen A.  However, unequivocal features of malignancy are not seen.      This case was seen in consultation with Dr Andre and Dr. Perkins.         Robot assisted bronchoscopy biopsy 2/2024  Final Diagnosis   A.  LUNG, RIGHT UPPER LOBE NODULE, BIOPSY:  -Rare small atypical glands embedded in pulmonary scar, see comment.     B.  LUNG, LEFT UPPER LOBE ENDOBRONCHIAL LESION, BIOPSY:  -Benign respiratory mucosa with submucosal scarring and focal smooth muscle hyperplasia.     Comment  [facility]   In part A, rare small atypical glands are seen in a background of scarring (additional levels were examined).  Rebiopsy is recommended if there is clinical/radiologic suspicion for malignancy.   Again, thank you for allowing me to participate in the care of your patient.        Sincerely,        Svitlana Taylor MD

## 2024-11-19 NOTE — PROGRESS NOTES
[facility] Care Coordination Contact    Called member to complete six month assessment and left a message requesting a return call.  Leeanna Sigala RN, BSN, PHN  [facility] Care Coordinator  [phone]       [FreeTextEntry1] : The patient is a pleasant 67 year old female diagnosed with limited stage small cell lung cancer referred by Dr. Castrejon. She reports she was sent for a screening chest CT due to her smoking history ( 2 PPD x 20 yrs, now cut down to 2 cigs / day ). She also has COPD and chronic cough.  CT chest 8/13/24 ([facility]) showed enlarging 3.9 x 2.9. 2.6 cm RUL lung mass and new 5x 3.6 x 3.6 cm mass like density in RUL anteromedially.  Navigational bronchoscopy on 09/05/2024. Path of RUL biopsy revealed positive for malignant cells c/w small cell carcinoma. Path of Level 7, 4R, 11R revealed negative for malignant cells. No epithelial lesion identified.  CT brain w/o contrast 10/4/24 showed:  *  No evidence of acute intracranial hemorrhage, midline shift or CT evidence of acute territorial infarct. No area of abnormal enhancement. *  2.3 cm calcified lesion in the scalp at the level of the vertex may represent a sebaceous cyst. Clinical correlation suggested.  PET/CT ([facility]) 10/5/24 FDG avid lobulated right upper lobe mass with necrotic component measures 4.8 x 2.9 cm and approximately 7.5 cm in vertical span with photopenic component and thick rind of activity showing SUV 18.3 compatible with recently biopsy-proven malignancy. There are additional FDG avid opacities in the anteromedial aspect of the right upper lobe (image 85) measuring 3.6 x 3.0 cm with up to SUV 9.6. These opacities are also present on the most recent CT chest measuring 4.5 x 2.5 cm and 7.9 cm in vertical span. IMPRESSION : Right upper lobe FDG avid lobulated mass with intense activity compatible with biopsy-proven malignancy. Nonavid 2.0 cm left adrenal nodule.  Brain MRI 10/4/24 No evidence of metastases. No evidence of acute infarct, or hemorrhage.   There is no evidence of abnormal enhancement. Scattered T2/FLAIR hyperintense signal in the periventricular and pontine white matter, suggestive of mild to moderate chronic microvascular ischemic change.  She lives in  [place] with her . She has chronic mild cough and SOB on exertion- due to COPD, no significant change. Follows with pulmonologist Dr Katie Jauregui.  PFTs on 4/16/24 showed FEV 1 0.78 L and DLCO 46 % predicted. She does not use home O2 but was not cleared for surgery.  She began cycle 1 of chemotherapy last week with Cisplatin/Etopside on 11/1/24 and presents with her friend to discuss the role of radiation therapy in her care.  11/19/24 Patient seen for OTV fx 2/33. Feels well. 2nd cycle chemo this week.

## 2024-11-27 ENCOUNTER — PATIENT OUTREACH (OUTPATIENT)
Dept: GERIATRIC MEDICINE | Facility: CLINIC | Age: 74
End: 2024-11-27
Payer: COMMERCIAL

## 2024-11-27 NOTE — PROGRESS NOTES
[facility] Care Coordination Contact      [facility] Six-Month Telephone Assessment    6 month telephone assessment completed on 11/27/24.    ER visits: No  Hospitalizations: No  TCU stays: No  Significant health status changes: None  Falls/Injuries: No  ADL/IADL changes: No  Changes in services: No    Caregiver Assessment follow up:  NA    Goals: See Support Plan for goal progress documentation.      Member reports he is doing well and does not need a visit.    Will call  member in 6 months for an annual health risk assessment.   Encouraged member to call CC with any questions or concerns in the meantime.     Leeanna Sigala RN, BSN, PHN  [facility] Care Coordinator  [phone]

## 2025-01-20 DIAGNOSIS — Z71.89 ENCOUNTER FOR HERB AND VITAMIN SUPPLEMENT MANAGEMENT: ICD-10-CM

## 2025-01-21 RX ORDER — MULTIVIT WITH MINERALS/LUTEIN
1000 TABLET ORAL DAILY
Qty: 90 TABLET | Refills: 3 | OUTPATIENT
Start: 2025-01-21

## 2025-01-22 DIAGNOSIS — Z78.9 TAKES DIETARY SUPPLEMENTS: ICD-10-CM

## 2025-01-22 DIAGNOSIS — Z71.89 ENCOUNTER FOR HERB AND VITAMIN SUPPLEMENT MANAGEMENT: ICD-10-CM

## 2025-01-22 DIAGNOSIS — J44.0 CHRONIC OBSTRUCTIVE PULMONARY DISEASE WITH ACUTE LOWER RESPIRATORY INFECTION (H): ICD-10-CM

## 2025-01-22 DIAGNOSIS — M16.11 OSTEOARTHRITIS OF RIGHT HIP, UNSPECIFIED OSTEOARTHRITIS TYPE: ICD-10-CM

## 2025-01-22 RX ORDER — MULTIVIT WITH MINERALS/LUTEIN
1000 TABLET ORAL DAILY
Qty: 90 TABLET | Refills: 3 | Status: SHIPPED | OUTPATIENT
Start: 2025-01-22

## 2025-01-22 RX ORDER — DIAPER,BRIEF,INFANT-TODD,DISP
1 EACH MISCELLANEOUS 2 TIMES DAILY
Qty: 180 TABLET | Refills: 1 | Status: SHIPPED | OUTPATIENT
Start: 2025-01-22

## 2025-01-22 RX ORDER — SELENIUM 200 MCG
200 TABLET ORAL DAILY
Qty: 90 TABLET | Refills: 3 | Status: SHIPPED | OUTPATIENT
Start: 2025-01-22

## 2025-01-22 RX ORDER — MULTIVITAMIN WITH IRON
1 TABLET ORAL 2 TIMES DAILY
Qty: 180 TABLET | Refills: 3 | Status: SHIPPED | OUTPATIENT
Start: 2025-01-22

## 2025-01-22 RX ORDER — MAGNESIUM CARB/ALUMINUM HYDROX 105-160MG
TABLET,CHEWABLE ORAL
Qty: 180 TABLET | Refills: 3 | Status: SHIPPED | OUTPATIENT
Start: 2025-01-22

## 2025-01-22 RX ORDER — MULTIVIT WITH MINERALS/LUTEIN
1000 TABLET ORAL DAILY
Qty: 90 CAPSULE | Refills: 3 | Status: SHIPPED | OUTPATIENT
Start: 2025-01-22

## 2025-01-22 RX ORDER — CHLORAL HYDRATE 500 MG
1 CAPSULE ORAL DAILY
Qty: 90 CAPSULE | Refills: 1 | Status: SHIPPED | OUTPATIENT
Start: 2025-01-22

## 2025-01-22 NOTE — TELEPHONE ENCOUNTER
Patient is almost out of medication and per patient pharmacy was supposed to have sent the refill requests last week.

## 2025-02-17 DIAGNOSIS — J44.9 CHRONIC OBSTRUCTIVE PULMONARY DISEASE, UNSPECIFIED COPD TYPE (H): ICD-10-CM

## 2025-02-17 RX ORDER — FLUTICASONE PROPIONATE AND SALMETEROL 500; 50 UG/1; UG/1
1 POWDER RESPIRATORY (INHALATION) 2 TIMES DAILY
Qty: 60 EACH | Refills: 3 | Status: SHIPPED | OUTPATIENT
Start: 2025-02-17

## 2025-03-16 DIAGNOSIS — J44.9 COPD (CHRONIC OBSTRUCTIVE PULMONARY DISEASE) (H): ICD-10-CM

## 2025-03-17 RX ORDER — IPRATROPIUM BROMIDE AND ALBUTEROL 20; 100 UG/1; UG/1
SPRAY, METERED RESPIRATORY (INHALATION)
Qty: 4 G | Refills: 4 | Status: SHIPPED | OUTPATIENT
Start: 2025-03-17

## 2025-04-09 ENCOUNTER — PATIENT OUTREACH (OUTPATIENT)
Dept: GERIATRIC MEDICINE | Facility: CLINIC | Age: 75
End: 2025-04-09
Payer: COMMERCIAL

## 2025-04-09 NOTE — Clinical Note
Jon Pickens, I am the community care coordinator through [facility] for Sergio. He declined a home visit with me. All my notes can be found in Epic. Please feel free to contact me with any questions or concerns. Thank you, Leeanna Sigala RN, BSN, PHN [facility] Care Coordinator [phone]

## 2025-04-22 DIAGNOSIS — Z71.89 ENCOUNTER FOR HERB AND VITAMIN SUPPLEMENT MANAGEMENT: ICD-10-CM

## 2025-04-22 RX ORDER — SELENIUM 200 MCG
200 TABLET ORAL DAILY
Qty: 90 TABLET | Refills: 0 | Status: SHIPPED | OUTPATIENT
Start: 2025-04-22

## 2025-04-22 RX ORDER — CHLORAL HYDRATE 500 MG
1 CAPSULE ORAL DAILY
Qty: 90 CAPSULE | Refills: 0 | Status: SHIPPED | OUTPATIENT
Start: 2025-04-22

## 2025-04-24 NOTE — PROGRESS NOTES
[facility] Care Coordination Contact      [facility] Refusal Telephone Assessment    Member refused home visit H[facility] on 4/9/25 (reason: Doesn't want a home visit at this time.).    ER visits: No  Hospitalizations: No  Health concerns: None  Falls/Injuries: No  ADL/IADL Dependencies: None        Member currently receiving the following home care services:   None  Member currently receiving the following community resources:  None  Informal support(s):      Health [facility] benefits: [facility]: Shared information regarding [employer]. Reviewed preventative health screening and health plan supplemental benefits/incentives. Reviewed medication disposal form and transition of care member handout.    Follow-Up Plan: Member informed of future contact, plan to f/u with member with a 6 month telephone assessment and offer a home visit.  Contact information shared with member and family, encouraged member to call with any questions or concerns at any time.    This CC note routed to PCP, Timoteo Pickens.    Leeanna Sigala RN, BSN, PHN  [facility] Care Coordinator  [phone]

## 2025-04-28 DIAGNOSIS — J44.9 CHRONIC OBSTRUCTIVE PULMONARY DISEASE, UNSPECIFIED COPD TYPE (H): ICD-10-CM

## 2025-04-29 RX ORDER — FLUTICASONE PROPIONATE AND SALMETEROL 500; 50 UG/1; UG/1
1 POWDER RESPIRATORY (INHALATION) 2 TIMES DAILY
Qty: 60 EACH | Refills: 5 | Status: SHIPPED | OUTPATIENT
Start: 2025-04-29

## 2025-04-30 NOTE — TELEPHONE ENCOUNTER
"Requested Prescriptions   Pending Prescriptions Disp Refills     ipratropium - albuterol 0.5 mg/2.5 mg/3 mL (DUONEB) 0.5-2.5 (3) MG/3ML neb solution [Pharmacy Med Name: IPRATROPI/ALB 0.5/3MG INH SL 60X3ML]  Last Written Prescription Date:  12-7-19  Last Fill Quantity: 4 g,  # refills: 6   Last office visit: 9/10/2019 with prescribing provider:  ARAM FOURNIER    Future Office Visit:         Sig: USE 1 VIAL VIA NEBULIZER EVERY 6 HOURS AS NEEDED FOR SHORTNESS OF BREATH OR DIFFICULT BREATHING OR WHEEZING       Short-Acting Beta Agonist Inhalers Protocol  Passed - 12/27/2019 10:50 AM        Passed - Patient is age 12 or older        Passed - Recent (12 mo) or future (30 days) visit within the authorizing provider's specialty     Patient has had an office visit with the authorizing provider or a provider within the authorizing providers department within the previous 12 mos or has a future within next 30 days. See \"Patient Info\" tab in inbasket, or \"Choose Columns\" in Meds & Orders section of the refill encounter.          Passed - Medication is active on med list         " complains of pain/discomfort

## 2025-05-06 ENCOUNTER — ANCILLARY PROCEDURE (OUTPATIENT)
Dept: CT IMAGING | Facility: CLINIC | Age: 75
End: 2025-05-06
Attending: INTERNAL MEDICINE
Payer: COMMERCIAL

## 2025-05-06 ENCOUNTER — OFFICE VISIT (OUTPATIENT)
Dept: PULMONOLOGY | Facility: CLINIC | Age: 75
End: 2025-05-06
Attending: INTERNAL MEDICINE
Payer: COMMERCIAL

## 2025-05-06 ENCOUNTER — TELEPHONE (OUTPATIENT)
Dept: PULMONOLOGY | Facility: CLINIC | Age: 75
End: 2025-05-06

## 2025-05-06 VITALS
BODY MASS INDEX: 23.08 KG/M2 | DIASTOLIC BLOOD PRESSURE: 77 MMHG | OXYGEN SATURATION: 95 % | HEART RATE: 68 BPM | SYSTOLIC BLOOD PRESSURE: 158 MMHG | WEIGHT: 155.8 LBS | HEIGHT: 69 IN

## 2025-05-06 DIAGNOSIS — J44.9 CHRONIC OBSTRUCTIVE PULMONARY DISEASE, UNSPECIFIED COPD TYPE (H): Primary | ICD-10-CM

## 2025-05-06 DIAGNOSIS — R91.8 MASS OF UPPER LOBE OF RIGHT LUNG: ICD-10-CM

## 2025-05-06 PROCEDURE — 3077F SYST BP >= 140 MM HG: CPT | Performed by: INTERNAL MEDICINE

## 2025-05-06 PROCEDURE — 1126F AMNT PAIN NOTED NONE PRSNT: CPT | Performed by: INTERNAL MEDICINE

## 2025-05-06 PROCEDURE — 3078F DIAST BP <80 MM HG: CPT | Performed by: INTERNAL MEDICINE

## 2025-05-06 PROCEDURE — 99214 OFFICE O/P EST MOD 30 MIN: CPT | Performed by: INTERNAL MEDICINE

## 2025-05-06 PROCEDURE — G0463 HOSPITAL OUTPT CLINIC VISIT: HCPCS | Performed by: INTERNAL MEDICINE

## 2025-05-06 PROCEDURE — 71250 CT THORAX DX C-: CPT | Mod: GC | Performed by: RADIOLOGY

## 2025-05-06 ASSESSMENT — PAIN SCALES - GENERAL: PAINLEVEL_OUTOF10: NO PAIN (0)

## 2025-05-06 NOTE — PATIENT INSTRUCTIONS
The lung spot does look to still be slowly growing. I think we can keep watching it as we discussed.   We will initiate the process of starting a new medication. It will likely take a bit of paperwork so stay tuned.   Continue your current inhalers.

## 2025-05-06 NOTE — TELEPHONE ENCOUNTER
Prior Authorization Not Needed per Insurance    Medication: OHTUVAYRE 3 MG/2.5ML IN SUSP  Insurance Company: [employer] - Phone [phone] Fax [phone]  Expected CoPay: $    Pharmacy Filling the Rx:    Pharmacy Notified:   Patient Notified:     Test claim at [facility] no PA required and copay is $0.     Goldie pathways will need signed chart notes to sent with forms     Uploaded prescriber forms to  tab

## 2025-05-06 NOTE — PROGRESS NOTES
[facility] [address] [address]  Phone: [phone]  Fax: [phone]    Patient:  Robert B Behr, Date of birth 1950  Date of Visit:  05/06/2025  Referring Provider Svitlana Taylor      Assessment & Plan      Robert B Behr is a 75 year old male with a history of COPD, lung nodules, and tobacco use who presents for follow-up.    # COPD:  Currently on inhaled corticosteroid / long-acting beta agonist he just never really used long-acting muscarinic agent despite having the medication. Reports some worsened RODRIGUEZ and cough over the past year. Discussed not a candidate for lung transplant.  - Maintenance therapy: Wixela   - Rescue: Combivent (reports use), Albuterol  - Ensifentrine neb BID ordered for improved control    # Tobacco use: continues to roll his own cigarettes and not interested in quitting. He smokes 5-10 cig/day, less than historical use    # lung mass - persistent for several years, there is some FDG uptake, he had two prior JAKY on sputum culture, minimally symptomatic, at high risk for cancer, the bronchoscopy biopsy 2024 showed atypical cells. Today we discussed the uncertainty of this lesion, potential risk of repeat biopsy (percutaneous biopsy and risk of hospitalization for persistent air leak and pneumothorax), potential treatments for cancer (likely no surgery, so radiation or systemic therapy). He opted for continued clinical monitoring, will also repeat CT scans at interval.  - Recommended contacting clinic for exacerbations/respiratory infections as not to promote resistance in JAKY through azithro use  - PFTs next visit in the event we need to plan for additional lung therapies  - follow up CT in 4months    # Vaccinations: he has declined vaccination    Follow up in 4 months            Reason for Visit  Robert B Behr is a 75 year old year old male who is being seen for follow up. No chief complaint  on file.  Pulmonary HPI  Robert B Behr is a 75 year old male with a history of COPD, lung nodules, and tobacco use who presents for follow-up.     He rolls his own cigarettes and smokes 5-10 every day, makes sure to always use filters. He still has daily clear sputum production in the morning. He has been regularly swimming (20 minutes per day), and is using stair climber 4-5 days a week, previously reporting that he put the bike away because it was hard on his hips. Today he reports he would like to be biking more and has noticed some increased shortness of breath with stairs and overall cannot walk as far without resting. He is having to slow down the pace with exercise. He confirms use of his Wixela twice a day. He also uses Combivent a couple times per day. Slightly increased sputum in the morning as compared to prior, though still just clear. He reports increase in cough over the past 6 months or so as well but denies green or red sputum. Does report one episode of illness/bronchitis which he recieved antibiotics and prednisone for and believes this resolved his issue. Endorses chronic rhinorrhea but no sore throat, weight loss, fevers or chills, or weight loss.    He is holding out hope for stem cell therapies for COPD.     Current Outpatient Medications   Medication Sig Dispense Refill    acetaminophen (TYLENOL) 500 MG tablet Take 500-1,000 mg by mouth every 6 hours as needed for mild pain.      albuterol (PROAIR HFA) 108 (90 Base) MCG/ACT inhaler INHALE 1 TO 2 PUFFS BY MOUTH EVERY 4 HOURS AS NEEDED FOR SHORTNESS OF BREATH 8.5 g 3    atorvastatin (LIPITOR) 40 MG tablet Take 1 tablet (40 mg) by mouth daily 90 tablet 3    B-Complex-C TABS Take 1 tablet by mouth 2 times daily. 180 tablet 3    Calcium Carbonate-Vitamin D (CALCIUM 600 +D HIGH POTENCY) 600-10 MG-MCG TABS Take 1 tablet by mouth 2 times daily. 180 tablet 1    COMBIVENT RESPIMAT  MCG/ACT inhaler INHALE 1 PUFF INTO THE LUNGS FOUR TIMES DAILY AS  NEEDED FOR SHORTNESS OF BREATH OR WHEEZING OR COUGH. DO NOT EXCEED 6 DOSES PER DAY 4 g 4    fish oil-omega-3 fatty acids 1000 MG capsule Take 1 capsule (1 g) by mouth daily. 90 capsule 0    fluticasone-salmeterol (WIXELA INHUB) 500-50 MCG/ACT inhaler INHALE 1 PUFF INTO THE LUNGS TWICE DAILY 60 each 5    glucosamine-chondroitinoitin 750-600 MG TABS Take 1 tablet by mouth twice daily 180 tablet 3    ipratropium - albuterol 0.5 mg/2.5 mg/3 mL (DUONEB) 0.5-2.5 (3) MG/3ML neb solution Take 1 vial (3 mLs) by nebulization every 6 hours as needed for shortness of breath, wheezing or cough 90 mL 0    Selenium (SELENIMIN-200) 200 MCG TABS tablet Take 1 tablet (200 mcg) by mouth daily. 90 tablet 0    vitamin C (ASCORBIC ACID) 1000 MG TABS Take 1 tablet (1,000 mg) by mouth daily. 90 tablet 3    vitamin E (TOCOPHEROL) 1000 units (450 mg) CAPS capsule Take 1 capsule (1,000 Units) by mouth daily. 90 capsule 3     No current facility-administered medications for this visit.   No Known Allergies  Past Medical History:   Diagnosis Date    Cataract     COPD (chronic obstructive pulmonary disease) (H)     diagnosed with spirometry     Fractured lateral malleolus     Lung nodules     CT scan 2016    Osteoporosis     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    Sepsis without acute organ dysfunction, due to unspecified organism (H)     Tobacco abuse        Past Surgical History:   Procedure Laterality Date    BRONCHOSCOPY RIGID OR FLEXIBLE W/TRANSENDOSCOPIC ENDOBRONCHIAL ULTRASOUND GUIDED N/A 2/12/2024    Procedure: Endobronchial Ultrasound Guided;  Surgeon: Paulo Bryan MD;  [facility]    BRONCHOSCOPY, WITH BIOPSY, ROBOT ASSISTED N/A 2/12/2024    Procedure: BRONCHOSCOPY, ROBOT-ASSISTED, WITH BIOPSY ION;  Surgeon: Paulo Bryan MD;  Location: [facility] OR    CATARACT IOL, RT/LT Left 09/15/2017    s/p CE/IOL left eye    CATARACT IOL, RT/LT Right     CV CORONARY ANGIOGRAM  10/23/2023    Procedure: CV CORONARY ANGIOGRAM;  Surgeon:  Torin Parker MD;  Location:  [facility]ARD[facility] CATH LAB    ESOPHAGOSCOPY, GASTROSCOPY, DUODENOSCOPY (EGD), COMBINED N/A 10/22/2023    Procedure: Esophagoscopy, gastroscopy, duodenoscopy (EGD), combined;  Surgeon: David Psot MD;  Location:  GI    PHACOEMULSIFICATION CLEAR CORNEA WITH STANDARD INTRAOCULAR LENS IMPLANT Right 9/30/2016    Procedure: PHACOEMULSIFICATION CLEAR CORNEA WITH STANDARD INTRAOCULAR LENS IMPLANT;  Surgeon: Elly Valencia MD;  Location:  [facility]    PHACOEMULSIFICATION WITH STANDARD INTRAOCULAR LENS IMPLANT Left 9/15/2017    Procedure: PHACOEMULSIFICATION WITH STANDARD INTRAOCULAR LENS IMPLANT;  Left Eye Phacoemulsification with Standard Lens;  Surgeon: Elly Valencia MD;  [facility] OR    WRIST SURGERY         Social History     Socioeconomic History    Marital status: Single     Spouse name: Not on file    Number of children: 0    Years of education: Not on file    Highest education level: Not on file   Occupational History    Occupation: Musician/ (retired)     Comment: taught audio engineering, played [employer]   Tobacco Use    Smoking status: Every Day     Current packs/day: 1.00     Average packs/day: 1 pack/day for 61.3 years (61.3 ttl pk-yrs)     Types: Cigarettes     Start date: 1964    Smokeless tobacco: Former    Tobacco comments:     *2ppd for 10 years, 1ppd for 25 years, now 5 cigs per day. Actively working on quitting.   Vaping Use    Vaping status: Never Used   Substance and Sexual Activity    Alcohol use: Yes     Alcohol/week: 6.0 standard drinks of alcohol     Types: 6 Cans of beer per week     Comment: Drinks for sporting events.    Drug use: Not Currently     Types: Marijuana     Comment: Marijuana - as a youth    Sexual activity: Not Currently   Other Topics Concern    Parent/sibling w/ CABG, MI or angioplasty before 65F 55M? No   Social History Narrative    Single.  Retired.     No children    5 siblings:      No family history of bleeding,  clotting disorders or complications with anesthesia.     Social Drivers of Health     Financial Resource Strain: Low Risk  (8/5/2024)    Financial Resource Strain     Within the past 12 months, have you or your family members you live with been unable to get utilities (heat, electricity) when it was really needed?: No   Food Insecurity: Low Risk  (8/5/2024)    Food Insecurity     Within the past 12 months, did you worry that your food would run out before you got money to buy more?: No     Within the past 12 months, did the food you bought just not last and you didn t have money to get more?: No   Transportation Needs: Low Risk  (8/5/2024)    Transportation Needs     Within the past 12 months, has lack of transportation kept you from medical appointments, getting your medicines, non-medical meetings or appointments, work, or from getting things that you need?: No   Physical Activity: Unknown (8/5/2024)    Exercise Vital Sign     Days of Exercise per Week: 4 days     Minutes of Exercise per Session: Not on file   Stress: No Stress Concern Present (8/5/2024)    [employer] - Occupational Stress Questionnaire     Feeling of Stress : Not at all   Social Connections: Unknown (8/5/2024)    Social Connection and Isolation Panel [NHANES]     Frequency of Communication with Friends and Family: Not on file     Frequency of Social Gatherings with Friends and Family: Three times a week     Attends Sikhism Services: Not on file     Active Member of Clubs or Organizations: Not on file     Attends Club or Organization Meetings: Not on file     Marital Status: Not on file   Interpersonal Safety: Low Risk  (8/5/2024)    Interpersonal Safety     Do you feel physically and emotionally safe where you currently live?: Yes     Within the past 12 months, have you been hit, slapped, kicked or otherwise physically hurt by someone?: No     Within the past 12 months, have you been humiliated or emotionally  abused in other ways by your partner or ex-partner?: No   Housing Stability: Low Risk  (8/5/2024)    Housing Stability     Do you have housing? : Yes     Are you worried about losing your housing?: No     A complete ROS was otherwise negative except as noted in the HPI.  There were no vitals taken for this visit.  Exam:   GENERAL APPEARANCE: Well developed, well nourished, alert, and in no apparent distress.  RESP: normal percussion, good air flow throughout.  No crackles. No rhonchi. No wheezes.  NEURO: Mentation intact, speech normal, normal strength and tone, normal gait and stance  PSYCH: mentation appears normal. and affect normal/bright  Results:  Spirometry March 2020 at [facility] moderate obstruction normal diffusion    CT chest 11/2024  IMPRESSION:   1. Stable masslike consolidation with coarse internal calcification in  the posterolateral right upper lobe, favored infectious/inflammatory,  though malignancy remains a consideration.  2. Resolved focal nodular consolidation in the anterior right upper  lobe and new focal nodular opacities in the left upper lobe, likely  waning and waxing infectious processes.  3. Advanced obstructive emphysematous change.  4. Moderate hiatal hernia.    FNA 2/2024  Final Diagnosis   A. LUNG, UPPER LOBE, RIGHT, RIGHT UPPER LOBE NODULE, FINE NEEDLE ASPIRATE:  - Atypical cells present (see comment)  Adequacy: Satisfactory for evaluation but limited by scant cellularity on the cellblock     B. LYMPH NODE(S), STATION 7, FINE NEEDLE ASPIRATE:  - Polymorphous population of lymphocytes  - No evidence of epithelial malignancy  Adequacy: Satisfactory for evaluation     C. LYMPH NODE(S), STATION 4R, FINE NEEDLE ASPIRATE:  - Atypical cells present  in background of polymorphous population of lymphocytes (see comment)  Adequacy: Satisfactory for evaluation but limited by scant cellularity     D. LYMPH NODE(S), STATION 11R, FINE NEEDLE ASPIRATE:  - Nondiagnostic specimen  Adequacy:  Unsatisfactory for evaluation due to absence of lymphoid elements   Comment    Cytological preparations from specimen A show few clusters of atypical epithelial cells in a background of histiocytic inflammation and giant cells.  These cells are somewhat concerning but do not exhibit unequivocal features of malignancy.  Please correlate with concurrent surgical pathology report [id number].  If the clinical suspicion remains high, repeat sampling is recommended.     Cell block from specimen C shows few atypical cell groups in a background of polymorphous lymphoid cells.  These atypical cells are similar in morphology to those seen in specimen A.  However, unequivocal features of malignancy are not seen.      This case was seen in consultation with Dr Andre and Dr. Perkins.         Robot assisted bronchoscopy biopsy 2/2024  Final Diagnosis   A.  LUNG, RIGHT UPPER LOBE NODULE, BIOPSY:  -Rare small atypical glands embedded in pulmonary scar, see comment.     B.  LUNG, LEFT UPPER LOBE ENDOBRONCHIAL LESION, BIOPSY:  -Benign respiratory mucosa with submucosal scarring and focal smooth muscle hyperplasia.     Comment  [facility]   In part A, rare small atypical glands are seen in a background of scarring (additional levels were examined).  Rebiopsy is recommended if there is clinical/radiologic suspicion for malignancy.

## 2025-05-07 ENCOUNTER — TELEPHONE (OUTPATIENT)
Dept: PULMONOLOGY | Facility: CLINIC | Age: 75
End: 2025-05-07
Payer: COMMERCIAL

## 2025-05-07 NOTE — TELEPHONE ENCOUNTER
Left message for Ravin to call back to ask how he would like to enroll in [place] [facility] for medication.

## 2025-05-07 NOTE — TELEPHONE ENCOUNTER
Left Voicemail (1st Attempt) and Sent Mychart (1st Attempt) for the patient to call back and schedule the following:    Appointment type: Return  Provider: Claudia  Return date: Around 9/6/2025  Specialty phone number: [phone]  Additional appointment(s) needed: CT, Full  Additonal Notes: Provider booking out a few months, ok'd to schedule as next avail with clinic review.

## 2025-05-07 NOTE — TELEPHONE ENCOUNTER
Ravin called back while on phone and left message. Called him back and spoke to him and his computer is currently being fixed and he has a hard time with [employer] making him reset his password. He would like the [place] [facility] forms to be emailed to him and he will send back. He would like to use [facility].

## 2025-05-23 NOTE — TELEPHONE ENCOUNTER
Due to Ravin's insurance he has to fill medication at [facility] and they already have a prescription there.     Left message for him to call back and sent magui with [facility] phone number [phone]

## 2025-07-07 ENCOUNTER — PATIENT OUTREACH (OUTPATIENT)
Dept: CARE COORDINATION | Facility: CLINIC | Age: 75
End: 2025-07-07
Payer: COMMERCIAL

## 2025-07-09 ENCOUNTER — TELEPHONE (OUTPATIENT)
Dept: ORTHOPEDICS | Facility: CLINIC | Age: 75
End: 2025-07-09
Payer: COMMERCIAL

## 2025-07-09 DIAGNOSIS — Z71.89 ENCOUNTER FOR HERB AND VITAMIN SUPPLEMENT MANAGEMENT: ICD-10-CM

## 2025-07-09 DIAGNOSIS — M79.642 HAND PAIN, LEFT: Primary | ICD-10-CM

## 2025-07-10 RX ORDER — CHLORAL HYDRATE 500 MG
1 CAPSULE ORAL DAILY
Qty: 90 CAPSULE | Refills: 0 | Status: SHIPPED | OUTPATIENT
Start: 2025-07-10

## 2025-07-10 NOTE — PROGRESS NOTES
Chief Complaint:   Chief Complaint   Patient presents with    Consult     Left hand pain, numbness, lack of strength and palmar nodules       Referring Physician: No ref. provider found    Diagnosis: Left carpal tunnel syndrome, possible left cubital tunnel syndrome  Treatment: None    History of Present Illness: Robert B Behr is a 75 year old RHD male presenting for evaluation of Left hand numbness.  He does have a history of a right ring finger PIP pilon type fracture dislocation and remote history of a ring finger flexor tendon injury.     He says about 1.5 weeks ago, he woke up with numb fingers, involving his thumb/IF/MF/RF. Symptoms are present all the time, not worse at night.  He does not note symptoms in his neck.  He says his hand feels weak.  He noticed some atrophy in his hand a long time ago.  He has not tried a wrist brace.    Clinical documentation by Dr. Sanders on 4/16/2024 was reviewed.    Occupation: retired    Past Medical History:   Past Medical History:   Diagnosis Date    Cataract     COPD (chronic obstructive pulmonary disease) (H)     diagnosed with spirometry     Fractured lateral malleolus 6/14/2015    Lung nodules     CT scan 2016    Osteoporosis     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    Sepsis without acute organ dysfunction, due to unspecified organism (H) 10/20/2023    Tobacco abuse        Past Surgical History:   Past Surgical History:   Procedure Laterality Date    BRONCHOSCOPY RIGID OR FLEXIBLE W/TRANSENDOSCOPIC ENDOBRONCHIAL ULTRASOUND GUIDED N/A 2/12/2024    Procedure: Endobronchial Ultrasound Guided;  Surgeon: Paulo Bryan MD;  [facility]    BRONCHOSCOPY, WITH BIOPSY, ROBOT ASSISTED N/A 2/12/2024    Procedure: BRONCHOSCOPY, ROBOT-ASSISTED, WITH BIOPSY ION;  Surgeon: Paulo Bryan MD;  Location: [facility] OR    CATARACT IOL, RT/LT Left 09/15/2017    s/p CE/IOL left eye    CATARACT IOL, RT/LT Right     CV CORONARY ANGIOGRAM  10/23/2023    Procedure: CV CORONARY  ANGIOGRAM;  Surgeon: Torin Parker MD;  Location:  [facility]ARD[facility] CATH LAB    ESOPHAGOSCOPY, GASTROSCOPY, DUODENOSCOPY (EGD), COMBINED N/A 10/22/2023    Procedure: Esophagoscopy, gastroscopy, duodenoscopy (EGD), combined;  Surgeon: David Post MD;  Location:  GI    PHACOEMULSIFICATION CLEAR CORNEA WITH STANDARD INTRAOCULAR LENS IMPLANT Right 9/30/2016    Procedure: PHACOEMULSIFICATION CLEAR CORNEA WITH STANDARD INTRAOCULAR LENS IMPLANT;  Surgeon: Elly Valencia MD;  Location:  [facility]    PHACOEMULSIFICATION WITH STANDARD INTRAOCULAR LENS IMPLANT Left 9/15/2017    Procedure: PHACOEMULSIFICATION WITH STANDARD INTRAOCULAR LENS IMPLANT;  Left Eye Phacoemulsification with Standard Lens;  Surgeon: Elly Valencia MD;  [facility]: [facility] OR    WRIST SURGERY         Medications:   Current Outpatient Medications:     acetaminophen (TYLENOL) 500 MG tablet, Take 500-1,000 mg by mouth every 6 hours as needed for mild pain., Disp: , Rfl:     albuterol (PROAIR HFA) 108 (90 Base) MCG/ACT inhaler, INHALE 1 TO 2 PUFFS BY MOUTH EVERY 4 HOURS AS NEEDED FOR SHORTNESS OF BREATH (Patient not taking: Reported on 7/16/2025), Disp: 8.5 g, Rfl: 3    atorvastatin (LIPITOR) 40 MG tablet, Take 1 tablet (40 mg) by mouth daily (Patient not taking: Reported on 7/16/2025), Disp: 90 tablet, Rfl: 3    B-Complex-C TABS, Take 1 tablet by mouth 2 times daily., Disp: 180 tablet, Rfl: 3    Calcium Carbonate-Vitamin D (CALCIUM 600 +D HIGH POTENCY) 600-10 MG-MCG TABS, Take 1 tablet by mouth 2 times daily., Disp: 180 tablet, Rfl: 1    COMBIVENT RESPIMAT  MCG/ACT inhaler, INHALE 1 PUFF INTO THE LUNGS FOUR TIMES DAILY AS NEEDED FOR SHORTNESS OF BREATH OR WHEEZING OR COUGH. DO NOT EXCEED 6 DOSES PER DAY, Disp: 4 g, Rfl: 4    Ensifentrine 3 MG/2.5ML SUSP, Inhale 3 mg into the lungs 2 times daily., Disp: 150 mL, Rfl: 11    fish oil-omega-3 fatty acids 1000 MG capsule, TAKE 1 CAPSULE BY MOUTH DAILY, Disp: 90 capsule, Rfl: 0     fluticasone-salmeterol (WIXELA INHUB) 500-50 MCG/ACT inhaler, INHALE 1 PUFF INTO THE LUNGS TWICE DAILY, Disp: 60 each, Rfl: 5    glucosamine-chondroitinoitin 750-600 MG TABS, Take 1 tablet by mouth twice daily, Disp: 180 tablet, Rfl: 3    ipratropium - albuterol 0.5 mg/2.5 mg/3 mL (DUONEB) 0.5-2.5 (3) MG/3ML neb solution, Take 1 vial (3 mLs) by nebulization every 6 hours as needed for shortness of breath, wheezing or cough, Disp: 90 mL, Rfl: 0    Selenium (SELENIMIN-200) 200 MCG TABS tablet, Take 1 tablet (200 mcg) by mouth daily., Disp: 90 tablet, Rfl: 0    vitamin C (ASCORBIC ACID) 1000 MG TABS, Take 1 tablet (1,000 mg) by mouth daily., Disp: 90 tablet, Rfl: 3    vitamin E (TOCOPHEROL) 1000 units (450 mg) CAPS capsule, Take 1 capsule (1,000 Units) by mouth daily., Disp: 90 capsule, Rfl: 3    Allergy: No Known Allergies    Social History:   History   Smoking Status    Every Day    Types: Cigarettes   Smokeless Tobacco    Former      Social History     Tobacco Use    Smoking status: Every Day     Current packs/day: 1.00     Average packs/day: 1 pack/day for 61.5 years (61.5 ttl pk-yrs)     Types: Cigarettes     Start date: 1964    Smokeless tobacco: Former    Tobacco comments:     *2ppd for 10 years, 1ppd for 25 years, now 5 cigs per day. Actively working on quitting.   Vaping Use    Vaping status: Never Used   Substance Use Topics    Alcohol use: Yes     Alcohol/week: 6.0 standard drinks of alcohol     Types: 6 Cans of beer per week     Comment: Drinks for sporting events.    Drug use: Not Currently     Types: Marijuana     Comment: Marijuana - as a youth        Family History:   Family History   Problem Relation Age of Onset    Diabetes Brother         living    Cancer Brother         throat    Macular Degeneration Mother     Colon Cancer No family hx of     Glaucoma No family hx of     Retinal detachment No family hx of     Amblyopia No family hx of     Skin Cancer No family hx of     Melanoma No family hx of   "      Physical Examination:  Vitals:    07/16/25 1430   Weight: 70.3 kg (155 lb)   Height: 1.753 m (5' 9\")     Body mass index is 22.89 kg/m .    Well appearing, well nourished  Alert and oriented x 3, cooperative with exam     Left Upper Extremity:  Thenar atrophy: yes   Tinel's sign (volar wrist): negative   Phalen test: positive   Durkan's test: positive   Hypothenar atrophy: yes   Tinel's sign (medial elbow): negative   Elbow flexion test: negative   Motor Exam:   Abductor pollicis brevis strength: 4/5    1st dorsal interosseous strength: 4/5    Intact thumbs up  Sensory Exam:   Decreased sensation to light touch in volar IF (median).  Sensation intact to light touch in FDWS (radial), volar SF (ulnar).    Two Point Discrimination:   Thumb 5, IF 7, MF 7, RF 7, SF 7  Vascular Exam: fingers warm, well perfused      Imaging/Studies:  XR (3 views) of the left hand was obtained 7/16/2025.  I reviewed the images with the patient.  The imaging study shows mild polyarticular degenerative changes.    Assessment: Robert B Behr is a 75 year old male with left hand numbness/tingling.    Plan:   I had a discussion with the patient regarding my clinical findings, diagnosis, and treatment plan.  I recommend an EMG to further evaluate the source and site of compression since he appears to have both median and ulnar nerve neuropathy.  We discussed a wrist brace in the interim.  After the EMG is complete we will discuss treatment options. The patient understands and agrees to the treatment plan.  All questions answered.      Recommend wrist bracing at night   EMG ordered    Next Visit:   Follow-up: After EMG   Imaging: None.   Plan: Review treatment options    ANUPAMA CHAN MD    "

## 2025-07-16 ENCOUNTER — OFFICE VISIT (OUTPATIENT)
Dept: ORTHOPEDICS | Facility: CLINIC | Age: 75
End: 2025-07-16
Payer: COMMERCIAL

## 2025-07-16 ENCOUNTER — ANCILLARY PROCEDURE (OUTPATIENT)
Dept: GENERAL RADIOLOGY | Facility: CLINIC | Age: 75
End: 2025-07-16
Attending: STUDENT IN AN ORGANIZED HEALTH CARE EDUCATION/TRAINING PROGRAM
Payer: COMMERCIAL

## 2025-07-16 VITALS — HEIGHT: 69 IN | WEIGHT: 155 LBS | BODY MASS INDEX: 22.96 KG/M2

## 2025-07-16 DIAGNOSIS — M79.642 BILATERAL HAND PAIN: Primary | ICD-10-CM

## 2025-07-16 DIAGNOSIS — M79.641 BILATERAL HAND PAIN: Primary | ICD-10-CM

## 2025-07-16 DIAGNOSIS — M79.642 HAND PAIN, LEFT: ICD-10-CM

## 2025-07-16 PROCEDURE — 73130 X-RAY EXAM OF HAND: CPT | Mod: LT | Performed by: RADIOLOGY

## 2025-07-16 PROCEDURE — 99203 OFFICE O/P NEW LOW 30 MIN: CPT | Performed by: STUDENT IN AN ORGANIZED HEALTH CARE EDUCATION/TRAINING PROGRAM

## 2025-07-16 NOTE — NURSING NOTE
"Reason For Visit:   Chief Complaint   Patient presents with    Consult     Left hand pain, numbness, lack of strength and palmar nodules       Primary MD: Timoteo Pickens  Ref. MD: Self    Age: 75 year old    ?  No      Ht 1.753 m (5' 9\")   Wt 70.3 kg (155 lb)   BMI 22.89 kg/m        Pain Assessment  Patient Currently in Pain: No (no left hand pain, just numbness)    Hand Dominance Evaluation  Hand Dominance: Right          QuickDASH Assessment      4/16/2024    10:07 AM   QuickDASH Main   1. Open a tight or new jar No difficulty   2. Do heavy household chores (e.g., wash walls, floors) No difficulty   3. Carry a shopping bag or briefcase No difficulty   4. Wash your back No difficulty   5. Use a knife to cut food No difficulty   6. Recreational activities in which you take some force or impact through your arm, shoulder or hand (e.g., golf, hammering, tennis, etc.) No difficulty   7. During the past week, to what extent has your arm, shoulder or hand problem interfered with your normal social activities with family, friends, neighbours or groups Not at all   8. During the past week, were you limited in your work or other regular daily activities as a result of your arm, shoulder or hand problem Slightly limited   9. Arm, shoulder or hand pain None   10.Tingling (pins and needles) in your arm,shoulder or hand None   11. During the past week, how much difficulty have you had sleeping because of the pain in your arm, shoulder or hand No difficulty   Quickdash Ability Score 2.27          Current Outpatient Medications   Medication Sig Dispense Refill    acetaminophen (TYLENOL) 500 MG tablet Take 500-1,000 mg by mouth every 6 hours as needed for mild pain.      albuterol (PROAIR HFA) 108 (90 Base) MCG/ACT inhaler INHALE 1 TO 2 PUFFS BY MOUTH EVERY 4 HOURS AS NEEDED FOR SHORTNESS OF BREATH (Patient not taking: Reported on 7/16/2025) 8.5 g 3    atorvastatin (LIPITOR) 40 MG tablet Take 1 tablet (40 mg) by " mouth daily (Patient not taking: Reported on 7/16/2025) 90 tablet 3    B-Complex-C TABS Take 1 tablet by mouth 2 times daily. 180 tablet 3    Calcium Carbonate-Vitamin D (CALCIUM 600 +D HIGH POTENCY) 600-10 MG-MCG TABS Take 1 tablet by mouth 2 times daily. 180 tablet 1    COMBIVENT RESPIMAT  MCG/ACT inhaler INHALE 1 PUFF INTO THE LUNGS FOUR TIMES DAILY AS NEEDED FOR SHORTNESS OF BREATH OR WHEEZING OR COUGH. DO NOT EXCEED 6 DOSES PER DAY 4 g 4    Ensifentrine 3 MG/2.5ML SUSP Inhale 3 mg into the lungs 2 times daily. 150 mL 11    fish oil-omega-3 fatty acids 1000 MG capsule TAKE 1 CAPSULE BY MOUTH DAILY 90 capsule 0    fluticasone-salmeterol (WIXELA INHUB) 500-50 MCG/ACT inhaler INHALE 1 PUFF INTO THE LUNGS TWICE DAILY 60 each 5    glucosamine-chondroitinoitin 750-600 MG TABS Take 1 tablet by mouth twice daily 180 tablet 3    ipratropium - albuterol 0.5 mg/2.5 mg/3 mL (DUONEB) 0.5-2.5 (3) MG/3ML neb solution Take 1 vial (3 mLs) by nebulization every 6 hours as needed for shortness of breath, wheezing or cough 90 mL 0    Selenium (SELENIMIN-200) 200 MCG TABS tablet Take 1 tablet (200 mcg) by mouth daily. 90 tablet 0    vitamin C (ASCORBIC ACID) 1000 MG TABS Take 1 tablet (1,000 mg) by mouth daily. 90 tablet 3    vitamin E (TOCOPHEROL) 1000 units (450 mg) CAPS capsule Take 1 capsule (1,000 Units) by mouth daily. 90 capsule 3       No Known Allergies    NAHED LASSITER, ATC

## 2025-07-16 NOTE — LETTER
7/16/2025      Robert B Behr  [address]  [place] [address]      Dear Colleague,    Thank you for referring your patient, Robert B Behr, to the [facility] [place]. Please see a copy of my visit note below.    Chief Complaint:   Chief Complaint   Patient presents with     Consult     Left hand pain, numbness, lack of strength and palmar nodules       Referring Physician: No ref. provider found    Diagnosis: Left carpal tunnel syndrome, possible left cubital tunnel syndrome  Treatment: None    History of Present Illness: Robert B Behr is a 75 year old RHD male presenting for evaluation of Left hand numbness.  He does have a history of a right ring finger PIP pilon type fracture dislocation and remote history of a ring finger flexor tendon injury.     He says about 1.5 weeks ago, he woke up with numb fingers, involving his thumb/IF/MF/RF. Symptoms are present all the time, not worse at night.  He does not note symptoms in his neck.  He says his hand feels weak.  He noticed some atrophy in his hand a long time ago.  He has not tried a wrist brace.    Clinical documentation by Dr. Sanders on 4/16/2024 was reviewed.    Occupation: retired    Past Medical History:   Past Medical History:   Diagnosis Date     Cataract      COPD (chronic obstructive pulmonary disease) (H)     diagnosed with spirometry      Fractured lateral malleolus 6/14/2015     Lung nodules     CT scan 2016     Osteoporosis      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     Sepsis without acute organ dysfunction, due to unspecified organism (H) 10/20/2023     Tobacco abuse        Past Surgical History:   Past Surgical History:   Procedure Laterality Date     BRONCHOSCOPY RIGID OR FLEXIBLE W/TRANSENDOSCOPIC ENDOBRONCHIAL ULTRASOUND GUIDED N/A 2/12/2024    Procedure: Endobronchial Ultrasound Guided;  Surgeon: Paulo Bryan MD;  Location: [facility] OR     BRONCHOSCOPY, WITH BIOPSY, ROBOT ASSISTED N/A 2/12/2024     Procedure: BRONCHOSCOPY, ROBOT-ASSISTED, WITH BIOPSY ION;  Surgeon: Paulo Bryan MD;  Location: [facility] OR     CATARACT IOL, RT/LT Left 09/15/2017    s/p CE/IOL left eye     CATARACT IOL, RT/LT Right      CV CORONARY ANGIOGRAM  10/23/2023    Procedure: CV CORONARY ANGIOGRAM;  Surgeon: Torin Parker MD;  Location:  [facility]     ESOPHAGOSCOPY, GASTROSCOPY, DUODENOSCOPY (EGD), COMBINED N/A 10/22/2023    Procedure: Esophagoscopy, gastroscopy, duodenoscopy (EGD), combined;  Surgeon: David Post MD;  Location:  GI     PHACOEMULSIFICATION CLEAR CORNEA WITH STANDARD INTRAOCULAR LENS IMPLANT Right 9/30/2016    Procedure: PHACOEMULSIFICATION CLEAR CORNEA WITH STANDARD INTRAOCULAR LENS IMPLANT;  Surgeon: Elly Valencia MD;  Location:  [facility]     PHACOEMULSIFICATION WITH STANDARD INTRAOCULAR LENS IMPLANT Left 9/15/2017    Procedure: PHACOEMULSIFICATION WITH STANDARD INTRAOCULAR LENS IMPLANT;  Left Eye Phacoemulsification with Standard Lens;  Surgeon: Elly Valencia MD;  Location: [facility] OR     WRIST SURGERY         Medications:   Current Outpatient Medications:      acetaminophen (TYLENOL) 500 MG tablet, Take 500-1,000 mg by mouth every 6 hours as needed for mild pain., Disp: , Rfl:      albuterol (PROAIR HFA) 108 (90 Base) MCG/ACT inhaler, INHALE 1 TO 2 PUFFS BY MOUTH EVERY 4 HOURS AS NEEDED FOR SHORTNESS OF BREATH (Patient not taking: Reported on 7/16/2025), Disp: 8.5 g, Rfl: 3     atorvastatin (LIPITOR) 40 MG tablet, Take 1 tablet (40 mg) by mouth daily (Patient not taking: Reported on 7/16/2025), Disp: 90 tablet, Rfl: 3     B-Complex-C TABS, Take 1 tablet by mouth 2 times daily., Disp: 180 tablet, Rfl: 3     Calcium Carbonate-Vitamin D (CALCIUM 600 +D HIGH POTENCY) 600-10 MG-MCG TABS, Take 1 tablet by mouth 2 times daily., Disp: 180 tablet, Rfl: 1     COMBIVENT RESPIMAT  MCG/ACT inhaler, INHALE 1 PUFF INTO THE LUNGS FOUR TIMES DAILY AS NEEDED FOR SHORTNESS OF BREATH OR  WHEEZING OR COUGH. DO NOT EXCEED 6 DOSES PER DAY, Disp: 4 g, Rfl: 4     Ensifentrine 3 MG/2.5ML SUSP, Inhale 3 mg into the lungs 2 times daily., Disp: 150 mL, Rfl: 11     fish oil-omega-3 fatty acids 1000 MG capsule, TAKE 1 CAPSULE BY MOUTH DAILY, Disp: 90 capsule, Rfl: 0     fluticasone-salmeterol (WIXELA INHUB) 500-50 MCG/ACT inhaler, INHALE 1 PUFF INTO THE LUNGS TWICE DAILY, Disp: 60 each, Rfl: 5     glucosamine-chondroitinoitin 750-600 MG TABS, Take 1 tablet by mouth twice daily, Disp: 180 tablet, Rfl: 3     ipratropium - albuterol 0.5 mg/2.5 mg/3 mL (DUONEB) 0.5-2.5 (3) MG/3ML neb solution, Take 1 vial (3 mLs) by nebulization every 6 hours as needed for shortness of breath, wheezing or cough, Disp: 90 mL, Rfl: 0     Selenium (SELENIMIN-200) 200 MCG TABS tablet, Take 1 tablet (200 mcg) by mouth daily., Disp: 90 tablet, Rfl: 0     vitamin C (ASCORBIC ACID) 1000 MG TABS, Take 1 tablet (1,000 mg) by mouth daily., Disp: 90 tablet, Rfl: 3     vitamin E (TOCOPHEROL) 1000 units (450 mg) CAPS capsule, Take 1 capsule (1,000 Units) by mouth daily., Disp: 90 capsule, Rfl: 3    Allergy: No Known Allergies    Social History:   History   Smoking Status     Every Day     Types: Cigarettes   Smokeless Tobacco     Former      Social History     Tobacco Use     Smoking status: Every Day     Current packs/day: 1.00     Average packs/day: 1 pack/day for 61.5 years (61.5 ttl pk-yrs)     Types: Cigarettes     Start date: 1964     Smokeless tobacco: Former     Tobacco comments:     *2ppd for 10 years, 1ppd for 25 years, now 5 cigs per day. Actively working on quitting.   Vaping Use     Vaping status: Never Used   Substance Use Topics     Alcohol use: Yes     Alcohol/week: 6.0 standard drinks of alcohol     Types: 6 Cans of beer per week     Comment: Drinks for sporting events.     Drug use: Not Currently     Types: Marijuana     Comment: Marijuana - as a youth        Family History:   Family History   Problem Relation Age of Onset      "Diabetes Brother         living     Cancer Brother         throat     Macular Degeneration Mother      Colon Cancer No family hx of      Glaucoma No family hx of      Retinal detachment No family hx of      Amblyopia No family hx of      Skin Cancer No family hx of      Melanoma No family hx of        Physical Examination:  Vitals:    07/16/25 1430   Weight: 70.3 kg (155 lb)   Height: 1.753 m (5' 9\")     Body mass index is 22.89 kg/m .    Well appearing, well nourished  Alert and oriented x 3, cooperative with exam     Left Upper Extremity:  Thenar atrophy: yes   Tinel's sign (volar wrist): negative   Phalen test: positive   Durkan's test: positive   Hypothenar atrophy: yes   Tinel's sign (medial elbow): negative   Elbow flexion test: negative   Motor Exam:    Abductor pollicis brevis strength: 4/5     1st dorsal interosseous strength: 4/5     Intact thumbs up  Sensory Exam:    Decreased sensation to light touch in volar IF (median).  Sensation intact to light touch in FDWS (radial), volar SF (ulnar).     Two Point Discrimination:   Thumb 5, IF 7, MF 7, RF 7, SF 7  Vascular Exam: fingers warm, well perfused      Imaging/Studies:  XR (3 views) of the left hand was obtained 7/16/2025.  I reviewed the images with the patient.  The imaging study shows mild polyarticular degenerative changes.    Assessment: Robert B Behr is a 75 year old male with left hand numbness/tingling.    Plan:   I had a discussion with the patient regarding my clinical findings, diagnosis, and treatment plan.  I recommend an EMG to further evaluate the source and site of compression since he appears to have both median and ulnar nerve neuropathy.  We discussed a wrist brace in the interim.  After the EMG is complete we will discuss treatment options. The patient understands and agrees to the treatment plan.  All questions answered.       Recommend wrist bracing at night    EMG ordered    Next Visit:    Follow-up: After EMG    Imaging: " None.    Plan: Review treatment options    ANUPAMA CHAN MD      Again, thank you for allowing me to participate in the care of your patient.        Sincerely,        ANUPAMA CHAN MD    Electronically signed

## 2025-07-21 ENCOUNTER — PATIENT OUTREACH (OUTPATIENT)
Dept: CARE COORDINATION | Facility: CLINIC | Age: 75
End: 2025-07-21
Payer: COMMERCIAL

## 2025-07-22 ENCOUNTER — TELEPHONE (OUTPATIENT)
Dept: PULMONOLOGY | Facility: CLINIC | Age: 75
End: 2025-07-22
Payer: COMMERCIAL

## 2025-07-22 ENCOUNTER — TELEPHONE (OUTPATIENT)
Dept: FAMILY MEDICINE | Facility: CLINIC | Age: 75
End: 2025-07-22
Payer: COMMERCIAL

## 2025-07-22 NOTE — TELEPHONE ENCOUNTER
Reason for Call:  Appointment Request    Patient requesting this type of appt:  frequently tired and light headed     Requested provider: Timoteo Pickens is preferred but any provider is okay    Reason patient unable to be scheduled: Not within requested timeframe    When does patient want to be seen/preferred time: 3-7 days    Comments: Requesting a work in. Surrounding [facility] and \[facility] [place] [facility]\"" clinics are all booking into August.    Could we send this [place] to you [place] or would you prefer to receive a phone call?:   Patient would prefer a phone call   Okay to leave a detailed message?: Yes at Home number on file [phone] (home)    Call taken on 7/22/2025 at 1:13 PM by Hunter Medina

## 2025-07-22 NOTE — TELEPHONE ENCOUNTER
RN spoke to patient and he is requesting an appt with Pulmonologist very soon. Patient reports being very tired all the time. Denies any SOB, chest pains, or respiratory symptoms. RN advised that it would be best to be seen by his Primary Care Provider, RN provided [facility] number to patient. Patient is agreeable to calling PCP and schedule appt with them.     -AFIA Harkins

## 2025-07-27 DIAGNOSIS — J44.9 COPD (CHRONIC OBSTRUCTIVE PULMONARY DISEASE) (H): ICD-10-CM

## 2025-07-28 DIAGNOSIS — J44.9 COPD (CHRONIC OBSTRUCTIVE PULMONARY DISEASE) (H): ICD-10-CM

## 2025-07-28 RX ORDER — IPRATROPIUM BROMIDE AND ALBUTEROL 20; 100 UG/1; UG/1
SPRAY, METERED RESPIRATORY (INHALATION)
Qty: 4 G | Refills: 9 | OUTPATIENT
Start: 2025-07-28

## 2025-07-28 RX ORDER — IPRATROPIUM BROMIDE AND ALBUTEROL 20; 100 UG/1; UG/1
SPRAY, METERED RESPIRATORY (INHALATION)
Qty: 4 G | Refills: 9 | Status: SHIPPED | OUTPATIENT
Start: 2025-07-28

## 2025-07-28 NOTE — TELEPHONE ENCOUNTER
Writer called patient to let him know, we denied a request for Combivent because the medication had just been filled earlier today. Duplicate request. Left message

## 2025-08-16 DIAGNOSIS — Z71.89 ENCOUNTER FOR HERB AND VITAMIN SUPPLEMENT MANAGEMENT: ICD-10-CM

## 2025-08-19 RX ORDER — SELENIUM 200 MCG
200 TABLET ORAL DAILY
Qty: 90 TABLET | Refills: 0 | Status: SHIPPED | OUTPATIENT
Start: 2025-08-19

## 2025-08-22 DIAGNOSIS — J44.9 COPD (CHRONIC OBSTRUCTIVE PULMONARY DISEASE) (H): ICD-10-CM

## 2025-08-25 RX ORDER — IPRATROPIUM BROMIDE AND ALBUTEROL 20; 100 UG/1; UG/1
SPRAY, METERED RESPIRATORY (INHALATION)
Qty: 4 G | Refills: 9 | Status: SHIPPED | OUTPATIENT
Start: 2025-08-25

## (undated) DEVICE — ENDO VALVE SUCTION ULTRASOUND BRONCH MAJ-1414

## (undated) DEVICE — EYE SOL BSS 500ML

## (undated) DEVICE — EYE PACK PHACO AVS W/2.6 SLEEVE BLV2260000

## (undated) DEVICE — EYE CANN IRR 25GA CYSTOTOME 581610

## (undated) DEVICE — DRSG TELFA 3X8" 1238

## (undated) DEVICE — CATH ANGIO INFINITI JL4 4FRX100CM 538420

## (undated) DEVICE — CATH ANGIO INFINITI 3DRC 4FRX100CM 538476

## (undated) DEVICE — ENDO FORCEP ALLIGATOR JAW BIOPSY 2MMX100CM FB-211D

## (undated) DEVICE — SYR 30ML SLIP TIP W/O NDL 302833

## (undated) DEVICE — PITCHER STERILE 1000ML  SSK9004A

## (undated) DEVICE — EYE SHIELD PLASTIC

## (undated) DEVICE — ENDO ADPT BRONCH SWIVEL Y A1002

## (undated) DEVICE — EYE CANN IRR 27GA ANTERIOR CHAMBER 581280

## (undated) DEVICE — TUBING SUCTION 10'X3/16" N510

## (undated) DEVICE — SYR 10ML SLIP TIP W/O NDL 303134

## (undated) DEVICE — NDL BIOPSY 21G FLEXISION ION 490103

## (undated) DEVICE — GLOVE PROTEXIS MICRO 6.5  2D73PM65

## (undated) DEVICE — ENDO VALVE SYR NDL KIT ULTRASOUND BRONCH NA-201SX-4022-A

## (undated) DEVICE — SPECIMEN TRAP MUCOUS 40ML LUKI C30200A

## (undated) DEVICE — EYE KNIFE SLIT XSTAR VISITEC 2.6MM 45DEG 373726

## (undated) DEVICE — LINEN TOWEL PACK X5 5464

## (undated) DEVICE — PACK CATARACT CUSTOM ASC SEY15CPUMC

## (undated) DEVICE — TUBING PRESSURE 30"

## (undated) DEVICE — MANIFOLD KIT ANGIO AUTOMATED 014613

## (undated) DEVICE — ENDO VALVE BX EVIS MAJ-210

## (undated) DEVICE — EYE KNIFE STILETTO VISITEC 1.1MM ANG 45DEG SIDEPORT 376620

## (undated) DEVICE — KIT HAND CONTROL ACIST 016795

## (undated) DEVICE — EYE PREP BETADINE 5% SOLUTION 30ML 0065-0411-30

## (undated) DEVICE — KIT ENDO FIRST STEP DISINFECTANT 200ML W/POUCH EP-4

## (undated) DEVICE — ADAPTER PROBE/SUCTION VISION ION 490101

## (undated) DEVICE — ENDO VALVE SUCTION BRONCH EVIS MAJ-209

## (undated) DEVICE — LABEL MEDICATION SYSTEM 3303-P

## (undated) DEVICE — SOL NACL 0.9% IRRIG 1000ML BOTTLE 2F7124

## (undated) DEVICE — INTRO SHEATH AVANTI 4FRX23CM 504604T

## (undated) DEVICE — CATH FOGARTY EMBOLECTOMY 5FR 80CM LATEX 120805FP

## (undated) DEVICE — SYR 50ML SLIP TIP W/O NDL 309654

## (undated) DEVICE — CONNECTOR SWIVEL 7.0MM ION 490108

## (undated) DEVICE — PACK HEART LEFT CUSTOM

## (undated) DEVICE — LUBRICANT INST KIT ENDO-LUBE 220-90

## (undated) DEVICE — CATH ANGIO INFINITI JL5 4FRX100CM 538422

## (undated) DEVICE — SLEEVE TR BAND RADIAL COMPRESSION DEVICE 24CM TRB24-REG

## (undated) DEVICE — BAG INSTRUMENT IV VISION ION 490127

## (undated) DEVICE — GUIDE WIRE NITRIX NIT 0 DEGREE .018INX60CM N180601

## (undated) DEVICE — SHTH INTRO 0.021IN ID 6FR DIA

## (undated) RX ORDER — HEPARIN SODIUM 1000 [USP'U]/ML
INJECTION, SOLUTION INTRAVENOUS; SUBCUTANEOUS
Status: DISPENSED
Start: 2023-10-23

## (undated) RX ORDER — FENTANYL CITRATE 50 UG/ML
INJECTION, SOLUTION INTRAMUSCULAR; INTRAVENOUS
Status: DISPENSED
Start: 2023-10-22

## (undated) RX ORDER — PROPOFOL 10 MG/ML
INJECTION, EMULSION INTRAVENOUS
Status: DISPENSED
Start: 2024-02-12

## (undated) RX ORDER — ACETAMINOPHEN 325 MG/1
TABLET ORAL
Status: DISPENSED
Start: 2024-02-12

## (undated) RX ORDER — NITROGLYCERIN 5 MG/ML
VIAL (ML) INTRAVENOUS
Status: DISPENSED
Start: 2023-10-23

## (undated) RX ORDER — FENTANYL CITRATE-0.9 % NACL/PF 10 MCG/ML
PLASTIC BAG, INJECTION (ML) INTRAVENOUS
Status: DISPENSED
Start: 2024-02-12

## (undated) RX ORDER — FENTANYL CITRATE 50 UG/ML
INJECTION, SOLUTION INTRAMUSCULAR; INTRAVENOUS
Status: DISPENSED
Start: 2024-02-12

## (undated) RX ORDER — ALBUTEROL SULFATE 0.83 MG/ML
SOLUTION RESPIRATORY (INHALATION)
Status: DISPENSED
Start: 2017-08-28

## (undated) RX ORDER — FENTANYL CITRATE 50 UG/ML
INJECTION, SOLUTION INTRAMUSCULAR; INTRAVENOUS
Status: DISPENSED
Start: 2023-10-23

## (undated) RX ORDER — NICARDIPINE HCL-0.9% SOD CHLOR 1 MG/10 ML
SYRINGE (ML) INTRAVENOUS
Status: DISPENSED
Start: 2023-10-23

## (undated) RX ORDER — ONDANSETRON 2 MG/ML
INJECTION INTRAMUSCULAR; INTRAVENOUS
Status: DISPENSED
Start: 2024-02-12

## (undated) RX ORDER — DEXAMETHASONE SODIUM PHOSPHATE 4 MG/ML
INJECTION, SOLUTION INTRA-ARTICULAR; INTRALESIONAL; INTRAMUSCULAR; INTRAVENOUS; SOFT TISSUE
Status: DISPENSED
Start: 2024-02-12

## (undated) RX ORDER — FENTANYL CITRATE 50 UG/ML
INJECTION, SOLUTION INTRAMUSCULAR; INTRAVENOUS
Status: DISPENSED
Start: 2017-09-15